# Patient Record
Sex: MALE | Race: WHITE | NOT HISPANIC OR LATINO | Employment: OTHER | URBAN - NONMETROPOLITAN AREA
[De-identification: names, ages, dates, MRNs, and addresses within clinical notes are randomized per-mention and may not be internally consistent; named-entity substitution may affect disease eponyms.]

---

## 2019-08-30 ENCOUNTER — TRANSCRIBE ORDERS (OUTPATIENT)
Dept: ADMINISTRATIVE | Facility: HOSPITAL | Age: 59
End: 2019-08-30

## 2019-08-30 ENCOUNTER — HOSPITAL ENCOUNTER (OUTPATIENT)
Dept: GENERAL RADIOLOGY | Facility: HOSPITAL | Age: 59
Discharge: HOME | End: 2019-08-30
Admitting: FAMILY MEDICINE

## 2019-08-30 DIAGNOSIS — M54.5 LOW BACK PAIN, UNSPECIFIED BACK PAIN LATERALITY, UNSPECIFIED CHRONICITY, WITH SCIATICA PRESENCE UNSPECIFIED: ICD-10-CM

## 2019-08-30 DIAGNOSIS — M54.5 LOW BACK PAIN, UNSPECIFIED BACK PAIN LATERALITY, UNSPECIFIED CHRONICITY, WITH SCIATICA PRESENCE UNSPECIFIED: Primary | ICD-10-CM

## 2019-08-30 PROCEDURE — 72050 X-RAY EXAM NECK SPINE 4/5VWS: CPT | Performed by: RADIOLOGY

## 2019-08-30 PROCEDURE — 72110 X-RAY EXAM L-2 SPINE 4/>VWS: CPT

## 2019-08-30 PROCEDURE — 72050 X-RAY EXAM NECK SPINE 4/5VWS: CPT

## 2019-08-30 PROCEDURE — 72110 X-RAY EXAM L-2 SPINE 4/>VWS: CPT | Performed by: RADIOLOGY

## 2020-05-26 ENCOUNTER — HOSPITAL ENCOUNTER (EMERGENCY)
Facility: HOSPITAL | Age: 60
Discharge: ANOTHER HOSPITAL | End: 2020-05-26
Attending: EMERGENCY MEDICINE | Admitting: EMERGENCY MEDICINE

## 2020-05-26 VITALS
DIASTOLIC BLOOD PRESSURE: 74 MMHG | BODY MASS INDEX: 24.25 KG/M2 | SYSTOLIC BLOOD PRESSURE: 105 MMHG | HEIGHT: 68 IN | TEMPERATURE: 98.7 F | RESPIRATION RATE: 18 BRPM | HEART RATE: 68 BPM | OXYGEN SATURATION: 94 % | WEIGHT: 160 LBS

## 2020-05-26 DIAGNOSIS — T18.128A ESOPHAGEAL OBSTRUCTION DUE TO FOOD IMPACTION: Primary | ICD-10-CM

## 2020-05-26 DIAGNOSIS — K22.2 ESOPHAGEAL OBSTRUCTION DUE TO FOOD IMPACTION: Primary | ICD-10-CM

## 2020-05-26 LAB
ALBUMIN SERPL-MCNC: 4.56 G/DL (ref 3.5–5.2)
ALBUMIN/GLOB SERPL: 1.5 G/DL
ALP SERPL-CCNC: 80 U/L (ref 39–117)
ALT SERPL W P-5'-P-CCNC: 9 U/L (ref 1–41)
ANION GAP SERPL CALCULATED.3IONS-SCNC: 15.5 MMOL/L (ref 5–15)
AST SERPL-CCNC: 12 U/L (ref 1–40)
BASOPHILS # BLD AUTO: 0.04 10*3/MM3 (ref 0–0.2)
BASOPHILS NFR BLD AUTO: 0.4 % (ref 0–1.5)
BILIRUB SERPL-MCNC: 0.9 MG/DL (ref 0.2–1.2)
BUN BLD-MCNC: 23 MG/DL (ref 8–23)
BUN/CREAT SERPL: 20.4 (ref 7–25)
CALCIUM SPEC-SCNC: 9.7 MG/DL (ref 8.6–10.5)
CHLORIDE SERPL-SCNC: 103 MMOL/L (ref 98–107)
CO2 SERPL-SCNC: 21.5 MMOL/L (ref 22–29)
CREAT BLD-MCNC: 1.13 MG/DL (ref 0.76–1.27)
DEPRECATED RDW RBC AUTO: 40.3 FL (ref 37–54)
EOSINOPHIL # BLD AUTO: 0.03 10*3/MM3 (ref 0–0.4)
EOSINOPHIL NFR BLD AUTO: 0.3 % (ref 0.3–6.2)
ERYTHROCYTE [DISTWIDTH] IN BLOOD BY AUTOMATED COUNT: 12.2 % (ref 12.3–15.4)
GFR SERPL CREATININE-BSD FRML MDRD: 66 ML/MIN/1.73
GLOBULIN UR ELPH-MCNC: 3 GM/DL
GLUCOSE BLD-MCNC: 109 MG/DL (ref 65–99)
HCT VFR BLD AUTO: 54.2 % (ref 37.5–51)
HGB BLD-MCNC: 18.3 G/DL (ref 13–17.7)
IMM GRANULOCYTES # BLD AUTO: 0.04 10*3/MM3 (ref 0–0.05)
IMM GRANULOCYTES NFR BLD AUTO: 0.4 % (ref 0–0.5)
LYMPHOCYTES # BLD AUTO: 2 10*3/MM3 (ref 0.7–3.1)
LYMPHOCYTES NFR BLD AUTO: 18.2 % (ref 19.6–45.3)
MCH RBC QN AUTO: 30.6 PG (ref 26.6–33)
MCHC RBC AUTO-ENTMCNC: 33.8 G/DL (ref 31.5–35.7)
MCV RBC AUTO: 90.5 FL (ref 79–97)
MONOCYTES # BLD AUTO: 0.7 10*3/MM3 (ref 0.1–0.9)
MONOCYTES NFR BLD AUTO: 6.4 % (ref 5–12)
NEUTROPHILS # BLD AUTO: 8.16 10*3/MM3 (ref 1.7–7)
NEUTROPHILS NFR BLD AUTO: 74.3 % (ref 42.7–76)
NRBC BLD AUTO-RTO: 0 /100 WBC (ref 0–0.2)
PLATELET # BLD AUTO: 236 10*3/MM3 (ref 140–450)
PMV BLD AUTO: 10.4 FL (ref 6–12)
POTASSIUM BLD-SCNC: 4.1 MMOL/L (ref 3.5–5.2)
PROT SERPL-MCNC: 7.6 G/DL (ref 6–8.5)
RBC # BLD AUTO: 5.99 10*6/MM3 (ref 4.14–5.8)
SODIUM BLD-SCNC: 140 MMOL/L (ref 136–145)
WBC NRBC COR # BLD: 10.97 10*3/MM3 (ref 3.4–10.8)

## 2020-05-26 PROCEDURE — 96374 THER/PROPH/DIAG INJ IV PUSH: CPT

## 2020-05-26 PROCEDURE — 80053 COMPREHEN METABOLIC PANEL: CPT | Performed by: PHYSICIAN ASSISTANT

## 2020-05-26 PROCEDURE — 25010000002 GLUCAGON (HUMAN RECOMBINANT) 1 MG RECONSTITUTED SOLUTION: Performed by: PHYSICIAN ASSISTANT

## 2020-05-26 PROCEDURE — 99283 EMERGENCY DEPT VISIT LOW MDM: CPT

## 2020-05-26 PROCEDURE — 85025 COMPLETE CBC W/AUTO DIFF WBC: CPT | Performed by: PHYSICIAN ASSISTANT

## 2020-05-26 RX ORDER — SODIUM CHLORIDE 0.9 % (FLUSH) 0.9 %
10 SYRINGE (ML) INJECTION AS NEEDED
Status: DISCONTINUED | OUTPATIENT
Start: 2020-05-26 | End: 2020-05-26 | Stop reason: HOSPADM

## 2020-05-26 RX ADMIN — SODIUM CHLORIDE 1000 ML: 9 INJECTION, SOLUTION INTRAVENOUS at 16:45

## 2020-05-26 RX ADMIN — GLUCAGON HYDROCHLORIDE 1 MG: 1 INJECTION, POWDER, FOR SOLUTION INTRAMUSCULAR; INTRAVENOUS; SUBCUTANEOUS at 16:45

## 2020-09-01 ENCOUNTER — APPOINTMENT (OUTPATIENT)
Dept: CT IMAGING | Facility: HOSPITAL | Age: 60
End: 2020-09-01

## 2020-09-01 ENCOUNTER — APPOINTMENT (OUTPATIENT)
Dept: GENERAL RADIOLOGY | Facility: HOSPITAL | Age: 60
End: 2020-09-01

## 2020-09-01 ENCOUNTER — HOSPITAL ENCOUNTER (INPATIENT)
Facility: HOSPITAL | Age: 60
LOS: 9 days | Discharge: HOME | End: 2020-09-10
Attending: FAMILY MEDICINE | Admitting: INTERNAL MEDICINE

## 2020-09-01 DIAGNOSIS — R94.39 ABNORMAL NUCLEAR STRESS TEST: ICD-10-CM

## 2020-09-01 DIAGNOSIS — M54.50 CHRONIC MIDLINE LOW BACK PAIN, UNSPECIFIED WHETHER SCIATICA PRESENT: ICD-10-CM

## 2020-09-01 DIAGNOSIS — G89.29 CHRONIC MIDLINE LOW BACK PAIN, UNSPECIFIED WHETHER SCIATICA PRESENT: ICD-10-CM

## 2020-09-01 DIAGNOSIS — I48.91 ATRIAL FIBRILLATION WITH RVR (HCC): Primary | ICD-10-CM

## 2020-09-01 DIAGNOSIS — I50.21 ACUTE SYSTOLIC HEART FAILURE (HCC): ICD-10-CM

## 2020-09-01 LAB
6-ACETYL MORPHINE: NEGATIVE
ALBUMIN SERPL-MCNC: 4.03 G/DL (ref 3.5–5.2)
ALBUMIN/GLOB SERPL: 1.5 G/DL
ALP SERPL-CCNC: 96 U/L (ref 39–117)
ALT SERPL W P-5'-P-CCNC: 30 U/L (ref 1–41)
AMPHET+METHAMPHET UR QL: NEGATIVE
ANION GAP SERPL CALCULATED.3IONS-SCNC: 9.9 MMOL/L (ref 5–15)
APAP SERPL-MCNC: <5 MCG/ML (ref 10–30)
AST SERPL-CCNC: 24 U/L (ref 1–40)
BACTERIA UR QL AUTO: ABNORMAL /HPF
BARBITURATES UR QL SCN: NEGATIVE
BASOPHILS # BLD AUTO: 0.07 10*3/MM3 (ref 0–0.2)
BASOPHILS NFR BLD AUTO: 0.8 % (ref 0–1.5)
BENZODIAZ UR QL SCN: NEGATIVE
BILIRUB SERPL-MCNC: 0.4 MG/DL (ref 0–1.2)
BILIRUB UR QL STRIP: NEGATIVE
BUN SERPL-MCNC: 11 MG/DL (ref 8–23)
BUN/CREAT SERPL: 9.8 (ref 7–25)
BUPRENORPHINE SERPL-MCNC: POSITIVE NG/ML
CA-I SERPL ISE-MCNC: 1.25 MMOL/L (ref 1.12–1.32)
CALCIUM SPEC-SCNC: 8.9 MG/DL (ref 8.6–10.5)
CANNABINOIDS SERPL QL: POSITIVE
CHLORIDE SERPL-SCNC: 103 MMOL/L (ref 98–107)
CLARITY UR: CLEAR
CO2 SERPL-SCNC: 25.1 MMOL/L (ref 22–29)
COCAINE UR QL: NEGATIVE
COLOR UR: YELLOW
CREAT SERPL-MCNC: 1.12 MG/DL (ref 0.76–1.27)
CRP SERPL-MCNC: 0.3 MG/DL (ref 0–0.5)
D-LACTATE SERPL-SCNC: 1.2 MMOL/L (ref 0.5–2)
DEPRECATED RDW RBC AUTO: 43.6 FL (ref 37–54)
EOSINOPHIL # BLD AUTO: 0.09 10*3/MM3 (ref 0–0.4)
EOSINOPHIL NFR BLD AUTO: 1 % (ref 0.3–6.2)
ERYTHROCYTE [DISTWIDTH] IN BLOOD BY AUTOMATED COUNT: 12.9 % (ref 12.3–15.4)
ETHANOL BLD-MCNC: <10 MG/DL (ref 0–10)
ETHANOL UR QL: <0.01 %
FLUAV AG NPH QL: NEGATIVE
FLUBV AG NPH QL IA: NEGATIVE
GFR SERPL CREATININE-BSD FRML MDRD: 67 ML/MIN/1.73
GLOBULIN UR ELPH-MCNC: 2.8 GM/DL
GLUCOSE SERPL-MCNC: 114 MG/DL (ref 65–99)
GLUCOSE UR STRIP-MCNC: NEGATIVE MG/DL
HCT VFR BLD AUTO: 48.9 % (ref 37.5–51)
HGB BLD-MCNC: 16.1 G/DL (ref 13–17.7)
HGB UR QL STRIP.AUTO: NEGATIVE
HOLD SPECIMEN: NORMAL
HOLD SPECIMEN: NORMAL
HYALINE CASTS UR QL AUTO: ABNORMAL /LPF
IMM GRANULOCYTES # BLD AUTO: 0.05 10*3/MM3 (ref 0–0.05)
IMM GRANULOCYTES NFR BLD AUTO: 0.5 % (ref 0–0.5)
INR PPP: 0.97 (ref 0.9–1.1)
KETONES UR QL STRIP: NEGATIVE
LEUKOCYTE ESTERASE UR QL STRIP.AUTO: NEGATIVE
LIPASE SERPL-CCNC: 53 U/L (ref 13–60)
LYMPHOCYTES # BLD AUTO: 1.77 10*3/MM3 (ref 0.7–3.1)
LYMPHOCYTES NFR BLD AUTO: 19.4 % (ref 19.6–45.3)
MAGNESIUM SERPL-MCNC: 2.3 MG/DL (ref 1.6–2.4)
MCH RBC QN AUTO: 30.2 PG (ref 26.6–33)
MCHC RBC AUTO-ENTMCNC: 32.9 G/DL (ref 31.5–35.7)
MCV RBC AUTO: 91.7 FL (ref 79–97)
METHADONE UR QL SCN: NEGATIVE
MONOCYTES # BLD AUTO: 0.65 10*3/MM3 (ref 0.1–0.9)
MONOCYTES NFR BLD AUTO: 7.1 % (ref 5–12)
NEUTROPHILS NFR BLD AUTO: 6.49 10*3/MM3 (ref 1.7–7)
NEUTROPHILS NFR BLD AUTO: 71.2 % (ref 42.7–76)
NITRITE UR QL STRIP: NEGATIVE
NRBC BLD AUTO-RTO: 0 /100 WBC (ref 0–0.2)
NT-PROBNP SERPL-MCNC: 4722 PG/ML (ref 0–900)
OPIATES UR QL: NEGATIVE
OXYCODONE UR QL SCN: NEGATIVE
PCP UR QL SCN: NEGATIVE
PH UR STRIP.AUTO: 5.5 [PH] (ref 5–8)
PLATELET # BLD AUTO: 276 10*3/MM3 (ref 140–450)
PMV BLD AUTO: 10.3 FL (ref 6–12)
POTASSIUM SERPL-SCNC: 4.7 MMOL/L (ref 3.5–5.2)
PROCALCITONIN SERPL-MCNC: 0.03 NG/ML (ref 0–0.25)
PROT SERPL-MCNC: 6.8 G/DL (ref 6–8.5)
PROT UR QL STRIP: ABNORMAL
PROTHROMBIN TIME: 12.7 SECONDS (ref 11.9–14.1)
RBC # BLD AUTO: 5.33 10*6/MM3 (ref 4.14–5.8)
RBC # UR: ABNORMAL /HPF
REF LAB TEST METHOD: ABNORMAL
SALICYLATES SERPL-MCNC: <0.3 MG/DL
SODIUM SERPL-SCNC: 138 MMOL/L (ref 136–145)
SP GR UR STRIP: 1.02 (ref 1–1.03)
SPERM URNS QL MICRO: ABNORMAL /HPF
SQUAMOUS #/AREA URNS HPF: ABNORMAL /HPF
TROPONIN T SERPL-MCNC: <0.01 NG/ML (ref 0–0.03)
TROPONIN T SERPL-MCNC: <0.01 NG/ML (ref 0–0.03)
UROBILINOGEN UR QL STRIP: ABNORMAL
WBC # BLD AUTO: 9.12 10*3/MM3 (ref 3.4–10.8)
WBC UR QL AUTO: ABNORMAL /HPF
WHOLE BLOOD HOLD SPECIMEN: NORMAL
WHOLE BLOOD HOLD SPECIMEN: NORMAL

## 2020-09-01 PROCEDURE — 71045 X-RAY EXAM CHEST 1 VIEW: CPT | Performed by: RADIOLOGY

## 2020-09-01 PROCEDURE — 83735 ASSAY OF MAGNESIUM: CPT | Performed by: FAMILY MEDICINE

## 2020-09-01 PROCEDURE — 84484 ASSAY OF TROPONIN QUANT: CPT | Performed by: FAMILY MEDICINE

## 2020-09-01 PROCEDURE — 80307 DRUG TEST PRSMV CHEM ANLYZR: CPT | Performed by: FAMILY MEDICINE

## 2020-09-01 PROCEDURE — 87040 BLOOD CULTURE FOR BACTERIA: CPT | Performed by: FAMILY MEDICINE

## 2020-09-01 PROCEDURE — 85610 PROTHROMBIN TIME: CPT | Performed by: FAMILY MEDICINE

## 2020-09-01 PROCEDURE — 99284 EMERGENCY DEPT VISIT MOD MDM: CPT

## 2020-09-01 PROCEDURE — 86140 C-REACTIVE PROTEIN: CPT | Performed by: FAMILY MEDICINE

## 2020-09-01 PROCEDURE — 85025 COMPLETE CBC W/AUTO DIFF WBC: CPT | Performed by: FAMILY MEDICINE

## 2020-09-01 PROCEDURE — 87804 INFLUENZA ASSAY W/OPTIC: CPT | Performed by: FAMILY MEDICINE

## 2020-09-01 PROCEDURE — 81001 URINALYSIS AUTO W/SCOPE: CPT | Performed by: FAMILY MEDICINE

## 2020-09-01 PROCEDURE — 71275 CT ANGIOGRAPHY CHEST: CPT

## 2020-09-01 PROCEDURE — 71045 X-RAY EXAM CHEST 1 VIEW: CPT

## 2020-09-01 PROCEDURE — 83605 ASSAY OF LACTIC ACID: CPT | Performed by: FAMILY MEDICINE

## 2020-09-01 PROCEDURE — 82330 ASSAY OF CALCIUM: CPT | Performed by: FAMILY MEDICINE

## 2020-09-01 PROCEDURE — 0 IOVERSOL 74 % SOLUTION: Performed by: FAMILY MEDICINE

## 2020-09-01 PROCEDURE — 84145 PROCALCITONIN (PCT): CPT | Performed by: FAMILY MEDICINE

## 2020-09-01 PROCEDURE — 83690 ASSAY OF LIPASE: CPT | Performed by: FAMILY MEDICINE

## 2020-09-01 PROCEDURE — 93005 ELECTROCARDIOGRAM TRACING: CPT | Performed by: FAMILY MEDICINE

## 2020-09-01 PROCEDURE — 71275 CT ANGIOGRAPHY CHEST: CPT | Performed by: RADIOLOGY

## 2020-09-01 PROCEDURE — 93010 ELECTROCARDIOGRAM REPORT: CPT | Performed by: INTERNAL MEDICINE

## 2020-09-01 PROCEDURE — 80053 COMPREHEN METABOLIC PANEL: CPT | Performed by: FAMILY MEDICINE

## 2020-09-01 PROCEDURE — 25010000002 ENOXAPARIN PER 10 MG: Performed by: INTERNAL MEDICINE

## 2020-09-01 PROCEDURE — 99223 1ST HOSP IP/OBS HIGH 75: CPT | Performed by: INTERNAL MEDICINE

## 2020-09-01 PROCEDURE — 83880 ASSAY OF NATRIURETIC PEPTIDE: CPT | Performed by: FAMILY MEDICINE

## 2020-09-01 RX ORDER — SODIUM CHLORIDE 0.9 % (FLUSH) 0.9 %
10 SYRINGE (ML) INJECTION EVERY 12 HOURS SCHEDULED
Status: DISCONTINUED | OUTPATIENT
Start: 2020-09-01 | End: 2020-09-10 | Stop reason: HOSPADM

## 2020-09-01 RX ORDER — DILTIAZEM HYDROCHLORIDE 5 MG/ML
10 INJECTION INTRAVENOUS ONCE
Status: COMPLETED | OUTPATIENT
Start: 2020-09-01 | End: 2020-09-01

## 2020-09-01 RX ORDER — HYDROCODONE BITARTRATE AND ACETAMINOPHEN 5; 325 MG/1; MG/1
1 TABLET ORAL ONCE
Status: COMPLETED | OUTPATIENT
Start: 2020-09-02 | End: 2020-09-02

## 2020-09-01 RX ORDER — SODIUM CHLORIDE 0.9 % (FLUSH) 0.9 %
10 SYRINGE (ML) INJECTION AS NEEDED
Status: DISCONTINUED | OUTPATIENT
Start: 2020-09-01 | End: 2020-09-10 | Stop reason: HOSPADM

## 2020-09-01 RX ORDER — ASPIRIN 81 MG/1
81 TABLET ORAL DAILY
COMMUNITY
End: 2020-09-10 | Stop reason: HOSPADM

## 2020-09-01 RX ADMIN — SODIUM CHLORIDE 5 MG/HR: 900 INJECTION, SOLUTION INTRAVENOUS at 12:48

## 2020-09-01 RX ADMIN — ENOXAPARIN SODIUM 40 MG: 40 INJECTION SUBCUTANEOUS at 18:15

## 2020-09-01 RX ADMIN — DILTIAZEM HYDROCHLORIDE 10 MG: 5 INJECTION INTRAVENOUS at 12:48

## 2020-09-01 RX ADMIN — SODIUM CHLORIDE 500 ML: 9 INJECTION, SOLUTION INTRAVENOUS at 12:44

## 2020-09-01 RX ADMIN — IOVERSOL 100 ML: 741 INJECTION INTRA-ARTERIAL; INTRAVENOUS at 15:37

## 2020-09-01 RX ADMIN — SODIUM CHLORIDE 10 MG/HR: 900 INJECTION, SOLUTION INTRAVENOUS at 23:01

## 2020-09-01 RX ADMIN — SODIUM CHLORIDE, PRESERVATIVE FREE 10 ML: 5 INJECTION INTRAVENOUS at 20:14

## 2020-09-01 NOTE — ED NOTES
Consent for contrast obtained at this time and placed onto chart.     Roxana Mo, RN  09/01/20 0962

## 2020-09-01 NOTE — ED PROVIDER NOTES
Subjective   60-year-old male with a history of coronary disease status post stent placement, hypertension presents the emergency room with complaints of shortness of breath and palpitations.  Patient reports that his been feeling short of breath present his heart racing over the past few days.  Patient states he had a cough is nonproductive.  Denies fever chills.  He denies nausea vomiting.  Patient states that he is also noticed lower extremity swelling.  He states that he tried to get to the emergency room when symptoms worsen began but has been unable due to transportation reasons.  He states that he was unable to contact EMS due to not having phone service.  Patient denies chest pressure chest pain at this time.  Patient states that he takes aspirin daily but he denies any other medications.  He states he currently is camping.  He reports his stent was placed in 2003 in [facility]      Shortness of Breath   Severity:  Moderate  Timing:  Constant  Progression:  Worsening  Chronicity:  New  Relieved by:  Nothing  Worsened by:  Nothing  Associated symptoms: abdominal pain and cough    Associated symptoms: no chest pain, no claudication, no diaphoresis, no ear pain, no fever, no headaches, no hemoptysis, no sputum production, no vomiting and no wheezing    Risk factors: tobacco use        Review of Systems   Constitutional: Negative for diaphoresis and fever.   HENT: Negative for ear pain.    Respiratory: Positive for cough and shortness of breath. Negative for hemoptysis, sputum production and wheezing.    Cardiovascular: Positive for leg swelling. Negative for chest pain and claudication.   Gastrointestinal: Positive for abdominal pain. Negative for vomiting.   Neurological: Negative for headaches.   All other systems reviewed and are negative.      Past Medical History:   Diagnosis Date   • Atrial fibrillation (CMS/HCC)    • GERD (gastroesophageal reflux disease)    • Hypertension    • Myocardial infarction  (CMS/[facility])        No Known Allergies    Past Surgical History:   Procedure Laterality Date   • CORONARY STENT PLACEMENT         No family history on file.    Social History     Socioeconomic History   • Marital status: Single     Spouse name: Not on file   • Number of children: Not on file   • Years of education: Not on file   • Highest education level: Not on file   Tobacco Use   • Smoking status: Current Every Day Smoker     Packs/day: 1.00   • Smokeless tobacco: Never Used   Substance and Sexual Activity   • Alcohol use: Never     Frequency: Never   • Drug use: Never   • Sexual activity: Defer           Objective   Physical Exam   Constitutional: He is oriented to person, place, and time. He does not appear ill.   HENT:   Head: Normocephalic and atraumatic.   Eyes: Pupils are equal, round, and reactive to light. EOM are normal.   Neck: Neck supple. No JVD present.   Cardiovascular:   Tachycardic.  Irregularly irregular.  2+ radial pulses bilaterally.   Pulmonary/Chest: He has wheezes in the right upper field, the right middle field and the left upper field. He has no rhonchi.   No accessory muscle use.  Symmetric chest wall movement.  Expiratory wheeze   Abdominal: Soft. Bowel sounds are normal. There is no tenderness.   No guarding no rigidity.  No abdominal bruit.   Musculoskeletal:        Right lower leg: He exhibits edema.        Left lower leg: He exhibits edema.   No calf tenderness.  Pedal edema bilateral extremities.   Neurological: He is alert and oriented to person, place, and time. No cranial nerve deficit.   No focal neurologic deficits.   Skin: Skin is warm and dry.   Psychiatric: He has a normal mood and affect.   Nursing note and vitals reviewed.      Procedures           ED Course  ED Course as of Sep 02 0816   Tue Sep 01, 2020   1225 EKG: Atrial fibrillation with a ventricular response ventricular 153 QRS 84 QTc 402 no ST elevation    [BB]   1233 Patient white blood cell count unremarkable  hemoglobin hematocrit unremarkable.  Patient is noted atrial fibrillation rapid ventricular response.  Have ordered IV diltiazem.    [BB]   1245 Patient ionized calcium coags unremarkable    [BB]   1246 Patient flu negative    [BB]   1301 Patient is noted to have proven heart rate to 100-90 after IV Cardizem and placed on drip.  We will continue to monitor    [BB]   1302 Patient salicylate acetaminophen lipase unremarkable CMP is unremarkable lactic acid unremarkable.  Troponin negative x1 set CRP 0.30    [BB]   1338 Have discussed case with Dr. Melendez who has assumed care    [BB]      ED Course User Index  [BB] Wilson Farmer MD                                           MDM  Number of Diagnoses or Management Options  Atrial fibrillation with RVR (CMS/HCC): new and requires workup     Amount and/or Complexity of Data Reviewed  Clinical lab tests: reviewed and ordered  Tests in the radiology section of CPT®: reviewed and ordered  Tests in the medicine section of CPT®: reviewed and ordered  Discuss the patient with other providers: yes  Independent visualization of images, tracings, or specimens: yes    Risk of Complications, Morbidity, and/or Mortality  Presenting problems: high  Diagnostic procedures: high  Management options: high    Patient Progress  Patient progress: (guarded)      Final diagnoses:   Atrial fibrillation with RVR (CMS/HCC)            Lovely Melendez DO  09/02/20 0816

## 2020-09-01 NOTE — PLAN OF CARE
Problem: Patient Care Overview  Goal: Plan of Care Review  Outcome: Ongoing (interventions implemented as appropriate)  Flowsheets  Taken 9/1/2020 1755  Progress: no change  Taken 9/1/2020 1747  Plan of Care Reviewed With: patient  Note:   Patient arrived to the floor from ER. VSS. Tachycardic at 106. Cardizem drip infusing at 10mg/hr. No distress noted. No needs at this time. Will continue to monitor.   Goal: Individualization and Mutuality  Outcome: Ongoing (interventions implemented as appropriate)  Goal: Discharge Needs Assessment  Outcome: Ongoing (interventions implemented as appropriate)  Goal: Interprofessional Rounds/Family Conf  Outcome: Ongoing (interventions implemented as appropriate)     Problem: Fall Risk (Adult)  Goal: Identify Related Risk Factors and Signs and Symptoms  Outcome: Ongoing (interventions implemented as appropriate)  Goal: Absence of Fall  Outcome: Ongoing (interventions implemented as appropriate)     Problem: Arrhythmia/Dysrhythmia (Symptomatic) (Adult)  Goal: Signs and Symptoms of Listed Potential Problems Will be Absent, Minimized or Managed (Arrhythmia/Dysrhythmia)  Outcome: Ongoing (interventions implemented as appropriate)

## 2020-09-01 NOTE — H&P
[facility] HOSPITALIST HISTORY AND PHYSICAL    Patient Identification:  Name:  Chong White  Age:  60 y.o.  Sex:  male  :  1960  MRN:  [id number]   Visit Number:  [id number]  Primary Care Physician:  Kaya Romo APRN       Chief complaint: Palpitations and shortness of breath    History of presenting illness: Mr. White is a 60 y.o. male who presented to [facility] emergency department on 2020 with a chief complaint of shortness of breath with palpitations.  Patient has a past medical history remarkable for GERD, essential hypertension, coronary artery disease and tobacco abuse.  It is listed in patient's past medical history that he has a history of atrial fibrillation but he denies this.  Patient states approximately 2 days ago he began feeling sudden acute shortness of breath.  He denies any fevers or chills.  Denies any productive cough.  Patient states his shortness of breath is worsened when lying flat and he has had to use an extra pillow to sleep at night.  He denies exertion worsening his symptoms.  Secondary to the beforementioned problems, he did present to the emergency department for further treatment and evaluation.  Initial evaluation in the emergency department did consist of basic laboratory work as well as physical exam and vital signs.  Initial vital signs found patient's blood pressure 140/112, heart rate of 129, respirations 21 and temperature 97.6 °F.  Patient also had EKG which did confirm new onset atrial fibrillation with RVR with a confirmed heart rate of 153.  Initial lab work did include a CBC and BMP.  CBC was within normal limits.  CMP was within normal limits.  Patient did have chest x-ray obtained in the emergency department which demonstrated findings concerning for a left lower lobe pneumonia.  Overall, patient was diagnosed with new onset A. fib with RVR and was admitted for further treatment and  evaluation.  -------------------------------------------------------------------------------------------------------------------  Past Medical History:   Diagnosis Date   • Atrial fibrillation (CMS/HCC)    • GERD (gastroesophageal reflux disease)    • Hypertension    • Myocardial infarction (CMS/HCC)      Past Surgical History:   Procedure Laterality Date   • CORONARY STENT PLACEMENT       No family history on file.  Social History     Socioeconomic History   • Marital status: Single     Spouse name: Not on file   • Number of children: Not on file   • Years of education: Not on file   • Highest education level: Not on file   Tobacco Use   • Smoking status: Current Every Day Smoker     Packs/day: 1.00   • Smokeless tobacco: Never Used   Substance and Sexual Activity   • Alcohol use: Never     Frequency: Never   • Drug use: Never   • Sexual activity: Defer     ---------------------------------------------------------------------------------------------------------------------   Allergies:  Patient has no known allergies.  ---------------------------------------------------------------------------------------------------------------------   Prior to Admission Medications     Prescriptions Last Dose Informant Patient Reported? Taking?    aspirin 81 MG EC tablet 8/31/2020 Self Yes Yes    Take 81 mg by mouth Daily.        Hospital Scheduled Meds:    enoxaparin 40 mg Subcutaneous Q24H   sodium chloride 10 mL Intravenous Q12H       dilTIAZem 5-15 mg/hr Last Rate: 10 mg/hr (09/01/20 1757)     ---------------------------------------------------------------------------------------------------------------------   Review of Systems   On review of systems the patient denies the following unless noted above:     Constitutional:  Fevers, chills, weight change, fatigue     Eyes: Vision changes, headache, double vision, loss of vision     ENT: Runny nose, nose bleeds, ringing in ears, pain with swallowing, sore  throat     Cardiovascular: Chest pains, palpitations, PND, orthopnea     Respiratory: Cough, wheezing, SOA, hemoptysis     GI:  Abdominal pain, diarrhea, constipation, change in stool caliber,    Rectal bleeding, vomiting or nausea     : Difficulty voiding, dysuria, hematuria     Musculoskeletal: Changes of any chronic joint pain, swelling     Skin: Rash, itching, easy bruisability     Neurological: Unilateral weakness, new onset numbness, speech difficulties     Psychiatric: Sadness, tearfulness, feelings of hopelessness, racing thoughts     Endocrine:  Heat or cold intolerance, mood swings, polydipsia, polyphagia,    recent hypoglycemia  --------------------------------------------------------------------------------------------------------------------  Vital Signs:  Temp:  [97.6 °F (36.4 °C)-98.8 °F (37.1 °C)] 98.8 °F (37.1 °C)  Heart Rate:  [] 96  Resp:  [21-22] 22  BP: (119-145)/() 128/89      09/01/20  1213 09/01/20  1717   Weight: 72.6 kg (160 lb) 75.7 kg (166 lb 14.4 oz)     Body mass index is 25.38 kg/m².  ---------------------------------------------------------------------------------------------------------------------   Physical exam:  Constitutional: Well-nourished  male in no apparent distress.     HENT:  Head:  Normocephalic and atraumatic.  Mouth:  Moist mucous membranes.    Eyes:  Conjunctivae and EOM are normal.  Pupils are equal, round, and reactive to light.  No scleral icterus.    Neck:  Neck supple. No thyromegaly.  No JVD present.    Cardiovascular: Irregular rhythm with rate in the low 100s, no murmurs, rubs, clicks or gallops appreciated  Pulmonary/Chest: Bibasilar crackles on inspiration with no wheezes or rhonchi appreciated  Abdominal:  Soft. Nontender. Nondistended  Bowel sounds are normal in all four quadrants. No organomegally appreciated.   Musculoskeletal:  5/5 muscle strength bilateral upper and lower extermties with equal muscle tone and bulk.  2+ pitting  edema bilateral lower extremities, no tenderness, and no deformity.  No red or swollen joints anywhere.    Neurological:  Alert and oriented to person, place, and time. Cranial nerves II-XII intact with no focal defecits.  No facial droop.  No slurred speech.   Skin:  Warm and dry to palpation with no rashes or lesions appreciated.  Peripheral vascular:  2+ radial and pedal pulses in bilateral upper and lower extremities.  Psychiatric:  Alert and oriented x3, demonstrates appropriate judgement and insight.  ---------------------------------------------------------------------------------------------------------------------  EKG: EKG as interpreted by myself appears to demonstrate atrial fibrillation with ventricular rate in the 150s.  No ST elevation or depression is noted.    ---------------------------------------------------------------------------------------------------------------------   Results from last 7 days   Lab Units 09/01/20  1228 09/01/20  1221   CRP mg/dL  --  0.30   LACTATE mmol/L 1.2  --    WBC 10*3/mm3  --  9.12   HEMOGLOBIN g/dL  --  16.1   HEMATOCRIT %  --  48.9   MCV fL  --  91.7   MCHC g/dL  --  32.9   PLATELETS 10*3/mm3  --  276   INR   --  0.97         Results from last 7 days   Lab Units 09/01/20  1221   SODIUM mmol/L 138   POTASSIUM mmol/L 4.7   MAGNESIUM mg/dL 2.3   CHLORIDE mmol/L 103   CO2 mmol/L 25.1   BUN mg/dL 11   CREATININE mg/dL 1.12   EGFR IF NONAFRICN AM mL/min/1.73 67   CALCIUM mg/dL 8.9   GLUCOSE mg/dL 114*   ALBUMIN g/dL 4.03   BILIRUBIN mg/dL 0.4   ALK PHOS U/L 96   AST (SGOT) U/L 24   ALT (SGPT) U/L 30   Estimated Creatinine Clearance: 75.1 mL/min (by C-G formula based on SCr of 1.12 mg/dL).  No results found for: AMMONIA  Results from last 7 days   Lab Units 09/01/20  1541 09/01/20  1221   TROPONIN T ng/mL <0.010 <0.010     Results from last 7 days   Lab Units 09/01/20  1221   PROBNP pg/mL 4,722.0*     No results found for: HGBA1C  No results found for: TSH, FREET4  No  results found for: PREGTESTUR, PREGSERUM, HCG, HCGQUANT  Pain Management Panel     Pain Management Panel Latest Ref Rng & Units 9/1/2020    AMPHETAMINES SCREEN, URINE Negative Negative    BARBITURATES SCREEN Negative Negative    BENZODIAZEPINE SCREEN, URINE Negative Negative    BUPRENORPHINEUR Negative Positive(A)    COCAINE SCREEN, URINE Negative Negative    METHADONE SCREEN, URINE Negative Negative        No results found for: BLOODCX  No results found for: URINECX  No results found for: WOUNDCX  No results found for: STOOLCX      ---------------------------------------------------------------------------------------------------------------------  Imaging Results (Last 7 Days)     Procedure Component Value Units Date/Time    CT Chest Pulmonary Embolism [[id number]] Collected:  09/01/20 1543     Updated:  09/01/20 1547    Narrative:       EXAMINATION: CT CHEST PULMONARY EMBOLISM-      CLINICAL INDICATION:shortness of breath; afib w rvr      COMPARISON: NONE.     TECHNIQUE: Multiple CT axial images were obtained through the level of  pulmonary arteries, following IV contrast administration per CT PE  protocol.  Volume Rendered 3D or MIP images performed.     Radiation dose reduction techniques were utilized per ALARA protocol.  Automated exposure control was initiated through either or MiiPharos or  DoseRight software packages by  protocol.    DOSE (DLP mGy-cm): 574.47 mGy.cm        FINDINGS:     Pulmonary arteries: No evidence of pulmonary embolism.  Lungs: Predominantly linear opacities of the left greater than right  lower lobes predominantly representing atelectasis but superimposed  pneumonia of the left lower lobe also considered. Advanced centrilobular  emphysema noted with upper lung predominance. Interstitial thickening  likely edema.  Heart: Moderate cardiomegaly.  Pericardium: No effusion.  Mediastinum: No masses. No enlarged lymph nodes.  No fluid collections.  Pleura: Small left pleural  effusion and trace right pleural effusion  which are nonloculated.  Major airways: Clear. No intrinsic mass.  Vasculature: No evidence of aneurysm.  Visualized upper abdomen:Low-density liver lesion appears to represent  benign hepatic cysts.  Other: None.  Bones: No acute bony abnormality.       Impression:       1. No PE.  2. Moderate cardiomegaly with interstitial edema and left greater than  right small nonloculated pleural effusions. Findings compatible with  CHF.  3. Moderate to advanced COPD.  4. Other nonacute findings described above.     This report was finalized on 9/1/2020 3:45 PM by Dr. Chidi Dean MD.       XR Chest 1 View [[id number]] Collected:  09/01/20 1415     Updated:  09/01/20 1418    Narrative:       EXAMINATION: XR CHEST 1 VW-      CLINICAL INDICATION:     Simple Sepsis Protocol     TECHNIQUE:  XR CHEST 1 VW-      COMPARISON: NONE      FINDINGS:      Left lower lobe pneumonia.   Heart size, mediastinum, and pulmonary vascularity are unremarkable.   No pneumothorax.   No effusions.   No acute osseous findings.          Impression:       Left lower lobe pneumonia.     This report was finalized on 9/1/2020 2:16 PM by Dr. Chidi Dean MD.             I have personally reviewed the radiology images and read the final radiology report.  ---------------------------------------------------------------------------------------------------------------------  Assessment and Plan:    1.  New onset A. Fib -patient will be admitted to progressive care unit and placed on telemetry.  Patient will be continued on Cardizem drip at 10 mg/hour.  Cardiology has been consulted, CHADS-Vasc at this time is low and patient does not require anticoagulation at this time.  Transthoracic echocardiogram currently pending.    2.  Questionable left lower lobe pneumonia -patient with no hypoxia, no fever, white blood cell count within normal limits and normal CRP and lactic acid.  Will obtain pro calcitonin as well.  1  view chest x-ray likely demonstrates left lower lobe consolidation consistent with atelectasis versus mild pulmonary edema from what appears to be new onset CHF from A. fib.  Defer antibiotic coverage at this time.    3.  Essential hypertension -well controlled at this time, will restart home medications once available    4.  History of CAD -continue medical management, cardiology also consulted.    5.  Tobacco abuse -encourage cessation    Napoleon Del Angel,   09/01/20  19:04

## 2020-09-02 ENCOUNTER — APPOINTMENT (OUTPATIENT)
Dept: CARDIOLOGY | Facility: HOSPITAL | Age: 60
End: 2020-09-02

## 2020-09-02 LAB
ANION GAP SERPL CALCULATED.3IONS-SCNC: 10.8 MMOL/L (ref 5–15)
APTT PPP: 28.3 SECONDS (ref 25.6–35.3)
APTT PPP: 35.1 SECONDS (ref 25.6–35.3)
BASOPHILS # BLD AUTO: 0.06 10*3/MM3 (ref 0–0.2)
BASOPHILS NFR BLD AUTO: 0.7 % (ref 0–1.5)
BH CV ECHO MEAS - % IVS THICK: 0.81 %
BH CV ECHO MEAS - % LVPW THICK: 8.4 %
BH CV ECHO MEAS - ACS: 2.2 CM
BH CV ECHO MEAS - AO MAX PG: 3.6 MMHG
BH CV ECHO MEAS - AO MEAN PG: 2 MMHG
BH CV ECHO MEAS - AO ROOT AREA (BSA CORRECTED): 1.9
BH CV ECHO MEAS - AO ROOT AREA: 9.6 CM^2
BH CV ECHO MEAS - AO ROOT DIAM: 3.5 CM
BH CV ECHO MEAS - AO V2 MAX: 94.8 CM/SEC
BH CV ECHO MEAS - AO V2 MEAN: 66.9 CM/SEC
BH CV ECHO MEAS - AO V2 VTI: 13.3 CM
BH CV ECHO MEAS - BSA(HAYCOCK): 1.9 M^2
BH CV ECHO MEAS - BSA: 1.9 M^2
BH CV ECHO MEAS - BZI_BMI: 25.2 KILOGRAMS/M^2
BH CV ECHO MEAS - BZI_METRIC_HEIGHT: 172.7 CM
BH CV ECHO MEAS - BZI_METRIC_WEIGHT: 75.3 KG
BH CV ECHO MEAS - EDV(CUBED): 166.4 ML
BH CV ECHO MEAS - EDV(MOD-SP4): 95.3 ML
BH CV ECHO MEAS - EDV(TEICH): 147.4 ML
BH CV ECHO MEAS - EF(CUBED): 21.4 %
BH CV ECHO MEAS - EF(MOD-SP4): 20.5 %
BH CV ECHO MEAS - EF(TEICH): 17 %
BH CV ECHO MEAS - ESV(CUBED): 130.7 ML
BH CV ECHO MEAS - ESV(MOD-SP4): 75.8 ML
BH CV ECHO MEAS - ESV(TEICH): 122.4 ML
BH CV ECHO MEAS - FS: 7.7 %
BH CV ECHO MEAS - IVS/LVPW: 0.87
BH CV ECHO MEAS - IVSD: 0.99 CM
BH CV ECHO MEAS - IVSS: 1 CM
BH CV ECHO MEAS - LA DIMENSION: 3.8 CM
BH CV ECHO MEAS - LA/AO: 1.1
BH CV ECHO MEAS - LV DIASTOLIC VOL/BSA (35-75): 50.5 ML/M^2
BH CV ECHO MEAS - LV MASS(C)D: 231.2 GRAMS
BH CV ECHO MEAS - LV MASS(C)DI: 122.4 GRAMS/M^2
BH CV ECHO MEAS - LV MASS(C)S: 216 GRAMS
BH CV ECHO MEAS - LV MASS(C)SI: 114.4 GRAMS/M^2
BH CV ECHO MEAS - LV SYSTOLIC VOL/BSA (12-30): 40.1 ML/M^2
BH CV ECHO MEAS - LVIDD: 5.5 CM
BH CV ECHO MEAS - LVIDS: 5.1 CM
BH CV ECHO MEAS - LVLD AP4: 6.5 CM
BH CV ECHO MEAS - LVLS AP4: 6 CM
BH CV ECHO MEAS - LVOT AREA (M): 4.2 CM^2
BH CV ECHO MEAS - LVOT AREA: 4.2 CM^2
BH CV ECHO MEAS - LVOT DIAM: 2.3 CM
BH CV ECHO MEAS - LVPWD: 1.1 CM
BH CV ECHO MEAS - LVPWS: 1.2 CM
BH CV ECHO MEAS - MV E MAX VEL: 94 CM/SEC
BH CV ECHO MEAS - PA ACC TIME: 0.09 SEC
BH CV ECHO MEAS - PA PR(ACCEL): 40.8 MMHG
BH CV ECHO MEAS - RAP SYSTOLE: 10 MMHG
BH CV ECHO MEAS - RVSP: 35 MMHG
BH CV ECHO MEAS - SI(AO): 67.8 ML/M^2
BH CV ECHO MEAS - SI(CUBED): 18.9 ML/M^2
BH CV ECHO MEAS - SI(MOD-SP4): 10.3 ML/M^2
BH CV ECHO MEAS - SI(TEICH): 13.2 ML/M^2
BH CV ECHO MEAS - SV(AO): 128 ML
BH CV ECHO MEAS - SV(CUBED): 35.7 ML
BH CV ECHO MEAS - SV(MOD-SP4): 19.5 ML
BH CV ECHO MEAS - SV(TEICH): 25 ML
BH CV ECHO MEAS - TR MAX VEL: 245 CM/SEC
BUN SERPL-MCNC: 15 MG/DL (ref 8–23)
BUN/CREAT SERPL: 17.6 (ref 7–25)
CALCIUM SPEC-SCNC: 8.7 MG/DL (ref 8.6–10.5)
CHLORIDE SERPL-SCNC: 106 MMOL/L (ref 98–107)
CO2 SERPL-SCNC: 22.2 MMOL/L (ref 22–29)
CREAT SERPL-MCNC: 0.85 MG/DL (ref 0.76–1.27)
DEPRECATED RDW RBC AUTO: 44.4 FL (ref 37–54)
DEPRECATED RDW RBC AUTO: 45.2 FL (ref 37–54)
EOSINOPHIL # BLD AUTO: 0.11 10*3/MM3 (ref 0–0.4)
EOSINOPHIL NFR BLD AUTO: 1.3 % (ref 0.3–6.2)
ERYTHROCYTE [DISTWIDTH] IN BLOOD BY AUTOMATED COUNT: 13 % (ref 12.3–15.4)
ERYTHROCYTE [DISTWIDTH] IN BLOOD BY AUTOMATED COUNT: 13.2 % (ref 12.3–15.4)
GFR SERPL CREATININE-BSD FRML MDRD: 92 ML/MIN/1.73
GLUCOSE SERPL-MCNC: 108 MG/DL (ref 65–99)
HCT VFR BLD AUTO: 44.4 % (ref 37.5–51)
HCT VFR BLD AUTO: 44.6 % (ref 37.5–51)
HGB BLD-MCNC: 14.1 G/DL (ref 13–17.7)
HGB BLD-MCNC: 14.7 G/DL (ref 13–17.7)
IMM GRANULOCYTES # BLD AUTO: 0.04 10*3/MM3 (ref 0–0.05)
IMM GRANULOCYTES NFR BLD AUTO: 0.5 % (ref 0–0.5)
INR PPP: 1.05 (ref 0.9–1.1)
LYMPHOCYTES # BLD AUTO: 1.5 10*3/MM3 (ref 0.7–3.1)
LYMPHOCYTES NFR BLD AUTO: 18.2 % (ref 19.6–45.3)
MAXIMAL PREDICTED HEART RATE: 160 BPM
MCH RBC QN AUTO: 30 PG (ref 26.6–33)
MCH RBC QN AUTO: 30.5 PG (ref 26.6–33)
MCHC RBC AUTO-ENTMCNC: 31.8 G/DL (ref 31.5–35.7)
MCHC RBC AUTO-ENTMCNC: 33 G/DL (ref 31.5–35.7)
MCV RBC AUTO: 92.5 FL (ref 79–97)
MCV RBC AUTO: 94.5 FL (ref 79–97)
MONOCYTES # BLD AUTO: 0.54 10*3/MM3 (ref 0.1–0.9)
MONOCYTES NFR BLD AUTO: 6.6 % (ref 5–12)
NEUTROPHILS NFR BLD AUTO: 5.99 10*3/MM3 (ref 1.7–7)
NEUTROPHILS NFR BLD AUTO: 72.7 % (ref 42.7–76)
NRBC BLD AUTO-RTO: 0 /100 WBC (ref 0–0.2)
PLATELET # BLD AUTO: 212 10*3/MM3 (ref 140–450)
PLATELET # BLD AUTO: 219 10*3/MM3 (ref 140–450)
PMV BLD AUTO: 10.2 FL (ref 6–12)
PMV BLD AUTO: 10.2 FL (ref 6–12)
POTASSIUM SERPL-SCNC: 4.5 MMOL/L (ref 3.5–5.2)
PROTHROMBIN TIME: 13.5 SECONDS (ref 11.9–14.1)
RBC # BLD AUTO: 4.7 10*6/MM3 (ref 4.14–5.8)
RBC # BLD AUTO: 4.82 10*6/MM3 (ref 4.14–5.8)
SODIUM SERPL-SCNC: 139 MMOL/L (ref 136–145)
STRESS TARGET HR: 136 BPM
WBC # BLD AUTO: 8.24 10*3/MM3 (ref 3.4–10.8)
WBC # BLD AUTO: 8.53 10*3/MM3 (ref 3.4–10.8)

## 2020-09-02 PROCEDURE — 93306 TTE W/DOPPLER COMPLETE: CPT | Performed by: SPECIALIST

## 2020-09-02 PROCEDURE — 80048 BASIC METABOLIC PNL TOTAL CA: CPT | Performed by: INTERNAL MEDICINE

## 2020-09-02 PROCEDURE — 93306 TTE W/DOPPLER COMPLETE: CPT

## 2020-09-02 PROCEDURE — 85610 PROTHROMBIN TIME: CPT | Performed by: NURSE PRACTITIONER

## 2020-09-02 PROCEDURE — 25010000002 AZITHROMYCIN PER 500 MG: Performed by: INTERNAL MEDICINE

## 2020-09-02 PROCEDURE — 85025 COMPLETE CBC W/AUTO DIFF WBC: CPT | Performed by: NURSE PRACTITIONER

## 2020-09-02 PROCEDURE — 94799 UNLISTED PULMONARY SVC/PX: CPT

## 2020-09-02 PROCEDURE — 25010000002 HEPARIN (PORCINE) PER 1000 UNITS: Performed by: INTERNAL MEDICINE

## 2020-09-02 PROCEDURE — 85730 THROMBOPLASTIN TIME PARTIAL: CPT | Performed by: NURSE PRACTITIONER

## 2020-09-02 PROCEDURE — 25010000002 CEFTRIAXONE PER 250 MG: Performed by: INTERNAL MEDICINE

## 2020-09-02 PROCEDURE — 99253 IP/OBS CNSLTJ NEW/EST LOW 45: CPT | Performed by: SPECIALIST

## 2020-09-02 PROCEDURE — 25010000002 HEPARIN (PORCINE) PER 1000 UNITS: Performed by: NURSE PRACTITIONER

## 2020-09-02 PROCEDURE — 99232 SBSQ HOSP IP/OBS MODERATE 35: CPT | Performed by: INTERNAL MEDICINE

## 2020-09-02 PROCEDURE — 85027 COMPLETE CBC AUTOMATED: CPT | Performed by: INTERNAL MEDICINE

## 2020-09-02 PROCEDURE — 85730 THROMBOPLASTIN TIME PARTIAL: CPT | Performed by: INTERNAL MEDICINE

## 2020-09-02 RX ORDER — HEPARIN SODIUM 5000 [USP'U]/ML
60 INJECTION, SOLUTION INTRAVENOUS; SUBCUTANEOUS ONCE
Status: DISCONTINUED | OUTPATIENT
Start: 2020-09-02 | End: 2020-09-05 | Stop reason: SDUPTHER

## 2020-09-02 RX ORDER — ASPIRIN 81 MG/1
81 TABLET ORAL DAILY
Status: DISCONTINUED | OUTPATIENT
Start: 2020-09-02 | End: 2020-09-08

## 2020-09-02 RX ORDER — FUROSEMIDE 20 MG/1
20 TABLET ORAL
Status: DISCONTINUED | OUTPATIENT
Start: 2020-09-02 | End: 2020-09-03

## 2020-09-02 RX ORDER — HYDROCODONE BITARTRATE AND ACETAMINOPHEN 5; 325 MG/1; MG/1
1 TABLET ORAL EVERY 6 HOURS PRN
Status: DISPENSED | OUTPATIENT
Start: 2020-09-02 | End: 2020-09-09

## 2020-09-02 RX ORDER — HEPARIN SODIUM 10000 [USP'U]/100ML
12 INJECTION, SOLUTION INTRAVENOUS
Status: DISCONTINUED | OUTPATIENT
Start: 2020-09-02 | End: 2020-09-05 | Stop reason: SDUPTHER

## 2020-09-02 RX ORDER — HEPARIN SODIUM 5000 [USP'U]/ML
30 INJECTION, SOLUTION INTRAVENOUS; SUBCUTANEOUS AS NEEDED
Status: DISCONTINUED | OUTPATIENT
Start: 2020-09-02 | End: 2020-09-05 | Stop reason: SDUPTHER

## 2020-09-02 RX ORDER — HEPARIN SODIUM 5000 [USP'U]/ML
60 INJECTION, SOLUTION INTRAVENOUS; SUBCUTANEOUS AS NEEDED
Status: DISCONTINUED | OUTPATIENT
Start: 2020-09-02 | End: 2020-09-05 | Stop reason: SDUPTHER

## 2020-09-02 RX ORDER — GUAIFENESIN 600 MG/1
1200 TABLET, EXTENDED RELEASE ORAL EVERY 12 HOURS SCHEDULED
Status: DISCONTINUED | OUTPATIENT
Start: 2020-09-02 | End: 2020-09-10 | Stop reason: HOSPADM

## 2020-09-02 RX ORDER — ATORVASTATIN CALCIUM 40 MG/1
40 TABLET, FILM COATED ORAL NIGHTLY
Status: DISCONTINUED | OUTPATIENT
Start: 2020-09-02 | End: 2020-09-10 | Stop reason: HOSPADM

## 2020-09-02 RX ADMIN — SODIUM CHLORIDE, PRESERVATIVE FREE 10 ML: 5 INJECTION INTRAVENOUS at 08:05

## 2020-09-02 RX ADMIN — AZITHROMYCIN MONOHYDRATE 500 MG: 500 INJECTION, POWDER, LYOPHILIZED, FOR SOLUTION INTRAVENOUS at 20:23

## 2020-09-02 RX ADMIN — SODIUM CHLORIDE, PRESERVATIVE FREE 10 ML: 5 INJECTION INTRAVENOUS at 20:23

## 2020-09-02 RX ADMIN — CEFTRIAXONE 1 G: 1 INJECTION, POWDER, FOR SOLUTION INTRAMUSCULAR; INTRAVENOUS at 20:23

## 2020-09-02 RX ADMIN — METOPROLOL TARTRATE 25 MG: 25 TABLET, FILM COATED ORAL at 20:23

## 2020-09-02 RX ADMIN — SODIUM CHLORIDE 5 MG/HR: 900 INJECTION, SOLUTION INTRAVENOUS at 10:21

## 2020-09-02 RX ADMIN — HEPARIN SODIUM 4500 UNITS: 5000 INJECTION INTRAVENOUS; SUBCUTANEOUS at 22:58

## 2020-09-02 RX ADMIN — ATORVASTATIN CALCIUM 40 MG: 40 TABLET, FILM COATED ORAL at 20:23

## 2020-09-02 RX ADMIN — GUAIFENESIN 1200 MG: 600 TABLET, EXTENDED RELEASE ORAL at 22:57

## 2020-09-02 RX ADMIN — ASPIRIN 81 MG: 81 TABLET, COATED ORAL at 10:21

## 2020-09-02 RX ADMIN — HYDROCODONE BITARTRATE AND ACETAMINOPHEN 1 TABLET: 5; 325 TABLET ORAL at 10:21

## 2020-09-02 RX ADMIN — HYDROCODONE BITARTRATE AND ACETAMINOPHEN 1 TABLET: 5; 325 TABLET ORAL at 20:24

## 2020-09-02 RX ADMIN — HYDROCODONE BITARTRATE AND ACETAMINOPHEN 1 TABLET: 5; 325 TABLET ORAL at 00:26

## 2020-09-02 RX ADMIN — FUROSEMIDE 20 MG: 20 TABLET ORAL at 18:14

## 2020-09-02 RX ADMIN — FUROSEMIDE 20 MG: 20 TABLET ORAL at 13:51

## 2020-09-02 RX ADMIN — HEPARIN SODIUM 4500 UNITS: 5000 INJECTION INTRAVENOUS; SUBCUTANEOUS at 13:53

## 2020-09-02 RX ADMIN — HEPARIN SODIUM 12 UNITS/KG/HR: 10000 INJECTION, SOLUTION INTRAVENOUS at 13:52

## 2020-09-02 RX ADMIN — METOPROLOL TARTRATE 25 MG: 25 TABLET, FILM COATED ORAL at 13:51

## 2020-09-02 NOTE — PAYOR COMM NOTE
"  [facility]  NPI: [id number]    Utilization Review   Contact:Aleida Ramos MSN, APRN, NP-C  Phone: [phone]  Fax: [phone]    hum[employer] mcaid/Attn: nurse review  Inpatient Auth Req  REF: [id number]  Chong White (60 y.o. Male)     Date of Birth Social Security Number Address Home Phone MRN    1960  [address] [facility] [address] [phone] [id number]    Sikhism Marital Status          Samaritan Single       Admission Date Admission Type Admitting Provider Attending Provider Department, Room/Bed    20 Emergency Napoleon Del Angel DO Mullins, Thomas Anthony, DO [facility] PROGRESS CARE, P222/1P    Discharge Date Discharge Disposition Discharge Destination                       Attending Provider:  Napoleon Del Angel DO    Allergies:  No Known Allergies    Isolation:  None   Infection:  None   Code Status:  CPR    Ht:  172.7 cm (68\")   Wt:  75.3 kg (166 lb)    Admission Cmt:  None   Principal Problem:  None                Active Insurance as of 2020     Primary Coverage     Payor Plan Insurance Group Employer/Plan Group    [id number]     Payor Plan Address Payor Plan Phone Number Payor Plan Fax Number Effective Dates    [employer] Claims Office - [address] [address] [phone]  2020 - None Entered    [facility] [id number]       Subscriber Name Subscriber Birth Date Member ID       CHONG WHITE 1960 [id number]                 Emergency Contacts      (Rel.) Home Phone Work Phone Mobile Phone    KENDRICK WHITE (Sister) -- -- [phone]               History & Physical      Napoleon Del Angel DO at 20 1904              [facility] HOSPITALIST HISTORY AND PHYSICAL    Patient Identification:  Name:  Chong White  Age:  60 y.o.  Sex:  male  :  1960  MRN:  [id number]   Visit Number:  [id number]  Primary Care Physician:  Kaya Romo, SAMARA       Chief complaint: " Palpitations and shortness of breath    History of presenting illness: Mr. White is a 60 y.o. male who presented to [facility] emergency department on 9/1/2020 with a chief complaint of shortness of breath with palpitations.  Patient has a past medical history remarkable for GERD, essential hypertension, coronary artery disease and tobacco abuse.  It is listed in patient's past medical history that he has a history of atrial fibrillation but he denies this.  Patient states approximately 2 days ago he began feeling sudden acute shortness of breath.  He denies any fevers or chills.  Denies any productive cough.  Patient states his shortness of breath is worsened when lying flat and he has had to use an extra pillow to sleep at night.  He denies exertion worsening his symptoms.  Secondary to the beforementioned problems, he did present to the emergency department for further treatment and evaluation.  Initial evaluation in the emergency department did consist of basic laboratory work as well as physical exam and vital signs.  Initial vital signs found patient's blood pressure 140/112, heart rate of 129, respirations 21 and temperature 97.6 °F.  Patient also had EKG which did confirm new onset atrial fibrillation with RVR with a confirmed heart rate of 153.  Initial lab work did include a CBC and BMP.  CBC was within normal limits.  CMP was within normal limits.  Patient did have chest x-ray obtained in the emergency department which demonstrated findings concerning for a left lower lobe pneumonia.  Overall, patient was diagnosed with new onset A. fib with RVR and was admitted for further treatment and evaluation.  -------------------------------------------------------------------------------------------------------------------  Past Medical History:   Diagnosis Date   • Atrial fibrillation (CMS/HCC)    • GERD (gastroesophageal reflux disease)    • Hypertension    • Myocardial infarction (CMS/HCC)      Past  Surgical History:   Procedure Laterality Date   • CORONARY STENT PLACEMENT       No family history on file.  Social History     Socioeconomic History   • Marital status: Single     Spouse name: Not on file   • Number of children: Not on file   • Years of education: Not on file   • Highest education level: Not on file   Tobacco Use   • Smoking status: Current Every Day Smoker     Packs/day: 1.00   • Smokeless tobacco: Never Used   Substance and Sexual Activity   • Alcohol use: Never     Frequency: Never   • Drug use: Never   • Sexual activity: Defer     ---------------------------------------------------------------------------------------------------------------------   Allergies:  Patient has no known allergies.  ---------------------------------------------------------------------------------------------------------------------   Prior to Admission Medications     Prescriptions Last Dose Informant Patient Reported? Taking?    aspirin 81 MG EC tablet 8/31/2020 Self Yes Yes    Take 81 mg by mouth Daily.        Hospital Scheduled Meds:    enoxaparin 40 mg Subcutaneous Q24H   sodium chloride 10 mL Intravenous Q12H       dilTIAZem 5-15 mg/hr Last Rate: 10 mg/hr (09/01/20 1757)     ---------------------------------------------------------------------------------------------------------------------   Review of Systems   On review of systems the patient denies the following unless noted above:     Constitutional:  Fevers, chills, weight change, fatigue     Eyes: Vision changes, headache, double vision, loss of vision     ENT: Runny nose, nose bleeds, ringing in ears, pain with swallowing, sore throat     Cardiovascular: Chest pains, palpitations, PND, orthopnea     Respiratory: Cough, wheezing, SOA, hemoptysis     GI:  Abdominal pain, diarrhea, constipation, change in stool caliber,    Rectal bleeding, vomiting or nausea     : Difficulty voiding, dysuria, hematuria     Musculoskeletal: Changes of any chronic joint pain,  swelling     Skin: Rash, itching, easy bruisability     Neurological: Unilateral weakness, new onset numbness, speech difficulties     Psychiatric: Sadness, tearfulness, feelings of hopelessness, racing thoughts     Endocrine:  Heat or cold intolerance, mood swings, polydipsia, polyphagia,    recent hypoglycemia  --------------------------------------------------------------------------------------------------------------------  Vital Signs:  Temp:  [97.6 °F (36.4 °C)-98.8 °F (37.1 °C)] 98.8 °F (37.1 °C)  Heart Rate:  [] 96  Resp:  [21-22] 22  BP: (119-145)/() 128/89      09/01/20  1213 09/01/20  1717   Weight: 72.6 kg (160 lb) 75.7 kg (166 lb 14.4 oz)     Body mass index is 25.38 kg/m².  ---------------------------------------------------------------------------------------------------------------------   Physical exam:  Constitutional: Well-nourished  male in no apparent distress.     HENT:  Head:  Normocephalic and atraumatic.  Mouth:  Moist mucous membranes.    Eyes:  Conjunctivae and EOM are normal.  Pupils are equal, round, and reactive to light.  No scleral icterus.    Neck:  Neck supple. No thyromegaly.  No JVD present.    Cardiovascular: Irregular rhythm with rate in the low 100s, no murmurs, rubs, clicks or gallops appreciated  Pulmonary/Chest: Bibasilar crackles on inspiration with no wheezes or rhonchi appreciated  Abdominal:  Soft. Nontender. Nondistended  Bowel sounds are normal in all four quadrants. No organomegally appreciated.   Musculoskeletal:  5/5 muscle strength bilateral upper and lower extermties with equal muscle tone and bulk.  2+ pitting edema bilateral lower extremities, no tenderness, and no deformity.  No red or swollen joints anywhere.    Neurological:  Alert and oriented to person, place, and time. Cranial nerves II-XII intact with no focal defecits.  No facial droop.  No slurred speech.   Skin:  Warm and dry to palpation with no rashes or lesions  appreciated.  Peripheral vascular:  2+ radial and pedal pulses in bilateral upper and lower extremities.  Psychiatric:  Alert and oriented x3, demonstrates appropriate judgement and insight.  ---------------------------------------------------------------------------------------------------------------------  EKG: EKG as interpreted by myself appears to demonstrate atrial fibrillation with ventricular rate in the 150s.  No ST elevation or depression is noted.    ---------------------------------------------------------------------------------------------------------------------   Results from last 7 days   Lab Units 09/01/20  1228 09/01/20  1221   CRP mg/dL  --  0.30   LACTATE mmol/L 1.2  --    WBC 10*3/mm3  --  9.12   HEMOGLOBIN g/dL  --  16.1   HEMATOCRIT %  --  48.9   MCV fL  --  91.7   MCHC g/dL  --  32.9   PLATELETS 10*3/mm3  --  276   INR   --  0.97         Results from last 7 days   Lab Units 09/01/20  1221   SODIUM mmol/L 138   POTASSIUM mmol/L 4.7   MAGNESIUM mg/dL 2.3   CHLORIDE mmol/L 103   CO2 mmol/L 25.1   BUN mg/dL 11   CREATININE mg/dL 1.12   EGFR IF NONAFRICN AM mL/min/1.73 67   CALCIUM mg/dL 8.9   GLUCOSE mg/dL 114*   ALBUMIN g/dL 4.03   BILIRUBIN mg/dL 0.4   ALK PHOS U/L 96   AST (SGOT) U/L 24   ALT (SGPT) U/L 30   Estimated Creatinine Clearance: 75.1 mL/min (by C-G formula based on SCr of 1.12 mg/dL).  No results found for: AMMONIA  Results from last 7 days   Lab Units 09/01/20  1541 09/01/20  1221   TROPONIN T ng/mL <0.010 <0.010     Results from last 7 days   Lab Units 09/01/20  1221   PROBNP pg/mL 4,722.0*     No results found for: HGBA1C  No results found for: TSH, FREET4  No results found for: PREGTESTUR, PREGSERUM, HCG, HCGQUANT  Pain Management Panel     Pain Management Panel Latest Ref Rng & Units 9/1/2020    AMPHETAMINES SCREEN, URINE Negative Negative    BARBITURATES SCREEN Negative Negative    BENZODIAZEPINE SCREEN, URINE Negative Negative    BUPRENORPHINEUR Negative Positive(A)     COCAINE SCREEN, URINE Negative Negative    METHADONE SCREEN, URINE Negative Negative        No results found for: BLOODCX  No results found for: URINECX  No results found for: WOUNDCX  No results found for: STOOLCX      ---------------------------------------------------------------------------------------------------------------------  Imaging Results (Last 7 Days)     Procedure Component Value Units Date/Time    CT Chest Pulmonary Embolism [[id number]] Collected:  09/01/20 1543     Updated:  09/01/20 1547    Narrative:       EXAMINATION: CT CHEST PULMONARY EMBOLISM-      CLINICAL INDICATION:shortness of breath; afib w rvr      COMPARISON: NONE.     TECHNIQUE: Multiple CT axial images were obtained through the level of  pulmonary arteries, following IV contrast administration per CT PE  protocol.  Volume Rendered 3D or MIP images performed.     Radiation dose reduction techniques were utilized per ALARA protocol.  Automated exposure control was initiated through either or [employer] or  Dose[employer] software packages by  protocol.    DOSE (DLP mGy-cm): 574.47 mGy.cm        FINDINGS:     Pulmonary arteries: No evidence of pulmonary embolism.  Lungs: Predominantly linear opacities of the left greater than right  lower lobes predominantly representing atelectasis but superimposed  pneumonia of the left lower lobe also considered. Advanced centrilobular  emphysema noted with upper lung predominance. Interstitial thickening  likely edema.  Heart: Moderate cardiomegaly.  Pericardium: No effusion.  Mediastinum: No masses. No enlarged lymph nodes.  No fluid collections.  Pleura: Small left pleural effusion and trace right pleural effusion  which are nonloculated.  Major airways: Clear. No intrinsic mass.  Vasculature: No evidence of aneurysm.  Visualized upper abdomen:Low-density liver lesion appears to represent  benign hepatic cysts.  Other: None.  Bones: No acute bony abnormality.       Impression:       1. No  PE.  2. Moderate cardiomegaly with interstitial edema and left greater than  right small nonloculated pleural effusions. Findings compatible with  CHF.  3. Moderate to advanced COPD.  4. Other nonacute findings described above.     This report was finalized on 9/1/2020 3:45 PM by Dr. Chidi Dean MD.       XR Chest 1 View [[id number]] Collected:  09/01/20 1415     Updated:  09/01/20 1418    Narrative:       EXAMINATION: XR CHEST 1 VW-      CLINICAL INDICATION:     Simple Sepsis Protocol     TECHNIQUE:  XR CHEST 1 VW-      COMPARISON: NONE      FINDINGS:      Left lower lobe pneumonia.   Heart size, mediastinum, and pulmonary vascularity are unremarkable.   No pneumothorax.   No effusions.   No acute osseous findings.          Impression:       Left lower lobe pneumonia.     This report was finalized on 9/1/2020 2:16 PM by Dr. Chidi Dean MD.             I have personally reviewed the radiology images and read the final radiology report.  ---------------------------------------------------------------------------------------------------------------------  Assessment and Plan:    1.  New onset A. Fib -patient will be admitted to progressive care unit and placed on telemetry.  Patient will be continued on Cardizem drip at 10 mg/hour.  Cardiology has been consulted, CHADS-Vasc at this time is low and patient does not require anticoagulation at this time.  Transthoracic echocardiogram currently pending.    2.  Questionable left lower lobe pneumonia -patient with no hypoxia, no fever, white blood cell count within normal limits and normal CRP and lactic acid.  Will obtain pro calcitonin as well.  1 view chest x-ray likely demonstrates left lower lobe consolidation consistent with atelectasis versus mild pulmonary edema from what appears to be new onset CHF from A. fib.  Defer antibiotic coverage at this time.    3.  Essential hypertension -well controlled at this time, will restart home medications once  available    4.  History of CAD -continue medical management, cardiology also consulted.    5.  Tobacco abuse -encourage cessation    Napoleon Del Angel DO  09/01/20  19:04    Electronically signed by Napoleon Del Angel DO at 09/01/20 1912          Emergency Department Notes      Lovely Melendez,  at 09/01/20 1227          Subjective   60-year-old male with a history of coronary disease status post stent placement, hypertension presents the emergency room with complaints of shortness of breath and palpitations.  Patient reports that his been feeling short of breath present his heart racing over the past few days.  Patient states he had a cough is nonproductive.  Denies fever chills.  He denies nausea vomiting.  Patient states that he is also noticed lower extremity swelling.  He states that he tried to get to the emergency room when symptoms worsen began but has been unable due to transportation reasons.  He states that he was unable to contact EMS due to not having phone service.  Patient denies chest pressure chest pain at this time.  Patient states that he takes aspirin daily but he denies any other medications.  He states he currently is camping.  He reports his stent was placed in 2003 in [facility]      Shortness of Breath   Severity:  Moderate  Timing:  Constant  Progression:  Worsening  Chronicity:  New  Relieved by:  Nothing  Worsened by:  Nothing  Associated symptoms: abdominal pain and cough    Associated symptoms: no chest pain, no claudication, no diaphoresis, no ear pain, no fever, no headaches, no hemoptysis, no sputum production, no vomiting and no wheezing    Risk factors: tobacco use        Review of Systems   Constitutional: Negative for diaphoresis and fever.   HENT: Negative for ear pain.    Respiratory: Positive for cough and shortness of breath. Negative for hemoptysis, sputum production and wheezing.    Cardiovascular: Positive for leg swelling. Negative for chest  pain and claudication.   Gastrointestinal: Positive for abdominal pain. Negative for vomiting.   Neurological: Negative for headaches.   All other systems reviewed and are negative.      Past Medical History:   Diagnosis Date   • Atrial fibrillation (CMS/HCC)    • GERD (gastroesophageal reflux disease)    • Hypertension    • Myocardial infarction (CMS/HCC)        No Known Allergies    Past Surgical History:   Procedure Laterality Date   • CORONARY STENT PLACEMENT         No family history on file.    Social History     Socioeconomic History   • Marital status: Single     Spouse name: Not on file   • Number of children: Not on file   • Years of education: Not on file   • Highest education level: Not on file   Tobacco Use   • Smoking status: Current Every Day Smoker     Packs/day: 1.00   • Smokeless tobacco: Never Used   Substance and Sexual Activity   • Alcohol use: Never     Frequency: Never   • Drug use: Never   • Sexual activity: Defer           Objective   Physical Exam   Constitutional: He is oriented to person, place, and time. He does not appear ill.   HENT:   Head: Normocephalic and atraumatic.   Eyes: Pupils are equal, round, and reactive to light. EOM are normal.   Neck: Neck supple. No JVD present.   Cardiovascular:   Tachycardic.  Irregularly irregular.  2+ radial pulses bilaterally.   Pulmonary/Chest: He has wheezes in the right upper field, the right middle field and the left upper field. He has no rhonchi.   No accessory muscle use.  Symmetric chest wall movement.  Expiratory wheeze   Abdominal: Soft. Bowel sounds are normal. There is no tenderness.   No guarding no rigidity.  No abdominal bruit.   Musculoskeletal:        Right lower leg: He exhibits edema.        Left lower leg: He exhibits edema.   No calf tenderness.  Pedal edema bilateral extremities.   Neurological: He is alert and oriented to person, place, and time. No cranial nerve deficit.   No focal neurologic deficits.   Skin: Skin is warm  and dry.   Psychiatric: He has a normal mood and affect.   Nursing note and vitals reviewed.      Procedures          ED Course  ED Course as of Sep 02 0816   Tue Sep 01, 2020   1225 EKG: Atrial fibrillation with a ventricular response ventricular 153 QRS 84 QTc 402 no ST elevation    [BB]   1233 Patient white blood cell count unremarkable hemoglobin hematocrit unremarkable.  Patient is noted atrial fibrillation rapid ventricular response.  Have ordered IV diltiazem.    [BB]   1245 Patient ionized calcium coags unremarkable    [BB]   1246 Patient flu negative    [BB]   1301 Patient is noted to have proven heart rate to 100-90 after IV Cardizem and placed on drip.  We will continue to monitor    [BB]   1302 Patient salicylate acetaminophen lipase unremarkable CMP is unremarkable lactic acid unremarkable.  Troponin negative x1 set CRP 0.30    [BB]   1338 Have discussed case with Dr. Melendez who has assumed care    [BB]      ED Course User Index  [BB] Wilson Farmer MD                                           MDM  Number of Diagnoses or Management Options  Atrial fibrillation with RVR (CMS/HCC): new and requires workup     Amount and/or Complexity of Data Reviewed  Clinical lab tests: reviewed and ordered  Tests in the radiology section of CPT®:  reviewed and ordered  Tests in the medicine section of CPT®:  reviewed and ordered  Discuss the patient with other providers: yes  Independent visualization of images, tracings, or specimens: yes    Risk of Complications, Morbidity, and/or Mortality  Presenting problems: high  Diagnostic procedures: high  Management options: high    Patient Progress  Patient progress: (guarded)      Final diagnoses:   Atrial fibrillation with RVR (CMS/HCC)            Lovely Melendez DO  09/02/20 0816      Electronically signed by Lovely Melendez DO at 09/02/20 0816     Phillip Yoon at 09/01/20 1259        EKG performed @ 1223 given to Dr Farmer @ 1225     Karrie  Phillip  09/01/20 1259      Electronically signed by Phillip Yoon at 09/01/20 1259     Roxana Mo RN at 09/01/20 1454        Consent for contrast obtained at this time and placed onto chart.     Roxana Mo RN  09/01/20 1454      Electronically signed by Roxana Mo RN at 09/01/20 1454     Roxana Mo RN at 09/01/20 1601        Ordered pt a cardiac tray at this time.     Roxana Mo RN  09/01/20 1601      Electronically signed by Roxana Mo RN at 09/01/20 1601       Scheduled Meds Sorted by Name   for Chong White as of 8/31/20 through 9/2/20     1 Day 3 Days 7 Days 10 Days < Today >    Legend:                           Inactive     Active     Other Encounter    Linked               Medications 08/31/20 09/01/20 09/02/20   aspirin EC tablet 81 mg   Dose: 81 mg  Freq: Daily Route: PO  Start: 09/02/20 0915    Admin Instructions:   Herbal/drug interaction: Avoid use with ginkgo biloba. Do not crush or chew.  Do not exceed 4 grams of aspirin in a 24 hr period.    If given for pain, use the following pain scale:   Mild Pain = Pain Score of 1-3, CPOT 1-2  Moderate Pain = Pain Score of 4-6, CPOT 3-4  Severe Pain = Pain Score of 7-10, CPOT 5-8      0915            dilTIAZem (CARDIZEM) injection 10 mg   Dose: 10 mg  Freq: Once Route: IV  Start: 09/01/20 1221 End: 09/01/20 1248    Admin Instructions:   Caution: Look alike/sound alike drug alert. Refrigerate. May give IV push over 2 minutes.     1248             enoxaparin (LOVENOX) syringe 40 mg   Dose: 40 mg  Freq: Every 24 Hours Route: SC  Indications of Use: PROPHYLAXIS OF VENOUS THROMBOEMBOLISM  Start: 09/01/20 1900    Admin Instructions:   Give subcutaneous in abdomen only. Do not massage site after injection.     1815 1900            HYDROcodone-acetaminophen (NORCO) 5-325 MG per tablet 1 tablet   Dose: 1 tablet  Freq: Once Route: PO  Start: 09/02/20 0045 End: 09/02/20 0026    Admin Instructions:   [DIANA]    Do not exceed 4 grams  of acetaminophen in a 24 hr period.    If given for pain, use the following pain scale:   Mild Pain = Pain Score of 1-3, CPOT 1-2  Moderate Pain = Pain Score of 4-6, CPOT 3-4  Severe Pain = Pain Score of 7-10, CPOT 5-8      0026            Ioversol (OPTIRAY 350) injection 100 mL   Dose: 100 mL  Freq: Once in Imaging Route: IV  Start: 09/01/20 1537 End: 09/01/20 1537     1537             sodium chloride 0.9 % bolus 500 mL   Dose: 500 mL  Freq: Once Route: IV  Last Dose: Stopped (09/01/20 1327)  Start: 09/01/20 1239 End: 09/01/20 1327     1244   1327           sodium chloride 0.9 % flush 10 mL   Dose: 10 mL  Freq: Every 12 Hours Scheduled Route: IV  Start: 09/01/20 2100 2014 0805 2100          Medications 08/31/20 09/01/20 09/02/20       Continuous Meds Sorted by Name   for Chong White as of 8/31/20 through 9/2/20    Legend:                           Inactive     Active     Other Encounter    Linked               Medications 08/31/20 09/01/20 09/02/20   dilTIAZem (CARDIZEM) 100 mg in 100 mL NS (1 mg/mL) infusion (ADV)   Rate: 5-15 mL/hr Dose: 5-15 mg/hr  Freq: Titrated Route: IV  Last Dose: 10 mg/hr (09/01/20 2301)  Start: 09/01/20 1800    Admin Instructions:   For heart rate - To maintain HR less than 120, initiate infusion at 5 mg/hr. Titrate 5 mg/hr every 15 minutes to use the lowest dose possible. Maximum infusion rate of 15 mg/hr. Hold for SBP less than 120 mmHg or heart rate less than 60. Contact provider if unable to maintain HR less than 120 on maximum dose. Once SBP target achieved obtain vitals a minimum of every 30 minutes. For patients not in critical care areas - obtain vitals a minimum of every 4 hours.  Caution: Look alike/sound alike drug alert.     [id number]           dilTIAZem (CARDIZEM) 100 mg in 100 mL NS (1 mg/mL) infusion (ADV)   Rate: 5-15 mL/hr Dose: 5-15 mg/hr  Freq: Titrated Route: IV  Last Dose: 10 mg/hr (09/01/20 1550)  Start: 09/01/20 1238 End: 09/01/20 1712    Admin  Instructions:   For heart rate - To maintain HR less than 120, initiate infusion at 5 mg/hr. Titrate 5 mg/hr every 15 minutes to use the lowest dose possible. Maximum infusion rate of 15 mg/hr. Hold for SBP less than 120 mmHg or heart rate less than 60. Contact provider if unable to maintain HR less than 120 on maximum dose. Once SBP target achieved obtain vitals a minimum of every 30 minutes. For patients not in critical care areas - obtain vitals a minimum of every 4 hours.  Caution: Look alike/sound alike drug alert.     1248   1453   1550     1712-D/C'd               PRN Meds Sorted by Name   for Chong White as of 8/31/20 through 9/2/20    Legend:                           Inactive     Active     Other Encounter    Linked               Medications 08/31/20 09/01/20 09/02/20   HYDROcodone-acetaminophen (NORCO) 5-325 MG per tablet 1 tablet   Dose: 1 tablet  Freq: Every 6 Hours PRN Route: PO  PRN Reason: Moderate Pain   Start: 09/02/20 0817 End: 09/09/20 0816    Admin Instructions:   [DIANA]    Do not exceed 4 grams of acetaminophen in a 24 hr period.    If given for pain, use the following pain scale:   Mild Pain = Pain Score of 1-3, CPOT 1-2  Moderate Pain = Pain Score of 4-6, CPOT 3-4  Severe Pain = Pain Score of 7-10, CPOT 5-8         sodium chloride 0.9 % flush 10 mL   Dose: 10 mL  Freq: As Needed Route: IV  PRN Reason: Line Care  Start: 09/01/20 1709         sodium chloride 0.9 % flush 10 mL   Dose: 10 mL  Freq: As Needed Route: IV  PRN Reason: Line Care  Start: 09/01/20 1219

## 2020-09-02 NOTE — PLAN OF CARE
Problem: Patient Care Overview  Goal: Plan of Care Review  Outcome: Ongoing (interventions implemented as appropriate)  Flowsheets  Progress: no change  Taken 2020 0200 by Fadia Chun RN  Plan of Care Reviewed With: patient  Goal: Individualization and Mutuality  Outcome: Ongoing (interventions implemented as appropriate)  Goal: Discharge Needs Assessment  Outcome: Ongoing (interventions implemented as appropriate)  Goal: Interprofessional Rounds/Family Conf  Outcome: Ongoing (interventions implemented as appropriate)   Problem: Fall Risk (Adult)  Goal: Identify Related Risk Factors and Signs and Symptoms  Description  Related risk factors and signs and symptoms are identified upon initiation of Human Response Clinical Practice Guideline (CPG).  Outcome: Ongoing (interventions implemented as appropriate)  Goal: Absence of Fall  Description  Patient will demonstrate the desired outcomes by discharge/transition of care.  Outcome: Ongoing (interventions implemented as appropriate)   Problem: Arrhythmia/Dysrhythmia (Symptomatic) (Adult)  Description  Prevent and manage potential problems includin. chest pain (angina)  2. electrophysiologic conduction defect  3. embolism  4. hemodynamic instability  5. hypoxia/hypoxemia  6. situational response  7. syncope  Goal: Signs and Symptoms of Listed Potential Problems Will be Absent, Minimized or Managed (Arrhythmia/Dysrhythmia)  Description  Signs and symptoms of listed potential problems will be absent, minimized or managed by discharge/transition of care (reference Arrhythmia/Dysrhythmia (Symptomatic) (Adult) CPG).  Outcome: Ongoing (interventions implemented as appropriate)

## 2020-09-02 NOTE — CONSULTS
Date of Admit: 9/1/2020  Date of Consult: 09/02/20  No ref. provider found        A-fib (CMS/[facility])      Assessment      1. New onset atrial fibrillation with RVR, CHADS-VASc score of 3 for CHF, HTN and CAD, on IV cardizem   2. ASCVD, history of MI in remote past with multiple PCI per patient report   3. Essential hypertension, controlled   4. Acute congestive heart failure, echo pending     Recommendations     1. Patient will need to be anticoagulated has a history of cardiac disease and hypertension and also was done for CHF will consult pharmacy for the cost of NOAC for now start him on heparin  2. For rate control we will going to add metoprolol 25 mg p.o. twice daily and will titrate the dose as tolerated  3.  Consider cardioversion if is not tolerating this can be done with a LUCERO to rule out left atrial thrombus otherwise if is rate controlled potentially patient can be discharged and be cardioverted 3 to 4 weeks after initiating anticoagulation  4.  Patient has history of coronary disease he never had chest pain before according to him we will proceed with stress testing to assess meant of ischemia  5.  We will get an echocardiac to assess cardiac function will motion valve morphology  6.  He is not on a statin we will get lipid profile in the interim we will put him on atorvastatin 40 mg/day  7.  He was advised in the strongest terms to quit smoking will start him on a NicoDerm patch  8.  We will start him on 81 mg of aspirin per day        Reason for consultation: New onset atrial fibrillation with RVR    Subjective       Subjective     History of Present Illness     Chong White is a 60-year-old male with past medical history significant for essential hypertension, coronary artery disease, tobacco abuse and GERD.  He presented to the ER with complaints of shortness of breath and palpitations.Patient states for the last 3 days he has had worsening shortness of breath and palpitations. He states he feels  "like he is \"smothering\". He also has had intermittent palpitations. He has had worsening pedal edema also. He has a history of coronary artery disease per his report and had a MI years ago with multiple PCI in the past from IV drug abuse. He denies any current drug abuse. He has not been following with any cardiologist and reports he only takes aspirin at home. He denies any current chest pain, fever or chills. He denies any history of atrial fibrillation in the past. He is current smoker.     Cardiac risk factors:arteriosclerotic heart disease, hypertension and smoking    Past Medical History:   Diagnosis Date   • Atrial fibrillation (CMS/HCC)    • GERD (gastroesophageal reflux disease)    • Hypertension    • Myocardial infarction (CMS/HCC)      Past Surgical History:   Procedure Laterality Date   • CORONARY STENT PLACEMENT       No family history on file.  Social History     Tobacco Use   • Smoking status: Current Every Day Smoker     Packs/day: 1.00   • Smokeless tobacco: Never Used   Substance Use Topics   • Alcohol use: Never     Frequency: Never   • Drug use: Never     Medications Prior to Admission   Medication Sig Dispense Refill Last Dose   • aspirin 81 MG EC tablet Take 81 mg by mouth Daily.   8/31/2020 at Unknown time     Allergies:  Patient has no known allergies.    Review of Systems   Constitutional: Negative for chills, diaphoresis, fatigue and fever.   HENT: Negative for congestion and trouble swallowing.    Eyes: Negative for photophobia and visual disturbance.   Respiratory: Positive for shortness of breath. Negative for chest tightness and wheezing.    Cardiovascular: Positive for palpitations and leg swelling. Negative for chest pain.   Gastrointestinal: Negative for abdominal distention, abdominal pain, nausea and vomiting.   Endocrine: Negative for polyphagia and polyuria.   Genitourinary: Negative for dysuria and hematuria.   Musculoskeletal: Negative for neck pain and neck stiffness.   Skin: " Negative for rash and wound.   Allergic/Immunologic: Negative for food allergies and immunocompromised state.   Neurological: Negative for dizziness, syncope, weakness and light-headedness.   Hematological: Does not bruise/bleed easily.   Psychiatric/Behavioral: Negative for confusion and suicidal ideas.       Objective       Objective      Vital Signs  Temp:  [97.6 °F (36.4 °C)-98.8 °F (37.1 °C)] 98.6 °F (37 °C)  Heart Rate:  [] 98  Resp:  [18-22] 18  BP: ()/() 109/86     Vital Signs (last 72 hrs)       08/30 0700  -  08/31 0659 08/31 0700  -  09/01 0659 09/01 0700  -  09/02 0659 09/02 0700  -  09/02 1016   Most Recent    Temp (°F)     97.6 -  98.8       98.6 (37)    Heart Rate     82 -  160    93 -  114     98    Resp     21 -  22    18 -  20     18    BP     96/79 -  145/97    109/86 -  131/100     109/86    SpO2 (%)     88 -  100    93 -  98     95        Body mass index is 25.24 kg/m².    Intake/Output Summary (Last 24 hours) at 9/2/2020 1016  Last data filed at 9/2/2020 0803  Gross per 24 hour   Intake 783.19 ml   Output 975 ml   Net -191.81 ml     Physical Exam   Constitutional: He is oriented to person, place, and time. He appears well-developed and well-nourished.   HENT:   Head: Normocephalic and atraumatic.   Eyes: Pupils are equal, round, and reactive to light. Conjunctivae and EOM are normal.   Neck: Normal range of motion. No JVD present.   Cardiovascular: An irregularly irregular rhythm present. Tachycardia present. Exam reveals no S3 and no S4.   No murmur heard.  Pulses:       Dorsalis pedis pulses are 2+ on the right side, and 2+ on the left side.        Posterior tibial pulses are 2+ on the right side, and 2+ on the left side.   Pulmonary/Chest: Effort normal and breath sounds normal. No respiratory distress. He has no wheezes.   Abdominal: Soft. Bowel sounds are normal.   Musculoskeletal: Normal range of motion. He exhibits no edema.   Lymphadenopathy:     He has no cervical  adenopathy.   Neurological: He is alert and oriented to person, place, and time.   Skin: Skin is warm and dry.   Psychiatric: He has a normal mood and affect. His behavior is normal.     Results review     Results Review:    I reviewed the patient's new clinical results.  Results from last 7 days   Lab Units 09/01/20  1541 09/01/20  1221   TROPONIN T ng/mL <0.010 <0.010     Results from last 7 days   Lab Units 09/02/20  0518 09/01/20  1221   WBC 10*3/mm3 8.53 9.12   HEMOGLOBIN g/dL 14.1 16.1   PLATELETS 10*3/mm3 219 276     Results from last 7 days   Lab Units 09/02/20  0518 09/01/20  1221   SODIUM mmol/L 139 138   POTASSIUM mmol/L 4.5 4.7   CHLORIDE mmol/L 106 103   CO2 mmol/L 22.2 25.1   BUN mg/dL 15 11   CREATININE mg/dL 0.85 1.12   CALCIUM mg/dL 8.7 8.9   GLUCOSE mg/dL 108* 114*   ALT (SGPT) U/L  --  30   AST (SGOT) U/L  --  24     Lab Results   Component Value Date    INR 0.97 09/01/2020     Lab Results   Component Value Date    MG 2.3 09/01/2020     No results found for: TSH, PSA, CHLPL, TRIG, HDL, LDL   Lab Results   Component Value Date    PROBNP 4,722.0 (H) 09/01/2020       ECG  ECG/EMG Results (last 24 hours)     Procedure Component Value Units Date/Time    ECG 12 Lead [[id number]] Collected:  09/01/20 1209     Updated:  09/01/20 1340            Imaging Results (Last 72 Hours)     Procedure Component Value Units Date/Time    CT Chest Pulmonary Embolism [[id number]] Collected:  09/01/20 1543     Updated:  09/01/20 1547    Narrative:       EXAMINATION: CT CHEST PULMONARY EMBOLISM-      CLINICAL INDICATION:shortness of breath; afib w rvr      COMPARISON: NONE.     TECHNIQUE: Multiple CT axial images were obtained through the level of  pulmonary arteries, following IV contrast administration per CT PE  protocol.  Volume Rendered 3D or MIP images performed.     Radiation dose reduction techniques were utilized per ALARA protocol.  Automated exposure control was initiated through either or CareDose or  DoseRight  software packages by  protocol.    DOSE (DLP mGy-cm): 574.47 mGy.cm        FINDINGS:     Pulmonary arteries: No evidence of pulmonary embolism.  Lungs: Predominantly linear opacities of the left greater than right  lower lobes predominantly representing atelectasis but superimposed  pneumonia of the left lower lobe also considered. Advanced centrilobular  emphysema noted with upper lung predominance. Interstitial thickening  likely edema.  Heart: Moderate cardiomegaly.  Pericardium: No effusion.  Mediastinum: No masses. No enlarged lymph nodes.  No fluid collections.  Pleura: Small left pleural effusion and trace right pleural effusion  which are nonloculated.  Major airways: Clear. No intrinsic mass.  Vasculature: No evidence of aneurysm.  Visualized upper abdomen:Low-density liver lesion appears to represent  benign hepatic cysts.  Other: None.  Bones: No acute bony abnormality.       Impression:       1. No PE.  2. Moderate cardiomegaly with interstitial edema and left greater than  right small nonloculated pleural effusions. Findings compatible with  CHF.  3. Moderate to advanced COPD.  4. Other nonacute findings described above.     This report was finalized on 9/1/2020 3:45 PM by Dr. Chidi Dean MD.       XR Chest 1 View [[id number]] Collected:  09/01/20 1415     Updated:  09/01/20 1418    Narrative:       EXAMINATION: XR CHEST 1 VW-      CLINICAL INDICATION:     Simple Sepsis Protocol     TECHNIQUE:  XR CHEST 1 VW-      COMPARISON: NONE      FINDINGS:      Left lower lobe pneumonia.   Heart size, mediastinum, and pulmonary vascularity are unremarkable.   No pneumothorax.   No effusions.   No acute osseous findings.          Impression:       Left lower lobe pneumonia.     This report was finalized on 9/1/2020 2:16 PM by Dr. Chidi Dean MD.             I have discussed my impression and recommendations with the patient and family.    Thank you very much for asking us to be involved in this  patient's care.  We will follow along with you.      SAMARA Arango, acting as scribe for Dr. Thong Delarosa MD FACC  09/02/20  10:16  IThong MD, [facility], performed the services described in this documentation as documented by the above-named individual under my supervision and made necessary changes in the note is both accurate and complete  Please note that portions of this note were completed with a voice recognition program.

## 2020-09-02 NOTE — PROGRESS NOTES
"Discharge Planning Assessment   [place]     Patient Name: Chong White  MRN: [id number]  Today's Date: 9/2/2020    Admit Date: 9/1/2020    Discharge Needs Assessment     Row Name 09/02/20 1656       Living Environment    Lives With  child(real), adult    Name(s) of Who Lives With Patient  Adult son with mental illness    Current Living Arrangements  homeless    Primary Care Provided by  self    Provides Primary Care For  no one    Family Caregiver if Needed  none    Quality of Family Relationships  unable to assess    Able to Return to Prior Arrangements  no       Resource/Environmental Concerns    Resource/Environmental Concerns  none    Transportation Concerns  car, none       Transition Planning    Patient/Family Anticipates Transition to  shelter    Patient/Family Anticipated Services at Transition  none       Discharge Needs Assessment    Readmission Within the Last 30 Days  no previous admission in last 30 days    Equipment Currently Used at Home  none        Discharge Plan     Row Name 09/02/20 6239       Plan    Plan  SS spoke with Pt. Pt reported that he is currently living in his care. Pt reports that staying at [facility] until about a week ago and it was \"shut down\". Pt reports that he has had apartments in the past but gets evicted due to his 28 year old schizophrenic son. Pt reports that his son is currently staying with the son's brother.     Pt reports that he does have an income and he draws $771 monthly SSI. Pt does not utilize any home health services or DME. Pt's PCP is Kaya Romo and Pt utilizes [facility]. Pt does not have a discharge plan at this time.     Pt was okay with referral to [facility] for targeted case management services [phone] to assist with housing and other needs post hospital discharge.     SS will continue to work on housing options. SS will continue to follow and assist with discharge planning.    Patient/Family in " Agreement with Plan  yes          Demographic Summary     Row Name 09/02/20 9821       General Information    Admission Type  inpatient    Referral Source  nursing SS received consult for patient is asking for help with discharge planning. States he currently does not have a home and lives from place to place with no stable home. Needs help with housing at discharge.               Gosia Allen

## 2020-09-02 NOTE — PLAN OF CARE
Patient c/o pain in lower back and neck.  Dr. Del Angel notified and orders put in for prn pain meds.  Patient remains on Diltiazem at 10 and heparin at 12 u/kg/hr.  Patient NPO at midnight and will have stress test tomorrow.

## 2020-09-02 NOTE — PHARMACY PATIENT ASSISTANCE
Pharmacy was consulted to evaluate patient co-pay for novel anticoagulants. According to the patients insurance, the patient would have a $0 co-pay for either Xarelto or Eliquis.       Thank you,    Gwen Marrero. Lazaro, PharmD  09/02/20  12:02

## 2020-09-02 NOTE — PROGRESS NOTES
[facility] HOSPITALIST PROGRESS NOTE     Patient Identification:  Name:  Chong White  Age:  60 y.o.  Sex:  male  :  1960  MRN:  [id number]  Visit Number:  [id number]  Primary Care Provider:  Kaya Romo APRN    Length of stay:  1    Chief complaint:  Cough and shortness of breath    Subjective:    Patient states he developed a somewhat productive cough overnight and he still feels as though he is short of breath.  Did discuss with patient he has an oxygen saturation of 97 to 98% on room air and that he is not hypoxic.  Patient states he understands this but he still feels as though he cannot get enough air when he is breathing.  Did discuss possible anxiety and methods in coping with anxiety.  Patient expressed his understanding.  Otherwise, no new events overnight.  ----------------------------------------------------------------------------------------------------------------------  Current [facility] Meds:    aspirin 81 mg Oral Daily   atorvastatin 40 mg Oral Nightly   furosemide 20 mg Oral BID   heparin (porcine) 60 Units/kg Intravenous Once   metoprolol tartrate 25 mg Oral Q12H   sodium chloride 10 mL Intravenous Q12H       dilTIAZem 5-15 mg/hr Last Rate: 5 mg/hr (20 1021)   heparin (porcine) 12 Units/kg/hr Last Rate: 12 Units/kg/hr (20 1352)     ----------------------------------------------------------------------------------------------------------------------  Vital Signs:  Temp:  [98.5 °F (36.9 °C)-98.8 °F (37.1 °C)] 98.6 °F (37 °C)  Heart Rate:  [] 120  Resp:  [14-22] 16  BP: ()/() 134/94      20  1213 20  1717 20  0400   Weight: 72.6 kg (160 lb) 75.7 kg (166 lb 14.4 oz) 75.3 kg (166 lb)     Body mass index is 25.24 kg/m².    Intake/Output Summary (Last 24 hours) at 2020 1714  Last data filed at 2020 1600  Gross per 24 hour   Intake 523.19 ml   Output 1275 ml   Net -751.81 ml     Diet Regular; Cardiac  NPO  Diet  ----------------------------------------------------------------------------------------------------------------------  Physical exam:  Constitutional: Well-nourished  male in no apparent distress.     HENT:  Head:  Normocephalic and atraumatic.  Mouth:  Moist mucous membranes.    Eyes:  Conjunctivae and EOM are normal.  Pupils are equal, round, and reactive to light.  No scleral icterus.    Neck:  Neck supple. No thyromegaly.  No JVD present.    Cardiovascular: Irregular rhythm with rate in the low 100s, no murmurs, rubs, clicks or gallops appreciated  Pulmonary/Chest:  Faint inspiratory crackle in the right base with no wheezes or rhonchi appreciated  Abdominal:  Soft. Nontender. Nondistended  Bowel sounds are normal in all four quadrants. No organomegally appreciated.   Musculoskeletal:  5/5 muscle strength bilateral upper and lower extermties with equal muscle tone and bulk.  2+ pitting edema bilateral lower extremities, no tenderness, and no deformity.  No red or swollen joints anywhere.    Neurological:  Alert and oriented to person, place, and time. Cranial nerves II-XII intact with no focal defecits.  No facial droop.  No slurred speech.   Skin:  Warm and dry to palpation with no rashes or lesions appreciated.  Peripheral vascular:  2+ radial and pedal pulses in bilateral upper and lower extremities.  Psychiatric:  Alert and oriented x3, demonstrates appropriate judgement and insight.  ----------------------------------------------------------------------------------------------------------------------  Tele:    ----------------------------------------------------------------------------------------------------------------------  Results from last 7 days   Lab Units 09/01/20  1541 09/01/20  1221   TROPONIN T ng/mL <0.010 <0.010     Results from last 7 days   Lab Units 09/02/20  1244 09/02/20  0518 09/01/20  1228 09/01/20  1221   CRP mg/dL  --   --   --  0.30   LACTATE mmol/L  --   --  1.2  --     WBC 10*3/mm3 8.24 8.53  --  9.12   HEMOGLOBIN g/dL 14.7 14.1  --  16.1   HEMATOCRIT % 44.6 44.4  --  48.9   MCV fL 92.5 94.5  --  91.7   MCHC g/dL 33.0 31.8  --  32.9   PLATELETS 10*3/mm3 212 219  --  276   INR  1.05  --   --  0.97         Results from last 7 days   Lab Units 09/02/20  0518 09/01/20  1221   SODIUM mmol/L 139 138   POTASSIUM mmol/L 4.5 4.7   MAGNESIUM mg/dL  --  2.3   CHLORIDE mmol/L 106 103   CO2 mmol/L 22.2 25.1   BUN mg/dL 15 11   CREATININE mg/dL 0.85 1.12   EGFR IF NONAFRICN AM mL/min/1.73 92 67   CALCIUM mg/dL 8.7 8.9   GLUCOSE mg/dL 108* 114*   ALBUMIN g/dL  --  4.03   BILIRUBIN mg/dL  --  0.4   ALK PHOS U/L  --  96   AST (SGOT) U/L  --  24   ALT (SGPT) U/L  --  30   Estimated Creatinine Clearance: 98.4 mL/min (by C-G formula based on SCr of 0.85 mg/dL).    No results found for: AMMONIA      Blood Culture   Date Value Ref Range Status   09/01/2020 No growth at 24 hours  Preliminary   09/01/2020 No growth at 24 hours  Preliminary     No results found for: URINECX  No results found for: WOUNDCX  No results found for: STOOLCX    I have personally looked at the labs and they are summarized above.  ----------------------------------------------------------------------------------------------------------------------  Imaging Results (Last 24 Hours)     ** No results found for the last 24 hours. **        ----------------------------------------------------------------------------------------------------------------------  Assessment and Plan:    1.  New onset A. Fib -with consideration of transthoracic echo, patient now with CHADS-Vasc of 3.  Continue heparin for now, appreciate cardiology recommendations.  Continue Cardizem drip and add metoprolol tartrate for rate control.  If patient is intolerant of addition of beta-blocker, patient may require electrical cardioversion with LUCERO.    2.  Chronic systolic CHF -transthoracic echocardiogram with severely reduced left ventricular systolic function  with estimated EF 21 to 25%.  Patient will need further ischemic work-up.  Appreciate cardiology recommendations, will proceed with stress testing.  Patient will likely require LifeVest on discharge.     3.  Questionable left lower lobe pneumonia -patient with persistent cough and subjective feelings of shortness of breath.  Will initiate antibiotic therapy for possible underlying pneumonia.     4.  Essential hypertension -well controlled at this time, will restart home medications once available     5.  History of CAD -continue medical management, cardiology also consulted.     6.  Tobacco abuse -encourage cessation            Naploeon Del Angel,   09/02/20  17:14

## 2020-09-03 ENCOUNTER — APPOINTMENT (OUTPATIENT)
Dept: NUCLEAR MEDICINE | Facility: HOSPITAL | Age: 60
End: 2020-09-03

## 2020-09-03 ENCOUNTER — APPOINTMENT (OUTPATIENT)
Dept: CARDIOLOGY | Facility: HOSPITAL | Age: 60
End: 2020-09-03

## 2020-09-03 ENCOUNTER — APPOINTMENT (OUTPATIENT)
Dept: GENERAL RADIOLOGY | Facility: HOSPITAL | Age: 60
End: 2020-09-03

## 2020-09-03 LAB
ANION GAP SERPL CALCULATED.3IONS-SCNC: 10 MMOL/L (ref 5–15)
APTT PPP: 46.4 SECONDS (ref 25.6–35.3)
APTT PPP: 47.6 SECONDS (ref 25.6–35.3)
APTT PPP: >100 SECONDS (ref 25.6–35.3)
BH CV NUCLEAR PRIOR STUDY: 3
BH CV STRESS BP STAGE 1: NORMAL
BH CV STRESS BP STAGE 2: NORMAL
BH CV STRESS COMMENTS STAGE 1: NORMAL
BH CV STRESS COMMENTS STAGE 2: NORMAL
BH CV STRESS DOSE REGADENOSON STAGE 1: 0.4
BH CV STRESS DURATION MIN STAGE 1: 0
BH CV STRESS DURATION MIN STAGE 2: 4
BH CV STRESS DURATION SEC STAGE 1: 10
BH CV STRESS DURATION SEC STAGE 2: 0
BH CV STRESS HR STAGE 1: 98
BH CV STRESS HR STAGE 2: 119
BH CV STRESS PROTOCOL 1: NORMAL
BH CV STRESS RECOVERY BP: NORMAL MMHG
BH CV STRESS RECOVERY HR: 97 BPM
BH CV STRESS STAGE 1: 1
BH CV STRESS STAGE 2: 2
BUN SERPL-MCNC: 17 MG/DL (ref 8–23)
BUN/CREAT SERPL: 19.3 (ref 7–25)
CALCIUM SPEC-SCNC: 8.9 MG/DL (ref 8.6–10.5)
CHLORIDE SERPL-SCNC: 103 MMOL/L (ref 98–107)
CHOLEST SERPL-MCNC: 133 MG/DL (ref 0–200)
CO2 SERPL-SCNC: 24 MMOL/L (ref 22–29)
CREAT SERPL-MCNC: 0.88 MG/DL (ref 0.76–1.27)
DEPRECATED RDW RBC AUTO: 44.5 FL (ref 37–54)
ERYTHROCYTE [DISTWIDTH] IN BLOOD BY AUTOMATED COUNT: 13.2 % (ref 12.3–15.4)
GFR SERPL CREATININE-BSD FRML MDRD: 88 ML/MIN/1.73
GLUCOSE SERPL-MCNC: 101 MG/DL (ref 65–99)
HCT VFR BLD AUTO: 42.7 % (ref 37.5–51)
HDLC SERPL-MCNC: 42 MG/DL (ref 40–60)
HGB BLD-MCNC: 14 G/DL (ref 13–17.7)
LDLC SERPL CALC-MCNC: 81 MG/DL (ref 0–100)
LDLC/HDLC SERPL: 1.92 {RATIO}
LV EF NUC BP: 21 %
MAXIMAL PREDICTED HEART RATE: 160 BPM
MCH RBC QN AUTO: 30.4 PG (ref 26.6–33)
MCHC RBC AUTO-ENTMCNC: 32.8 G/DL (ref 31.5–35.7)
MCV RBC AUTO: 92.8 FL (ref 79–97)
PERCENT MAX PREDICTED HR: 74.38 %
PLATELET # BLD AUTO: 208 10*3/MM3 (ref 140–450)
PMV BLD AUTO: 10.3 FL (ref 6–12)
POTASSIUM SERPL-SCNC: 4.3 MMOL/L (ref 3.5–5.2)
RBC # BLD AUTO: 4.6 10*6/MM3 (ref 4.14–5.8)
SARS-COV-2 RDRP RESP QL NAA+PROBE: NOT DETECTED
SODIUM SERPL-SCNC: 137 MMOL/L (ref 136–145)
STRESS BASELINE BP: NORMAL MMHG
STRESS BASELINE HR: 94 BPM
STRESS PERCENT HR: 88 %
STRESS POST PEAK BP: NORMAL MMHG
STRESS POST PEAK HR: 119 BPM
STRESS TARGET HR: 136 BPM
TRIGL SERPL-MCNC: 51 MG/DL (ref 0–150)
VLDLC SERPL-MCNC: 10.2 MG/DL
WBC # BLD AUTO: 8.12 10*3/MM3 (ref 3.4–10.8)

## 2020-09-03 PROCEDURE — 99232 SBSQ HOSP IP/OBS MODERATE 35: CPT | Performed by: INTERNAL MEDICINE

## 2020-09-03 PROCEDURE — 25010000002 HEPARIN (PORCINE) PER 1000 UNITS: Performed by: NURSE PRACTITIONER

## 2020-09-03 PROCEDURE — 78452 HT MUSCLE IMAGE SPECT MULT: CPT | Performed by: SPECIALIST

## 2020-09-03 PROCEDURE — 25010000002 DIGOXIN PER 500 MCG: Performed by: NURSE PRACTITIONER

## 2020-09-03 PROCEDURE — 99232 SBSQ HOSP IP/OBS MODERATE 35: CPT | Performed by: SPECIALIST

## 2020-09-03 PROCEDURE — 25010000002 AZITHROMYCIN PER 500 MG: Performed by: INTERNAL MEDICINE

## 2020-09-03 PROCEDURE — 93018 CV STRESS TEST I&R ONLY: CPT | Performed by: SPECIALIST

## 2020-09-03 PROCEDURE — 25010000002 CEFTRIAXONE PER 250 MG: Performed by: INTERNAL MEDICINE

## 2020-09-03 PROCEDURE — A9500 TC99M SESTAMIBI: HCPCS | Performed by: INTERNAL MEDICINE

## 2020-09-03 PROCEDURE — 71046 X-RAY EXAM CHEST 2 VIEWS: CPT

## 2020-09-03 PROCEDURE — 93017 CV STRESS TEST TRACING ONLY: CPT

## 2020-09-03 PROCEDURE — 78452 HT MUSCLE IMAGE SPECT MULT: CPT

## 2020-09-03 PROCEDURE — 80048 BASIC METABOLIC PNL TOTAL CA: CPT | Performed by: INTERNAL MEDICINE

## 2020-09-03 PROCEDURE — 94799 UNLISTED PULMONARY SVC/PX: CPT

## 2020-09-03 PROCEDURE — 80061 LIPID PANEL: CPT | Performed by: NURSE PRACTITIONER

## 2020-09-03 PROCEDURE — 25010000002 FUROSEMIDE PER 20 MG: Performed by: INTERNAL MEDICINE

## 2020-09-03 PROCEDURE — 25010000002 REGADENOSON 0.4 MG/5ML SOLUTION: Performed by: NURSE PRACTITIONER

## 2020-09-03 PROCEDURE — 71046 X-RAY EXAM CHEST 2 VIEWS: CPT | Performed by: RADIOLOGY

## 2020-09-03 PROCEDURE — 85730 THROMBOPLASTIN TIME PARTIAL: CPT | Performed by: INTERNAL MEDICINE

## 2020-09-03 PROCEDURE — 85027 COMPLETE CBC AUTOMATED: CPT | Performed by: INTERNAL MEDICINE

## 2020-09-03 PROCEDURE — 87635 SARS-COV-2 COVID-19 AMP PRB: CPT | Performed by: NURSE PRACTITIONER

## 2020-09-03 PROCEDURE — 0 TECHNETIUM SESTAMIBI: Performed by: INTERNAL MEDICINE

## 2020-09-03 RX ORDER — L.ACID,PARA/B.BIFIDUM/S.THERM 8B CELL
1 CAPSULE ORAL DAILY
Status: DISCONTINUED | OUTPATIENT
Start: 2020-09-03 | End: 2020-09-08

## 2020-09-03 RX ORDER — DIGOXIN 0.25 MG/ML
500 INJECTION INTRAMUSCULAR; INTRAVENOUS ONCE
Status: COMPLETED | OUTPATIENT
Start: 2020-09-03 | End: 2020-09-03

## 2020-09-03 RX ORDER — DIGOXIN 125 MCG
125 TABLET ORAL
Status: DISCONTINUED | OUTPATIENT
Start: 2020-09-04 | End: 2020-09-04

## 2020-09-03 RX ORDER — FUROSEMIDE 10 MG/ML
20 INJECTION INTRAMUSCULAR; INTRAVENOUS DAILY
Status: DISCONTINUED | OUTPATIENT
Start: 2020-09-03 | End: 2020-09-08

## 2020-09-03 RX ORDER — LOSARTAN POTASSIUM 25 MG/1
25 TABLET ORAL
Status: DISCONTINUED | OUTPATIENT
Start: 2020-09-03 | End: 2020-09-08

## 2020-09-03 RX ORDER — CARVEDILOL 6.25 MG/1
6.25 TABLET ORAL 2 TIMES DAILY WITH MEALS
Status: DISCONTINUED | OUTPATIENT
Start: 2020-09-03 | End: 2020-09-04

## 2020-09-03 RX ADMIN — FUROSEMIDE 20 MG: 20 TABLET ORAL at 09:21

## 2020-09-03 RX ADMIN — FUROSEMIDE 20 MG: 20 INJECTION, SOLUTION INTRAMUSCULAR; INTRAVENOUS at 18:14

## 2020-09-03 RX ADMIN — SODIUM CHLORIDE, PRESERVATIVE FREE 10 ML: 5 INJECTION INTRAVENOUS at 20:58

## 2020-09-03 RX ADMIN — ATORVASTATIN CALCIUM 40 MG: 40 TABLET, FILM COATED ORAL at 20:58

## 2020-09-03 RX ADMIN — HYDROCODONE BITARTRATE AND ACETAMINOPHEN 1 TABLET: 5; 325 TABLET ORAL at 11:23

## 2020-09-03 RX ADMIN — SODIUM CHLORIDE, PRESERVATIVE FREE 10 ML: 5 INJECTION INTRAVENOUS at 09:22

## 2020-09-03 RX ADMIN — HYDROCODONE BITARTRATE AND ACETAMINOPHEN 1 TABLET: 5; 325 TABLET ORAL at 03:14

## 2020-09-03 RX ADMIN — GUAIFENESIN 1200 MG: 600 TABLET, EXTENDED RELEASE ORAL at 20:57

## 2020-09-03 RX ADMIN — SODIUM CHLORIDE 5 MG/HR: 900 INJECTION, SOLUTION INTRAVENOUS at 22:11

## 2020-09-03 RX ADMIN — SODIUM CHLORIDE 5 MG/HR: 900 INJECTION, SOLUTION INTRAVENOUS at 03:15

## 2020-09-03 RX ADMIN — Medication 1 CAPSULE: at 18:14

## 2020-09-03 RX ADMIN — HEPARIN SODIUM 15 UNITS/KG/HR: 10000 INJECTION, SOLUTION INTRAVENOUS at 14:15

## 2020-09-03 RX ADMIN — ASPIRIN 81 MG: 81 TABLET, COATED ORAL at 09:21

## 2020-09-03 RX ADMIN — CARVEDILOL 6.25 MG: 6.25 TABLET, FILM COATED ORAL at 18:14

## 2020-09-03 RX ADMIN — TECHNETIUM TC 99M SESTAMIBI 1 DOSE: 1 INJECTION INTRAVENOUS at 10:27

## 2020-09-03 RX ADMIN — HEPARIN SODIUM 2300 UNITS: 5000 INJECTION INTRAVENOUS; SUBCUTANEOUS at 14:13

## 2020-09-03 RX ADMIN — CEFTRIAXONE 1 G: 1 INJECTION, POWDER, FOR SOLUTION INTRAMUSCULAR; INTRAVENOUS at 20:57

## 2020-09-03 RX ADMIN — HYDROCODONE BITARTRATE AND ACETAMINOPHEN 1 TABLET: 5; 325 TABLET ORAL at 18:23

## 2020-09-03 RX ADMIN — METOPROLOL TARTRATE 25 MG: 25 TABLET, FILM COATED ORAL at 09:21

## 2020-09-03 RX ADMIN — LOSARTAN POTASSIUM 25 MG: 25 TABLET, FILM COATED ORAL at 12:50

## 2020-09-03 RX ADMIN — TECHNETIUM TC 99M SESTAMIBI 1 DOSE: 1 INJECTION INTRAVENOUS at 08:30

## 2020-09-03 RX ADMIN — AZITHROMYCIN MONOHYDRATE 500 MG: 500 INJECTION, POWDER, LYOPHILIZED, FOR SOLUTION INTRAVENOUS at 20:56

## 2020-09-03 RX ADMIN — GUAIFENESIN 1200 MG: 600 TABLET, EXTENDED RELEASE ORAL at 09:21

## 2020-09-03 RX ADMIN — REGADENOSON 0.4 MG: 0.08 INJECTION, SOLUTION INTRAVENOUS at 10:27

## 2020-09-03 RX ADMIN — DIGOXIN 500 MCG: 0.25 INJECTION INTRAMUSCULAR; INTRAVENOUS at 12:51

## 2020-09-03 NOTE — PROGRESS NOTES
Discharge Planning Assessment  ANIYAH Burden     Patient Name: Chong White  MRN: [id number]  Today's Date: 9/3/2020    Admit Date: 9/1/2020        Discharge Plan     Row Name 09/03/20 1619       Plan    Plan  SS spoke with Nancy at [facility] [phone]. Nancy reported that they do not have any shelters available at this time and that the shelter availability that they do have is for women, women and children and families. Nancy reported that they do have low income housing for the elderly but Pt would have to be agreeable to move to [facility]. SS will inform Pt. SS will continue to follow and assist with discharge planning.    Patient/Family in Agreement with Plan  yes           Gosia Allen

## 2020-09-03 NOTE — PROGRESS NOTES
[facility] HOSPITALIST PROGRESS NOTE     Patient Identification:  Name:  Chong White  Age:  60 y.o.  Sex:  male  :  1960  MRN:  [id number]  Visit Number:  [id number]  Primary Care Provider:  Kaya Romo APRN    Length of stay:  2    Chief complaint:  Cough and shortness of breath    Subjective:    Patient continues to report mild shortness of breath but is more concerned about bilateral lower extremity edema today.  He says it is painful from time to time.  He denies any fevers or chills.  Denies any other new symptoms overnight.  ----------------------------------------------------------------------------------------------------------------------  Current [facility] Meds:    aspirin 81 mg Oral Daily   atorvastatin 40 mg Oral Nightly   azithromycin 500 mg Intravenous Q24H   carvedilol 6.25 mg Oral BID With Meals   cefTRIAXone 1 g Intravenous Q24H   [START ON 2020] digoxin 125 mcg Oral Daily   furosemide 20 mg Oral BID   guaiFENesin 1,200 mg Oral Q12H   heparin (porcine) 60 Units/kg Intravenous Once   lactobacillus acidophilus 1 capsule Oral Daily   losartan 25 mg Oral Q24H   sodium chloride 10 mL Intravenous Q12H       dilTIAZem 5-15 mg/hr Last Rate: 5 mg/hr (20 0315)   heparin (porcine) 12 Units/kg/hr Last Rate: 15 Units/kg/hr (20 1415)     ----------------------------------------------------------------------------------------------------------------------  Vital Signs:  Temp:  [97.5 °F (36.4 °C)-98.1 °F (36.7 °C)] 98.1 °F (36.7 °C)  Heart Rate:  [] 88  Resp:  [12-24] 23  BP: ()/() 115/90      20  1717 20  0400 20  0400   Weight: 75.7 kg (166 lb 14.4 oz) 75.3 kg (166 lb) 78.1 kg (172 lb 3.2 oz)     Body mass index is 26.18 kg/m².    Intake/Output Summary (Last 24 hours) at 9/3/2020 1655  Last data filed at 9/3/2020 0400  Gross per 24 hour   Intake 2338.67 ml   Output 1200 ml   Net 1138.67 ml     NPO Diet  Diet Regular;  Cardiac  ----------------------------------------------------------------------------------------------------------------------  Physical exam:  Constitutional: Well-nourished  male in no apparent distress.     HENT:  Head:  Normocephalic and atraumatic.  Mouth:  Moist mucous membranes.    Eyes:  Conjunctivae and EOM are normal.  Pupils are equal, round, and reactive to light.  No scleral icterus.    Neck:  Neck supple. No thyromegaly.  No JVD present.    Cardiovascular: Irregular rhythm with rate in the low 100s, no murmurs, rubs, clicks or gallops appreciated  Pulmonary/Chest:  Faint inspiratory crackle in the right base with no wheezes or rhonchi appreciated  Abdominal:  Soft. Nontender. Nondistended  Bowel sounds are normal in all four quadrants. No organomegally appreciated.   Musculoskeletal:  5/5 muscle strength bilateral upper and lower extermties with equal muscle tone and bulk.  2+ pitting edema bilateral lower extremities, no tenderness, and no deformity.  No red or swollen joints anywhere.    Neurological:  Alert and oriented to person, place, and time. Cranial nerves II-XII intact with no focal defecits.  No facial droop.  No slurred speech.   Skin:  Warm and dry to palpation with no rashes or lesions appreciated.  Peripheral vascular:  2+ radial and pedal pulses in bilateral upper and lower extremities.  Psychiatric:  Alert and oriented x3, demonstrates appropriate judgement and insight.  ----------------------------------------------------------------------------------------------------------------------  Tele:    ----------------------------------------------------------------------------------------------------------------------  Results from last 7 days   Lab Units 09/01/20  1541 09/01/20  1221   TROPONIN T ng/mL <0.010 <0.010     Results from last 7 days   Lab Units 09/03/20  0136 09/02/20  1244 09/02/20  0518 09/01/20  1228 09/01/20  1221   CRP mg/dL  --   --   --   --  0.30   LACTATE  mmol/L  --   --   --  1.2  --    WBC 10*3/mm3 8.12 8.24 8.53  --  9.12   HEMOGLOBIN g/dL 14.0 14.7 14.1  --  16.1   HEMATOCRIT % 42.7 44.6 44.4  --  48.9   MCV fL 92.8 92.5 94.5  --  91.7   MCHC g/dL 32.8 33.0 31.8  --  32.9   PLATELETS 10*3/mm3 208 212 219  --  276   INR   --  1.05  --   --  0.97         Results from last 7 days   Lab Units 09/03/20  0136 09/02/20  0518 09/01/20  1221   SODIUM mmol/L 137 139 138   POTASSIUM mmol/L 4.3 4.5 4.7   MAGNESIUM mg/dL  --   --  2.3   CHLORIDE mmol/L 103 106 103   CO2 mmol/L 24.0 22.2 25.1   BUN mg/dL 17 15 11   CREATININE mg/dL 0.88 0.85 1.12   EGFR IF NONAFRICN AM mL/min/1.73 88 92 67   CALCIUM mg/dL 8.9 8.7 8.9   GLUCOSE mg/dL 101* 108* 114*   ALBUMIN g/dL  --   --  4.03   BILIRUBIN mg/dL  --   --  0.4   ALK PHOS U/L  --   --  96   AST (SGOT) U/L  --   --  24   ALT (SGPT) U/L  --   --  30   Estimated Creatinine Clearance: 98.6 mL/min (by C-G formula based on SCr of 0.88 mg/dL).    No results found for: AMMONIA  Results from last 7 days   Lab Units 09/03/20  0136   CHOLESTEROL mg/dL 133   TRIGLYCERIDES mg/dL 51   HDL CHOL mg/dL 42   LDL CHOL mg/dL 81     Blood Culture   Date Value Ref Range Status   09/01/2020 No growth at 24 hours  Preliminary   09/01/2020 No growth at 24 hours  Preliminary     No results found for: URINECX  No results found for: WOUNDCX  No results found for: STOOLCX    I have personally looked at the labs and they are summarized above.  ----------------------------------------------------------------------------------------------------------------------  Imaging Results (Last 24 Hours)     Procedure Component Value Units Date/Time    XR Chest PA & Lateral [[id number]] Collected:  09/03/20 1008     Updated:  09/03/20 1011    Narrative:       EXAMINATION: XR CHEST PA AND LATERAL-      CLINICAL INDICATION:     possible pna; I48.91-Unspecified atrial  fibrillation     TECHNIQUE:  XR CHEST PA AND LATERAL-      COMPARISON: NONE      FINDINGS:   Bilateral  interstitial thickening.  Central bronchial thickening with perihilar bronchial cuffing.   Heart size, mediastinum, and pulmonary vascularity are unremarkable.   No pneumothorax.   Trace effusions noted.   No acute osseous findings.          Impression:       Bilateral atypical pneumonia. Superimposed CHF with  interstitial edema also considered given presence of trace pleural  effusions.     This report was finalized on 9/3/2020 10:08 AM by Dr. Chidi Dean MD.           ----------------------------------------------------------------------------------------------------------------------  Assessment and Plan:    1.  New onset A. Fib -plan for LUCERO with electrical cardioversion tomorrow morning, appreciate cardiology recommendations.    2.  Chronic systolic CHF -stress test performed today and demonstrates an intermediate risk study.  This in combination with transthoracic echocardiogram findings, recommendation has been made for left heart catheterization.  Consult has been placed for Entresto eligibility.     3.  Questionable left lower lobe pneumonia -patient with persistent cough and subjective feelings of shortness of breath.  Continue Rocephin and azithromycin for now     4.  Essential hypertension -well controlled at this time     5.  History of CAD -continue medical management, cardiology also consulted.     6.  Tobacco abuse -encourage cessation            Napoleon Del Angel,   09/03/20  16:55

## 2020-09-03 NOTE — PLAN OF CARE
Pt is NPO again after midnight for potential heart cath,LUCERO, and cardioversion.  Pt has resting in room with all items in reach. Pt expressed issues with being homeless and son with schizophrenia. Pt completed stress test today. NELIA MARIA.

## 2020-09-03 NOTE — PLAN OF CARE
Problem: Patient Care Overview  Goal: Plan of Care Review  Outcome: Ongoing (interventions implemented as appropriate)  Goal: Individualization and Mutuality  Outcome: Ongoing (interventions implemented as appropriate)  Goal: Discharge Needs Assessment  Outcome: Ongoing (interventions implemented as appropriate)  Goal: Interprofessional Rounds/Family Conf  Outcome: Ongoing (interventions implemented as appropriate)     Problem: Fall Risk (Adult)  Goal: Identify Related Risk Factors and Signs and Symptoms  Description  Related risk factors and signs and symptoms are identified upon initiation of Human Response Clinical Practice Guideline (CPG).  Outcome: Ongoing (interventions implemented as appropriate)  Goal: Absence of Fall  Description  Patient will demonstrate the desired outcomes by discharge/transition of care.  Outcome: Ongoing (interventions implemented as appropriate)     Problem: Arrhythmia/Dysrhythmia (Symptomatic) (Adult)  Description  Prevent and manage potential problems includin. chest pain (angina)  2. electrophysiologic conduction defect  3. embolism  4. hemodynamic instability  5. hypoxia/hypoxemia  6. situational response  7. syncope  Goal: Signs and Symptoms of Listed Potential Problems Will be Absent, Minimized or Managed (Arrhythmia/Dysrhythmia)  Description  Signs and symptoms of listed potential problems will be absent, minimized or managed by discharge/transition of care (reference Arrhythmia/Dysrhythmia (Symptomatic) (Adult) CPG).  Outcome: Ongoing (interventions implemented as appropriate)

## 2020-09-03 NOTE — NURSING NOTE
Pt continuously removes telemetry himself. Pt has been educated to not disconnected from Cardizem or Heparin. Pt was been educated not to shower because of IVs. Pt refuses bed alarm and is aware of risk of walking frequently in the room. NELIA RN.

## 2020-09-03 NOTE — PROGRESS NOTES
"     LOS: 2 days     Name: Chong White  Age/Sex: 60 y.o. male  :  1960        PCP: Kaya Romo APRN  REF: No ref. provider found    Active Problems:    A-fib (CMS/[facility])      Reason for follow-up: Atrial fibrillation and Congestive Heart Failure     Subjective       Subjective     Chong White is a 60-year-old male with past medical history significant for essential hypertension, coronary artery disease, tobacco abuse and GERD.  He presented to the ER with complaints of shortness of breath and palpitations.Patient states for the last 3 days he has had worsening shortness of breath and palpitations. He states he feels like he is \"smothering\". He also has had intermittent palpitations. He has had worsening pedal edema also. He has a history of coronary artery disease per his report and had a MI years ago with multiple PCI in the past from IV drug abuse. He denies any current drug abuse. He has not been following with any cardiologist and reports he only takes aspirin at home. He denies any current chest pain, fever or chills. He denies any history of atrial fibrillation in the past. He is current smoker.     Interval History: Patient reports his breathing is some better today. He denies any chest pain. Stress test showing old infarct but no ischemia. Echocardiogram showing decreased EF of 21-25%. Remains in atrial fibrillation on cardizem drip. He does report intermittent palpitations.     Vital Signs  Temp:  [97.8 °F (36.6 °C)-98 °F (36.7 °C)] 98 °F (36.7 °C)  Heart Rate:  [] 101  Resp:  [12-24] 24  BP: ()/() 118/39     Vital Signs (last 72 hrs)        0700  -  09/ 0659 09/ 0700  -   0659 / 07  -   0659  07  -   1056   Most Recent    Temp (°F)   97.6 -  98.8    97.8 -  98       98 (36.7)    Heart Rate   82 -  160    80 -  120    96 -  107     101    Resp   21 -  22    14 -  24    12 -  24     24    BP   96/79 -  145/97    94/46 -  148/122    " 118/39 -  148/72     118/39    SpO2 (%)   88 -  100    89 -  100    90 -  97     97        Body mass index is 26.18 kg/m².    Intake/Output Summary (Last 24 hours) at 9/3/2020 1056  Last data filed at 9/3/2020 0400  Gross per 24 hour   Intake 2578.67 ml   Output 1500 ml   Net 1078.67 ml     Objective    Objective       Physical Exam:     General Appearance:    Alert, cooperative, in no acute distress   Head:    Normocephalic, without obvious abnormality, atraumatic   Eyes:            Conjunctivae and sclerae normal, no   icterus, no pallor, corneas clear.   Neck:   No adenopathy, supple, trachea midline, no thyromegaly, no   carotid bruit, no JVD   Lungs:     Clear to auscultation,respirations regular, even and                  unlabored    Heart:    irregular rhythm and normal rate, normal S1 and S2, no            murmur, no gallop, no rub, no click   Chest Wall:    No abnormalities observed   Abdomen:     Normal bowel sounds, no masses, no organomegaly, soft        non-tender, non-distended, no guarding, no rebound                tenderness   Extremities:   Moves all extremities well, no edema, no cyanosis, no             redness   Pulses:   Pulses palpable and equal bilaterally   Skin:   No bleeding, bruising or rash       Neurologic:   Alert and oriented    I have seen and performed a physical examination today.     Results review       Results Review:   Results from last 7 days   Lab Units 09/03/20  0136 09/02/20  1244 09/02/20 0518 09/01/20  1221   WBC 10*3/mm3 8.12 8.24 8.53 9.12   HEMOGLOBIN g/dL 14.0 14.7 14.1 16.1   PLATELETS 10*3/mm3 208 212 219 276     Results from last 7 days   Lab Units 09/03/20  0136 09/02/20  0518 09/01/20  1221   SODIUM mmol/L 137 139 138   POTASSIUM mmol/L 4.3 4.5 4.7   CHLORIDE mmol/L 103 106 103   CO2 mmol/L 24.0 22.2 25.1   BUN mg/dL 17 15 11   CREATININE mg/dL 0.88 0.85 1.12   CALCIUM mg/dL 8.9 8.7 8.9   GLUCOSE mg/dL 101* 108* 114*   ALT (SGPT) U/L  --   --  30   AST (SGOT)  U/L  --   --  24     Results from last 7 days   Lab Units 09/01/20  1541 09/01/20  1221   TROPONIN T ng/mL <0.010 <0.010     Lab Results   Component Value Date    INR 1.05 09/02/2020    INR 0.97 09/01/2020     Lab Results   Component Value Date    MG 2.3 09/01/2020     Lab Results   Component Value Date    TRIG 51 09/03/2020    HDL 42 09/03/2020    LDL 81 09/03/2020      Imaging Results (Last 48 Hours)     Procedure Component Value Units Date/Time    XR Chest PA & Lateral [[id number]] Collected:  09/03/20 1008     Updated:  09/03/20 1011    Narrative:       EXAMINATION: XR CHEST PA AND LATERAL-      CLINICAL INDICATION:     possible pna; I48.91-Unspecified atrial  fibrillation     TECHNIQUE:  XR CHEST PA AND LATERAL-      COMPARISON: NONE      FINDINGS:   Bilateral interstitial thickening.  Central bronchial thickening with perihilar bronchial cuffing.   Heart size, mediastinum, and pulmonary vascularity are unremarkable.   No pneumothorax.   Trace effusions noted.   No acute osseous findings.          Impression:       Bilateral atypical pneumonia. Superimposed CHF with  interstitial edema also considered given presence of trace pleural  effusions.     This report was finalized on 9/3/2020 10:08 AM by Dr. Chidi Dean MD.       CT Chest Pulmonary Embolism [[id number]] Collected:  09/01/20 1543     Updated:  09/01/20 1547    Narrative:       EXAMINATION: CT CHEST PULMONARY EMBOLISM-      CLINICAL INDICATION:shortness of breath; afib w rvr      COMPARISON: NONE.     TECHNIQUE: Multiple CT axial images were obtained through the level of  pulmonary arteries, following IV contrast administration per CT PE  protocol.  Volume Rendered 3D or MIP images performed.     Radiation dose reduction techniques were utilized per ALARA protocol.  Automated exposure control was initiated through either or Xierkang or  DoseRight software packages by  protocol.    DOSE (DLP mGy-cm): 574.47 mGy.cm        FINDINGS:        Pulmonary arteries: No evidence of pulmonary embolism.  Lungs: Predominantly linear opacities of the left greater than right  lower lobes predominantly representing atelectasis but superimposed  pneumonia of the left lower lobe also considered. Advanced centrilobular  emphysema noted with upper lung predominance. Interstitial thickening  likely edema.  Heart: Moderate cardiomegaly.  Pericardium: No effusion.  Mediastinum: No masses. No enlarged lymph nodes.  No fluid collections.  Pleura: Small left pleural effusion and trace right pleural effusion  which are nonloculated.  Major airways: Clear. No intrinsic mass.  Vasculature: No evidence of aneurysm.  Visualized upper abdomen:Low-density liver lesion appears to represent  benign hepatic cysts.  Other: None.  Bones: No acute bony abnormality.       Impression:       1. No PE.  2. Moderate cardiomegaly with interstitial edema and left greater than  right small nonloculated pleural effusions. Findings compatible with  CHF.  3. Moderate to advanced COPD.  4. Other nonacute findings described above.     This report was finalized on 9/1/2020 3:45 PM by Dr. Chidi Dean MD.       XR Chest 1 View [[id number]] Collected:  09/01/20 1415     Updated:  09/01/20 1418    Narrative:       EXAMINATION: XR CHEST 1 VW-      CLINICAL INDICATION:     Simple Sepsis Protocol     TECHNIQUE:  XR CHEST 1 VW-      COMPARISON: NONE      FINDINGS:      Left lower lobe pneumonia.   Heart size, mediastinum, and pulmonary vascularity are unremarkable.   No pneumothorax.   No effusions.   No acute osseous findings.          Impression:       Left lower lobe pneumonia.     This report was finalized on 9/1/2020 2:16 PM by Dr. Chidi Dean MD.             Echo   Results for orders placed during the hospital encounter of 09/01/20   Transthoracic Echo Complete With Contrast if Necessary Per Protocol    Narrative · The left ventricular cavity is borderline dilated with severe global   wall  hypokinesis and severe left ventricular systolic dysfunction, EF   21-25%.  · Left ventricular diastolic dysfunction (grade II) consistent with   pseudonormalization.  · Right ventricular cavity is dilated.  · Left atrial cavity size is mildly dilated.  · Mild-to-moderate mitral valve regurgitation is present  · Moderate tricuspid valve regurgitation is present.  · Calculated right ventricular systolic pressure from tricuspid   regurgitation is 35 mmHg.           I reviewed the patient's new clinical results.    Telemetry: A. Fib  bpm        Medication Review:     aspirin 81 mg Oral Daily   atorvastatin 40 mg Oral Nightly   azithromycin 500 mg Intravenous Q24H   cefTRIAXone 1 g Intravenous Q24H   furosemide 20 mg Oral BID   guaiFENesin 1,200 mg Oral Q12H   heparin (porcine) 60 Units/kg Intravenous Once   metoprolol tartrate 25 mg Oral Q12H   sodium chloride 10 mL Intravenous Q12H         dilTIAZem 5-15 mg/hr Last Rate: 5 mg/hr (09/03/20 0315)   heparin (porcine) 12 Units/kg/hr Last Rate: 13 Units/kg/hr (09/03/20 5214)       Assessment      Assessment:  1. New onset atrial fibrillation with RVR, CHADS-VASc score of 3 for CHF, HTN and CAD, on IV cardizem and IV heparin, rate  bpm   2. ASCVD, history of MI in remote past with multiple PCI per patient report   3. Acute systolic and diastolic congestive heart failure  4. Ischemic vs non-ischemic cardiomyopathy, EF of 21-25%   5. Moderate tricuspid valve regurgitation  6. Mild to moderate mitral valve regurgitation   7. Essential hypertension, controlled     Plan     Recommendations:  1. Patient still in atrial fibrillation with RVR I discussed with him about having a LUCERO and cardioversion he is agreeable with plan to do that in the morning  2. I discussed the stress test resulted also in consideration of severe LV systolic dysfunction I recommended cardiac catheterization for him with the risks and benefits he is agreeable to go for the procedure  3. We will  advance carvedilol gradually and also consult for Entresto  4. I had a long discussion with the patient regarding compliance with medications and he insisted that he would be compliant  5. Again recommended in the strongest terms that he should quit smoking    I discussed the patients findings and my recommendations with patient and family      Coleen SAMARA Schwartz, acting as scribe for Dr. Thong Delarosa MD FACC  09/03/20  10:56  I, Thong Delarosa MD, [facility], performed the services described in this documentation as documented by the above-named individual under my supervision and made necessary changes in the note is both accurate and complete  Please note that portions of this note were completed with a voice recognition program.

## 2020-09-04 ENCOUNTER — APPOINTMENT (OUTPATIENT)
Dept: CARDIOLOGY | Facility: HOSPITAL | Age: 60
End: 2020-09-04

## 2020-09-04 ENCOUNTER — ANESTHESIA EVENT (OUTPATIENT)
Dept: CARDIOLOGY | Facility: HOSPITAL | Age: 60
End: 2020-09-04

## 2020-09-04 ENCOUNTER — ANESTHESIA (OUTPATIENT)
Dept: CARDIOLOGY | Facility: HOSPITAL | Age: 60
End: 2020-09-04

## 2020-09-04 PROBLEM — R94.39 ABNORMAL NUCLEAR STRESS TEST: Status: ACTIVE | Noted: 2020-09-01

## 2020-09-04 LAB
ANION GAP SERPL CALCULATED.3IONS-SCNC: 9.4 MMOL/L (ref 5–15)
APTT PPP: 39.7 SECONDS (ref 25.6–35.3)
APTT PPP: 61.7 SECONDS (ref 25.6–35.3)
APTT PPP: >100 SECONDS (ref 25.6–35.3)
BH CV ECHO MEAS - BSA(HAYCOCK): 2 M^2
BH CV ECHO MEAS - BSA: 1.9 M^2
BH CV ECHO MEAS - BZI_BMI: 26.8 KILOGRAMS/M^2
BH CV ECHO MEAS - BZI_METRIC_HEIGHT: 172.7 CM
BH CV ECHO MEAS - BZI_METRIC_WEIGHT: 79.8 KG
BUN SERPL-MCNC: 18 MG/DL (ref 8–23)
BUN/CREAT SERPL: 17.1 (ref 7–25)
CALCIUM SPEC-SCNC: 8.8 MG/DL (ref 8.6–10.5)
CHLORIDE SERPL-SCNC: 102 MMOL/L (ref 98–107)
CO2 SERPL-SCNC: 28.6 MMOL/L (ref 22–29)
CREAT SERPL-MCNC: 1.05 MG/DL (ref 0.76–1.27)
DEPRECATED RDW RBC AUTO: 43.7 FL (ref 37–54)
ERYTHROCYTE [DISTWIDTH] IN BLOOD BY AUTOMATED COUNT: 12.9 % (ref 12.3–15.4)
GFR SERPL CREATININE-BSD FRML MDRD: 72 ML/MIN/1.73
GLUCOSE SERPL-MCNC: 101 MG/DL (ref 65–99)
HCT VFR BLD AUTO: 44.9 % (ref 37.5–51)
HGB BLD-MCNC: 14.4 G/DL (ref 13–17.7)
MCH RBC QN AUTO: 29.7 PG (ref 26.6–33)
MCHC RBC AUTO-ENTMCNC: 32.1 G/DL (ref 31.5–35.7)
MCV RBC AUTO: 92.6 FL (ref 79–97)
PLATELET # BLD AUTO: 235 10*3/MM3 (ref 140–450)
PMV BLD AUTO: 10.1 FL (ref 6–12)
POTASSIUM SERPL-SCNC: 4.3 MMOL/L (ref 3.5–5.2)
RBC # BLD AUTO: 4.85 10*6/MM3 (ref 4.14–5.8)
SODIUM SERPL-SCNC: 140 MMOL/L (ref 136–145)
WBC # BLD AUTO: 8.24 10*3/MM3 (ref 3.4–10.8)

## 2020-09-04 PROCEDURE — 93454 CORONARY ARTERY ANGIO S&I: CPT | Performed by: INTERNAL MEDICINE

## 2020-09-04 PROCEDURE — C1769 GUIDE WIRE: HCPCS | Performed by: INTERNAL MEDICINE

## 2020-09-04 PROCEDURE — 25010000002 FUROSEMIDE PER 20 MG: Performed by: INTERNAL MEDICINE

## 2020-09-04 PROCEDURE — 25010000002 PROPOFOL 10 MG/ML EMULSION: Performed by: NURSE ANESTHETIST, CERTIFIED REGISTERED

## 2020-09-04 PROCEDURE — 4A023N7 MEASUREMENT OF CARDIAC SAMPLING AND PRESSURE, LEFT HEART, PERCUTANEOUS APPROACH: ICD-10-PCS | Performed by: SPECIALIST

## 2020-09-04 PROCEDURE — 0 IOPAMIDOL PER 1 ML: Performed by: INTERNAL MEDICINE

## 2020-09-04 PROCEDURE — 92960 CARDIOVERSION ELECTRIC EXT: CPT

## 2020-09-04 PROCEDURE — 93325 DOPPLER ECHO COLOR FLOW MAPG: CPT

## 2020-09-04 PROCEDURE — 85730 THROMBOPLASTIN TIME PARTIAL: CPT | Performed by: INTERNAL MEDICINE

## 2020-09-04 PROCEDURE — 92960 CARDIOVERSION ELECTRIC EXT: CPT | Performed by: SPECIALIST

## 2020-09-04 PROCEDURE — 93312 ECHO TRANSESOPHAGEAL: CPT | Performed by: SPECIALIST

## 2020-09-04 PROCEDURE — 93321 DOPPLER ECHO F-UP/LMTD STD: CPT | Performed by: SPECIALIST

## 2020-09-04 PROCEDURE — 80048 BASIC METABOLIC PNL TOTAL CA: CPT | Performed by: INTERNAL MEDICINE

## 2020-09-04 PROCEDURE — 25010000002 AZITHROMYCIN PER 500 MG: Performed by: INTERNAL MEDICINE

## 2020-09-04 PROCEDURE — 93321 DOPPLER ECHO F-UP/LMTD STD: CPT

## 2020-09-04 PROCEDURE — 99232 SBSQ HOSP IP/OBS MODERATE 35: CPT | Performed by: SPECIALIST

## 2020-09-04 PROCEDURE — 5A2204Z RESTORATION OF CARDIAC RHYTHM, SINGLE: ICD-10-PCS | Performed by: SPECIALIST

## 2020-09-04 PROCEDURE — C1894 INTRO/SHEATH, NON-LASER: HCPCS | Performed by: INTERNAL MEDICINE

## 2020-09-04 PROCEDURE — 25010000002 HEPARIN (PORCINE) PER 1000 UNITS: Performed by: NURSE PRACTITIONER

## 2020-09-04 PROCEDURE — 99232 SBSQ HOSP IP/OBS MODERATE 35: CPT | Performed by: INTERNAL MEDICINE

## 2020-09-04 PROCEDURE — 93312 ECHO TRANSESOPHAGEAL: CPT

## 2020-09-04 PROCEDURE — 85027 COMPLETE CBC AUTOMATED: CPT | Performed by: INTERNAL MEDICINE

## 2020-09-04 PROCEDURE — 25010000002 CEFTRIAXONE PER 250 MG: Performed by: INTERNAL MEDICINE

## 2020-09-04 PROCEDURE — 93325 DOPPLER ECHO COLOR FLOW MAPG: CPT | Performed by: SPECIALIST

## 2020-09-04 PROCEDURE — B2111ZZ FLUOROSCOPY OF MULTIPLE CORONARY ARTERIES USING LOW OSMOLAR CONTRAST: ICD-10-PCS | Performed by: SPECIALIST

## 2020-09-04 RX ORDER — PROPOFOL 10 MG/ML
VIAL (ML) INTRAVENOUS AS NEEDED
Status: DISCONTINUED | OUTPATIENT
Start: 2020-09-04 | End: 2020-09-04 | Stop reason: SURG

## 2020-09-04 RX ORDER — SODIUM CHLORIDE 9 MG/ML
INJECTION, SOLUTION INTRAVENOUS CONTINUOUS PRN
Status: COMPLETED | OUTPATIENT
Start: 2020-09-04 | End: 2020-09-04

## 2020-09-04 RX ORDER — LIDOCAINE HYDROCHLORIDE 20 MG/ML
INJECTION, SOLUTION INFILTRATION; PERINEURAL AS NEEDED
Status: DISCONTINUED | OUTPATIENT
Start: 2020-09-04 | End: 2020-09-04 | Stop reason: HOSPADM

## 2020-09-04 RX ORDER — SODIUM CHLORIDE 9 MG/ML
100 INJECTION, SOLUTION INTRAVENOUS ONCE
Status: COMPLETED | OUTPATIENT
Start: 2020-09-04 | End: 2020-09-04

## 2020-09-04 RX ORDER — NICOTINE 21 MG/24HR
1 PATCH, TRANSDERMAL 24 HOURS TRANSDERMAL
Status: DISCONTINUED | OUTPATIENT
Start: 2020-09-04 | End: 2020-09-10 | Stop reason: HOSPADM

## 2020-09-04 RX ORDER — SODIUM CHLORIDE 9 MG/ML
INJECTION, SOLUTION INTRAVENOUS CONTINUOUS PRN
Status: DISCONTINUED | OUTPATIENT
Start: 2020-09-04 | End: 2020-09-04 | Stop reason: SURG

## 2020-09-04 RX ORDER — CARVEDILOL 6.25 MG/1
6.25 TABLET ORAL 2 TIMES DAILY WITH MEALS
Status: DISCONTINUED | OUTPATIENT
Start: 2020-09-04 | End: 2020-09-05

## 2020-09-04 RX ORDER — LIDOCAINE HYDROCHLORIDE 20 MG/ML
INJECTION, SOLUTION EPIDURAL; INFILTRATION; INTRACAUDAL; PERINEURAL AS NEEDED
Status: DISCONTINUED | OUTPATIENT
Start: 2020-09-04 | End: 2020-09-04 | Stop reason: SURG

## 2020-09-04 RX ORDER — CARVEDILOL 6.25 MG/1
12.5 TABLET ORAL 2 TIMES DAILY WITH MEALS
Status: DISCONTINUED | OUTPATIENT
Start: 2020-09-04 | End: 2020-09-04

## 2020-09-04 RX ORDER — ACETAMINOPHEN 325 MG/1
650 TABLET ORAL EVERY 4 HOURS PRN
Status: DISCONTINUED | OUTPATIENT
Start: 2020-09-04 | End: 2020-09-10 | Stop reason: HOSPADM

## 2020-09-04 RX ORDER — SOTALOL HYDROCHLORIDE 80 MG/1
80 TABLET ORAL EVERY 12 HOURS SCHEDULED
Status: DISCONTINUED | OUTPATIENT
Start: 2020-09-04 | End: 2020-09-10 | Stop reason: HOSPADM

## 2020-09-04 RX ADMIN — GUAIFENESIN 1200 MG: 600 TABLET, EXTENDED RELEASE ORAL at 08:52

## 2020-09-04 RX ADMIN — NICOTINE 1 PATCH: 21 PATCH TRANSDERMAL at 14:54

## 2020-09-04 RX ADMIN — HYDROCODONE BITARTRATE AND ACETAMINOPHEN 1 TABLET: 5; 325 TABLET ORAL at 00:49

## 2020-09-04 RX ADMIN — GUAIFENESIN 1200 MG: 600 TABLET, EXTENDED RELEASE ORAL at 21:17

## 2020-09-04 RX ADMIN — ASPIRIN 81 MG: 81 TABLET, COATED ORAL at 08:52

## 2020-09-04 RX ADMIN — PROPOFOL 80 MG: 10 INJECTION, EMULSION INTRAVENOUS at 13:45

## 2020-09-04 RX ADMIN — SODIUM CHLORIDE, PRESERVATIVE FREE 10 ML: 5 INJECTION INTRAVENOUS at 08:52

## 2020-09-04 RX ADMIN — SODIUM CHLORIDE, PRESERVATIVE FREE 10 ML: 5 INJECTION INTRAVENOUS at 21:18

## 2020-09-04 RX ADMIN — HYDROCODONE BITARTRATE AND ACETAMINOPHEN 1 TABLET: 5; 325 TABLET ORAL at 08:52

## 2020-09-04 RX ADMIN — SODIUM CHLORIDE 100 ML/HR: 9 INJECTION, SOLUTION INTRAVENOUS at 14:54

## 2020-09-04 RX ADMIN — CARVEDILOL 6.25 MG: 6.25 TABLET, FILM COATED ORAL at 08:52

## 2020-09-04 RX ADMIN — HEPARIN SODIUM 21 UNITS/KG/HR: 10000 INJECTION, SOLUTION INTRAVENOUS at 11:27

## 2020-09-04 RX ADMIN — CEFTRIAXONE 1 G: 1 INJECTION, POWDER, FOR SOLUTION INTRAMUSCULAR; INTRAVENOUS at 21:16

## 2020-09-04 RX ADMIN — Medication 1 CAPSULE: at 08:52

## 2020-09-04 RX ADMIN — HYDROCODONE BITARTRATE AND ACETAMINOPHEN 1 TABLET: 5; 325 TABLET ORAL at 14:54

## 2020-09-04 RX ADMIN — SOTALOL HYDROCHLORIDE 80 MG: 80 TABLET ORAL at 21:17

## 2020-09-04 RX ADMIN — FUROSEMIDE 20 MG: 20 INJECTION, SOLUTION INTRAMUSCULAR; INTRAVENOUS at 08:52

## 2020-09-04 RX ADMIN — PROPOFOL 75 MCG/KG/MIN: 10 INJECTION, EMULSION INTRAVENOUS at 13:45

## 2020-09-04 RX ADMIN — LIDOCAINE HYDROCHLORIDE 60 MG: 20 INJECTION, SOLUTION EPIDURAL; INFILTRATION; INTRACAUDAL; PERINEURAL at 13:45

## 2020-09-04 RX ADMIN — CARVEDILOL 6.25 MG: 6.25 TABLET, FILM COATED ORAL at 17:54

## 2020-09-04 RX ADMIN — HYDROCODONE BITARTRATE AND ACETAMINOPHEN 1 TABLET: 5; 325 TABLET ORAL at 21:18

## 2020-09-04 RX ADMIN — AZITHROMYCIN MONOHYDRATE 500 MG: 500 INJECTION, POWDER, LYOPHILIZED, FOR SOLUTION INTRAVENOUS at 21:17

## 2020-09-04 RX ADMIN — LOSARTAN POTASSIUM 25 MG: 25 TABLET, FILM COATED ORAL at 08:52

## 2020-09-04 RX ADMIN — HEPARIN SODIUM 4500 UNITS: 5000 INJECTION INTRAVENOUS; SUBCUTANEOUS at 11:24

## 2020-09-04 RX ADMIN — ATORVASTATIN CALCIUM 40 MG: 40 TABLET, FILM COATED ORAL at 21:17

## 2020-09-04 RX ADMIN — DIGOXIN 125 MCG: 125 TABLET ORAL at 11:24

## 2020-09-04 RX ADMIN — SODIUM CHLORIDE: 0.9 INJECTION, SOLUTION INTRAVENOUS at 13:36

## 2020-09-04 NOTE — PROGRESS NOTES
Discharge Planning Assessment  ANIYAH Burden     Patient Name: Chong White  MRN: [id number]  Today's Date: 9/4/2020    Admit Date: 9/1/2020    Discharge Plan     Row Name 09/04/20 1632       Plan    Plan  Pt was admitted on 9/1/20. SS is still currently working on housing. Pt reports that his son will have to live with him which makes it hard to live in an apartment complex. Pt is not agreeable to move to [facility] at this time. SS will continue to explore housing options. SS will continue to follow and assist with discharge planning.    Patient/Family in Agreement with Plan  yes              Gosia Allen

## 2020-09-04 NOTE — ADDENDUM NOTE
Addendum  created 09/04/20 1522 by Puma Novoa MD    Attestation recorded in Intraprocedure, Intraprocedure Attestations filed

## 2020-09-04 NOTE — NURSING NOTE
"Patient has been educated again today that he does not need to remove monitors. He continuously has removed them despite q15 VS being ordered post cath, stating that he is \"trying to look for something.\"  "

## 2020-09-04 NOTE — ANESTHESIA POSTPROCEDURE EVALUATION
Patient: Chong White    Procedure Summary     Date:  09/04/20 Room / Location:  [facility] NONINVASIVE LAB    Anesthesia Start:  1337 Anesthesia Stop:  1405    Procedure:  ADULT TRANSESOPHAGEAL ECHO (LUCERO) W/ CONT IF NECESSARY PER PROTOCOL Diagnosis:  (Arrhythmia)    Scheduled Providers:  Thong Delarosa MD Provider:  Puma Novoa MD    Anesthesia Type:  general ASA Status:  3          Anesthesia Type: general    Vitals  No vitals data found for the desired time range.          Post Anesthesia Care and Evaluation    Patient location during evaluation: PHASE II  Patient participation: waiting for patient participation  Level of consciousness: responsive to verbal stimuli and sleepy but conscious  Pain score: 1  Pain management: adequate  Airway patency: patent  Anesthetic complications: No anesthetic complications  PONV Status: controlled  Cardiovascular status: acceptable  Respiratory status: acceptable  Hydration status: acceptable

## 2020-09-04 NOTE — ANESTHESIA PREPROCEDURE EVALUATION
Anesthesia Evaluation     Patient summary reviewed   NPO Solid Status: > 8 hours  NPO Liquid Status: > 8 hours           Airway   Mallampati: II  TM distance: >3 FB  Neck ROM: full  No difficulty expected  Dental - normal exam     Pulmonary - normal exam    breath sounds clear to auscultation  (+) pneumonia (left lower lobe) ,   Cardiovascular     Rhythm: irregular  Rate: abnormal    (+) hypertension less than 2 medications, past MI , dysrhythmias Atrial Fib,       Neuro/Psych  GI/Hepatic/Renal/Endo    (+)  GERD,      Musculoskeletal     Abdominal  - normal exam   Substance History   (+) drug use (smoker 2 ppd;  marjuiana 1 month ago;  other substances years ago)     OB/GYN          Other                      Anesthesia Plan    ASA 3     general     intravenous induction     Anesthetic plan, all risks, benefits, and alternatives have been provided, discussed and informed consent has been obtained with: patient.

## 2020-09-04 NOTE — PHARMACY PATIENT ASSISTANCE
Pharmacy was asked to check on the cost of Entresto for this patient. It required a prior authorization through patient's insurance, which was approved today, and patient will have a $0 copay. The PA is good through 9/4/2022.    Thank you,  Miranda George, PharmD

## 2020-09-04 NOTE — PROGRESS NOTES
[facility] HOSPITALIST PROGRESS NOTE     Patient Identification:  Name:  Chong White  Age:  60 y.o.  Sex:  male  :  1960  MRN:  [id number]  Visit Number:  [id number]  Primary Care Provider:  Kaya Romo APRN    Length of stay:  3    Chief complaint: Bilateral lower extremity edema    Subjective:    Patient reports that his cough has improved compared to yesterday.  However, he continues to have bilateral lower extremity edema with some pain.  Patient is scheduled to have LUCERO with electrical cardioversion as well as left heart catheterization today.  ----------------------------------------------------------------------------------------------------------------------  [facility] Meds:    [START ON 2020] apixaban 5 mg Oral Q12H   aspirin 81 mg Oral Daily   atorvastatin 40 mg Oral Nightly   azithromycin 500 mg Intravenous Q24H   carvedilol 6.25 mg Oral BID With Meals   cefTRIAXone 1 g Intravenous Q24H   furosemide 20 mg Intravenous Daily   guaiFENesin 1,200 mg Oral Q12H   heparin (porcine) 60 Units/kg Intravenous Once   lactobacillus acidophilus 1 capsule Oral Daily   losartan 25 mg Oral Q24H   nicotine 1 patch Transdermal Q24H   sodium chloride 10 mL Intravenous Q12H   sotalol 80 mg Oral Q12H       dilTIAZem 5-15 mg/hr Last Rate: Stopped (20 1130)   heparin (porcine) 12 Units/kg/hr Last Rate: 21 Units/kg/hr (20 1127)   Pharmacy Consult       ----------------------------------------------------------------------------------------------------------------------  Vital Signs:  Temp:  [98.1 °F (36.7 °C)-98.5 °F (36.9 °C)] 98.1 °F (36.7 °C)  Heart Rate:  [] 108  Resp:  [12-24] 21  BP: ()/() 131/119      20  0400 20  0400 20  0500   Weight: 75.3 kg (166 lb) 78.1 kg (172 lb 3.2 oz) 79.8 kg (176 lb)     Body mass index is 26.76 kg/m².    Intake/Output Summary (Last 24 hours) at 2020 1609  Last data filed at 2020 1321  Gross per  24 hour   Intake 1647.62 ml   Output 2700 ml   Net -1052.38 ml     Diet Regular; Cardiac  ----------------------------------------------------------------------------------------------------------------------  Physical exam:  Constitutional: Well-nourished  male in no apparent distress.     HENT:  Head:  Normocephalic and atraumatic.  Mouth:  Moist mucous membranes.    Eyes:  Conjunctivae and EOM are normal.  Pupils are equal, round, and reactive to light.  No scleral icterus.    Neck:  Neck supple. No thyromegaly.  No JVD present.    Cardiovascular: Irregular rhythm with rate in the low 100s, no murmurs, rubs, clicks or gallops appreciated  Pulmonary/Chest:  Faint inspiratory crackle in the right base with no wheezes or rhonchi appreciated  Abdominal:  Soft. Nontender. Nondistended  Bowel sounds are normal in all four quadrants. No organomegally appreciated.   Musculoskeletal:  5/5 muscle strength bilateral upper and lower extermties with equal muscle tone and bulk.  2+ pitting edema bilateral lower extremities, no tenderness, and no deformity.  No red or swollen joints anywhere.    Neurological:  Alert and oriented to person, place, and time. Cranial nerves II-XII intact with no focal defecits.  No facial droop.  No slurred speech.   Skin:  Warm and dry to palpation with no rashes or lesions appreciated.  Peripheral vascular:  2+ radial and pedal pulses in bilateral upper and lower extremities.  Psychiatric:  Alert and oriented x3, demonstrates appropriate judgement and insight.  ----------------------------------------------------------------------------------------------------------------------  Tele:    ----------------------------------------------------------------------------------------------------------------------  Results from last 7 days   Lab Units 09/01/20  1541 09/01/20  1221   TROPONIN T ng/mL <0.010 <0.010     Results from last 7 days   Lab Units 09/04/20  0353 09/03/20  0136  09/02/20  1244  09/01/20  1228 09/01/20  1221   CRP mg/dL  --   --   --   --   --  0.30   LACTATE mmol/L  --   --   --   --  1.2  --    WBC 10*3/mm3 8.24 8.12 8.24   < >  --  9.12   HEMOGLOBIN g/dL 14.4 14.0 14.7   < >  --  16.1   HEMATOCRIT % 44.9 42.7 44.6   < >  --  48.9   MCV fL 92.6 92.8 92.5   < >  --  91.7   MCHC g/dL 32.1 32.8 33.0   < >  --  32.9   PLATELETS 10*3/mm3 235 208 212   < >  --  276   INR   --   --  1.05  --   --  0.97    < > = values in this interval not displayed.         Results from last 7 days   Lab Units 09/04/20  0353 09/03/20  0136 09/02/20  0518 09/01/20  1221   SODIUM mmol/L 140 137 139 138   POTASSIUM mmol/L 4.3 4.3 4.5 4.7   MAGNESIUM mg/dL  --   --   --  2.3   CHLORIDE mmol/L 102 103 106 103   CO2 mmol/L 28.6 24.0 22.2 25.1   BUN mg/dL 18 17 15 11   CREATININE mg/dL 1.05 0.88 0.85 1.12   EGFR IF NONAFRICN AM mL/min/1.73 72 88 92 67   CALCIUM mg/dL 8.8 8.9 8.7 8.9   GLUCOSE mg/dL 101* 101* 108* 114*   ALBUMIN g/dL  --   --   --  4.03   BILIRUBIN mg/dL  --   --   --  0.4   ALK PHOS U/L  --   --   --  96   AST (SGOT) U/L  --   --   --  24   ALT (SGPT) U/L  --   --   --  30   Estimated Creatinine Clearance: 84.4 mL/min (by C-G formula based on SCr of 1.05 mg/dL).    No results found for: AMMONIA  Results from last 7 days   Lab Units 09/03/20  0136   CHOLESTEROL mg/dL 133   TRIGLYCERIDES mg/dL 51   HDL CHOL mg/dL 42   LDL CHOL mg/dL 81     Blood Culture   Date Value Ref Range Status   09/01/2020 No growth at 24 hours  Preliminary   09/01/2020 No growth at 24 hours  Preliminary     No results found for: URINECX  No results found for: WOUNDCX  No results found for: STOOLCX    I have personally looked at the labs and they are summarized above.  ----------------------------------------------------------------------------------------------------------------------  Imaging Results (Last 24 Hours)     ** No results found for the last 24 hours. **         ----------------------------------------------------------------------------------------------------------------------  Assessment and Plan:    1.  New onset A. Fib - plan for LUCERO with electrical cardioversion today, appreciate cardiology recommendations.    2.  Chronic systolic CHF -left heart catheterization performed today demonstrated occlusion of ostial LAD with surprisingly good collateral connection from RCA filling the LAD with brisk flow to the point of occlusion.  RCA and left circumflex are free of any disease.  Patient will be placed on beta-blockers, statin therapy, aspirin and ACE inhibitor.     3.  Questionable left lower lobe pneumonia - Continue Rocephin and azithromycin for now     4.  Essential hypertension -well controlled at this time     5.  History of CAD -continue medical management, cardiology also consulted.     6.  Tobacco abuse -encourage cessation            Napoleon Del Angel,   09/04/20  16:09

## 2020-09-04 NOTE — H&P (VIEW-ONLY)
Patient Identification:  Name:  Chong White  Age:  60 y.o.  Sex:  male  :  1960  MRN:  [id number]  Visit Number:  [id number]    Chief Complaint:   Atrial fibrillation and Congestive heart failure    Subjective:  The patient is resting on the side of the bed today. He denies chest pains. He reports palpitations and dyspnea. LE edema is persistent. He reports excellent urine output today. Cardiac catheterization and LUCERO cardioversion is planned for this afternoon.   ----------------------------------------------------------------------------------------------------------------------  Current Hospital Meds:    aspirin 81 mg Oral Daily   atorvastatin 40 mg Oral Nightly   azithromycin 500 mg Intravenous Q24H   carvedilol 6.25 mg Oral BID With Meals   cefTRIAXone 1 g Intravenous Q24H   digoxin 125 mcg Oral Daily   furosemide 20 mg Intravenous Daily   guaiFENesin 1,200 mg Oral Q12H   heparin (porcine) 60 Units/kg Intravenous Once   lactobacillus acidophilus 1 capsule Oral Daily   losartan 25 mg Oral Q24H   nicotine 1 patch Transdermal Q24H   sodium chloride 10 mL Intravenous Q12H       dilTIAZem 5-15 mg/hr Last Rate: 5 mg/hr (20 2211)   heparin (porcine) 12 Units/kg/hr Last Rate: 21 Units/kg/hr (20 1127)     ----------------------------------------------------------------------------------------------------------------------  Vital Signs:  Temp:  [97.8 °F (36.6 °C)-98.5 °F (36.9 °C)] 98.2 °F (36.8 °C)  Heart Rate:  [] 98  Resp:  [12-23] 15  BP: ()/() 114/93      20  0400 20  0400 20  0500   Weight: 75.3 kg (166 lb) 78.1 kg (172 lb 3.2 oz) 79.8 kg (176 lb)     Body mass index is 26.76 kg/m².    Intake/Output Summary (Last 24 hours) at 2020 1130  Last data filed at 2020 0804  Gross per 24 hour   Intake 1875.12 ml   Output 2000 ml   Net -124.88 ml     NPO  Diet  ----------------------------------------------------------------------------------------------------------------------  Physical exam:    HEENT:  Head:  Normocephalic and atraumatic.     Eyes:  Conjunctivae and EOM are normal.  Pupils are equal, round, and reactive to light.  No scleral icterus.    Neck:  Neck supple.  No JVD present.  No bruit.  Cardiovascular: Normal rate, irregularly irregular rhythm, S1 S2+, NO S3 / S4  Pulmonary/Chest:  Vesicular breath sounds B/L, Clear to auscultation, with no added sounds.  Abdominal:  Soft.  Bowel sounds are normal.  No distension and no tenderness.  No organomegaly.  Neurological:  Alert and oriented to person, place, and time. No focal defecits  Skin:  Skin is warm and dry. No rash noted. No pallor.   Musculoskeletal:  No tenderness, and no deformity.  No red or swollen joints anywhere.   Lower extremities: 1+ edema BLE, Peripheral vascular:  2+ Pulses B/L DP.  ----------------------------------------------------------------------------------------------------------------------    ----------------------------------------------------------------------------------------------------------------------  Results from last 7 days   Lab Units 09/01/20  1541 09/01/20  1221   TROPONIN T ng/mL <0.010 <0.010     Results from last 7 days   Lab Units 09/04/20  0353 09/03/20  0136 09/02/20  1244  09/01/20  1228 09/01/20  1221   CRP mg/dL  --   --   --   --   --  0.30   LACTATE mmol/L  --   --   --   --  1.2  --    WBC 10*3/mm3 8.24 8.12 8.24   < >  --  9.12   HEMOGLOBIN g/dL 14.4 14.0 14.7   < >  --  16.1   HEMATOCRIT % 44.9 42.7 44.6   < >  --  48.9   MCV fL 92.6 92.8 92.5   < >  --  91.7   MCHC g/dL 32.1 32.8 33.0   < >  --  32.9   PLATELETS 10*3/mm3 235 208 212   < >  --  276   INR   --   --  1.05  --   --  0.97    < > = values in this interval not displayed.         Results from last 7 days   Lab Units 09/04/20 0353 09/03/20  0136 09/02/20  0518 09/01/20  1221   SODIUM mmol/L  140 137 139 138   POTASSIUM mmol/L 4.3 4.3 4.5 4.7   MAGNESIUM mg/dL  --   --   --  2.3   CHLORIDE mmol/L 102 103 106 103   CO2 mmol/L 28.6 24.0 22.2 25.1   BUN mg/dL 18 17 15 11   CREATININE mg/dL 1.05 0.88 0.85 1.12   EGFR IF NONAFRICN AM mL/min/1.73 72 88 92 67   CALCIUM mg/dL 8.8 8.9 8.7 8.9   GLUCOSE mg/dL 101* 101* 108* 114*   ALBUMIN g/dL  --   --   --  4.03   BILIRUBIN mg/dL  --   --   --  0.4   ALK PHOS U/L  --   --   --  96   AST (SGOT) U/L  --   --   --  24   ALT (SGPT) U/L  --   --   --  30   Estimated Creatinine Clearance: 84.4 mL/min (by C-G formula based on SCr of 1.05 mg/dL).    No results found for: AMMONIA  Results from last 7 days   Lab Units 09/03/20  0136   CHOLESTEROL mg/dL 133   TRIGLYCERIDES mg/dL 51   HDL CHOL mg/dL 42   LDL CHOL mg/dL 81     Blood Culture   Date Value Ref Range Status   09/01/2020 No growth at 2 days  Preliminary   09/01/2020 No growth at 2 days  Preliminary     No results found for: URINECX  No results found for: WOUNDCX  No results found for: STOOLCX    I have personally looked at the labs and they are summarized above.  ----------------------------------------------------------------------------------------------------------------------  Imaging Results (Last 24 Hours)     ** No results found for the last 24 hours. **        ----------------------------------------------------------------------------------------------------------------------    Assessment:  1. New onset atrial fibrillation with RVR, CHADS-VASc score of 3 for CHF, HTN and CAD, on IV cardizem and IV heparin, rate  bpm   2. ASCVD, history of MI in remote past with multiple PCI per patient report   3. Acute systolic and diastolic congestive heart failure  4. Ischemic vs non-ischemic cardiomyopathy, EF of 21-25%   5. Moderate tricuspid valve regurgitation  6. Mild to moderate mitral valve regurgitation   7. Essential hypertension, controlled     Plan:  1.  His heart rate is better controlled we will  increase the carvedilol to 12.5 twice daily  2.  Cardiac catheterization today assessment of coronary anatomy  3.  Trial of a LUCERO and possible cardioversion  4.  Subsequently after cardiac catheterization can start Eliquis for anticoagulation  5.  Again advised to quit smoking      Olviia Steele, APRN   09/04/20 11:30     Addendum:  IThong MD, formerly [facility], performed the services described in this documentation as documented by the above-named individual under my supervision and made necessary changes in the note is both accurate and complete  Dr. Thong Delarosa MD

## 2020-09-04 NOTE — PLAN OF CARE
Problem: Patient Care Overview  Goal: Plan of Care Review  Outcome: Ongoing (interventions implemented as appropriate)  Note:   Patient had heart cath today with no stents placed. He also had LUCERO & cardioversion today which LUCERO was negative for thrombus but the cardioversion was unsuccessful x3. Plans to try again on Monday. Remains in atrial fibrillation.      Problem: Patient Care Overview  Goal: Plan of Care Review  Outcome: Ongoing (interventions implemented as appropriate)

## 2020-09-04 NOTE — PLAN OF CARE
Problem: Fall Risk (Adult)  Intervention: Monitor/Assist with Self Care  Flowsheets (Taken 9/4/2020 0221)  Activity Assistance Provided: independent     Problem: Arrhythmia/Dysrhythmia (Symptomatic) (Adult)  Intervention: Prevent/Manage Thrombi/Emboli  Flowsheets (Taken 9/4/2020 0221)  Bleeding Precautions: blood pressure closely monitored  Intervention: Promote Hemodynamic Stability  Flowsheets  Taken 9/3/2020 0200 by Fadia Chun RN  Fluid/Electrolyte Management: fluids restricted  Taken 9/3/2020 1400 by Dainka Mendez RN  Sensory Stimulation Regulation: quiet environment promoted  Taken 9/4/2020 0000 by Rona Knapp PCT  Head of Bed (HOB): HOB elevated     Problem: Patient Care Overview  Goal: Plan of Care Review  Outcome: Ongoing (interventions implemented as appropriate)  Flowsheets  Taken 9/3/2020 1529 by Danika Mendez RN  Progress: no change  Taken 9/4/2020 0214 by Veda Muñoz RN  Plan of Care Reviewed With: patient  Goal: Individualization and Mutuality  Outcome: Ongoing (interventions implemented as appropriate)  Goal: Discharge Needs Assessment  Outcome: Ongoing (interventions implemented as appropriate)  Flowsheets  Taken 9/1/2020 1720 by Alexandrea Mendoza, RN  Transportation Anticipated: family or friend will provide  Taken 9/2/2020 1656 by Gosia Allen  Transportation Concerns: car, none  Readmission Within the Last 30 Days: no previous admission in last 30 days  Patient/Family Anticipated Services at Transition: none  Patient/Family Anticipates Transition to: shelter  Goal: Interprofessional Rounds/Family Conf  Outcome: Ongoing (interventions implemented as appropriate)     Problem: Pain, Acute (Adult)  Goal: Identify Related Risk Factors and Signs and Symptoms  Outcome: Ongoing (interventions implemented as appropriate)  Goal: Acceptable Pain Control/Comfort Level  Outcome: Ongoing (interventions implemented as appropriate)  Flowsheets (Taken 9/4/2020  0221)  Acceptable Pain Control/Comfort Level: making progress toward outcome

## 2020-09-04 NOTE — PROGRESS NOTES
Patient Identification:  Name:  Chong White  Age:  60 y.o.  Sex:  male  :  1960  MRN:  [id number]  Visit Number:  [id number]    Chief Complaint:   Atrial fibrillation and Congestive heart failure    Subjective:  The patient is resting on the side of the bed today. He denies chest pains. He reports palpitations and dyspnea. LE edema is persistent. He reports excellent urine output today. Cardiac catheterization and LUCERO cardioversion is planned for this afternoon.   ----------------------------------------------------------------------------------------------------------------------  Current Hospital Meds:    aspirin 81 mg Oral Daily   atorvastatin 40 mg Oral Nightly   azithromycin 500 mg Intravenous Q24H   carvedilol 6.25 mg Oral BID With Meals   cefTRIAXone 1 g Intravenous Q24H   digoxin 125 mcg Oral Daily   furosemide 20 mg Intravenous Daily   guaiFENesin 1,200 mg Oral Q12H   heparin (porcine) 60 Units/kg Intravenous Once   lactobacillus acidophilus 1 capsule Oral Daily   losartan 25 mg Oral Q24H   nicotine 1 patch Transdermal Q24H   sodium chloride 10 mL Intravenous Q12H       dilTIAZem 5-15 mg/hr Last Rate: 5 mg/hr (20 2211)   heparin (porcine) 12 Units/kg/hr Last Rate: 21 Units/kg/hr (20 1127)     ----------------------------------------------------------------------------------------------------------------------  Vital Signs:  Temp:  [97.8 °F (36.6 °C)-98.5 °F (36.9 °C)] 98.2 °F (36.8 °C)  Heart Rate:  [] 98  Resp:  [12-23] 15  BP: ()/() 114/93      20  0400 20  0400 20  0500   Weight: 75.3 kg (166 lb) 78.1 kg (172 lb 3.2 oz) 79.8 kg (176 lb)     Body mass index is 26.76 kg/m².    Intake/Output Summary (Last 24 hours) at 2020 1130  Last data filed at 2020 0804  Gross per 24 hour   Intake 1875.12 ml   Output 2000 ml   Net -124.88 ml     NPO  Diet  ----------------------------------------------------------------------------------------------------------------------  Physical exam:    HEENT:  Head:  Normocephalic and atraumatic.     Eyes:  Conjunctivae and EOM are normal.  Pupils are equal, round, and reactive to light.  No scleral icterus.    Neck:  Neck supple.  No JVD present.  No bruit.  Cardiovascular: Normal rate, irregularly irregular rhythm, S1 S2+, NO S3 / S4  Pulmonary/Chest:  Vesicular breath sounds B/L, Clear to auscultation, with no added sounds.  Abdominal:  Soft.  Bowel sounds are normal.  No distension and no tenderness.  No organomegaly.  Neurological:  Alert and oriented to person, place, and time. No focal defecits  Skin:  Skin is warm and dry. No rash noted. No pallor.   Musculoskeletal:  No tenderness, and no deformity.  No red or swollen joints anywhere.   Lower extremities: 1+ edema BLE, Peripheral vascular:  2+ Pulses B/L DP.  ----------------------------------------------------------------------------------------------------------------------    ----------------------------------------------------------------------------------------------------------------------  Results from last 7 days   Lab Units 09/01/20  1541 09/01/20  1221   TROPONIN T ng/mL <0.010 <0.010     Results from last 7 days   Lab Units 09/04/20  0353 09/03/20  0136 09/02/20  1244  09/01/20  1228 09/01/20  1221   CRP mg/dL  --   --   --   --   --  0.30   LACTATE mmol/L  --   --   --   --  1.2  --    WBC 10*3/mm3 8.24 8.12 8.24   < >  --  9.12   HEMOGLOBIN g/dL 14.4 14.0 14.7   < >  --  16.1   HEMATOCRIT % 44.9 42.7 44.6   < >  --  48.9   MCV fL 92.6 92.8 92.5   < >  --  91.7   MCHC g/dL 32.1 32.8 33.0   < >  --  32.9   PLATELETS 10*3/mm3 235 208 212   < >  --  276   INR   --   --  1.05  --   --  0.97    < > = values in this interval not displayed.         Results from last 7 days   Lab Units 09/04/20 0353 09/03/20  0136 09/02/20  0518 09/01/20  1221   SODIUM mmol/L  140 137 139 138   POTASSIUM mmol/L 4.3 4.3 4.5 4.7   MAGNESIUM mg/dL  --   --   --  2.3   CHLORIDE mmol/L 102 103 106 103   CO2 mmol/L 28.6 24.0 22.2 25.1   BUN mg/dL 18 17 15 11   CREATININE mg/dL 1.05 0.88 0.85 1.12   EGFR IF NONAFRICN AM mL/min/1.73 72 88 92 67   CALCIUM mg/dL 8.8 8.9 8.7 8.9   GLUCOSE mg/dL 101* 101* 108* 114*   ALBUMIN g/dL  --   --   --  4.03   BILIRUBIN mg/dL  --   --   --  0.4   ALK PHOS U/L  --   --   --  96   AST (SGOT) U/L  --   --   --  24   ALT (SGPT) U/L  --   --   --  30   Estimated Creatinine Clearance: 84.4 mL/min (by C-G formula based on SCr of 1.05 mg/dL).    No results found for: AMMONIA  Results from last 7 days   Lab Units 09/03/20  0136   CHOLESTEROL mg/dL 133   TRIGLYCERIDES mg/dL 51   HDL CHOL mg/dL 42   LDL CHOL mg/dL 81     Blood Culture   Date Value Ref Range Status   09/01/2020 No growth at 2 days  Preliminary   09/01/2020 No growth at 2 days  Preliminary     No results found for: URINECX  No results found for: WOUNDCX  No results found for: STOOLCX    I have personally looked at the labs and they are summarized above.  ----------------------------------------------------------------------------------------------------------------------  Imaging Results (Last 24 Hours)     ** No results found for the last 24 hours. **        ----------------------------------------------------------------------------------------------------------------------    Assessment:  1. New onset atrial fibrillation with RVR, CHADS-VASc score of 3 for CHF, HTN and CAD, on IV cardizem and IV heparin, rate  bpm   2. ASCVD, history of MI in remote past with multiple PCI per patient report   3. Acute systolic and diastolic congestive heart failure  4. Ischemic vs non-ischemic cardiomyopathy, EF of 21-25%   5. Moderate tricuspid valve regurgitation  6. Mild to moderate mitral valve regurgitation   7. Essential hypertension, controlled     Plan:  1.  His heart rate is better controlled we will  increase the carvedilol to 12.5 twice daily  2.  Cardiac catheterization today assessment of coronary anatomy  3.  Trial of a LUCERO and possible cardioversion  4.  Subsequently after cardiac catheterization can start Eliquis for anticoagulation  5.  Again advised to quit smoking      Olivia Steele, APRN   09/04/20 11:30     Addendum:  IThong MD, [facility], performed the services described in this documentation as documented by the above-named individual under my supervision and made necessary changes in the note is both accurate and complete  Dr. Thong Delarosa MD

## 2020-09-04 NOTE — SIGNIFICANT NOTE
Patient had unsuccessful cardioversion x3 we will start him on sotalol and then another trial of cardioversion and 3 days we will monitor the QTC

## 2020-09-04 NOTE — PAYOR COMM NOTE
"  [facility]  NPI: [id number]    Utilization Review   Contact:Aleida Ramos MSN, APRN, NP-C  Phone: [phone]  Fax: [phone]    hum[employer] mcaid/Attn: clemencia  Continue stay  REF: [id number]  Chong White (60 y.o. Male)     Date of Birth Social Security Number Address Home Phone MRN    1960  [address] [phone] [id number]    Caoda[employer] Marital Status          Presybeterian Single       Admission Date Admission Type Admitting Provider Attending Provider Department, Room/Bed    9/1/20 Emergency Napoleon Del Angel, Napoleon Evans,  [facility] PROGRESS CARE, P222/1P    Discharge Date Discharge Disposition Discharge Destination                       Attending Provider:  Napoleon Del Angel DO    Allergies:  No Known Allergies    Isolation:  None   Infection:  None   Code Status:  CPR    Ht:  172.7 cm (68\")   Wt:  79.8 kg (176 lb)    Admission Cmt:  None   Principal Problem:  Abnormal nuclear stress test [R94.39] More...                 Active Insurance as of 9/1/2020     Primary Coverage     Payor Plan Insurance Group Employer/Plan Group    [place] [id number]     Payor Plan Address Payor Plan Phone Number Payor Plan Fax Number Effective Dates    [employer] Claims Office - [address] [address] [phone]  4/1/2020 - None Entered    [facility] [id number]       Subscriber Name Subscriber Birth Date Member ID       CHONG WHITE 1960 [id number]                 Emergency Contacts      (Rel.) Home Phone Work Phone Mobile Phone    KENDRICK WHITE (Sister) -- -- [phone]        Thong Delarosa MD   Physician   Cardiology   Progress Notes   Signed   Date of Service:  09/03/20 1056   Creation Time:  09/03/20 1056            Signed        Expand All Collapse All     Show:Clear all  [x]Manual[x]Template[x]Copied    Added by:  [x]Coleen Schwartz APRN[x]Thong Delarosa MD    []Jennifer for details         " "LOS: 2 days      Name: Chong White  Age/Sex: 60 y.o. male  :  1960        PCP: Kaya Romo APRN  REF: No ref. provider found     Active Problems:    A-fib (CMS/[facility])        Reason for follow-up: Atrial fibrillation and Congestive Heart Failure      Subjective            Subjective         Chong White is a 60-year-old male with past medical history significant for essential hypertension, coronary artery disease, tobacco abuse and GERD.  He presented to the ER with complaints of shortness of breath and palpitations.Patient states for the last 3 days he has had worsening shortness of breath and palpitations. He states he feels like he is \"smothering\". He also has had intermittent palpitations. He has had worsening pedal edema also. He has a history of coronary artery disease per his report and had a MI years ago with multiple PCI in the past from IV drug abuse. He denies any current drug abuse. He has not been following with any cardiologist and reports he only takes aspirin at home. He denies any current chest pain, fever or chills. He denies any history of atrial fibrillation in the past. He is current smoker.      Interval History: Patient reports his breathing is some better today. He denies any chest pain. Stress test showing old infarct but no ischemia. Echocardiogram showing decreased EF of 21-25%. Remains in atrial fibrillation on cardizem drip. He does report intermittent palpitations.      Vital Signs  Temp:  [97.8 °F (36.6 °C)-98 °F (36.7 °C)] 98 °F (36.7 °C)  Heart Rate:  [] 101  Resp:  [12-24] 24  BP: ()/() 118/39                 Vital Signs (last 72 hrs)         0700  -  09/ 0659 09/ 0700  -   0659  07  -   0659  07  -   1056   Most Recent     Temp (°F)   97.6 -  98.8    97.8 -  98        98 (36.7)     Heart Rate   82 -  160    80 -  120    96 -  107      101     Resp   21 -  22    14 -  24    12 -  24      24     BP   96/79 -  " 145/97    94/46 -  148/122    118/39 -  148/72      118/39     SpO2 (%)   88 -  100    89 -  100    90 -  97      97          Body mass index is 26.18 kg/m².     Intake/Output Summary (Last 24 hours) at 9/3/2020 1056  Last data filed at 9/3/2020 0400      Gross per 24 hour   Intake 2578.67 ml   Output 1500 ml   Net 1078.67 ml      Objective     Objective         Physical Exam:                General Appearance:    Alert, cooperative, in no acute distress   Head:    Normocephalic, without obvious abnormality, atraumatic   Eyes:            Conjunctivae and sclerae normal, no   icterus, no pallor, corneas clear.   Neck:   No adenopathy, supple, trachea midline, no thyromegaly, no   carotid bruit, no JVD   Lungs:     Clear to auscultation,respirations regular, even and                  unlabored    Heart:    irregular rhythm and normal rate, normal S1 and S2, no            murmur, no gallop, no rub, no click   Chest Wall:    No abnormalities observed   Abdomen:     Normal bowel sounds, no masses, no organomegaly, soft        non-tender, non-distended, no guarding, no rebound                tenderness   Extremities:   Moves all extremities well, no edema, no cyanosis, no             redness   Pulses:   Pulses palpable and equal bilaterally   Skin:   No bleeding, bruising or rash         Neurologic:   Alert and oriented    I have seen and performed a physical examination today.      Results review         Results Review:           Results from last 7 days   Lab Units 09/03/20  0136 09/02/20  1244 09/02/20  0518 09/01/20  1221   WBC 10*3/mm3 8.12 8.24 8.53 9.12   HEMOGLOBIN g/dL 14.0 14.7 14.1 16.1   PLATELETS 10*3/mm3 208 212 219 276             Results from last 7 days   Lab Units 09/03/20  0136 09/02/20  0518 09/01/20  1221   SODIUM mmol/L 137 139 138   POTASSIUM mmol/L 4.3 4.5 4.7   CHLORIDE mmol/L 103 106 103   CO2 mmol/L 24.0 22.2 25.1   BUN mg/dL 17 15 11   CREATININE mg/dL 0.88 0.85 1.12   CALCIUM mg/dL 8.9 8.7 8.9    GLUCOSE mg/dL 101* 108* 114*   ALT (SGPT) U/L  --   --  30   AST (SGOT) U/L  --   --  24            Results from last 7 days   Lab Units 09/01/20  1541 09/01/20  1221   TROPONIN T ng/mL <0.010 <0.010            Lab Results   Component Value Date     INR 1.05 09/02/2020     INR 0.97 09/01/2020            Lab Results   Component Value Date     MG 2.3 09/01/2020            Lab Results   Component Value Date     TRIG 51 09/03/2020     HDL 42 09/03/2020     LDL 81 09/03/2020               Imaging Results (Last 48 Hours)      Procedure Component Value Units Date/Time     XR Chest PA & Lateral [[id number]] Collected:  09/03/20 1008       Updated:  09/03/20 1011     Narrative:        EXAMINATION: XR CHEST PA AND LATERAL-      CLINICAL INDICATION:     possible pna; I48.91-Unspecified atrial  fibrillation     TECHNIQUE:  XR CHEST PA AND LATERAL-      COMPARISON: NONE      FINDINGS:   Bilateral interstitial thickening.  Central bronchial thickening with perihilar bronchial cuffing.   Heart size, mediastinum, and pulmonary vascularity are unremarkable.   No pneumothorax.   Trace effusions noted.   No acute osseous findings.           Impression:        Bilateral atypical pneumonia. Superimposed CHF with  interstitial edema also considered given presence of trace pleural  effusions.     This report was finalized on 9/3/2020 10:08 AM by Dr. Chidi Dean MD.        CT Chest Pulmonary Embolism [[id number]] Collected:  09/01/20 1543       Updated:  09/01/20 1547     Narrative:        EXAMINATION: CT CHEST PULMONARY EMBOLISM-      CLINICAL INDICATION:shortness of breath; afib w rvr      COMPARISON: NONE.     TECHNIQUE: Multiple CT axial images were obtained through the level of  pulmonary arteries, following IV contrast administration per CT PE  protocol.  Volume Rendered 3D or MIP images performed.     Radiation dose reduction techniques were utilized per ALARA protocol.  Automated exposure control was initiated through either or  CareDose or  DoseRight software packages by  protocol.    DOSE (DLP mGy-cm): 574.47 mGy.cm        FINDINGS:     Pulmonary arteries: No evidence of pulmonary embolism.  Lungs: Predominantly linear opacities of the left greater than right  lower lobes predominantly representing atelectasis but superimposed  pneumonia of the left lower lobe also considered. Advanced centrilobular  emphysema noted with upper lung predominance. Interstitial thickening  likely edema.  Heart: Moderate cardiomegaly.  Pericardium: No effusion.  Mediastinum: No masses. No enlarged lymph nodes.  No fluid collections.  Pleura: Small left pleural effusion and trace right pleural effusion  which are nonloculated.  Major airways: Clear. No intrinsic mass.  Vasculature: No evidence of aneurysm.  Visualized upper abdomen:Low-density liver lesion appears to represent  benign hepatic cysts.  Other: None.  Bones: No acute bony abnormality.        Impression:        1. No PE.  2. Moderate cardiomegaly with interstitial edema and left greater than  right small nonloculated pleural effusions. Findings compatible with  CHF.  3. Moderate to advanced COPD.  4. Other nonacute findings described above.     This report was finalized on 9/1/2020 3:45 PM by Dr. Chidi Dean MD.        XR Chest 1 View [[id number]] Collected:  09/01/20 1415       Updated:  09/01/20 1418     Narrative:        EXAMINATION: XR CHEST 1 VW-      CLINICAL INDICATION:     Simple Sepsis Protocol     TECHNIQUE:  XR CHEST 1 VW-      COMPARISON: NONE      FINDINGS:      Left lower lobe pneumonia.   Heart size, mediastinum, and pulmonary vascularity are unremarkable.   No pneumothorax.   No effusions.   No acute osseous findings.           Impression:        Left lower lobe pneumonia.     This report was finalized on 9/1/2020 2:16 PM by Dr. Chidi Dean MD.                Echo        Results for orders placed during the hospital encounter of 09/01/20   Transthoracic Echo  Complete With Contrast if Necessary Per Protocol     Narrative · The left ventricular cavity is borderline dilated with severe global   wall hypokinesis and severe left ventricular systolic dysfunction, EF   21-25%.  · Left ventricular diastolic dysfunction (grade II) consistent with   pseudonormalization.  · Right ventricular cavity is dilated.  · Left atrial cavity size is mildly dilated.  · Mild-to-moderate mitral valve regurgitation is present  · Moderate tricuspid valve regurgitation is present.  · Calculated right ventricular systolic pressure from tricuspid   regurgitation is 35 mmHg.             I reviewed the patient's new clinical results.     Telemetry: A. Fib  bpm         Medication Review:      aspirin 81 mg Oral Daily   atorvastatin 40 mg Oral Nightly   azithromycin 500 mg Intravenous Q24H   cefTRIAXone 1 g Intravenous Q24H   furosemide 20 mg Oral BID   guaiFENesin 1,200 mg Oral Q12H   heparin (porcine) 60 Units/kg Intravenous Once   metoprolol tartrate 25 mg Oral Q12H   sodium chloride 10 mL Intravenous Q12H            dilTIAZem 5-15 mg/hr Last Rate: 5 mg/hr (09/03/20 0315)   heparin (porcine) 12 Units/kg/hr Last Rate: 13 Units/kg/hr (09/03/20 0786)         Assessment       Assessment:  1. New onset atrial fibrillation with RVR, CHADS-VASc score of 3 for CHF, HTN and CAD, on IV cardizem and IV heparin, rate  bpm   2. ASCVD, history of MI in remote past with multiple PCI per patient report   3. Acute systolic and diastolic congestive heart failure  4. Ischemic vs non-ischemic cardiomyopathy, EF of 21-25%   5. Moderate tricuspid valve regurgitation  6. Mild to moderate mitral valve regurgitation   7. Essential hypertension, controlled      Plan      Recommendations:  1. Patient still in atrial fibrillation with RVR I discussed with him about having a LUCERO and cardioversion he is agreeable with plan to do that in the morning  2. I discussed the stress test resulted also in consideration of  severe LV systolic dysfunction I recommended cardiac catheterization for him with the risks and benefits he is agreeable to go for the procedure  3. We will advance carvedilol gradually and also consult for Entresto  4. I had a long discussion with the patient regarding compliance with medications and he insisted that he would be compliant  5. Again recommended in the strongest terms that he should quit smoking     I discussed the patients findings and my recommendations with patient and family        Coleen SAMARA Schwartz, acting as scribe for Dr. Thong Delarosa MD [facility]  20  10:56  I, Thong Delarosa MD, [facility], performed the services described in this documentation as documented by the above-named individual under my supervision and made necessary changes in the note is both accurate and complete  Please note that portions of this note were completed with a voice recognition program.               Revision History                             Napoleon Del Angel DO   Physician   Medicine   Progress Notes   Signed   Date of Service:  20   Creation Time:  20            Signed        Expand All Collapse All     Show:Clear all  [x]Manual[x]Template[x]Copied    Added by:  [x]Napoleon Del Angel DO    []Jennifer for details       [facility]     Patient Identification:  Name:  Chong White  Age:  60 y.o.  Sex:  male  :  1960  MRN:  [id number]  Visit Number:  [id number]  Primary Care Provider:  Kaya Romo APRN     Length of stay:  2     Chief complaint:  Cough and shortness of breath     Subjective:    Patient continues to report mild shortness of breath but is more concerned about bilateral lower extremity edema today.  He says it is painful from time to time.  He denies any fevers or chills.  Denies any other new symptoms  overnight.  ----------------------------------------------------------------------------------------------------------------------  Current Hospital Meds:     aspirin 81 mg Oral Daily   atorvastatin 40 mg Oral Nightly   azithromycin 500 mg Intravenous Q24H   carvedilol 6.25 mg Oral BID With Meals   cefTRIAXone 1 g Intravenous Q24H   [START ON 9/4/2020] digoxin 125 mcg Oral Daily   furosemide 20 mg Oral BID   guaiFENesin 1,200 mg Oral Q12H   heparin (porcine) 60 Units/kg Intravenous Once   lactobacillus acidophilus 1 capsule Oral Daily   losartan 25 mg Oral Q24H   sodium chloride 10 mL Intravenous Q12H         dilTIAZem 5-15 mg/hr Last Rate: 5 mg/hr (09/03/20 0315)   heparin (porcine) 12 Units/kg/hr Last Rate: 15 Units/kg/hr (09/03/20 1415)      ----------------------------------------------------------------------------------------------------------------------  Vital Signs:  Temp:  [97.5 °F (36.4 °C)-98.1 °F (36.7 °C)] 98.1 °F (36.7 °C)  Heart Rate:  [] 88  Resp:  [12-24] 23  BP: ()/() 115/90  Vitals               09/01/20  1717 09/02/20 0400 09/03/20 0400   Weight: 75.7 kg (166 lb 14.4 oz) 75.3 kg (166 lb) 78.1 kg (172 lb 3.2 oz)         Body mass index is 26.18 kg/m².     Intake/Output Summary (Last 24 hours) at 9/3/2020 1655  Last data filed at 9/3/2020 0400      Gross per 24 hour   Intake 2338.67 ml   Output 1200 ml   Net 1138.67 ml      NPO Diet  Diet Regular; Cardiac  ----------------------------------------------------------------------------------------------------------------------  Physical exam:  Constitutional: Well-nourished  male in no apparent distress.     HENT:  Head:  Normocephalic and atraumatic.  Mouth:  Moist mucous membranes.    Eyes:  Conjunctivae and EOM are normal.  Pupils are equal, round, and reactive to light.  No scleral icterus.    Neck:  Neck supple. No thyromegaly.  No JVD present.    Cardiovascular: Irregular rhythm with rate in the low 100s, no murmurs,  rubs, clicks or gallops appreciated  Pulmonary/Chest:  Faint inspiratory crackle in the right base with no wheezes or rhonchi appreciated  Abdominal:  Soft. Nontender. Nondistended  Bowel sounds are normal in all four quadrants. No organomegally appreciated.   Musculoskeletal:  5/5 muscle strength bilateral upper and lower extermties with equal muscle tone and bulk.  2+ pitting edema bilateral lower extremities, no tenderness, and no deformity.  No red or swollen joints anywhere.    Neurological:  Alert and oriented to person, place, and time. Cranial nerves II-XII intact with no focal defecits.  No facial droop.  No slurred speech.   Skin:  Warm and dry to palpation with no rashes or lesions appreciated.  Peripheral vascular:  2+ radial and pedal pulses in bilateral upper and lower extremities.  Psychiatric:  Alert and oriented x3, demonstrates appropriate judgement and insight.  ----------------------------------------------------------------------------------------------------------------------  Tele:    ----------------------------------------------------------------------------------------------------------------------        Results from last 7 days   Lab Units 09/01/20  1541 09/01/20  1221   TROPONIN T ng/mL <0.010 <0.010               Results from last 7 days   Lab Units 09/03/20  0136 09/02/20  1244 09/02/20  0518 09/01/20  1228 09/01/20  1221   CRP mg/dL  --   --   --   --  0.30   LACTATE mmol/L  --   --   --  1.2  --    WBC 10*3/mm3 8.12 8.24 8.53  --  9.12   HEMOGLOBIN g/dL 14.0 14.7 14.1  --  16.1   HEMATOCRIT % 42.7 44.6 44.4  --  48.9   MCV fL 92.8 92.5 94.5  --  91.7   MCHC g/dL 32.8 33.0 31.8  --  32.9   PLATELETS 10*3/mm3 208 212 219  --  276   INR    --  1.05  --   --  0.97                 Results from last 7 days   Lab Units 09/03/20  0136 09/02/20  0518 09/01/20  1221   SODIUM mmol/L 137 139 138   POTASSIUM mmol/L 4.3 4.5 4.7   MAGNESIUM mg/dL  --   --  2.3   CHLORIDE mmol/L 103 106 103   CO2  mmol/L 24.0 22.2 25.1   BUN mg/dL 17 15 11   CREATININE mg/dL 0.88 0.85 1.12   EGFR IF NONAFRICN AM mL/min/1.73 88 92 67   CALCIUM mg/dL 8.9 8.7 8.9   GLUCOSE mg/dL 101* 108* 114*   ALBUMIN g/dL  --   --  4.03   BILIRUBIN mg/dL  --   --  0.4   ALK PHOS U/L  --   --  96   AST (SGOT) U/L  --   --  24   ALT (SGPT) U/L  --   --  30   Estimated Creatinine Clearance: 98.6 mL/min (by C-G formula based on SCr of 0.88 mg/dL).     No results found for: AMMONIA       Results from last 7 days   Lab Units 09/03/20  0136   CHOLESTEROL mg/dL 133   TRIGLYCERIDES mg/dL 51   HDL CHOL mg/dL 42   LDL CHOL mg/dL 81            Blood Culture   Date Value Ref Range Status   09/01/2020 No growth at 24 hours   Preliminary   09/01/2020 No growth at 24 hours   Preliminary      No results found for: URINECX  No results found for: WOUNDCX  No results found for: STOOLCX     I have personally looked at the labs and they are summarized above.  ----------------------------------------------------------------------------------------------------------------------           Imaging Results (Last 24 Hours)      Procedure Component Value Units Date/Time     XR Chest PA & Lateral [[id number]] Collected:  09/03/20 1008       Updated:  09/03/20 1011     Narrative:        EXAMINATION: XR CHEST PA AND LATERAL-      CLINICAL INDICATION:     possible pna; I48.91-Unspecified atrial  fibrillation     TECHNIQUE:  XR CHEST PA AND LATERAL-      COMPARISON: NONE      FINDINGS:   Bilateral interstitial thickening.  Central bronchial thickening with perihilar bronchial cuffing.   Heart size, mediastinum, and pulmonary vascularity are unremarkable.   No pneumothorax.   Trace effusions noted.   No acute osseous findings.           Impression:        Bilateral atypical pneumonia. Superimposed CHF with  interstitial edema also considered given presence of trace pleural  effusions.     This report was finalized on 9/3/2020 10:08 AM by Dr. Chidi Dean MD.              ----------------------------------------------------------------------------------------------------------------------  Assessment and Plan:     1.  New onset A. Fib -plan for LUCERO with electrical cardioversion tomorrow morning, appreciate cardiology recommendations.     2.  Chronic systolic CHF -stress test performed today and demonstrates an intermediate risk study.  This in combination with transthoracic echocardiogram findings, recommendation has been made for left heart catheterization.  Consult has been placed for Entresto eligibility.     3.  Questionable left lower lobe pneumonia -patient with persistent cough and subjective feelings of shortness of breath.  Continue Rocephin and azithromycin for now     4.  Essential hypertension -well controlled at this time     5.  History of CAD -continue medical management, cardiology also consulted.     6.  Tobacco abuse -encourage cessation                 Napoleon Del Angel,   09/03/20  16:55

## 2020-09-05 LAB
ANION GAP SERPL CALCULATED.3IONS-SCNC: 9 MMOL/L (ref 5–15)
APTT PPP: 73.5 SECONDS (ref 25.6–35.3)
APTT PPP: 75.8 SECONDS (ref 25.6–35.3)
BASOPHILS # BLD AUTO: 0.05 10*3/MM3 (ref 0–0.2)
BASOPHILS NFR BLD AUTO: 0.7 % (ref 0–1.5)
BUN SERPL-MCNC: 17 MG/DL (ref 8–23)
BUN/CREAT SERPL: 18.7 (ref 7–25)
CALCIUM SPEC-SCNC: 9 MG/DL (ref 8.6–10.5)
CHLORIDE SERPL-SCNC: 105 MMOL/L (ref 98–107)
CO2 SERPL-SCNC: 27 MMOL/L (ref 22–29)
CREAT SERPL-MCNC: 0.91 MG/DL (ref 0.76–1.27)
DEPRECATED RDW RBC AUTO: 43.8 FL (ref 37–54)
DEPRECATED RDW RBC AUTO: 44 FL (ref 37–54)
EOSINOPHIL # BLD AUTO: 0.14 10*3/MM3 (ref 0–0.4)
EOSINOPHIL NFR BLD AUTO: 2 % (ref 0.3–6.2)
ERYTHROCYTE [DISTWIDTH] IN BLOOD BY AUTOMATED COUNT: 12.8 % (ref 12.3–15.4)
ERYTHROCYTE [DISTWIDTH] IN BLOOD BY AUTOMATED COUNT: 12.8 % (ref 12.3–15.4)
GFR SERPL CREATININE-BSD FRML MDRD: 85 ML/MIN/1.73
GLUCOSE SERPL-MCNC: 87 MG/DL (ref 65–99)
HCT VFR BLD AUTO: 47 % (ref 37.5–51)
HCT VFR BLD AUTO: 48 % (ref 37.5–51)
HGB BLD-MCNC: 15.1 G/DL (ref 13–17.7)
HGB BLD-MCNC: 15.8 G/DL (ref 13–17.7)
IMM GRANULOCYTES # BLD AUTO: 0.05 10*3/MM3 (ref 0–0.05)
IMM GRANULOCYTES NFR BLD AUTO: 0.7 % (ref 0–0.5)
LYMPHOCYTES # BLD AUTO: 1.56 10*3/MM3 (ref 0.7–3.1)
LYMPHOCYTES NFR BLD AUTO: 22 % (ref 19.6–45.3)
MCH RBC QN AUTO: 30 PG (ref 26.6–33)
MCH RBC QN AUTO: 30.4 PG (ref 26.6–33)
MCHC RBC AUTO-ENTMCNC: 32.1 G/DL (ref 31.5–35.7)
MCHC RBC AUTO-ENTMCNC: 32.9 G/DL (ref 31.5–35.7)
MCV RBC AUTO: 92.5 FL (ref 79–97)
MCV RBC AUTO: 93.3 FL (ref 79–97)
MONOCYTES # BLD AUTO: 0.65 10*3/MM3 (ref 0.1–0.9)
MONOCYTES NFR BLD AUTO: 9.2 % (ref 5–12)
NEUTROPHILS NFR BLD AUTO: 4.64 10*3/MM3 (ref 1.7–7)
NEUTROPHILS NFR BLD AUTO: 65.4 % (ref 42.7–76)
NRBC BLD AUTO-RTO: 0 /100 WBC (ref 0–0.2)
PLATELET # BLD AUTO: 259 10*3/MM3 (ref 140–450)
PLATELET # BLD AUTO: 279 10*3/MM3 (ref 140–450)
PMV BLD AUTO: 10 FL (ref 6–12)
PMV BLD AUTO: 10.2 FL (ref 6–12)
POTASSIUM SERPL-SCNC: 5.2 MMOL/L (ref 3.5–5.2)
RBC # BLD AUTO: 5.04 10*6/MM3 (ref 4.14–5.8)
RBC # BLD AUTO: 5.19 10*6/MM3 (ref 4.14–5.8)
SODIUM SERPL-SCNC: 141 MMOL/L (ref 136–145)
WBC # BLD AUTO: 7.09 10*3/MM3 (ref 3.4–10.8)
WBC # BLD AUTO: 7.6 10*3/MM3 (ref 3.4–10.8)

## 2020-09-05 PROCEDURE — 93005 ELECTROCARDIOGRAM TRACING: CPT | Performed by: INTERNAL MEDICINE

## 2020-09-05 PROCEDURE — 93010 ELECTROCARDIOGRAM REPORT: CPT | Performed by: INTERNAL MEDICINE

## 2020-09-05 PROCEDURE — 85730 THROMBOPLASTIN TIME PARTIAL: CPT | Performed by: INTERNAL MEDICINE

## 2020-09-05 PROCEDURE — 25010000002 AZITHROMYCIN PER 500 MG: Performed by: INTERNAL MEDICINE

## 2020-09-05 PROCEDURE — 85025 COMPLETE CBC W/AUTO DIFF WBC: CPT | Performed by: INTERNAL MEDICINE

## 2020-09-05 PROCEDURE — 93005 ELECTROCARDIOGRAM TRACING: CPT | Performed by: SPECIALIST

## 2020-09-05 PROCEDURE — 99232 SBSQ HOSP IP/OBS MODERATE 35: CPT | Performed by: INTERNAL MEDICINE

## 2020-09-05 PROCEDURE — 99232 SBSQ HOSP IP/OBS MODERATE 35: CPT | Performed by: PHYSICIAN ASSISTANT

## 2020-09-05 PROCEDURE — 85027 COMPLETE CBC AUTOMATED: CPT | Performed by: INTERNAL MEDICINE

## 2020-09-05 PROCEDURE — 25010000002 CEFTRIAXONE PER 250 MG: Performed by: INTERNAL MEDICINE

## 2020-09-05 PROCEDURE — 80048 BASIC METABOLIC PNL TOTAL CA: CPT | Performed by: INTERNAL MEDICINE

## 2020-09-05 PROCEDURE — 63710000001 DIPHENHYDRAMINE PER 50 MG: Performed by: HOSPITALIST

## 2020-09-05 PROCEDURE — 25010000002 FUROSEMIDE PER 20 MG: Performed by: INTERNAL MEDICINE

## 2020-09-05 RX ORDER — DIPHENHYDRAMINE HCL 25 MG
25 CAPSULE ORAL ONCE
Status: COMPLETED | OUTPATIENT
Start: 2020-09-05 | End: 2020-09-05

## 2020-09-05 RX ADMIN — GUAIFENESIN 1200 MG: 600 TABLET, EXTENDED RELEASE ORAL at 08:42

## 2020-09-05 RX ADMIN — CARVEDILOL 6.25 MG: 6.25 TABLET, FILM COATED ORAL at 07:06

## 2020-09-05 RX ADMIN — SOTALOL HYDROCHLORIDE 80 MG: 80 TABLET ORAL at 08:42

## 2020-09-05 RX ADMIN — CEFTRIAXONE 1 G: 1 INJECTION, POWDER, FOR SOLUTION INTRAMUSCULAR; INTRAVENOUS at 20:51

## 2020-09-05 RX ADMIN — AZITHROMYCIN MONOHYDRATE 500 MG: 500 INJECTION, POWDER, LYOPHILIZED, FOR SOLUTION INTRAVENOUS at 20:51

## 2020-09-05 RX ADMIN — NICOTINE 1 PATCH: 21 PATCH TRANSDERMAL at 08:43

## 2020-09-05 RX ADMIN — Medication 1 CAPSULE: at 08:42

## 2020-09-05 RX ADMIN — DIPHENHYDRAMINE HYDROCHLORIDE 25 MG: 25 CAPSULE ORAL at 06:14

## 2020-09-05 RX ADMIN — LOSARTAN POTASSIUM 25 MG: 25 TABLET, FILM COATED ORAL at 08:42

## 2020-09-05 RX ADMIN — SODIUM CHLORIDE, PRESERVATIVE FREE 10 ML: 5 INJECTION INTRAVENOUS at 08:42

## 2020-09-05 RX ADMIN — ATORVASTATIN CALCIUM 40 MG: 40 TABLET, FILM COATED ORAL at 20:50

## 2020-09-05 RX ADMIN — HYDROCODONE BITARTRATE AND ACETAMINOPHEN 1 TABLET: 5; 325 TABLET ORAL at 07:06

## 2020-09-05 RX ADMIN — HYDROCODONE BITARTRATE AND ACETAMINOPHEN 1 TABLET: 5; 325 TABLET ORAL at 15:46

## 2020-09-05 RX ADMIN — FUROSEMIDE 20 MG: 20 INJECTION, SOLUTION INTRAMUSCULAR; INTRAVENOUS at 08:42

## 2020-09-05 RX ADMIN — HYDROCODONE BITARTRATE AND ACETAMINOPHEN 1 TABLET: 5; 325 TABLET ORAL at 21:52

## 2020-09-05 RX ADMIN — APIXABAN 5 MG: 5 TABLET, FILM COATED ORAL at 20:50

## 2020-09-05 RX ADMIN — APIXABAN 5 MG: 5 TABLET, FILM COATED ORAL at 08:42

## 2020-09-05 RX ADMIN — SODIUM CHLORIDE, PRESERVATIVE FREE 10 ML: 5 INJECTION INTRAVENOUS at 20:51

## 2020-09-05 RX ADMIN — GUAIFENESIN 1200 MG: 600 TABLET, EXTENDED RELEASE ORAL at 20:50

## 2020-09-05 RX ADMIN — SOTALOL HYDROCHLORIDE 80 MG: 80 TABLET ORAL at 20:50

## 2020-09-05 RX ADMIN — ASPIRIN 81 MG: 81 TABLET, COATED ORAL at 08:42

## 2020-09-05 NOTE — PROGRESS NOTES
[facility] HOSPITALIST PROGRESS NOTE     Patient Identification:  Name:  Chong White  Age:  60 y.o.  Sex:  male  :  1960  MRN:  [id number]  Visit Number:  [id number]  Primary Care Provider:  Kaya Romo APRN    Length of stay:  4    Chief complaint: Bilateral lower extremity edema    Subjective:    Patient states his bilateral lower extremity edema has improved compared to yesterday.  However, it is still persistent.  He does report occasional shortness of breath.  Otherwise, denies any other new symptoms overnight.  ----------------------------------------------------------------------------------------------------------------------  Current Hospital Meds:    apixaban 5 mg Oral Q12H   aspirin 81 mg Oral Daily   atorvastatin 40 mg Oral Nightly   azithromycin 500 mg Intravenous Q24H   cefTRIAXone 1 g Intravenous Q24H   furosemide 20 mg Intravenous Daily   guaiFENesin 1,200 mg Oral Q12H   lactobacillus acidophilus 1 capsule Oral Daily   losartan 25 mg Oral Q24H   nicotine 1 patch Transdermal Q24H   sodium chloride 10 mL Intravenous Q12H   sotalol 80 mg Oral Q12H       dilTIAZem 5-15 mg/hr Last Rate: Stopped (20 1130)     ----------------------------------------------------------------------------------------------------------------------  Vital Signs:  Temp:  [98 °F (36.7 °C)-98.4 °F (36.9 °C)] 98.1 °F (36.7 °C)  Heart Rate:  [] 62  Resp:  [8-21] 9  BP: ()/(51-98) 97/51      20  0400 20  0500 20  0400   Weight: 78.1 kg (172 lb 3.2 oz) 79.8 kg (176 lb) 71.5 kg (157 lb 9.6 oz) (CANDACE Mccollum aware of weight)     Body mass index is 23.96 kg/m².    Intake/Output Summary (Last 24 hours) at 2020 1650  Last data filed at 2020 1302  Gross per 24 hour   Intake 3702.16 ml   Output 1475 ml   Net 2227.16 ml     Diet Regular;  Cardiac  ----------------------------------------------------------------------------------------------------------------------  Physical exam:  Constitutional: Well-nourished  male in no apparent distress.     HENT:  Head:  Normocephalic and atraumatic.  Mouth:  Moist mucous membranes.    Eyes:  Conjunctivae and EOM are normal.  Pupils are equal, round, and reactive to light.  No scleral icterus.    Neck:  Neck supple. No thyromegaly.  No JVD present.    Cardiovascular: Irregular rhythm with rate in the low 100s, no murmurs, rubs, clicks or gallops appreciated  Pulmonary/Chest:  Faint inspiratory crackle in the right base with no wheezes or rhonchi appreciated  Abdominal:  Soft. Nontender. Nondistended  Bowel sounds are normal in all four quadrants. No organomegally appreciated.   Musculoskeletal:  5/5 muscle strength bilateral upper and lower extermties with equal muscle tone and bulk.  2+ pitting edema bilateral lower extremities, no tenderness, and no deformity.  No red or swollen joints anywhere.    Neurological:  Alert and oriented to person, place, and time. Cranial nerves II-XII intact with no focal defecits.  No facial droop.  No slurred speech.   Skin:  Warm and dry to palpation with no rashes or lesions appreciated.  Peripheral vascular:  2+ radial and pedal pulses in bilateral upper and lower extremities.  Psychiatric:  Alert and oriented x3, demonstrates appropriate judgement and insight.  ----------------------------------------------------------------------------------------------------------------------  Tele:    ----------------------------------------------------------------------------------------------------------------------  Results from last 7 days   Lab Units 09/01/20  1541 09/01/20  1221   TROPONIN T ng/mL <0.010 <0.010     Results from last 7 days   Lab Units 09/05/20  1200 09/05/20  0116 09/04/20  0353  09/02/20  1244  09/01/20  1228 09/01/20  1221   CRP mg/dL  --   --   --   --    --   --   --  0.30   LACTATE mmol/L  --   --   --   --   --   --  1.2  --    WBC 10*3/mm3 7.09 7.60 8.24   < > 8.24   < >  --  9.12   HEMOGLOBIN g/dL 15.1 15.8 14.4   < > 14.7   < >  --  16.1   HEMATOCRIT % 47.0 48.0 44.9   < > 44.6   < >  --  48.9   MCV fL 93.3 92.5 92.6   < > 92.5   < >  --  91.7   MCHC g/dL 32.1 32.9 32.1   < > 33.0   < >  --  32.9   PLATELETS 10*3/mm3 279 259 235   < > 212   < >  --  276   INR   --   --   --   --  1.05  --   --  0.97    < > = values in this interval not displayed.         Results from last 7 days   Lab Units 09/05/20  0116 09/04/20  0353 09/03/20  0136  09/01/20  1221   SODIUM mmol/L 141 140 137   < > 138   POTASSIUM mmol/L 5.2 4.3 4.3   < > 4.7   MAGNESIUM mg/dL  --   --   --   --  2.3   CHLORIDE mmol/L 105 102 103   < > 103   CO2 mmol/L 27.0 28.6 24.0   < > 25.1   BUN mg/dL 17 18 17   < > 11   CREATININE mg/dL 0.91 1.05 0.88   < > 1.12   EGFR IF NONAFRICN AM mL/min/1.73 85 72 88   < > 67   CALCIUM mg/dL 9.0 8.8 8.9   < > 8.9   GLUCOSE mg/dL 87 101* 101*   < > 114*   ALBUMIN g/dL  --   --   --   --  4.03   BILIRUBIN mg/dL  --   --   --   --  0.4   ALK PHOS U/L  --   --   --   --  96   AST (SGOT) U/L  --   --   --   --  24   ALT (SGPT) U/L  --   --   --   --  30    < > = values in this interval not displayed.   Estimated Creatinine Clearance: 87.3 mL/min (by C-G formula based on SCr of 0.91 mg/dL).    No results found for: AMMONIA  Results from last 7 days   Lab Units 09/03/20  0136   CHOLESTEROL mg/dL 133   TRIGLYCERIDES mg/dL 51   HDL CHOL mg/dL 42   LDL CHOL mg/dL 81     Blood Culture   Date Value Ref Range Status   09/01/2020 No growth at 24 hours  Preliminary   09/01/2020 No growth at 24 hours  Preliminary     No results found for: URINECX  No results found for: WOUNDCX  No results found for: STOOLCX    I have personally looked at the labs and they are summarized  above.  ----------------------------------------------------------------------------------------------------------------------  Imaging Results (Last 24 Hours)     ** No results found for the last 24 hours. **        ----------------------------------------------------------------------------------------------------------------------  Assessment and Plan:    1.  New onset A. Fib -patient did have LUCERO performed yesterday which unfortunately was unsuccessful.  However, patient did spontaneously convert to normal sinus rhythm today after starting sotalol.  Continue sotalol and continue Eliquis.    2.  Chronic systolic CHF -left heart catheterization demonstrated occlusion of ostial LAD with surprisingly good collateral connection from RCA filling the LAD with brisk flow to the point of occlusion.  RCA and left circumflex are free of any disease.  Will hold carvedilol secondary to low normal blood pressure, continue statin therapy, aspirin and ACE inhibitor.  Patient will need LifeVest on discharge.     3.  Questionable left lower lobe pneumonia - Continue Rocephin and azithromycin for now     4.  Essential hypertension -well controlled at this time     5.  History of CAD -continue medical management, cardiology also consulted.     6.  Tobacco abuse -encourage cessation            Napoleon Del Angel DO  09/05/20  16:50

## 2020-09-05 NOTE — PROGRESS NOTES
LOS: 4 days     Name: Chong White  Age/Sex: 60 y.o. male  :  1960        PCP: Kaya Romo APRN    Principal Problem:    Abnormal nuclear stress test  Active Problems:    A-fib (CMS/HCC)        Following for: New onset atrial fibrillation    Telemetry Monitoring: Sinus rhythm with rate of 60-70    Interval history: Patient reports she is still short of breath compared to his baseline.  He did spontaneously convert to normal sinus rhythm earlier this morning.  He is tolerating sotalol well QTC is 418.    Subjective     ROS    Vital Signs  Vitals:    20 1002 20 1133 20 1302 20 1402   BP: 104/87 (!) 87/61 110/69 93/55   Pulse: 110 69 64 72   Resp: 8 12 19 11   Temp:       TempSrc:       SpO2: 95% 93% 95% 99%   Weight:       Height:         Body mass index is 23.96 kg/m².      Intake/Output Summary (Last 24 hours) at 2020 1511  Last data filed at 2020 1302  Gross per 24 hour   Intake 4057.16 ml   Output 1675 ml   Net 2382.16 ml       Physical Exam   Constitutional: He is oriented to person, place, and time. He appears well-developed and well-nourished. No distress.   HENT:   Head: Normocephalic and atraumatic.   Cardiovascular: Normal rate, regular rhythm and normal heart sounds.   Pulmonary/Chest: Effort normal. No respiratory distress. He has rales.   Musculoskeletal: He exhibits no edema.   Neurological: He is alert and oriented to person, place, and time.   Skin: He is not diaphoretic.       Results Review:     Results from last 7 days   Lab Units 20  1200 20  0116 20  0353 20  0136 20  1244 20  0518 20  1221   WBC 10*3/mm3 7.09 7.60 8.24 8.12 8.24 8.53 9.12   HEMOGLOBIN g/dL 15.1 15.8 14.4 14.0 14.7 14.1 16.1   PLATELETS 10*3/mm3 279 259 235 208 212 219 276     Results from last 7 days   Lab Units 20  0116 20  0353 20  0136 20  0518 20  1221   SODIUM mmol/L 141 140 137 139 138   POTASSIUM  mmol/L 5.2 4.3 4.3 4.5 4.7   CHLORIDE mmol/L 105 102 103 106 103   CO2 mmol/L 27.0 28.6 24.0 22.2 25.1   BUN mg/dL 17 18 17 15 11   CREATININE mg/dL 0.91 1.05 0.88 0.85 1.12   CALCIUM mg/dL 9.0 8.8 8.9 8.7 8.9   GLUCOSE mg/dL 87 101* 101* 108* 114*   ALT (SGPT) U/L  --   --   --   --  30   AST (SGOT) U/L  --   --   --   --  24     Results from last 7 days   Lab Units 09/01/20  1541 09/01/20  1221   TROPONIN T ng/mL <0.010 <0.010       Results from last 7 days   Lab Units 09/02/20  1244 09/01/20  1221   INR  1.05 0.97       I reviewed the patient's new clinical results.  I reviewed the patient's new imaging results and agree with the interpretation.  I personally viewed and interpreted the patient's EKG/Telemetry data    Medication Review:     apixaban 5 mg Oral Q12H   aspirin 81 mg Oral Daily   atorvastatin 40 mg Oral Nightly   azithromycin 500 mg Intravenous Q24H   carvedilol 6.25 mg Oral BID With Meals   cefTRIAXone 1 g Intravenous Q24H   furosemide 20 mg Intravenous Daily   guaiFENesin 1,200 mg Oral Q12H   lactobacillus acidophilus 1 capsule Oral Daily   losartan 25 mg Oral Q24H   nicotine 1 patch Transdermal Q24H   sodium chloride 10 mL Intravenous Q12H   sotalol 80 mg Oral Q12H       dilTIAZem 5-15 mg/hr Last Rate: Stopped (09/04/20 1130)       Assessment:  1. New onset atrial fibrillation with RVR, CHADS-VASc score of 3 for CHF, HTN and CAD, anticoagulated with eliquis  2. ASCVD, history of MI in remote past with multiple PCI per patient report, occlusion of LAD on left heart cath yesterday with good colateral flow from RCA.   3. Acute systolic and diastolic congestive heart failure  4. Ischemic vs non-ischemic cardiomyopathy, EF of 21-25%   5. Moderate tricuspid valve regurgitation  6. Mild to moderate mitral valve regurgitation   7. Essential hypertension, controlled       Recommendations:  1. Thankfully patient has converted back to normal sinus rhythm with the assistance of sotalol.  Unfortunately patient  has become hypotensive with systolic running 90 to 95 mmHg.  He is also on carvedilol so will stop this.  2. I also recommend patient being on at least 2000 mL fluid restriction.  3. If blood pressure improves would like to increase Lasix to 40 mg IV however will continue with 20 until blood pressure improves.    I discussed the patients findings and my recommendations with patient and family    Pancho Edwards PA-C  09/05/20  3:11 PM

## 2020-09-05 NOTE — PLAN OF CARE
Problem: Patient Care Overview  Goal: Plan of Care Review  Outcome: Ongoing (interventions implemented as appropriate)  Note:   Patient was transitioned from IV heparin to eliquis today. He was started on sotalol today and has since converted to normal sinus rhythm. Anticipating discharge on Monday if he can find suitable housing. VSS. Continue to monitor.

## 2020-09-05 NOTE — PLAN OF CARE
Problem: Fall Risk (Adult)  Intervention: Monitor/Assist with Self Care  Flowsheets (Taken 9/5/2020 0240)  Activity Assistance Provided: independent  Self-Care Promotion: independence encouraged  Intervention: Reduce Risk/Promote Restraint Free Environment  Flowsheets  Taken 9/5/2020 0240 by Veda Muñoz RN  Safety Promotion/Fall Prevention: fall prevention program maintained;safety round/check completed  Environmental Safety Modification: assistive device/personal items within reach;lighting adjusted  Taken 9/5/2020 0000 by Faye Sousa PCT  Safety/Security Measures: bed alarm refused     Problem: Arrhythmia/Dysrhythmia (Symptomatic) (Adult)  Intervention: Prevent/Manage Thrombi/Emboli  Flowsheets  Taken 9/5/2020 0240  Bleeding Precautions: blood pressure closely monitored  Taken 9/4/2020 1940  VTE Prevention/Management: other (see comments) (hep)  Intervention: Promote Hemodynamic Stability  Flowsheets  Taken 9/4/2020 1940 by Veda Muñoz RN  Fluid/Electrolyte Management: fluids restricted  Taken 9/3/2020 1400 by Danika Mendez RN  Sensory Stimulation Regulation: quiet environment promoted  Taken 9/5/2020 0200 by Faye Sousa PCT  Head of Bed (HOB): HOB elevated  Intervention: Monitor/Manage Cardiac Rhythm Disturbance  Flowsheets (Taken 9/3/2020 0800 by Danika Mendez RN)  Dysrhythmia Management: other (see comments) (mar)     Problem: Patient Care Overview  Goal: Plan of Care Review  Outcome: Ongoing (interventions implemented as appropriate)  Flowsheets  Taken 9/3/2020 1529 by Danika Mendez RN  Progress: no change  Taken 9/5/2020 0240 by Veda Muñoz RN  Plan of Care Reviewed With: patient  Goal: Individualization and Mutuality  Outcome: Ongoing (interventions implemented as appropriate)  Goal: Discharge Needs Assessment  Outcome: Ongoing (interventions implemented as appropriate)  Flowsheets  Taken 9/5/2020 0245 by Veda Muñoz RN  Outpatient/Agency/Support Group Needs:  transportation;home based intervention services;case management  Current Discharge Risk: homeless  Concerns to be Addressed: homelessness  Taken 9/1/2020 1720 by Alexandrea Mendoza RN  Transportation Anticipated: family or friend will provide  Taken 9/2/2020 1656 by Gosia Allen  Transportation Concerns: car, none  Readmission Within the Last 30 Days: no previous admission in last 30 days  Patient/Family Anticipated Services at Transition: none  Patient/Family Anticipates Transition to: shelter  Goal: Interprofessional Rounds/Family Conf  Outcome: Ongoing (interventions implemented as appropriate)     Problem: Pain, Acute (Adult)  Goal: Identify Related Risk Factors and Signs and Symptoms  Outcome: Ongoing (interventions implemented as appropriate)  Goal: Acceptable Pain Control/Comfort Level  Outcome: Ongoing (interventions implemented as appropriate)  Flowsheets (Taken 9/5/2020 0245)  Acceptable Pain Control/Comfort Level: making progress toward outcome     Problem: Patient Care Overview  Goal: Plan of Care Review  Outcome: Ongoing (interventions implemented as appropriate)  Flowsheets  Taken 9/3/2020 1529 by Dnaika Mendez RN  Progress: no change  Taken 9/5/2020 0240 by Veda Muñoz RN  Plan of Care Reviewed With: patient  Goal: Individualization and Mutuality  Outcome: Ongoing (interventions implemented as appropriate)  Goal: Discharge Needs Assessment  Outcome: Ongoing (interventions implemented as appropriate)  Flowsheets  Taken 9/2/2020 1656 by Gosia Allen  Equipment Currently Used at Home: none  Transportation Concerns: car, none  Readmission Within the Last 30 Days: no previous admission in last 30 days  Patient/Family Anticipated Services at Transition: none  Patient/Family Anticipates Transition to: shelter  Taken 9/1/2020 1720 by Alexandrea Mendoza RN  Transportation Anticipated: family or friend will provide  Taken 9/5/2020 0245 by Veda Muñoz RN  Current Discharge Risk:  homeless  Concerns to be Addressed: homelessness  Goal: Interprofessional Rounds/Family Conf  Outcome: Ongoing (interventions implemented as appropriate)

## 2020-09-06 LAB
ANION GAP SERPL CALCULATED.3IONS-SCNC: 7.9 MMOL/L (ref 5–15)
BACTERIA SPEC AEROBE CULT: NORMAL
BACTERIA SPEC AEROBE CULT: NORMAL
BUN SERPL-MCNC: 21 MG/DL (ref 8–23)
BUN/CREAT SERPL: 18.8 (ref 7–25)
CALCIUM SPEC-SCNC: 9 MG/DL (ref 8.6–10.5)
CHLORIDE SERPL-SCNC: 103 MMOL/L (ref 98–107)
CO2 SERPL-SCNC: 28.1 MMOL/L (ref 22–29)
CREAT SERPL-MCNC: 1.12 MG/DL (ref 0.76–1.27)
DEPRECATED RDW RBC AUTO: 45 FL (ref 37–54)
ERYTHROCYTE [DISTWIDTH] IN BLOOD BY AUTOMATED COUNT: 13.1 % (ref 12.3–15.4)
GFR SERPL CREATININE-BSD FRML MDRD: 67 ML/MIN/1.73
GLUCOSE SERPL-MCNC: 82 MG/DL (ref 65–99)
HCT VFR BLD AUTO: 44.2 % (ref 37.5–51)
HGB BLD-MCNC: 14 G/DL (ref 13–17.7)
MCH RBC QN AUTO: 29.9 PG (ref 26.6–33)
MCHC RBC AUTO-ENTMCNC: 31.7 G/DL (ref 31.5–35.7)
MCV RBC AUTO: 94.4 FL (ref 79–97)
PLATELET # BLD AUTO: 255 10*3/MM3 (ref 140–450)
PMV BLD AUTO: 10.2 FL (ref 6–12)
POTASSIUM SERPL-SCNC: 5.1 MMOL/L (ref 3.5–5.2)
RBC # BLD AUTO: 4.68 10*6/MM3 (ref 4.14–5.8)
SARS-COV-2 RNA RESP QL NAA+PROBE: NOT DETECTED
SODIUM SERPL-SCNC: 139 MMOL/L (ref 136–145)
WBC # BLD AUTO: 7.11 10*3/MM3 (ref 3.4–10.8)

## 2020-09-06 PROCEDURE — 93010 ELECTROCARDIOGRAM REPORT: CPT | Performed by: INTERNAL MEDICINE

## 2020-09-06 PROCEDURE — 99232 SBSQ HOSP IP/OBS MODERATE 35: CPT | Performed by: INTERNAL MEDICINE

## 2020-09-06 PROCEDURE — 80048 BASIC METABOLIC PNL TOTAL CA: CPT | Performed by: INTERNAL MEDICINE

## 2020-09-06 PROCEDURE — 99232 SBSQ HOSP IP/OBS MODERATE 35: CPT | Performed by: PHYSICIAN ASSISTANT

## 2020-09-06 PROCEDURE — 87635 SARS-COV-2 COVID-19 AMP PRB: CPT | Performed by: INTERNAL MEDICINE

## 2020-09-06 PROCEDURE — 85027 COMPLETE CBC AUTOMATED: CPT | Performed by: INTERNAL MEDICINE

## 2020-09-06 PROCEDURE — 25010000002 FUROSEMIDE PER 20 MG: Performed by: INTERNAL MEDICINE

## 2020-09-06 PROCEDURE — 25010000002 AZITHROMYCIN PER 500 MG: Performed by: INTERNAL MEDICINE

## 2020-09-06 PROCEDURE — 93005 ELECTROCARDIOGRAM TRACING: CPT | Performed by: INTERNAL MEDICINE

## 2020-09-06 PROCEDURE — 25010000002 CEFTRIAXONE PER 250 MG: Performed by: INTERNAL MEDICINE

## 2020-09-06 RX ORDER — CARVEDILOL 3.12 MG/1
3.12 TABLET ORAL 2 TIMES DAILY WITH MEALS
Status: DISCONTINUED | OUTPATIENT
Start: 2020-09-06 | End: 2020-09-10 | Stop reason: HOSPADM

## 2020-09-06 RX ADMIN — AZITHROMYCIN MONOHYDRATE 500 MG: 500 INJECTION, POWDER, LYOPHILIZED, FOR SOLUTION INTRAVENOUS at 19:57

## 2020-09-06 RX ADMIN — HYDROCODONE BITARTRATE AND ACETAMINOPHEN 1 TABLET: 5; 325 TABLET ORAL at 19:57

## 2020-09-06 RX ADMIN — HYDROCODONE BITARTRATE AND ACETAMINOPHEN 1 TABLET: 5; 325 TABLET ORAL at 11:55

## 2020-09-06 RX ADMIN — ATORVASTATIN CALCIUM 40 MG: 40 TABLET, FILM COATED ORAL at 19:56

## 2020-09-06 RX ADMIN — GUAIFENESIN 1200 MG: 600 TABLET, EXTENDED RELEASE ORAL at 09:02

## 2020-09-06 RX ADMIN — ASPIRIN 81 MG: 81 TABLET, COATED ORAL at 09:02

## 2020-09-06 RX ADMIN — NICOTINE 1 PATCH: 21 PATCH TRANSDERMAL at 11:11

## 2020-09-06 RX ADMIN — CARVEDILOL 3.12 MG: 3.12 TABLET, FILM COATED ORAL at 17:04

## 2020-09-06 RX ADMIN — ACETAMINOPHEN 650 MG: 325 TABLET ORAL at 15:33

## 2020-09-06 RX ADMIN — LOSARTAN POTASSIUM 25 MG: 25 TABLET, FILM COATED ORAL at 09:04

## 2020-09-06 RX ADMIN — CEFTRIAXONE 1 G: 1 INJECTION, POWDER, FOR SOLUTION INTRAMUSCULAR; INTRAVENOUS at 19:56

## 2020-09-06 RX ADMIN — APIXABAN 5 MG: 5 TABLET, FILM COATED ORAL at 09:02

## 2020-09-06 RX ADMIN — HYDROCODONE BITARTRATE AND ACETAMINOPHEN 1 TABLET: 5; 325 TABLET ORAL at 05:31

## 2020-09-06 RX ADMIN — SODIUM CHLORIDE, PRESERVATIVE FREE 10 ML: 5 INJECTION INTRAVENOUS at 09:02

## 2020-09-06 RX ADMIN — SOTALOL HYDROCHLORIDE 80 MG: 80 TABLET ORAL at 19:55

## 2020-09-06 RX ADMIN — SOTALOL HYDROCHLORIDE 80 MG: 80 TABLET ORAL at 09:04

## 2020-09-06 RX ADMIN — Medication 1 CAPSULE: at 09:04

## 2020-09-06 RX ADMIN — SODIUM CHLORIDE, PRESERVATIVE FREE 10 ML: 5 INJECTION INTRAVENOUS at 20:02

## 2020-09-06 RX ADMIN — FUROSEMIDE 20 MG: 20 INJECTION, SOLUTION INTRAMUSCULAR; INTRAVENOUS at 09:02

## 2020-09-06 RX ADMIN — APIXABAN 5 MG: 5 TABLET, FILM COATED ORAL at 19:56

## 2020-09-06 RX ADMIN — GUAIFENESIN 1200 MG: 600 TABLET, EXTENDED RELEASE ORAL at 19:56

## 2020-09-06 NOTE — PROGRESS NOTES
[facility] HOSPITALIST PROGRESS NOTE     Patient Identification:  Name:  Chong White  Age:  60 y.o.  Sex:  male  :  1960  MRN:  [id number]  Visit Number:  [id number]  Primary Care Provider:  Kaya Romo APRN    Length of stay:  5    Chief complaint: Bilateral lower extremity edema    Subjective:    Patient reports continued improvement in bilateral lower extremity edema.  Patient still with intermittent shortness of breath.  Denies any further palpitations.  Patient reports he has close to his baseline functioning status.  However, patient raises some concerns about where he will go when he is discharged.  He is technically homeless at this point and is requesting assistance from case management for possible placement in assisted living or possibly nursing home.  ----------------------------------------------------------------------------------------------------------------------  Current Hospital Meds:    apixaban 5 mg Oral Q12H   aspirin 81 mg Oral Daily   atorvastatin 40 mg Oral Nightly   azithromycin 500 mg Intravenous Q24H   cefTRIAXone 1 g Intravenous Q24H   furosemide 20 mg Intravenous Daily   guaiFENesin 1,200 mg Oral Q12H   lactobacillus acidophilus 1 capsule Oral Daily   losartan 25 mg Oral Q24H   nicotine 1 patch Transdermal Q24H   sodium chloride 10 mL Intravenous Q12H   sotalol 80 mg Oral Q12H       dilTIAZem 5-15 mg/hr Last Rate: Stopped (20 1130)     ----------------------------------------------------------------------------------------------------------------------  Vital Signs:  Temp:  [98.1 °F (36.7 °C)-98.6 °F (37 °C)] 98.6 °F (37 °C)  Heart Rate:  [55-77] 64  Resp:  [9-21] 18  BP: ()/(51-91) 122/71      20  0500 20  0400 20  0400   Weight: 79.8 kg (176 lb) 71.5 kg (157 lb 9.6 oz) (CANDACE Mccollum aware of weight) 72.8 kg (160 lb 8 oz) (Simultaneous filing. User may be unaware of other data.)     Body mass index is 24.4  kg/m².    Intake/Output Summary (Last 24 hours) at 9/6/2020 1443  Last data filed at 9/6/2020 1200  Gross per 24 hour   Intake 800.24 ml   Output 650 ml   Net 150.24 ml     Diet Regular; Cardiac  ----------------------------------------------------------------------------------------------------------------------  Physical exam:  Constitutional: Well-nourished  male in no apparent distress.     HENT:  Head:  Normocephalic and atraumatic.  Mouth:  Moist mucous membranes.    Eyes:  Conjunctivae and EOM are normal.  Pupils are equal, round, and reactive to light.  No scleral icterus.    Neck:  Neck supple. No thyromegaly.  No JVD present.    Cardiovascular: Irregular rhythm with rate in the low 100s, no murmurs, rubs, clicks or gallops appreciated  Pulmonary/Chest:  Faint inspiratory crackle in the right base with no wheezes or rhonchi appreciated  Abdominal:  Soft. Nontender. Nondistended  Bowel sounds are normal in all four quadrants. No organomegally appreciated.   Musculoskeletal:  5/5 muscle strength bilateral upper and lower extermties with equal muscle tone and bulk.   Trace pitting edema bilateral lower extremities, no tenderness, and no deformity.  No red or swollen joints anywhere.    Neurological:  Alert and oriented to person, place, and time. Cranial nerves II-XII intact with no focal defecits.  No facial droop.  No slurred speech.   Skin:  Warm and dry to palpation with no rashes or lesions appreciated.  Peripheral vascular:  2+ radial and pedal pulses in bilateral upper and lower extremities.  Psychiatric:  Alert and oriented x3, demonstrates appropriate judgement and insight.  ----------------------------------------------------------------------------------------------------------------------  Tele:    ----------------------------------------------------------------------------------------------------------------------  Results from last 7 days   Lab Units 09/01/20  1541 09/01/20  1221    TROPONIN T ng/mL <0.010 <0.010     Results from last 7 days   Lab Units 09/06/20  0140 09/05/20  1200 09/05/20  0116  09/02/20  1244  09/01/20  1228 09/01/20  1221   CRP mg/dL  --   --   --   --   --   --   --  0.30   LACTATE mmol/L  --   --   --   --   --   --  1.2  --    WBC 10*3/mm3 7.11 7.09 7.60   < > 8.24   < >  --  9.12   HEMOGLOBIN g/dL 14.0 15.1 15.8   < > 14.7   < >  --  16.1   HEMATOCRIT % 44.2 47.0 48.0   < > 44.6   < >  --  48.9   MCV fL 94.4 93.3 92.5   < > 92.5   < >  --  91.7   MCHC g/dL 31.7 32.1 32.9   < > 33.0   < >  --  32.9   PLATELETS 10*3/mm3 255 279 259   < > 212   < >  --  276   INR   --   --   --   --  1.05  --   --  0.97    < > = values in this interval not displayed.         Results from last 7 days   Lab Units 09/06/20  0140 09/05/20  0116 09/04/20  0353  09/01/20  1221   SODIUM mmol/L 139 141 140   < > 138   POTASSIUM mmol/L 5.1 5.2 4.3   < > 4.7   MAGNESIUM mg/dL  --   --   --   --  2.3   CHLORIDE mmol/L 103 105 102   < > 103   CO2 mmol/L 28.1 27.0 28.6   < > 25.1   BUN mg/dL 21 17 18   < > 11   CREATININE mg/dL 1.12 0.91 1.05   < > 1.12   EGFR IF NONAFRICN AM mL/min/1.73 67 85 72   < > 67   CALCIUM mg/dL 9.0 9.0 8.8   < > 8.9   GLUCOSE mg/dL 82 87 101*   < > 114*   ALBUMIN g/dL  --   --   --   --  4.03   BILIRUBIN mg/dL  --   --   --   --  0.4   ALK PHOS U/L  --   --   --   --  96   AST (SGOT) U/L  --   --   --   --  24   ALT (SGPT) U/L  --   --   --   --  30    < > = values in this interval not displayed.   Estimated Creatinine Clearance: 72.2 mL/min (by C-G formula based on SCr of 1.12 mg/dL).    No results found for: AMMONIA  Results from last 7 days   Lab Units 09/03/20  0136   CHOLESTEROL mg/dL 133   TRIGLYCERIDES mg/dL 51   HDL CHOL mg/dL 42   LDL CHOL mg/dL 81     Blood Culture   Date Value Ref Range Status   09/01/2020 No growth at 24 hours  Preliminary   09/01/2020 No growth at 24 hours  Preliminary     No results found for: URINECX  No results found for: WOUNDCX  No  results found for: STOOLCX    I have personally looked at the labs and they are summarized above.  ----------------------------------------------------------------------------------------------------------------------  Imaging Results (Last 24 Hours)     ** No results found for the last 24 hours. **        ----------------------------------------------------------------------------------------------------------------------  Assessment and Plan:    1.  New onset A. Fib -patient did have LUCERO performed which unfortunately was unsuccessful.  However, patient did spontaneously convert to normal sinus rhythm yesterday after starting sotalol.  Continue sotalol and continue Eliquis.    2.  Chronic systolic CHF -left heart catheterization demonstrated occlusion of ostial LAD with surprisingly good collateral connection from RCA filling the LAD with brisk flow to the point of occlusion.  RCA and left circumflex are free of any disease.  Will hold carvedilol secondary to low normal blood pressure, continue statin therapy, aspirin and ACE inhibitor.  Patient will need LifeVest on discharge.     3.  Questionable left lower lobe pneumonia - Will complete Rocephin and azithromycin today     4.  Essential hypertension -well controlled at this time     5.  History of CAD -continue medical management, cardiology also consulted.     6.  Tobacco abuse -encourage cessation      Case management consulted for placement.      Napoleon Del Angel,   09/06/20  14:43

## 2020-09-06 NOTE — PLAN OF CARE
Problem: Fall Risk (Adult)  Intervention: Monitor/Assist with Self Care  Flowsheets (Taken 9/6/2020 0210)  Activity Assistance Provided: independent  Self-Care Promotion: independence encouraged     Problem: Arrhythmia/Dysrhythmia (Symptomatic) (Adult)  Intervention: Prevent/Manage Thrombi/Emboli  Flowsheets  Taken 9/5/2020 2000 by Lorrie Morrison RN  Bleeding Precautions: blood pressure closely monitored  Taken 9/4/2020 1940 by Veda Muñoz RN  VTE Prevention/Management: other (see comments) (hep)     Problem: Patient Care Overview  Goal: Plan of Care Review  Outcome: Ongoing (interventions implemented as appropriate)  Flowsheets  Taken 9/3/2020 1529 by Danika Mendez RN  Progress: no change  Taken 9/6/2020 0210 by Veda Muñoz RN  Plan of Care Reviewed With: patient  Goal: Individualization and Mutuality  Outcome: Ongoing (interventions implemented as appropriate)  Goal: Discharge Needs Assessment  Outcome: Ongoing (interventions implemented as appropriate)  Flowsheets  Taken 9/5/2020 0245 by Veda Muñoz RN  Outpatient/Agency/Support Group Needs: transportation;home based intervention services;case management  Current Discharge Risk: homeless  Concerns to be Addressed: homelessness  Taken 9/1/2020 1720 by Alexandrea Mendoza, RN  Transportation Anticipated: family or friend will provide  Taken 9/2/2020 1656 by Gosia Allen  Transportation Concerns: car, none  Readmission Within the Last 30 Days: no previous admission in last 30 days  Patient/Family Anticipated Services at Transition: none  Patient/Family Anticipates Transition to: shelter  Goal: Interprofessional Rounds/Family Conf  Outcome: Ongoing (interventions implemented as appropriate)     Problem: Pain, Acute (Adult)  Goal: Identify Related Risk Factors and Signs and Symptoms  Outcome: Ongoing (interventions implemented as appropriate)  Flowsheets (Taken 9/6/2020 0227)  Signs and Symptoms (Acute Pain): verbalization of pain  descriptors  Goal: Acceptable Pain Control/Comfort Level  Outcome: Ongoing (interventions implemented as appropriate)  Flowsheets (Taken 9/6/2020 0227)  Acceptable Pain Control/Comfort Level: making progress toward outcome     Problem: Patient Care Overview  Goal: Plan of Care Review  Outcome: Ongoing (interventions implemented as appropriate)  Flowsheets  Taken 9/3/2020 1529 by Danika Mendez RN  Progress: no change  Taken 9/6/2020 0210 by Veda Muñoz RN  Plan of Care Reviewed With: patient  Goal: Individualization and Mutuality  Outcome: Ongoing (interventions implemented as appropriate)  Goal: Discharge Needs Assessment  Outcome: Ongoing (interventions implemented as appropriate)  Flowsheets  Taken 9/2/2020 1656 by Gosia Allen  Equipment Currently Used at Home: none  Transportation Concerns: car, none  Readmission Within the Last 30 Days: no previous admission in last 30 days  Patient/Family Anticipated Services at Transition: none  Patient/Family Anticipates Transition to: shelter  Taken 9/1/2020 1720 by Alexandrea Mendoza, RN  Transportation Anticipated: family or friend will provide  Taken 9/5/2020 0245 by Veda Muñoz RN  Current Discharge Risk: homeless  Concerns to be Addressed: homelessness  Goal: Interprofessional Rounds/Family Conf  Outcome: Ongoing (interventions implemented as appropriate)

## 2020-09-06 NOTE — PLAN OF CARE
Problem: Patient Care Overview  Goal: Plan of Care Review  Outcome: Ongoing (interventions implemented as appropriate)   Patient's vital signs stable. Norco given for chronic back pain at 1151. Will continue to monitor.

## 2020-09-06 NOTE — PROGRESS NOTES
LOS: 5 days     Name: Chong White  Age/Sex: 60 y.o. male  :  1960        PCP: Kaya Romo APRN    Principal Problem:    Abnormal nuclear stress test  Active Problems:    A-fib (CMS/HCC)    Following for: Atrial fibrillation    Telemetry Monitoring: Sinus rhythm with rate of 70 bpm    Interval history: Patient reports he is doing much better reports his breathing is significantly improved than when he was admitted.  He denies any chest pains or bleeding issues with his Eliquis.  Patient's QTC is 449    Subjective     ROS    Vital Signs  Vitals:    20 1116 20 1203 20 1303 20 1403   BP: 105/74 107/65 133/83 122/71   BP Location: Left arm Left arm Left arm Left arm   Patient Position: Sitting Sitting Sitting Sitting   Pulse: 64 58 57 64   Resp: 18 18 18 18   Temp:  98.6 °F (37 °C)     TempSrc:  Oral     SpO2: 96% 94% 97% 99%   Weight:       Height:         Body mass index is 24.4 kg/m².      Intake/Output Summary (Last 24 hours) at 2020 1439  Last data filed at 2020 1200  Gross per 24 hour   Intake 800.24 ml   Output 650 ml   Net 150.24 ml       Physical Exam   Constitutional: He is oriented to person, place, and time. He appears well-developed and well-nourished. No distress.   HENT:   Head: Normocephalic and atraumatic.   Cardiovascular: Normal rate, regular rhythm and normal heart sounds.   Pulmonary/Chest: Effort normal and breath sounds normal. No respiratory distress.   Musculoskeletal: He exhibits no edema.   Neurological: He is alert and oriented to person, place, and time.   Skin: He is not diaphoretic.       Results Review:     Results from last 7 days   Lab Units 20  0140 20  1200 20  0116 20  0353 20  0136 20  1244 20  0518   WBC 10*3/mm3 7.11 7.09 7.60 8.24 8.12 8.24 8.53   HEMOGLOBIN g/dL 14.0 15.1 15.8 14.4 14.0 14.7 14.1   PLATELETS 10*3/mm3 255 279 259 235 208 212 219     Results from last 7 days   Lab Units  09/06/20  0140 09/05/20  0116 09/04/20  0353 09/03/20  0136 09/02/20  0518 09/01/20  1221   SODIUM mmol/L 139 141 140 137 139 138   POTASSIUM mmol/L 5.1 5.2 4.3 4.3 4.5 4.7   CHLORIDE mmol/L 103 105 102 103 106 103   CO2 mmol/L 28.1 27.0 28.6 24.0 22.2 25.1   BUN mg/dL 21 17 18 17 15 11   CREATININE mg/dL 1.12 0.91 1.05 0.88 0.85 1.12   CALCIUM mg/dL 9.0 9.0 8.8 8.9 8.7 8.9   GLUCOSE mg/dL 82 87 101* 101* 108* 114*   ALT (SGPT) U/L  --   --   --   --   --  30   AST (SGOT) U/L  --   --   --   --   --  24     Results from last 7 days   Lab Units 09/01/20  1541 09/01/20  1221   TROPONIN T ng/mL <0.010 <0.010       Results from last 7 days   Lab Units 09/02/20  1244 09/01/20  1221   INR  1.05 0.97       I reviewed the patient's new clinical results.  I reviewed the patient's new imaging results and agree with the interpretation.  I personally viewed and interpreted the patient's EKG/Telemetry data    Medication Review:     apixaban 5 mg Oral Q12H   aspirin 81 mg Oral Daily   atorvastatin 40 mg Oral Nightly   azithromycin 500 mg Intravenous Q24H   cefTRIAXone 1 g Intravenous Q24H   furosemide 20 mg Intravenous Daily   guaiFENesin 1,200 mg Oral Q12H   lactobacillus acidophilus 1 capsule Oral Daily   losartan 25 mg Oral Q24H   nicotine 1 patch Transdermal Q24H   sodium chloride 10 mL Intravenous Q12H   sotalol 80 mg Oral Q12H       dilTIAZem 5-15 mg/hr Last Rate: Stopped (09/04/20 1130)       Assessment:  1. New onset atrial fibrillation with RVR, CHADS-VASc score of 3 for CHF, HTN and CAD, anticoagulated with eliquis  2. ASCVD, history of MI in remote past with multiple PCI per patient report, occlusion of LAD on left heart cath yesterday with good colateral flow from RCA.   3. Acute systolic and diastolic congestive heart failure  4. Ischemic vs non-ischemic cardiomyopathy, EF of 21-25%   5. Moderate tricuspid valve regurgitation  6. Mild to moderate mitral valve regurgitation   7. Essential  hypertension, controlled       Recommendations:  1. I did speak with Dr. Delarosa, his attending cardiologist, he would like to keep at least a low dose Coreg if possible given his severe systolic heart failure. I will restart Coreg at 3.125 for now.   2. Will continue with current dose of lasix, he does appear to be doing much better. Lungs are clear on auscultation and he reports his breathing is improved as well as his pedal edema.  3. Continue losartan, Eliquis, Lipitor, and aspirin.    I discussed the patients findings and my recommendations with patient and family    Pancho Edwards PA-C  09/06/20  2:39 PM

## 2020-09-07 LAB
ANION GAP SERPL CALCULATED.3IONS-SCNC: 7.7 MMOL/L (ref 5–15)
BUN SERPL-MCNC: 21 MG/DL (ref 8–23)
BUN/CREAT SERPL: 20.8 (ref 7–25)
CALCIUM SPEC-SCNC: 9.3 MG/DL (ref 8.6–10.5)
CHLORIDE SERPL-SCNC: 103 MMOL/L (ref 98–107)
CO2 SERPL-SCNC: 25.3 MMOL/L (ref 22–29)
CREAT SERPL-MCNC: 1.01 MG/DL (ref 0.76–1.27)
DEPRECATED RDW RBC AUTO: 43.8 FL (ref 37–54)
ERYTHROCYTE [DISTWIDTH] IN BLOOD BY AUTOMATED COUNT: 12.8 % (ref 12.3–15.4)
GFR SERPL CREATININE-BSD FRML MDRD: 75 ML/MIN/1.73
GLUCOSE SERPL-MCNC: 83 MG/DL (ref 65–99)
HCT VFR BLD AUTO: 45.9 % (ref 37.5–51)
HGB BLD-MCNC: 14.8 G/DL (ref 13–17.7)
MCH RBC QN AUTO: 30.1 PG (ref 26.6–33)
MCHC RBC AUTO-ENTMCNC: 32.2 G/DL (ref 31.5–35.7)
MCV RBC AUTO: 93.5 FL (ref 79–97)
PLATELET # BLD AUTO: 271 10*3/MM3 (ref 140–450)
PMV BLD AUTO: 10.2 FL (ref 6–12)
POTASSIUM SERPL-SCNC: 4.8 MMOL/L (ref 3.5–5.2)
RBC # BLD AUTO: 4.91 10*6/MM3 (ref 4.14–5.8)
SODIUM SERPL-SCNC: 136 MMOL/L (ref 136–145)
WBC # BLD AUTO: 8.33 10*3/MM3 (ref 3.4–10.8)

## 2020-09-07 PROCEDURE — 94799 UNLISTED PULMONARY SVC/PX: CPT

## 2020-09-07 PROCEDURE — 80048 BASIC METABOLIC PNL TOTAL CA: CPT | Performed by: INTERNAL MEDICINE

## 2020-09-07 PROCEDURE — 85027 COMPLETE CBC AUTOMATED: CPT | Performed by: INTERNAL MEDICINE

## 2020-09-07 PROCEDURE — 93010 ELECTROCARDIOGRAM REPORT: CPT | Performed by: INTERNAL MEDICINE

## 2020-09-07 PROCEDURE — 25010000002 FUROSEMIDE PER 20 MG: Performed by: INTERNAL MEDICINE

## 2020-09-07 PROCEDURE — 99232 SBSQ HOSP IP/OBS MODERATE 35: CPT | Performed by: HOSPITALIST

## 2020-09-07 PROCEDURE — 93005 ELECTROCARDIOGRAM TRACING: CPT | Performed by: HOSPITALIST

## 2020-09-07 RX ORDER — ALUMINA, MAGNESIA, AND SIMETHICONE 2400; 2400; 240 MG/30ML; MG/30ML; MG/30ML
15 SUSPENSION ORAL EVERY 6 HOURS PRN
Status: DISCONTINUED | OUTPATIENT
Start: 2020-09-07 | End: 2020-09-10 | Stop reason: HOSPADM

## 2020-09-07 RX ADMIN — APIXABAN 5 MG: 5 TABLET, FILM COATED ORAL at 20:14

## 2020-09-07 RX ADMIN — SOTALOL HYDROCHLORIDE 80 MG: 80 TABLET ORAL at 08:42

## 2020-09-07 RX ADMIN — SOTALOL HYDROCHLORIDE 80 MG: 80 TABLET ORAL at 20:14

## 2020-09-07 RX ADMIN — HYDROCODONE BITARTRATE AND ACETAMINOPHEN 1 TABLET: 5; 325 TABLET ORAL at 04:59

## 2020-09-07 RX ADMIN — LOSARTAN POTASSIUM 25 MG: 25 TABLET, FILM COATED ORAL at 08:44

## 2020-09-07 RX ADMIN — ALUMINUM HYDROXIDE, MAGNESIUM HYDROXIDE, AND DIMETHICONE 15 ML: 400; 400; 40 SUSPENSION ORAL at 04:59

## 2020-09-07 RX ADMIN — NICOTINE 1 PATCH: 21 PATCH TRANSDERMAL at 08:43

## 2020-09-07 RX ADMIN — SODIUM CHLORIDE, PRESERVATIVE FREE 10 ML: 5 INJECTION INTRAVENOUS at 08:43

## 2020-09-07 RX ADMIN — GUAIFENESIN 1200 MG: 600 TABLET, EXTENDED RELEASE ORAL at 20:14

## 2020-09-07 RX ADMIN — CARVEDILOL 3.12 MG: 3.12 TABLET, FILM COATED ORAL at 08:41

## 2020-09-07 RX ADMIN — GUAIFENESIN 1200 MG: 600 TABLET, EXTENDED RELEASE ORAL at 08:42

## 2020-09-07 RX ADMIN — ATORVASTATIN CALCIUM 40 MG: 40 TABLET, FILM COATED ORAL at 20:14

## 2020-09-07 RX ADMIN — HYDROCODONE BITARTRATE AND ACETAMINOPHEN 1 TABLET: 5; 325 TABLET ORAL at 20:14

## 2020-09-07 RX ADMIN — HYDROCODONE BITARTRATE AND ACETAMINOPHEN 1 TABLET: 5; 325 TABLET ORAL at 14:12

## 2020-09-07 RX ADMIN — SODIUM CHLORIDE, PRESERVATIVE FREE 10 ML: 5 INJECTION INTRAVENOUS at 20:14

## 2020-09-07 RX ADMIN — ASPIRIN 81 MG: 81 TABLET, COATED ORAL at 08:42

## 2020-09-07 RX ADMIN — Medication 1 CAPSULE: at 08:44

## 2020-09-07 RX ADMIN — FUROSEMIDE 20 MG: 20 INJECTION, SOLUTION INTRAMUSCULAR; INTRAVENOUS at 08:42

## 2020-09-07 RX ADMIN — CARVEDILOL 3.12 MG: 3.12 TABLET, FILM COATED ORAL at 17:01

## 2020-09-07 RX ADMIN — APIXABAN 5 MG: 5 TABLET, FILM COATED ORAL at 08:42

## 2020-09-07 NOTE — PROGRESS NOTES
"    [facility] HOSPITALIST PROGRESS NOTE     Patient Identification:  Name:  Chong White  Age:  60 y.o.  Sex:  male  :  1960  MRN:  [id number]  Visit Number:  [id number]  Primary Care Provider:  Kaya Romo APRN    Length of stay:  6    Subjective: Patient seen and examined, patient is in bed he is not orthopneic, he reports breathing \"fine\".  Patient maintained sinus rhythm.  No nausea or vomiting, patient currently on sotalol and Lopressor as well as losartan for cardiac medications.  Cardiology help appreciated.  Patient reports he had MI multiple times and had 1 stent placed almost    Chief Complaint: Palpitations  ----------------------------------------------------------------------------------------------------------------------  Current [facility] Meds:    apixaban 5 mg Oral Q12H   aspirin 81 mg Oral Daily   atorvastatin 40 mg Oral Nightly   carvedilol 3.125 mg Oral BID With Meals   furosemide 20 mg Intravenous Daily   guaiFENesin 1,200 mg Oral Q12H   lactobacillus acidophilus 1 capsule Oral Daily   losartan 25 mg Oral Q24H   nicotine 1 patch Transdermal Q24H   sodium chloride 10 mL Intravenous Q12H   sotalol 80 mg Oral Q12H       dilTIAZem 5-15 mg/hr Last Rate: Stopped (20 1130)     ----------------------------------------------------------------------------------------------------------------------  Vital Signs:  Temp:  [98 °F (36.7 °C)-98.6 °F (37 °C)] 98 °F (36.7 °C)  Heart Rate:  [54-77] 56  Resp:  [16-20] 16  BP: ()/(34-83) 97/62       Tele: Sinus rhythm 59 bpm        20  0400 20  0400 20  0400   Weight: 71.5 kg (157 lb 9.6 oz) (CANDACE Mccollum aware of weight) 72.8 kg (160 lb 8 oz) (Simultaneous filing. User may be unaware of other data.) 72.9 kg (160 lb 12.8 oz)     Body mass index is 24.45 kg/m².    Intake/Output Summary (Last 24 hours) at 2020 1010  Last data filed at 2020 1800  Gross per 24 hour   Intake 0 ml   Output --   Net 0 ml     Diet " Regular; Cardiac  ----------------------------------------------------------------------------------------------------------------------  Physical exam:  General: Comfortable,awake, alert, oriented to self, place, and time, well-developed and well-nourished.  No respiratory distress.    Skin:  Skin is warm and dry. No rash noted. No pallor.    HENT:  Head:  Normocephalic and atraumatic.  Mouth:  Moist mucous membranes.    Eyes:  Conjunctivae and EOM are normal.  Pupils are equal, round, and reactive to light.  No scleral icterus.    Neck:  Neck supple.  No JVD present.  No bruit  Pulmonary/Chest:  No respiratory distress, no wheezes, no crackles, with normal breath sounds and good air movement.  Multiple tattoos noted on chest wall  Cardiovascular:  Normal rate, regular rhythm and normal heart sounds with no murmur.  Abdominal:  Soft.  Bowel sounds are normal.  No distension and no tenderness.   Extremities:  No edema, no tenderness, and no deformity.  No red or swollen joints anywhere.  Strong pulses in all 4 extremities with no clubbing, no cyanosis, no edema.  Neurological:  Motor strength equal no obvious deficit, sensory grossly intact.   No cranial nerve deficit.  No tongue deviation.  No facial droop.  No slurred speech.    Genitourinary: No Salguero catheter  Back: No skin break  ----------------------------------------------------------------------------------------------------------------------  ----------------------------------------------------------------------------------------------------------------------  Results from last 7 days   Lab Units 09/01/20  1541 09/01/20  1221   TROPONIN T ng/mL <0.010 <0.010     Results from last 7 days   Lab Units 09/07/20  0102 09/06/20  0140 09/05/20  1200  09/02/20  1244  09/01/20  1228 09/01/20  1221   CRP mg/dL  --   --   --   --   --   --   --  0.30   LACTATE mmol/L  --   --   --   --   --   --  1.2  --    WBC 10*3/mm3 8.33 7.11 7.09   < > 8.24   < >  --  9.12      HEMOGLOBIN g/dL 14.8 14.0 15.1   < > 14.7   < >  --  16.1   HEMATOCRIT % 45.9 44.2 47.0   < > 44.6   < >  --  48.9   MCV fL 93.5 94.4 93.3   < > 92.5   < >  --  91.7   MCHC g/dL 32.2 31.7 32.1   < > 33.0   < >  --  32.9   PLATELETS 10*3/mm3 271 255 279   < > 212   < >  --  276   INR   --   --   --   --  1.05  --   --  0.97    < > = values in this interval not displayed.         Results from last 7 days   Lab Units 09/07/20  0102 09/06/20  0140 09/05/20  0116  09/01/20  1221   SODIUM mmol/L 136 139 141   < > 138   POTASSIUM mmol/L 4.8 5.1 5.2   < > 4.7   MAGNESIUM mg/dL  --   --   --   --  2.3   CHLORIDE mmol/L 103 103 105   < > 103   CO2 mmol/L 25.3 28.1 27.0   < > 25.1   BUN mg/dL 21 21 17   < > 11   CREATININE mg/dL 1.01 1.12 0.91   < > 1.12   EGFR IF NONAFRICN AM mL/min/1.73 75 67 85   < > 67   CALCIUM mg/dL 9.3 9.0 9.0   < > 8.9   GLUCOSE mg/dL 83 82 87   < > 114*   ALBUMIN g/dL  --   --   --   --  4.03   BILIRUBIN mg/dL  --   --   --   --  0.4   ALK PHOS U/L  --   --   --   --  96   AST (SGOT) U/L  --   --   --   --  24   ALT (SGPT) U/L  --   --   --   --  30    < > = values in this interval not displayed.   Estimated Creatinine Clearance: 80.2 mL/min (by C-G formula based on SCr of 1.01 mg/dL).    No results found for: AMMONIA  Results from last 7 days   Lab Units 09/03/20  0136   CHOLESTEROL mg/dL 133   TRIGLYCERIDES mg/dL 51   HDL CHOL mg/dL 42   LDL CHOL mg/dL 81     Blood Culture   Date Value Ref Range Status   09/01/2020 No growth at 5 days  Final   09/01/2020 No growth at 5 days  Final     No results found for: URINECX  No results found for: WOUNDCX  No results found for: STOOLCX    I have personally looked at the labs and they are summarized above.  ----------------------------------------------------------------------------------------------------------------------  Imaging Results (Last 24 Hours)     ** No results found for the last 24 hours. **         ----------------------------------------------------------------------------------------------------------------------  Assessment and Plan:  -Atrial fibrillation with RVR now converted to sinus  -Moderate tricuspid regurgitation  -Acute combined systolic and diastolic heart failure ejection fraction 21 to 25%  -Apparent history of coronary disease status post stent placement  -Complete occlusion of LAD on heart cath on this admission with good collaterals from RCA  -Tobacco use    So far QT is not prolonged, patient tolerating with Coreg and sotalol, cardiology help appreciated, no further changes on his medication at this time.  Blood pressure is borderline low, will hold off on ACE inhibitor/ARB for now.   working on placement.  Monitor renal function and electrolytes.    Plan outlined to the patient and agreed.    Cindy Olivo MD  09/07/20  10:10

## 2020-09-07 NOTE — PLAN OF CARE
Problem: Patient Care Overview  Goal: Plan of Care Review  Outcome: Ongoing (interventions implemented as appropriate)   Patient resting in bed. Vital signs stable. Norco given for chronic back pain. Will continue to monitor.

## 2020-09-07 NOTE — PLAN OF CARE
Problem: Patient Care Overview  Goal: Plan of Care Review  Outcome: Ongoing (interventions implemented as appropriate)  Goal: Individualization and Mutuality  Outcome: Ongoing (interventions implemented as appropriate)  Goal: Discharge Needs Assessment  Outcome: Ongoing (interventions implemented as appropriate)  Goal: Interprofessional Rounds/Family Conf  Outcome: Ongoing (interventions implemented as appropriate)     Problem: Pain, Acute (Adult)  Goal: Identify Related Risk Factors and Signs and Symptoms  Outcome: Ongoing (interventions implemented as appropriate)  Goal: Acceptable Pain Control/Comfort Level  Outcome: Ongoing (interventions implemented as appropriate)     Problem: Patient Care Overview  Goal: Plan of Care Review  Outcome: Ongoing (interventions implemented as appropriate)  Note:   Patient has rested well through shift. VSS. Patient reported heart burn. Administered mallox. Patient reported relief. Will continue to monitor.   Goal: Individualization and Mutuality  Outcome: Ongoing (interventions implemented as appropriate)  Goal: Discharge Needs Assessment  Outcome: Ongoing (interventions implemented as appropriate)  Goal: Interprofessional Rounds/Family Conf  Outcome: Ongoing (interventions implemented as appropriate)

## 2020-09-08 LAB
ANION GAP SERPL CALCULATED.3IONS-SCNC: 10.8 MMOL/L (ref 5–15)
BASOPHILS # BLD AUTO: 0.06 10*3/MM3 (ref 0–0.2)
BASOPHILS # BLD AUTO: 0.08 10*3/MM3 (ref 0–0.2)
BASOPHILS NFR BLD AUTO: 0.8 % (ref 0–1.5)
BASOPHILS NFR BLD AUTO: 1.1 % (ref 0–1.5)
BUN SERPL-MCNC: 28 MG/DL (ref 8–23)
BUN/CREAT SERPL: 25.9 (ref 7–25)
CALCIUM SPEC-SCNC: 9 MG/DL (ref 8.6–10.5)
CHLORIDE SERPL-SCNC: 100 MMOL/L (ref 98–107)
CO2 SERPL-SCNC: 27.2 MMOL/L (ref 22–29)
CREAT SERPL-MCNC: 1.08 MG/DL (ref 0.76–1.27)
DEPRECATED RDW RBC AUTO: 43.8 FL (ref 37–54)
DEPRECATED RDW RBC AUTO: 43.8 FL (ref 37–54)
EOSINOPHIL # BLD AUTO: 0.13 10*3/MM3 (ref 0–0.4)
EOSINOPHIL # BLD AUTO: 0.21 10*3/MM3 (ref 0–0.4)
EOSINOPHIL NFR BLD AUTO: 1.6 % (ref 0.3–6.2)
EOSINOPHIL NFR BLD AUTO: 2.8 % (ref 0.3–6.2)
ERYTHROCYTE [DISTWIDTH] IN BLOOD BY AUTOMATED COUNT: 12.6 % (ref 12.3–15.4)
ERYTHROCYTE [DISTWIDTH] IN BLOOD BY AUTOMATED COUNT: 12.7 % (ref 12.3–15.4)
GFR SERPL CREATININE-BSD FRML MDRD: 70 ML/MIN/1.73
GLUCOSE SERPL-MCNC: 120 MG/DL (ref 65–99)
HCT VFR BLD AUTO: 46 % (ref 37.5–51)
HCT VFR BLD AUTO: 49.3 % (ref 37.5–51)
HGB BLD-MCNC: 14.7 G/DL (ref 13–17.7)
HGB BLD-MCNC: 16 G/DL (ref 13–17.7)
IMM GRANULOCYTES # BLD AUTO: 0.05 10*3/MM3 (ref 0–0.05)
IMM GRANULOCYTES # BLD AUTO: 0.05 10*3/MM3 (ref 0–0.05)
IMM GRANULOCYTES NFR BLD AUTO: 0.6 % (ref 0–0.5)
IMM GRANULOCYTES NFR BLD AUTO: 0.7 % (ref 0–0.5)
LYMPHOCYTES # BLD AUTO: 1.52 10*3/MM3 (ref 0.7–3.1)
LYMPHOCYTES # BLD AUTO: 1.8 10*3/MM3 (ref 0.7–3.1)
LYMPHOCYTES NFR BLD AUTO: 19 % (ref 19.6–45.3)
LYMPHOCYTES NFR BLD AUTO: 24.2 % (ref 19.6–45.3)
MAGNESIUM SERPL-MCNC: 2.3 MG/DL (ref 1.6–2.4)
MCH RBC QN AUTO: 30.1 PG (ref 26.6–33)
MCH RBC QN AUTO: 30.3 PG (ref 26.6–33)
MCHC RBC AUTO-ENTMCNC: 32 G/DL (ref 31.5–35.7)
MCHC RBC AUTO-ENTMCNC: 32.5 G/DL (ref 31.5–35.7)
MCV RBC AUTO: 93.4 FL (ref 79–97)
MCV RBC AUTO: 94.3 FL (ref 79–97)
MONOCYTES # BLD AUTO: 0.66 10*3/MM3 (ref 0.1–0.9)
MONOCYTES # BLD AUTO: 0.7 10*3/MM3 (ref 0.1–0.9)
MONOCYTES NFR BLD AUTO: 8.8 % (ref 5–12)
MONOCYTES NFR BLD AUTO: 8.9 % (ref 5–12)
NEUTROPHILS NFR BLD AUTO: 4.64 10*3/MM3 (ref 1.7–7)
NEUTROPHILS NFR BLD AUTO: 5.52 10*3/MM3 (ref 1.7–7)
NEUTROPHILS NFR BLD AUTO: 62.3 % (ref 42.7–76)
NEUTROPHILS NFR BLD AUTO: 69.2 % (ref 42.7–76)
NRBC BLD AUTO-RTO: 0 /100 WBC (ref 0–0.2)
NRBC BLD AUTO-RTO: 0 /100 WBC (ref 0–0.2)
PLATELET # BLD AUTO: 264 10*3/MM3 (ref 140–450)
PLATELET # BLD AUTO: 280 10*3/MM3 (ref 140–450)
PMV BLD AUTO: 10.1 FL (ref 6–12)
PMV BLD AUTO: 10.4 FL (ref 6–12)
POTASSIUM SERPL-SCNC: 4.6 MMOL/L (ref 3.5–5.2)
RBC # BLD AUTO: 4.88 10*6/MM3 (ref 4.14–5.8)
RBC # BLD AUTO: 5.28 10*6/MM3 (ref 4.14–5.8)
SODIUM SERPL-SCNC: 138 MMOL/L (ref 136–145)
WBC # BLD AUTO: 7.44 10*3/MM3 (ref 3.4–10.8)
WBC # BLD AUTO: 7.98 10*3/MM3 (ref 3.4–10.8)

## 2020-09-08 PROCEDURE — 85025 COMPLETE CBC W/AUTO DIFF WBC: CPT | Performed by: HOSPITALIST

## 2020-09-08 PROCEDURE — 25010000002 FUROSEMIDE PER 20 MG: Performed by: INTERNAL MEDICINE

## 2020-09-08 PROCEDURE — 99232 SBSQ HOSP IP/OBS MODERATE 35: CPT | Performed by: HOSPITALIST

## 2020-09-08 PROCEDURE — 99231 SBSQ HOSP IP/OBS SF/LOW 25: CPT | Performed by: SPECIALIST

## 2020-09-08 PROCEDURE — 94799 UNLISTED PULMONARY SVC/PX: CPT

## 2020-09-08 PROCEDURE — 85025 COMPLETE CBC W/AUTO DIFF WBC: CPT | Performed by: INTERNAL MEDICINE

## 2020-09-08 PROCEDURE — 80048 BASIC METABOLIC PNL TOTAL CA: CPT | Performed by: HOSPITALIST

## 2020-09-08 PROCEDURE — 83735 ASSAY OF MAGNESIUM: CPT | Performed by: HOSPITALIST

## 2020-09-08 RX ORDER — FUROSEMIDE 40 MG/1
40 TABLET ORAL DAILY
Status: DISCONTINUED | OUTPATIENT
Start: 2020-09-09 | End: 2020-09-10 | Stop reason: HOSPADM

## 2020-09-08 RX ADMIN — Medication 1 CAPSULE: at 08:10

## 2020-09-08 RX ADMIN — APIXABAN 5 MG: 5 TABLET, FILM COATED ORAL at 08:09

## 2020-09-08 RX ADMIN — LOSARTAN POTASSIUM 25 MG: 25 TABLET, FILM COATED ORAL at 08:10

## 2020-09-08 RX ADMIN — CARVEDILOL 3.12 MG: 3.12 TABLET, FILM COATED ORAL at 08:09

## 2020-09-08 RX ADMIN — HYDROCODONE BITARTRATE AND ACETAMINOPHEN 1 TABLET: 5; 325 TABLET ORAL at 08:10

## 2020-09-08 RX ADMIN — GUAIFENESIN 1200 MG: 600 TABLET, EXTENDED RELEASE ORAL at 08:09

## 2020-09-08 RX ADMIN — NICOTINE 1 PATCH: 21 PATCH TRANSDERMAL at 08:11

## 2020-09-08 RX ADMIN — SOTALOL HYDROCHLORIDE 80 MG: 80 TABLET ORAL at 08:10

## 2020-09-08 RX ADMIN — HYDROCODONE BITARTRATE AND ACETAMINOPHEN 1 TABLET: 5; 325 TABLET ORAL at 02:01

## 2020-09-08 RX ADMIN — HYDROCODONE BITARTRATE AND ACETAMINOPHEN 1 TABLET: 5; 325 TABLET ORAL at 23:34

## 2020-09-08 RX ADMIN — SOTALOL HYDROCHLORIDE 80 MG: 80 TABLET ORAL at 20:14

## 2020-09-08 RX ADMIN — SODIUM CHLORIDE, PRESERVATIVE FREE 10 ML: 5 INJECTION INTRAVENOUS at 08:10

## 2020-09-08 RX ADMIN — ACETAMINOPHEN 650 MG: 325 TABLET ORAL at 20:25

## 2020-09-08 RX ADMIN — GUAIFENESIN 1200 MG: 600 TABLET, EXTENDED RELEASE ORAL at 20:15

## 2020-09-08 RX ADMIN — SACUBITRIL AND VALSARTAN 1 TABLET: 24; 26 TABLET, FILM COATED ORAL at 20:13

## 2020-09-08 RX ADMIN — SODIUM CHLORIDE, PRESERVATIVE FREE 10 ML: 5 INJECTION INTRAVENOUS at 20:17

## 2020-09-08 RX ADMIN — FUROSEMIDE 20 MG: 20 INJECTION, SOLUTION INTRAMUSCULAR; INTRAVENOUS at 08:10

## 2020-09-08 RX ADMIN — APIXABAN 5 MG: 5 TABLET, FILM COATED ORAL at 20:15

## 2020-09-08 RX ADMIN — ATORVASTATIN CALCIUM 40 MG: 40 TABLET, FILM COATED ORAL at 20:15

## 2020-09-08 RX ADMIN — ASPIRIN 81 MG: 81 TABLET, COATED ORAL at 08:09

## 2020-09-08 RX ADMIN — HYDROCODONE BITARTRATE AND ACETAMINOPHEN 1 TABLET: 5; 325 TABLET ORAL at 17:19

## 2020-09-08 NOTE — PROGRESS NOTES
Discharge Planning Assessment   [place]     Patient Name: Chong White  MRN: [id number]  Today's Date: 9/8/2020    Admit Date: 9/1/2020      Discharge Plan     Row Name 09/08/20 1627       Plan    Plan  SS spoke with pt on this day. Pt states that he does have a place to go at discharge, that he does not prefer to go here though due to certain activity by others who live here. However, he understands that he may have to go here at discharge while he continues to look for a place to live. He did request the information to sign up for [employer]. SS made him aware that he would have to go to the [employer] office and fill out an application. He stated that he would be able to do this once discharged. SS was going to provide pt with the number but pt had to get off of the phone because [employer] rep was here to fit him for his vest. SS will call pt back in the morning to provide him with the number. SS will follow and assist as needed.     Patient/Family in Agreement with Plan  yes          BRYANT Julian

## 2020-09-08 NOTE — PROGRESS NOTES
Is a video interview     LOS: 7 days     Name: Chong White  Age/Sex: 60 y.o. male  :  1960        PCP: Kaya Romo APRN  REF: No ref. provider found    Principal Problem:    Abnormal nuclear stress test  Active Problems:    A-fib (CMS/HCC)      Reason for follow-up: Atrial fibrillation and cardiomyopathy    Subjective       Subjective     Chong White is a 60-year-old male admitted with new onset atrial fibrillation and cardiomyopathy    Interval History: Patient complains of intermittent palpitations and shortness of breath but has overall improved. He remains in normal sinus rhythm on sotalol. [employer] is being placed. He was seen today via IPAD due to covid restrictions.       Vital Signs  Temp:  [97.5 °F (36.4 °C)-98.5 °F (36.9 °C)] 97.5 °F (36.4 °C)  Heart Rate:  [52-66] 55  Resp:  [16-20] 18  BP: ()/(44-77) 110/75     Vital Signs (last 72 hrs)       09/ 0700  -  09/ 0659 09/06 0700  -  / 0659 09/ 0700  -   0659 / 0700  -   1402   Most Recent    Temp (°F) 98.1 -  98.4    98.1 -  98.6    97.5 -  98.7       97.5 (36.4)    Heart Rate 55 -  129    54 -  77    52 -  66    55 -  65     55    Resp 8 -  21    18 -  20    16 -  20      18     18    BP 87/61 -  139/75    97/52 -  133/83    89/44 -  127/76    95/59 -  122/77     110/75    SpO2 (%) 91 -  99    92 -  99    91 -  99    91 -  93     93        Body mass index is 25.16 kg/m².    Intake/Output Summary (Last 24 hours) at 2020 1402  Last data filed at 2020 0907  Gross per 24 hour   Intake 850 ml   Output 250 ml   Net 600 ml     Objective    Objective       Physical Exam:  Patient was interviewed through video due to the coronavirus pandemic patient skin tone was normal as well as patient`s rate of breathing as well as clarity of speech the patient was sitting there was no pallor and breathing rate was normal patient was not wheezing the patient was able to complete full sentences without difficulty patient  mood and behavior were both appropriate there was no sign of the stress the patient could also follow instruction to with examination and patient was not in pain all or distress and through the camera the patient's eyes and oropharynx seems to be unremarkable and facial appearance were essentially unremarkable.    Patient seen via IPAD due to COVID restrictions.     Results review       Results Review:   Results from last 7 days   Lab Units 09/08/20  0100 09/07/20  0102 09/06/20  0140 09/05/20  1200 09/05/20  0116 09/04/20  0353 09/03/20  0136   WBC 10*3/mm3 7.44 8.33 7.11 7.09 7.60 8.24 8.12   HEMOGLOBIN g/dL 14.7 14.8 14.0 15.1 15.8 14.4 14.0   PLATELETS 10*3/mm3 264 271 255 279 259 235 208     Results from last 7 days   Lab Units 09/08/20  0100 09/07/20  0102 09/06/20  0140 09/05/20  0116 09/04/20  0353 09/03/20  0136 09/02/20  0518   SODIUM mmol/L 138 136 139 141 140 137 139   POTASSIUM mmol/L 4.6 4.8 5.1 5.2 4.3 4.3 4.5   CHLORIDE mmol/L 100 103 103 105 102 103 106   CO2 mmol/L 27.2 25.3 28.1 27.0 28.6 24.0 22.2   BUN mg/dL 28* 21 21 17 18 17 15   CREATININE mg/dL 1.08 1.01 1.12 0.91 1.05 0.88 0.85   CALCIUM mg/dL 9.0 9.3 9.0 9.0 8.8 8.9 8.7   GLUCOSE mg/dL 120* 83 82 87 101* 101* 108*     Results from last 7 days   Lab Units 09/01/20  1541   TROPONIN T ng/mL <0.010     Lab Results   Component Value Date    INR 1.05 09/02/2020    INR 0.97 09/01/2020     Lab Results   Component Value Date    MG 2.3 09/08/2020    MG 2.3 09/01/2020     Lab Results   Component Value Date    TRIG 51 09/03/2020    HDL 42 09/03/2020    LDL 81 09/03/2020      Imaging Results (Last 48 Hours)     ** No results found for the last 48 hours. **        Echo   Results for orders placed during the hospital encounter of 09/01/20   Adult Transesophageal Echo (LUCERO) W/ Cont if Necessary Per Protocol    Narrative · Left ventricular systolic function is severely decreased, EF 21-25%.  · Moderately reduced right ventricular systolic function  noted.  · Left atrial cavity size is dilated.  · No left atrial thrombus seen.  · Trace mitral regurgitation.  · Trace tricuspid valve regurgitation.  · No PFO seen with negative bubble study.           I reviewed the patient's new clinical results.    Telemetry: NSR 60-70 bpm        Medication Review:     apixaban 5 mg Oral Q12H   aspirin 81 mg Oral Daily   atorvastatin 40 mg Oral Nightly   carvedilol 3.125 mg Oral BID With Meals   furosemide 20 mg Intravenous Daily   guaiFENesin 1,200 mg Oral Q12H   lactobacillus acidophilus 1 capsule Oral Daily   losartan 25 mg Oral Q24H   nicotine 1 patch Transdermal Q24H   sodium chloride 10 mL Intravenous Q12H   sotalol 80 mg Oral Q12H         dilTIAZem 5-15 mg/hr Last Rate: Stopped (09/04/20 1130)       Assessment      Assessment:  1. New onset atrial fibrillation with RVR, CHADS-VASc score of 3 for CHF, HTN and CAD, s/p unsuccessful cardioversion on 9/4/2020, now with spontaneous conversion to NSR, on sotalol, on Eliquis  2. ASCVD, left heart catheterization on 9/4/2020 shows occlusion of the ostial LAD with good collateral connection from the RCA filling the LAD with brisk flow to the point of occlusion to the proximal LAD  3. Acute systolic and diastolic congestive heart failure, clinically compensated  4. Non-ischemic cardiomyopathy, EF of 21-25%   5. Moderate tricuspid valve regurgitation  6. Mild to moderate mitral valve regurgitation   7. Essential hypertension, controlled     Plan     Recommendations:  1. Patient is maintaining sinus rhythm with acceptable QTc interval continue current medications including anticoagulation and sotalol  2. Patient will need a LifeVest placement prior to discharge  3. We will try a small dose of Entresto  4. And we can switch to p.o. Diuretics  5. Blood pressure is controlled continue current management  6. Again advised to quit smoking    I discussed the patients findings and my recommendations with patient and family      Coleen  SAMARA Schwartz, acting as scribe for Dr. Thong Delarosa MD [facility]  09/08/20  14:02    Please note that portions of this note were completed with a voice recognition program.

## 2020-09-08 NOTE — PLAN OF CARE
Patient is being fitted today by [employer].  Patient in sinus bradycardia today (50's), otherwise patient vitals are stable and will continue to monitor.

## 2020-09-08 NOTE — PROGRESS NOTES
[facility] HOSPITALIST PROGRESS NOTE     Patient Identification:  Name:  Chong White  Age:  60 y.o.  Sex:  male  :  1960  MRN:  [id number]  Visit Number:  [id number]  Primary Care Provider:  Kaya Romo APRN    Length of stay:  7    Subjective: Patient is currently in bed, awake and alert, no complaints, blood pressure stable, no recurrence of cardiac arrhythmia/A. fib EKG noted QTC so far has been stable on sotalol.  Patient reports of occasional sharp pain sacroiliac area, no dysuria no hematuria no nausea vomiting.  Afebrile.    Chief Complaint: Short of breath  ----------------------------------------------------------------------------------------------------------------------  Current Hospital Meds:    apixaban 5 mg Oral Q12H   aspirin 81 mg Oral Daily   atorvastatin 40 mg Oral Nightly   carvedilol 3.125 mg Oral BID With Meals   furosemide 20 mg Intravenous Daily   guaiFENesin 1,200 mg Oral Q12H   lactobacillus acidophilus 1 capsule Oral Daily   losartan 25 mg Oral Q24H   nicotine 1 patch Transdermal Q24H   sodium chloride 10 mL Intravenous Q12H   sotalol 80 mg Oral Q12H       dilTIAZem 5-15 mg/hr Last Rate: Stopped (20 1130)     ----------------------------------------------------------------------------------------------------------------------  Vital Signs:  Temp:  [97.5 °F (36.4 °C)-98.7 °F (37.1 °C)] 97.5 °F (36.4 °C)  Heart Rate:  [52-66] 52  Resp:  [16-20] 18  BP: ()/(44-76) 124/69       Tele: Sinus bradycardia 53 bpm          20  0400 20  0400 20  0200   Weight: 72.8 kg (160 lb 8 oz) (Simultaneous filing. User may be unaware of other data.) 72.9 kg (160 lb 12.8 oz) 75.1 kg (165 lb 8 oz)     Body mass index is 25.16 kg/m².    Intake/Output Summary (Last 24 hours) at 2020 1041  Last data filed at 2020 0907  Gross per 24 hour   Intake 1205 ml   Output 250 ml   Net 955 ml     Diet Regular;  Cardiac  ----------------------------------------------------------------------------------------------------------------------  Physical exam:  General: Comfortable,awake, alert, oriented to self, place, and time, well-developed and well-nourished.  No respiratory distress.    Skin:  Skin is warm and dry. No rash noted. No pallor.    HENT:  Head:  Normocephalic and atraumatic.  Mouth:  Moist mucous membranes.    Eyes:  Conjunctivae and EOM are normal.  Pupils are equal, round, and reactive to light.  No scleral icterus.    Neck:  Neck supple.  No JVD present.    Pulmonary/Chest:  No respiratory distress, no wheezes, no crackles, with normal breath sounds and good air movement.  Cardiovascular:  Normal rate, regular rhythm and normal heart sounds with no murmur.  Abdominal:  Soft.  Bowel sounds are normal.  No distension and no tenderness.   Extremities:  No edema, no tenderness, and no deformity.  No red or swollen joints anywhere.  Strong pulses in all 4 extremities with no clubbing, no cyanosis, no edema.  Neurological:  Motor strength equal no obvious deficit, sensory grossly intact.   No cranial nerve deficit.  No tongue deviation.  No facial droop.  No slurred speech.    Genitourinary:   Back:  ----------------------------------------------------------------------------------------------------------------------  ----------------------------------------------------------------------------------------------------------------------  Results from last 7 days   Lab Units 09/01/20  1541 09/01/20  1221   TROPONIN T ng/mL <0.010 <0.010     Results from last 7 days   Lab Units 09/08/20  0100 09/07/20  0102 09/06/20  0140  09/02/20  1244  09/01/20  1228 09/01/20  1221   CRP mg/dL  --   --   --   --   --   --   --  0.30   LACTATE mmol/L  --   --   --   --   --   --  1.2  --    WBC 10*3/mm3 7.44 8.33 7.11   < > 8.24   < >  --  9.12   HEMOGLOBIN g/dL 14.7 14.8 14.0   < > 14.7   < >  --  16.1   HEMATOCRIT % 46.0 45.9 44.2    < > 44.6   < >  --  48.9   MCV fL 94.3 93.5 94.4   < > 92.5   < >  --  91.7   MCHC g/dL 32.0 32.2 31.7   < > 33.0   < >  --  32.9   PLATELETS 10*3/mm3 264 271 255   < > 212   < >  --  276   INR   --   --   --   --  1.05  --   --  0.97    < > = values in this interval not displayed.         Results from last 7 days   Lab Units 09/08/20  0100 09/07/20  0102 09/06/20  0140  09/01/20  1221   SODIUM mmol/L 138 136 139   < > 138   POTASSIUM mmol/L 4.6 4.8 5.1   < > 4.7   MAGNESIUM mg/dL 2.3  --   --   --  2.3   CHLORIDE mmol/L 100 103 103   < > 103   CO2 mmol/L 27.2 25.3 28.1   < > 25.1   BUN mg/dL 28* 21 21   < > 11   CREATININE mg/dL 1.08 1.01 1.12   < > 1.12   EGFR IF NONAFRICN AM mL/min/1.73 70 75 67   < > 67   CALCIUM mg/dL 9.0 9.3 9.0   < > 8.9   GLUCOSE mg/dL 120* 83 82   < > 114*   ALBUMIN g/dL  --   --   --   --  4.03   BILIRUBIN mg/dL  --   --   --   --  0.4   ALK PHOS U/L  --   --   --   --  96   AST (SGOT) U/L  --   --   --   --  24   ALT (SGPT) U/L  --   --   --   --  30    < > = values in this interval not displayed.   Estimated Creatinine Clearance: 77.3 mL/min (by C-G formula based on SCr of 1.08 mg/dL).    No results found for: AMMONIA  Results from last 7 days   Lab Units 09/03/20  0136   CHOLESTEROL mg/dL 133   TRIGLYCERIDES mg/dL 51   HDL CHOL mg/dL 42   LDL CHOL mg/dL 81     Blood Culture   Date Value Ref Range Status   09/01/2020 No growth at 5 days  Final   09/01/2020 No growth at 5 days  Final     No results found for: URINECX  No results found for: WOUNDCX  No results found for: STOOLCX    I have personally looked at the labs and they are summarized above.  ----------------------------------------------------------------------------------------------------------------------  Imaging Results (Last 24 Hours)     ** No results found for the last 24 hours. **        ----------------------------------------------------------------------------------------------------------------------  Assessment and  Plan:    -Atrial fibrillation with RVR now in sinus rhythm  -Acute combined systolic and diastolic heart failure ejection fraction of 21 to 25%  -Moderate tricuspid regurgitation  -Atherosclerotic cardiovascular disease status post stent placement in the past  -Tobacco use  -Complete occlusion of LAD with good collateralization  -Episodic sacroiliac pain right side, if persistent or significant will order x-ray of pelvis.    Patient is tolerating the current regimen, awaiting for placement, regarding patient's cardiomyopathy whether he needs LifeVest cardiology is on board and is managing his cardiac issues.    Cindy Olivo MD  09/08/20  10:41

## 2020-09-08 NOTE — PLAN OF CARE
Problem: Fall Risk (Adult)  Intervention: Monitor/Assist with Self Care  Flowsheets  Taken 9/8/2020 0211  Activity Assistance Provided: independent  Taken 9/6/2020 0210  Self-Care Promotion: independence encouraged     Problem: Arrhythmia/Dysrhythmia (Symptomatic) (Adult)  Intervention: Prevent/Manage Thrombi/Emboli  Flowsheets  Taken 9/8/2020 0211 by Veda Muñoz RN  Bleeding Precautions: blood pressure closely monitored  Taken 9/7/2020 2014 by Gladys Sharp RN  VTE Prevention/Management: other (see comments) (See MAr)     Problem: Patient Care Overview  Goal: Plan of Care Review  Outcome: Ongoing (interventions implemented as appropriate)  Goal: Individualization and Mutuality  Outcome: Ongoing (interventions implemented as appropriate)  Goal: Discharge Needs Assessment  Outcome: Ongoing (interventions implemented as appropriate)  Goal: Interprofessional Rounds/Family Conf  Outcome: Ongoing (interventions implemented as appropriate)     Problem: Pain, Acute (Adult)  Goal: Identify Related Risk Factors and Signs and Symptoms  Outcome: Ongoing (interventions implemented as appropriate)  Flowsheets (Taken 9/6/2020 0227)  Signs and Symptoms (Acute Pain): verbalization of pain descriptors  Goal: Acceptable Pain Control/Comfort Level  Outcome: Ongoing (interventions implemented as appropriate)  Flowsheets (Taken 9/8/2020 0216)  Acceptable Pain Control/Comfort Level: making progress toward outcome     Problem: Patient Care Overview  Goal: Plan of Care Review  Outcome: Ongoing (interventions implemented as appropriate)  Flowsheets  Taken 9/3/2020 1529 by Danika Mendez, RN  Progress: no change  Taken 9/8/2020 0211 by Veda Muñoz RN  Plan of Care Reviewed With: patient  Goal: Individualization and Mutuality  Outcome: Ongoing (interventions implemented as appropriate)  Goal: Discharge Needs Assessment  Outcome: Ongoing (interventions implemented as appropriate)  Flowsheets  Taken 9/2/2020 1656 by Alejandro  Gosia  Equipment Currently Used at Home: none  Transportation Concerns: car, none  Readmission Within the Last 30 Days: no previous admission in last 30 days  Patient/Family Anticipated Services at Transition: none  Patient/Family Anticipates Transition to: shelter  Taken 9/1/2020 1720 by Alexandrea Mendoza, RN  Transportation Anticipated: family or friend will provide  Taken 9/5/2020 0245 by Veda Muñoz RN  Current Discharge Risk: homeless  Concerns to be Addressed: homelessness  Goal: Interprofessional Rounds/Family Conf  Outcome: Ongoing (interventions implemented as appropriate)  Flowsheets (Taken 9/2/2020 1451 by Hailey Millan, RN)  Participants: ;social work/services

## 2020-09-09 LAB
NT-PROBNP SERPL-MCNC: 351.9 PG/ML (ref 0–900)
SARS-COV-2 RNA NOSE QL NAA+PROBE: NOT DETECTED

## 2020-09-09 PROCEDURE — U0004 COV-19 TEST NON-CDC HGH THRU: HCPCS | Performed by: NURSE PRACTITIONER

## 2020-09-09 PROCEDURE — 99232 SBSQ HOSP IP/OBS MODERATE 35: CPT | Performed by: HOSPITALIST

## 2020-09-09 PROCEDURE — 99231 SBSQ HOSP IP/OBS SF/LOW 25: CPT | Performed by: NURSE PRACTITIONER

## 2020-09-09 PROCEDURE — 83880 ASSAY OF NATRIURETIC PEPTIDE: CPT | Performed by: HOSPITALIST

## 2020-09-09 RX ORDER — ASPIRIN 81 MG/1
81 TABLET, CHEWABLE ORAL DAILY
Status: DISCONTINUED | OUTPATIENT
Start: 2020-09-09 | End: 2020-09-10 | Stop reason: HOSPADM

## 2020-09-09 RX ORDER — HYDROCODONE BITARTRATE AND ACETAMINOPHEN 5; 325 MG/1; MG/1
1 TABLET ORAL EVERY 6 HOURS PRN
Status: DISCONTINUED | OUTPATIENT
Start: 2020-09-09 | End: 2020-09-10 | Stop reason: HOSPADM

## 2020-09-09 RX ADMIN — CARVEDILOL 3.12 MG: 3.12 TABLET, FILM COATED ORAL at 08:17

## 2020-09-09 RX ADMIN — ATORVASTATIN CALCIUM 40 MG: 40 TABLET, FILM COATED ORAL at 21:21

## 2020-09-09 RX ADMIN — SACUBITRIL AND VALSARTAN 1 TABLET: 24; 26 TABLET, FILM COATED ORAL at 08:16

## 2020-09-09 RX ADMIN — SODIUM CHLORIDE, PRESERVATIVE FREE 10 ML: 5 INJECTION INTRAVENOUS at 21:21

## 2020-09-09 RX ADMIN — SOTALOL HYDROCHLORIDE 80 MG: 80 TABLET ORAL at 21:21

## 2020-09-09 RX ADMIN — FUROSEMIDE 40 MG: 40 TABLET ORAL at 08:19

## 2020-09-09 RX ADMIN — SOTALOL HYDROCHLORIDE 80 MG: 80 TABLET ORAL at 08:18

## 2020-09-09 RX ADMIN — GUAIFENESIN 1200 MG: 600 TABLET, EXTENDED RELEASE ORAL at 08:18

## 2020-09-09 RX ADMIN — SODIUM CHLORIDE, PRESERVATIVE FREE 10 ML: 5 INJECTION INTRAVENOUS at 08:15

## 2020-09-09 RX ADMIN — APIXABAN 5 MG: 5 TABLET, FILM COATED ORAL at 21:21

## 2020-09-09 RX ADMIN — HYDROCODONE BITARTRATE AND ACETAMINOPHEN 1 TABLET: 5; 325 TABLET ORAL at 14:50

## 2020-09-09 RX ADMIN — ASPIRIN 81 MG: 81 TABLET, CHEWABLE ORAL at 13:38

## 2020-09-09 RX ADMIN — GUAIFENESIN 1200 MG: 600 TABLET, EXTENDED RELEASE ORAL at 21:21

## 2020-09-09 RX ADMIN — SACUBITRIL AND VALSARTAN 1 TABLET: 24; 26 TABLET, FILM COATED ORAL at 21:21

## 2020-09-09 RX ADMIN — NICOTINE 1 PATCH: 21 PATCH TRANSDERMAL at 08:21

## 2020-09-09 RX ADMIN — APIXABAN 5 MG: 5 TABLET, FILM COATED ORAL at 08:19

## 2020-09-09 RX ADMIN — HYDROCODONE BITARTRATE AND ACETAMINOPHEN 1 TABLET: 5; 325 TABLET ORAL at 08:20

## 2020-09-09 NOTE — PROGRESS NOTES
Discharge Planning Assessment   [place]     Patient Name: Chong White  MRN: [id number]  Today's Date: 9/9/2020    Admit Date: 9/1/2020        Discharge Plan     Row Name 09/09/20 1435       Plan    Plan  SS provided pt with the phone number to [facility] office. Pt plans to return to prior living situation at discharge. SS will follow and assist as needed.     Patient/Family in Agreement with Plan  yes            BRYANT Julian

## 2020-09-09 NOTE — PLAN OF CARE
Patient has complained of pain and numbness to right hand and bilateral feet and legs today. Patient states that this is not new to today, states that this was part of the reason he came to the hospital. Patient states symptoms come and go.  Dr. Olivo was notified of patient complaints and ordered xray of patients lower back. Patient will not go to xray until his covid test comes back this evening. Patient's VS have remained stable today.

## 2020-09-09 NOTE — NURSING NOTE
Mr. White has COVID19 test pending, will follow up tomorrow to provide HF Education. Per discussion with his primary nurse, he had LV placed yesterday. Please call my cell should discharge orders be written prior to getting his HF education.    Thank you

## 2020-09-09 NOTE — NURSING NOTE
Patient c/o numbness and tingling in bilateral fingers and toes.  Assessed patient at this time and patient shows no deficits.  Dr. Olivo notified.

## 2020-09-09 NOTE — PROGRESS NOTES
[facility] HOSPITALIST PROGRESS NOTE     Patient Identification:  Name:  Chong White  Age:  60 y.o.  Sex:  male  :  1960  MRN:  [id number]  Visit Number:  [id number]  Primary Care Provider:  Kaya Romo APRN    Length of stay:  8    Subjective: Patient seen and examined, patient denies shortness of breath chest pain or palpitation, his LifeVest has arrived and has been wearing it patient reports occasional right pelvic pain sharp starting from the right sacroiliac radiating to the groin very brief episodes.  Patient also reports of numbness and tingling on both feet and left arm.  No slurring of speech.    Chief Complaint: Short of breath  ----------------------------------------------------------------------------------------------------------------------  Current Hospital Meds:    apixaban 5 mg Oral Q12H   aspirin 81 mg Oral Daily   atorvastatin 40 mg Oral Nightly   carvedilol 3.125 mg Oral BID With Meals   furosemide 40 mg Oral Daily   guaiFENesin 1,200 mg Oral Q12H   nicotine 1 patch Transdermal Q24H   sacubitril-valsartan 1 tablet Oral Q12H   sodium chloride 10 mL Intravenous Q12H   sotalol 80 mg Oral Q12H       dilTIAZem 5-15 mg/hr Last Rate: Stopped (20 1130)     ----------------------------------------------------------------------------------------------------------------------  Vital Signs:  Temp:  [97.8 °F (36.6 °C)-98.5 °F (36.9 °C)] 98.4 °F (36.9 °C)  Heart Rate:  [54-67] 58  Resp:  [13-22] 16  BP: ()/(51-91) 122/76       Tele: Sinus rhythm 63 beats per      20  0400 20  0200 20  0400   Weight: 72.9 kg (160 lb 12.8 oz) 75.1 kg (165 lb 8 oz) 76.2 kg (168 lb)     Body mass index is 25.54 kg/m².    Intake/Output Summary (Last 24 hours) at 2020 1612  Last data filed at 2020 0530  Gross per 24 hour   Intake 0 ml   Output 1350 ml   Net -1350 ml     Diet Regular;  Cardiac  ----------------------------------------------------------------------------------------------------------------------  Physical exam:  General: Comfortable,awake, alert, oriented to self, place, and time, well-developed and well-nourished.  No respiratory distress.    Skin:  Skin is warm and dry. No rash noted. No pallor.    HENT:  Head:  Normocephalic and atraumatic.  Mouth:  Moist mucous membranes.    Eyes:  Conjunctivae and EOM are normal.  Pupils are equal, round, and reactive to light.  No scleral icterus.    Neck:  Neck supple.  No JVD present.    Pulmonary/Chest:  No respiratory distress, no wheezes, no crackles, with normal breath sounds and good air movement.  Cardiovascular:  Normal rate, regular rhythm and normal heart sounds with no murmur.  Abdominal:  Soft.  Bowel sounds are normal.  No distension and no tenderness.   Extremities:  No edema, no tenderness, and no deformity.  No red or swollen joints anywhere.  Strong pulses in all 4 extremities with no clubbing, no cyanosis, no edema.  Neurological:  Motor strength equal no obvious deficit,  No cranial nerve deficit.  No tongue deviation.  No facial droop.  No slurred speech.  No subjective deficit for sensory and tactile stimuli.  Genitourinary: He has no obvious inguinal hernia noted, no lymphadenopathy.  Back: No skin break  ----------------------------------------------------------------------------------------------------------------------  ----------------------------------------------------------------------------------------------------------------------      Results from last 7 days   Lab Units 09/08/20  1213 09/08/20 0100 09/07/20  0102   WBC 10*3/mm3 7.98 7.44 8.33   HEMOGLOBIN g/dL 16.0 14.7 14.8   HEMATOCRIT % 49.3 46.0 45.9   MCV fL 93.4 94.3 93.5   MCHC g/dL 32.5 32.0 32.2   PLATELETS 10*3/mm3 280 264 271         Results from last 7 days   Lab Units 09/08/20  0100 09/07/20  0102 09/06/20  0140   SODIUM mmol/L 138 136 139    POTASSIUM mmol/L 4.6 4.8 5.1   MAGNESIUM mg/dL 2.3  --   --    CHLORIDE mmol/L 100 103 103   CO2 mmol/L 27.2 25.3 28.1   BUN mg/dL 28* 21 21   CREATININE mg/dL 1.08 1.01 1.12   EGFR IF NONAFRICN AM mL/min/1.73 70 75 67   CALCIUM mg/dL 9.0 9.3 9.0   GLUCOSE mg/dL 120* 83 82   Estimated Creatinine Clearance: 78.4 mL/min (by C-G formula based on SCr of 1.08 mg/dL).    No results found for: AMMONIA  Results from last 7 days   Lab Units 09/03/20  0136   CHOLESTEROL mg/dL 133   TRIGLYCERIDES mg/dL 51   HDL CHOL mg/dL 42   LDL CHOL mg/dL 81     No results found for: BLOODCX  No results found for: URINECX  No results found for: WOUNDCX  No results found for: STOOLCX    I have personally looked at the labs and they are summarized above.  ----------------------------------------------------------------------------------------------------------------------  Imaging Results (Last 24 Hours)     ** No results found for the last 24 hours. **        ----------------------------------------------------------------------------------------------------------------------  Assessment and Plan:    -Atrial fibrillation with RVR now in sinus rhythm  -Acute combined systolic and diastolic heart failure ejection fraction 21 to 25% now compensated  -Complete occlusion of LAD with good collateralization  -Episodes of paresthesia of both feet together with left fingers, etiology unclear, will get x-ray of the lumbosacral spine, will check BMP.  His left fingers paresthesia is not related to activity.    -Atherosclerotic cardiovascular disease status post and placement in the past  -Moderate tricuspid regurgitation    Patient is now on Entresto and tolerating well, on anticoagulation, awaiting placement, will order lumbosacral spine x-ray and BNP.    Cindy Olivo MD  09/09/20  16:12

## 2020-09-09 NOTE — PLAN OF CARE
Problem: Patient Care Overview  Goal: Plan of Care Review  Outcome: Ongoing (interventions implemented as appropriate)  Flowsheets  Taken 9/9/2020 0344  Progress: improving  Outcome Summary: Patient vital signs currently stable and he is on room air. He has required pain medication during the shift for lower back and lower extremity pain. Otherwise, no significant changes. Will continue to monitor.  Taken 9/9/2020 0200  Plan of Care Reviewed With: patient

## 2020-09-09 NOTE — PROGRESS NOTES
LOS: 8 days     Name: Chong White  Age/Sex: 60 y.o. male  :  1960        PCP: Kaya Romo APRN  REF: No ref. provider found    Principal Problem:    Abnormal nuclear stress test  Active Problems:    A-fib (CMS/HCC)      Reason for follow-up: Atrial fibrillation and cardiomyopathy    Subjective       Subjective     Chong White is a 60-year-old male admitted with new onset atrial fibrillation and cardiomyopathy.     Interval History: Patient remains in normal sinus rhythm.  LifeVest has been placed.  Vital signs are stable.  Shortness of breath has improved.       Vital Signs  Temp:  [97.5 °F (36.4 °C)-98.5 °F (36.9 °C)] 98.5 °F (36.9 °C)  Heart Rate:  [54-67] 64  Resp:  [13-22] 18  BP: ()/(51-91) 106/75     Vital Signs (last 72 hrs)        0700  -   0659  0700  -   0659 700  -   0659  07  -   1114   Most Recent    Temp (°F) 98.1 -  98.6    97.5 -  98.7    97.5 -  98.4      98.5     98.5 (36.9)    Heart Rate 54 -  77    52 -  66    54 -  67    54 -  64     64    Resp 18 -  20    16 -  20    13 -  22    13 -  21     18    BP 97/52 -  133/83    89/44 -  127/76    93/51 -  132/75    103/62 -  118/79     106/75    SpO2 (%) 92 -  99    91 -  99    91 -  97    96 -  98     98        Body mass index is 25.54 kg/m².    Intake/Output Summary (Last 24 hours) at 2020 1114  Last data filed at 2020 0530  Gross per 24 hour   Intake 0 ml   Output 1700 ml   Net -1700 ml     Objective    Objective       Physical Exam:     General Appearance:    Alert, cooperative, in no acute distress   Head:    Normocephalic, without obvious abnormality, atraumatic   Eyes:            Conjunctivae and sclerae normal, no   icterus, no pallor, corneas clear.   Neck:   No adenopathy, supple, trachea midline, no thyromegaly, no   carotid bruit, no JVD   Lungs:     Clear to auscultation,respirations regular, even and                  unlabored    Heart:    Regular rhythm  and bradycardia, normal S1 and S2, no            murmur, no gallop, no rub, no click   Chest Wall:    No abnormalities observed   Abdomen:     Normal bowel sounds, no masses, no organomegaly, soft        non-tender, non-distended, no guarding, no rebound                tenderness   Extremities:   Moves all extremities well, no edema, no cyanosis, no             redness   Pulses:   Pulses palpable and equal bilaterally   Skin:   No bleeding, bruising or rash       Neurologic:  Alert and oriented   I have seen and performed a physical examination today.     Results review       Results Review:   Results from last 7 days   Lab Units 09/08/20  1213 09/08/20  0100 09/07/20  0102 09/06/20  0140 09/05/20  1200 09/05/20  0116 09/04/20  0353   WBC 10*3/mm3 7.98 7.44 8.33 7.11 7.09 7.60 8.24   HEMOGLOBIN g/dL 16.0 14.7 14.8 14.0 15.1 15.8 14.4   PLATELETS 10*3/mm3 280 264 271 255 279 259 235     Results from last 7 days   Lab Units 09/08/20  0100 09/07/20  0102 09/06/20  0140 09/05/20  0116 09/04/20  0353 09/03/20  0136   SODIUM mmol/L 138 136 139 141 140 137   POTASSIUM mmol/L 4.6 4.8 5.1 5.2 4.3 4.3   CHLORIDE mmol/L 100 103 103 105 102 103   CO2 mmol/L 27.2 25.3 28.1 27.0 28.6 24.0   BUN mg/dL 28* 21 21 17 18 17   CREATININE mg/dL 1.08 1.01 1.12 0.91 1.05 0.88   CALCIUM mg/dL 9.0 9.3 9.0 9.0 8.8 8.9   GLUCOSE mg/dL 120* 83 82 87 101* 101*         Lab Results   Component Value Date    INR 1.05 09/02/2020    INR 0.97 09/01/2020     Lab Results   Component Value Date    MG 2.3 09/08/2020    MG 2.3 09/01/2020     Lab Results   Component Value Date    TRIG 51 09/03/2020    HDL 42 09/03/2020    LDL 81 09/03/2020      Imaging Results (Last 48 Hours)     ** No results found for the last 48 hours. **        Echo   Results for orders placed during the hospital encounter of 09/01/20   Adult Transesophageal Echo (LUCERO) W/ Cont if Necessary Per Protocol    Narrative · Left ventricular systolic function is severely decreased, EF  21-25%.  · Moderately reduced right ventricular systolic function noted.  · Left atrial cavity size is dilated.  · No left atrial thrombus seen.  · Trace mitral regurgitation.  · Trace tricuspid valve regurgitation.  · No PFO seen with negative bubble study.         I reviewed the patient's new clinical results.    Telemetry: Sinus bradycardia 50-60 bpm     Medication Review:     apixaban 5 mg Oral Q12H   atorvastatin 40 mg Oral Nightly   carvedilol 3.125 mg Oral BID With Meals   furosemide 40 mg Oral Daily   guaiFENesin 1,200 mg Oral Q12H   nicotine 1 patch Transdermal Q24H   sacubitril-valsartan 1 tablet Oral Q12H   sodium chloride 10 mL Intravenous Q12H   sotalol 80 mg Oral Q12H         dilTIAZem 5-15 mg/hr Last Rate: Stopped (09/04/20 1130)       Assessment      Assessment:  1. New onset atrial fibrillation with RVR, CHADS-VASc score of 3 for CHF, HTN and CAD, s/p unsuccessful cardioversion on 9/4/2020, now with spontaneous conversion to NSR, on sotalol, on Eliquis,, stable  2. ASCVD, left heart catheterization on 9/4/2020 shows occlusion of the ostial LAD with good collateral connection from the RCA filling the LAD with brisk flow to the point of occlusion to the proximal LAD  3. Acute systolic and diastolic congestive heart failure, clinically compensated  4. Non-ischemic cardiomyopathy, EF of 21-25%, LifeVest has been placed  5. Moderate tricuspid valve regurgitation  6. Mild to moderate mitral valve regurgitation   7. Essential hypertension, controlled     Plan     Recommendations:  1. Atrial fibrillation  · Patient remains in normal sinus rhythm on sotalol with acceptable QTC.  Continue with Eliquis for stroke prevention.    2.  ASCVD  · Will continue with guideline directed medical therapy for coronary artery disease.  Continue with Lipitor, Coreg, aspirin and Entresto.  · Discussed with the patient about the importance of smoking cessation to reduce his risk of cardiovascular disease.  He verbalized  understanding.    3.  Nonischemic cardiomyopathy/CHF  · LifeVest has been placed.  Continue with Entresto and Coreg.  · Will continue with Lasix 40 mg p.o. daily.  Discussed with patient about the importance of outpatient follow-up and he verbalized understanding.   · We will plan to repeat echocardiogram in about 2 to 3 months to reevaluate LV function and will consider referral to EP for ICD placement if EF does not improve.  · He will need to follow-up in our office in 1 week.  · Patient is stable from a cardiac standpoint at this time.  We will see as needed.  Please call if assistance is needed.    I discussed the patients findings and my recommendations with patient and family      ColeenSAMARA Carballo  09/09/20  11:14    Please note that portions of this note were completed with a voice recognition program.

## 2020-09-10 ENCOUNTER — APPOINTMENT (OUTPATIENT)
Dept: GENERAL RADIOLOGY | Facility: HOSPITAL | Age: 60
End: 2020-09-10

## 2020-09-10 VITALS
OXYGEN SATURATION: 97 % | DIASTOLIC BLOOD PRESSURE: 68 MMHG | BODY MASS INDEX: 25.46 KG/M2 | SYSTOLIC BLOOD PRESSURE: 111 MMHG | WEIGHT: 168 LBS | RESPIRATION RATE: 18 BRPM | HEIGHT: 68 IN | HEART RATE: 54 BPM | TEMPERATURE: 98.4 F

## 2020-09-10 PROCEDURE — 72220 X-RAY EXAM SACRUM TAILBONE: CPT

## 2020-09-10 PROCEDURE — 99239 HOSP IP/OBS DSCHRG MGMT >30: CPT | Performed by: HOSPITALIST

## 2020-09-10 PROCEDURE — 72110 X-RAY EXAM L-2 SPINE 4/>VWS: CPT

## 2020-09-10 PROCEDURE — 72110 X-RAY EXAM L-2 SPINE 4/>VWS: CPT | Performed by: RADIOLOGY

## 2020-09-10 PROCEDURE — 72220 X-RAY EXAM SACRUM TAILBONE: CPT | Performed by: RADIOLOGY

## 2020-09-10 RX ORDER — ACETAMINOPHEN 325 MG/1
650 TABLET ORAL EVERY 4 HOURS PRN
Qty: 30 TABLET | Refills: 3 | Status: SHIPPED | OUTPATIENT
Start: 2020-09-10 | End: 2020-11-06

## 2020-09-10 RX ORDER — FUROSEMIDE 40 MG/1
40 TABLET ORAL DAILY
Qty: 30 TABLET | Refills: 3 | Status: SHIPPED | OUTPATIENT
Start: 2020-09-11 | End: 2020-09-23 | Stop reason: SDUPTHER

## 2020-09-10 RX ORDER — ATORVASTATIN CALCIUM 40 MG/1
40 TABLET, FILM COATED ORAL NIGHTLY
Qty: 30 TABLET | Refills: 3 | Status: SHIPPED | OUTPATIENT
Start: 2020-09-10 | End: 2020-09-23 | Stop reason: SDUPTHER

## 2020-09-10 RX ORDER — NICOTINE 21 MG/24HR
1 PATCH, TRANSDERMAL 24 HOURS TRANSDERMAL
Qty: 28 PATCH | Refills: 0 | Status: SHIPPED | OUTPATIENT
Start: 2020-09-11 | End: 2022-02-23

## 2020-09-10 RX ORDER — PANTOPRAZOLE SODIUM 40 MG/1
40 TABLET, DELAYED RELEASE ORAL DAILY
Qty: 30 TABLET | Refills: 3 | Status: SHIPPED | OUTPATIENT
Start: 2020-09-10 | End: 2022-02-23

## 2020-09-10 RX ORDER — HYDROCODONE BITARTRATE AND ACETAMINOPHEN 7.5; 325 MG/1; MG/1
1 TABLET ORAL EVERY 8 HOURS PRN
Qty: 10 TABLET | Refills: 0 | Status: SHIPPED | OUTPATIENT
Start: 2020-09-10 | End: 2020-09-14

## 2020-09-10 RX ORDER — SOTALOL HYDROCHLORIDE 80 MG/1
80 TABLET ORAL 2 TIMES DAILY
Qty: 60 TABLET | Refills: 3 | Status: SHIPPED | OUTPATIENT
Start: 2020-09-10 | End: 2020-09-23 | Stop reason: SDUPTHER

## 2020-09-10 RX ORDER — CARVEDILOL 3.12 MG/1
3.12 TABLET ORAL 2 TIMES DAILY WITH MEALS
Qty: 60 TABLET | Refills: 3 | Status: SHIPPED | OUTPATIENT
Start: 2020-09-10 | End: 2020-09-23 | Stop reason: SDUPTHER

## 2020-09-10 RX ORDER — ASPIRIN 81 MG/1
81 TABLET, CHEWABLE ORAL DAILY
Qty: 30 TABLET | Refills: 3 | Status: SHIPPED | OUTPATIENT
Start: 2020-09-11 | End: 2020-09-23 | Stop reason: SDUPTHER

## 2020-09-10 RX ADMIN — ASPIRIN 81 MG: 81 TABLET, CHEWABLE ORAL at 09:18

## 2020-09-10 RX ADMIN — GUAIFENESIN 1200 MG: 600 TABLET, EXTENDED RELEASE ORAL at 09:18

## 2020-09-10 RX ADMIN — APIXABAN 5 MG: 5 TABLET, FILM COATED ORAL at 09:18

## 2020-09-10 RX ADMIN — HYDROCODONE BITARTRATE AND ACETAMINOPHEN 1 TABLET: 5; 325 TABLET ORAL at 03:56

## 2020-09-10 RX ADMIN — SOTALOL HYDROCHLORIDE 80 MG: 80 TABLET ORAL at 09:18

## 2020-09-10 RX ADMIN — CARVEDILOL 3.12 MG: 3.12 TABLET, FILM COATED ORAL at 09:18

## 2020-09-10 RX ADMIN — SACUBITRIL AND VALSARTAN 1 TABLET: 24; 26 TABLET, FILM COATED ORAL at 09:18

## 2020-09-10 RX ADMIN — FUROSEMIDE 40 MG: 40 TABLET ORAL at 09:18

## 2020-09-10 RX ADMIN — SODIUM CHLORIDE, PRESERVATIVE FREE 10 ML: 5 INJECTION INTRAVENOUS at 09:18

## 2020-09-10 RX ADMIN — NICOTINE 1 PATCH: 21 PATCH TRANSDERMAL at 09:17

## 2020-09-10 NOTE — PLAN OF CARE
Problem: Patient Care Overview  Goal: Plan of Care Review  Outcome: Ongoing (interventions implemented as appropriate)  Flowsheets  Taken 9/10/2020 0342  Progress: improving  Outcome Summary: Patient vital signs stable and he remains on room air. No significant changes noted. Will continue to monitor.  Taken 9/10/2020 0200  Plan of Care Reviewed With: patient

## 2020-09-10 NOTE — PROGRESS NOTES
Discharge Planning Assessment  ANIYAH Burden     Patient Name: Chong White  MRN: [id number]  Today's Date: 9/10/2020    Admit Date: 9/1/2020    Discharge Plan     Row Name 09/10/20 1423       Plan    Final Discharge Disposition Code  01 - home or self-care    Final Note  Pt is being discharged on this date. SS spoke with Pt and Pt reported that he has rented a place to live and he will be transporting himself home. No other needs identified.         Expected Discharge Date and Time     Expected Discharge Date Expected Discharge Time    Sep 10, 2020           Gosia Allen

## 2020-09-10 NOTE — DISCHARGE SUMMARY
[facility] HOSPITALIST MEDICINE DISCHARGE SUMMARY    Patient Identification:  Name:  Chong White  Age:  60 y.o.  Sex:  male  :  1960  MRN:  [id number]  Visit Number:  [id number]    Date of Admission: 2020  Date of Discharge: September 10, 2020  DISCHARGE DISPOSITION   Stable  PCP: aKya Romo, SAMARA    DISCHARGE DIAGNOSIS : Nonischemic cardiomyopathy, paroxysmal atrial fibrillation, GERD, essential hypertension, coronary artery disease, tobacco use, chronic paresthesia of both upper and lower extremity mostly hands and feet patient is to see neurologist for possible neuropathy, COPD without exacerbation, left lower lobe pneumonia.  Acute combined systolic and diastolic heart failure ejection fraction 21 to 25%, moderate tricuspid regurgitation.  Complete occlusion of LAD with good collateralization, chronic back pain.    HOSPITAL COURSE  Patient is a 60 y.o. male presented to [facility] complaining of orthopnea, leg swelling shortness of breath and palpitation on admission he was noted to be in atrial fibrillation, he has congestive heart failure with cardiomegaly, CT scan of the chest PE protocol was negative for PE, he has pulmonary edema, left lower lobe infiltrate.  He was placed on heparin drip, his heart rate was controlled on admission, work-up included transthoracic echo revealing findings above.  Cardiology is comanaging.  He was placed on antibiotic for possible pneumonia left lower lobe, cultures were nonyielding.  Patient remained in atrial fibrillation with normal ventricular response, he underwent transesophageal echo and attempted cardioversion failed.  The next day he spontaneously converted.  He was maintained on sotalol per cardiology, cardiac catheterization revealed findings above, treated medically.  LifeVest has been recommended and to reevaluate in 2 to 3 months time after optimal medical management.  During his stay with diuretics his leg  "swelling has resolved, he normally has orthopnea.  He has complaints of chronic numbness of both hands and toes sensation of \"sleep \".  He has this for quite some time.  He reports it gets better with movement or exercise.  He also complained of low back pain which x-ray showed DJD.  Rest of his stay was uneventful, initially he claims he is homeless and  was trying to work on placement.  But eventually he decided to go back to his friend/relatives where he can rent.  Plan is to discharge him home today appointment with cardiology in 1 week's time, advised to come back to emergency room should he develop hematemesis melena hemoptysis palpitation or episodes of LifeVest discharge.  Smoking cessation has also been counseled to the patient.      VITAL SIGNS:      09/08/20  0200 09/09/20  0400 09/10/20  0400   Weight: 75.1 kg (165 lb 8 oz) 76.2 kg (168 lb) 76.2 kg (168 lb)     Body mass index is 25.54 kg/m².  Vitals:    09/10/20 1214   BP:    Pulse: 54   Resp:    Temp: 98.4 °F (36.9 °C)   SpO2: 97%     PHYSICAL EXAM:  General: Comfortable,awake, alert, oriented to self, place, and time, well-developed and well-nourished.  No respiratory distress.    Skin:  Skin is warm and dry. No rash noted. No pallor.    HENT:  Head:  Normocephalic and atraumatic.  Mouth:  Moist mucous membranes.    Eyes:  Conjunctivae and EOM are normal.  Pupils are equal, round, and reactive to light.  No scleral icterus.    Neck:  Neck supple.  No JVD present.    Pulmonary/Chest:  No respiratory distress, no wheezes, no crackles, with normal breath sounds and good air movement.  Tattoos both chest  Cardiovascular:  Normal rate, regular rhythm and normal heart sounds with no murmur.  Abdominal:  Soft.  Bowel sounds are normal.  No distension and no tenderness.   Extremities:  No edema, no tenderness, and no deformity.  No red or swollen joints anywhere.  Strong pulses in all 4 extremities with no clubbing, no cyanosis, no " edema.  Neurological:  Motor strength equal no obvious deficit, sensory grossly intact.   No cranial nerve deficit.  No tongue deviation.  No facial droop.  No slurred speech.    Genitourinary: No Salguero catheter  Back: No skin break  ----------    DISCHARGE MEDICATIONS:     Discharge Medications      New Medications      Instructions Start Date   acetaminophen 325 MG tablet  Commonly known as:  TYLENOL   650 mg, Oral, Every 4 Hours PRN      Aspirin Low Dose 81 MG chewable tablet  Generic drug:  aspirin  Replaces:  aspirin 81 MG EC tablet   81 mg, Oral, Daily   Start Date:  September 11, 2020     atorvastatin 40 MG tablet  Commonly known as:  LIPITOR   40 mg, Oral, Nightly      carvedilol 3.125 MG tablet  Commonly known as:  COREG   3.125 mg, Oral, 2 Times Daily With Meals      Eliquis 5 MG tablet tablet  Generic drug:  apixaban   5 mg, Oral, Every 12 Hours Scheduled      Entresto 24-26 MG tablet  Generic drug:  sacubitril-valsartan   1 tablet, Oral, Every 12 Hours Scheduled      furosemide 40 MG tablet  Commonly known as:  LASIX   40 mg, Oral, Daily   Start Date:  September 11, 2020     pantoprazole 40 MG EC tablet  Commonly known as:  PROTONIX   40 mg, Oral, Daily      sotalol 80 MG tablet  Commonly known as:  BETAPACE   80 mg, Oral, 2 Times Daily         Stop These Medications    aspirin 81 MG EC tablet  Replaced by:  Aspirin Low Dose 81 MG chewable tablet                  Additional Instructions for the Follow-ups that You Need to Schedule     Discharge Follow-up with PCP   As directed       Currently Documented PCP:    Kaya Romo APRN    PCP Phone Number:    [phone]     Follow Up Details:  SAMARA Segvoia         Discharge Follow-up with Specified Provider: Cardiology; 1 Week   As directed      To:  Cardiology    Follow Up:  1 Week         Discharge Follow-up with Specified Provider: Dr Keenan; 2 Weeks   As directed      To:  Dr Keenan    Follow Up:  2 Weeks    Follow Up Details:  numbness,  ?Neuropathy           Follow-up Information     Kaya Romo APRN .    Specialty:  Family Medicine  Why:  SAMARA Segovia  Contact information:  [address] [facility] [address] [phone]                   Cindy Olivo MD  09/10/20  13:56    Please note that this discharge summary required more than 30 minutes to complete.

## 2020-09-10 NOTE — CONSULTS
Heart Failure Education Consult  60 y.o. male with a chief complaint of shortness of breath with palpitations.  Patient has a past medical history of GERD, HTN, MI, CAD  Type of Heart Failure: Systolic     Length of diagnosis: New Diagnosis      Current HF knowledge: poor     Have you had HF education/teaching in the past? No  Do you check your weight daily? No    Current weight        09/09/20  0400 09/10/20  0400   Weight: 76.2 kg (168 lb) 76.2 kg (168 lb)          Most recent EF 21-25% Date 9/4/2020       Most recent ProBNP 351.9pg/ml Date 9/9/2020      Edema Yes and Improving        Shortness of Breath: Yes and Improving     Number of pillows used to sleep at night: 4  Barriers to learning: Other stress of caring for son who has schizophrenia     Materials Provided:Living with HF Book, HF Action Plan, Daily Weight Monitoring  and 2 Gm Na+ diet             Referral candidate for HF Clinic:Yes - Scheduled for 9/14/2020 at 11am      Thank you for this consult. Please let me know if I can be of any assistance with HF education for this patient.  Harper Mckeon, CANDACE   09/10/20 3:11 PM

## 2020-09-10 NOTE — PLAN OF CARE
Problem: Patient Care Overview  Goal: Plan of Care Review  Outcome: Ongoing (interventions implemented as appropriate)  Flowsheets (Taken 9/10/2020 0594)  Progress: improving  Plan of Care Reviewed With: patient  Outcome Summary: VSS. Patient being discharged this day. No concerns from patient. Will continue to monitor.

## 2020-09-11 ENCOUNTER — READMISSION MANAGEMENT (OUTPATIENT)
Dept: CALL CENTER | Facility: HOSPITAL | Age: 60
End: 2020-09-11

## 2020-09-11 NOTE — PAYOR COMM NOTE
"[facility]  NPI: [id number]    Utilization Review   Contact:Aleida Ramos MSN, APRN, FNP- BC  Phone: [phone]  Fax: [phone]    human mcaid/ attn: nurse review  Discharge Notification  REF# [id number]  Please let me know if all days are approved   Chong White (60 y.o. Male)     Date of Birth Social Security Number Address Home Phone MRN    1960 [address] [facility] [address] [phone] [id number]    Spiritism Marital Status          Jainism Single       Admission Date Admission Type Admitting Provider Attending Provider Department, Room/Bed    20 Emergency Napoleon Del Angel DO  [facility] PROGRESS CARE, P222/1P    Discharge Date Discharge Disposition Discharge Destination        9/10/2020 Home or Self Care              Attending Provider:  (none)   Allergies:  No Known Allergies    Isolation:  None   Infection:  None   Code Status:  Prior    Ht:  172.7 cm (68\")   Wt:  76.2 kg (168 lb)    Admission Cmt:  None   Principal Problem:  Abnormal nuclear stress test [R94.39] More...                 Active Insurance as of 2020     Primary Coverage     Payor Plan Insurance Group Employer/Plan Group    [place] [id number]     Payor Plan Address Payor Plan Phone Number Payor Plan Fax Number Effective Dates    [employer] Claims Office - [address] [address] [phone]  2020 - None Entered    [facility] [id number]       Subscriber Name Subscriber Birth Date Member ID       CHONG WHITE 1960 [id number]                 Emergency Contacts      (Rel.) Home Phone Work Phone Mobile Phone    KENDRICK WHITE (Sister) -- -- [phone]               Discharge Summary      Cindy Olivo MD at 09/10/20 [facility]6              [facility] HOSPITALIST MEDICINE DISCHARGE SUMMARY    Patient Identification:  Name:  Chong White  Age:  60 y.o.  Sex:  male  :  1960  MRN:  [id number]  Visit Number:  [id number]    Date " "of Admission: 9/1/2020  Date of Discharge: September 10, 2020  DISCHARGE DISPOSITION   Stable  PCP: Kaya Romo, SAMARA    DISCHARGE DIAGNOSIS : Nonischemic cardiomyopathy, paroxysmal atrial fibrillation, GERD, essential hypertension, coronary artery disease, tobacco use, chronic paresthesia of both upper and lower extremity mostly hands and feet patient is to see neurologist for possible neuropathy, COPD without exacerbation, left lower lobe pneumonia.  Acute combined systolic and diastolic heart failure ejection fraction 21 to 25%, moderate tricuspid regurgitation.  Complete occlusion of LAD with good collateralization, chronic back pain.    HOSPITAL COURSE  Patient is a 60 y.o. male presented to [facility] complaining of orthopnea, leg swelling shortness of breath and palpitation on admission he was noted to be in atrial fibrillation, he has congestive heart failure with cardiomegaly, CT scan of the chest PE protocol was negative for PE, he has pulmonary edema, left lower lobe infiltrate.  He was placed on heparin drip, his heart rate was controlled on admission, work-up included transthoracic echo revealing findings above.  Cardiology is comanaging.  He was placed on antibiotic for possible pneumonia left lower lobe, cultures were nonyielding.  Patient remained in atrial fibrillation with normal ventricular response, he underwent transesophageal echo and attempted cardioversion failed.  The next day he spontaneously converted.  He was maintained on sotalol per cardiology, cardiac catheterization revealed findings above, treated medically.  LifeVest has been recommended and to reevaluate in 2 to 3 months time after optimal medical management.  During his stay with diuretics his leg swelling has resolved, he normally has orthopnea.  He has complaints of chronic numbness of both hands and toes sensation of \"sleep \".  He has this for quite some time.  He reports it gets better with movement or " exercise.  He also complained of low back pain which x-ray showed DJD.  Rest of his stay was uneventful, initially he claims he is homeless and  was trying to work on placement.  But eventually he decided to go back to his friend/relatives where he can rent.  Plan is to discharge him home today appointment with cardiology in 1 week's time, advised to come back to emergency room should he develop hematemesis melena hemoptysis palpitation or episodes of LifeVest discharge.  Smoking cessation has also been counseled to the patient.      VITAL SIGNS:      09/08/20  0200 09/09/20  0400 09/10/20  0400   Weight: 75.1 kg (165 lb 8 oz) 76.2 kg (168 lb) 76.2 kg (168 lb)     Body mass index is 25.54 kg/m².  Vitals:    09/10/20 1214   BP:    Pulse: 54   Resp:    Temp: 98.4 °F (36.9 °C)   SpO2: 97%     PHYSICAL EXAM:  General: Comfortable,awake, alert, oriented to self, place, and time, well-developed and well-nourished.  No respiratory distress.    Skin:  Skin is warm and dry. No rash noted. No pallor.    HENT:  Head:  Normocephalic and atraumatic.  Mouth:  Moist mucous membranes.    Eyes:  Conjunctivae and EOM are normal.  Pupils are equal, round, and reactive to light.  No scleral icterus.    Neck:  Neck supple.  No JVD present.    Pulmonary/Chest:  No respiratory distress, no wheezes, no crackles, with normal breath sounds and good air movement.  Tattoos both chest  Cardiovascular:  Normal rate, regular rhythm and normal heart sounds with no murmur.  Abdominal:  Soft.  Bowel sounds are normal.  No distension and no tenderness.   Extremities:  No edema, no tenderness, and no deformity.  No red or swollen joints anywhere.  Strong pulses in all 4 extremities with no clubbing, no cyanosis, no edema.  Neurological:  Motor strength equal no obvious deficit, sensory grossly intact.   No cranial nerve deficit.  No tongue deviation.  No facial droop.  No slurred speech.    Genitourinary: No Salguero catheter  Back: No  skin break  ----------    DISCHARGE MEDICATIONS:     Discharge Medications      New Medications      Instructions Start Date   acetaminophen 325 MG tablet  Commonly known as:  TYLENOL   650 mg, Oral, Every 4 Hours PRN      Aspirin Low Dose 81 MG chewable tablet  Generic drug:  aspirin  Replaces:  aspirin 81 MG EC tablet   81 mg, Oral, Daily   Start Date:  September 11, 2020     atorvastatin 40 MG tablet  Commonly known as:  LIPITOR   40 mg, Oral, Nightly      carvedilol 3.125 MG tablet  Commonly known as:  COREG   3.125 mg, Oral, 2 Times Daily With Meals      Eliquis 5 MG tablet tablet  Generic drug:  apixaban   5 mg, Oral, Every 12 Hours Scheduled      Entresto 24-26 MG tablet  Generic drug:  sacubitril-valsartan   1 tablet, Oral, Every 12 Hours Scheduled      furosemide 40 MG tablet  Commonly known as:  LASIX   40 mg, Oral, Daily   Start Date:  September 11, 2020     pantoprazole 40 MG EC tablet  Commonly known as:  PROTONIX   40 mg, Oral, Daily      sotalol 80 MG tablet  Commonly known as:  BETAPACE   80 mg, Oral, 2 Times Daily         Stop These Medications    aspirin 81 MG EC tablet  Replaced by:  Aspirin Low Dose 81 MG chewable tablet                  Additional Instructions for the Follow-ups that You Need to Schedule     Discharge Follow-up with PCP   As directed       Currently Documented PCP:    Kaya Romo APRN    PCP Phone Number:    [phone]     Follow Up Details:  SAMARA Segovia         Discharge Follow-up with Specified Provider: Cardiology; 1 Week   As directed      To:  Cardiology    Follow Up:  1 Week         Discharge Follow-up with Specified Provider: Dr Keenan; 2 Weeks   As directed      To:  Dr Keenan    Follow Up:  2 Weeks    Follow Up Details:  numbness, ?Neuropathy           Follow-up Information     Kaya Romo APRN .    Specialty:  Family Medicine  Why:  SAMARA Segovia  Contact information:  [facility] [address] [phone]                          Cindy Olivo MD  09/10/20  13:56    Please note that this discharge summary required more than 30 minutes to complete.      Electronically signed by Cindy Olivo MD at 09/10/20 0784

## 2020-09-11 NOTE — OUTREACH NOTE
Prep Survey      Responses   [facility] [place] patient discharged from?  [facility]   Is LACE score < 7 ?  No   Eligibility  Readm Mgmt   Discharge diagnosis  Nonischemic cardiomyopathy, paroxysmal atrial fibrillation, GERD, essential hypertension, coronary artery disease, tobacco use, chronic paresthesia of both upper and lower extremity mostly hands and feet patient is to see neurologist for possible neuropathy, COPD without exacerbation, left lower lobe pneumonia.  Acute combined systolic and diastolic heart failure ejection fraction 21 to 25%, moderate tricuspid regurgitation.  Complete occlusion of LAD with good collateralization, chronic back pain.   COVID-19 Test Status  Negative   Does the patient have one of the following disease processes/diagnoses(primary or secondary)?  CHF   Does the patient have Home health ordered?  No   Is there a DME ordered?  Yes   What DME was ordered?  Life Vest   Comments regarding appointments  see AVS   Medication alerts for this patient  see AVS for changes   Prep survey completed?  Yes          Yanet Fiore RN

## 2020-09-14 ENCOUNTER — HOSPITAL ENCOUNTER (OUTPATIENT)
Dept: CARDIOLOGY | Facility: HOSPITAL | Age: 60
Discharge: HOME | End: 2020-09-14
Admitting: NURSE PRACTITIONER

## 2020-09-14 VITALS
WEIGHT: 156.6 LBS | DIASTOLIC BLOOD PRESSURE: 84 MMHG | SYSTOLIC BLOOD PRESSURE: 127 MMHG | OXYGEN SATURATION: 96 % | BODY MASS INDEX: 23.81 KG/M2 | TEMPERATURE: 97.5 F | HEART RATE: 72 BPM

## 2020-09-14 DIAGNOSIS — I50.41 ACUTE COMBINED SYSTOLIC AND DIASTOLIC CONGESTIVE HEART FAILURE (HCC): ICD-10-CM

## 2020-09-14 DIAGNOSIS — Z72.0 TOBACCO ABUSE: ICD-10-CM

## 2020-09-14 DIAGNOSIS — G62.9 NEUROPATHY: ICD-10-CM

## 2020-09-14 DIAGNOSIS — I42.8 NON-ISCHEMIC CARDIOMYOPATHY (HCC): Primary | ICD-10-CM

## 2020-09-14 LAB
ANION GAP SERPL CALCULATED.3IONS-SCNC: 9.8 MMOL/L (ref 5–15)
BUN SERPL-MCNC: 16 MG/DL (ref 8–23)
BUN/CREAT SERPL: 19.3 (ref 7–25)
CALCIUM SPEC-SCNC: 9.2 MG/DL (ref 8.6–10.5)
CHLORIDE SERPL-SCNC: 103 MMOL/L (ref 98–107)
CO2 SERPL-SCNC: 25.2 MMOL/L (ref 22–29)
CREAT SERPL-MCNC: 0.83 MG/DL (ref 0.76–1.27)
GFR SERPL CREATININE-BSD FRML MDRD: 95 ML/MIN/1.73
GLUCOSE SERPL-MCNC: 102 MG/DL (ref 65–99)
MAGNESIUM SERPL-MCNC: 2.1 MG/DL (ref 1.6–2.4)
NT-PROBNP SERPL-MCNC: 450.5 PG/ML (ref 0–900)
POTASSIUM SERPL-SCNC: 4.3 MMOL/L (ref 3.5–5.2)
SODIUM SERPL-SCNC: 138 MMOL/L (ref 136–145)

## 2020-09-14 PROCEDURE — G0463 HOSPITAL OUTPT CLINIC VISIT: HCPCS | Performed by: PHARMACIST

## 2020-09-14 PROCEDURE — 83880 ASSAY OF NATRIURETIC PEPTIDE: CPT | Performed by: NURSE PRACTITIONER

## 2020-09-14 PROCEDURE — 99214 OFFICE O/P EST MOD 30 MIN: CPT | Performed by: NURSE PRACTITIONER

## 2020-09-14 PROCEDURE — 80048 BASIC METABOLIC PNL TOTAL CA: CPT | Performed by: NURSE PRACTITIONER

## 2020-09-14 PROCEDURE — 83735 ASSAY OF MAGNESIUM: CPT | Performed by: NURSE PRACTITIONER

## 2020-09-14 NOTE — PROGRESS NOTES
Heart Failure Pharmacy Note  Patient Name: Chong White  Referring Provider: Coleen Schwartz  Primary Cardiologist: Olivia Steele    Medication Use:   Adherence: Pt has Hx of medical non-compliance, will need close monitoring   Hx of med intolerances: None reported  Affordability: None reported, Eliquis and Entresto $0     Past Medical History:   Diagnosis Date   • Atrial fibrillation (CMS/HCC)    • GERD (gastroesophageal reflux disease)    • Hypertension    • Myocardial infarction (CMS/HCC)      ALLERGIES: Patient has no known allergies.  Current Outpatient Medications   Medication Sig Dispense Refill   • acetaminophen (TYLENOL) 325 MG tablet Take 2 tablets by mouth Every 4 (Four) Hours As Needed for Mild Pain  or Fever (temperature greater than 101F). 30 tablet 3   • apixaban (ELIQUIS) 5 MG tablet tablet Take 1 tablet by mouth Every 12 (Twelve) Hours. Indications: Atrial Fibrillation 60 tablet 3   • aspirin 81 MG chewable tablet Chew 1 tablet Daily. 30 tablet 3   • atorvastatin (LIPITOR) 40 MG tablet Take 1 tablet by mouth Every Night. 30 tablet 3   • carvedilol (COREG) 3.125 MG tablet Take 1 tablet by mouth 2 (Two) Times a Day With Meals. 60 tablet 3   • furosemide (LASIX) 40 MG tablet Take 1 tablet by mouth Daily. 30 tablet 3   • nicotine (NICODERM CQ) 21 MG/24HR patch Place 1 patch on the skin as directed by provider Daily. 28 patch 0   • pantoprazole (Protonix) 40 MG EC tablet Take 1 tablet by mouth Daily. 30 tablet 3   • sacubitril-valsartan (ENTRESTO) 24-26 MG tablet Take 1 tablet by mouth Every 12 (Twelve) Hours. 60 tablet 3   • sotalol (BETAPACE) 80 MG tablet Take 1 tablet by mouth 2 (Two) Times a Day. 60 tablet 3     No current facility-administered medications for this encounter.        Vaccination History:   Pneumonia: Needs vaccination  Annual Influenza: Needs vaccination     Objective  Vitals:    09/14/20 1130   BP: 127/84   BP Location: Left arm   Patient Position: Sitting   Cuff Size: Adult    Pulse: 72   Temp: 97.5 °F (36.4 °C)   TempSrc: Oral   SpO2: 96%   Weight: 71 kg (156 lb 9.6 oz)     Wt Readings from Last 3 Encounters:   09/14/20 71 kg (156 lb 9.6 oz)   09/10/20 76.2 kg (168 lb)   05/26/20 72.6 kg (160 lb)         09/14/20  1130   Weight: 71 kg (156 lb 9.6 oz)     Lab Results   Component Value Date    GLUCOSE 120 (H) 09/08/2020    BUN 28 (H) 09/08/2020    CREATININE 1.08 09/08/2020    EGFRIFNONA 70 09/08/2020    BCR 25.9 (H) 09/08/2020    K 4.6 09/08/2020    CO2 27.2 09/08/2020    CALCIUM 9.0 09/08/2020    ALBUMIN 4.03 09/01/2020    AST 24 09/01/2020    ALT 30 09/01/2020     Lab Results   Component Value Date    WBC 7.98 09/08/2020    HGB 16.0 09/08/2020    HCT 49.3 09/08/2020    MCV 93.4 09/08/2020     09/08/2020     Lab Results   Component Value Date    TROPONINT <0.010 09/01/2020     Lab Results   Component Value Date    PROBNP 351.9 09/09/2020     Results for orders placed during the hospital encounter of 09/01/20   Adult Transesophageal Echo (LUCERO) W/ Cont if Necessary Per Protocol    Narrative · Left ventricular systolic function is severely decreased, EF 21-25%.  · Moderately reduced right ventricular systolic function noted.  · Left atrial cavity size is dilated.  · No left atrial thrombus seen.  · Trace mitral regurgitation.  · Trace tricuspid valve regurgitation.  · No PFO seen with negative bubble study.             Drug Therapy Problems    1. Drug Interactions Screening: Aspirin and Eliquis, increased bleeding risk   2. GDMT: on Entresto and carvedilol   3. Needs PPSV23 and Annual Influenza   4. Smoking cessation       Recommendations:     1. Pt educated to watch for S/Sx of bleeding, avoid NSAIDs and no additional ASA.   2. Plan to titrate Entresto next week if BP and creatinine/K+ allow   3. Will give PPSV23 and flu vaccine at next appointment   4. Counseled Pt on benefits of quitting.  Advised Pt that he should not be using nicotine patch and smoking at the same time.    Patient was educated on heart failure medications and the importance of medication adherence. All questions were addressed and patient expressed understanding.      Thank you for allowing me to participate in the care of your patient,    Georgia Hartley, PharmD  09/14/20  12:27 EDT
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
## 2020-09-14 NOTE — PROGRESS NOTES
Heart Failure Clinic Note  To room 4 via wheelchair. Patient walks with cane usually.  Weighing self daily - scale provided prior to discharge  Monitoring HF zones  States in green zone today.  Taking medication as prescribed.  No edema at this time.  No shortness of breath at this time, however states that he gets SOA with ambulation. Will defer 6MWT until next visit.  Has just gotten into his new living space and is currently sleeping on a mattress only.    Additional educational material provided on monitoring sodium amounts on labels and Shopping on a Budget from [employer]. Blood pressure cuff provided to patient. Phone number for [facility] to assist with transportation and possibly to provide assistance with his son who has schizophrenic.

## 2020-09-15 ENCOUNTER — READMISSION MANAGEMENT (OUTPATIENT)
Dept: CALL CENTER | Facility: HOSPITAL | Age: 60
End: 2020-09-15

## 2020-09-15 PROBLEM — G62.9 NEUROPATHY: Status: ACTIVE | Noted: 2020-09-15

## 2020-09-15 NOTE — ADDENDUM NOTE
Encounter addended by: Georgia Hartley, PharmD on: 9/15/2020 10:27 AM   Actions taken: Charge Capture section accepted

## 2020-09-15 NOTE — OUTREACH NOTE
CHF Week 1 Survey      Responses   [facility] patient discharged from?  [facility]   Does the patient have one of the following disease processes/diagnoses(primary or secondary)?  CHF   CHF Week 1 attempt successful?  Yes   Call start time  1111   Call end time  [phone]   Discharge diagnosis  Nonischemic cardiomyopathy, paroxysmal atrial fibrillation, GERD, essential hypertension, coronary artery disease, tobacco use, chronic paresthesia of both upper and lower extremity mostly hands and feet patient is to see neurologist for possible neuropathy, COPD without exacerbation, left lower lobe pneumonia.  Acute combined systolic and diastolic heart failure ejection fraction 21 to 25%, moderate tricuspid regurgitation.  Complete occlusion of LAD with good collateralization, chronic back pain.   Meds reviewed with patient/caregiver?  Yes   Is the patient having any side effects they believe may be caused by any medication additions or changes?  No   Does the patient have all medications ordered at discharge?  Yes   Is the patient taking all medications as directed (includes completed medication regime)?  Yes   Does the patient have a primary care provider?   Yes   Does the patient have an appointment with their PCP within 7 days of discharge?  Yes   Has the patient kept scheduled appointments due by today?  Yes   Has home health visited the patient within 72 hours of discharge?  N/A   What DME was ordered?  Life Vest   Has all DME been delivered?  Yes   Pulse Ox monitoring  None   Psychosocial issues?  No   Comments  pt states had visit from  to coordinate care   Did the patient receive a copy of their discharge instructions?  Yes   Nursing interventions  Reviewed instructions with patient   What is the patient's perception of their health status since discharge?  Improving   Nursing interventions  Nurse provided patient education   Is the patient weighing daily?  No   Does the patient have scales?  Yes   Is  the patient able to teach back Heart Failure diet management?  Yes   Is the patient able to teach back Heart Failure Zones?  Yes   Is the patient able to teach back signs and symptoms of worsening condition? (i.e. weight gain, shortness of air, etc.)  Yes   Is the patient/caregiver able to teach back the hierarchy of who to call/visit for symptoms/problems? PCP, Specialist, Home health nurse, Urgent Care, ED, 911  Yes   Additional teach back comments  states was given scale but has not started daily weights yet, wears LifeVest, removes when bathing    CHF Week 1 call completed?  Yes          Va Glover, RN

## 2020-09-15 NOTE — ADDENDUM NOTE
Encounter addended by: Veronica Moeller APRN on: 9/15/2020 9:19 AM   Actions taken: Level of Service modified

## 2020-09-16 ENCOUNTER — OFFICE VISIT (OUTPATIENT)
Dept: CARDIOLOGY | Facility: CLINIC | Age: 60
End: 2020-09-16

## 2020-09-16 VITALS
HEIGHT: 68 IN | SYSTOLIC BLOOD PRESSURE: 94 MMHG | RESPIRATION RATE: 16 BRPM | DIASTOLIC BLOOD PRESSURE: 58 MMHG | BODY MASS INDEX: 23.76 KG/M2 | TEMPERATURE: 97.7 F | HEART RATE: 63 BPM | WEIGHT: 156.8 LBS

## 2020-09-16 DIAGNOSIS — Z72.0 TOBACCO ABUSE: ICD-10-CM

## 2020-09-16 DIAGNOSIS — I50.41 ACUTE COMBINED SYSTOLIC AND DIASTOLIC CONGESTIVE HEART FAILURE (HCC): ICD-10-CM

## 2020-09-16 DIAGNOSIS — I48.0 PAROXYSMAL ATRIAL FIBRILLATION (HCC): ICD-10-CM

## 2020-09-16 DIAGNOSIS — I42.8 NON-ISCHEMIC CARDIOMYOPATHY (HCC): Primary | ICD-10-CM

## 2020-09-16 DIAGNOSIS — I25.10 ASCVD (ARTERIOSCLEROTIC CARDIOVASCULAR DISEASE): ICD-10-CM

## 2020-09-16 PROCEDURE — 99214 OFFICE O/P EST MOD 30 MIN: CPT | Performed by: NURSE PRACTITIONER

## 2020-09-16 NOTE — PROGRESS NOTES
Kaya Romo APRN  Chong White  1960  09/16/2020    Patient Active Problem List   Diagnosis   • A-fib (CMS/[facility])   • Abnormal nuclear stress test   • Neuropathy       Dear Kaya Romo APRN:    Subjective     Chief Complaint   Patient presents with   • Follow-up     hosp stay   • Atrial Fibrillation   • Coronary Artery Disease     s/p MI,    • Med Management     not available   • Chest Pain     palp and shortness of breath           History of Present Illness:    Chong White is a 60 y.o. male with a history of hypertension, ASCVD, paroxysmal atrial fibrillation, cardiomyopathy with most recent EF 21 to 25% on LUCERO, tobacco abuse, and history of IV drug abuse.  He presents today for hospital follow-up.  He was initially admitted to [facility] due to atrial fibrillation with rapid ventricular rate.  He was also noted to be in acute systolic congestive heart failure.  He underwent cardiac catheterization which revealed flush occlusion of the LAD at the ostium which filled from the RCA as well as 50% stenosis in the distal segment of the left circumflex.  No intervention was indicated and was recommended the patient continue with medical management.  He was placed on sotalol for his atrial fibrillation and ultimately converted to sinus rhythm.  He has been anticoagulated with Eliquis.  He is also wearing a LifeVest due to the cardiomyopathy.  He reports he is just feeling very tired and weak.  His breathing has been stable.  He continues to lose weight and has had excellent urine output per his report.  He has been following with the outpatient heart failure clinic and recent labs have been noted.  Kidney function and potassium are stable.  He is tolerating his medications well.  He denies any bleeding issues with Eliquis.  He does report he believes he had a shock from the LifeVest and was contacted by the company but we do not have any report of this.  He has been trying to eat better  and avoid high sodium foods.          No Known Allergies:      Current Outpatient Medications:   •  acetaminophen (TYLENOL) 325 MG tablet, Take 2 tablets by mouth Every 4 (Four) Hours As Needed for Mild Pain  or Fever (temperature greater than 101F)., Disp: 30 tablet, Rfl: 3  •  apixaban (ELIQUIS) 5 MG tablet tablet, Take 1 tablet by mouth Every 12 (Twelve) Hours. Indications: Atrial Fibrillation, Disp: 60 tablet, Rfl: 3  •  aspirin 81 MG chewable tablet, Chew 1 tablet Daily., Disp: 30 tablet, Rfl: 3  •  atorvastatin (LIPITOR) 40 MG tablet, Take 1 tablet by mouth Every Night., Disp: 30 tablet, Rfl: 3  •  carvedilol (COREG) 3.125 MG tablet, Take 1 tablet by mouth 2 (Two) Times a Day With Meals., Disp: 60 tablet, Rfl: 3  •  furosemide (LASIX) 40 MG tablet, Take 1 tablet by mouth Daily., Disp: 30 tablet, Rfl: 3  •  nicotine (NICODERM CQ) 21 MG/24HR patch, Place 1 patch on the skin as directed by provider Daily., Disp: 28 patch, Rfl: 0  •  pantoprazole (Protonix) 40 MG EC tablet, Take 1 tablet by mouth Daily., Disp: 30 tablet, Rfl: 3  •  sacubitril-valsartan (ENTRESTO) 24-26 MG tablet, Take 1 tablet by mouth Every 12 (Twelve) Hours., Disp: 60 tablet, Rfl: 3  •  sotalol (BETAPACE) 80 MG tablet, Take 1 tablet by mouth 2 (Two) Times a Day., Disp: 60 tablet, Rfl: 3      The following portions of the patient's history were reviewed and updated as appropriate: allergies, current medications, past family history, past medical history, past social history, past surgical history and problem list.    Social History     Tobacco Use   • Smoking status: Current Every Day Smoker     Packs/day: 2.00     Types: Cigarettes     Start date: 3/1/1969   • Smokeless tobacco: Never Used   • Tobacco comment: Has nicotine patches and states has only had approximately 10 cigarettes since discharge on 9/11/2020. Not using patch at this time   Substance Use Topics   • Alcohol use: Never     Frequency: Never   • Drug use: Never       Review of  "Systems   Constitution: Negative for decreased appetite and malaise/fatigue.   Cardiovascular: Positive for chest pain and palpitations. Negative for dyspnea on exertion, irregular heartbeat, leg swelling, near-syncope, orthopnea, paroxysmal nocturnal dyspnea and syncope.   Respiratory: Positive for shortness of breath. Negative for cough and wheezing.    Neurological: Positive for numbness and paresthesias. Negative for dizziness, light-headedness and weakness.       Objective   Vitals:    09/16/20 1120   BP: 94/58   Pulse: 63   Resp: 16   Temp: 97.7 °F (36.5 °C)   Weight: 71.1 kg (156 lb 12.8 oz)   Height: 172.7 cm (68\")     Body mass index is 23.84 kg/m².        Vitals signs reviewed.   Constitutional:       Appearance: Healthy appearance. Well-developed and not in distress.   HENT:      Head: Normocephalic and atraumatic.   Neck:      Vascular: JVD normal.   Pulmonary:      Effort: Pulmonary effort is normal.      Breath sounds: Normal breath sounds. No wheezing. No rhonchi. No rales.   Chest:      Chest wall: Not tender to palpatation.   Cardiovascular:      Normal rate. Regular rhythm.      Murmurs: There is no murmur.      . No S3 and S4 gallop.   Edema:     Peripheral edema absent.   Abdominal:      General: Bowel sounds are normal.      Palpations: Abdomen is soft.      Tenderness: There is no abdominal tenderness.   Musculoskeletal:      Comments: Ambulates with cane  Antalgic gait   Skin:     General: Skin is warm and dry.   Neurological:      General: No focal deficit present.      Mental Status: Alert, oriented to person, place, and time and oriented to person, place and time.   Psychiatric:         Behavior: Behavior normal.         Lab Results   Component Value Date     09/14/2020    K 4.3 09/14/2020     09/14/2020    CO2 25.2 09/14/2020    BUN 16 09/14/2020    CREATININE 0.83 09/14/2020    GLUCOSE 102 (H) 09/14/2020    CALCIUM 9.2 09/14/2020    AST 24 09/01/2020    ALT 30 09/01/2020    " ALKPHOS 96 09/01/2020     No results found for: CKTOTAL  Lab Results   Component Value Date    WBC 7.98 09/08/2020    HGB 16.0 09/08/2020    HCT 49.3 09/08/2020     09/08/2020     Lab Results   Component Value Date    INR 1.05 09/02/2020    INR 0.97 09/01/2020     Lab Results   Component Value Date    MG 2.1 09/14/2020     Lab Results   Component Value Date    TRIG 51 09/03/2020    HDL 42 09/03/2020    LDL 81 09/03/2020      No results found for: BNP        Procedures      Assessment/Plan    Diagnosis Plan   1. Non-ischemic cardiomyopathy (CMS/[facility])     2. Acute combined systolic and diastolic congestive heart failure (CMS/[facility])     3. Tobacco abuse     4. Paroxysmal atrial fibrillation (CMS/[facility])                  Recommendations:    1.  Nonischemic cardiomyopathy- continue carvedilol, furosemide, and Entresto.  Monitor renal function and potassium.  Continue follow-up with heart failure clinic as scheduled.  Plan to follow-up with limited echocardiogram in 3 months to evaluate LV function and decide if ICD is indicated.     2.  Coronary artery disease-continue low-dose aspirin, atorvastatin, carvedilol.    3.  Paroxysmal atrial fibrillation-continue sotalol and Eliquis.    4.  Have counseled him regarding tobacco abuse today and advised him the consequences of continued tobacco abuse.  He is currently using nicotine patches and trying to quit.      5.  Follow-up in 2 months or sooner if needed.        Patient's Body mass index is 23.84 kg/m². BMI is within normal parameters. No follow-up required..         Return in about 2 months (around 11/16/2020) for Recheck.    As always, I appreciate very much the opportunity to participate in the cardiovascular care of your patients.      With Best Regards,    SAMARA Almonte

## 2020-09-21 ENCOUNTER — HOSPITAL ENCOUNTER (OUTPATIENT)
Dept: CARDIOLOGY | Facility: HOSPITAL | Age: 60
Discharge: HOME | End: 2020-09-21
Admitting: NURSE PRACTITIONER

## 2020-09-21 VITALS
RESPIRATION RATE: 20 BRPM | HEART RATE: 69 BPM | WEIGHT: 157.6 LBS | SYSTOLIC BLOOD PRESSURE: 149 MMHG | BODY MASS INDEX: 23.96 KG/M2 | OXYGEN SATURATION: 97 % | DIASTOLIC BLOOD PRESSURE: 85 MMHG | TEMPERATURE: 97.5 F

## 2020-09-21 DIAGNOSIS — Z72.0 TOBACCO ABUSE: ICD-10-CM

## 2020-09-21 DIAGNOSIS — I50.42 CHRONIC COMBINED SYSTOLIC AND DIASTOLIC CONGESTIVE HEART FAILURE (HCC): ICD-10-CM

## 2020-09-21 DIAGNOSIS — I42.8 NON-ISCHEMIC CARDIOMYOPATHY (HCC): Primary | ICD-10-CM

## 2020-09-21 DIAGNOSIS — G62.9 NEUROPATHY: ICD-10-CM

## 2020-09-21 LAB
ANION GAP SERPL CALCULATED.3IONS-SCNC: 10.6 MMOL/L (ref 5–15)
BUN SERPL-MCNC: 14 MG/DL (ref 8–23)
BUN/CREAT SERPL: 18.2 (ref 7–25)
CALCIUM SPEC-SCNC: 9 MG/DL (ref 8.6–10.5)
CHLORIDE SERPL-SCNC: 101 MMOL/L (ref 98–107)
CO2 SERPL-SCNC: 27.4 MMOL/L (ref 22–29)
CREAT SERPL-MCNC: 0.77 MG/DL (ref 0.76–1.27)
GFR SERPL CREATININE-BSD FRML MDRD: 103 ML/MIN/1.73
GLUCOSE SERPL-MCNC: 91 MG/DL (ref 65–99)
NT-PROBNP SERPL-MCNC: 766.7 PG/ML (ref 0–900)
POTASSIUM SERPL-SCNC: 3.5 MMOL/L (ref 3.5–5.2)
SODIUM SERPL-SCNC: 139 MMOL/L (ref 136–145)
TROPONIN T SERPL-MCNC: <0.01 NG/ML (ref 0–0.03)

## 2020-09-21 PROCEDURE — 83880 ASSAY OF NATRIURETIC PEPTIDE: CPT | Performed by: NURSE PRACTITIONER

## 2020-09-21 PROCEDURE — 99214 OFFICE O/P EST MOD 30 MIN: CPT | Performed by: NURSE PRACTITIONER

## 2020-09-21 PROCEDURE — 80048 BASIC METABOLIC PNL TOTAL CA: CPT | Performed by: NURSE PRACTITIONER

## 2020-09-21 PROCEDURE — 84484 ASSAY OF TROPONIN QUANT: CPT | Performed by: NURSE PRACTITIONER

## 2020-09-21 PROCEDURE — G0463 HOSPITAL OUTPT CLINIC VISIT: HCPCS | Performed by: PHARMACIST

## 2020-09-21 RX ORDER — SPIRONOLACTONE 25 MG/1
25 TABLET ORAL DAILY
Qty: 30 TABLET | Refills: 1 | Status: SHIPPED | OUTPATIENT
Start: 2020-09-21 | End: 2020-11-18 | Stop reason: SDUPTHER

## 2020-09-21 NOTE — PROGRESS NOTES
Heart Failure Pharmacy Note  Patient Name: Chong White  Referring Provider: Coleen Schwartz  Primary Cardiologist: Olivia Steele    Medication Use:   Adherence: Reports non-compliance x 2 in the past week   Hx of med intolerances: None reported  Affordability: None reported, Eliquis and Entresto $0     Past Medical History:   Diagnosis Date   • Atrial fibrillation (CMS/HCC)    • GERD (gastroesophageal reflux disease)    • Hypertension    • Myocardial infarction (CMS/HCC)      ALLERGIES: Patient has no known allergies.  Current Outpatient Medications   Medication Sig Dispense Refill   • acetaminophen (TYLENOL) 325 MG tablet Take 2 tablets by mouth Every 4 (Four) Hours As Needed for Mild Pain  or Fever (temperature greater than 101F). 30 tablet 3   • apixaban (ELIQUIS) 5 MG tablet tablet Take 1 tablet by mouth Every 12 (Twelve) Hours. Indications: Atrial Fibrillation 60 tablet 3   • aspirin 81 MG chewable tablet Chew 1 tablet Daily. 30 tablet 3   • atorvastatin (LIPITOR) 40 MG tablet Take 1 tablet by mouth Every Night. 30 tablet 3   • carvedilol (COREG) 3.125 MG tablet Take 1 tablet by mouth 2 (Two) Times a Day With Meals. 60 tablet 3   • furosemide (LASIX) 40 MG tablet Take 1 tablet by mouth Daily. 30 tablet 3   • influenza vac split quad (Flulaval Quadrivalent) 0.5 ML suspension prefilled syringe injection Inject  into the appropriate muscle as directed by prescriber. 0.5 mL 0   • nicotine (NICODERM CQ) 21 MG/24HR patch Place 1 patch on the skin as directed by provider Daily. 28 patch 0   • pantoprazole (Protonix) 40 MG EC tablet Take 1 tablet by mouth Daily. 30 tablet 3   • pneumococcal polysaccharide 23-valent (PNEUMOVAX-23) 25 MCG/0.5ML vaccine Inject  into the appropriate muscle as directed by prescriber. 0.5 mL 0   • sacubitril-valsartan (ENTRESTO) 24-26 MG tablet Take 1 tablet by mouth Every 12 (Twelve) Hours. 60 tablet 3   • sotalol (BETAPACE) 80 MG tablet Take 1 tablet by mouth 2 (Two) Times a Day. 60  tablet 3     No current facility-administered medications for this encounter.        Vaccination History:   Pneumonia: Needs vaccination  Annual Influenza: Needs vaccination     Objective  Vitals:    09/21/20 1150   BP: 149/85   BP Location: Left arm   Patient Position: Sitting   Cuff Size: Adult   Pulse: 69   Resp: 20   Temp: 97.5 °F (36.4 °C)   TempSrc: Oral   SpO2: 97%   Weight: 71.5 kg (157 lb 9.6 oz)     Wt Readings from Last 3 Encounters:   09/21/20 71.5 kg (157 lb 9.6 oz)   09/16/20 71.1 kg (156 lb 12.8 oz)   09/14/20 71 kg (156 lb 9.6 oz)         09/21/20  1150   Weight: 71.5 kg (157 lb 9.6 oz)     Lab Results   Component Value Date    GLUCOSE 91 09/21/2020    BUN 14 09/21/2020    CREATININE 0.77 09/21/2020    EGFRIFNONA 103 09/21/2020    BCR 18.2 09/21/2020    K 3.5 09/21/2020    CO2 27.4 09/21/2020    CALCIUM 9.0 09/21/2020    ALBUMIN 4.03 09/01/2020    AST 24 09/01/2020    ALT 30 09/01/2020     Lab Results   Component Value Date    WBC 7.98 09/08/2020    HGB 16.0 09/08/2020    HCT 49.3 09/08/2020    MCV 93.4 09/08/2020     09/08/2020     Lab Results   Component Value Date    TROPONINT <0.010 09/21/2020     Lab Results   Component Value Date    PROBNP 766.7 09/21/2020     Results for orders placed during the hospital encounter of 09/01/20   Adult Transesophageal Echo (LUCERO) W/ Cont if Necessary Per Protocol    Narrative · Left ventricular systolic function is severely decreased, EF 21-25%.  · Moderately reduced right ventricular systolic function noted.  · Left atrial cavity size is dilated.  · No left atrial thrombus seen.  · Trace mitral regurgitation.  · Trace tricuspid valve regurgitation.  · No PFO seen with negative bubble study.             Drug Therapy Problems    1.GDMT: on Entresto and carvedilol   2. Needs PPSV23 and Annual Influenza   3. Smoking cessation   4. Medication Non-compliance       Recommendations:     1. Plan to titrate Entresto to 49/51mg BID, carvedilol would be titrated very  cautiously as patient is on sotalol. Consider potassium replacement or spironolactone initiation if current drug regimen continued.   2. Administered PPSV23 and flu vaccine    3. Encouraged Pt to quit smoking.    4. Patient educated on the importance of compliance with medications.  Explained risks associated with missed doses.  Pill planner was provided at last visit. Encouraged patient to bring in medication bottles to next appointment for me to review.   5. Pt requests that Mychart access be removed.  I have sent request to [facility].     Patient was educated on heart failure medications and the importance of medication adherence. All questions were addressed and patient expressed understanding.      Thank you for allowing me to participate in the care of your patient,    Georgia Hartley, PharmD  09/21/20  12:29 EDT
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
## 2020-09-21 NOTE — PROGRESS NOTES
Mr. White mentioned that his LifeVest garment was too big. Stated that the original one was the right size but the second one that they sent him is too big. Upon inspection, the garment is very loose around his chest and is noted to be a size B04. Called [employer] support and spoke with Jenna who said Mr. White had been measured as a B02.4 and should not have received a size B04. [employer] will call him later today to re-measure and refit him for appropriate garment.

## 2020-09-21 NOTE — PROGRESS NOTES
Heart Failure Clinic    Vitals:    09/21/20 1150   BP: 149/85   Pulse: 69   Resp: 20   Temp: 97.5 °F (36.4 °C)   SpO2: 97%        Method of arrival: Ambulatory with cane    Weighing self daily: Most days - states he weighed himself this morning and his weight was 136 at home. States isn't sure the scale is working correctly. Encouraged to continue doing daily weights to trend and track changes daily.    Monitoring Heart Failure Zones: Yes    Today's HF Zone: Yellow     Taking medications as prescribed: Yes    Edema None at present.    Shortness of Air: Yes - at times.    Number of pillows used at night:<2     States had some chest wall pain and shortness of breath yesterday. No pain at this time but states feels very fatigued. States that his son is very difficult to manage and keeps him very stressed and tired.     Educational Materials given:   No additional printed material given. Provided with emotional support and encouragement.   Harper Mckeon RN 09/21/20 11:52 EDT

## 2020-09-22 PROBLEM — I50.42 CHRONIC COMBINED SYSTOLIC AND DIASTOLIC CONGESTIVE HEART FAILURE: Status: ACTIVE | Noted: 2020-09-22

## 2020-09-22 PROBLEM — I42.8 NON-ISCHEMIC CARDIOMYOPATHY: Status: ACTIVE | Noted: 2020-09-22

## 2020-09-23 ENCOUNTER — TELEPHONE (OUTPATIENT)
Dept: CARDIOLOGY | Facility: CLINIC | Age: 60
End: 2020-09-23

## 2020-09-23 ENCOUNTER — READMISSION MANAGEMENT (OUTPATIENT)
Dept: CALL CENTER | Facility: HOSPITAL | Age: 60
End: 2020-09-23

## 2020-09-23 RX ORDER — CARVEDILOL 3.12 MG/1
3.12 TABLET ORAL 2 TIMES DAILY WITH MEALS
Qty: 60 TABLET | Refills: 3 | Status: SHIPPED | OUTPATIENT
Start: 2020-09-23 | End: 2022-02-23 | Stop reason: SDUPTHER

## 2020-09-23 RX ORDER — FUROSEMIDE 40 MG/1
40 TABLET ORAL DAILY
Qty: 30 TABLET | Refills: 3 | Status: SHIPPED | OUTPATIENT
Start: 2020-09-23 | End: 2021-09-13

## 2020-09-23 RX ORDER — SOTALOL HYDROCHLORIDE 80 MG/1
80 TABLET ORAL 2 TIMES DAILY
Qty: 60 TABLET | Refills: 3 | Status: SHIPPED | OUTPATIENT
Start: 2020-09-23 | End: 2022-02-23 | Stop reason: ALTCHOICE

## 2020-09-23 RX ORDER — ASPIRIN 81 MG/1
81 TABLET, CHEWABLE ORAL DAILY
Qty: 30 TABLET | Refills: 3 | Status: SHIPPED | OUTPATIENT
Start: 2020-09-23 | End: 2020-10-12

## 2020-09-23 RX ORDER — ATORVASTATIN CALCIUM 40 MG/1
40 TABLET, FILM COATED ORAL NIGHTLY
Qty: 30 TABLET | Refills: 3 | Status: SHIPPED | OUTPATIENT
Start: 2020-09-23 | End: 2021-09-13

## 2020-09-23 NOTE — OUTREACH NOTE
CHF Week 2 Survey      Responses   [facility] patient discharged from?  [facility]   Does the patient have one of the following disease processes/diagnoses(primary or secondary)?  CHF   Week 2 attempt successful?  Yes   Call start time  1206   Call end time  1218   Discharge diagnosis  Nonischemic cardiomyopathy, paroxysmal atrial fibrillation, GERD, essential hypertension, coronary artery disease, tobacco use, chronic paresthesia of both upper and lower extremity mostly hands and feet patient is to see neurologist for possible neuropathy, COPD without exacerbation, left lower lobe pneumonia.  Acute combined systolic and diastolic heart failure ejection fraction 21 to 25%, moderate tricuspid regurgitation.  Complete occlusion of LAD with good collateralization, chronic back pain.   Is patient permission given to speak with other caregiver?  No   Meds reviewed with patient/caregiver?  Yes   Does the patient have all medications ordered at discharge?  No [Patient states that he has lost his meds. He states that he moved and does not know what happened to meds. ]   Nursing Interventions  Nurse advised patient to call provider [Advised to call Heart Failure Clinic, number provided, to have medication refills sent to pharmacy. ]   Is the patient taking all medications as directed (includes completed medication regime)?  No   What is preventing the patient from taking all medications as directed?  Other [See above. ]   Nursing Interventions  Nurse provided patient education, Advised patient to call provider   Does the patient have a primary care provider?   Yes   Comments regarding PCP  PCP SAMARA Segovia   Has the patient kept scheduled appointments due by today?  Yes [patient has been to heart failure clinic post discharge. ]   Has home health visited the patient within 72 hours of discharge?  N/A   What DME was ordered?  Life Vest   Pulse Ox monitoring  None   Psychosocial issues?  Yes   Nursing  interventions  Other [Advised to contact local food bank, [employer] for assistance. Message routed to case management. ]   Psychosocial comments  patient states that he recently moved. He states that he needs groceries.    Notified Case Management  Psychosocial (Urgent) [patient needs food. ]   Did the patient receive a copy of their discharge instructions?  Yes   Nursing interventions  Reviewed instructions with patient   What is the patient's perception of their health status since discharge?  Improving   Is the patient weighing daily?  No   Does the patient have scales?  Yes   Daily weight interventions  Education provided on importance of daily weight   Is the patient able to teach back signs and symptoms of worsening condition? (i.e. weight gain, shortness of air, etc.)  Yes   Is the patient/caregiver able to teach back the hierarchy of who to call/visit for symptoms/problems? PCP, Specialist, Home health nurse, Urgent Care, ED, 911  Yes   CHF Week 2 call completed?  Yes          Marian Gongora RN

## 2020-09-23 NOTE — TELEPHONE ENCOUNTER
Patient called stating he has lost all of his medications and needs refills called to [facility]'s pharmacy at [phone].  All medications had been prescribed at hospital.

## 2020-09-24 ENCOUNTER — TELEPHONE (OUTPATIENT)
Dept: TELEMETRY | Facility: HOSPITAL | Age: 60
End: 2020-09-24

## 2020-09-24 ENCOUNTER — TELEPHONE (OUTPATIENT)
Dept: CARDIOLOGY | Facility: HOSPITAL | Age: 60
End: 2020-09-24

## 2020-09-24 ENCOUNTER — TELEPHONE (OUTPATIENT)
Dept: CARDIOLOGY | Facility: CLINIC | Age: 60
End: 2020-09-24
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
## 2020-09-29 ENCOUNTER — OFFICE VISIT (OUTPATIENT)
Dept: FAMILY MEDICINE CLINIC | Facility: CLINIC | Age: 60
End: 2020-09-29

## 2020-09-29 VITALS
HEIGHT: 68 IN | TEMPERATURE: 97.7 F | SYSTOLIC BLOOD PRESSURE: 120 MMHG | DIASTOLIC BLOOD PRESSURE: 80 MMHG | WEIGHT: 159 LBS | BODY MASS INDEX: 24.1 KG/M2 | OXYGEN SATURATION: 99 % | HEART RATE: 78 BPM | RESPIRATION RATE: 16 BRPM

## 2020-09-29 DIAGNOSIS — G62.9 NEUROPATHY: ICD-10-CM

## 2020-09-29 DIAGNOSIS — M54.50 LUMBAR PAIN: Primary | ICD-10-CM

## 2020-09-29 DIAGNOSIS — I25.10 ASCVD (ARTERIOSCLEROTIC CARDIOVASCULAR DISEASE): ICD-10-CM

## 2020-09-29 DIAGNOSIS — R10.13 EPIGASTRIC ABDOMINAL PAIN: ICD-10-CM

## 2020-09-29 DIAGNOSIS — K21.9 GASTROESOPHAGEAL REFLUX DISEASE, ESOPHAGITIS PRESENCE NOT SPECIFIED: ICD-10-CM

## 2020-09-29 DIAGNOSIS — I50.42 CHRONIC COMBINED SYSTOLIC AND DIASTOLIC CONGESTIVE HEART FAILURE (HCC): ICD-10-CM

## 2020-09-29 DIAGNOSIS — I42.8 NON-ISCHEMIC CARDIOMYOPATHY (HCC): ICD-10-CM

## 2020-09-29 DIAGNOSIS — I48.0 PAROXYSMAL ATRIAL FIBRILLATION (HCC): ICD-10-CM

## 2020-09-29 PROCEDURE — 99214 OFFICE O/P EST MOD 30 MIN: CPT | Performed by: NURSE PRACTITIONER
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
## 2020-09-30 ENCOUNTER — TELEPHONE (OUTPATIENT)
Dept: CARDIOLOGY | Facility: CLINIC | Age: 60
End: 2020-09-30

## 2020-10-02 ENCOUNTER — APPOINTMENT (OUTPATIENT)
Dept: CARDIOLOGY | Facility: HOSPITAL | Age: 60
End: 2020-10-02

## 2020-10-05 ENCOUNTER — LAB REQUISITION (OUTPATIENT)
Dept: LAB | Facility: HOSPITAL | Age: 60
End: 2020-10-05

## 2020-10-05 DIAGNOSIS — Z20.828 CONTACT WITH AND (SUSPECTED) EXPOSURE TO OTHER VIRAL COMMUNICABLE DISEASES: ICD-10-CM

## 2020-10-05 PROCEDURE — U0004 COV-19 TEST NON-CDC HGH THRU: HCPCS | Performed by: NURSE PRACTITIONER

## 2020-10-06 LAB — SARS-COV-2 RNA RESP QL NAA+PROBE: NOT DETECTED

## 2020-10-08 NOTE — PROGRESS NOTES
SS spoke with CHF clinic per [place] who states when she called to confirm pt's appt for tomorrow 10/09/20 at 11:30am pt was unsure about transportation. SS spoke with [facility] [phone] and they cannot schedule pt so soon for transport. SS notified [place] [facility] would not be able to transport.

## 2020-10-09 ENCOUNTER — APPOINTMENT (OUTPATIENT)
Dept: CARDIOLOGY | Facility: HOSPITAL | Age: 60
End: 2020-10-09

## 2020-10-12 ENCOUNTER — HOSPITAL ENCOUNTER (OUTPATIENT)
Dept: CARDIOLOGY | Facility: HOSPITAL | Age: 60
Discharge: HOME | End: 2020-10-12
Admitting: NURSE PRACTITIONER

## 2020-10-12 ENCOUNTER — TELEPHONE (OUTPATIENT)
Dept: FAMILY MEDICINE CLINIC | Facility: CLINIC | Age: 60
End: 2020-10-12

## 2020-10-12 VITALS
BODY MASS INDEX: 23.48 KG/M2 | DIASTOLIC BLOOD PRESSURE: 79 MMHG | WEIGHT: 154.4 LBS | TEMPERATURE: 98.2 F | OXYGEN SATURATION: 97 % | HEART RATE: 76 BPM | RESPIRATION RATE: 16 BRPM | SYSTOLIC BLOOD PRESSURE: 124 MMHG

## 2020-10-12 DIAGNOSIS — G62.9 NEUROPATHY: ICD-10-CM

## 2020-10-12 DIAGNOSIS — R26.9 ABNORMAL GAIT: Primary | ICD-10-CM

## 2020-10-12 DIAGNOSIS — G89.29 OTHER CHRONIC PAIN: ICD-10-CM

## 2020-10-12 DIAGNOSIS — I42.8 NON-ISCHEMIC CARDIOMYOPATHY (HCC): Primary | ICD-10-CM

## 2020-10-12 DIAGNOSIS — I50.42 CHRONIC COMBINED SYSTOLIC AND DIASTOLIC CONGESTIVE HEART FAILURE (HCC): ICD-10-CM

## 2020-10-12 DIAGNOSIS — Z72.0 TOBACCO ABUSE: ICD-10-CM

## 2020-10-12 LAB
ANION GAP SERPL CALCULATED.3IONS-SCNC: 9.6 MMOL/L (ref 5–15)
BUN SERPL-MCNC: 18 MG/DL (ref 8–23)
BUN/CREAT SERPL: 17.6 (ref 7–25)
CALCIUM SPEC-SCNC: 9.4 MG/DL (ref 8.6–10.5)
CHLORIDE SERPL-SCNC: 104 MMOL/L (ref 98–107)
CO2 SERPL-SCNC: 26.4 MMOL/L (ref 22–29)
CREAT SERPL-MCNC: 1.02 MG/DL (ref 0.76–1.27)
GFR SERPL CREATININE-BSD FRML MDRD: 74 ML/MIN/1.73
GLUCOSE SERPL-MCNC: 94 MG/DL (ref 65–99)
MAGNESIUM SERPL-MCNC: 2.1 MG/DL (ref 1.6–2.4)
NT-PROBNP SERPL-MCNC: 273.7 PG/ML (ref 0–900)
POTASSIUM SERPL-SCNC: 4.6 MMOL/L (ref 3.5–5.2)
SODIUM SERPL-SCNC: 140 MMOL/L (ref 136–145)

## 2020-10-12 PROCEDURE — 83735 ASSAY OF MAGNESIUM: CPT | Performed by: NURSE PRACTITIONER

## 2020-10-12 PROCEDURE — 80048 BASIC METABOLIC PNL TOTAL CA: CPT | Performed by: NURSE PRACTITIONER

## 2020-10-12 PROCEDURE — 83880 ASSAY OF NATRIURETIC PEPTIDE: CPT | Performed by: NURSE PRACTITIONER

## 2020-10-12 PROCEDURE — 99214 OFFICE O/P EST MOD 30 MIN: CPT | Performed by: NURSE PRACTITIONER

## 2020-10-12 NOTE — PATIENT INSTRUCTIONS
Living With Heart Failure   Heart failure is a long-term (chronic) condition in which the heart cannot pump enough blood through the body. When this happens, parts of the body do not get the blood and oxygen they need.  There is no cure for heart failure at this time, so it is important for you to take good care of yourself and follow the treatment plan set by your health care provider. If you are living with heart failure, there are ways to help you manage the disease.  Follow these instructions at home:  Living with heart failure requires you to make changes in your life. Your health care team will teach you about the changes you need to make in order to relieve your symptoms and lower your risk of going to the hospital. Follow the treatment plan as set by your health care provider.  Medicines  Medicines are important in reducing your heart's workload, slowing the progression of heart failure, and improving your symptoms.  · Take over-the-counter and prescription medicines only as told by your health care provider.  · Do not stop taking your medicine unless your health care provider tells you to do that.  · Do not skip any dose of your medicine.  · Refill prescriptions before you run out of medicine. You need your medicines every day.  Eating and drinking    · Eat heart-healthy foods. Talk with a dietitian to make an eating plan that is right for you.  ? If directed by your health care provider:  ? Limit salt (sodium). Lowering your sodium intake may reduce symptoms of heart failure. Ask a dietitian to recommend heart-healthy seasonings.  ? Limit your fluid intake. Fluid restriction may reduce symptoms of heart failure.  ? Use low-fat cooking methods instead of frying. Low-fat methods include roasting, grilling, broiling, baking, poaching, steaming, and stir-frying.  ? Choose foods that contain no trans fat and are low in saturated fat and cholesterol. Healthy choices include fresh or frozen fruits and vegetables,  fish, lean meats, legumes, fat-free or low-fat dairy products, and whole-grain or high-fiber foods.  · Limit alcohol intake to no more than 1 drink a day for nonpregnant women and 2 drinks a day for men. One drink equals 12 oz of beer, 5 oz of wine, or 1½ oz of hard liquor.  ? Drinking more than that is harmful to your heart. Tell your health care provider if you drink alcohol several times a week.  ? Talk with your health care provider about whether any level of alcohol use is safe for you.  Activity    · Ask your health care provider about attending cardiac rehabilitation. These programs include aerobic physical activity, which provides many benefits for your heart.  · If no cardiac rehabilitation program is available, ask your health care provider what aerobic exercises are safe for you to do.  Lifestyle  Make the lifestyle changes recommended by your health care provider. In general:  · Lose weight if your health care provider tells you to do that. Weight loss may reduce symptoms of heart failure.  · Do not use any products that contain nicotine or tobacco, such as cigarettes or e-cigarettes. If you need help quitting, ask your health care provider.  · Do not use street (illegal) drugs.  · Return to your normal activities as told by your health care provider. Ask your health care provider what activities are safe for you.  General instructions    · Make sure you weigh yourself every day to track your weight. Rapid weight gain may indicate an increase in fluid in your body and may increase the workload of your heart.  ? Weigh yourself every morning. Do this after you urinate but before you eat breakfast.  ? Wear the same type of clothing, without shoes, each time you weigh yourself.  ? Weigh yourself on the same scale and in the same spot each time.  · Living with chronic heart failure often leads to emotions such as fear, stress, anxiety, and depression. If you feel any of these emotions and need help coping,  contact your health care provider. Other ways to get help include:  ? Talking to friends and family members about your condition. They can give you support and guidance. Explain your symptoms to them and, if comfortable, invite them to attend appointments or rehabilitation with you.  ? Joining a support group for people with chronic heart failure. Talking with other people who have the same symptoms may give you new ways of coping with your disease and your emotions.  · Stay up to date with your shots (vaccines). Staying current on pneumococcal and influenza vaccines is especially important in preventing germs from attacking your airways (respiratory infections).  · Keep all follow-up visits as told by your health care provider. This is important.  How to recognize changes in your condition  You and your family members need to know what changes to watch for in your condition.  Watch for the following changes and report them to your health care provider:  · Sudden weight gain. Ask your health care provider what amount of weight gain to report.  · Shortness of breath:  ? Feeling short of breath while at rest, with no exercise or activity that required great effort.  ? Feeling breathless with activity.  · Swelling of your lower legs or ankles.  · Difficulty sleeping:  ? You wake up feeling short of breath.  ? You have to use more pillows to raise your head in order to sleep.  · Frequent, dry, hacking cough.  · Loss of appetite.  · Feeling more tired all the time.  · Depression or feelings of sadness or hopelessness.  · Bloating in the stomach.  Where to find more information  · Local support groups. Ask your health care provider about groups near you.  · The American Heart Association: www.heart.org  Contact a health care provider if:  · You have a rapid weight gain.  · You have increasing shortness of breath that is unusual for you.  · You are unable to participate in your usual physical activities.  · You tire  easily.  · You cough more than normal, especially with physical activity.  · You have any swelling or more swelling in areas such as your hands, feet, ankles, or abdomen.  · You feel like your heart is beating quickly (palpitations).  · You become dizzy or light-headed when you stand up.  Get help right away if:  · You have difficulty breathing.  · You notice or your family notices a change in your awareness, such as having trouble staying awake or having difficulty with concentration.  · You have pain or discomfort in your chest.  · You have an episode of fainting (syncope).  Summary  · There is no cure for heart failure, so it is important for you to take good care of yourself and follow the treatment plan set by your health care provider.  · Medicines are important in reducing your heart's workload, slowing the progression of heart failure, and improving your symptoms.  · Living with chronic heart failure often leads to emotions such as fear, stress, anxiety, and depression. If you are feeling any of these emotions and need help coping, contact your health care provider.  This information is not intended to replace advice given to you by your health care provider. Make sure you discuss any questions you have with your health care provider.  Document Released: 05/02/2018 Document Revised: 11/30/2018 Document Reviewed: 05/02/2018  [employer] Patient Education © 2020 [employer]    Steps to Quit Smoking  Smoking tobacco is the leading cause of preventable death. It can affect almost every organ in the body. Smoking puts you and people around you at risk for many serious, long-lasting (chronic) diseases. Quitting smoking can be hard, but it is one of the best things that you can do for your health. It is never too late to quit.  How do I get ready to quit?  When you decide to quit smoking, make a plan to help you succeed. Before you quit:  · Pick a date to quit. Set a date within the next 2 weeks to give you time to  prepare.  · Write down the reasons why you are quitting. Keep this list in places where you will see it often.  · Tell your family, friends, and co-workers that you are quitting. Their support is important.  · Talk with your doctor about the choices that may help you quit.  · Find out if your health insurance will pay for these treatments.  · Know the people, places, things, and activities that make you want to smoke (triggers). Avoid them.  What first steps can I take to quit smoking?  · Throw away all cigarettes at home, at work, and in your car.  · Throw away the things that you use when you smoke, such as ashtrays and lighters.  · Clean your car. Make sure to empty the ashtray.  · Clean your home, including curtains and carpets.  What can I do to help me quit smoking?  Talk with your doctor about taking medicines and seeing a counselor at the same time. You are more likely to succeed when you do both.  · If you are pregnant or breastfeeding, talk with your doctor about counseling or other ways to quit smoking. Do not take medicine to help you quit smoking unless your doctor tells you to do so.  To quit smoking:  Quit right away  · Quit smoking totally, instead of slowly cutting back on how much you smoke over a period of time.  · Go to counseling. You are more likely to quit if you go to counseling sessions regularly.  Take medicine  You may take medicines to help you quit. Some medicines need a prescription, and some you can buy over-the-counter. Some medicines may contain a drug called nicotine to replace the nicotine in cigarettes. Medicines may:  · Help you to stop having the desire to smoke (cravings).  · Help to stop the problems that come when you stop smoking (withdrawal symptoms).  Your doctor may ask you to use:  · Nicotine patches, gum, or lozenges.  · Nicotine inhalers or sprays.  · Non-nicotine medicine that is taken by mouth.  Find resources  Find resources and other ways to help you quit smoking  and remain smoke-free after you quit. These resources are most helpful when you use them often. They include:  · Online chats with a counselor.  · Phone quitlines.  · Printed self-help materials.  · Support groups or group counseling.  · Text messaging programs.  · Mobile phone apps. Use apps on your mobile phone or tablet that can help you stick to your quit plan. There are many free apps for mobile phones and tablets as well as websites. Examples include Quit Guide from the CDC and smokefree.gov    What things can I do to make it easier to quit?    · Talk to your family and friends. Ask them to support and encourage you.  · Call a phone quitline (1-800-QUIT-NOW), reach out to support groups, or work with a counselor.  · Ask people who smoke to not smoke around you.  · Avoid places that make you want to smoke, such as:  ? Bars.  ? Parties.  ? Smoke-break areas at work.  · Spend time with people who do not smoke.  · Lower the stress in your life. Stress can make you want to smoke. Try these things to help your stress:  ? Getting regular exercise.  ? Doing deep-breathing exercises.  ? Doing yoga.  ? Meditating.  ? Doing a body scan. To do this, close your eyes, focus on one area of your body at a time from head to toe. Notice which parts of your body are tense. Try to relax the muscles in those areas.  How will I feel when I quit smoking?  Day 1 to 3 weeks  Within the first 24 hours, you may start to have some problems that come from quitting tobacco. These problems are very bad 2-3 days after you quit, but they do not often last for more than 2-3 weeks. You may get these symptoms:  · Mood swings.  · Feeling restless, nervous, angry, or annoyed.  · Trouble concentrating.  · Dizziness.  · Strong desire for high-sugar foods and nicotine.  · Weight gain.  · Trouble pooping (constipation).  · Feeling like you may vomit (nausea).  · Coughing or a sore throat.  · Changes in how the medicines that you take for other issues  work in your body.  · Depression.  · Trouble sleeping (insomnia).  Week 3 and afterward  After the first 2-3 weeks of quitting, you may start to notice more positive results, such as:  · Better sense of smell and taste.  · Less coughing and sore throat.  · Slower heart rate.  · Lower blood pressure.  · Clearer skin.  · Better breathing.  · Fewer sick days.  Quitting smoking can be hard. Do not give up if you fail the first time. Some people need to try a few times before they succeed. Do your best to stick to your quit plan, and talk with your doctor if you have any questions or concerns.  Summary  · Smoking tobacco is the leading cause of preventable death. Quitting smoking can be hard, but it is one of the best things that you can do for your health.  · When you decide to quit smoking, make a plan to help you succeed.  · Quit smoking right away, not slowly over a period of time.  · When you start quitting, seek help from your doctor, family, or friends.  This information is not intended to replace advice given to you by your health care provider. Make sure you discuss any questions you have with your health care provider.  Document Released: 10/14/2010 Document Revised: 09/11/2020 Document Reviewed: 03/07/2020  [employer] Patient Education © 2020 [employer]
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
## 2020-10-12 NOTE — PROGRESS NOTES
Heart Failure Pharmacy Note  Patient Name: Chong White  Referring Provider: Coleen Schwartz  Primary Cardiologist: Olivia Steele    Medication Use:   Adherence: pt reports being compliant   Hx of med intolerances: None reported  Affordability: None reported, Eliquis and Entresto $0     Past Medical History:   Diagnosis Date   • Atrial fibrillation (CMS/HCC)    • GERD (gastroesophageal reflux disease)    • Hypertension    • Myocardial infarction (CMS/HCC)      ALLERGIES: Penicillins  Current Outpatient Medications   Medication Sig Dispense Refill   • acetaminophen (TYLENOL) 325 MG tablet Take 2 tablets by mouth Every 4 (Four) Hours As Needed for Mild Pain  or Fever (temperature greater than 101F). 30 tablet 3   • furosemide (LASIX) 40 MG tablet Take 1 tablet by mouth Daily. 30 tablet 3   • spironolactone (ALDACTONE) 25 MG tablet Take 1 tablet by mouth Daily. 30 tablet 1   • apixaban (ELIQUIS) 5 MG tablet tablet Take 1 tablet by mouth Every 12 (Twelve) Hours. Indications: Atrial Fibrillation 60 tablet 3   • atorvastatin (LIPITOR) 40 MG tablet Take 1 tablet by mouth Every Night. 30 tablet 3   • carvedilol (COREG) 3.125 MG tablet Take 1 tablet by mouth 2 (Two) Times a Day With Meals. 60 tablet 3   • nicotine (NICODERM CQ) 21 MG/24HR patch Place 1 patch on the skin as directed by provider Daily. 28 patch 0   • pantoprazole (Protonix) 40 MG EC tablet Take 1 tablet by mouth Daily. 30 tablet 3   • sacubitril-valsartan (ENTRESTO) 24-26 MG tablet Take 1 tablet by mouth Every 12 (Twelve) Hours. 60 tablet 3   • sotalol (BETAPACE) 80 MG tablet Take 1 tablet by mouth 2 (Two) Times a Day. 60 tablet 3     No current facility-administered medications for this encounter.        Vaccination History:   Pneumonia: up to date  Annual Influenza: up to date for 20-21    Objective  Vitals:    10/12/20 1159   BP: 124/79   BP Location: Left arm   Patient Position: Sitting   Cuff Size: Adult   Pulse: 76   Resp: 16   Temp: 98.2 °F (36.8  °C)   TempSrc: Oral   SpO2: 97%   Weight: 70 kg (154 lb 6.4 oz)     Wt Readings from Last 3 Encounters:   10/12/20 70 kg (154 lb 6.4 oz)   09/29/20 72.1 kg (159 lb)   09/21/20 71.5 kg (157 lb 9.6 oz)         10/12/20  1159   Weight: 70 kg (154 lb 6.4 oz)     Lab Results   Component Value Date    GLUCOSE 94 10/12/2020    BUN 18 10/12/2020    CREATININE 1.02 10/12/2020    EGFRIFNONA 74 10/12/2020    BCR 17.6 10/12/2020    K 4.6 10/12/2020    CO2 26.4 10/12/2020    CALCIUM 9.4 10/12/2020    ALBUMIN 4.03 09/01/2020    AST 24 09/01/2020    ALT 30 09/01/2020     Lab Results   Component Value Date    WBC 7.98 09/08/2020    HGB 16.0 09/08/2020    HCT 49.3 09/08/2020    MCV 93.4 09/08/2020     09/08/2020     Lab Results   Component Value Date    TROPONINT <0.010 09/21/2020     Lab Results   Component Value Date    PROBNP 273.7 10/12/2020     Results for orders placed during the hospital encounter of 09/01/20   Adult Transesophageal Echo (LUCERO) W/ Cont if Necessary Per Protocol    Narrative · Left ventricular systolic function is severely decreased, EF 21-25%.  · Moderately reduced right ventricular systolic function noted.  · Left atrial cavity size is dilated.  · No left atrial thrombus seen.  · Trace mitral regurgitation.  · Trace tricuspid valve regurgitation.  · No PFO seen with negative bubble study.             Drug Therapy Problems    1.GDMT: on Entresto, carvedilol and spironolactone   2. Smoking cessation    3. Medication Non-compliance       Recommendations:     1. Titrate up Entresto when appropriate  2. Encouraged Pt to quit smoking. Pt willing and reports using patches off and on and cutting back his overall cigarette use.   3. Patient educated on the importance of compliance with medications.  Explained risks associated with missed doses.  Encouraged patient to bring in medication bottles to next appointment for me to review. Attempted to call patient twice to go over medications he has at home to figure  out what he has been taking. Called [facility] and verified $0 copay for his medications. Refills ready and waiting for him to .       Patient was educated on heart failure medications and the importance of medication adherence. All questions were addressed and patient expressed understanding.      Thank you for allowing me to participate in the care of your patient,    Silvio Maier, PharmD  Pharmacy Resident   10/12/2020  15:12 EDT

## 2020-10-13 NOTE — PROGRESS NOTES
Tired to call patient x2 today to go over his medications since we never reached him yesterday. Still no answer and I am unable to leave a message on his phone.     Thank you,   Silvio Maier, PharmD  Pharmacy Resident   10/13/2020  15:23 EDT

## 2020-10-13 NOTE — ADDENDUM NOTE
Encounter addended by: Silvio Maier PharmD on: 10/13/2020 3:24 PM   Actions taken: Clinical Note Signed

## 2020-10-15 DIAGNOSIS — R26.9 ABNORMAL GAIT: Primary | ICD-10-CM

## 2020-10-28 PROBLEM — M54.50 CHRONIC LOW BACK PAIN: Status: ACTIVE | Noted: 2019-08-22

## 2020-10-28 PROBLEM — G89.29 CHRONIC LOW BACK PAIN: Status: ACTIVE | Noted: 2019-08-22

## 2020-10-29 ENCOUNTER — OFFICE VISIT (OUTPATIENT)
Dept: FAMILY MEDICINE CLINIC | Facility: CLINIC | Age: 60
End: 2020-10-29

## 2020-10-29 VITALS
HEART RATE: 72 BPM | HEIGHT: 68 IN | WEIGHT: 152 LBS | TEMPERATURE: 97.5 F | BODY MASS INDEX: 23.04 KG/M2 | SYSTOLIC BLOOD PRESSURE: 100 MMHG | RESPIRATION RATE: 18 BRPM | OXYGEN SATURATION: 98 % | DIASTOLIC BLOOD PRESSURE: 60 MMHG

## 2020-10-29 DIAGNOSIS — R10.32 LEFT LOWER QUADRANT PAIN: Primary | ICD-10-CM

## 2020-10-29 DIAGNOSIS — I50.42 CHRONIC COMBINED SYSTOLIC AND DIASTOLIC CONGESTIVE HEART FAILURE (HCC): ICD-10-CM

## 2020-10-29 PROCEDURE — 99213 OFFICE O/P EST LOW 20 MIN: CPT | Performed by: NURSE PRACTITIONER

## 2020-10-29 PROCEDURE — 36415 COLL VENOUS BLD VENIPUNCTURE: CPT | Performed by: NURSE PRACTITIONER

## 2020-10-29 NOTE — PROGRESS NOTES
"Subjective   Chong White is a 60 y.o. male.     Chief Complaint   Patient presents with   • Follow-up       He presents for follow up of heart failure. He did not wear his life vest today because it was aggravating him. He has appointments with pain management and with psych. He also c/o a sharp pain in his left lower abdomen for about a week. He states he doesn't know, it could be from his back. He states he had a colonoscopy about 6 months ago that was normal. He is unsure of what medications he is taking and did not bring them with him. He reports his sons are on meth.       The following portions of the patient's history were reviewed and updated as appropriate: allergies, current medications, past family history, past medical history, past social history, past surgical history and problem list.    Review of Systems   Constitutional: Negative for fever and unexpected weight change.   HENT: Negative for ear pain, rhinorrhea, sore throat and trouble swallowing.    Eyes: Negative for visual disturbance.   Respiratory: Positive for cough, shortness of breath and wheezing.    Cardiovascular: Positive for chest pain and palpitations.   Gastrointestinal: Positive for abdominal pain. Negative for blood in stool, constipation, diarrhea, nausea and vomiting.   Genitourinary: Negative for dysuria and hematuria.   Musculoskeletal: Negative for arthralgias and myalgias.   Skin: Negative for color change.   Allergic/Immunologic: Negative for environmental allergies.   Neurological: Negative for dizziness, seizures, syncope and headaches.   Hematological: Negative for adenopathy.   Psychiatric/Behavioral: Negative for sleep disturbance and suicidal ideas. The patient is not nervous/anxious.        Objective     /60 (BP Location: Right arm, Patient Position: Sitting, Cuff Size: Adult)   Pulse 72   Temp 97.5 °F (36.4 °C) (Temporal)   Resp 18   Ht 172.7 cm (67.99\")   Wt 68.9 kg (152 lb)   SpO2 98%   BMI 23.12 " kg/m²     Physical Exam  Vitals signs reviewed.   Constitutional:       General: He is not in acute distress.     Appearance: He is well-developed. He is not diaphoretic.   HENT:      Head: Normocephalic and atraumatic.   Cardiovascular:      Rate and Rhythm: Normal rate and regular rhythm.      Heart sounds: Normal heart sounds. No murmur. No friction rub. No gallop.    Pulmonary:      Effort: Pulmonary effort is normal. No respiratory distress.      Breath sounds: Normal breath sounds.   Abdominal:      General: Bowel sounds are normal. There is no distension.      Palpations: Abdomen is soft.      Tenderness: There is no abdominal tenderness. There is no guarding or rebound.   Skin:     General: Skin is warm and dry.   Neurological:      Mental Status: He is alert and oriented to person, place, and time.   Psychiatric:         Behavior: Behavior normal.         Thought Content: Thought content normal.         Judgment: Judgment normal.         Assessment/Plan     Problem List Items Addressed This Visit        Cardiovascular and Mediastinum    Chronic combined systolic and diastolic congestive heart failure (CMS/HCC)      Other Visit Diagnoses     Left lower quadrant pain    -  Primary    Relevant Orders    CBC & Differential    Comprehensive Metabolic Panel    US Abdomen Complete          Plan: Nothing on exam to account for left lower quadrant pain. Get ultrasound and labs to look for cause of pain. I have instructed him to wear his life vest as his heart is not functioning like it should and he could die if he has an event without it. I have instructed him to keep his appointments with neurology and pain management as well as cardiology. He verbalized an understanding of the same. Follow up pending results.     Patient's Body mass index is 23.12 kg/m². BMI is within normal parameters. No follow-up required..   (Normal BMI:  18.5-24.9, OW 25-29.9, Obesity 30 or greater)    Chong White  reports that he has  been smoking cigarettes. He started smoking about 51 years ago. He has been smoking about 2.00 packs per day. He has never used smokeless tobacco.. He reports he knows he needs to stop smoking.            Understands disease processes and need for medications.  Understands reasons for urgent and emergent care.  Patient (& family) verbalized agreement for treatment plan.   Emotional support and active listening provided.  Patient provided time to verbalize feelings.               This document has been electronically signed by SAMARA Blunt   October 29, 2020 11:29 EDT

## 2020-10-30 ENCOUNTER — TELEPHONE (OUTPATIENT)
Dept: CARDIOLOGY | Facility: CLINIC | Age: 60
End: 2020-10-30

## 2020-10-30 LAB
ALBUMIN SERPL-MCNC: 4.3 G/DL (ref 3.5–5.2)
ALBUMIN/GLOB SERPL: 1.9 G/DL
ALP SERPL-CCNC: 93 U/L (ref 39–117)
ALT SERPL-CCNC: 36 U/L (ref 1–41)
AST SERPL-CCNC: 21 U/L (ref 1–40)
BASOPHILS # BLD AUTO: 0.03 10*3/MM3 (ref 0–0.2)
BASOPHILS NFR BLD AUTO: 0.4 % (ref 0–1.5)
BILIRUB SERPL-MCNC: 0.6 MG/DL (ref 0–1.2)
BUN SERPL-MCNC: 23 MG/DL (ref 8–23)
BUN/CREAT SERPL: 25.8 (ref 7–25)
CALCIUM SERPL-MCNC: 9.1 MG/DL (ref 8.6–10.5)
CHLORIDE SERPL-SCNC: 102 MMOL/L (ref 98–107)
CO2 SERPL-SCNC: 30.6 MMOL/L (ref 22–29)
CREAT SERPL-MCNC: 0.89 MG/DL (ref 0.76–1.27)
EOSINOPHIL # BLD AUTO: 0.03 10*3/MM3 (ref 0–0.4)
EOSINOPHIL NFR BLD AUTO: 0.4 % (ref 0.3–6.2)
ERYTHROCYTE [DISTWIDTH] IN BLOOD BY AUTOMATED COUNT: 12.8 % (ref 12.3–15.4)
GLOBULIN SER CALC-MCNC: 2.3 GM/DL
GLUCOSE SERPL-MCNC: 95 MG/DL (ref 65–99)
HCT VFR BLD AUTO: 45.5 % (ref 37.5–51)
HGB BLD-MCNC: 15.9 G/DL (ref 13–17.7)
IMM GRANULOCYTES # BLD AUTO: 0.04 10*3/MM3 (ref 0–0.05)
IMM GRANULOCYTES NFR BLD AUTO: 0.5 % (ref 0–0.5)
LYMPHOCYTES # BLD AUTO: 1.59 10*3/MM3 (ref 0.7–3.1)
LYMPHOCYTES NFR BLD AUTO: 19.3 % (ref 19.6–45.3)
MCH RBC QN AUTO: 31 PG (ref 26.6–33)
MCHC RBC AUTO-ENTMCNC: 34.9 G/DL (ref 31.5–35.7)
MCV RBC AUTO: 88.7 FL (ref 79–97)
MONOCYTES # BLD AUTO: 0.56 10*3/MM3 (ref 0.1–0.9)
MONOCYTES NFR BLD AUTO: 6.8 % (ref 5–12)
NEUTROPHILS # BLD AUTO: 5.98 10*3/MM3 (ref 1.7–7)
NEUTROPHILS NFR BLD AUTO: 72.6 % (ref 42.7–76)
NRBC BLD AUTO-RTO: 0 /100 WBC (ref 0–0.2)
PLATELET # BLD AUTO: 252 10*3/MM3 (ref 140–450)
POTASSIUM SERPL-SCNC: 4.7 MMOL/L (ref 3.5–5.2)
PROT SERPL-MCNC: 6.6 G/DL (ref 6–8.5)
RBC # BLD AUTO: 5.13 10*6/MM3 (ref 4.14–5.8)
SODIUM SERPL-SCNC: 141 MMOL/L (ref 136–145)
WBC # BLD AUTO: 8.23 10*3/MM3 (ref 3.4–10.8)

## 2020-10-30 NOTE — TELEPHONE ENCOUNTER
Chong had called with concerns about being dehydrated. He reports he went to his PCP and had labs and they had reported dehydration. He had bought some Gatorade to see if that helps with his dehydration. He is concerned he may need to stop some of his heart failure medications. He reports that he is drinking and eating and has good urine output. Reviewed his labs which appear to be stable. Advised to continue on current medical therapy, cautioned him in drinking to much Gatorade due to sodium. He inquired about his leg pain, reviewed his potassium which was normal advised to follow up with neurology and discuss with pcp other causes. He expresses understanding. Reviewed his upcoming appointment.

## 2020-11-02 ENCOUNTER — APPOINTMENT (OUTPATIENT)
Dept: CARDIOLOGY | Facility: HOSPITAL | Age: 60
End: 2020-11-02

## 2020-11-06 ENCOUNTER — HOSPITAL ENCOUNTER (OUTPATIENT)
Dept: CARDIOLOGY | Facility: HOSPITAL | Age: 60
Discharge: HOME | End: 2020-11-06

## 2020-11-06 ENCOUNTER — HOSPITAL ENCOUNTER (OUTPATIENT)
Dept: ULTRASOUND IMAGING | Facility: HOSPITAL | Age: 60
Discharge: HOME | End: 2020-11-06

## 2020-11-06 VITALS
SYSTOLIC BLOOD PRESSURE: 102 MMHG | WEIGHT: 153.8 LBS | DIASTOLIC BLOOD PRESSURE: 73 MMHG | HEART RATE: 57 BPM | OXYGEN SATURATION: 96 % | BODY MASS INDEX: 23.39 KG/M2 | RESPIRATION RATE: 20 BRPM | TEMPERATURE: 97.8 F

## 2020-11-06 DIAGNOSIS — G89.29 OTHER CHRONIC PAIN: ICD-10-CM

## 2020-11-06 DIAGNOSIS — Z72.0 TOBACCO ABUSE: ICD-10-CM

## 2020-11-06 DIAGNOSIS — I50.42 CHRONIC COMBINED SYSTOLIC AND DIASTOLIC CONGESTIVE HEART FAILURE (HCC): ICD-10-CM

## 2020-11-06 DIAGNOSIS — R10.32 LEFT LOWER QUADRANT PAIN: ICD-10-CM

## 2020-11-06 DIAGNOSIS — G62.9 NEUROPATHY: ICD-10-CM

## 2020-11-06 DIAGNOSIS — I42.8 NON-ISCHEMIC CARDIOMYOPATHY (HCC): Primary | ICD-10-CM

## 2020-11-06 LAB
ANION GAP SERPL CALCULATED.3IONS-SCNC: 8.9 MMOL/L (ref 5–15)
BUN SERPL-MCNC: 19 MG/DL (ref 8–23)
BUN/CREAT SERPL: 19.2 (ref 7–25)
CALCIUM SPEC-SCNC: 9.4 MG/DL (ref 8.6–10.5)
CHLORIDE SERPL-SCNC: 99 MMOL/L (ref 98–107)
CO2 SERPL-SCNC: 33.1 MMOL/L (ref 22–29)
CREAT SERPL-MCNC: 0.99 MG/DL (ref 0.76–1.27)
GFR SERPL CREATININE-BSD FRML MDRD: 77 ML/MIN/1.73
GLUCOSE SERPL-MCNC: 97 MG/DL (ref 65–99)
MAGNESIUM SERPL-MCNC: 2.3 MG/DL (ref 1.6–2.4)
NT-PROBNP SERPL-MCNC: 260.5 PG/ML (ref 0–900)
POTASSIUM SERPL-SCNC: 4.3 MMOL/L (ref 3.5–5.2)
SODIUM SERPL-SCNC: 141 MMOL/L (ref 136–145)

## 2020-11-06 PROCEDURE — 83880 ASSAY OF NATRIURETIC PEPTIDE: CPT | Performed by: NURSE PRACTITIONER

## 2020-11-06 PROCEDURE — 83735 ASSAY OF MAGNESIUM: CPT | Performed by: NURSE PRACTITIONER

## 2020-11-06 PROCEDURE — 76700 US EXAM ABDOM COMPLETE: CPT

## 2020-11-06 PROCEDURE — 80048 BASIC METABOLIC PNL TOTAL CA: CPT | Performed by: NURSE PRACTITIONER

## 2020-11-06 PROCEDURE — 99214 OFFICE O/P EST MOD 30 MIN: CPT | Performed by: NURSE PRACTITIONER

## 2020-11-06 PROCEDURE — G0463 HOSPITAL OUTPT CLINIC VISIT: HCPCS | Performed by: PHARMACIST

## 2020-11-06 PROCEDURE — 76700 US EXAM ABDOM COMPLETE: CPT | Performed by: RADIOLOGY

## 2020-11-06 RX ORDER — ASPIRIN 81 MG/1
81 TABLET, CHEWABLE ORAL DAILY
COMMUNITY
End: 2022-06-16

## 2020-11-06 NOTE — PROGRESS NOTES
Heart Failure Pharmacy Note  Patient Name: Chong White  Referring Provider: Coleen Schwartz  Primary Cardiologist: Olivia Steele    Medication Use:   Adherence: pt reports being compliant   Hx of med intolerances: None reported  Affordability: None reported, Eliquis and Entresto $0     Past Medical History:   Diagnosis Date   • Atrial fibrillation (CMS/HCC)    • GERD (gastroesophageal reflux disease)    • Hypertension    • Myocardial infarction (CMS/HCC)      ALLERGIES: Penicillins  Current Outpatient Medications   Medication Sig Dispense Refill   • apixaban (ELIQUIS) 5 MG tablet tablet Take 1 tablet by mouth Every 12 (Twelve) Hours. Indications: Atrial Fibrillation 60 tablet 3   • aspirin 81 MG chewable tablet Chew 81 mg Daily.     • atorvastatin (LIPITOR) 40 MG tablet Take 1 tablet by mouth Every Night. 30 tablet 3   • carvedilol (COREG) 3.125 MG tablet Take 1 tablet by mouth 2 (Two) Times a Day With Meals. 60 tablet 3   • furosemide (LASIX) 40 MG tablet Take 1 tablet by mouth Daily. 30 tablet 3   • pantoprazole (Protonix) 40 MG EC tablet Take 1 tablet by mouth Daily. 30 tablet 3   • sacubitril-valsartan (ENTRESTO) 24-26 MG tablet Take 1 tablet by mouth Every 12 (Twelve) Hours. 60 tablet 3   • sotalol (BETAPACE) 80 MG tablet Take 1 tablet by mouth 2 (Two) Times a Day. 60 tablet 3   • spironolactone (ALDACTONE) 25 MG tablet Take 1 tablet by mouth Daily. 30 tablet 1   • nicotine (NICODERM CQ) 21 MG/24HR patch Place 1 patch on the skin as directed by provider Daily. 28 patch 0     No current facility-administered medications for this encounter.        Vaccination History:   Pneumonia: up to date  Annual Influenza: up to date for 20-21    Objective  Vitals:    11/06/20 1100   BP: 102/73   BP Location: Left arm   Patient Position: Sitting   Pulse: 57   Resp: 20   Temp: 97.8 °F (36.6 °C)   TempSrc: Oral   SpO2: 96%   Weight: 69.8 kg (153 lb 12.8 oz)     Wt Readings from Last 3 Encounters:   11/06/20 69.8 kg (153  lb 12.8 oz)   10/29/20 68.9 kg (152 lb)   10/12/20 70 kg (154 lb 6.4 oz)         11/06/20  1100   Weight: 69.8 kg (153 lb 12.8 oz)     Lab Results   Component Value Date    GLUCOSE 97 11/06/2020    BUN 19 11/06/2020    CREATININE 0.99 11/06/2020    EGFRIFNONA 77 11/06/2020    EGFRIFAFRI 106 10/29/2020    BCR 19.2 11/06/2020    K 4.3 11/06/2020    CO2 33.1 (H) 11/06/2020    CALCIUM 9.4 11/06/2020    PROTENTOTREF 6.6 10/29/2020    ALBUMIN 4.30 10/29/2020    LABIL2 1.9 10/29/2020    AST 21 10/29/2020    ALT 36 10/29/2020     Lab Results   Component Value Date    WBC 8.23 10/29/2020    HGB 15.9 10/29/2020    HCT 45.5 10/29/2020    MCV 88.7 10/29/2020     10/29/2020     Lab Results   Component Value Date    TROPONINT <0.010 09/21/2020     Lab Results   Component Value Date    PROBNP 260.5 11/06/2020     Results for orders placed during the hospital encounter of 09/01/20   Adult Transesophageal Echo (LUCERO) W/ Cont if Necessary Per Protocol    Narrative · Left ventricular systolic function is severely decreased, EF 21-25%.  · Moderately reduced right ventricular systolic function noted.  · Left atrial cavity size is dilated.  · No left atrial thrombus seen.  · Trace mitral regurgitation.  · Trace tricuspid valve regurgitation.  · No PFO seen with negative bubble study.             Drug Therapy Problems    1.GDMT: on Entresto, carvedilol and spironolactone   2. Medication Non-compliance     Recommendations:     1. Titrate up Entresto when appropriate, BP would not tolerate today.   2. Encouraged Pt to quit smoking. Pt requests refills on nicotine patch.   3. Patient educated on the importance of compliance with medications.  Encouraged patient to bring in medication bottles to next appointment for me to review.     Patient was educated on heart failure medications and the importance of medication adherence. All questions were addressed and patient expressed understanding.      Thank you for allowing me to participate  in the care of your patient,    Georgia Hartley, PharmD  11/6/2020  12:33 EST

## 2020-11-06 NOTE — PROGRESS NOTES
[facility] CHF CLINIC OFFICE VISIT    Subjective:     History of Present Illness   Chong White is a 60-year-old  male who presents to the clinic today for follow-up on heart failure.  He reports taking Entresto 24/26 twice daily, Lasix 40 mg daily, Spironolactone 25 mg daily and Coreg 3.125 mg twice a day. There is some concerns with medication compliance, he reports he takes what is in the bottles.  He doesn't bring in any medicines today. He reports being relaxed about wearing his LifeVest. He was given a BP cuff however hasn't been monitoring his blood pressure at home. He hasn't been monitoring weight. He feels that he is loosing weight - he wonders about using boost or nutritional shakes. He has difficulties with finances and affording to keeping follow-up appointments. He hasn't been able to get much help thru . He is trying to adhere to a low-sodium diet however that is difficult from a financial standpoint. He does continue to smoke. He feels he needs something for pain and his neuropathy is worsening. He is awaiting referrals for neurology and pain management next week. He was able to get a new cane. Poor historian due to memory issues.     PCP: Kaya Romo but has been seeing Estelle Joyce  Cardiologist: Dr. Delarosa    Hospitalizations: discharged on 9/10/2020    Past Medical History:   Diagnosis Date   • Atrial fibrillation (CMS/HCC)    • GERD (gastroesophageal reflux disease)    • Hypertension    • Myocardial infarction (CMS/HCC)      Past Surgical History:   Procedure Laterality Date   • CARDIAC CATHETERIZATION N/A 9/4/2020    Procedure: Left Heart Cath;  Surgeon: Herman Sen MD;  Location: [facility] INVASIVE LOCATION;  Service: Cardiology;  Laterality: N/A;   • CORONARY STENT PLACEMENT       Social History     Socioeconomic History   • Marital status: Single     Spouse name: Not on file   • Number of children: Not on file   • Years of education: Not on file   • Highest  education level: Not on file   Tobacco Use   • Smoking status: Current Every Day Smoker     Packs/day: 2.00     Types: Cigarettes     Start date: 3/1/1969   • Smokeless tobacco: Never Used   • Tobacco comment: Has nicotine patches and states has only had approximately 10 cigarettes since discharge on 9/11/2020. Not using patch at this time   Substance and Sexual Activity   • Alcohol use: Never     Frequency: Never   • Drug use: Never   • Sexual activity: Defer     Family History   Problem Relation Age of Onset   • Heart disease Mother    • Heart disease Maternal Grandmother      Allergies:  Allergies   Allergen Reactions   • Penicillins Hives     Review of Systems   Constitution: Negative for fever, malaise/fatigue, weight gain and weight loss.   HENT: Negative for congestion and sore throat.    Eyes: Negative for double vision and visual disturbance.   Cardiovascular: Negative for chest pain, dyspnea on exertion, irregular heartbeat, leg swelling, orthopnea, palpitations, paroxysmal nocturnal dyspnea and syncope.   Respiratory: Positive for shortness of breath (stable ). Negative for cough and wheezing.    Endocrine: Negative for cold intolerance and heat intolerance.   Hematologic/Lymphatic: Negative for bleeding problem. Does not bruise/bleed easily.   Skin: Negative for dry skin and rash.   Musculoskeletal: Positive for arthritis and neck pain. Negative for joint pain and joint swelling.   Gastrointestinal: Negative for abdominal pain, diarrhea, nausea and vomiting.   Genitourinary: Negative for dysuria, frequency and urgency.   Neurological: Positive for numbness, paresthesias and weakness. Negative for loss of balance.   Psychiatric/Behavioral: Negative for altered mental status, depression and substance abuse. The patient is not nervous/anxious.      Current Outpatient Medications   Medication Sig Dispense Refill   • apixaban (ELIQUIS) 5 MG tablet tablet Take 1 tablet by mouth Every 12 (Twelve) Hours.  Indications: Atrial Fibrillation 60 tablet 3   • aspirin 81 MG chewable tablet Chew 81 mg Daily.     • atorvastatin (LIPITOR) 40 MG tablet Take 1 tablet by mouth Every Night. 30 tablet 3   • carvedilol (COREG) 3.125 MG tablet Take 1 tablet by mouth 2 (Two) Times a Day With Meals. 60 tablet 3   • furosemide (LASIX) 40 MG tablet Take 1 tablet by mouth Daily. 30 tablet 3   • pantoprazole (Protonix) 40 MG EC tablet Take 1 tablet by mouth Daily. 30 tablet 3   • sacubitril-valsartan (ENTRESTO) 24-26 MG tablet Take 1 tablet by mouth Every 12 (Twelve) Hours. 60 tablet 3   • sotalol (BETAPACE) 80 MG tablet Take 1 tablet by mouth 2 (Two) Times a Day. 60 tablet 3   • spironolactone (ALDACTONE) 25 MG tablet Take 1 tablet by mouth Daily. 30 tablet 1   • nicotine (NICODERM CQ) 21 MG/24HR patch Place 1 patch on the skin as directed by provider Daily. 28 patch 0     No current facility-administered medications for this encounter.         Objective:     Vitals:    11/06/20 1100   BP: 102/73   Pulse: 57   Resp: 20   Temp: 97.8 °F (36.6 °C)   SpO2: 96%     Wt Readings from Last 3 Encounters:   11/06/20 69.8 kg (153 lb 12.8 oz)   10/29/20 68.9 kg (152 lb)   10/12/20 70 kg (154 lb 6.4 oz)        Vitals signs reviewed.   Constitutional:       Appearance: Normal appearance. Well-developed.      Comments: He walks into the clinic with a new cane.    Eyes:      Conjunctiva/sclera: Conjunctivae normal.   HENT:      Head: Normocephalic.   Neck:      Musculoskeletal: Normal range of motion and neck supple.      Vascular: No JVD or JVR.   Pulmonary:      Effort: Pulmonary effort is normal.      Breath sounds: Normal breath sounds.   Cardiovascular:      Bradycardia present. Regular rhythm.      Comments: lifevest on   Pulses:     Intact distal pulses.   Edema:     Peripheral edema absent.   Abdominal:      General: Bowel sounds are normal.      Palpations: Abdomen is soft. There is no hepatomegaly or splenomegaly.   Musculoskeletal: Normal  range of motion.   Skin:     General: Skin is warm and dry.   Neurological:      Mental Status: Alert and oriented to person, place, and time.   Psychiatric:         Attention and Perception: Attention normal.         Mood and Affect: Mood normal.         Speech: Speech normal.         Behavior: Behavior is cooperative.         Cognition and Memory: Cognition normal.     Cardiographics  Results for orders placed during the hospital encounter of 09/01/20   Adult Transesophageal Echo (LUCERO) W/ Cont if Necessary Per Protocol    Narrative · Left ventricular systolic function is severely decreased, EF 21-25%.  · Moderately reduced right ventricular systolic function noted.  · Left atrial cavity size is dilated.  · No left atrial thrombus seen.  · Trace mitral regurgitation.  · Trace tricuspid valve regurgitation.  · No PFO seen with negative bubble study.        EKG 9/7/2020    Chest x-ray: 9/3/2020    IMPRESSION:  Bilateral atypical pneumonia. Superimposed CHF with  interstitial edema also considered given presence of trace pleural  effusions.  This report was finalized on 9/3/2020 10:08 AM by Dr. Chidi Dean MD.    Lab Review     No results found for: TSH  Lab Results   Component Value Date    GLUCOSE 97 11/06/2020    BUN 19 11/06/2020    CREATININE 0.99 11/06/2020    EGFRIFNONA 77 11/06/2020    EGFRIFAFRI 106 10/29/2020    BCR 19.2 11/06/2020    K 4.3 11/06/2020    CO2 33.1 (H) 11/06/2020    CALCIUM 9.4 11/06/2020    PROTENTOTREF 6.6 10/29/2020    ALBUMIN 4.30 10/29/2020    LABIL2 1.9 10/29/2020    AST 21 10/29/2020    ALT 36 10/29/2020     Lab Results   Component Value Date    WBC 8.23 10/29/2020    HGB 15.9 10/29/2020    HCT 45.5 10/29/2020    MCV 88.7 10/29/2020     10/29/2020     Lab Results   Component Value Date    TROPONINT <0.010 09/21/2020     Lab Results   Component Value Date    PROBNP 260.5 11/06/2020     The following portions of the patient's history were reviewed and updated as appropriate:  allergies, current medications, past family history, past medical history, past social history, past surgical history and problem list.     Old records reviewed and pertinent information is included in the above objective data.     Assessment/Plan:   1. Non-ischemic cardiomyopathy, EF of 21-25% from echo on 9/4/2020  2. Chronic combined systolic and diastolic congestive heart failure  4. Tobacco abuse  5. Neuropathy   6. Chronic pain    1. BMP, BNP and magnesium today   2. Reviewed and discussed laboratory testing with patient today.   3. Continue on current medications, encouraged to bring in all bottles at follow up appointment. Discussed importance of compliance with medications.     4. Monitor blood pressure  5. Encouraged in smoking cessation and encouraged in use of nicotine patches.   5. Schedule for follow up echocardiogram to reevaluate EF  6. Follow up with PCP for evaluation of TSH. Encourage to further discuss nutritional supplements with PCP. Contacted pharmacy and they do not fill nutritional supplements. He will need to further discuss with home supply store.   7. Keep follow up with pain management and neurology  8. Follow up in 1 month, sooner if needed     NICM: EF21-25%. NYHA Class III, Stage C. Patient appears euvolemic. and in a well perfused physiologic state. Hemodynamics are acceptable  BETA-BLOCKER:   Carvedilol 3.215 BID   CORLANOR: no  ACE/ARB: Entresto   ENTRESTO: 24/26mg BID  DIURETIC: Lasix 40 mg daily   ALDOSTERONE ANTAGONIST: Spironolactone 25 mg daily    IMDUR/HYDRALAZINE: no  DIGOXIN: no   Fluid restriction: 2 L  Sodium restriction:2 grams  Daily Weights - encouraged   6MWT: will consider when pt condition is improved  ICD: not indicated - schedule echo for 12/2020  ADHF: 9/10/2000  LVAD/AHF: not indicated     25 minutes face to face spent counseling patient extensively on dietary Na+ intake, importance of activity, weight monitoring, compliance with medications and follow up  appointments.

## 2020-11-09 DIAGNOSIS — K76.9 HEPATIC LESION: Primary | ICD-10-CM

## 2020-11-09 NOTE — PROGRESS NOTES
The ultrasound showed a couple of areas related to liver.  Most likely a cyst and also a large blood vessel.  Further testing is recommended.  I want to refer to GI for further evaluation.  Diagnosis hepatic lesion.

## 2020-11-11 ENCOUNTER — TELEPHONE (OUTPATIENT)
Dept: CARDIOLOGY | Facility: CLINIC | Age: 60
End: 2020-11-11

## 2020-11-11 NOTE — TELEPHONE ENCOUNTER
Patient called inquiring about nutritional supplements. Spoke with [facility] and they can fill prescriptions with proper documentation and diagnosis from PCP. He is advised to further discuss this with his primary as his weight at follow up visits here seem to be stable. He expresses understanding. He also states his spironolactone cost him a $10.00 copay and wanting to know if he needs this medication. Discussed with patient that it is needed for GDMT. We will attempt to see if we can fill thru our pharmacy with some assistance programs. Discussed with [place] and we will further discuss at follow up appointments. Rescheduled echo to same day of his office visit at his request due to transportation difficulties. Patient expresses understanding.

## 2020-11-17 ENCOUNTER — APPOINTMENT (OUTPATIENT)
Dept: CARDIOLOGY | Facility: HOSPITAL | Age: 60
End: 2020-11-17

## 2020-11-18 ENCOUNTER — HOSPITAL ENCOUNTER (OUTPATIENT)
Dept: GENERAL RADIOLOGY | Facility: HOSPITAL | Age: 60
Discharge: HOME | End: 2020-11-18
Admitting: PSYCHIATRY & NEUROLOGY

## 2020-11-18 ENCOUNTER — TRANSCRIBE ORDERS (OUTPATIENT)
Dept: OTHER | Facility: OTHER | Age: 60
End: 2020-11-18

## 2020-11-18 ENCOUNTER — OFFICE VISIT (OUTPATIENT)
Dept: CARDIOLOGY | Facility: CLINIC | Age: 60
End: 2020-11-18

## 2020-11-18 VITALS
SYSTOLIC BLOOD PRESSURE: 85 MMHG | HEIGHT: 68 IN | DIASTOLIC BLOOD PRESSURE: 60 MMHG | BODY MASS INDEX: 23.64 KG/M2 | RESPIRATION RATE: 14 BRPM | HEART RATE: 70 BPM | TEMPERATURE: 97.1 F | WEIGHT: 156 LBS | OXYGEN SATURATION: 95 %

## 2020-11-18 DIAGNOSIS — M54.2 NECK PAIN: ICD-10-CM

## 2020-11-18 DIAGNOSIS — I50.42 CHRONIC COMBINED SYSTOLIC AND DIASTOLIC CONGESTIVE HEART FAILURE (HCC): ICD-10-CM

## 2020-11-18 DIAGNOSIS — I42.8 NON-ISCHEMIC CARDIOMYOPATHY (HCC): Primary | ICD-10-CM

## 2020-11-18 DIAGNOSIS — M54.2 NECK PAIN: Primary | ICD-10-CM

## 2020-11-18 DIAGNOSIS — I48.0 PAROXYSMAL ATRIAL FIBRILLATION (HCC): ICD-10-CM

## 2020-11-18 DIAGNOSIS — I25.10 ASCVD (ARTERIOSCLEROTIC CARDIOVASCULAR DISEASE): ICD-10-CM

## 2020-11-18 PROCEDURE — 72050 X-RAY EXAM NECK SPINE 4/5VWS: CPT | Performed by: RADIOLOGY

## 2020-11-18 PROCEDURE — 72072 X-RAY EXAM THORAC SPINE 3VWS: CPT

## 2020-11-18 PROCEDURE — 72072 X-RAY EXAM THORAC SPINE 3VWS: CPT | Performed by: RADIOLOGY

## 2020-11-18 PROCEDURE — 99213 OFFICE O/P EST LOW 20 MIN: CPT | Performed by: NURSE PRACTITIONER

## 2020-11-18 PROCEDURE — 72050 X-RAY EXAM NECK SPINE 4/5VWS: CPT

## 2020-11-18 RX ORDER — SPIRONOLACTONE 25 MG/1
12.5 TABLET ORAL DAILY
Qty: 15 TABLET | Refills: 1
Start: 2020-11-18 | End: 2021-09-13 | Stop reason: DRUGHIGH

## 2020-11-18 NOTE — PROGRESS NOTES
Kaya Romo APRN  Chong White  1960  11/18/2020    Patient Active Problem List   Diagnosis   • Atrial fibrillation (CMS/HCC)   • Abnormal nuclear stress test   • Neuropathy   • ASCVD (arteriosclerotic cardiovascular disease)   • Tobacco abuse   • Non-ischemic cardiomyopathy (CMS/HCC)   • Chronic combined systolic and diastolic congestive heart failure (CMS/HCC)   • Chronic pain   • Chronic low back pain   • Paralysis (CMS/HCC)       Dear Kaya Romo APRN:    Subjective     Chief Complaint   Patient presents with   • Follow-up     patient is still feeling his hear fluttering   • Med Management     verbal           History of Present Illness:    Chong White is a 60 y.o. male with a past medical history significant for hypertension, ASCVD, paroxysmal atrial fibrillation, cardiomyopathy with most recent EF 21 to 25%, tobacco abuse, and history of IV drug abuse.  He presents today for routine cardiology follow-up.  He has an occasional aching in the left chest wall which is not related to exertion.  He has chronic dyspnea with mild to moderate exertion.  He has occasional palpitations which seem to be occurring at night.  He denies any leg edema, orthopnea, or weight gain.  He denies any discharges from his LifeVest.  He does report he continues to feel weak and tired.  His blood pressure has been running low.  He reports good urine output.  Labs on 11/6/2020 revealed normal BMP, BNP, and magnesium.  He has follow-up echocardiogram scheduled for 12/4/2020 to reevaluate LV function.  He has a visit with the heart failure clinic on 12/4/2020 as well.        Allergies   Allergen Reactions   • Penicillins Hives   :      Current Outpatient Medications:   •  apixaban (ELIQUIS) 5 MG tablet tablet, Take 1 tablet by mouth Every 12 (Twelve) Hours. Indications: Atrial Fibrillation, Disp: 60 tablet, Rfl: 3  •  aspirin 81 MG chewable tablet, Chew 81 mg Daily., Disp: , Rfl:   •  atorvastatin (LIPITOR) 40 MG  tablet, Take 1 tablet by mouth Every Night., Disp: 30 tablet, Rfl: 3  •  carvedilol (COREG) 3.125 MG tablet, Take 1 tablet by mouth 2 (Two) Times a Day With Meals., Disp: 60 tablet, Rfl: 3  •  furosemide (LASIX) 40 MG tablet, Take 1 tablet by mouth Daily., Disp: 30 tablet, Rfl: 3  •  nicotine (NICODERM CQ) 21 MG/24HR patch, Place 1 patch on the skin as directed by provider Daily., Disp: 28 patch, Rfl: 0  •  pantoprazole (Protonix) 40 MG EC tablet, Take 1 tablet by mouth Daily., Disp: 30 tablet, Rfl: 3  •  sacubitril-valsartan (ENTRESTO) 24-26 MG tablet, Take 1 tablet by mouth Every 12 (Twelve) Hours., Disp: 60 tablet, Rfl: 3  •  sotalol (BETAPACE) 80 MG tablet, Take 1 tablet by mouth 2 (Two) Times a Day., Disp: 60 tablet, Rfl: 3  •  spironolactone (ALDACTONE) 25 MG tablet, Take 0.5 tablets by mouth Daily., Disp: 15 tablet, Rfl: 1      The following portions of the patient's history were reviewed and updated as appropriate: allergies, current medications, past family history, past medical history, past social history, past surgical history and problem list.    Social History     Tobacco Use   • Smoking status: Current Every Day Smoker     Packs/day: 2.00     Types: Cigarettes     Start date: 3/1/1969   • Smokeless tobacco: Never Used   • Tobacco comment: Has nicotine patches and states has only had approximately 10 cigarettes since discharge on 9/11/2020. Not using patch at this time   Substance Use Topics   • Alcohol use: Never     Frequency: Never   • Drug use: Never       Review of Systems   Constitution: Positive for malaise/fatigue. Negative for decreased appetite.   Cardiovascular: Positive for dyspnea on exertion and palpitations. Negative for chest pain, irregular heartbeat, leg swelling, near-syncope, orthopnea, paroxysmal nocturnal dyspnea and syncope.   Respiratory: Positive for shortness of breath. Negative for cough and wheezing.    Neurological: Negative for dizziness, light-headedness and weakness.  "      Objective   Vitals:    11/18/20 1338   BP: (!) 85/60   BP Location: Left arm   Patient Position: Sitting   Cuff Size: Adult   Pulse: 70   Resp: 14   Temp: 97.1 °F (36.2 °C)   TempSrc: Temporal   SpO2: 95%   Weight: 70.8 kg (156 lb)   Height: 172.7 cm (67.99\")     Body mass index is 23.73 kg/m².        Vitals signs reviewed.   Constitutional:       Appearance: Healthy appearance. Well-developed and not in distress.   HENT:      Head: Normocephalic and atraumatic.   Pulmonary:      Effort: Pulmonary effort is normal.      Breath sounds: Normal breath sounds. No wheezing. No rales.   Chest:      Comments: Wearing LifeVest  Cardiovascular:      Normal rate. Regular rhythm.      Murmurs: There is no murmur.      . No S3 and S4 gallop.   Edema:     Peripheral edema absent.   Abdominal:      General: Bowel sounds are normal.      Palpations: Abdomen is soft.   Musculoskeletal:      Comments: Antalgic gait, ambulates with cane   Skin:     General: Skin is warm and dry.   Neurological:      Mental Status: Alert, oriented to person, place, and time and oriented to person, place and time.   Psychiatric:         Mood and Affect: Mood normal.         Behavior: Behavior normal.         Lab Results   Component Value Date     11/06/2020    K 4.3 11/06/2020    CL 99 11/06/2020    CO2 33.1 (H) 11/06/2020    BUN 19 11/06/2020    CREATININE 0.99 11/06/2020    GLUCOSE 97 11/06/2020    CALCIUM 9.4 11/06/2020    AST 21 10/29/2020    ALT 36 10/29/2020    ALKPHOS 93 10/29/2020    LABIL2 1.9 10/29/2020     No results found for: CKTOTAL  Lab Results   Component Value Date    WBC 8.23 10/29/2020    HGB 15.9 10/29/2020    HCT 45.5 10/29/2020     10/29/2020     Lab Results   Component Value Date    INR 1.05 09/02/2020    INR 0.97 09/01/2020     Lab Results   Component Value Date    MG 2.3 11/06/2020     Lab Results   Component Value Date    TRIG 51 09/03/2020    HDL 42 09/03/2020    LDL 81 09/03/2020      No results found for: " BNP        Procedures      Assessment/Plan    Diagnosis Plan   1. Non-ischemic cardiomyopathy (CMS/HCC)  spironolactone (ALDACTONE) 25 MG tablet   2. Chronic combined systolic and diastolic congestive heart failure (CMS/HCC)     3. ASCVD (arteriosclerotic cardiovascular disease)     4. Paroxysmal atrial fibrillation (CMS/HCC)                  Recommendations:    1. Since he is hypotensive and weak today, will decrease spironolactone to 12.5 mg daily.    2. Continue low dose aspirin, Eliquis, atorvastatin, carvedilol, furosemide, Entresto, and Sotalol.    3. He will follow up with CHF clinic and have echocardiogram on 12/4/2020.    4. Follow up here in 2 months or sooner if needed.          Patient's Body mass index is 23.73 kg/m². BMI is within normal parameters. No follow-up required..         Return in about 2 months (around 1/18/2021) for Recheck.    As always, I appreciate very much the opportunity to participate in the cardiovascular care of your patients.      With Best Regards,    SAMARA Almonte

## 2021-01-08 ENCOUNTER — TELEPHONE (OUTPATIENT)
Dept: CARDIOLOGY | Facility: CLINIC | Age: 61
End: 2021-01-08

## 2021-01-08 NOTE — TELEPHONE ENCOUNTER
Have made several attempt to call patient to tell him needs to reschedule his ECHO-per Olivia-because he is wearing the [employer] Life Vest    Number has been unreachable and is now disconnected     Left  for Dania -his emergency contact to call back-she is not on his HIPPA but we need to get another number from her

## 2021-01-11 ENCOUNTER — TELEPHONE (OUTPATIENT)
Dept: CARDIOLOGY | Facility: HOSPITAL | Age: 61
End: 2021-01-11

## 2021-01-11 NOTE — TELEPHONE ENCOUNTER
Tried reaching out to Mr. White to see if we could get him an appointment scheduled. No answer or voicemail.

## 2021-01-14 NOTE — TELEPHONE ENCOUNTER
I spoke with life vest and they are not going to reorder echo since he has not answered his phone.

## 2021-01-18 ENCOUNTER — TELEPHONE (OUTPATIENT)
Dept: CARDIOLOGY | Facility: HOSPITAL | Age: 61
End: 2021-01-18

## 2021-01-18 NOTE — TELEPHONE ENCOUNTER
I tried calling Chnog again to see if we can get him an appointment to follow-up in the [facility], no answer on either number.

## 2021-02-01 ENCOUNTER — TELEPHONE (OUTPATIENT)
Dept: CARDIOLOGY | Facility: HOSPITAL | Age: 61
End: 2021-02-01

## 2021-02-01 NOTE — TELEPHONE ENCOUNTER
Spoke to Chong about getting an appointment scheduled in [facility]. He requested early afternoon next Wednesday 2/10/21. He has been scheduled for 1:00 PM. I also spoke to him about I have scheduled him for his Echo at 9:30 AM same day and mentioned he could come on over to appointment right after. He was okay with appointment times and stated he would be compliant. He had no questions at this time.

## 2021-02-10 ENCOUNTER — APPOINTMENT (OUTPATIENT)
Dept: CARDIOLOGY | Facility: HOSPITAL | Age: 61
End: 2021-02-10

## 2021-02-10 ENCOUNTER — TELEPHONE (OUTPATIENT)
Dept: CARDIOLOGY | Facility: HOSPITAL | Age: 61
End: 2021-02-10

## 2021-02-10 NOTE — TELEPHONE ENCOUNTER
I called Chong to check on him since he did not show for his ECHO or appointment in Heart Failure Clinic. No answer or voicemail.

## 2021-02-26 ENCOUNTER — TELEPHONE (OUTPATIENT)
Dept: CARDIOLOGY | Facility: HOSPITAL | Age: 61
End: 2021-02-26

## 2021-02-26 NOTE — TELEPHONE ENCOUNTER
Spoke to Chong about having an Echo scheduled and a follow up appointment in [facility]. His Echo is scheduled for 3/8/21 at 9:[facility]am at Hospital and his follow up has been made for 11:00am same day. I encouraged him about how important it is to keep his appointments in order to keep his LifeVest. He had no questions today and stated that he would keep his appointments.

## 2021-03-15 NOTE — PROGRESS NOTES
He is a little dehydrated. Otherwise his labs are not showing any cause for his abdominal pain. Detail Level: Detailed Billing Information: Bill by Static Price Render Post-Care Instructions In Note?: no Post-Care Instructions: I reviewed with the patient in detail post-care instructions. Patient is to wear sunprotection, and avoid picking at any of the treated lesions. Pt may apply Vaseline to crusted or scabbing areas. Consent: The patient's consent was obtained including but not limited to risks of crusting, scabbing, blistering, scarring, darker or lighter pigmentary change, recurrence, incomplete removal and infection. The patient understands that the procedure is cosmetic in nature and is not covered by insurance.

## 2021-03-16 ENCOUNTER — HOSPITAL ENCOUNTER (OUTPATIENT)
Dept: GENERAL RADIOLOGY | Facility: HOSPITAL | Age: 61
Discharge: HOME | End: 2021-03-16
Admitting: PSYCHIATRY & NEUROLOGY

## 2021-03-16 ENCOUNTER — TRANSCRIBE ORDERS (OUTPATIENT)
Dept: ADMINISTRATIVE | Facility: HOSPITAL | Age: 61
End: 2021-03-16

## 2021-03-16 DIAGNOSIS — M47.816 LUMBAR SPONDYLOSIS: ICD-10-CM

## 2021-03-16 DIAGNOSIS — M47.816 LUMBAR SPONDYLOSIS: Primary | ICD-10-CM

## 2021-03-16 PROCEDURE — 72202 X-RAY EXAM SI JOINTS 3/> VWS: CPT | Performed by: RADIOLOGY

## 2021-03-16 PROCEDURE — 72202 X-RAY EXAM SI JOINTS 3/> VWS: CPT

## 2021-03-25 ENCOUNTER — TRANSCRIBE ORDERS (OUTPATIENT)
Dept: ADMINISTRATIVE | Facility: HOSPITAL | Age: 61
End: 2021-03-25

## 2021-03-25 DIAGNOSIS — M47.816 LUMBAR SPONDYLOSIS: Primary | ICD-10-CM

## 2021-04-13 ENCOUNTER — HOSPITAL ENCOUNTER (OUTPATIENT)
Dept: MRI IMAGING | Facility: HOSPITAL | Age: 61
Discharge: HOME | End: 2021-04-13

## 2021-04-13 DIAGNOSIS — M47.816 LUMBAR SPONDYLOSIS: ICD-10-CM

## 2021-04-13 PROCEDURE — 72148 MRI LUMBAR SPINE W/O DYE: CPT

## 2021-04-13 PROCEDURE — 72141 MRI NECK SPINE W/O DYE: CPT | Performed by: RADIOLOGY

## 2021-04-13 PROCEDURE — 72141 MRI NECK SPINE W/O DYE: CPT

## 2021-04-13 PROCEDURE — 72148 MRI LUMBAR SPINE W/O DYE: CPT | Performed by: RADIOLOGY

## 2021-08-17 ENCOUNTER — TELEPHONE (OUTPATIENT)
Dept: PHARMACY | Facility: HOSPITAL | Age: 61
End: 2021-08-17

## 2021-08-17 NOTE — TELEPHONE ENCOUNTER
Patient called wanting to schedule an appointment with the [facility]. He reports have shortness of breathe and swelling. I instructed patient to go to the ER. He states he has a child at home that he can not leave. I made patient an appointment for tomorrow and again advised that he should go to the ER.

## 2021-08-18 ENCOUNTER — APPOINTMENT (OUTPATIENT)
Dept: GENERAL RADIOLOGY | Facility: HOSPITAL | Age: 61
End: 2021-08-18

## 2021-08-18 ENCOUNTER — HOSPITAL ENCOUNTER (EMERGENCY)
Facility: HOSPITAL | Age: 61
Discharge: HOME | End: 2021-08-18
Attending: EMERGENCY MEDICINE | Admitting: EMERGENCY MEDICINE

## 2021-08-18 ENCOUNTER — HOSPITAL ENCOUNTER (OUTPATIENT)
Dept: CARDIOLOGY | Facility: HOSPITAL | Age: 61
Discharge: HOME | End: 2021-08-18

## 2021-08-18 VITALS
HEIGHT: 68 IN | WEIGHT: 171 LBS | TEMPERATURE: 98.6 F | DIASTOLIC BLOOD PRESSURE: 94 MMHG | BODY MASS INDEX: 25.91 KG/M2 | SYSTOLIC BLOOD PRESSURE: 104 MMHG | RESPIRATION RATE: 18 BRPM | OXYGEN SATURATION: 100 % | HEART RATE: 131 BPM

## 2021-08-18 DIAGNOSIS — R07.9 CHEST PAIN, UNSPECIFIED TYPE: Primary | ICD-10-CM

## 2021-08-18 DIAGNOSIS — I42.8 NON-ISCHEMIC CARDIOMYOPATHY (HCC): ICD-10-CM

## 2021-08-18 DIAGNOSIS — I50.43 ACUTE ON CHRONIC COMBINED SYSTOLIC AND DIASTOLIC CONGESTIVE HEART FAILURE (HCC): ICD-10-CM

## 2021-08-18 DIAGNOSIS — R00.2 PALPITATIONS: ICD-10-CM

## 2021-08-18 DIAGNOSIS — I48.91 ATRIAL FIBRILLATION WITH RVR (HCC): Primary | ICD-10-CM

## 2021-08-18 DIAGNOSIS — J18.9 PNEUMONIA OF LEFT LOWER LOBE DUE TO INFECTIOUS ORGANISM: ICD-10-CM

## 2021-08-18 LAB
ALBUMIN SERPL-MCNC: 3.62 G/DL (ref 3.5–5.2)
ALBUMIN/GLOB SERPL: 1.4 G/DL
ALP SERPL-CCNC: 106 U/L (ref 39–117)
ALT SERPL W P-5'-P-CCNC: 49 U/L (ref 1–41)
ANION GAP SERPL CALCULATED.3IONS-SCNC: 8.3 MMOL/L (ref 5–15)
APTT PPP: 29.8 SECONDS (ref 25.5–35.4)
AST SERPL-CCNC: 30 U/L (ref 1–40)
BASOPHILS # BLD AUTO: 0.04 10*3/MM3 (ref 0–0.2)
BASOPHILS NFR BLD AUTO: 0.5 % (ref 0–1.5)
BILIRUB SERPL-MCNC: 0.4 MG/DL (ref 0–1.2)
BILIRUB UR QL STRIP: NEGATIVE
BUN SERPL-MCNC: 11 MG/DL (ref 8–23)
BUN/CREAT SERPL: 10.3 (ref 7–25)
CALCIUM SPEC-SCNC: 8.6 MG/DL (ref 8.6–10.5)
CHLORIDE SERPL-SCNC: 105 MMOL/L (ref 98–107)
CLARITY UR: CLEAR
CO2 SERPL-SCNC: 26.7 MMOL/L (ref 22–29)
COLOR UR: YELLOW
CREAT SERPL-MCNC: 1.07 MG/DL (ref 0.76–1.27)
DEPRECATED RDW RBC AUTO: 43.8 FL (ref 37–54)
EOSINOPHIL # BLD AUTO: 0.06 10*3/MM3 (ref 0–0.4)
EOSINOPHIL NFR BLD AUTO: 0.8 % (ref 0.3–6.2)
ERYTHROCYTE [DISTWIDTH] IN BLOOD BY AUTOMATED COUNT: 12.9 % (ref 12.3–15.4)
FLUAV RNA RESP QL NAA+PROBE: NOT DETECTED
FLUBV RNA RESP QL NAA+PROBE: NOT DETECTED
GFR SERPL CREATININE-BSD FRML MDRD: 70 ML/MIN/1.73
GLOBULIN UR ELPH-MCNC: 2.7 GM/DL
GLUCOSE SERPL-MCNC: 104 MG/DL (ref 65–99)
GLUCOSE UR STRIP-MCNC: NEGATIVE MG/DL
HCT VFR BLD AUTO: 44.1 % (ref 37.5–51)
HGB BLD-MCNC: 14.4 G/DL (ref 13–17.7)
HGB UR QL STRIP.AUTO: NEGATIVE
IMM GRANULOCYTES # BLD AUTO: 0.04 10*3/MM3 (ref 0–0.05)
IMM GRANULOCYTES NFR BLD AUTO: 0.5 % (ref 0–0.5)
INR PPP: 1.07 (ref 0.9–1.1)
KETONES UR QL STRIP: NEGATIVE
LEUKOCYTE ESTERASE UR QL STRIP.AUTO: NEGATIVE
LYMPHOCYTES # BLD AUTO: 1.48 10*3/MM3 (ref 0.7–3.1)
LYMPHOCYTES NFR BLD AUTO: 19 % (ref 19.6–45.3)
MAGNESIUM SERPL-MCNC: 2.3 MG/DL (ref 1.6–2.4)
MCH RBC QN AUTO: 30.1 PG (ref 26.6–33)
MCHC RBC AUTO-ENTMCNC: 32.7 G/DL (ref 31.5–35.7)
MCV RBC AUTO: 92.1 FL (ref 79–97)
MONOCYTES # BLD AUTO: 0.59 10*3/MM3 (ref 0.1–0.9)
MONOCYTES NFR BLD AUTO: 7.6 % (ref 5–12)
NEUTROPHILS NFR BLD AUTO: 5.6 10*3/MM3 (ref 1.7–7)
NEUTROPHILS NFR BLD AUTO: 71.6 % (ref 42.7–76)
NITRITE UR QL STRIP: NEGATIVE
NRBC BLD AUTO-RTO: 0 /100 WBC (ref 0–0.2)
NT-PROBNP SERPL-MCNC: 5409 PG/ML (ref 0–900)
PH UR STRIP.AUTO: 5.5 [PH] (ref 5–8)
PLATELET # BLD AUTO: 295 10*3/MM3 (ref 140–450)
PMV BLD AUTO: 10 FL (ref 6–12)
POTASSIUM SERPL-SCNC: 3.9 MMOL/L (ref 3.5–5.2)
PROT SERPL-MCNC: 6.3 G/DL (ref 6–8.5)
PROT UR QL STRIP: ABNORMAL
PROTHROMBIN TIME: 14.3 SECONDS (ref 12.8–14.5)
RBC # BLD AUTO: 4.79 10*6/MM3 (ref 4.14–5.8)
SARS-COV-2 RNA RESP QL NAA+PROBE: NOT DETECTED
SODIUM SERPL-SCNC: 140 MMOL/L (ref 136–145)
SP GR UR STRIP: 1.02 (ref 1–1.03)
TROPONIN T SERPL-MCNC: <0.01 NG/ML (ref 0–0.03)
UROBILINOGEN UR QL STRIP: ABNORMAL
WBC # BLD AUTO: 7.81 10*3/MM3 (ref 3.4–10.8)

## 2021-08-18 PROCEDURE — 93005 ELECTROCARDIOGRAM TRACING: CPT | Performed by: EMERGENCY MEDICINE

## 2021-08-18 PROCEDURE — 87636 SARSCOV2 & INF A&B AMP PRB: CPT | Performed by: EMERGENCY MEDICINE

## 2021-08-18 PROCEDURE — 99284 EMERGENCY DEPT VISIT MOD MDM: CPT

## 2021-08-18 PROCEDURE — 85610 PROTHROMBIN TIME: CPT | Performed by: EMERGENCY MEDICINE

## 2021-08-18 PROCEDURE — 71045 X-RAY EXAM CHEST 1 VIEW: CPT

## 2021-08-18 PROCEDURE — 81003 URINALYSIS AUTO W/O SCOPE: CPT | Performed by: EMERGENCY MEDICINE

## 2021-08-18 PROCEDURE — 96376 TX/PRO/DX INJ SAME DRUG ADON: CPT

## 2021-08-18 PROCEDURE — 80053 COMPREHEN METABOLIC PANEL: CPT | Performed by: EMERGENCY MEDICINE

## 2021-08-18 PROCEDURE — 93010 ELECTROCARDIOGRAM REPORT: CPT | Performed by: INTERNAL MEDICINE

## 2021-08-18 PROCEDURE — 85025 COMPLETE CBC W/AUTO DIFF WBC: CPT | Performed by: EMERGENCY MEDICINE

## 2021-08-18 PROCEDURE — 71045 X-RAY EXAM CHEST 1 VIEW: CPT | Performed by: RADIOLOGY

## 2021-08-18 PROCEDURE — 83880 ASSAY OF NATRIURETIC PEPTIDE: CPT | Performed by: EMERGENCY MEDICINE

## 2021-08-18 PROCEDURE — 83735 ASSAY OF MAGNESIUM: CPT | Performed by: EMERGENCY MEDICINE

## 2021-08-18 PROCEDURE — 84484 ASSAY OF TROPONIN QUANT: CPT | Performed by: EMERGENCY MEDICINE

## 2021-08-18 PROCEDURE — 96374 THER/PROPH/DIAG INJ IV PUSH: CPT

## 2021-08-18 PROCEDURE — 85730 THROMBOPLASTIN TIME PARTIAL: CPT | Performed by: EMERGENCY MEDICINE

## 2021-08-18 RX ORDER — SOTALOL HYDROCHLORIDE 80 MG/1
80 TABLET ORAL 2 TIMES DAILY
Qty: 60 TABLET | Refills: 0 | Status: SHIPPED | OUTPATIENT
Start: 2021-08-18 | End: 2022-02-23 | Stop reason: SDUPTHER

## 2021-08-18 RX ORDER — METOPROLOL TARTRATE 5 MG/5ML
5 INJECTION INTRAVENOUS ONCE
Status: COMPLETED | OUTPATIENT
Start: 2021-08-18 | End: 2021-08-18

## 2021-08-18 RX ORDER — FUROSEMIDE 20 MG/1
20 TABLET ORAL DAILY
Qty: 30 TABLET | Refills: 0 | Status: SHIPPED | OUTPATIENT
Start: 2021-08-18 | End: 2022-02-23

## 2021-08-18 RX ORDER — FUROSEMIDE 40 MG/1
20 TABLET ORAL ONCE
Status: COMPLETED | OUTPATIENT
Start: 2021-08-18 | End: 2021-08-18

## 2021-08-18 RX ORDER — CARVEDILOL 3.12 MG/1
3.12 TABLET ORAL DAILY
Qty: 30 TABLET | Refills: 0 | Status: SHIPPED | OUTPATIENT
Start: 2021-08-18 | End: 2022-02-23 | Stop reason: SDUPTHER

## 2021-08-18 RX ORDER — DOXYCYCLINE 100 MG/1
100 CAPSULE ORAL ONCE
Status: COMPLETED | OUTPATIENT
Start: 2021-08-18 | End: 2021-08-18

## 2021-08-18 RX ORDER — CARVEDILOL 6.25 MG/1
6.25 TABLET ORAL 2 TIMES DAILY WITH MEALS
Status: DISCONTINUED | OUTPATIENT
Start: 2021-08-18 | End: 2021-08-18 | Stop reason: HOSPADM

## 2021-08-18 RX ORDER — SODIUM CHLORIDE 0.9 % (FLUSH) 0.9 %
10 SYRINGE (ML) INJECTION AS NEEDED
Status: DISCONTINUED | OUTPATIENT
Start: 2021-08-18 | End: 2021-08-18 | Stop reason: HOSPADM

## 2021-08-18 RX ORDER — DOXYCYCLINE 100 MG/1
100 CAPSULE ORAL 2 TIMES DAILY
Qty: 14 CAPSULE | Refills: 0 | Status: SHIPPED | OUTPATIENT
Start: 2021-08-18 | End: 2022-02-23

## 2021-08-18 RX ORDER — SPIRONOLACTONE 25 MG/1
25 TABLET ORAL 2 TIMES DAILY
Qty: 60 TABLET | Refills: 0 | Status: SHIPPED | OUTPATIENT
Start: 2021-08-18 | End: 2022-02-23

## 2021-08-18 RX ORDER — ATORVASTATIN CALCIUM 10 MG/1
40 TABLET, FILM COATED ORAL DAILY
Qty: 30 TABLET | Refills: 0 | Status: SHIPPED | OUTPATIENT
Start: 2021-08-18 | End: 2021-09-13 | Stop reason: ALTCHOICE

## 2021-08-18 RX ORDER — DILTIAZEM HYDROCHLORIDE 5 MG/ML
20 INJECTION INTRAVENOUS ONCE
Status: DISCONTINUED | OUTPATIENT
Start: 2021-08-18 | End: 2021-08-18 | Stop reason: HOSPADM

## 2021-08-18 RX ORDER — SPIRONOLACTONE 25 MG/1
50 TABLET ORAL DAILY
Status: DISCONTINUED | OUTPATIENT
Start: 2021-08-18 | End: 2021-08-18 | Stop reason: HOSPADM

## 2021-08-18 RX ADMIN — APIXABAN 5 MG: 5 TABLET, FILM COATED ORAL at 14:56

## 2021-08-18 RX ADMIN — SPIRONOLACTONE 50 MG: 25 TABLET ORAL at 14:56

## 2021-08-18 RX ADMIN — METOPROLOL TARTRATE 5 MG: 1 INJECTION, SOLUTION INTRAVENOUS at 17:31

## 2021-08-18 RX ADMIN — METOPROLOL TARTRATE 5 MG: 1 INJECTION, SOLUTION INTRAVENOUS at 15:00

## 2021-08-18 RX ADMIN — FUROSEMIDE 20 MG: 40 TABLET ORAL at 16:03

## 2021-08-18 RX ADMIN — DOXYCYCLINE 100 MG: 100 CAPSULE ORAL at 16:04

## 2021-08-18 RX ADMIN — CARVEDILOL 6.25 MG: 6.25 TABLET, FILM COATED ORAL at 14:56

## 2021-08-18 RX ADMIN — METOPROLOL TARTRATE 5 MG: 5 INJECTION, SOLUTION INTRAVENOUS at 14:24

## 2021-08-18 RX ADMIN — METOPROLOL TARTRATE 5 MG: 1 INJECTION, SOLUTION INTRAVENOUS at 16:03

## 2021-08-18 NOTE — ED NOTES
Pt placed on zoles monitor in [facility].     Elisabeth Powell, CANDACE  08/18/21 7215

## 2021-08-18 NOTE — ED PROVIDER NOTES
Subjective   Patient has had chest discomfort and palpitations for 5 days. He is out of all his medications for 30 days. He has history of heart disease, atrial fibrillation, and hypertension.      Chest Pain  Pain location:  Unable to specify  Pain quality: dull    Pain radiates to:  Does not radiate  Pain severity:  Mild  Onset quality:  Gradual  Duration:  5 days  Chronicity:  Chronic  Context: at rest    Context: not breathing, not drug use and not eating    Relieved by:  Nothing  Worsened by:  Nothing  Associated symptoms: fatigue, palpitations and shortness of breath    Risk factors: coronary artery disease, high cholesterol, hypertension and smoking        Review of Systems   Constitutional: Positive for activity change and fatigue.   HENT: Negative.    Eyes: Negative.    Respiratory: Positive for shortness of breath.    Cardiovascular: Positive for chest pain, palpitations and leg swelling.   Gastrointestinal: Negative.    Endocrine: Negative.    Genitourinary: Negative.    Musculoskeletal: Negative.    Skin: Negative.    Allergic/Immunologic: Negative.    Neurological: Negative.    Hematological: Negative.    Psychiatric/Behavioral: Negative.        Past Medical History:   Diagnosis Date   • Atrial fibrillation (HCC)    • GERD (gastroesophageal reflux disease)    • Hypertension    • Myocardial infarction (HCC)        Allergies   Allergen Reactions   • Penicillins Hives       Past Surgical History:   Procedure Laterality Date   • CARDIAC CATHETERIZATION N/A 9/4/2020    Procedure: Left Heart Cath;  Surgeon: Herman Sen MD;  Location: [facility] CATH INVASIVE LOCATION;  Service: Cardiology;  Laterality: N/A;   • CORONARY STENT PLACEMENT         Family History   Problem Relation Age of Onset   • Heart disease Mother    • Heart disease Maternal Grandmother        Social History     Socioeconomic History   • Marital status: Single   Tobacco Use   • Smoking status: Current Every Day Smoker     Packs/day: 2.00      Types: Cigarettes     Start date: 3/1/1969   • Smokeless tobacco: Never Used   • Tobacco comment: Has nicotine patches and states has only had approximately 10 cigarettes since discharge on 9/11/2020. Not using patch at this time   Vaping Use   • Vaping Use: Never used   Substance and Sexual Activity   • Alcohol use: Never   • Drug use: Never   • Sexual activity: Defer           Objective   Physical Exam  Vitals and nursing note reviewed.   HENT:      Head: Normocephalic.   Eyes:      Pupils: Pupils are equal, round, and reactive to light.   Cardiovascular:      Rate and Rhythm: Tachycardia present. Rhythm irregular.   Pulmonary:      Breath sounds: Examination of the right-upper field reveals wheezing. Examination of the left-upper field reveals wheezing. Examination of the right-lower field reveals decreased breath sounds. Examination of the left-lower field reveals decreased breath sounds. Decreased breath sounds and wheezing present.   Abdominal:      Palpations: Abdomen is soft.   Musculoskeletal:         General: Normal range of motion.      Cervical back: Normal range of motion.   Skin:     General: Skin is warm.      Capillary Refill: Capillary refill takes less than 2 seconds.   Neurological:      General: No focal deficit present.      Mental Status: He is alert.   Psychiatric:         Mood and Affect: Mood normal.         Procedures           ED Course  ED Course as of 03/10/22 1310   Wed Aug 18, 2021   1349 EKG interpretation, ventricular rate 143, QRS 88, QTc 481, A. fib with RVR   [JM]   1349 Patient will be triaged to room as quickly as possible after reviewing the EKG patient's presentation is chest pain. [JM]   1553 CXR : left basilar consolidation [FABY]      ED Course User Index  [JM] Alex Lorenzo,   [FABY] Neal Musa MD                                           [facility]    Final diagnoses:   Atrial fibrillation with RVR (CMS/HCC)   Pneumonia of left lower lobe due to infectious organism        ED Disposition  ED Disposition     ED Disposition   Discharge    Condition   Stable    Comment   --             Kaya Romo, APRN  [address] [id number]  [phone]    Schedule an appointment as soon as possible for a visit   If symptoms worsen         Medication List      No changes were made to your prescriptions during this visit.          Neal Musa MD  08/18/21 2455       Neal Musa MD  03/10/22 1317

## 2021-08-18 NOTE — PROGRESS NOTES
Patient presented to clinic today for urgent evaluation after several missed appointments with complaint of chest pain, palpitations, dyspnea and swelling.   VS - weight - 174 lbs, /80, pulse 148, SpO2 at 98%. Pt was advised the need for urgent evaluation in the ER, he was agreeable. Merlyn Johnston MA transported patient via wheelchair to ER.

## 2021-08-18 NOTE — DISCHARGE INSTRUCTIONS
Atrial Fibrillation    Atrial fibrillation is a type of heartbeat that is irregular or fast. If you have this condition, your heart beats without any order. This makes it hard for your heart to pump blood in a normal way.  Atrial fibrillation may come and go, or it may become a long-lasting problem. If this condition is not treated, it can put you at higher risk for stroke, heart failure, and other heart problems.  What are the causes?  This condition may be caused by diseases that damage the heart. They include:  · High blood pressure.  · Heart failure.  · Heart valve disease.  · Heart surgery.  Other causes include:  · Diabetes.  · Thyroid disease.  · Being overweight.  · Kidney disease.  Sometimes the cause is not known.  What increases the risk?  You are more likely to develop this condition if:  · You are older.  · You smoke.  · You exercise often and very hard.  · You have a family history of this condition.  · You are a man.  · You use drugs.  · You drink a lot of alcohol.  · You have lung conditions, such as emphysema, pneumonia, or COPD.  · You have sleep apnea.  What are the signs or symptoms?  Common symptoms of this condition include:  · A feeling that your heart is beating very fast.  · Chest pain or discomfort.  · Feeling short of breath.  · Suddenly feeling light-headed or weak.  · Getting tired easily during activity.  · Fainting.  · Sweating.  In some cases, there are no symptoms.  How is this treated?  Treatment for this condition depends on underlying conditions and how you feel when you have atrial fibrillation. They include:  · Medicines to:  ? Prevent blood clots.  ? Treat heart rate or heart rhythm problems.  · Using devices, such as a pacemaker, to correct heart rhythm problems.  · Doing surgery to remove the part of the heart that sends bad signals.  · Closing an area where clots can form in the heart (left atrial appendage).  In some cases, your doctor will treat other underlying  conditions.  Follow these instructions at home:  Medicines  · Take over-the-counter and prescription medicines only as told by your doctor.  · Do not take any new medicines without first talking to your doctor.  · If you are taking blood thinners:  ? Talk with your doctor before you take any medicines that have aspirin or NSAIDs, such as ibuprofen, in them.  ? Take your medicine exactly as told by your doctor. Take it at the same time each day.  ? Avoid activities that could hurt or bruise you. Follow instructions about how to prevent falls.  ? Wear a bracelet that says you are taking blood thinners. Or, carry a card that lists what medicines you take.  Lifestyle         · Do not use any products that have nicotine or tobacco in them. These include cigarettes, e-cigarettes, and chewing tobacco. If you need help quitting, ask your doctor.  · Eat heart-healthy foods. Talk with your doctor about the right eating plan for you.  · Exercise regularly as told by your doctor.  · Do not drink alcohol.  · Lose weight if you are overweight.  · Do not use drugs, including cannabis.  General instructions  · If you have a condition that causes breathing to stop for a short period of time (apnea), treat it as told by your doctor.  · Keep a healthy weight. Do not use diet pills unless your doctor says they are safe for you. Diet pills may make heart problems worse.  · Keep all follow-up visits as told by your doctor. This is important.  Contact a doctor if:  · You notice a change in the speed, rhythm, or strength of your heartbeat.  · You are taking a blood-thinning medicine and you get more bruising.  · You get tired more easily when you move or exercise.  · You have a sudden change in weight.  Get help right away if:    · You have pain in your chest or your belly (abdomen).  · You have trouble breathing.  · You have side effects of blood thinners, such as blood in your vomit, poop (stool), or pee (urine), or bleeding that cannot  "stop.  · You have any signs of a stroke. \"BE FAST\" is an easy way to remember the main warning signs:  ? B - Balance. Signs are dizziness, sudden trouble walking, or loss of balance.  ? E - Eyes. Signs are trouble seeing or a change in how you see.  ? F - Face. Signs are sudden weakness or loss of feeling in the face, or the face or eyelid drooping on one side.  ? A - Arms. Signs are weakness or loss of feeling in an arm. This happens suddenly and usually on one side of the body.  ? S - Speech. Signs are sudden trouble speaking, slurred speech, or trouble understanding what people say.  ? T - Time. Time to call emergency services. Write down what time symptoms started.  · You have other signs of a stroke, such as:  ? A sudden, very bad headache with no known cause.  ? Feeling like you may vomit (nausea).  ? Vomiting.  ? A seizure.  These symptoms may be an emergency. Do not wait to see if the symptoms will go away. Get medical help right away. Call your local emergency services (911 in the [place] Do not drive yourself to the hospital.  Summary  · Atrial fibrillation is a type of heartbeat that is irregular or fast.  · You are at higher risk of this condition if you smoke, are older, have diabetes, or are overweight.  · Follow your doctor's instructions about medicines, diet, exercise, and follow-up visits.  · Get help right away if you have signs or symptoms of a stroke.  · Get help right away if you cannot catch your breath, or you have chest pain or discomfort.  This information is not intended to replace advice given to you by your health care provider. Make sure you discuss any questions you have with your health care provider.  Document Revised: 06/10/2020 Document Reviewed: 06/10/2020  Elsevier Patient Education © 2021 [employer]    "

## 2021-08-19 LAB
QT INTERVAL: 312 MS
QTC INTERVAL: 481 MS

## 2021-09-13 RX ORDER — FUROSEMIDE 40 MG/1
TABLET ORAL
Qty: 30 TABLET | Refills: 2 | Status: SHIPPED | OUTPATIENT
Start: 2021-09-13 | End: 2022-02-23

## 2021-09-13 RX ORDER — APIXABAN 5 MG/1
TABLET, FILM COATED ORAL
Qty: 60 TABLET | Refills: 2 | Status: SHIPPED | OUTPATIENT
Start: 2021-09-13 | End: 2022-02-23

## 2021-09-13 RX ORDER — ATORVASTATIN CALCIUM 40 MG/1
40 TABLET, FILM COATED ORAL NIGHTLY
Qty: 30 TABLET | Refills: 2 | Status: SHIPPED | OUTPATIENT
Start: 2021-09-13 | End: 2022-02-23

## 2021-09-13 RX ORDER — SPIRONOLACTONE 25 MG/1
25 TABLET ORAL 2 TIMES DAILY
Qty: 60 TABLET | Refills: 2 | OUTPATIENT
Start: 2021-09-13

## 2021-09-16 NOTE — TELEPHONE ENCOUNTER
I tried to call pt on all numbers listed and they have call restrictions so I called [facility] and they have the same number and was unable toreach pt as well but they said if he calls in they will let him know to call our office and schedule a appt.

## 2021-12-20 ENCOUNTER — TRANSCRIBE ORDERS (OUTPATIENT)
Dept: LAB | Facility: HOSPITAL | Age: 61
End: 2021-12-20

## 2021-12-20 ENCOUNTER — HOSPITAL ENCOUNTER (OUTPATIENT)
Dept: GENERAL RADIOLOGY | Facility: HOSPITAL | Age: 61
Discharge: HOME | End: 2021-12-20

## 2021-12-20 DIAGNOSIS — M54.2 CERVICALGIA: ICD-10-CM

## 2021-12-20 DIAGNOSIS — M47.816 LUMBAR SPONDYLOSIS: ICD-10-CM

## 2021-12-20 DIAGNOSIS — M54.2 CERVICALGIA: Primary | ICD-10-CM

## 2021-12-20 PROCEDURE — 72110 X-RAY EXAM L-2 SPINE 4/>VWS: CPT

## 2021-12-20 PROCEDURE — 72050 X-RAY EXAM NECK SPINE 4/5VWS: CPT

## 2021-12-20 PROCEDURE — 72050 X-RAY EXAM NECK SPINE 4/5VWS: CPT | Performed by: RADIOLOGY

## 2021-12-20 PROCEDURE — 72110 X-RAY EXAM L-2 SPINE 4/>VWS: CPT | Performed by: RADIOLOGY

## 2021-12-22 RX ORDER — FUROSEMIDE 40 MG/1
TABLET ORAL
Qty: 30 TABLET | Refills: 2 | OUTPATIENT
Start: 2021-12-22

## 2021-12-22 RX ORDER — ATORVASTATIN CALCIUM 40 MG/1
40 TABLET, FILM COATED ORAL NIGHTLY
Qty: 30 TABLET | Refills: 2 | OUTPATIENT
Start: 2021-12-22

## 2022-01-27 ENCOUNTER — TELEPHONE (OUTPATIENT)
Dept: NEUROSURGERY | Facility: CLINIC | Age: 62
End: 2022-01-27

## 2022-01-27 NOTE — TELEPHONE ENCOUNTER
Caller: Chong White    Relationship to patient: Self    Best call back number:[phone]    Chief complaint:MISSED A NEW PATIENT APPT.    Type of visit:NEW PATIENT    Requested date:ASAP    If rescheduling, when is the original appointment:01/25/22    Additional notes:PT CALLED AND STATES THAT HE RECEIVED A PAPER IN THE MAIL STATING THAT HE HAD BEEN SCHEDULED FOR AN APPT. ON 01/25/22-PT STATES THAT THE LETTER WAS DATED 12/22/21-PT WAS UNAWARE OF THE APPT. OFFICE MADE THE APPT. ADVISING AS PT WOULD LIKE TO RESCHEDULE

## 2022-01-28 NOTE — TELEPHONE ENCOUNTER
Called patient with no answer and no voicemail. If the patient calls back can whomever takes the call please reschedule the patient with a PA-C on a day Dr. Lackey is in the office.

## 2022-02-23 ENCOUNTER — HOSPITAL ENCOUNTER (OUTPATIENT)
Dept: CARDIOLOGY | Facility: HOSPITAL | Age: 62
Discharge: HOME | End: 2022-02-23
Admitting: NURSE PRACTITIONER

## 2022-02-23 VITALS
SYSTOLIC BLOOD PRESSURE: 150 MMHG | HEART RATE: 67 BPM | OXYGEN SATURATION: 97 % | HEIGHT: 68 IN | DIASTOLIC BLOOD PRESSURE: 90 MMHG | WEIGHT: 158.2 LBS | BODY MASS INDEX: 23.98 KG/M2

## 2022-02-23 DIAGNOSIS — Z91.199 MEDICAL NON-COMPLIANCE: ICD-10-CM

## 2022-02-23 DIAGNOSIS — I50.42 CHRONIC COMBINED SYSTOLIC AND DIASTOLIC CONGESTIVE HEART FAILURE: Primary | ICD-10-CM

## 2022-02-23 DIAGNOSIS — Z72.0 TOBACCO ABUSE: ICD-10-CM

## 2022-02-23 DIAGNOSIS — I48.91 ATRIAL FIBRILLATION, UNSPECIFIED TYPE: ICD-10-CM

## 2022-02-23 DIAGNOSIS — I42.9 CARDIOMYOPATHY, UNSPECIFIED TYPE: ICD-10-CM

## 2022-02-23 LAB
ABSOLUTE LUNG FLUID CONTENT: 27 % (ref 20–35)
ANION GAP SERPL CALCULATED.3IONS-SCNC: 8 MMOL/L (ref 5–15)
BUN SERPL-MCNC: 14 MG/DL (ref 8–23)
BUN/CREAT SERPL: 15.2 (ref 7–25)
CALCIUM SPEC-SCNC: 9.1 MG/DL (ref 8.6–10.5)
CHLORIDE SERPL-SCNC: 102 MMOL/L (ref 98–107)
CO2 SERPL-SCNC: 29 MMOL/L (ref 22–29)
CREAT SERPL-MCNC: 0.92 MG/DL (ref 0.76–1.27)
GFR SERPL CREATININE-BSD FRML MDRD: 84 ML/MIN/1.73
GLUCOSE SERPL-MCNC: 87 MG/DL (ref 65–99)
MAGNESIUM SERPL-MCNC: 2.3 MG/DL (ref 1.6–2.4)
NT-PROBNP SERPL-MCNC: 767.6 PG/ML (ref 0–900)
POTASSIUM SERPL-SCNC: 4.8 MMOL/L (ref 3.5–5.2)
SODIUM SERPL-SCNC: 139 MMOL/L (ref 136–145)

## 2022-02-23 PROCEDURE — 83735 ASSAY OF MAGNESIUM: CPT | Performed by: NURSE PRACTITIONER

## 2022-02-23 PROCEDURE — 83880 ASSAY OF NATRIURETIC PEPTIDE: CPT

## 2022-02-23 PROCEDURE — 36415 COLL VENOUS BLD VENIPUNCTURE: CPT | Performed by: NURSE PRACTITIONER

## 2022-02-23 PROCEDURE — 80048 BASIC METABOLIC PNL TOTAL CA: CPT | Performed by: NURSE PRACTITIONER

## 2022-02-23 PROCEDURE — 94726 PLETHYSMOGRAPHY LUNG VOLUMES: CPT | Performed by: NURSE PRACTITIONER

## 2022-02-23 PROCEDURE — 99214 OFFICE O/P EST MOD 30 MIN: CPT | Performed by: NURSE PRACTITIONER

## 2022-02-23 RX ORDER — TAMSULOSIN HYDROCHLORIDE 0.4 MG/1
1 CAPSULE ORAL DAILY
COMMUNITY
End: 2022-02-23

## 2022-02-23 RX ORDER — BACLOFEN 20 MG/1
10 TABLET ORAL 3 TIMES DAILY PRN
COMMUNITY
End: 2022-02-23

## 2022-02-23 RX ORDER — OXCARBAZEPINE 300 MG/1
600 TABLET, FILM COATED ORAL 2 TIMES DAILY
COMMUNITY
End: 2022-02-23

## 2022-02-23 RX ORDER — CARVEDILOL 3.12 MG/1
3.12 TABLET ORAL 2 TIMES DAILY WITH MEALS
Qty: 60 TABLET | Refills: 0 | Status: SHIPPED | OUTPATIENT
Start: 2022-02-23 | End: 2022-02-28 | Stop reason: ALTCHOICE

## 2022-02-23 RX ORDER — LISINOPRIL 5 MG/1
5 TABLET ORAL DAILY
COMMUNITY
End: 2022-02-23

## 2022-02-23 NOTE — PROGRESS NOTES
[facility] CL[facility] OFFICE VISIT  Subjective:     History of Present Illness   Chong White is a 61-year-old  male who presents to the clinic today after a delayed follow-up on heart failure.  He has a history of chronic systolic congestive heart failure and cardiomyopathy with an LVEF of 21 to 25% from echocardiogram performed on 9/2/2020 read by Dr. Delarosa.  He has been noncompliant with medical therapy.  He reports he has not taken any medications in quite some time.     He complains today of intermittent chest pain and palpitations over the last month.  He has a history of systolic congestive heart failure, cardiomyopathy and paroxysmal atrial fibrillation.  He has not been taking any medications for some time.   Mild shortness of air   Mild swelling in lower extremities  Denies abdominal swelling  He discontinued LifeVest usage and did not follow-up for repeat echocardiogram to reevaluate his LVEF to see if he needed evaluation with EP for defibrillator placement.   He was in the ER in August for atrial fibrillation with no follow-up and has quit taking his medicines.  He continues to smoke  When performing a medicine reconciliation it does appear primary Kaya Romo had prescribed lisinopril, patient reports not taking.  Nonadherence to sodium and fluid restrictions    PCP: Tanna Daniels in [place] and Kaya Romo in [place](per patient report)  Cardiologist: Dr. Delarosa    Hospitalizations: discharged on 9/10/2020    Past Medical History:   Diagnosis Date   • Atrial fibrillation (HCC)    • GERD (gastroesophageal reflux disease)    • Hypertension    • Myocardial infarction (HCC)      Past Surgical History:   Procedure Laterality Date   • CARDIAC CATHETERIZATION N/A 9/4/2020    Procedure: Left Heart Cath;  Surgeon: Herman Sen MD;  Location: [facility] CATH INVASIVE LOCATION;  Service: Cardiology;  Laterality: N/A;   • CORONARY STENT PLACEMENT       Social History     Socioeconomic History   • Marital  status: Single   Tobacco Use   • Smoking status: Current Every Day Smoker     Packs/day: 2.00     Types: Cigarettes     Start date: 3/1/1969   • Smokeless tobacco: Never Used   • Tobacco comment: Has nicotine patches and states has only had approximately 10 cigarettes since discharge on 9/11/2020. Not using patch at this time   Substance and Sexual Activity   • Alcohol use: Never   • Drug use: Never   • Sexual activity: Defer     Family History   Problem Relation Age of Onset   • Heart disease Mother    • Heart disease Maternal Grandmother      Allergies:  Allergies   Allergen Reactions   • Penicillins Hives     Review of Systems   Constitutional: Negative for fever, malaise/fatigue, weight gain and weight loss.   HENT: Negative for congestion and sore throat.    Eyes: Negative for double vision and visual disturbance.   Cardiovascular: Positive for leg swelling. Negative for chest pain, dyspnea on exertion, irregular heartbeat, orthopnea, palpitations, paroxysmal nocturnal dyspnea and syncope.   Respiratory: Positive for shortness of breath. Negative for cough and wheezing.    Endocrine: Negative for cold intolerance and heat intolerance.   Hematologic/Lymphatic: Negative for bleeding problem. Does not bruise/bleed easily.   Skin: Negative for dry skin and rash.   Musculoskeletal: Negative for arthritis, joint pain, joint swelling and neck pain.   Gastrointestinal: Negative for abdominal pain, diarrhea, nausea and vomiting.   Genitourinary: Negative for dysuria, frequency and urgency.   Neurological: Negative for loss of balance, numbness, paresthesias and weakness.   Psychiatric/Behavioral: Negative for altered mental status, depression and substance abuse. The patient is not nervous/anxious.      Current Outpatient Medications   Medication Sig Dispense Refill   • aspirin 81 MG chewable tablet Chew 81 mg Daily.     • carvedilol (COREG) 3.125 MG tablet Take 1 tablet by mouth 2 (Two) Times a Day With Meals. 60  tablet 0   • sacubitril-valsartan (ENTRESTO) 24-26 MG tablet Take 1 tablet by mouth Every 12 (Twelve) Hours. 60 tablet 0     No current facility-administered medications for this encounter.      Objective:     Vitals:    02/23/22 0958   BP: 150/90   Pulse: 67   SpO2: 97%     Body mass index is 24.05 kg/m².    Wt Readings from Last 3 Encounters:   02/23/22 71.8 kg (158 lb 3.2 oz)   08/18/21 77.6 kg (171 lb)   11/18/20 70.8 kg (156 lb)        Vitals reviewed.   Constitutional:       Appearance: Normal appearance. Well-developed.      Comments: Uses a cane    Eyes:      Conjunctiva/sclera: Conjunctivae normal.   HENT:      Head: Normocephalic.   Neck:      Vascular: No JVD or JVR.   Pulmonary:      Effort: Pulmonary effort is normal.      Breath sounds: Normal breath sounds.   Cardiovascular:      Normal rate. Regular rhythm.   Pulses:     Intact distal pulses.   Edema:     Peripheral edema present.     Ankle: bilateral trace edema of the ankle.     Feet: bilateral trace edema of the feet.  Abdominal:      General: Bowel sounds are normal.      Palpations: Abdomen is soft. There is no hepatomegaly or splenomegaly.   Musculoskeletal: Normal range of motion.      Cervical back: Normal range of motion and neck supple. Skin:     General: Skin is warm and dry.   Neurological:      Mental Status: Alert and oriented to person, place, and time.   Psychiatric:         Attention and Perception: Attention normal.         Mood and Affect: Mood normal.         Speech: Speech normal.         Behavior: Behavior is cooperative.         Cognition and Memory: Cognition normal.     Cardiographics  Results for orders placed during the hospital encounter of 09/01/20    Adult Transesophageal Echo (LUCERO) W/ Cont if Necessary Per Protocol    Interpretation Summary  · Left ventricular systolic function is severely decreased, EF 21-25%.  · Moderately reduced right ventricular systolic function noted.  · Left atrial cavity size is dilated.  · No  left atrial thrombus seen.  · Trace mitral regurgitation.  · Trace tricuspid valve regurgitation.  · No PFO seen with negative bubble study.    EKG 9/7/2020      Lab Review   No results found for: TSH  Lab Results   Component Value Date    GLUCOSE 87 02/23/2022    BUN 14 02/23/2022    CREATININE 0.92 02/23/2022    EGFRIFNONA 84 02/23/2022    EGFRIFAFRI 106 10/29/2020    BCR 15.2 02/23/2022    K 4.8 02/23/2022    CO2 29.0 02/23/2022    CALCIUM 9.1 02/23/2022    PROTENTOTREF 6.6 10/29/2020    ALBUMIN 3.62 08/18/2021    LABIL2 1.9 10/29/2020    AST 30 08/18/2021    ALT 49 (H) 08/18/2021     Lab Results   Component Value Date    WBC 7.81 08/18/2021    HGB 14.4 08/18/2021    HCT 44.1 08/18/2021    MCV 92.1 08/18/2021     08/18/2021     Lab Results   Component Value Date    TROPONINT <0.010 08/18/2021     Lab Results   Component Value Date    PROBNP 767.6 02/23/2022     The following portions of the patient's history were reviewed and updated as appropriate: allergies, current medications, past family history, past medical history, past social history, past surgical history and problem list.     Old records reviewed and pertinent information is included in the above objective data.     Assessment/Plan:   1. Chronic combined systolic and diastolic congestive heart failure  2. Cardiomyopathy, EF of 21-25% from echo on 9/4/2020  3. Tobacco abuse  4. Medical non-compliance   5. A. Fib     BMP, pro-BNP and magnesium today   Reviewed and discussed laboratory testing with patient today.   ReDs reviewed and discussed   Restart Entresto 24/26 BID, advised to not take Lisinopril with this medication or 72 hours before.  Refills canceled at [facility] pharmacy  Restart coreg 3.125 mg BID   Monitor BP and heart rate, prescription sent for blood pressure device   Encouraged in smoking cessation   Arrange for echocardiogram to reevaluate LVEF  Advised for chest discomfort he should seek immediate emergent medical attention in the  emergency room.  He expresses understanding but declines further evaluation.  Pharmacist arranged routine follow-up with cardiology.  He can further discuss reinitiation of medications for atrial fibrillation at that time and possible need for further cardiac work-up.    Strongly encourage patient in compliance with routine medical appointments and importance of medication compliance. We discussed risks to include but not limited to heart attack, stroke and even death. He expresses understanding and questions were answered.   Counseled patient extensively on dietary Na+ intake, importance of activity, weight monitoring, compliance with medications and follow up appointments.  Follow up in 2 weeks, sooner if needed     NICM: EF21-25%. NYHA Class III, Stage C. Patient appears euvolemic. and in a well perfused physiologic state. Hemodynamics are acceptable  BETA-BLOCKER:   Carvedilol 3.215 BID   CORLANOR: no  ACE/ARB: Entresto   ENTRESTO: 24/26mg BID  DIURETIC:   ALDOSTERONE ANTAGONIST:    IMDUR/HYDRALAZINE: no  DIGOXIN: no   Fluid restriction: 2 L  Sodium restriction:2 grams  Daily Weights - encouraged   6MWT: defer   ICD: reevaluate LVEF   ADHF: 9/10/2000  LVAD/AHF: not indicated

## 2022-02-23 NOTE — PROGRESS NOTES
Heart Failure Pharmacy Note  Patient Name: Chong White  Referring Provider:   Primary Cardiologist:John     Medication Use:   Adherence: Extremely non-adherent   Hx of med intolerances: None related to HF  Affordability: Reports no issues  Retail Rx Management: None    Past Medical History:   Diagnosis Date   • Atrial fibrillation (HCC)    • GERD (gastroesophageal reflux disease)    • Hypertension    • Myocardial infarction (HCC)      ALLERGIES: Penicillins  Current Outpatient Medications   Medication Sig Dispense Refill   • baclofen (LIORESAL) 20 MG tablet Take 10 mg by mouth 3 (Three) Times a Day As Needed for Muscle Spasms.     • OXcarbazepine (TRILEPTAL) 300 MG tablet Take 600 mg by mouth 2 (Two) Times a Day.     • aspirin 81 MG chewable tablet Chew 81 mg Daily.     • atorvastatin (LIPITOR) 40 MG tablet TAKE 1 TABLET BY MOUTH EVERY NIGHT. 30 tablet 2   • carvedilol (COREG) 3.125 MG tablet Take 1 tablet by mouth 2 (Two) Times a Day With Meals. 60 tablet 3   • carvedilol (COREG) 3.125 MG tablet Take 1 tablet by mouth Daily. 30 tablet 0   • Eliquis 5 MG tablet tablet TAKE 1 TABLET BY MOUTH EVERY 12 (TWELVE) HOURS. INDICATIONS: ATRIAL FIBRILLATION 60 tablet 2   • furosemide (LASIX) 40 MG tablet TAKE ONE TABLET BY MOUTH DAILY 30 tablet 2   • lisinopril (PRINIVIL,ZESTRIL) 5 MG tablet Take 5 mg by mouth Daily.     • pantoprazole (Protonix) 40 MG EC tablet Take 1 tablet by mouth Daily. 30 tablet 3   • sacubitril-valsartan (ENTRESTO) 24-26 MG tablet Take 1 tablet by mouth Every 12 (Twelve) Hours. 60 tablet 3   • sotalol (BETAPACE) 80 MG tablet Take 1 tablet by mouth 2 (Two) Times a Day. 60 tablet 3   • spironolactone (ALDACTONE) 25 MG tablet Take 1 tablet by mouth 2 (Two) Times a Day. 60 tablet 0   • tamsulosin (FLOMAX) 0.4 MG capsule 24 hr capsule Take 1 capsule by mouth Daily.       No current facility-administered medications for this encounter.       Vaccination History:   Pneumonia:   Annual Influenza:  "    Objective  Vitals:    02/23/22 0958   BP: 150/90   BP Location: Left arm   Patient Position: Sitting   Cuff Size: Adult   Pulse: 67   SpO2: 97%   Weight: 71.8 kg (158 lb 3.2 oz)   Height: 172.7 cm (68\")     Wt Readings from Last 3 Encounters:   02/23/22 71.8 kg (158 lb 3.2 oz)   08/18/21 77.6 kg (171 lb)   11/18/20 70.8 kg (156 lb)         02/23/22  0958   Weight: 71.8 kg (158 lb 3.2 oz)     Lab Results   Component Value Date    GLUCOSE 104 (H) 08/18/2021    BUN 11 08/18/2021    CREATININE 1.07 08/18/2021    EGFRIFNONA 70 08/18/2021    EGFRIFAFRI 106 10/29/2020    BCR 10.3 08/18/2021    K 3.9 08/18/2021    CO2 26.7 08/18/2021    CALCIUM 8.6 08/18/2021    PROTENTOTREF 6.6 10/29/2020    ALBUMIN 3.62 08/18/2021    LABIL2 1.9 10/29/2020    AST 30 08/18/2021    ALT 49 (H) 08/18/2021     Lab Results   Component Value Date    WBC 7.81 08/18/2021    HGB 14.4 08/18/2021    HCT 44.1 08/18/2021    MCV 92.1 08/18/2021     08/18/2021     Lab Results   Component Value Date    TROPONINT <0.010 08/18/2021     Lab Results   Component Value Date    PROBNP 5,409.0 (H) 08/18/2021     Results for orders placed during the hospital encounter of 09/01/20    Adult Transesophageal Echo (LUCERO) W/ Cont if Necessary Per Protocol    Interpretation Summary  · Left ventricular systolic function is severely decreased, EF 21-25%.  · Moderately reduced right ventricular systolic function noted.  · Left atrial cavity size is dilated.  · No left atrial thrombus seen.  · Trace mitral regurgitation.  · Trace tricuspid valve regurgitation.  · No PFO seen with negative bubble study.           GDMT:       Class   Drug   Dose Last Dose Adjustment Additional Titration   ACEi/ARB/ARNI       Beta Blocker       MRA         Drug Therapy Problems    1. Medication Non-adhearance - not currently taking any of his medications    Recommendations:     1. Educated Pt on the importance of medication adherence.  Explained to him that without his medications he " is at risk for a heart attack, stroke, or blood clot.  I explained that our pharmacy could help him with his medications if he would like but he would still have to be compliant with cardiology visits. Also I called and got him an appointment with cardiology for Monday.     Thank you for allowing me to participate in the care of your patient,    Georgia Hartley, PharmD  02/23/22  10:26 EST

## 2022-02-23 NOTE — PROGRESS NOTES
Heart Failure Clinic    Date: 02/23/22     Vitals:    02/23/22 0958   BP: 150/90   Pulse: 67   SpO2: 97%    weight 158.2    Method of arrival: Ambulatory    Weighing self daily: No    Monitoring Heart Failure Zones: Most days    Today's HF Zone: Yellow     Taking medications as prescribed: Yes    Edema No    Shortness of Air: Yes    Number of pillows used at night:3 or recliner    Educational Materials given:  avs                                                                         ReDS Value: 27  25-35 Optimal Value Status      Esteban Levine RN 02/23/22 10:00 EST

## 2022-02-28 ENCOUNTER — OFFICE VISIT (OUTPATIENT)
Dept: CARDIOLOGY | Facility: CLINIC | Age: 62
End: 2022-02-28

## 2022-02-28 VITALS
HEART RATE: 87 BPM | BODY MASS INDEX: 24.19 KG/M2 | OXYGEN SATURATION: 96 % | WEIGHT: 159.6 LBS | HEIGHT: 68 IN | DIASTOLIC BLOOD PRESSURE: 87 MMHG | TEMPERATURE: 97.1 F | SYSTOLIC BLOOD PRESSURE: 139 MMHG

## 2022-02-28 DIAGNOSIS — I42.9 CARDIOMYOPATHY, UNSPECIFIED TYPE: Primary | ICD-10-CM

## 2022-02-28 DIAGNOSIS — I48.0 PAROXYSMAL ATRIAL FIBRILLATION: ICD-10-CM

## 2022-02-28 DIAGNOSIS — R00.2 PALPITATIONS: ICD-10-CM

## 2022-02-28 PROCEDURE — 99214 OFFICE O/P EST MOD 30 MIN: CPT | Performed by: NURSE PRACTITIONER

## 2022-02-28 PROCEDURE — 93000 ELECTROCARDIOGRAM COMPLETE: CPT | Performed by: NURSE PRACTITIONER

## 2022-02-28 RX ORDER — METOPROLOL SUCCINATE 25 MG/1
25 TABLET, EXTENDED RELEASE ORAL DAILY
Qty: 30 TABLET | Refills: 11 | Status: SHIPPED | OUTPATIENT
Start: 2022-02-28 | End: 2022-03-04 | Stop reason: SDUPTHER

## 2022-02-28 RX ORDER — TAMSULOSIN HYDROCHLORIDE 0.4 MG/1
1 CAPSULE ORAL NIGHTLY
COMMUNITY
End: 2022-07-27

## 2022-02-28 RX ORDER — OXCARBAZEPINE 600 MG/1
600 TABLET, FILM COATED ORAL DAILY
COMMUNITY
Start: 2022-02-18 | End: 2022-06-16

## 2022-02-28 RX ORDER — BACLOFEN 20 MG/1
20 TABLET ORAL DAILY
COMMUNITY
Start: 2021-11-18 | End: 2022-06-16

## 2022-02-28 NOTE — PROGRESS NOTES
Tanna Daniels  Chong White  1960  02/28/2022    Patient Active Problem List   Diagnosis   • Atrial fibrillation (HCC)   • Abnormal nuclear stress test   • Neuropathy   • ASCVD (arteriosclerotic cardiovascular disease)   • Tobacco abuse   • Non-ischemic cardiomyopathy (HCC)   • Chronic combined systolic and diastolic congestive heart failure (HCC)   • Chronic pain   • Chronic low back pain   • Paralysis (HCC)       Dear Tanna Daniels:    Subjective     Chief Complaint   Patient presents with   • Follow-up   • Med Management     verbal           History of Present Illness:    Chong White is a 61 y.o. male with a past medical history of hypertension, ASCVD, paroxysmal atrial fibrillation, cardiomyopathy with most recent EF 21 to 25%, tobacco abuse, and history of IV drug abuse.  He presents today for cardiology follow-up.  He was last evaluated in our office in November 2020.  At that time he was wearing a LifeVest and on GDMT for cardiomyopathy with plans to repeat echocardiogram.  However, he did not return for follow-up.  He states he has had difficulty making his appointments so he is here to follow-up today.  He has quit taking all of his medications.  He also stopped taking the sotalol.  He was in the ER in August with an episode of atrial fibrillation.  He reports he has been having palpitations with feel like a racing heart rate and causes chest discomfort.  He has some occasional leg edema which has not been persistent.  He has chronic shortness of breath with mild exertion unchanged from his baseline.  He was recently evaluated by SAMARA Pandya in the heart failure clinic at [facility].  Entresto and carvedilol were resumed.  An echocardiogram was reordered to evaluate the patient's EF.  Of note, the patient did quit wearing his LifeVest.          Allergies   Allergen Reactions   • Penicillins Hives   :      Current Outpatient Medications:   •  aspirin 81 MG chewable tablet, Chew 81 mg  "Daily., Disp: , Rfl:   •  baclofen (LIORESAL) 20 MG tablet, Take 20 mg by mouth Daily., Disp: , Rfl:   •  OXcarbazepine (TRILEPTAL) 600 MG tablet, Take 600 mg by mouth Daily., Disp: , Rfl:   •  sacubitril-valsartan (ENTRESTO) 24-26 MG tablet, Take 1 tablet by mouth Every 12 (Twelve) Hours., Disp: 60 tablet, Rfl: 0  •  tamsulosin (FLOMAX) 0.4 MG capsule 24 hr capsule, Take 0.4 mg by mouth Daily., Disp: , Rfl:   •  apixaban (ELIQUIS) 5 MG tablet tablet, Take 1 tablet by mouth 2 (Two) Times a Day., Disp: 60 tablet, Rfl: 11  •  metoprolol succinate XL (TOPROL-XL) 25 MG 24 hr tablet, Take 1 tablet by mouth Daily., Disp: 30 tablet, Rfl: 11      The following portions of the patient's history were reviewed and updated as appropriate: allergies, current medications, past family history, past medical history, past social history, past surgical history and problem list.    Social History     Tobacco Use   • Smoking status: Current Every Day Smoker     Packs/day: 2.00     Types: Cigarettes     Start date: 3/1/1969   • Smokeless tobacco: Never Used   • Tobacco comment: Has nicotine patches and states has only had approximately 10 cigarettes since discharge on 9/11/2020. Not using patch at this time   Vaping Use   • Vaping Use: Never used   Substance Use Topics   • Alcohol use: Never   • Drug use: Never       ROS    Objective   Vitals:    02/28/22 0825   BP: 139/87   BP Location: Left arm   Patient Position: Sitting   Cuff Size: Adult   Pulse: 87   Temp: 97.1 °F (36.2 °C)   TempSrc: Temporal   SpO2: 96%   Weight: 72.4 kg (159 lb 9.6 oz)   Height: 172.7 cm (68\")     Body mass index is 24.27 kg/m².        Vitals reviewed.   Constitutional:       Appearance: Healthy appearance. Well-developed and not in distress.      Comments: Antalgic gait, ambulates with cane   HENT:      Head: Normocephalic and atraumatic.   Pulmonary:      Effort: Pulmonary effort is normal.      Breath sounds: Normal breath sounds. No wheezing. No rales. "   Cardiovascular:      Normal rate. Regular rhythm.      Murmurs: There is no murmur.      . No S3 and S4 gallop.   Edema:     Peripheral edema absent.   Abdominal:      General: Bowel sounds are normal.      Palpations: Abdomen is soft.   Skin:     General: Skin is warm and dry.   Neurological:      Mental Status: Alert, oriented to person, place, and time and oriented to person, place and time.   Psychiatric:         Mood and Affect: Mood normal.         Behavior: Behavior normal.         Lab Results   Component Value Date     02/23/2022    K 4.8 02/23/2022     02/23/2022    CO2 29.0 02/23/2022    BUN 14 02/23/2022    CREATININE 0.92 02/23/2022    GLUCOSE 87 02/23/2022    CALCIUM 9.1 02/23/2022    AST 30 08/18/2021    ALT 49 (H) 08/18/2021    ALKPHOS 106 08/18/2021    LABIL2 1.9 10/29/2020     No results found for: CKTOTAL  Lab Results   Component Value Date    WBC 7.81 08/18/2021    HGB 14.4 08/18/2021    HCT 44.1 08/18/2021     08/18/2021     Lab Results   Component Value Date    INR 1.07 08/18/2021    INR 1.05 09/02/2020    INR 0.97 09/01/2020     Lab Results   Component Value Date    MG 2.3 02/23/2022     Lab Results   Component Value Date    TRIG 51 09/03/2020    HDL 42 09/03/2020    LDL 81 09/03/2020      No results found for: BNP          ECG 12 Lead    Date/Time: 2/28/2022 8:18 AM  Performed by: Olivia Steele APRN  Authorized by: Olivia Steele APRN   Comparison: compared with previous ECG   Rhythm: sinus rhythm  BPM: 81  Q waves: V1, V2 and V3    T inversion: II, III and aVF                Assessment/Plan    Diagnosis Plan   1. Cardiomyopathy, unspecified type (HCC)  metoprolol succinate XL (TOPROL-XL) 25 MG 24 hr tablet   2. Paroxysmal atrial fibrillation (HCC)  apixaban (ELIQUIS) 5 MG tablet tablet    metoprolol succinate XL (TOPROL-XL) 25 MG 24 hr tablet    Cardiac Event Monitor   3. Palpitations  Cardiac Event Monitor                Recommendations:    1. I do think he is  having episodes of atrial fibrillation since he quit his antiarrhythmic therapy.  We will discontinue carvedilol and start metoprolol succinate 25 mg daily.  We will also resume Eliquis 5 mg twice daily.  2. If his symptoms persist, will have to consider specific antiarrhythmic therapy.  3. I have ordered an event monitor to rule out any other significant arrhythmias that could be causing his symptoms.  4. Echocardiogram pending to re--evaluate LV function  5. He will resume Entresto with lab follow up at the heart failure clinic.  6. We discussed the risks of non compliance with medication therapy and medical treatment.  7. Return in about 4 weeks (around 3/28/2022) for Recheck with Dr. Lopez.    As always, I appreciate very much the opportunity to participate in the cardiovascular care of your patients.      With Best Regards,    SAMARA Almonte

## 2022-03-03 ENCOUNTER — TELEPHONE (OUTPATIENT)
Dept: CARDIOLOGY | Facility: CLINIC | Age: 62
End: 2022-03-03

## 2022-03-04 ENCOUNTER — OFFICE VISIT (OUTPATIENT)
Dept: CARDIOLOGY | Facility: CLINIC | Age: 62
End: 2022-03-04

## 2022-03-04 VITALS
TEMPERATURE: 97.1 F | BODY MASS INDEX: 24.4 KG/M2 | HEART RATE: 81 BPM | HEIGHT: 68 IN | OXYGEN SATURATION: 99 % | DIASTOLIC BLOOD PRESSURE: 83 MMHG | SYSTOLIC BLOOD PRESSURE: 146 MMHG | WEIGHT: 161 LBS

## 2022-03-04 DIAGNOSIS — I48.0 PAROXYSMAL ATRIAL FIBRILLATION: Primary | ICD-10-CM

## 2022-03-04 DIAGNOSIS — I25.10 ASCVD (ARTERIOSCLEROTIC CARDIOVASCULAR DISEASE): ICD-10-CM

## 2022-03-04 DIAGNOSIS — I48.0 PAROXYSMAL ATRIAL FIBRILLATION: ICD-10-CM

## 2022-03-04 DIAGNOSIS — R00.2 PALPITATIONS: ICD-10-CM

## 2022-03-04 DIAGNOSIS — I42.9 CARDIOMYOPATHY, UNSPECIFIED TYPE: ICD-10-CM

## 2022-03-04 PROCEDURE — 99214 OFFICE O/P EST MOD 30 MIN: CPT | Performed by: INTERNAL MEDICINE

## 2022-03-04 RX ORDER — METOPROLOL SUCCINATE 25 MG/1
50 TABLET, EXTENDED RELEASE ORAL DAILY
Qty: 60 TABLET | Refills: 3 | Status: SHIPPED | OUTPATIENT
Start: 2022-03-04 | End: 2022-03-28 | Stop reason: SDUPTHER

## 2022-03-04 NOTE — PROGRESS NOTES
Tanna Daniels MD  Chong White  1960  03/04/2022    Patient Active Problem List   Diagnosis   • Atrial fibrillation (HCC)   • Abnormal nuclear stress test   • Neuropathy   • ASCVD (arteriosclerotic cardiovascular disease)   • Tobacco abuse   • Non-ischemic cardiomyopathy (HCC)   • Chronic combined systolic and diastolic congestive heart failure (HCC)   • Chronic pain   • Chronic low back pain   • Paralysis (HCC)       Dear :    Subjective     Chong White is a 62 y.o. male with the problems as listed above, presents    Chief Complaint   Patient presents with   • Follow-up     Event monitor reading   • Med Management     verbal       History of Present Illness: Mr. White is a pleasant 60-year-old  male with history of known coronary artery disease, with his recent cardiac catheterization revealing 100% occlusion of the proximal LAD with excellent collaterals from RCA to LAD and mild disease in the RCA left circumflex with underlying advanced cardiomyopathy and proximal afibrillation.  He is here after his recent event monitor revealed recurrent episodes of atrial fibrillation with rapid ventricular response to 190 bpm.  On further questioning patient complains of some intermittent palpitations with some associated dizziness but no syncope.  He is currently on metoprolol succinate 25 mg daily.  He is also on Entresto for his cardiomyopathy.  He has dyspnea with mild exertion with 3 pillow orthopnea and intermittent bilateral leg edema.    Allergies   Allergen Reactions   • Penicillins Hives       Current Outpatient Medications:   •  apixaban (ELIQUIS) 5 MG tablet tablet, Take 1 tablet by mouth 2 (Two) Times a Day., Disp: 60 tablet, Rfl: 11  •  aspirin 81 MG chewable tablet, Chew 81 mg Daily., Disp: , Rfl:   •  baclofen (LIORESAL) 20 MG tablet, Take 20 mg by mouth Daily., Disp: , Rfl:   •  metoprolol succinate XL (TOPROL-XL) 25 MG 24 hr tablet, Take 2 tablets by mouth Daily., Disp: 60 tablet, Rfl:  "3  •  OXcarbazepine (TRILEPTAL) 600 MG tablet, Take 600 mg by mouth Daily., Disp: , Rfl:   •  sacubitril-valsartan (ENTRESTO) 24-26 MG tablet, Take 1 tablet by mouth Every 12 (Twelve) Hours., Disp: 60 tablet, Rfl: 0  •  tamsulosin (FLOMAX) 0.4 MG capsule 24 hr capsule, Take 0.4 mg by mouth Daily., Disp: , Rfl:     The following portions of the patient's history were reviewed and updated as appropriate: allergies, current medications, past family history, past medical history, past social history, past surgical history and problem list.    Social History     Tobacco Use   • Smoking status: Current Every Day Smoker     Packs/day: 2.00     Types: Cigarettes     Start date: 3/1/1969   • Smokeless tobacco: Never Used   • Tobacco comment: Has nicotine patches and states has only had approximately 10 cigarettes since discharge on 9/11/2020. Not using patch at this time   Vaping Use   • Vaping Use: Never used   Substance Use Topics   • Alcohol use: Never   • Drug use: Never     Review of Systems   Constitutional: Negative for fever.   Cardiovascular: Positive for dyspnea on exertion and palpitations.   Respiratory: Positive for shortness of breath.      Objective   Vitals:    03/04/22 1037   BP: 146/83   Pulse: 81   Temp: 97.1 °F (36.2 °C)   SpO2: 99%   Weight: 73 kg (161 lb)   Height: 172.7 cm (67.99\")     Body mass index is 24.49 kg/m².    Constitutional:       Appearance: Well-developed.   Eyes:      Conjunctiva/sclera: Conjunctivae normal.   HENT:      Head: Normocephalic.   Neck:      Thyroid: No thyromegaly.      Vascular: No JVD.      Trachea: No tracheal deviation.   Pulmonary:      Effort: No respiratory distress.      Breath sounds: Normal breath sounds. No wheezing. No rales.   Cardiovascular:      PMI at left midclavicular line. Normal rate. Irregularly irregular rhythm. Normal S1. Normal S2.      Murmurs: There is no murmur.      No gallop. No click. No rub.   Pulses:     Intact distal pulses.   Edema:     " Peripheral edema absent.   Abdominal:      General: Bowel sounds are normal.      Palpations: Abdomen is soft. There is no abdominal mass.      Tenderness: There is no abdominal tenderness.   Musculoskeletal:      Cervical back: Normal range of motion and neck supple. Skin:     General: Skin is warm and dry.   Neurological:      Mental Status: Alert and oriented to person, place, and time.      Cranial Nerves: No cranial nerve deficit.       Lab Results   Component Value Date     02/23/2022    K 4.8 02/23/2022     02/23/2022    CO2 29.0 02/23/2022    BUN 14 02/23/2022    CREATININE 0.92 02/23/2022    GLUCOSE 87 02/23/2022    CALCIUM 9.1 02/23/2022    AST 30 08/18/2021    ALT 49 (H) 08/18/2021    ALKPHOS 106 08/18/2021    LABIL2 1.9 10/29/2020     No results found for: CKTOTAL  Lab Results   Component Value Date    WBC 7.81 08/18/2021    HGB 14.4 08/18/2021    HCT 44.1 08/18/2021     08/18/2021     Lab Results   Component Value Date    INR 1.07 08/18/2021    INR 1.05 09/02/2020    INR 0.97 09/01/2020     Lab Results   Component Value Date    MG 2.3 02/23/2022     Lab Results   Component Value Date    TRIG 51 09/03/2020    HDL 42 09/03/2020    LDL 81 09/03/2020        Assessment/Plan :   Diagnosis Plan   1. Paroxysmal atrial fibrillation with rapid ventricular response.     2. Cardiomyopathy, unspecified type (HCC)  metoprolol succinate XL (TOPROL-XL) 25 MG 24 hr tablet   3. ASCVD (arteriosclerotic cardiovascular disease) with 100% occlusion of the proximal LAD with excellent collaterals from RCA     4. Paroxysmal atrial fibrillation (HCC)  metoprolol succinate XL (TOPROL-XL) 25 MG 24 hr tablet       Recommendations:  1. We will increase the metoprolol succinate dose to 50 mg daily.  2. Continue with event monitor.  3. I am going to have the office staff to find the cost of Tikosyn for him and if this is with his insurance then will plan to admit him to [facility] to initiate Tikosyn and  monitor for 72 hours.  I discussed this with patient and he is agreeable.    Return in about 2 weeks (around 3/18/2022).    As always,   I appreciate very much the opportunity to participate in the cardiovascular care of your patients. Please do not hesitate to call me with any questions with regards to Chong Olliedonny's evaluation and management.       With Best Regards,        Remi Lopez MD, [facility]    Please note that portions of this note were completed with a voice recognition program.

## 2022-03-16 ENCOUNTER — APPOINTMENT (OUTPATIENT)
Dept: CARDIOLOGY | Facility: HOSPITAL | Age: 62
End: 2022-03-16

## 2022-03-28 ENCOUNTER — OFFICE VISIT (OUTPATIENT)
Dept: CARDIOLOGY | Facility: CLINIC | Age: 62
End: 2022-03-28

## 2022-03-28 VITALS
TEMPERATURE: 97.4 F | WEIGHT: 161 LBS | DIASTOLIC BLOOD PRESSURE: 93 MMHG | OXYGEN SATURATION: 97 % | HEIGHT: 68 IN | HEART RATE: 67 BPM | BODY MASS INDEX: 24.4 KG/M2 | SYSTOLIC BLOOD PRESSURE: 154 MMHG

## 2022-03-28 DIAGNOSIS — I42.9 CARDIOMYOPATHY, UNSPECIFIED TYPE: ICD-10-CM

## 2022-03-28 DIAGNOSIS — I50.42 CHRONIC COMBINED SYSTOLIC AND DIASTOLIC CONGESTIVE HEART FAILURE: ICD-10-CM

## 2022-03-28 DIAGNOSIS — I42.0 CARDIOMYOPATHY, DILATED: ICD-10-CM

## 2022-03-28 DIAGNOSIS — I48.0 PAROXYSMAL ATRIAL FIBRILLATION: ICD-10-CM

## 2022-03-28 DIAGNOSIS — I25.10 ASCVD (ARTERIOSCLEROTIC CARDIOVASCULAR DISEASE): Primary | ICD-10-CM

## 2022-03-28 DIAGNOSIS — Z91.14 NONCOMPLIANCE W/MEDICATION TREATMENT DUE TO INTERMIT USE OF MEDICATION: ICD-10-CM

## 2022-03-28 PROCEDURE — 99214 OFFICE O/P EST MOD 30 MIN: CPT | Performed by: INTERNAL MEDICINE

## 2022-03-28 RX ORDER — NICOTINE 21 MG/24HR
1 PATCH, TRANSDERMAL 24 HOURS TRANSDERMAL EVERY 24 HOURS
Qty: 30 PATCH | Refills: 0 | Status: SHIPPED | OUTPATIENT
Start: 2022-03-28 | End: 2022-06-16

## 2022-03-28 RX ORDER — METOPROLOL SUCCINATE 25 MG/1
50 TABLET, EXTENDED RELEASE ORAL DAILY
Qty: 60 TABLET | Refills: 3 | Status: SHIPPED | OUTPATIENT
Start: 2022-03-28 | End: 2022-06-16

## 2022-04-07 ENCOUNTER — HOSPITAL ENCOUNTER (OUTPATIENT)
Dept: CARDIOLOGY | Facility: HOSPITAL | Age: 62
Discharge: HOME | End: 2022-04-07
Admitting: NURSE PRACTITIONER

## 2022-04-07 DIAGNOSIS — I50.42 CHRONIC COMBINED SYSTOLIC AND DIASTOLIC CONGESTIVE HEART FAILURE: ICD-10-CM

## 2022-04-07 LAB
BH CV ECHO MEAS - ACS: 2.5 CM
BH CV ECHO MEAS - AO MAX PG: 4.2 MMHG
BH CV ECHO MEAS - AO MEAN PG: 2 MMHG
BH CV ECHO MEAS - AO ROOT DIAM: 3.2 CM
BH CV ECHO MEAS - AO V2 MAX: 102 CM/SEC
BH CV ECHO MEAS - AO V2 VTI: 16.9 CM
BH CV ECHO MEAS - EDV(CUBED): 217.1 ML
BH CV ECHO MEAS - EDV(MOD-SP4): 96.5 ML
BH CV ECHO MEAS - EF(MOD-SP4): 30.2 %
BH CV ECHO MEAS - ESV(CUBED): 105.8 ML
BH CV ECHO MEAS - ESV(MOD-SP4): 67.4 ML
BH CV ECHO MEAS - FS: 21.3 %
BH CV ECHO MEAS - IVS/LVPW: 1.2 CM
BH CV ECHO MEAS - IVSD: 1.44 CM
BH CV ECHO MEAS - LA DIMENSION: 3.5 CM
BH CV ECHO MEAS - LV DIASTOLIC VOL/BSA (35-75): 51.8 CM2
BH CV ECHO MEAS - LV MASS(C)D: 357.4 GRAMS
BH CV ECHO MEAS - LV SYSTOLIC VOL/BSA (12-30): 36.2 CM2
BH CV ECHO MEAS - LVIDD: 6 CM
BH CV ECHO MEAS - LVIDS: 4.7 CM
BH CV ECHO MEAS - LVOT AREA: 4.2 CM2
BH CV ECHO MEAS - LVOT DIAM: 2.3 CM
BH CV ECHO MEAS - LVPWD: 1.2 CM
BH CV ECHO MEAS - MV A MAX VEL: 48 CM/SEC
BH CV ECHO MEAS - MV E MAX VEL: 74.6 CM/SEC
BH CV ECHO MEAS - MV E/A: 1.55
BH CV ECHO MEAS - PA ACC TIME: 0.08 SEC
BH CV ECHO MEAS - PA PR(ACCEL): 44.4 MMHG
BH CV ECHO MEAS - RAP SYSTOLE: 10 MMHG
BH CV ECHO MEAS - RVSP: 26.4 MMHG
BH CV ECHO MEAS - SI(MOD-SP4): 15.6 ML/M2
BH CV ECHO MEAS - SV(MOD-SP4): 29.1 ML
BH CV ECHO MEAS - TAPSE (>1.6): 1.61 CM
BH CV ECHO MEAS - TR MAX PG: 16.4 MMHG
BH CV ECHO MEAS - TR MAX VEL: 201.5 CM/SEC
LEFT ATRIUM VOLUME INDEX: 28.5 ML/M2
MAXIMAL PREDICTED HEART RATE: 158 BPM
STRESS TARGET HR: 134 BPM

## 2022-04-07 PROCEDURE — 93306 TTE W/DOPPLER COMPLETE: CPT

## 2022-04-07 PROCEDURE — 93306 TTE W/DOPPLER COMPLETE: CPT | Performed by: SPECIALIST

## 2022-04-12 DIAGNOSIS — I42.9 CARDIOMYOPATHY, UNSPECIFIED TYPE: ICD-10-CM

## 2022-04-12 DIAGNOSIS — I42.8 NON-ISCHEMIC CARDIOMYOPATHY: Primary | ICD-10-CM

## 2022-04-12 DIAGNOSIS — I50.22 CHRONIC SYSTOLIC (CONGESTIVE) HEART FAILURE: ICD-10-CM

## 2022-04-19 ENCOUNTER — TELEPHONE (OUTPATIENT)
Dept: NEUROSURGERY | Facility: CLINIC | Age: 62
End: 2022-04-19

## 2022-04-19 NOTE — TELEPHONE ENCOUNTER
Caller: Chong White    Relationship: Self    Best call back number: [phone]    What orders are you requesting (i.e. lab or imaging): XRAYS    In what timeframe would the patient need to come in: BEFORE 5/6/2022    Where will you receive your lab/imaging services:  [facility]    Additional notes: PT STATES THIS IS THE 3RD TIME HE APPOINTMENT HAS BEEN RESCHEDULED.  HE ALSO STATES HE WAS NEVER CALLED.  PT HAD ARRANGED TO HAVE A SERVICE TO PICK HIM UP AND TAKE HIM TO THE APPT.      PT ALSO STATES HE HAS SOME XRAYS DONE IN FEB/22 AT [place].  NOTING IN CHART.    CONFIRMED PT PHONE NUMBER    PLEASE CONTACT WHEN X RAYS HAVE BEEN ORDERED.    THANK YOU

## 2022-04-19 NOTE — TELEPHONE ENCOUNTER
Patient completed both XRs and they are located in patient's chart. Can we get a sooner appointment for patient?

## 2022-06-16 ENCOUNTER — HOSPITAL ENCOUNTER (EMERGENCY)
Facility: HOSPITAL | Age: 62
Discharge: HOME | End: 2022-06-16
Attending: EMERGENCY MEDICINE | Admitting: EMERGENCY MEDICINE

## 2022-06-16 ENCOUNTER — APPOINTMENT (OUTPATIENT)
Dept: GENERAL RADIOLOGY | Facility: HOSPITAL | Age: 62
End: 2022-06-16

## 2022-06-16 VITALS
RESPIRATION RATE: 18 BRPM | BODY MASS INDEX: 25.76 KG/M2 | DIASTOLIC BLOOD PRESSURE: 69 MMHG | HEART RATE: 100 BPM | OXYGEN SATURATION: 100 % | TEMPERATURE: 98.2 F | HEIGHT: 68 IN | SYSTOLIC BLOOD PRESSURE: 100 MMHG | WEIGHT: 170 LBS

## 2022-06-16 DIAGNOSIS — I48.20 CHRONIC ATRIAL FIBRILLATION: Primary | ICD-10-CM

## 2022-06-16 LAB
ALBUMIN SERPL-MCNC: 3.63 G/DL (ref 3.5–5.2)
ALBUMIN/GLOB SERPL: 1.6 G/DL
ALP SERPL-CCNC: 77 U/L (ref 39–117)
ALT SERPL W P-5'-P-CCNC: 32 U/L (ref 1–41)
ANION GAP SERPL CALCULATED.3IONS-SCNC: 10 MMOL/L (ref 5–15)
APTT PPP: 34.8 SECONDS (ref 26.5–34.5)
AST SERPL-CCNC: 20 U/L (ref 1–40)
BASOPHILS # BLD AUTO: 0.03 10*3/MM3 (ref 0–0.2)
BASOPHILS NFR BLD AUTO: 0.4 % (ref 0–1.5)
BILIRUB SERPL-MCNC: 1.2 MG/DL (ref 0–1.2)
BUN SERPL-MCNC: 16 MG/DL (ref 8–23)
BUN/CREAT SERPL: 17.4 (ref 7–25)
CALCIUM SPEC-SCNC: 9 MG/DL (ref 8.6–10.5)
CHLORIDE SERPL-SCNC: 103 MMOL/L (ref 98–107)
CO2 SERPL-SCNC: 24 MMOL/L (ref 22–29)
CREAT SERPL-MCNC: 0.92 MG/DL (ref 0.76–1.27)
D-LACTATE SERPL-SCNC: 1.7 MMOL/L (ref 0.5–2)
DEPRECATED RDW RBC AUTO: 45.5 FL (ref 37–54)
EGFRCR SERPLBLD CKD-EPI 2021: 94.1 ML/MIN/1.73
EOSINOPHIL # BLD AUTO: 0.02 10*3/MM3 (ref 0–0.4)
EOSINOPHIL NFR BLD AUTO: 0.3 % (ref 0.3–6.2)
ERYTHROCYTE [DISTWIDTH] IN BLOOD BY AUTOMATED COUNT: 13.5 % (ref 12.3–15.4)
FLUAV RNA RESP QL NAA+PROBE: NOT DETECTED
FLUBV RNA RESP QL NAA+PROBE: NOT DETECTED
GLOBULIN UR ELPH-MCNC: 2.3 GM/DL
GLUCOSE SERPL-MCNC: 98 MG/DL (ref 65–99)
HCT VFR BLD AUTO: 44.1 % (ref 37.5–51)
HGB BLD-MCNC: 14.7 G/DL (ref 13–17.7)
IMM GRANULOCYTES # BLD AUTO: 0.02 10*3/MM3 (ref 0–0.05)
IMM GRANULOCYTES NFR BLD AUTO: 0.3 % (ref 0–0.5)
INR PPP: 1.41 (ref 0.9–1.1)
LYMPHOCYTES # BLD AUTO: 1.14 10*3/MM3 (ref 0.7–3.1)
LYMPHOCYTES NFR BLD AUTO: 14.6 % (ref 19.6–45.3)
MAGNESIUM SERPL-MCNC: 2 MG/DL (ref 1.6–2.4)
MCH RBC QN AUTO: 30.8 PG (ref 26.6–33)
MCHC RBC AUTO-ENTMCNC: 33.3 G/DL (ref 31.5–35.7)
MCV RBC AUTO: 92.3 FL (ref 79–97)
MONOCYTES # BLD AUTO: 0.52 10*3/MM3 (ref 0.1–0.9)
MONOCYTES NFR BLD AUTO: 6.6 % (ref 5–12)
NEUTROPHILS NFR BLD AUTO: 6.1 10*3/MM3 (ref 1.7–7)
NEUTROPHILS NFR BLD AUTO: 77.8 % (ref 42.7–76)
NRBC BLD AUTO-RTO: 0 /100 WBC (ref 0–0.2)
NT-PROBNP SERPL-MCNC: 5973 PG/ML (ref 0–900)
PLATELET # BLD AUTO: 193 10*3/MM3 (ref 140–450)
PMV BLD AUTO: 11 FL (ref 6–12)
POTASSIUM SERPL-SCNC: 4 MMOL/L (ref 3.5–5.2)
PROT SERPL-MCNC: 5.9 G/DL (ref 6–8.5)
PROTHROMBIN TIME: 17.6 SECONDS (ref 12.1–14.7)
RBC # BLD AUTO: 4.78 10*6/MM3 (ref 4.14–5.8)
SARS-COV-2 RNA RESP QL NAA+PROBE: NOT DETECTED
SODIUM SERPL-SCNC: 137 MMOL/L (ref 136–145)
TROPONIN T SERPL-MCNC: <0.01 NG/ML (ref 0–0.03)
TROPONIN T SERPL-MCNC: <0.01 NG/ML (ref 0–0.03)
TSH SERPL DL<=0.05 MIU/L-ACNC: 2.61 UIU/ML (ref 0.27–4.2)
WBC NRBC COR # BLD: 7.83 10*3/MM3 (ref 3.4–10.8)

## 2022-06-16 PROCEDURE — 83880 ASSAY OF NATRIURETIC PEPTIDE: CPT | Performed by: NURSE PRACTITIONER

## 2022-06-16 PROCEDURE — 36415 COLL VENOUS BLD VENIPUNCTURE: CPT

## 2022-06-16 PROCEDURE — 85730 THROMBOPLASTIN TIME PARTIAL: CPT | Performed by: NURSE PRACTITIONER

## 2022-06-16 PROCEDURE — 71045 X-RAY EXAM CHEST 1 VIEW: CPT | Performed by: RADIOLOGY

## 2022-06-16 PROCEDURE — 83735 ASSAY OF MAGNESIUM: CPT | Performed by: NURSE PRACTITIONER

## 2022-06-16 PROCEDURE — 71045 X-RAY EXAM CHEST 1 VIEW: CPT

## 2022-06-16 PROCEDURE — 83605 ASSAY OF LACTIC ACID: CPT | Performed by: NURSE PRACTITIONER

## 2022-06-16 PROCEDURE — 99284 EMERGENCY DEPT VISIT MOD MDM: CPT

## 2022-06-16 PROCEDURE — 84443 ASSAY THYROID STIM HORMONE: CPT | Performed by: NURSE PRACTITIONER

## 2022-06-16 PROCEDURE — 96375 TX/PRO/DX INJ NEW DRUG ADDON: CPT

## 2022-06-16 PROCEDURE — 87636 SARSCOV2 & INF A&B AMP PRB: CPT | Performed by: NURSE PRACTITIONER

## 2022-06-16 PROCEDURE — 25010000002 FUROSEMIDE PER 20 MG: Performed by: NURSE PRACTITIONER

## 2022-06-16 PROCEDURE — 96365 THER/PROPH/DIAG IV INF INIT: CPT

## 2022-06-16 PROCEDURE — 87040 BLOOD CULTURE FOR BACTERIA: CPT | Performed by: NURSE PRACTITIONER

## 2022-06-16 PROCEDURE — 85610 PROTHROMBIN TIME: CPT | Performed by: NURSE PRACTITIONER

## 2022-06-16 PROCEDURE — 93010 ELECTROCARDIOGRAM REPORT: CPT | Performed by: INTERNAL MEDICINE

## 2022-06-16 PROCEDURE — 84484 ASSAY OF TROPONIN QUANT: CPT | Performed by: NURSE PRACTITIONER

## 2022-06-16 PROCEDURE — 96366 THER/PROPH/DIAG IV INF ADDON: CPT

## 2022-06-16 PROCEDURE — 85025 COMPLETE CBC W/AUTO DIFF WBC: CPT | Performed by: NURSE PRACTITIONER

## 2022-06-16 PROCEDURE — 93005 ELECTROCARDIOGRAM TRACING: CPT | Performed by: NURSE PRACTITIONER

## 2022-06-16 PROCEDURE — 80053 COMPREHEN METABOLIC PANEL: CPT | Performed by: NURSE PRACTITIONER

## 2022-06-16 RX ORDER — DILTIAZEM HCL IN NACL,ISO-OSM 125 MG/125
5-15 PLASTIC BAG, INJECTION (ML) INTRAVENOUS CONTINUOUS
Status: DISCONTINUED | OUTPATIENT
Start: 2022-06-16 | End: 2022-06-16 | Stop reason: HOSPADM

## 2022-06-16 RX ORDER — MELOXICAM 15 MG/1
15 TABLET ORAL DAILY
COMMUNITY
End: 2022-07-12 | Stop reason: HOSPADM

## 2022-06-16 RX ORDER — METOPROLOL TARTRATE 50 MG/1
50 TABLET, FILM COATED ORAL ONCE
Status: COMPLETED | OUTPATIENT
Start: 2022-06-16 | End: 2022-06-16

## 2022-06-16 RX ORDER — ASPIRIN 81 MG/1
81 TABLET ORAL DAILY
COMMUNITY
End: 2022-07-26 | Stop reason: SDUPTHER

## 2022-06-16 RX ORDER — GABAPENTIN 600 MG/1
600 TABLET ORAL NIGHTLY
COMMUNITY
End: 2022-07-27

## 2022-06-16 RX ORDER — GABAPENTIN 300 MG/1
600 CAPSULE ORAL NIGHTLY
Status: CANCELLED | OUTPATIENT
Start: 2022-06-16

## 2022-06-16 RX ORDER — CETIRIZINE HYDROCHLORIDE 10 MG/1
10 TABLET ORAL DAILY PRN
Status: CANCELLED | OUTPATIENT
Start: 2022-06-16

## 2022-06-16 RX ORDER — METOPROLOL SUCCINATE 25 MG/1
25 TABLET, EXTENDED RELEASE ORAL DAILY
Status: CANCELLED | OUTPATIENT
Start: 2022-06-17

## 2022-06-16 RX ORDER — SODIUM CHLORIDE 0.9 % (FLUSH) 0.9 %
10 SYRINGE (ML) INJECTION AS NEEDED
Status: DISCONTINUED | OUTPATIENT
Start: 2022-06-16 | End: 2022-06-16 | Stop reason: HOSPADM

## 2022-06-16 RX ORDER — FUROSEMIDE 10 MG/ML
40 INJECTION INTRAMUSCULAR; INTRAVENOUS ONCE
Status: COMPLETED | OUTPATIENT
Start: 2022-06-16 | End: 2022-06-16

## 2022-06-16 RX ORDER — MELOXICAM 7.5 MG/1
15 TABLET ORAL DAILY
Status: CANCELLED | OUTPATIENT
Start: 2022-06-17

## 2022-06-16 RX ORDER — ASPIRIN 81 MG/1
81 TABLET ORAL DAILY
Status: CANCELLED | OUTPATIENT
Start: 2022-06-17

## 2022-06-16 RX ORDER — LISINOPRIL 5 MG/1
5 TABLET ORAL DAILY
COMMUNITY
End: 2022-07-12 | Stop reason: HOSPADM

## 2022-06-16 RX ORDER — TAMSULOSIN HYDROCHLORIDE 0.4 MG/1
0.4 CAPSULE ORAL NIGHTLY
Status: CANCELLED | OUTPATIENT
Start: 2022-06-16

## 2022-06-16 RX ORDER — FUROSEMIDE 40 MG/1
40 TABLET ORAL DAILY
Qty: 3 TABLET | Refills: 0 | Status: ON HOLD | OUTPATIENT
Start: 2022-06-16 | End: 2022-06-23

## 2022-06-16 RX ORDER — METOPROLOL SUCCINATE 25 MG/1
25 TABLET, EXTENDED RELEASE ORAL DAILY
Status: ON HOLD | COMMUNITY
End: 2022-06-23

## 2022-06-16 RX ORDER — LISINOPRIL 10 MG/1
5 TABLET ORAL DAILY
Status: CANCELLED | OUTPATIENT
Start: 2022-06-17

## 2022-06-16 RX ORDER — LORATADINE 10 MG/1
10 TABLET ORAL DAILY PRN
COMMUNITY
End: 2022-07-27

## 2022-06-16 RX ADMIN — Medication 5 MG/HR: at 10:50

## 2022-06-16 RX ADMIN — METOPROLOL TARTRATE 50 MG: 50 TABLET, FILM COATED ORAL at 17:55

## 2022-06-16 RX ADMIN — FUROSEMIDE 40 MG: 10 INJECTION, SOLUTION INTRAVENOUS at 17:55

## 2022-06-16 NOTE — ED PROVIDER NOTES
Subjective   Patient is a 62-year-old white male presents emergency room today complaining of shortness of breath, lower extremity edema, and feeling like his heart is beating fast.  Patient reports that he has a history of atrial fibrillation and states that his heart rate is usually lower.  Patient denies chest discomfort at this time.  Patient reports that his cardiologist on him in the past that he may need a pacemaker.      Shortness of Breath  Severity:  Moderate  Onset quality:  Sudden  Duration:  2 days  Timing:  Constant  Progression:  Worsening  Context: not activity, not emotional upset, not occupational exposure and not URI    Relieved by:  Nothing  Worsened by:  Nothing  Ineffective treatments:  None tried  Associated symptoms: no chest pain, no claudication, no diaphoresis, no fever, no hemoptysis and no PND    Risk factors: no recent alcohol use, no family hx of DVT, no obesity, no prolonged immobilization and no recent surgery    Risk factors comment:  History of atrial fibrillation, history of multiple MIs      Review of Systems   Constitutional: Negative.  Negative for diaphoresis and fever.   HENT: Negative.    Eyes: Negative.    Respiratory: Positive for shortness of breath. Negative for hemoptysis.    Cardiovascular: Negative.  Negative for chest pain, claudication and PND.   Gastrointestinal: Negative.    Endocrine: Negative.    Genitourinary: Negative.    Musculoskeletal: Negative.    Skin: Negative.    Allergic/Immunologic: Negative.    Neurological: Negative.    Hematological: Negative.    Psychiatric/Behavioral: Negative.        Past Medical History:   Diagnosis Date   • Atrial fibrillation (HCC)    • GERD (gastroesophageal reflux disease)    • Hypertension    • Myocardial infarction (HCC)        Allergies   Allergen Reactions   • Penicillins Hives       Past Surgical History:   Procedure Laterality Date   • CARDIAC CATHETERIZATION N/A 9/4/2020    Procedure: Left Heart Cath;  Surgeon:  Herman Sen MD;  Location:  [facility];  Service: Cardiology;  Laterality: N/A;   • CORONARY STENT PLACEMENT         Family History   Problem Relation Age of Onset   • Heart disease Mother    • Heart disease Maternal Grandmother        Social History     Socioeconomic History   • Marital status: Single   Tobacco Use   • Smoking status: Current Every Day Smoker     Packs/day: 2.00     Types: Cigarettes     Start date: 3/1/1969   • Smokeless tobacco: Never Used   • Tobacco comment: Has nicotine patches and states has only had approximately 10 cigarettes since discharge on 9/11/2020. Not using patch at this time   Vaping Use   • Vaping Use: Never used   Substance and Sexual Activity   • Alcohol use: Never   • Drug use: Never   • Sexual activity: Defer           Objective   Physical Exam  Vitals and nursing note reviewed.   Constitutional:       Appearance: He is well-developed.   HENT:      Head: Normocephalic.      Right Ear: External ear normal.      Left Ear: External ear normal.   Eyes:      Conjunctiva/sclera: Conjunctivae normal.      Pupils: Pupils are equal, round, and reactive to light.   Cardiovascular:      Rate and Rhythm: Normal rate and regular rhythm.      Heart sounds: Normal heart sounds.   Pulmonary:      Effort: Pulmonary effort is normal.      Breath sounds: Normal breath sounds.   Abdominal:      General: Bowel sounds are normal.      Palpations: Abdomen is soft.   Musculoskeletal:         General: Normal range of motion.      Cervical back: Normal range of motion and neck supple.   Skin:     General: Skin is warm and dry.      Capillary Refill: Capillary refill takes less than 2 seconds.   Neurological:      Mental Status: He is alert and oriented to person, place, and time.   Psychiatric:         Behavior: Behavior normal.         Thought Content: Thought content normal.         Procedures           ED Course                                                 MDM    Final  diagnoses:   Chronic atrial fibrillation (HCC)       ED Disposition  ED Disposition     ED Disposition   Discharge    Condition   Stable    Comment   --             Amy Yanez, APRN  [address]  [place] [id number]  [phone]    Schedule an appointment as soon as possible for a visit   For further evaluation         Medication List      New Prescriptions    furosemide 40 MG tablet  Commonly known as: LASIX  Take 1 tablet by mouth Daily for 3 days.           Where to Get Your Medications      You can get these medications from any pharmacy    Bring a paper prescription for each of these medications  · furosemide 40 MG tablet          Carlos Jorge, APRN  06/16/22 [id number]

## 2022-06-17 LAB
QT INTERVAL: 306 MS
QTC INTERVAL: 467 MS

## 2022-06-17 NOTE — ED NOTES
In to d/c pt home, reports doesn't have a way home, usually takes [employer]. States he has also taken a cab home, but unable to afford at this time. Spoke to provider, tech calling [employer] at this time

## 2022-06-17 NOTE — DISCHARGE INSTRUCTIONS
Increase Metoprolol to 50 mg daily    Follow up with the heart failure clinic at 10:30 AM Tomorrow

## 2022-06-17 NOTE — NURSING NOTE
6/17/2022 @ 1125:  Patient did not come to HF clinic this morning as directed by ED. Will have clinic RN attempt to contact and schedule follow up appointment as soon as possible.

## 2022-06-21 LAB
BACTERIA SPEC AEROBE CULT: NORMAL
BACTERIA SPEC AEROBE CULT: NORMAL

## 2022-06-23 ENCOUNTER — OFFICE VISIT (OUTPATIENT)
Dept: CARDIOLOGY | Facility: CLINIC | Age: 62
End: 2022-06-23

## 2022-06-23 ENCOUNTER — HOSPITAL ENCOUNTER (INPATIENT)
Facility: HOSPITAL | Age: 62
LOS: 19 days | Discharge: HOME | End: 2022-07-12
Attending: STUDENT IN AN ORGANIZED HEALTH CARE EDUCATION/TRAINING PROGRAM | Admitting: INTERNAL MEDICINE

## 2022-06-23 ENCOUNTER — APPOINTMENT (OUTPATIENT)
Dept: CT IMAGING | Facility: HOSPITAL | Age: 62
End: 2022-06-23

## 2022-06-23 ENCOUNTER — APPOINTMENT (OUTPATIENT)
Dept: GENERAL RADIOLOGY | Facility: HOSPITAL | Age: 62
End: 2022-06-23

## 2022-06-23 VITALS
WEIGHT: 189 LBS | SYSTOLIC BLOOD PRESSURE: 110 MMHG | HEIGHT: 68 IN | OXYGEN SATURATION: 97 % | DIASTOLIC BLOOD PRESSURE: 64 MMHG | HEART RATE: 84 BPM | BODY MASS INDEX: 28.64 KG/M2 | TEMPERATURE: 97.1 F

## 2022-06-23 DIAGNOSIS — I50.23 ACUTE ON CHRONIC HFREF (HEART FAILURE WITH REDUCED EJECTION FRACTION): ICD-10-CM

## 2022-06-23 DIAGNOSIS — I48.91 ATRIAL FIBRILLATION WITH RVR: Primary | ICD-10-CM

## 2022-06-23 DIAGNOSIS — I50.42 CHRONIC COMBINED SYSTOLIC AND DIASTOLIC CONGESTIVE HEART FAILURE: ICD-10-CM

## 2022-06-23 DIAGNOSIS — I42.0 CARDIOMYOPATHY, DILATED: ICD-10-CM

## 2022-06-23 DIAGNOSIS — I42.8 NON-ISCHEMIC CARDIOMYOPATHY: ICD-10-CM

## 2022-06-23 LAB
ALBUMIN SERPL-MCNC: 3.77 G/DL (ref 3.5–5.2)
ALBUMIN/GLOB SERPL: 1.6 G/DL
ALP SERPL-CCNC: 86 U/L (ref 39–117)
ALT SERPL W P-5'-P-CCNC: 24 U/L (ref 1–41)
ANION GAP SERPL CALCULATED.3IONS-SCNC: 12.2 MMOL/L (ref 5–15)
APTT PPP: 35.7 SECONDS (ref 26.5–34.5)
AST SERPL-CCNC: 18 U/L (ref 1–40)
BASOPHILS # BLD AUTO: 0.04 10*3/MM3 (ref 0–0.2)
BASOPHILS NFR BLD AUTO: 0.6 % (ref 0–1.5)
BILIRUB SERPL-MCNC: 0.9 MG/DL (ref 0–1.2)
BILIRUB UR QL STRIP: NEGATIVE
BUN SERPL-MCNC: 17 MG/DL (ref 8–23)
BUN/CREAT SERPL: 16.3 (ref 7–25)
CALCIUM SPEC-SCNC: 9 MG/DL (ref 8.6–10.5)
CHLORIDE SERPL-SCNC: 100 MMOL/L (ref 98–107)
CLARITY UR: CLEAR
CO2 SERPL-SCNC: 26.8 MMOL/L (ref 22–29)
COLOR UR: YELLOW
CREAT SERPL-MCNC: 1.04 MG/DL (ref 0.76–1.27)
CRP SERPL-MCNC: 0.36 MG/DL (ref 0–0.5)
D DIMER PPP FEU-MCNC: 0.94 MCGFEU/ML (ref 0–0.5)
DEPRECATED RDW RBC AUTO: 46.9 FL (ref 37–54)
EGFRCR SERPLBLD CKD-EPI 2021: 81.2 ML/MIN/1.73
EOSINOPHIL # BLD AUTO: 0.07 10*3/MM3 (ref 0–0.4)
EOSINOPHIL NFR BLD AUTO: 1.1 % (ref 0.3–6.2)
ERYTHROCYTE [DISTWIDTH] IN BLOOD BY AUTOMATED COUNT: 14 % (ref 12.3–15.4)
FLUAV RNA RESP QL NAA+PROBE: NOT DETECTED
FLUBV RNA RESP QL NAA+PROBE: NOT DETECTED
GLOBULIN UR ELPH-MCNC: 2.4 GM/DL
GLUCOSE SERPL-MCNC: 103 MG/DL (ref 65–99)
GLUCOSE UR STRIP-MCNC: NEGATIVE MG/DL
HCT VFR BLD AUTO: 45.9 % (ref 37.5–51)
HGB BLD-MCNC: 15.4 G/DL (ref 13–17.7)
HGB UR QL STRIP.AUTO: NEGATIVE
IMM GRANULOCYTES # BLD AUTO: 0.01 10*3/MM3 (ref 0–0.05)
IMM GRANULOCYTES NFR BLD AUTO: 0.2 % (ref 0–0.5)
INR PPP: 1.44 (ref 0.9–1.1)
KETONES UR QL STRIP: NEGATIVE
LEUKOCYTE ESTERASE UR QL STRIP.AUTO: NEGATIVE
LIPASE SERPL-CCNC: 12 U/L (ref 13–60)
LYMPHOCYTES # BLD AUTO: 1.38 10*3/MM3 (ref 0.7–3.1)
LYMPHOCYTES NFR BLD AUTO: 21.4 % (ref 19.6–45.3)
MAGNESIUM SERPL-MCNC: 2.2 MG/DL (ref 1.6–2.4)
MCH RBC QN AUTO: 31.2 PG (ref 26.6–33)
MCHC RBC AUTO-ENTMCNC: 33.6 G/DL (ref 31.5–35.7)
MCV RBC AUTO: 92.9 FL (ref 79–97)
MONOCYTES # BLD AUTO: 0.52 10*3/MM3 (ref 0.1–0.9)
MONOCYTES NFR BLD AUTO: 8.1 % (ref 5–12)
NEUTROPHILS NFR BLD AUTO: 4.42 10*3/MM3 (ref 1.7–7)
NEUTROPHILS NFR BLD AUTO: 68.6 % (ref 42.7–76)
NITRITE UR QL STRIP: NEGATIVE
NRBC BLD AUTO-RTO: 0 /100 WBC (ref 0–0.2)
NT-PROBNP SERPL-MCNC: 5648 PG/ML (ref 0–900)
PH UR STRIP.AUTO: 6 [PH] (ref 5–8)
PLATELET # BLD AUTO: 171 10*3/MM3 (ref 140–450)
PMV BLD AUTO: 11.3 FL (ref 6–12)
POTASSIUM SERPL-SCNC: 4.1 MMOL/L (ref 3.5–5.2)
PROCALCITONIN SERPL-MCNC: 0.04 NG/ML (ref 0–0.25)
PROT SERPL-MCNC: 6.2 G/DL (ref 6–8.5)
PROT UR QL STRIP: NEGATIVE
PROTHROMBIN TIME: 17.9 SECONDS (ref 12.1–14.7)
QT INTERVAL: 306 MS
QTC INTERVAL: 485 MS
RBC # BLD AUTO: 4.94 10*6/MM3 (ref 4.14–5.8)
SARS-COV-2 RNA RESP QL NAA+PROBE: NOT DETECTED
SODIUM SERPL-SCNC: 139 MMOL/L (ref 136–145)
SP GR UR STRIP: 1.01 (ref 1–1.03)
T4 FREE SERPL-MCNC: 1.49 NG/DL (ref 0.93–1.7)
TROPONIN T SERPL-MCNC: <0.01 NG/ML (ref 0–0.03)
TSH SERPL DL<=0.05 MIU/L-ACNC: 5.08 UIU/ML (ref 0.27–4.2)
UROBILINOGEN UR QL STRIP: NORMAL
WBC NRBC COR # BLD: 6.44 10*3/MM3 (ref 3.4–10.8)

## 2022-06-23 PROCEDURE — 84145 PROCALCITONIN (PCT): CPT | Performed by: STUDENT IN AN ORGANIZED HEALTH CARE EDUCATION/TRAINING PROGRAM

## 2022-06-23 PROCEDURE — 83735 ASSAY OF MAGNESIUM: CPT | Performed by: STUDENT IN AN ORGANIZED HEALTH CARE EDUCATION/TRAINING PROGRAM

## 2022-06-23 PROCEDURE — 93010 ELECTROCARDIOGRAM REPORT: CPT | Performed by: INTERNAL MEDICINE

## 2022-06-23 PROCEDURE — 25010000002 AMIODARONE PER 30 MG: Performed by: NURSE PRACTITIONER

## 2022-06-23 PROCEDURE — 84443 ASSAY THYROID STIM HORMONE: CPT | Performed by: STUDENT IN AN ORGANIZED HEALTH CARE EDUCATION/TRAINING PROGRAM

## 2022-06-23 PROCEDURE — 86140 C-REACTIVE PROTEIN: CPT | Performed by: STUDENT IN AN ORGANIZED HEALTH CARE EDUCATION/TRAINING PROGRAM

## 2022-06-23 PROCEDURE — 94761 N-INVAS EAR/PLS OXIMETRY MLT: CPT

## 2022-06-23 PROCEDURE — 25010000002 AMIODARONE IN DEXTROSE 5% 360-4.14 MG/200ML-% SOLUTION: Performed by: NURSE PRACTITIONER

## 2022-06-23 PROCEDURE — 83690 ASSAY OF LIPASE: CPT | Performed by: STUDENT IN AN ORGANIZED HEALTH CARE EDUCATION/TRAINING PROGRAM

## 2022-06-23 PROCEDURE — 93000 ELECTROCARDIOGRAM COMPLETE: CPT | Performed by: NURSE PRACTITIONER

## 2022-06-23 PROCEDURE — 99285 EMERGENCY DEPT VISIT HI MDM: CPT

## 2022-06-23 PROCEDURE — 25010000002 FUROSEMIDE PER 20 MG: Performed by: STUDENT IN AN ORGANIZED HEALTH CARE EDUCATION/TRAINING PROGRAM

## 2022-06-23 PROCEDURE — 84439 ASSAY OF FREE THYROXINE: CPT | Performed by: STUDENT IN AN ORGANIZED HEALTH CARE EDUCATION/TRAINING PROGRAM

## 2022-06-23 PROCEDURE — 25010000002 DIGOXIN PER 500 MCG: Performed by: STUDENT IN AN ORGANIZED HEALTH CARE EDUCATION/TRAINING PROGRAM

## 2022-06-23 PROCEDURE — 71275 CT ANGIOGRAPHY CHEST: CPT

## 2022-06-23 PROCEDURE — 0 IOPAMIDOL PER 1 ML: Performed by: STUDENT IN AN ORGANIZED HEALTH CARE EDUCATION/TRAINING PROGRAM

## 2022-06-23 PROCEDURE — 99223 1ST HOSP IP/OBS HIGH 75: CPT | Performed by: FAMILY MEDICINE

## 2022-06-23 PROCEDURE — 94799 UNLISTED PULMONARY SVC/PX: CPT

## 2022-06-23 PROCEDURE — 81003 URINALYSIS AUTO W/O SCOPE: CPT | Performed by: STUDENT IN AN ORGANIZED HEALTH CARE EDUCATION/TRAINING PROGRAM

## 2022-06-23 PROCEDURE — 83880 ASSAY OF NATRIURETIC PEPTIDE: CPT | Performed by: STUDENT IN AN ORGANIZED HEALTH CARE EDUCATION/TRAINING PROGRAM

## 2022-06-23 PROCEDURE — 99222 1ST HOSP IP/OBS MODERATE 55: CPT | Performed by: SPECIALIST

## 2022-06-23 PROCEDURE — 85610 PROTHROMBIN TIME: CPT | Performed by: STUDENT IN AN ORGANIZED HEALTH CARE EDUCATION/TRAINING PROGRAM

## 2022-06-23 PROCEDURE — 80053 COMPREHEN METABOLIC PANEL: CPT | Performed by: STUDENT IN AN ORGANIZED HEALTH CARE EDUCATION/TRAINING PROGRAM

## 2022-06-23 PROCEDURE — 71045 X-RAY EXAM CHEST 1 VIEW: CPT

## 2022-06-23 PROCEDURE — 84484 ASSAY OF TROPONIN QUANT: CPT | Performed by: STUDENT IN AN ORGANIZED HEALTH CARE EDUCATION/TRAINING PROGRAM

## 2022-06-23 PROCEDURE — 71045 X-RAY EXAM CHEST 1 VIEW: CPT | Performed by: RADIOLOGY

## 2022-06-23 PROCEDURE — 99214 OFFICE O/P EST MOD 30 MIN: CPT | Performed by: NURSE PRACTITIONER

## 2022-06-23 PROCEDURE — 85730 THROMBOPLASTIN TIME PARTIAL: CPT | Performed by: STUDENT IN AN ORGANIZED HEALTH CARE EDUCATION/TRAINING PROGRAM

## 2022-06-23 PROCEDURE — 85025 COMPLETE CBC W/AUTO DIFF WBC: CPT | Performed by: STUDENT IN AN ORGANIZED HEALTH CARE EDUCATION/TRAINING PROGRAM

## 2022-06-23 PROCEDURE — 71275 CT ANGIOGRAPHY CHEST: CPT | Performed by: RADIOLOGY

## 2022-06-23 PROCEDURE — 85379 FIBRIN DEGRADATION QUANT: CPT | Performed by: STUDENT IN AN ORGANIZED HEALTH CARE EDUCATION/TRAINING PROGRAM

## 2022-06-23 PROCEDURE — 87636 SARSCOV2 & INF A&B AMP PRB: CPT | Performed by: STUDENT IN AN ORGANIZED HEALTH CARE EDUCATION/TRAINING PROGRAM

## 2022-06-23 PROCEDURE — 93005 ELECTROCARDIOGRAM TRACING: CPT | Performed by: STUDENT IN AN ORGANIZED HEALTH CARE EDUCATION/TRAINING PROGRAM

## 2022-06-23 RX ORDER — DILTIAZEM HCL IN NACL,ISO-OSM 125 MG/125
5-15 PLASTIC BAG, INJECTION (ML) INTRAVENOUS
Status: DISCONTINUED | OUTPATIENT
Start: 2022-06-23 | End: 2022-06-23

## 2022-06-23 RX ORDER — CYCLOBENZAPRINE HCL 10 MG
10 TABLET ORAL EVERY 12 HOURS PRN
Status: CANCELLED | OUTPATIENT
Start: 2022-06-23

## 2022-06-23 RX ORDER — DIGOXIN 0.25 MG/ML
500 INJECTION INTRAMUSCULAR; INTRAVENOUS ONCE
Status: COMPLETED | OUTPATIENT
Start: 2022-06-23 | End: 2022-06-23

## 2022-06-23 RX ORDER — FUROSEMIDE 40 MG/1
40 TABLET ORAL DAILY
COMMUNITY
Start: 2022-06-17 | End: 2022-07-27 | Stop reason: SDUPTHER

## 2022-06-23 RX ORDER — FUROSEMIDE 40 MG/1
40 TABLET ORAL DAILY
Status: CANCELLED | OUTPATIENT
Start: 2022-06-23

## 2022-06-23 RX ORDER — ASPIRIN 81 MG/1
81 TABLET ORAL DAILY
Status: CANCELLED | OUTPATIENT
Start: 2022-06-23

## 2022-06-23 RX ORDER — LISINOPRIL 2.5 MG/1
5 TABLET ORAL DAILY
Status: CANCELLED | OUTPATIENT
Start: 2022-06-23

## 2022-06-23 RX ORDER — LISINOPRIL 5 MG/1
5 TABLET ORAL DAILY
Status: CANCELLED | OUTPATIENT
Start: 2022-06-23

## 2022-06-23 RX ORDER — HYDROCODONE BITARTRATE AND ACETAMINOPHEN 10; 325 MG/1; MG/1
1 TABLET ORAL EVERY 6 HOURS PRN
Status: DISPENSED | OUTPATIENT
Start: 2022-06-23 | End: 2022-06-30

## 2022-06-23 RX ORDER — FUROSEMIDE 10 MG/ML
40 INJECTION INTRAMUSCULAR; INTRAVENOUS ONCE
Status: COMPLETED | OUTPATIENT
Start: 2022-06-23 | End: 2022-06-23

## 2022-06-23 RX ORDER — GABAPENTIN 300 MG/1
600 CAPSULE ORAL NIGHTLY
Status: CANCELLED | OUTPATIENT
Start: 2022-06-23

## 2022-06-23 RX ORDER — TAMSULOSIN HYDROCHLORIDE 0.4 MG/1
0.4 CAPSULE ORAL NIGHTLY
Status: CANCELLED | OUTPATIENT
Start: 2022-06-23

## 2022-06-23 RX ORDER — ONDANSETRON 4 MG/1
4 TABLET, FILM COATED ORAL EVERY 6 HOURS PRN
Status: DISCONTINUED | OUTPATIENT
Start: 2022-06-23 | End: 2022-07-12 | Stop reason: HOSPADM

## 2022-06-23 RX ORDER — FUROSEMIDE 40 MG/1
40 TABLET ORAL DAILY
Qty: 3 TABLET | Refills: 0 | Status: CANCELLED | OUTPATIENT
Start: 2022-06-23 | End: 2022-06-26

## 2022-06-23 RX ORDER — CETIRIZINE HYDROCHLORIDE 10 MG/1
10 TABLET ORAL DAILY PRN
Status: CANCELLED | OUTPATIENT
Start: 2022-06-23

## 2022-06-23 RX ORDER — MELOXICAM 7.5 MG/1
15 TABLET ORAL DAILY
Status: CANCELLED | OUTPATIENT
Start: 2022-06-23

## 2022-06-23 RX ORDER — METOPROLOL SUCCINATE 25 MG/1
25 TABLET, EXTENDED RELEASE ORAL DAILY
Status: CANCELLED | OUTPATIENT
Start: 2022-06-23

## 2022-06-23 RX ORDER — SODIUM CHLORIDE 0.9 % (FLUSH) 0.9 %
10 SYRINGE (ML) INJECTION EVERY 12 HOURS SCHEDULED
Status: DISCONTINUED | OUTPATIENT
Start: 2022-06-23 | End: 2022-07-12 | Stop reason: HOSPADM

## 2022-06-23 RX ORDER — METOPROLOL TARTRATE 5 MG/5ML
5 INJECTION INTRAVENOUS
Status: DISCONTINUED | OUTPATIENT
Start: 2022-06-23 | End: 2022-06-23

## 2022-06-23 RX ORDER — SODIUM CHLORIDE 0.9 % (FLUSH) 0.9 %
10 SYRINGE (ML) INJECTION AS NEEDED
Status: DISCONTINUED | OUTPATIENT
Start: 2022-06-23 | End: 2022-07-12 | Stop reason: HOSPADM

## 2022-06-23 RX ORDER — CYCLOBENZAPRINE HCL 10 MG
10 TABLET ORAL EVERY 12 HOURS PRN
COMMUNITY
End: 2022-07-27

## 2022-06-23 RX ADMIN — IOPAMIDOL 88 ML: 755 INJECTION, SOLUTION INTRAVENOUS at 14:02

## 2022-06-23 RX ADMIN — Medication 5 MG/HR: at 14:39

## 2022-06-23 RX ADMIN — FUROSEMIDE 40 MG: 10 INJECTION, SOLUTION INTRAMUSCULAR; INTRAVENOUS at 11:59

## 2022-06-23 RX ADMIN — AMIODARONE HYDROCHLORIDE 1 MG/MIN: 1.8 INJECTION, SOLUTION INTRAVENOUS at 16:36

## 2022-06-23 RX ADMIN — DIGOXIN 500 MCG: 0.25 INJECTION INTRAMUSCULAR; INTRAVENOUS at 12:56

## 2022-06-23 RX ADMIN — AMIODARONE HYDROCHLORIDE 0.5 MG/MIN: 1.8 INJECTION, SOLUTION INTRAVENOUS at 22:53

## 2022-06-23 RX ADMIN — METOPROLOL TARTRATE 25 MG: 25 TABLET, FILM COATED ORAL at 12:56

## 2022-06-23 RX ADMIN — AMIODARONE HYDROCHLORIDE 150 MG: 50 INJECTION, SOLUTION INTRAVENOUS at 16:16

## 2022-06-23 RX ADMIN — METOPROLOL TARTRATE 25 MG: 25 TABLET, FILM COATED ORAL at 14:40

## 2022-06-23 RX ADMIN — METOPROLOL TARTRATE 5 MG: 1 INJECTION, SOLUTION INTRAVENOUS at 11:59

## 2022-06-23 RX ADMIN — Medication 10 ML: at 20:32

## 2022-06-23 RX ADMIN — HYDROCODONE BITARTRATE AND ACETAMINOPHEN 1 TABLET: 10; 325 TABLET ORAL at 20:32

## 2022-06-23 NOTE — PROGRESS NOTES
Amy Yanez, APRN  Chong White  1960  06/23/2022    Patient Active Problem List   Diagnosis   • Atrial fibrillation (HCC)   • Abnormal nuclear stress test   • Neuropathy   • ASCVD (arteriosclerotic cardiovascular disease)   • Tobacco abuse   • Non-ischemic cardiomyopathy (HCC)   • Chronic combined systolic and diastolic congestive heart failure (HCC)   • Chronic pain   • Chronic low back pain   • Paralysis (HCC)       Dear Amy Yanez, APRN:    Subjective     Chief Complaint   Patient presents with   • Follow-up   • Med Management     verbal           History of Present Illness:    Chong White is a 62 y.o. male with a past medical history of hypertension, ASCVD, paroxysmal atrial fibrillation, cardiomyopathy with most recent EF 21 to 25%, tobacco abuse, and history of IV drug abuse.  He presents today for cardiology follow-up. He recently underwent echocardiogram which revealed EF of 21-25%. He reports he has been compliant with medications recently. States he has gained 25 lbs over the last few months. He has been short of breath at rest with 2 pillow orthopnea. Has had increased lower extremity edema. He was evaluated in the ED on 6/16/22. At this time was in atrial fibrillation with RVR. proBNP was 5973. Chest x-ray was consistent with CHF. Metoprolol dosage was increased and he was discharged home. Since that time he has continued to gain weight. His swelling is worse in his lower extremities. He feels like he is smothering.           Allergies   Allergen Reactions   • Penicillins Hives   :      Current Outpatient Medications:   •  apixaban (ELIQUIS) 5 MG tablet tablet, Take 1 tablet by mouth 2 (Two) Times a Day., Disp: 60 tablet, Rfl: 11  •  aspirin 81 MG EC tablet, Take 81 mg by mouth Daily., Disp: , Rfl:   •  furosemide (LASIX) 40 MG tablet, Take 1 tablet by mouth Daily for 3 days., Disp: 3 tablet, Rfl: 0  •  gabapentin (NEURONTIN) 600 MG tablet, Take 600 mg by mouth Every Night., Disp: , Rfl:  "  •  lisinopril (PRINIVIL,ZESTRIL) 5 MG tablet, Take 5 mg by mouth Daily., Disp: , Rfl:   •  loratadine (CLARITIN) 10 MG tablet, Take 10 mg by mouth Daily As Needed for Allergies., Disp: , Rfl:   •  meloxicam (MOBIC) 15 MG tablet, Take 15 mg by mouth Daily., Disp: , Rfl:   •  metoprolol succinate XL (TOPROL-XL) 25 MG 24 hr tablet, Take 25 mg by mouth Daily., Disp: , Rfl:   •  sacubitril-valsartan (ENTRESTO) 24-26 MG tablet, Take 1 tablet by mouth Every 12 (Twelve) Hours., Disp: 60 tablet, Rfl: 0  •  tamsulosin (FLOMAX) 0.4 MG capsule 24 hr capsule, Take 1 capsule by mouth Every Night., Disp: , Rfl:       The following portions of the patient's history were reviewed and updated as appropriate: allergies, current medications, past family history, past medical history, past social history, past surgical history and problem list.    Social History     Tobacco Use   • Smoking status: Current Every Day Smoker     Packs/day: 2.00     Types: Cigarettes     Start date: 3/1/1969   • Smokeless tobacco: Never Used   • Tobacco comment: Has nicotine patches and states has only had approximately 10 cigarettes since discharge on 9/11/2020. Not using patch at this time   Vaping Use   • Vaping Use: Never used   Substance Use Topics   • Alcohol use: Never   • Drug use: Never       ROS    Objective   Vitals:    06/23/22 1039   BP: 110/64   BP Location: Left arm   Patient Position: Sitting   Cuff Size: Adult   Pulse: 84   Temp: 97.1 °F (36.2 °C)   TempSrc: Temporal   SpO2: 97%   Weight: 85.7 kg (189 lb)   Height: 172.7 cm (68\")     Body mass index is 28.74 kg/m².        Vitals reviewed.   Constitutional:       Appearance: Well-developed and not in distress. Acutely ill-appearing.   HENT:      Head: Normocephalic and atraumatic.   Pulmonary:      Effort: Pulmonary effort is normal.      Breath sounds: Bibasilar Rales present.   Cardiovascular:      Tachycardia present. Irregularly irregular rhythm.      Murmurs: There is no murmur.      " . No S3 and S4 gallop.   Edema:     Peripheral edema present.     Ankle: bilateral 2+ edema of the ankle.     Feet: bilateral 2+ edema of the feet.  Abdominal:      General: Bowel sounds are normal.      Palpations: Abdomen is soft.   Skin:     General: Skin is warm and dry.   Neurological:      Mental Status: Alert, oriented to person, place, and time and oriented to person, place and time.   Psychiatric:         Mood and Affect: Mood normal.         Behavior: Behavior normal.         Lab Results   Component Value Date     06/16/2022    K 4.0 06/16/2022     06/16/2022    CO2 24.0 06/16/2022    BUN 16 06/16/2022    CREATININE 0.92 06/16/2022    GLUCOSE 98 06/16/2022    CALCIUM 9.0 06/16/2022    AST 20 06/16/2022    ALT 32 06/16/2022    ALKPHOS 77 06/16/2022    LABIL2 1.9 10/29/2020     No results found for: CKTOTAL  Lab Results   Component Value Date    WBC 7.83 06/16/2022    HGB 14.7 06/16/2022    HCT 44.1 06/16/2022     06/16/2022     Lab Results   Component Value Date    INR 1.41 (H) 06/16/2022    INR 1.07 08/18/2021    INR 1.05 09/02/2020     Lab Results   Component Value Date    MG 2.0 06/16/2022     Lab Results   Component Value Date    TSH 2.610 06/16/2022    TRIG 51 09/03/2020    HDL 42 09/03/2020    LDL 81 09/03/2020      No results found for: BNP          ECG 12 Lead    Date/Time: 6/23/2022 11:15 AM  Performed by: Olivia Steele APRN  Authorized by: Olivia Steele APRN   Comparison: compared with previous ECG   Similar to previous ECG  Rhythm: atrial fibrillation  BPM: 148                Assessment & Plan    Diagnosis Plan   1. Atrial fibrillation with RVR ([facility])  ECG 12 Lead   2. Acute on chronic HFrEF (heart failure with reduced ejection fraction) ([facility])     3. Cardiomyopathy, dilated (possibly ischemic and nonischemic).                  Recommendations:    1. The patient is in atrial fibrillation with RVR in the office today. He is also in acute heart failure. He will likely  need admission to [facility] for IV diuretics. He has no transportation. EMS was contacted and transported the patient to the ED for further evaluation and treatment. Report called to Sonja at [facility] ED.        No follow-ups on file.    As always, I appreciate very much the opportunity to participate in the cardiovascular care of your patients.      With Best Regards,    SAMARA Almonte

## 2022-06-24 LAB
ABSOLUTE LUNG FLUID CONTENT: 34 % (ref 20–35)
ANION GAP SERPL CALCULATED.3IONS-SCNC: 12.1 MMOL/L (ref 5–15)
BASOPHILS # BLD AUTO: 0.06 10*3/MM3 (ref 0–0.2)
BASOPHILS NFR BLD AUTO: 0.9 % (ref 0–1.5)
BUN SERPL-MCNC: 23 MG/DL (ref 8–23)
BUN/CREAT SERPL: 17.4 (ref 7–25)
CALCIUM SPEC-SCNC: 8.5 MG/DL (ref 8.6–10.5)
CHLORIDE SERPL-SCNC: 102 MMOL/L (ref 98–107)
CO2 SERPL-SCNC: 22.9 MMOL/L (ref 22–29)
CREAT SERPL-MCNC: 1.32 MG/DL (ref 0.76–1.27)
DEPRECATED RDW RBC AUTO: 49.5 FL (ref 37–54)
EGFRCR SERPLBLD CKD-EPI 2021: 61 ML/MIN/1.73
EOSINOPHIL # BLD AUTO: 0.07 10*3/MM3 (ref 0–0.4)
EOSINOPHIL NFR BLD AUTO: 1.1 % (ref 0.3–6.2)
ERYTHROCYTE [DISTWIDTH] IN BLOOD BY AUTOMATED COUNT: 14.2 % (ref 12.3–15.4)
GLUCOSE SERPL-MCNC: 105 MG/DL (ref 65–99)
HCT VFR BLD AUTO: 42.3 % (ref 37.5–51)
HGB BLD-MCNC: 13.7 G/DL (ref 13–17.7)
IMM GRANULOCYTES # BLD AUTO: 0.02 10*3/MM3 (ref 0–0.05)
IMM GRANULOCYTES NFR BLD AUTO: 0.3 % (ref 0–0.5)
LYMPHOCYTES # BLD AUTO: 1.51 10*3/MM3 (ref 0.7–3.1)
LYMPHOCYTES NFR BLD AUTO: 23.4 % (ref 19.6–45.3)
MAGNESIUM SERPL-MCNC: 2.2 MG/DL (ref 1.6–2.4)
MCH RBC QN AUTO: 31.2 PG (ref 26.6–33)
MCHC RBC AUTO-ENTMCNC: 32.4 G/DL (ref 31.5–35.7)
MCV RBC AUTO: 96.4 FL (ref 79–97)
MONOCYTES # BLD AUTO: 0.57 10*3/MM3 (ref 0.1–0.9)
MONOCYTES NFR BLD AUTO: 8.8 % (ref 5–12)
NEUTROPHILS NFR BLD AUTO: 4.22 10*3/MM3 (ref 1.7–7)
NEUTROPHILS NFR BLD AUTO: 65.5 % (ref 42.7–76)
NRBC BLD AUTO-RTO: 0 /100 WBC (ref 0–0.2)
PLATELET # BLD AUTO: 170 10*3/MM3 (ref 140–450)
PMV BLD AUTO: 11.7 FL (ref 6–12)
POTASSIUM SERPL-SCNC: 4 MMOL/L (ref 3.5–5.2)
QT INTERVAL: 380 MS
QT INTERVAL: 382 MS
QTC INTERVAL: 464 MS
QTC INTERVAL: 482 MS
RBC # BLD AUTO: 4.39 10*6/MM3 (ref 4.14–5.8)
SODIUM SERPL-SCNC: 137 MMOL/L (ref 136–145)
TROPONIN T SERPL-MCNC: <0.01 NG/ML (ref 0–0.03)
TROPONIN T SERPL-MCNC: <0.01 NG/ML (ref 0–0.03)
WBC NRBC COR # BLD: 6.45 10*3/MM3 (ref 3.4–10.8)

## 2022-06-24 PROCEDURE — 94726 PLETHYSMOGRAPHY LUNG VOLUMES: CPT

## 2022-06-24 PROCEDURE — 99232 SBSQ HOSP IP/OBS MODERATE 35: CPT | Performed by: INTERNAL MEDICINE

## 2022-06-24 PROCEDURE — 93005 ELECTROCARDIOGRAM TRACING: CPT | Performed by: FAMILY MEDICINE

## 2022-06-24 PROCEDURE — 83735 ASSAY OF MAGNESIUM: CPT | Performed by: FAMILY MEDICINE

## 2022-06-24 PROCEDURE — 84484 ASSAY OF TROPONIN QUANT: CPT | Performed by: FAMILY MEDICINE

## 2022-06-24 PROCEDURE — 25010000002 AMIODARONE IN DEXTROSE 5% 360-4.14 MG/200ML-% SOLUTION: Performed by: NURSE PRACTITIONER

## 2022-06-24 PROCEDURE — 80048 BASIC METABOLIC PNL TOTAL CA: CPT | Performed by: FAMILY MEDICINE

## 2022-06-24 PROCEDURE — 93005 ELECTROCARDIOGRAM TRACING: CPT | Performed by: NURSE PRACTITIONER

## 2022-06-24 PROCEDURE — 93010 ELECTROCARDIOGRAM REPORT: CPT | Performed by: INTERNAL MEDICINE

## 2022-06-24 PROCEDURE — 99232 SBSQ HOSP IP/OBS MODERATE 35: CPT | Performed by: FAMILY MEDICINE

## 2022-06-24 PROCEDURE — 25010000002 DOBUTAMINE PER 250 MG: Performed by: INTERNAL MEDICINE

## 2022-06-24 PROCEDURE — 85025 COMPLETE CBC W/AUTO DIFF WBC: CPT | Performed by: FAMILY MEDICINE

## 2022-06-24 RX ORDER — CYCLOBENZAPRINE HCL 10 MG
10 TABLET ORAL EVERY 12 HOURS PRN
Status: DISCONTINUED | OUTPATIENT
Start: 2022-06-24 | End: 2022-07-12 | Stop reason: HOSPADM

## 2022-06-24 RX ORDER — RANOLAZINE 500 MG/1
500 TABLET, EXTENDED RELEASE ORAL EVERY 12 HOURS SCHEDULED
Status: DISCONTINUED | OUTPATIENT
Start: 2022-06-24 | End: 2022-07-12 | Stop reason: HOSPADM

## 2022-06-24 RX ORDER — CETIRIZINE HYDROCHLORIDE 10 MG/1
10 TABLET ORAL DAILY
Status: DISCONTINUED | OUTPATIENT
Start: 2022-06-24 | End: 2022-07-12 | Stop reason: HOSPADM

## 2022-06-24 RX ORDER — DOBUTAMINE HYDROCHLORIDE 200 MG/100ML
3 INJECTION INTRAVENOUS CONTINUOUS
Status: DISPENSED | OUTPATIENT
Start: 2022-06-24 | End: 2022-06-26

## 2022-06-24 RX ORDER — TAMSULOSIN HYDROCHLORIDE 0.4 MG/1
0.4 CAPSULE ORAL NIGHTLY
Status: DISCONTINUED | OUTPATIENT
Start: 2022-06-24 | End: 2022-07-12 | Stop reason: HOSPADM

## 2022-06-24 RX ORDER — CHOLECALCIFEROL (VITAMIN D3) 125 MCG
10 CAPSULE ORAL NIGHTLY PRN
Status: DISCONTINUED | OUTPATIENT
Start: 2022-06-24 | End: 2022-07-12 | Stop reason: HOSPADM

## 2022-06-24 RX ORDER — GABAPENTIN 300 MG/1
600 CAPSULE ORAL NIGHTLY
Status: DISCONTINUED | OUTPATIENT
Start: 2022-06-24 | End: 2022-07-12 | Stop reason: HOSPADM

## 2022-06-24 RX ORDER — ASPIRIN 81 MG/1
81 TABLET ORAL DAILY
Status: DISCONTINUED | OUTPATIENT
Start: 2022-06-24 | End: 2022-07-12 | Stop reason: HOSPADM

## 2022-06-24 RX ORDER — NICOTINE 21 MG/24HR
1 PATCH, TRANSDERMAL 24 HOURS TRANSDERMAL
Status: DISCONTINUED | OUTPATIENT
Start: 2022-06-24 | End: 2022-07-12 | Stop reason: HOSPADM

## 2022-06-24 RX ADMIN — DOBUTAMINE HYDROCHLORIDE 3 MCG/KG/MIN: 200 INJECTION INTRAVENOUS at 18:18

## 2022-06-24 RX ADMIN — METOPROLOL TARTRATE 12.5 MG: 25 TABLET, FILM COATED ORAL at 20:52

## 2022-06-24 RX ADMIN — Medication 10 MG: at 04:33

## 2022-06-24 RX ADMIN — RANOLAZINE 500 MG: 500 TABLET, FILM COATED, EXTENDED RELEASE ORAL at 20:52

## 2022-06-24 RX ADMIN — HYDROCODONE BITARTRATE AND ACETAMINOPHEN 1 TABLET: 10; 325 TABLET ORAL at 16:53

## 2022-06-24 RX ADMIN — HYDROCODONE BITARTRATE AND ACETAMINOPHEN 1 TABLET: 10; 325 TABLET ORAL at 04:33

## 2022-06-24 RX ADMIN — RANOLAZINE 500 MG: 500 TABLET, FILM COATED, EXTENDED RELEASE ORAL at 15:53

## 2022-06-24 RX ADMIN — TAMSULOSIN HYDROCHLORIDE 0.4 MG: 0.4 CAPSULE ORAL at 20:52

## 2022-06-24 RX ADMIN — APIXABAN 5 MG: 5 TABLET, FILM COATED ORAL at 09:31

## 2022-06-24 RX ADMIN — ASPIRIN 81 MG: 81 TABLET, COATED ORAL at 12:49

## 2022-06-24 RX ADMIN — NICOTINE TRANSDERMAL SYSTEM 1 PATCH: 21 PATCH, EXTENDED RELEASE TRANSDERMAL at 16:55

## 2022-06-24 RX ADMIN — METOPROLOL TARTRATE 12.5 MG: 25 TABLET, FILM COATED ORAL at 09:31

## 2022-06-24 RX ADMIN — CYCLOBENZAPRINE 10 MG: 10 TABLET, FILM COATED ORAL at 20:53

## 2022-06-24 RX ADMIN — CETIRIZINE HYDROCHLORIDE 10 MG: 10 TABLET, FILM COATED ORAL at 12:49

## 2022-06-24 RX ADMIN — HYDROCODONE BITARTRATE AND ACETAMINOPHEN 1 TABLET: 10; 325 TABLET ORAL at 23:07

## 2022-06-24 RX ADMIN — Medication 10 ML: at 20:53

## 2022-06-24 RX ADMIN — Medication 10 ML: at 08:06

## 2022-06-24 RX ADMIN — HYDROCODONE BITARTRATE AND ACETAMINOPHEN 1 TABLET: 10; 325 TABLET ORAL at 10:56

## 2022-06-24 RX ADMIN — Medication 10 MG: at 20:52

## 2022-06-24 RX ADMIN — AMIODARONE HYDROCHLORIDE 0.5 MG/MIN: 1.8 INJECTION, SOLUTION INTRAVENOUS at 08:05

## 2022-06-24 RX ADMIN — GABAPENTIN 600 MG: 300 CAPSULE ORAL at 20:52

## 2022-06-24 RX ADMIN — APIXABAN 5 MG: 5 TABLET, FILM COATED ORAL at 20:52

## 2022-06-25 ENCOUNTER — APPOINTMENT (OUTPATIENT)
Dept: GENERAL RADIOLOGY | Facility: HOSPITAL | Age: 62
End: 2022-06-25

## 2022-06-25 LAB
ANION GAP SERPL CALCULATED.3IONS-SCNC: 8.7 MMOL/L (ref 5–15)
BUN SERPL-MCNC: 26 MG/DL (ref 8–23)
BUN/CREAT SERPL: 21.1 (ref 7–25)
CALCIUM SPEC-SCNC: 8.5 MG/DL (ref 8.6–10.5)
CHLORIDE SERPL-SCNC: 100 MMOL/L (ref 98–107)
CO2 SERPL-SCNC: 29.3 MMOL/L (ref 22–29)
CREAT SERPL-MCNC: 1.23 MG/DL (ref 0.76–1.27)
EGFRCR SERPLBLD CKD-EPI 2021: 66.4 ML/MIN/1.73
GLUCOSE SERPL-MCNC: 97 MG/DL (ref 65–99)
POTASSIUM SERPL-SCNC: 4.2 MMOL/L (ref 3.5–5.2)
QT INTERVAL: 382 MS
QTC INTERVAL: 490 MS
SODIUM SERPL-SCNC: 138 MMOL/L (ref 136–145)
TROPONIN T SERPL-MCNC: <0.01 NG/ML (ref 0–0.03)

## 2022-06-25 PROCEDURE — 93005 ELECTROCARDIOGRAM TRACING: CPT | Performed by: FAMILY MEDICINE

## 2022-06-25 PROCEDURE — 84484 ASSAY OF TROPONIN QUANT: CPT | Performed by: FAMILY MEDICINE

## 2022-06-25 PROCEDURE — 25010000002 AMIODARONE IN DEXTROSE 5% 360-4.14 MG/200ML-% SOLUTION: Performed by: INTERNAL MEDICINE

## 2022-06-25 PROCEDURE — 94640 AIRWAY INHALATION TREATMENT: CPT

## 2022-06-25 PROCEDURE — 80048 BASIC METABOLIC PNL TOTAL CA: CPT | Performed by: INTERNAL MEDICINE

## 2022-06-25 PROCEDURE — 94761 N-INVAS EAR/PLS OXIMETRY MLT: CPT

## 2022-06-25 PROCEDURE — 94799 UNLISTED PULMONARY SVC/PX: CPT

## 2022-06-25 PROCEDURE — 99232 SBSQ HOSP IP/OBS MODERATE 35: CPT | Performed by: FAMILY MEDICINE

## 2022-06-25 PROCEDURE — 94760 N-INVAS EAR/PLS OXIMETRY 1: CPT

## 2022-06-25 PROCEDURE — 25010000002 FUROSEMIDE PER 20 MG: Performed by: INTERNAL MEDICINE

## 2022-06-25 PROCEDURE — 71045 X-RAY EXAM CHEST 1 VIEW: CPT

## 2022-06-25 RX ORDER — ALBUMIN (HUMAN) 12.5 G/50ML
25 SOLUTION INTRAVENOUS
Status: COMPLETED | OUTPATIENT
Start: 2022-06-26 | End: 2022-06-26

## 2022-06-25 RX ORDER — DIGOXIN 125 MCG
125 TABLET ORAL
Status: COMPLETED | OUTPATIENT
Start: 2022-06-25 | End: 2022-06-25

## 2022-06-25 RX ORDER — FUROSEMIDE 10 MG/ML
40 INJECTION INTRAMUSCULAR; INTRAVENOUS ONCE
Status: COMPLETED | OUTPATIENT
Start: 2022-06-25 | End: 2022-06-25

## 2022-06-25 RX ORDER — DOXYCYCLINE 100 MG/1
100 CAPSULE ORAL EVERY 12 HOURS SCHEDULED
Status: COMPLETED | OUTPATIENT
Start: 2022-06-25 | End: 2022-06-29

## 2022-06-25 RX ORDER — NOREPINEPHRINE BIT/0.9 % NACL 8 MG/250ML
.02-.3 INFUSION BOTTLE (ML) INTRAVENOUS
Status: DISCONTINUED | OUTPATIENT
Start: 2022-06-25 | End: 2022-06-29

## 2022-06-25 RX ADMIN — NICOTINE TRANSDERMAL SYSTEM 1 PATCH: 21 PATCH, EXTENDED RELEASE TRANSDERMAL at 08:02

## 2022-06-25 RX ADMIN — APIXABAN 5 MG: 5 TABLET, FILM COATED ORAL at 20:12

## 2022-06-25 RX ADMIN — Medication 0.02 MCG/KG/MIN: at 22:54

## 2022-06-25 RX ADMIN — HYDROCODONE BITARTRATE AND ACETAMINOPHEN 1 TABLET: 10; 325 TABLET ORAL at 08:05

## 2022-06-25 RX ADMIN — DOXYCYCLINE 100 MG: 100 CAPSULE ORAL at 20:11

## 2022-06-25 RX ADMIN — GABAPENTIN 600 MG: 300 CAPSULE ORAL at 20:12

## 2022-06-25 RX ADMIN — Medication 10 ML: at 20:12

## 2022-06-25 RX ADMIN — RANOLAZINE 500 MG: 500 TABLET, FILM COATED, EXTENDED RELEASE ORAL at 08:00

## 2022-06-25 RX ADMIN — HYDROCODONE BITARTRATE AND ACETAMINOPHEN 1 TABLET: 10; 325 TABLET ORAL at 18:07

## 2022-06-25 RX ADMIN — AMIODARONE HYDROCHLORIDE 0.5 MG/MIN: 1.8 INJECTION, SOLUTION INTRAVENOUS at 22:55

## 2022-06-25 RX ADMIN — ALBUMIN HUMAN 25 G: 0.25 SOLUTION INTRAVENOUS at 23:01

## 2022-06-25 RX ADMIN — RANOLAZINE 500 MG: 500 TABLET, FILM COATED, EXTENDED RELEASE ORAL at 20:11

## 2022-06-25 RX ADMIN — DIGOXIN 125 MCG: 125 TABLET ORAL at 23:01

## 2022-06-25 RX ADMIN — TAMSULOSIN HYDROCHLORIDE 0.4 MG: 0.4 CAPSULE ORAL at 20:11

## 2022-06-25 RX ADMIN — IPRATROPIUM BROMIDE 0.5 MG: 0.5 SOLUTION RESPIRATORY (INHALATION) at 18:55

## 2022-06-25 RX ADMIN — IPRATROPIUM BROMIDE 0.5 MG: 0.5 SOLUTION RESPIRATORY (INHALATION) at 13:51

## 2022-06-25 RX ADMIN — ASPIRIN 81 MG: 81 TABLET, COATED ORAL at 08:00

## 2022-06-25 RX ADMIN — Medication 10 ML: at 08:01

## 2022-06-25 RX ADMIN — FUROSEMIDE 40 MG: 10 INJECTION, SOLUTION INTRAMUSCULAR; INTRAVENOUS at 17:55

## 2022-06-25 RX ADMIN — FUROSEMIDE 40 MG: 10 INJECTION, SOLUTION INTRAVENOUS at 15:32

## 2022-06-25 RX ADMIN — CETIRIZINE HYDROCHLORIDE 10 MG: 10 TABLET, FILM COATED ORAL at 08:00

## 2022-06-25 RX ADMIN — Medication 10 MG: at 20:11

## 2022-06-25 RX ADMIN — HYDROCODONE BITARTRATE AND ACETAMINOPHEN 1 TABLET: 10; 325 TABLET ORAL at 23:28

## 2022-06-25 RX ADMIN — APIXABAN 5 MG: 5 TABLET, FILM COATED ORAL at 08:00

## 2022-06-25 RX ADMIN — DOXYCYCLINE 100 MG: 100 CAPSULE ORAL at 11:47

## 2022-06-26 ENCOUNTER — APPOINTMENT (OUTPATIENT)
Dept: GENERAL RADIOLOGY | Facility: HOSPITAL | Age: 62
End: 2022-06-26

## 2022-06-26 LAB
ANION GAP SERPL CALCULATED.3IONS-SCNC: 10.1 MMOL/L (ref 5–15)
BUN SERPL-MCNC: 28 MG/DL (ref 8–23)
BUN/CREAT SERPL: 21.1 (ref 7–25)
CALCIUM SPEC-SCNC: 8.2 MG/DL (ref 8.6–10.5)
CHLORIDE SERPL-SCNC: 100 MMOL/L (ref 98–107)
CO2 SERPL-SCNC: 23.9 MMOL/L (ref 22–29)
CREAT SERPL-MCNC: 1.33 MG/DL (ref 0.76–1.27)
EGFRCR SERPLBLD CKD-EPI 2021: 60.4 ML/MIN/1.73
GLUCOSE SERPL-MCNC: 73 MG/DL (ref 65–99)
POTASSIUM SERPL-SCNC: 4.4 MMOL/L (ref 3.5–5.2)
QT INTERVAL: 340 MS
QTC INTERVAL: 484 MS
SODIUM SERPL-SCNC: 134 MMOL/L (ref 136–145)

## 2022-06-26 PROCEDURE — 36556 INSERT NON-TUNNEL CV CATH: CPT | Performed by: SURGERY

## 2022-06-26 PROCEDURE — 94799 UNLISTED PULMONARY SVC/PX: CPT

## 2022-06-26 PROCEDURE — 02HV33Z INSERTION OF INFUSION DEVICE INTO SUPERIOR VENA CAVA, PERCUTANEOUS APPROACH: ICD-10-PCS | Performed by: SURGERY

## 2022-06-26 PROCEDURE — 99232 SBSQ HOSP IP/OBS MODERATE 35: CPT | Performed by: FAMILY MEDICINE

## 2022-06-26 PROCEDURE — 93005 ELECTROCARDIOGRAM TRACING: CPT | Performed by: NURSE PRACTITIONER

## 2022-06-26 PROCEDURE — 94664 DEMO&/EVAL PT USE INHALER: CPT

## 2022-06-26 PROCEDURE — 25010000002 METHYLPREDNISOLONE PER 40 MG: Performed by: FAMILY MEDICINE

## 2022-06-26 PROCEDURE — 94761 N-INVAS EAR/PLS OXIMETRY MLT: CPT

## 2022-06-26 PROCEDURE — 25010000002 PHENTOLAMINE MESYLATE PER 5 MG: Performed by: FAMILY MEDICINE

## 2022-06-26 PROCEDURE — 80048 BASIC METABOLIC PNL TOTAL CA: CPT | Performed by: INTERNAL MEDICINE

## 2022-06-26 PROCEDURE — 94760 N-INVAS EAR/PLS OXIMETRY 1: CPT

## 2022-06-26 PROCEDURE — 25010000002 FUROSEMIDE PER 20 MG: Performed by: INTERNAL MEDICINE

## 2022-06-26 PROCEDURE — 71045 X-RAY EXAM CHEST 1 VIEW: CPT

## 2022-06-26 PROCEDURE — 25010000002 ALBUMIN HUMAN 25% PER 50 ML: Performed by: HOSPITALIST

## 2022-06-26 PROCEDURE — P9047 ALBUMIN (HUMAN), 25%, 50ML: HCPCS | Performed by: HOSPITALIST

## 2022-06-26 RX ORDER — FUROSEMIDE 10 MG/ML
40 INJECTION INTRAMUSCULAR; INTRAVENOUS ONCE
Status: DISCONTINUED | OUTPATIENT
Start: 2022-06-26 | End: 2022-06-26

## 2022-06-26 RX ORDER — METHYLPREDNISOLONE SODIUM SUCCINATE 40 MG/ML
40 INJECTION, POWDER, LYOPHILIZED, FOR SOLUTION INTRAMUSCULAR; INTRAVENOUS ONCE
Status: COMPLETED | OUTPATIENT
Start: 2022-06-26 | End: 2022-06-26

## 2022-06-26 RX ORDER — GUAIFENESIN 600 MG/1
1200 TABLET, EXTENDED RELEASE ORAL EVERY 12 HOURS SCHEDULED
Status: DISCONTINUED | OUTPATIENT
Start: 2022-06-26 | End: 2022-07-12 | Stop reason: HOSPADM

## 2022-06-26 RX ADMIN — ALBUMIN HUMAN 25 G: 0.25 SOLUTION INTRAVENOUS at 01:07

## 2022-06-26 RX ADMIN — GUAIFENESIN 1200 MG: 600 TABLET, EXTENDED RELEASE ORAL at 11:31

## 2022-06-26 RX ADMIN — IPRATROPIUM BROMIDE 0.5 MG: 0.5 SOLUTION RESPIRATORY (INHALATION) at 00:13

## 2022-06-26 RX ADMIN — IPRATROPIUM BROMIDE 0.5 MG: 0.5 SOLUTION RESPIRATORY (INHALATION) at 18:24

## 2022-06-26 RX ADMIN — METHYLPREDNISOLONE SODIUM SUCCINATE 40 MG: 40 INJECTION, POWDER, FOR SOLUTION INTRAMUSCULAR; INTRAVENOUS at 11:31

## 2022-06-26 RX ADMIN — GABAPENTIN 600 MG: 300 CAPSULE ORAL at 20:43

## 2022-06-26 RX ADMIN — SODIUM CHLORIDE 5 MG: 9 INJECTION, SOLUTION INTRAVENOUS at 02:16

## 2022-06-26 RX ADMIN — HYDROCODONE BITARTRATE AND ACETAMINOPHEN 1 TABLET: 10; 325 TABLET ORAL at 17:22

## 2022-06-26 RX ADMIN — HYDROCODONE BITARTRATE AND ACETAMINOPHEN 1 TABLET: 10; 325 TABLET ORAL at 23:23

## 2022-06-26 RX ADMIN — APIXABAN 5 MG: 5 TABLET, FILM COATED ORAL at 20:43

## 2022-06-26 RX ADMIN — Medication 10 ML: at 09:32

## 2022-06-26 RX ADMIN — FUROSEMIDE 40 MG: 10 INJECTION, SOLUTION INTRAMUSCULAR; INTRAVENOUS at 11:32

## 2022-06-26 RX ADMIN — HYDROCODONE BITARTRATE AND ACETAMINOPHEN 1 TABLET: 10; 325 TABLET ORAL at 05:47

## 2022-06-26 RX ADMIN — Medication 10 MG: at 20:43

## 2022-06-26 RX ADMIN — ASPIRIN 81 MG: 81 TABLET, COATED ORAL at 09:30

## 2022-06-26 RX ADMIN — DOXYCYCLINE 100 MG: 100 CAPSULE ORAL at 20:43

## 2022-06-26 RX ADMIN — NICOTINE TRANSDERMAL SYSTEM 1 PATCH: 21 PATCH, EXTENDED RELEASE TRANSDERMAL at 09:31

## 2022-06-26 RX ADMIN — Medication 10 ML: at 20:44

## 2022-06-26 RX ADMIN — CETIRIZINE HYDROCHLORIDE 10 MG: 10 TABLET, FILM COATED ORAL at 09:30

## 2022-06-26 RX ADMIN — DOXYCYCLINE 100 MG: 100 CAPSULE ORAL at 09:30

## 2022-06-26 RX ADMIN — IPRATROPIUM BROMIDE 0.5 MG: 0.5 SOLUTION RESPIRATORY (INHALATION) at 12:56

## 2022-06-26 RX ADMIN — GUAIFENESIN 1200 MG: 600 TABLET, EXTENDED RELEASE ORAL at 20:43

## 2022-06-26 RX ADMIN — RANOLAZINE 500 MG: 500 TABLET, FILM COATED, EXTENDED RELEASE ORAL at 09:30

## 2022-06-26 RX ADMIN — IPRATROPIUM BROMIDE 0.5 MG: 0.5 SOLUTION RESPIRATORY (INHALATION) at 07:08

## 2022-06-26 RX ADMIN — RANOLAZINE 500 MG: 500 TABLET, FILM COATED, EXTENDED RELEASE ORAL at 20:44

## 2022-06-26 RX ADMIN — METOPROLOL TARTRATE 12.5 MG: 25 TABLET, FILM COATED ORAL at 20:50

## 2022-06-26 RX ADMIN — METOPROLOL TARTRATE 12.5 MG: 25 TABLET, FILM COATED ORAL at 09:32

## 2022-06-26 RX ADMIN — TAMSULOSIN HYDROCHLORIDE 0.4 MG: 0.4 CAPSULE ORAL at 20:43

## 2022-06-27 LAB
ANION GAP SERPL CALCULATED.3IONS-SCNC: 9.7 MMOL/L (ref 5–15)
BASOPHILS # BLD AUTO: 0 10*3/MM3 (ref 0–0.2)
BASOPHILS NFR BLD AUTO: 0 % (ref 0–1.5)
BUN SERPL-MCNC: 29 MG/DL (ref 8–23)
BUN/CREAT SERPL: 28.4 (ref 7–25)
CALCIUM SPEC-SCNC: 8.8 MG/DL (ref 8.6–10.5)
CHLORIDE SERPL-SCNC: 99 MMOL/L (ref 98–107)
CO2 SERPL-SCNC: 27.3 MMOL/L (ref 22–29)
CREAT SERPL-MCNC: 1.02 MG/DL (ref 0.76–1.27)
DEPRECATED RDW RBC AUTO: 47.2 FL (ref 37–54)
EGFRCR SERPLBLD CKD-EPI 2021: 83.1 ML/MIN/1.73
EOSINOPHIL # BLD AUTO: 0 10*3/MM3 (ref 0–0.4)
EOSINOPHIL NFR BLD AUTO: 0 % (ref 0.3–6.2)
ERYTHROCYTE [DISTWIDTH] IN BLOOD BY AUTOMATED COUNT: 13.9 % (ref 12.3–15.4)
GLUCOSE SERPL-MCNC: 138 MG/DL (ref 65–99)
HCT VFR BLD AUTO: 39.5 % (ref 37.5–51)
HGB BLD-MCNC: 12.9 G/DL (ref 13–17.7)
IMM GRANULOCYTES # BLD AUTO: 0.03 10*3/MM3 (ref 0–0.05)
IMM GRANULOCYTES NFR BLD AUTO: 0.5 % (ref 0–0.5)
LYMPHOCYTES # BLD AUTO: 0.36 10*3/MM3 (ref 0.7–3.1)
LYMPHOCYTES NFR BLD AUTO: 5.8 % (ref 19.6–45.3)
MCH RBC QN AUTO: 30.6 PG (ref 26.6–33)
MCHC RBC AUTO-ENTMCNC: 32.7 G/DL (ref 31.5–35.7)
MCV RBC AUTO: 93.6 FL (ref 79–97)
MONOCYTES # BLD AUTO: 0.16 10*3/MM3 (ref 0.1–0.9)
MONOCYTES NFR BLD AUTO: 2.6 % (ref 5–12)
NEUTROPHILS NFR BLD AUTO: 5.64 10*3/MM3 (ref 1.7–7)
NEUTROPHILS NFR BLD AUTO: 91.1 % (ref 42.7–76)
NRBC BLD AUTO-RTO: 0 /100 WBC (ref 0–0.2)
PLATELET # BLD AUTO: 142 10*3/MM3 (ref 140–450)
PMV BLD AUTO: 11.5 FL (ref 6–12)
POTASSIUM SERPL-SCNC: 4.6 MMOL/L (ref 3.5–5.2)
QT INTERVAL: 330 MS
QTC INTERVAL: 468 MS
RBC # BLD AUTO: 4.22 10*6/MM3 (ref 4.14–5.8)
SARS-COV-2 RNA RESP QL NAA+PROBE: NOT DETECTED
SODIUM SERPL-SCNC: 136 MMOL/L (ref 136–145)
TROPONIN T SERPL-MCNC: <0.01 NG/ML (ref 0–0.03)
WBC NRBC COR # BLD: 6.19 10*3/MM3 (ref 3.4–10.8)

## 2022-06-27 PROCEDURE — 84484 ASSAY OF TROPONIN QUANT: CPT | Performed by: FAMILY MEDICINE

## 2022-06-27 PROCEDURE — 25010000002 FUROSEMIDE PER 20 MG: Performed by: NURSE PRACTITIONER

## 2022-06-27 PROCEDURE — 94760 N-INVAS EAR/PLS OXIMETRY 1: CPT

## 2022-06-27 PROCEDURE — 94799 UNLISTED PULMONARY SVC/PX: CPT

## 2022-06-27 PROCEDURE — 85025 COMPLETE CBC W/AUTO DIFF WBC: CPT | Performed by: FAMILY MEDICINE

## 2022-06-27 PROCEDURE — 93005 ELECTROCARDIOGRAM TRACING: CPT | Performed by: FAMILY MEDICINE

## 2022-06-27 PROCEDURE — 93010 ELECTROCARDIOGRAM REPORT: CPT | Performed by: INTERNAL MEDICINE

## 2022-06-27 PROCEDURE — 99232 SBSQ HOSP IP/OBS MODERATE 35: CPT | Performed by: INTERNAL MEDICINE

## 2022-06-27 PROCEDURE — 94761 N-INVAS EAR/PLS OXIMETRY MLT: CPT

## 2022-06-27 PROCEDURE — 80048 BASIC METABOLIC PNL TOTAL CA: CPT | Performed by: INTERNAL MEDICINE

## 2022-06-27 PROCEDURE — 87635 SARS-COV-2 COVID-19 AMP PRB: CPT | Performed by: NURSE PRACTITIONER

## 2022-06-27 PROCEDURE — 99232 SBSQ HOSP IP/OBS MODERATE 35: CPT | Performed by: SPECIALIST

## 2022-06-27 RX ORDER — SODIUM CHLORIDE 0.9 % (FLUSH) 0.9 %
10 SYRINGE (ML) INJECTION EVERY 12 HOURS SCHEDULED
Status: DISCONTINUED | OUTPATIENT
Start: 2022-06-27 | End: 2022-07-12 | Stop reason: HOSPADM

## 2022-06-27 RX ORDER — SODIUM CHLORIDE 0.9 % (FLUSH) 0.9 %
20 SYRINGE (ML) INJECTION AS NEEDED
Status: DISCONTINUED | OUTPATIENT
Start: 2022-06-27 | End: 2022-07-12 | Stop reason: HOSPADM

## 2022-06-27 RX ORDER — FUROSEMIDE 10 MG/ML
20 INJECTION INTRAMUSCULAR; INTRAVENOUS EVERY 12 HOURS
Status: COMPLETED | OUTPATIENT
Start: 2022-06-27 | End: 2022-06-28

## 2022-06-27 RX ORDER — SODIUM CHLORIDE 0.9 % (FLUSH) 0.9 %
10 SYRINGE (ML) INJECTION AS NEEDED
Status: DISCONTINUED | OUTPATIENT
Start: 2022-06-27 | End: 2022-07-12 | Stop reason: HOSPADM

## 2022-06-27 RX ORDER — CALCIUM CARBONATE 200(500)MG
2 TABLET,CHEWABLE ORAL 3 TIMES DAILY PRN
Status: DISCONTINUED | OUTPATIENT
Start: 2022-06-27 | End: 2022-07-12 | Stop reason: HOSPADM

## 2022-06-27 RX ADMIN — DOXYCYCLINE 100 MG: 100 CAPSULE ORAL at 08:21

## 2022-06-27 RX ADMIN — IPRATROPIUM BROMIDE 0.5 MG: 0.5 SOLUTION RESPIRATORY (INHALATION) at 19:07

## 2022-06-27 RX ADMIN — GABAPENTIN 600 MG: 300 CAPSULE ORAL at 20:30

## 2022-06-27 RX ADMIN — GUAIFENESIN 1200 MG: 600 TABLET, EXTENDED RELEASE ORAL at 08:21

## 2022-06-27 RX ADMIN — Medication 10 ML: at 20:30

## 2022-06-27 RX ADMIN — METOPROLOL TARTRATE 12.5 MG: 25 TABLET, FILM COATED ORAL at 20:29

## 2022-06-27 RX ADMIN — HYDROCODONE BITARTRATE AND ACETAMINOPHEN 1 TABLET: 10; 325 TABLET ORAL at 13:56

## 2022-06-27 RX ADMIN — METOPROLOL TARTRATE 12.5 MG: 25 TABLET, FILM COATED ORAL at 08:22

## 2022-06-27 RX ADMIN — IPRATROPIUM BROMIDE 0.5 MG: 0.5 SOLUTION RESPIRATORY (INHALATION) at 06:57

## 2022-06-27 RX ADMIN — Medication 10 ML: at 08:23

## 2022-06-27 RX ADMIN — Medication 10 ML: at 08:22

## 2022-06-27 RX ADMIN — FUROSEMIDE 20 MG: 10 INJECTION, SOLUTION INTRAMUSCULAR; INTRAVENOUS at 12:07

## 2022-06-27 RX ADMIN — Medication 10 MG: at 20:29

## 2022-06-27 RX ADMIN — TAMSULOSIN HYDROCHLORIDE 0.4 MG: 0.4 CAPSULE ORAL at 20:29

## 2022-06-27 RX ADMIN — RANOLAZINE 500 MG: 500 TABLET, FILM COATED, EXTENDED RELEASE ORAL at 20:29

## 2022-06-27 RX ADMIN — IPRATROPIUM BROMIDE 0.5 MG: 0.5 SOLUTION RESPIRATORY (INHALATION) at 13:24

## 2022-06-27 RX ADMIN — GUAIFENESIN 1200 MG: 600 TABLET, EXTENDED RELEASE ORAL at 20:29

## 2022-06-27 RX ADMIN — APIXABAN 5 MG: 5 TABLET, FILM COATED ORAL at 20:29

## 2022-06-27 RX ADMIN — RANOLAZINE 500 MG: 500 TABLET, FILM COATED, EXTENDED RELEASE ORAL at 08:22

## 2022-06-27 RX ADMIN — ASPIRIN 81 MG: 81 TABLET, COATED ORAL at 08:22

## 2022-06-27 RX ADMIN — NICOTINE TRANSDERMAL SYSTEM 1 PATCH: 21 PATCH, EXTENDED RELEASE TRANSDERMAL at 08:23

## 2022-06-27 RX ADMIN — DOXYCYCLINE 100 MG: 100 CAPSULE ORAL at 20:29

## 2022-06-27 RX ADMIN — APIXABAN 5 MG: 5 TABLET, FILM COATED ORAL at 08:24

## 2022-06-27 RX ADMIN — CETIRIZINE HYDROCHLORIDE 10 MG: 10 TABLET, FILM COATED ORAL at 08:22

## 2022-06-27 RX ADMIN — HYDROCODONE BITARTRATE AND ACETAMINOPHEN 1 TABLET: 10; 325 TABLET ORAL at 05:11

## 2022-06-27 RX ADMIN — HYDROCODONE BITARTRATE AND ACETAMINOPHEN 1 TABLET: 10; 325 TABLET ORAL at 20:30

## 2022-06-28 ENCOUNTER — APPOINTMENT (OUTPATIENT)
Dept: CARDIOLOGY | Facility: HOSPITAL | Age: 62
End: 2022-06-28

## 2022-06-28 ENCOUNTER — ANESTHESIA EVENT (OUTPATIENT)
Dept: CARDIOLOGY | Facility: HOSPITAL | Age: 62
End: 2022-06-28

## 2022-06-28 ENCOUNTER — ANESTHESIA (OUTPATIENT)
Dept: CARDIOLOGY | Facility: HOSPITAL | Age: 62
End: 2022-06-28

## 2022-06-28 VITALS — TEMPERATURE: 98 F | SYSTOLIC BLOOD PRESSURE: 99 MMHG | DIASTOLIC BLOOD PRESSURE: 70 MMHG | OXYGEN SATURATION: 97 %

## 2022-06-28 LAB
ANION GAP SERPL CALCULATED.3IONS-SCNC: 10.5 MMOL/L (ref 5–15)
BUN SERPL-MCNC: 34 MG/DL (ref 8–23)
BUN/CREAT SERPL: 27.4 (ref 7–25)
CALCIUM SPEC-SCNC: 8.5 MG/DL (ref 8.6–10.5)
CHLORIDE SERPL-SCNC: 97 MMOL/L (ref 98–107)
CO2 SERPL-SCNC: 24.5 MMOL/L (ref 22–29)
CREAT SERPL-MCNC: 1.24 MG/DL (ref 0.76–1.27)
DEPRECATED RDW RBC AUTO: 49.1 FL (ref 37–54)
EGFRCR SERPLBLD CKD-EPI 2021: 65.7 ML/MIN/1.73
ERYTHROCYTE [DISTWIDTH] IN BLOOD BY AUTOMATED COUNT: 14.2 % (ref 12.3–15.4)
GLUCOSE SERPL-MCNC: 103 MG/DL (ref 65–99)
HCT VFR BLD AUTO: 38 % (ref 37.5–51)
HGB BLD-MCNC: 12.4 G/DL (ref 13–17.7)
MAXIMAL PREDICTED HEART RATE: 158 BPM
MCH RBC QN AUTO: 30.8 PG (ref 26.6–33)
MCHC RBC AUTO-ENTMCNC: 32.6 G/DL (ref 31.5–35.7)
MCV RBC AUTO: 94.3 FL (ref 79–97)
PLATELET # BLD AUTO: 132 10*3/MM3 (ref 140–450)
PMV BLD AUTO: 11.1 FL (ref 6–12)
POTASSIUM SERPL-SCNC: 4.3 MMOL/L (ref 3.5–5.2)
RBC # BLD AUTO: 4.03 10*6/MM3 (ref 4.14–5.8)
SODIUM SERPL-SCNC: 132 MMOL/L (ref 136–145)
STRESS TARGET HR: 134 BPM
WBC NRBC COR # BLD: 7.93 10*3/MM3 (ref 3.4–10.8)

## 2022-06-28 PROCEDURE — 93325 DOPPLER ECHO COLOR FLOW MAPG: CPT | Performed by: INTERNAL MEDICINE

## 2022-06-28 PROCEDURE — 93325 DOPPLER ECHO COLOR FLOW MAPG: CPT

## 2022-06-28 PROCEDURE — 80048 BASIC METABOLIC PNL TOTAL CA: CPT | Performed by: INTERNAL MEDICINE

## 2022-06-28 PROCEDURE — 99232 SBSQ HOSP IP/OBS MODERATE 35: CPT | Performed by: INTERNAL MEDICINE

## 2022-06-28 PROCEDURE — 25010000002 FUROSEMIDE PER 20 MG: Performed by: NURSE PRACTITIONER

## 2022-06-28 PROCEDURE — 93321 DOPPLER ECHO F-UP/LMTD STD: CPT | Performed by: INTERNAL MEDICINE

## 2022-06-28 PROCEDURE — 92960 CARDIOVERSION ELECTRIC EXT: CPT | Performed by: INTERNAL MEDICINE

## 2022-06-28 PROCEDURE — 25010000002 PROPOFOL 10 MG/ML EMULSION: Performed by: NURSE ANESTHETIST, CERTIFIED REGISTERED

## 2022-06-28 PROCEDURE — 85027 COMPLETE CBC AUTOMATED: CPT | Performed by: INTERNAL MEDICINE

## 2022-06-28 PROCEDURE — 94799 UNLISTED PULMONARY SVC/PX: CPT

## 2022-06-28 PROCEDURE — 94761 N-INVAS EAR/PLS OXIMETRY MLT: CPT

## 2022-06-28 PROCEDURE — 93005 ELECTROCARDIOGRAM TRACING: CPT | Performed by: INTERNAL MEDICINE

## 2022-06-28 PROCEDURE — 93312 ECHO TRANSESOPHAGEAL: CPT

## 2022-06-28 PROCEDURE — 5A2204Z RESTORATION OF CARDIAC RHYTHM, SINGLE: ICD-10-PCS | Performed by: INTERNAL MEDICINE

## 2022-06-28 PROCEDURE — 92960 CARDIOVERSION ELECTRIC EXT: CPT

## 2022-06-28 PROCEDURE — 93321 DOPPLER ECHO F-UP/LMTD STD: CPT

## 2022-06-28 PROCEDURE — 93010 ELECTROCARDIOGRAM REPORT: CPT | Performed by: INTERNAL MEDICINE

## 2022-06-28 PROCEDURE — 93312 ECHO TRANSESOPHAGEAL: CPT | Performed by: INTERNAL MEDICINE

## 2022-06-28 RX ORDER — AMIODARONE HYDROCHLORIDE 200 MG/1
400 TABLET ORAL EVERY 12 HOURS SCHEDULED
Status: DISCONTINUED | OUTPATIENT
Start: 2022-06-28 | End: 2022-07-12 | Stop reason: HOSPADM

## 2022-06-28 RX ORDER — CARVEDILOL 6.25 MG/1
6.25 TABLET ORAL 2 TIMES DAILY WITH MEALS
Status: DISCONTINUED | OUTPATIENT
Start: 2022-06-28 | End: 2022-07-12 | Stop reason: HOSPADM

## 2022-06-28 RX ORDER — SODIUM CHLORIDE 0.9 % (FLUSH) 0.9 %
10 SYRINGE (ML) INJECTION AS NEEDED
Status: DISCONTINUED | OUTPATIENT
Start: 2022-06-28 | End: 2022-07-12 | Stop reason: HOSPADM

## 2022-06-28 RX ORDER — SODIUM CHLORIDE, SODIUM LACTATE, POTASSIUM CHLORIDE, CALCIUM CHLORIDE 600; 310; 30; 20 MG/100ML; MG/100ML; MG/100ML; MG/100ML
125 INJECTION, SOLUTION INTRAVENOUS ONCE
Status: DISCONTINUED | OUTPATIENT
Start: 2022-06-28 | End: 2022-07-12 | Stop reason: HOSPADM

## 2022-06-28 RX ORDER — SODIUM CHLORIDE 0.9 % (FLUSH) 0.9 %
10 SYRINGE (ML) INJECTION EVERY 12 HOURS SCHEDULED
Status: DISCONTINUED | OUTPATIENT
Start: 2022-06-28 | End: 2022-07-12 | Stop reason: HOSPADM

## 2022-06-28 RX ORDER — PROPOFOL 10 MG/ML
VIAL (ML) INTRAVENOUS AS NEEDED
Status: DISCONTINUED | OUTPATIENT
Start: 2022-06-28 | End: 2022-06-28 | Stop reason: SURG

## 2022-06-28 RX ORDER — SODIUM CHLORIDE 9 MG/ML
INJECTION, SOLUTION INTRAVENOUS CONTINUOUS PRN
Status: DISCONTINUED | OUTPATIENT
Start: 2022-06-28 | End: 2022-06-28 | Stop reason: SURG

## 2022-06-28 RX ORDER — MIDAZOLAM HYDROCHLORIDE 1 MG/ML
1 INJECTION INTRAMUSCULAR; INTRAVENOUS
Status: DISCONTINUED | OUTPATIENT
Start: 2022-06-28 | End: 2022-07-03

## 2022-06-28 RX ADMIN — Medication 10 MG: at 20:31

## 2022-06-28 RX ADMIN — Medication 10 ML: at 09:57

## 2022-06-28 RX ADMIN — IPRATROPIUM BROMIDE 0.5 MG: 0.5 SOLUTION RESPIRATORY (INHALATION) at 13:25

## 2022-06-28 RX ADMIN — CARVEDILOL 6.25 MG: 6.25 TABLET, FILM COATED ORAL at 12:35

## 2022-06-28 RX ADMIN — TAMSULOSIN HYDROCHLORIDE 0.4 MG: 0.4 CAPSULE ORAL at 20:27

## 2022-06-28 RX ADMIN — GUAIFENESIN 1200 MG: 600 TABLET, EXTENDED RELEASE ORAL at 20:27

## 2022-06-28 RX ADMIN — IPRATROPIUM BROMIDE 0.5 MG: 0.5 SOLUTION RESPIRATORY (INHALATION) at 00:29

## 2022-06-28 RX ADMIN — ASPIRIN 81 MG: 81 TABLET, COATED ORAL at 09:57

## 2022-06-28 RX ADMIN — HYDROCODONE BITARTRATE AND ACETAMINOPHEN 1 TABLET: 10; 325 TABLET ORAL at 11:41

## 2022-06-28 RX ADMIN — DOXYCYCLINE 100 MG: 100 CAPSULE ORAL at 20:27

## 2022-06-28 RX ADMIN — PROPOFOL 50 MG: 10 INJECTION, EMULSION INTRAVENOUS at 08:53

## 2022-06-28 RX ADMIN — CETIRIZINE HYDROCHLORIDE 10 MG: 10 TABLET, FILM COATED ORAL at 09:57

## 2022-06-28 RX ADMIN — HYDROCODONE BITARTRATE AND ACETAMINOPHEN 1 TABLET: 10; 325 TABLET ORAL at 20:31

## 2022-06-28 RX ADMIN — Medication 10 ML: at 20:28

## 2022-06-28 RX ADMIN — NICOTINE TRANSDERMAL SYSTEM 1 PATCH: 21 PATCH, EXTENDED RELEASE TRANSDERMAL at 09:56

## 2022-06-28 RX ADMIN — PROPOFOL 20 MG: 10 INJECTION, EMULSION INTRAVENOUS at 08:58

## 2022-06-28 RX ADMIN — IPRATROPIUM BROMIDE 0.5 MG: 0.5 SOLUTION RESPIRATORY (INHALATION) at 19:12

## 2022-06-28 RX ADMIN — PROPOFOL 20 MG: 10 INJECTION, EMULSION INTRAVENOUS at 08:59

## 2022-06-28 RX ADMIN — GABAPENTIN 600 MG: 300 CAPSULE ORAL at 20:28

## 2022-06-28 RX ADMIN — RANOLAZINE 500 MG: 500 TABLET, FILM COATED, EXTENDED RELEASE ORAL at 09:57

## 2022-06-28 RX ADMIN — APIXABAN 5 MG: 5 TABLET, FILM COATED ORAL at 09:56

## 2022-06-28 RX ADMIN — APIXABAN 5 MG: 5 TABLET, FILM COATED ORAL at 20:27

## 2022-06-28 RX ADMIN — PROPOFOL 30 MG: 10 INJECTION, EMULSION INTRAVENOUS at 08:55

## 2022-06-28 RX ADMIN — RANOLAZINE 500 MG: 500 TABLET, FILM COATED, EXTENDED RELEASE ORAL at 20:27

## 2022-06-28 RX ADMIN — FUROSEMIDE 20 MG: 10 INJECTION, SOLUTION INTRAMUSCULAR; INTRAVENOUS at 00:33

## 2022-06-28 RX ADMIN — GUAIFENESIN 1200 MG: 600 TABLET, EXTENDED RELEASE ORAL at 09:56

## 2022-06-28 RX ADMIN — SODIUM CHLORIDE: 0.9 INJECTION, SOLUTION INTRAVENOUS at 08:49

## 2022-06-28 RX ADMIN — AMIODARONE HYDROCHLORIDE 400 MG: 200 TABLET ORAL at 20:28

## 2022-06-28 RX ADMIN — DOXYCYCLINE 100 MG: 100 CAPSULE ORAL at 09:56

## 2022-06-28 RX ADMIN — AMIODARONE HYDROCHLORIDE 400 MG: 200 TABLET ORAL at 12:35

## 2022-06-28 RX ADMIN — IPRATROPIUM BROMIDE 0.5 MG: 0.5 SOLUTION RESPIRATORY (INHALATION) at 06:27

## 2022-06-28 RX ADMIN — FUROSEMIDE 20 MG: 10 INJECTION, SOLUTION INTRAMUSCULAR; INTRAVENOUS at 12:35

## 2022-06-28 NOTE — ANESTHESIA POSTPROCEDURE EVALUATION
Patient: Chong White    Procedure Summary     Date: 06/28/22 Room / Location: [facility] NONINVASIVE LAB    Anesthesia Start: 0849 Anesthesia Stop: 0908    Procedure: ADULT TRANSESOPHAGEAL ECHO (LUCERO) W/ CONT IF NECESSARY PER PROTOCOL Diagnosis:       (Arrhythmia)      (AFib)    Scheduled Providers: Remi Lopez MD Provider: Jeffrey Feliz MD    Anesthesia Type: general, MAC ASA Status: 4          Anesthesia Type: general, MAC    Vitals  Vitals Value Taken Time   /81 06/28/22 1132   Temp 98 °F (36.7 °C) 06/28/22 0858   Pulse 87 06/28/22 1057   Resp 12 06/28/22 1002   SpO2 99 % 06/28/22 1058   Vitals shown include unvalidated device data.        Post Anesthesia Care and Evaluation    Patient location during evaluation: PHASE II  Patient participation: complete - patient participated  Level of consciousness: awake and alert  Pain score: 0  Pain management: adequate    Airway patency: patent  Anesthetic complications: No anesthetic complications    Cardiovascular status: acceptable  Respiratory status: acceptable  Hydration status: acceptable

## 2022-06-28 NOTE — ANESTHESIA PREPROCEDURE EVALUATION
Anesthesia Evaluation     Patient summary reviewed   NPO Solid Status: > 8 hours  NPO Liquid Status: > 8 hours           Airway   Mallampati: II  TM distance: >3 FB  Neck ROM: full  No difficulty expected  Dental - normal exam     Pulmonary - normal exam    breath sounds clear to auscultation  (+) pneumonia (left lower lobe) ,   Cardiovascular     Rhythm: irregular  Rate: abnormal    (+) hypertension less than 2 medications, past MI , dysrhythmias Atrial Fib, CHF ,       Neuro/Psych  GI/Hepatic/Renal/Endo    (+)  GERD,      Musculoskeletal     Abdominal  - normal exam   Substance History   (+) drug use (smoker 2 ppd;  marjuiana 1 month ago;  other substances years ago)     OB/GYN          Other        ROS/Med Hx Other: EF 20-25%                    Anesthesia Plan    ASA 4     general and MAC     intravenous induction     Anesthetic plan, risks, benefits, and alternatives have been provided, discussed and informed consent has been obtained with: patient.

## 2022-06-29 LAB
ALBUMIN SERPL-MCNC: 3.51 G/DL (ref 3.5–5.2)
ALBUMIN/GLOB SERPL: 1.8 G/DL
ALP SERPL-CCNC: 84 U/L (ref 39–117)
ALT SERPL W P-5'-P-CCNC: 20 U/L (ref 1–41)
ANION GAP SERPL CALCULATED.3IONS-SCNC: 7.1 MMOL/L (ref 5–15)
AST SERPL-CCNC: 19 U/L (ref 1–40)
BILIRUB SERPL-MCNC: 0.5 MG/DL (ref 0–1.2)
BUN SERPL-MCNC: 30 MG/DL (ref 8–23)
BUN/CREAT SERPL: 28.6 (ref 7–25)
CALCIUM SPEC-SCNC: 8.6 MG/DL (ref 8.6–10.5)
CHLORIDE SERPL-SCNC: 100 MMOL/L (ref 98–107)
CO2 SERPL-SCNC: 28.9 MMOL/L (ref 22–29)
CREAT SERPL-MCNC: 1.05 MG/DL (ref 0.76–1.27)
EGFRCR SERPLBLD CKD-EPI 2021: 80.3 ML/MIN/1.73
GLOBULIN UR ELPH-MCNC: 2 GM/DL
GLUCOSE SERPL-MCNC: 101 MG/DL (ref 65–99)
POTASSIUM SERPL-SCNC: 4.6 MMOL/L (ref 3.5–5.2)
PROT SERPL-MCNC: 5.5 G/DL (ref 6–8.5)
QT INTERVAL: 368 MS
QTC INTERVAL: 408 MS
SODIUM SERPL-SCNC: 136 MMOL/L (ref 136–145)

## 2022-06-29 PROCEDURE — 99232 SBSQ HOSP IP/OBS MODERATE 35: CPT | Performed by: INTERNAL MEDICINE

## 2022-06-29 PROCEDURE — 94761 N-INVAS EAR/PLS OXIMETRY MLT: CPT

## 2022-06-29 PROCEDURE — 99232 SBSQ HOSP IP/OBS MODERATE 35: CPT | Performed by: SPECIALIST

## 2022-06-29 PROCEDURE — C1751 CATH, INF, PER/CENT/MIDLINE: HCPCS

## 2022-06-29 PROCEDURE — 36410 VNPNXR 3YR/> PHY/QHP DX/THER: CPT

## 2022-06-29 PROCEDURE — 80053 COMPREHEN METABOLIC PANEL: CPT | Performed by: INTERNAL MEDICINE

## 2022-06-29 PROCEDURE — 25010000002 FUROSEMIDE PER 20 MG: Performed by: INTERNAL MEDICINE

## 2022-06-29 PROCEDURE — 94799 UNLISTED PULMONARY SVC/PX: CPT

## 2022-06-29 RX ORDER — SODIUM CHLORIDE 0.9 % (FLUSH) 0.9 %
10 SYRINGE (ML) INJECTION EVERY 12 HOURS SCHEDULED
Status: DISCONTINUED | OUTPATIENT
Start: 2022-06-29 | End: 2022-07-12 | Stop reason: HOSPADM

## 2022-06-29 RX ORDER — SODIUM CHLORIDE 0.9 % (FLUSH) 0.9 %
10 SYRINGE (ML) INJECTION AS NEEDED
Status: DISCONTINUED | OUTPATIENT
Start: 2022-06-29 | End: 2022-07-12 | Stop reason: HOSPADM

## 2022-06-29 RX ORDER — FUROSEMIDE 10 MG/ML
40 INJECTION INTRAMUSCULAR; INTRAVENOUS DAILY
Status: DISCONTINUED | OUTPATIENT
Start: 2022-06-29 | End: 2022-07-01

## 2022-06-29 RX ADMIN — HYDROCODONE BITARTRATE AND ACETAMINOPHEN 1 TABLET: 10; 325 TABLET ORAL at 14:48

## 2022-06-29 RX ADMIN — IPRATROPIUM BROMIDE 0.5 MG: 0.5 SOLUTION RESPIRATORY (INHALATION) at 19:42

## 2022-06-29 RX ADMIN — GABAPENTIN 600 MG: 300 CAPSULE ORAL at 20:56

## 2022-06-29 RX ADMIN — AMIODARONE HYDROCHLORIDE 400 MG: 200 TABLET ORAL at 20:56

## 2022-06-29 RX ADMIN — ASPIRIN 81 MG: 81 TABLET, COATED ORAL at 08:11

## 2022-06-29 RX ADMIN — AMIODARONE HYDROCHLORIDE 400 MG: 200 TABLET ORAL at 08:10

## 2022-06-29 RX ADMIN — FUROSEMIDE 40 MG: 10 INJECTION, SOLUTION INTRAVENOUS at 11:17

## 2022-06-29 RX ADMIN — Medication 10 ML: at 14:54

## 2022-06-29 RX ADMIN — Medication 10 ML: at 08:13

## 2022-06-29 RX ADMIN — Medication 10 ML: at 20:57

## 2022-06-29 RX ADMIN — Medication 10 ML: at 20:58

## 2022-06-29 RX ADMIN — IPRATROPIUM BROMIDE 0.5 MG: 0.5 SOLUTION RESPIRATORY (INHALATION) at 06:32

## 2022-06-29 RX ADMIN — HYDROCODONE BITARTRATE AND ACETAMINOPHEN 1 TABLET: 10; 325 TABLET ORAL at 04:52

## 2022-06-29 RX ADMIN — TAMSULOSIN HYDROCHLORIDE 0.4 MG: 0.4 CAPSULE ORAL at 20:56

## 2022-06-29 RX ADMIN — GUAIFENESIN 1200 MG: 600 TABLET, EXTENDED RELEASE ORAL at 08:11

## 2022-06-29 RX ADMIN — GUAIFENESIN 1200 MG: 600 TABLET, EXTENDED RELEASE ORAL at 20:56

## 2022-06-29 RX ADMIN — IPRATROPIUM BROMIDE 0.5 MG: 0.5 SOLUTION RESPIRATORY (INHALATION) at 01:03

## 2022-06-29 RX ADMIN — RANOLAZINE 500 MG: 500 TABLET, FILM COATED, EXTENDED RELEASE ORAL at 20:56

## 2022-06-29 RX ADMIN — DOXYCYCLINE 100 MG: 100 CAPSULE ORAL at 08:11

## 2022-06-29 RX ADMIN — CETIRIZINE HYDROCHLORIDE 10 MG: 10 TABLET, FILM COATED ORAL at 08:11

## 2022-06-29 RX ADMIN — Medication 10 ML: at 08:12

## 2022-06-29 RX ADMIN — CARVEDILOL 6.25 MG: 6.25 TABLET, FILM COATED ORAL at 17:43

## 2022-06-29 RX ADMIN — RANOLAZINE 500 MG: 500 TABLET, FILM COATED, EXTENDED RELEASE ORAL at 08:11

## 2022-06-29 RX ADMIN — IPRATROPIUM BROMIDE 0.5 MG: 0.5 SOLUTION RESPIRATORY (INHALATION) at 12:49

## 2022-06-29 RX ADMIN — HYDROCODONE BITARTRATE AND ACETAMINOPHEN 1 TABLET: 10; 325 TABLET ORAL at 20:56

## 2022-06-29 RX ADMIN — NICOTINE TRANSDERMAL SYSTEM 1 PATCH: 21 PATCH, EXTENDED RELEASE TRANSDERMAL at 08:17

## 2022-06-29 RX ADMIN — DOXYCYCLINE 100 MG: 100 CAPSULE ORAL at 20:56

## 2022-06-29 RX ADMIN — CARVEDILOL 6.25 MG: 6.25 TABLET, FILM COATED ORAL at 08:11

## 2022-06-29 RX ADMIN — APIXABAN 5 MG: 5 TABLET, FILM COATED ORAL at 20:56

## 2022-06-29 RX ADMIN — APIXABAN 5 MG: 5 TABLET, FILM COATED ORAL at 08:11

## 2022-06-30 ENCOUNTER — APPOINTMENT (OUTPATIENT)
Dept: NUCLEAR MEDICINE | Facility: HOSPITAL | Age: 62
End: 2022-06-30

## 2022-06-30 ENCOUNTER — APPOINTMENT (OUTPATIENT)
Dept: CARDIOLOGY | Facility: HOSPITAL | Age: 62
End: 2022-06-30

## 2022-06-30 LAB
ANION GAP SERPL CALCULATED.3IONS-SCNC: 8.1 MMOL/L (ref 5–15)
BH CV NUCLEAR PRIOR STUDY: 3
BH CV REST NUCLEAR ISOTOPE DOSE: 10.2 MCI
BH CV STRESS BP STAGE 1: NORMAL
BH CV STRESS BP STAGE 2: NORMAL
BH CV STRESS COMMENTS STAGE 1: NORMAL
BH CV STRESS COMMENTS STAGE 2: NORMAL
BH CV STRESS DOSE REGADENOSON STAGE 1: 0.4
BH CV STRESS DURATION MIN STAGE 1: 0
BH CV STRESS DURATION MIN STAGE 2: 4
BH CV STRESS DURATION SEC STAGE 1: 10
BH CV STRESS DURATION SEC STAGE 2: 0
BH CV STRESS HR STAGE 1: 77
BH CV STRESS HR STAGE 2: 86
BH CV STRESS NUCLEAR ISOTOPE DOSE: 30.7 MCI
BH CV STRESS PROTOCOL 1: NORMAL
BH CV STRESS RECOVERY BP: NORMAL MMHG
BH CV STRESS RECOVERY HR: 76 BPM
BH CV STRESS STAGE 1: 1
BH CV STRESS STAGE 2: 2
BUN SERPL-MCNC: 29 MG/DL (ref 8–23)
BUN/CREAT SERPL: 26.9 (ref 7–25)
CALCIUM SPEC-SCNC: 8.8 MG/DL (ref 8.6–10.5)
CHLORIDE SERPL-SCNC: 96 MMOL/L (ref 98–107)
CO2 SERPL-SCNC: 30.9 MMOL/L (ref 22–29)
CREAT SERPL-MCNC: 1.08 MG/DL (ref 0.76–1.27)
DEPRECATED RDW RBC AUTO: 48.6 FL (ref 37–54)
EGFRCR SERPLBLD CKD-EPI 2021: 77.6 ML/MIN/1.73
ERYTHROCYTE [DISTWIDTH] IN BLOOD BY AUTOMATED COUNT: 14.1 % (ref 12.3–15.4)
GLUCOSE SERPL-MCNC: 99 MG/DL (ref 65–99)
HCT VFR BLD AUTO: 37.9 % (ref 37.5–51)
HGB BLD-MCNC: 12.4 G/DL (ref 13–17.7)
LV EF NUC BP: 23 %
MAXIMAL PREDICTED HEART RATE: 158 BPM
MCH RBC QN AUTO: 30.9 PG (ref 26.6–33)
MCHC RBC AUTO-ENTMCNC: 32.7 G/DL (ref 31.5–35.7)
MCV RBC AUTO: 94.5 FL (ref 79–97)
PERCENT MAX PREDICTED HR: 54.43 %
PLATELET # BLD AUTO: 140 10*3/MM3 (ref 140–450)
PMV BLD AUTO: 11.5 FL (ref 6–12)
POTASSIUM SERPL-SCNC: 4.6 MMOL/L (ref 3.5–5.2)
RBC # BLD AUTO: 4.01 10*6/MM3 (ref 4.14–5.8)
SODIUM SERPL-SCNC: 135 MMOL/L (ref 136–145)
STRESS BASELINE BP: NORMAL MMHG
STRESS BASELINE HR: 78 BPM
STRESS PERCENT HR: 64 %
STRESS POST PEAK BP: NORMAL MMHG
STRESS POST PEAK HR: 86 BPM
STRESS TARGET HR: 134 BPM
WBC NRBC COR # BLD: 6.05 10*3/MM3 (ref 3.4–10.8)

## 2022-06-30 PROCEDURE — 78452 HT MUSCLE IMAGE SPECT MULT: CPT | Performed by: INTERNAL MEDICINE

## 2022-06-30 PROCEDURE — 94761 N-INVAS EAR/PLS OXIMETRY MLT: CPT

## 2022-06-30 PROCEDURE — 94799 UNLISTED PULMONARY SVC/PX: CPT

## 2022-06-30 PROCEDURE — 93017 CV STRESS TEST TRACING ONLY: CPT

## 2022-06-30 PROCEDURE — A9500 TC99M SESTAMIBI: HCPCS | Performed by: INTERNAL MEDICINE

## 2022-06-30 PROCEDURE — 93018 CV STRESS TEST I&R ONLY: CPT | Performed by: INTERNAL MEDICINE

## 2022-06-30 PROCEDURE — 85027 COMPLETE CBC AUTOMATED: CPT | Performed by: INTERNAL MEDICINE

## 2022-06-30 PROCEDURE — 25010000002 FUROSEMIDE PER 20 MG: Performed by: INTERNAL MEDICINE

## 2022-06-30 PROCEDURE — 78452 HT MUSCLE IMAGE SPECT MULT: CPT

## 2022-06-30 PROCEDURE — 25010000002 REGADENOSON 0.4 MG/5ML SOLUTION: Performed by: INTERNAL MEDICINE

## 2022-06-30 PROCEDURE — 0 TECHNETIUM SESTAMIBI: Performed by: INTERNAL MEDICINE

## 2022-06-30 PROCEDURE — 99232 SBSQ HOSP IP/OBS MODERATE 35: CPT | Performed by: INTERNAL MEDICINE

## 2022-06-30 PROCEDURE — 80048 BASIC METABOLIC PNL TOTAL CA: CPT | Performed by: INTERNAL MEDICINE

## 2022-06-30 RX ORDER — SODIUM CHLORIDE 0.9 % (FLUSH) 0.9 %
10 SYRINGE (ML) INJECTION AS NEEDED
Status: DISCONTINUED | OUTPATIENT
Start: 2022-06-30 | End: 2022-07-12 | Stop reason: HOSPADM

## 2022-06-30 RX ORDER — HYDROCODONE BITARTRATE AND ACETAMINOPHEN 10; 325 MG/1; MG/1
1 TABLET ORAL EVERY 6 HOURS PRN
Status: DISCONTINUED | OUTPATIENT
Start: 2022-06-30 | End: 2022-07-03

## 2022-06-30 RX ADMIN — Medication 10 ML: at 08:20

## 2022-06-30 RX ADMIN — Medication 10 ML: at 20:15

## 2022-06-30 RX ADMIN — RANOLAZINE 500 MG: 500 TABLET, FILM COATED, EXTENDED RELEASE ORAL at 20:14

## 2022-06-30 RX ADMIN — Medication 10 MG: at 20:14

## 2022-06-30 RX ADMIN — TAMSULOSIN HYDROCHLORIDE 0.4 MG: 0.4 CAPSULE ORAL at 20:14

## 2022-06-30 RX ADMIN — FUROSEMIDE 40 MG: 10 INJECTION, SOLUTION INTRAVENOUS at 08:23

## 2022-06-30 RX ADMIN — AMIODARONE HYDROCHLORIDE 400 MG: 200 TABLET ORAL at 08:17

## 2022-06-30 RX ADMIN — NICOTINE TRANSDERMAL SYSTEM 1 PATCH: 21 PATCH, EXTENDED RELEASE TRANSDERMAL at 08:22

## 2022-06-30 RX ADMIN — CETIRIZINE HYDROCHLORIDE 10 MG: 10 TABLET, FILM COATED ORAL at 08:18

## 2022-06-30 RX ADMIN — IPRATROPIUM BROMIDE 0.5 MG: 0.5 SOLUTION RESPIRATORY (INHALATION) at 00:45

## 2022-06-30 RX ADMIN — APIXABAN 5 MG: 5 TABLET, FILM COATED ORAL at 08:18

## 2022-06-30 RX ADMIN — APIXABAN 5 MG: 5 TABLET, FILM COATED ORAL at 20:14

## 2022-06-30 RX ADMIN — GABAPENTIN 600 MG: 300 CAPSULE ORAL at 20:14

## 2022-06-30 RX ADMIN — GUAIFENESIN 1200 MG: 600 TABLET, EXTENDED RELEASE ORAL at 08:17

## 2022-06-30 RX ADMIN — Medication 10 ML: at 08:23

## 2022-06-30 RX ADMIN — Medication 10 ML: at 20:14

## 2022-06-30 RX ADMIN — REGADENOSON 0.4 MG: 0.08 INJECTION, SOLUTION INTRAVENOUS at 10:06

## 2022-06-30 RX ADMIN — AMIODARONE HYDROCHLORIDE 400 MG: 200 TABLET ORAL at 20:14

## 2022-06-30 RX ADMIN — IPRATROPIUM BROMIDE 0.5 MG: 0.5 SOLUTION RESPIRATORY (INHALATION) at 06:19

## 2022-06-30 RX ADMIN — HYDROCODONE BITARTRATE AND ACETAMINOPHEN 1 TABLET: 10; 325 TABLET ORAL at 17:24

## 2022-06-30 RX ADMIN — GUAIFENESIN 1200 MG: 600 TABLET, EXTENDED RELEASE ORAL at 20:14

## 2022-06-30 RX ADMIN — Medication 10 ML: at 20:13

## 2022-06-30 RX ADMIN — ASPIRIN 81 MG: 81 TABLET, COATED ORAL at 08:21

## 2022-06-30 RX ADMIN — TECHNETIUM TC 99M SESTAMIBI 1 DOSE: 1 INJECTION INTRAVENOUS at 10:06

## 2022-06-30 RX ADMIN — CARVEDILOL 6.25 MG: 6.25 TABLET, FILM COATED ORAL at 17:20

## 2022-06-30 RX ADMIN — HYDROCODONE BITARTRATE AND ACETAMINOPHEN 1 TABLET: 10; 325 TABLET ORAL at 04:45

## 2022-06-30 RX ADMIN — TECHNETIUM TC 99M SESTAMIBI 1 DOSE: 1 INJECTION INTRAVENOUS at 08:30

## 2022-07-01 LAB
ANION GAP SERPL CALCULATED.3IONS-SCNC: 8 MMOL/L (ref 5–15)
BUN SERPL-MCNC: 27 MG/DL (ref 8–23)
BUN/CREAT SERPL: 22 (ref 7–25)
CALCIUM SPEC-SCNC: 8.8 MG/DL (ref 8.6–10.5)
CHLORIDE SERPL-SCNC: 101 MMOL/L (ref 98–107)
CO2 SERPL-SCNC: 27 MMOL/L (ref 22–29)
CREAT SERPL-MCNC: 1.23 MG/DL (ref 0.76–1.27)
EGFRCR SERPLBLD CKD-EPI 2021: 66.4 ML/MIN/1.73
GLUCOSE SERPL-MCNC: 93 MG/DL (ref 65–99)
POTASSIUM SERPL-SCNC: 4.7 MMOL/L (ref 3.5–5.2)
SODIUM SERPL-SCNC: 136 MMOL/L (ref 136–145)

## 2022-07-01 PROCEDURE — 94799 UNLISTED PULMONARY SVC/PX: CPT

## 2022-07-01 PROCEDURE — 99232 SBSQ HOSP IP/OBS MODERATE 35: CPT | Performed by: INTERNAL MEDICINE

## 2022-07-01 PROCEDURE — 99232 SBSQ HOSP IP/OBS MODERATE 35: CPT | Performed by: SPECIALIST

## 2022-07-01 PROCEDURE — 94761 N-INVAS EAR/PLS OXIMETRY MLT: CPT

## 2022-07-01 PROCEDURE — 80048 BASIC METABOLIC PNL TOTAL CA: CPT | Performed by: INTERNAL MEDICINE

## 2022-07-01 PROCEDURE — 25010000002 FUROSEMIDE PER 20 MG: Performed by: NURSE PRACTITIONER

## 2022-07-01 PROCEDURE — 25010000002 FUROSEMIDE PER 20 MG: Performed by: INTERNAL MEDICINE

## 2022-07-01 RX ORDER — FUROSEMIDE 10 MG/ML
40 INJECTION INTRAMUSCULAR; INTRAVENOUS EVERY 12 HOURS
Status: DISCONTINUED | OUTPATIENT
Start: 2022-07-01 | End: 2022-07-04

## 2022-07-01 RX ORDER — VALSARTAN 80 MG/1
40 TABLET ORAL
Status: DISCONTINUED | OUTPATIENT
Start: 2022-07-01 | End: 2022-07-02

## 2022-07-01 RX ADMIN — IPRATROPIUM BROMIDE 0.5 MG: 0.5 SOLUTION RESPIRATORY (INHALATION) at 13:34

## 2022-07-01 RX ADMIN — Medication 10 ML: at 20:40

## 2022-07-01 RX ADMIN — Medication 10 ML: at 08:39

## 2022-07-01 RX ADMIN — CARVEDILOL 6.25 MG: 6.25 TABLET, FILM COATED ORAL at 18:43

## 2022-07-01 RX ADMIN — APIXABAN 5 MG: 5 TABLET, FILM COATED ORAL at 08:32

## 2022-07-01 RX ADMIN — Medication 10 ML: at 08:33

## 2022-07-01 RX ADMIN — CETIRIZINE HYDROCHLORIDE 10 MG: 10 TABLET, FILM COATED ORAL at 08:32

## 2022-07-01 RX ADMIN — HYDROCODONE BITARTRATE AND ACETAMINOPHEN 1 TABLET: 10; 325 TABLET ORAL at 22:41

## 2022-07-01 RX ADMIN — RANOLAZINE 500 MG: 500 TABLET, FILM COATED, EXTENDED RELEASE ORAL at 08:33

## 2022-07-01 RX ADMIN — CARVEDILOL 6.25 MG: 6.25 TABLET, FILM COATED ORAL at 08:32

## 2022-07-01 RX ADMIN — IPRATROPIUM BROMIDE 0.5 MG: 0.5 SOLUTION RESPIRATORY (INHALATION) at 06:58

## 2022-07-01 RX ADMIN — GUAIFENESIN 1200 MG: 600 TABLET, EXTENDED RELEASE ORAL at 20:41

## 2022-07-01 RX ADMIN — HYDROCODONE BITARTRATE AND ACETAMINOPHEN 1 TABLET: 10; 325 TABLET ORAL at 00:51

## 2022-07-01 RX ADMIN — GABAPENTIN 600 MG: 300 CAPSULE ORAL at 20:41

## 2022-07-01 RX ADMIN — FUROSEMIDE 40 MG: 10 INJECTION, SOLUTION INTRAVENOUS at 08:32

## 2022-07-01 RX ADMIN — VALSARTAN 40 MG: 80 TABLET, FILM COATED ORAL at 18:44

## 2022-07-01 RX ADMIN — APIXABAN 5 MG: 5 TABLET, FILM COATED ORAL at 20:41

## 2022-07-01 RX ADMIN — HYDROCODONE BITARTRATE AND ACETAMINOPHEN 1 TABLET: 10; 325 TABLET ORAL at 16:13

## 2022-07-01 RX ADMIN — FUROSEMIDE 40 MG: 10 INJECTION, SOLUTION INTRAVENOUS at 20:41

## 2022-07-01 RX ADMIN — AMIODARONE HYDROCHLORIDE 400 MG: 200 TABLET ORAL at 20:41

## 2022-07-01 RX ADMIN — HYDROCODONE BITARTRATE AND ACETAMINOPHEN 1 TABLET: 10; 325 TABLET ORAL at 08:33

## 2022-07-01 RX ADMIN — NICOTINE TRANSDERMAL SYSTEM 1 PATCH: 21 PATCH, EXTENDED RELEASE TRANSDERMAL at 08:38

## 2022-07-01 RX ADMIN — AMIODARONE HYDROCHLORIDE 400 MG: 200 TABLET ORAL at 08:32

## 2022-07-01 RX ADMIN — RANOLAZINE 500 MG: 500 TABLET, FILM COATED, EXTENDED RELEASE ORAL at 20:41

## 2022-07-01 RX ADMIN — GUAIFENESIN 1200 MG: 600 TABLET, EXTENDED RELEASE ORAL at 08:33

## 2022-07-01 RX ADMIN — TAMSULOSIN HYDROCHLORIDE 0.4 MG: 0.4 CAPSULE ORAL at 20:41

## 2022-07-01 RX ADMIN — ASPIRIN 81 MG: 81 TABLET, COATED ORAL at 08:32

## 2022-07-01 NOTE — PHARMACY PATIENT ASSISTANCE
Pharmacy was asked to look into cost of entresto.  According to his insurance, he will not have a copay.    Thank You;  Kellie Hinton, PharmD  07/01/22  13:09 EDT

## 2022-07-01 NOTE — PROGRESS NOTES
[facility] HOSPITALIST PROGRESS NOTE     Patient Identification:  Name:  Chong White  Age:  62 y.o.  Sex:  male  :  1960  MRN:  [id number]  Visit Number:  [id number]  Primary Care Provider:  Amy Yanez APRN    Length of stay:  8    Chief complaint: Scrotal and bilateral lower extremity edema    Subjective:    Patient seen and examined at bedside with patient's nurse present.  Patient is very concerned about his scrotal edema and believes some of the medications we are giving him is making his scrotal edema worse.  I did attempt spending 15 to 20 minutes explaining how diuretics work and how his scrotal edema is secondary to advanced systolic CHF.  However, patient continues to believe the Lasix he has been given is making his scrotal edema worse.  I again attempted to explain how Lasix is being used to make his scrotal edema better but patient does not seem to understand this explanation.  He is getting quite frustrated at his persistent edema.  ----------------------------------------------------------------------------------------------------------------------  Current Hospital Meds:  amiodarone, 400 mg, Oral, Q12H  apixaban, 5 mg, Oral, Q12H  aspirin, 81 mg, Oral, Daily  carvedilol, 6.25 mg, Oral, BID With Meals  cetirizine, 10 mg, Oral, Daily  furosemide, 40 mg, Intravenous, Q12H  gabapentin, 600 mg, Oral, Nightly  guaiFENesin, 1,200 mg, Oral, Q12H  ipratropium, 0.5 mg, Nebulization, 4x Daily - RT  lactated ringers, 125 mL/hr, Intravenous, Once  nicotine, 1 patch, Transdermal, Q24H  ranolazine, 500 mg, Oral, Q12H  sodium chloride, 10 mL, Intravenous, Q12H  sodium chloride, 10 mL, Intravenous, Q12H  sodium chloride, 10 mL, Intravenous, Q12H  sodium chloride, 10 mL, Intravenous, Q12H  sodium chloride, 10 mL, Intravenous, Q12H  sodium chloride, 10 mL, Intravenous, Q12H  tamsulosin, 0.4 mg, Oral, Nightly          ----------------------------------------------------------------------------------------------------------------------  Vital Signs:  Temp:  [97.8 °F (36.6 °C)-98.7 °F (37.1 °C)] 97.9 °F (36.6 °C)  Heart Rate:  [61-85] 70  Resp:  [10-24] 12  BP: (101-128)/(50-93) 108/80      06/28/22  0500 06/29/22  0500 07/01/22  0500   Weight: 92.5 kg (203 lb 14.4 oz) 92.7 kg (204 lb 5.9 oz) 92 kg (202 lb 13.2 oz)     Body mass index is 30.84 kg/m².    Intake/Output Summary (Last 24 hours) at 7/1/2022 1437  Last data filed at 7/1/2022 1200  Gross per 24 hour   Intake 900 ml   Output 2850 ml   Net -1950 ml     Diet Regular; Cardiac  ----------------------------------------------------------------------------------------------------------------------  Physical exam:  Constitutional: Well-nourished  male in no apparent distress.     HENT:  Head:  Normocephalic and atraumatic.  Mouth:  Moist mucous membranes.    Eyes:  Conjunctivae and EOM are normal.  Pupils are equal, round, and reactive to light.  No scleral icterus.    Neck:  Neck supple. No thyromegaly.  No JVD present.    Cardiovascular:  Irregular rhythm with rate in the 90's with no murmurs, rubs, clicks or gallops appreciated.  Pulmonary/Chest:  Clear to auscultation bilaterally with no crackles, wheezes or rhonchi appreciated.  Abdominal:  Soft. Nontender. Nondistended  Bowel sounds are normal in all four quadrants. No organomegally appreciated.   Musculoskeletal:  3+ bilateral lower extremity edema, no tenderness, and no deformity.  No red or swollen joints anywhere.    Patient with significant scrotal edema  Neurological:  Alert, Cranial nerves II-XII intact with no focal deficits.  No facial droop.  No slurred speech.   Skin:  Warm and dry to palpation with no rashes or lesions appreciated.  Peripheral vascular:  2+ radial and pedal pulses in bilateral upper and lower extremities.  Psychiatric:  Alert and oriented  x3  -------------------------------------------------------------------------------  ----------------------------------------------------------------------------------------------------------------------  Results from last 7 days   Lab Units 06/27/22  0048 06/25/22  0700   TROPONIN T ng/mL <0.010 <0.010     Results from last 7 days   Lab Units 06/30/22  0113 06/28/22  0104 06/27/22  0048   WBC 10*3/mm3 6.05 7.93 6.19   HEMOGLOBIN g/dL 12.4* 12.4* 12.9*   HEMATOCRIT % 37.9 38.0 39.5   MCV fL 94.5 94.3 93.6   MCHC g/dL 32.7 32.6 32.7   PLATELETS 10*3/mm3 140 132* 142         Results from last 7 days   Lab Units 07/01/22  0749 06/30/22  0113 06/29/22  0029   SODIUM mmol/L 136 135* 136   POTASSIUM mmol/L 4.7 4.6 4.6   CHLORIDE mmol/L 101 96* 100   CO2 mmol/L 27.0 30.9* 28.9   BUN mg/dL 27* 29* 30*   CREATININE mg/dL 1.23 1.08 1.05   CALCIUM mg/dL 8.8 8.8 8.6   GLUCOSE mg/dL 93 99 101*   ALBUMIN g/dL  --   --  3.51   BILIRUBIN mg/dL  --   --  0.5   ALK PHOS U/L  --   --  84   AST (SGOT) U/L  --   --  19   ALT (SGPT) U/L  --   --  20   Estimated Creatinine Clearance: 68.5 mL/min (by C-G formula based on SCr of 1.23 mg/dL).    No results found for: AMMONIA      No results found for: BLOODCX  No results found for: URINECX  No results found for: WOUNDCX  No results found for: STOOLCX    I have personally looked at the labs and they are summarized above.  ----------------------------------------------------------------------------------------------------------------------  Imaging Results (Last 24 Hours)     ** No results found for the last 24 hours. **        ----------------------------------------------------------------------------------------------------------------------  Assessment and Plan:    1.  A. fib with RVR - Patient continues to remain in normal sinus rhythm. We will continue amiodarone 400 mg p.o. twice daily for the next 4 days then decrease to 200 mg p.o. daily for rhythm control.  Continue carvedilol and  added valsartan 40 mg p.o. daily today.    2.  Acute on chronic systolic CHF -creatinine essentially stable at 1.2.  We will continue Lasix 40 mg IV daily and monitor renal function closely.  Patient with excellent urine output with greater than 2 L output    3.  Acute kidney injury -resolved    4.  COPD - no wheezing on exam today.  We will continue supportive care for now.    Disposition will likely require several more days hospitalization    Napoleon Del Angel,    07/01/22   14:37 EDT

## 2022-07-01 NOTE — PLAN OF CARE
Goal Outcome Evaluation:  Plan of Care Reviewed With: patient           Outcome Evaluation: Pt transfered from PCU. Pt alert and oriented. VSS. Pt has no new complaints at this time. Will continue to monitor.

## 2022-07-01 NOTE — CASE MANAGEMENT/SOCIAL WORK
Discharge Planning Assessment  ANIYAH Burden     Patient Name: Chong White  MRN: [id number]  Today's Date: 7/1/2022    Admit Date: 6/23/2022     Discharge Plan     Row Name 07/01/22 1256       Plan    Plan Pt admitted 6/23/22. Edmund is working on life vest order. Pt lives at home with son and plans to return at discharge. Pt does not utilize home health services. [facility] utilized for DME. Housing and resources lists have been provided. SS following.              Continued Care and Services - Admitted Since 6/23/2022     Durable Medical Equipment     Service Provider Request Status Selected Services Address Phone Fax Patient Preferred    [facility]  Pending - No Request Sent N/A [id number] [place] [address] [phone] --              ELLE Soni

## 2022-07-01 NOTE — PROGRESS NOTES
LOS: 8 days     Name: Chong White  Age/Sex: 62 y.o. male  :  1960        PCP: Amy Yanez APRN  REF: No ref. provider found    Active Problems:    Atrial fibrillation with RVR ([facility])      Reason for follow-up: Atrial fibrillation and cardiomyopathy    Subjective     Subjective     Chong White is a 62 y.o. male with a past medical history significant for persistent atrial fibrillation and advanced cardiomyopathy with LV ejection fraction of 25% with acute on chronic systolic heart failure    Interval History: Patient had excellent urine output with IV diuresis and is - 2 liters. Breathing has improved. Reports scrotal edema and lower extremity edema. Kidney function is stable. Remains in normal sinus rhythm.     Vital Signs  Temp:  [97 °F (36.1 °C)-98.7 °F (37.1 °C)] 97.8 °F (36.6 °C)  Heart Rate:  [64-85] 71  Resp:  [10-] 16  BP: (101-126)/(50-92) 116/79     Vital Signs (last 72 hrs)        0700   0659  0700   0659  0700   0659  0700   0919   Most Recent      Temp (°F) 96.7 -  97.9    97.1 -  98    97 -  98.7      97.8     97.8 (36.6) / 0800    Heart Rate 69 -  118    68 -  89    64 -  85      71     71 07/01 0703    Resp 11 -  28    8 -  20    10 -        16     16 07/ 0703    BP 91/75 -  123/79    95/75 -  128/70    95/64 -  126/79      116/79     116/79 07/ 0703    SpO2 (%) 90 -  100    88 -  100    90 -  100       --  Comment: refused  040        Documented weights    22 1140 22 1712 22 0400 22 0500   Weight: 81.6 kg (180 lb) 86.5 kg (190 lb 11.2 oz) 89.8 kg (197 lb 14.4 oz) 86.8 kg (191 lb 4.8 oz)    22 0500 22 0500 22 0500 22 0500   Weight: 86.9 kg (191 lb 9.3 oz) 90 kg (198 lb 6.4 oz) 92.5 kg (203 lb 14.4 oz) 92.7 kg (204 lb 5.9 oz)    22 0500   Weight: 92 kg (202 lb 13.2 oz)      Body mass index is 30.84 kg/m².    Intake/Output Summary (Last 24 hours) at 2022 0919  Last  data filed at 7/1/2022 0800  Gross per 24 hour   Intake 715 ml   Output 2750 ml   Net -2035 ml     Objective    Objective       Physical Exam:     General Appearance:    Alert, cooperative, in no acute distress   Head:    Normocephalic, without obvious abnormality, atraumatic   Eyes:            Conjunctivae and sclerae normal, no   icterus, no pallor, corneas clear.   Neck:   No adenopathy, supple, trachea midline, no thyromegaly, no   carotid bruit, no JVD   Lungs:     Crackles,respirations regular, even and                  unlabored    Heart:    Regular rhythm and normal rate, normal S1 and S2, no            murmur, no gallop, no rub, no click   Chest Wall:    No abnormalities observed   Abdomen:     Normal bowel sounds, no masses, no organomegaly, soft        nontender, nondistended, no guarding, no rebound                tenderness   Extremities:   Moves all extremities well, 1+ BLE edema, no cyanosis, no      redness   Pulses:   Pulses palpable and equal bilaterally   Skin:   No bleeding, bruising or rash       Neurologic:  Alert and oriented     Results review       Results Review:   Results from last 7 days   Lab Units 06/30/22  0113 06/28/22  0104 06/27/22  0048   WBC 10*3/mm3 6.05 7.93 6.19   HEMOGLOBIN g/dL 12.4* 12.4* 12.9*   PLATELETS 10*3/mm3 140 132* 142     Results from last 7 days   Lab Units 07/01/22  0749 06/30/22  0113 06/29/22  0029 06/28/22  0104 06/27/22  0048 06/26/22  0219 06/25/22  0112   SODIUM mmol/L 136 135* 136 132* 136 134* 138   POTASSIUM mmol/L 4.7 4.6 4.6 4.3 4.6 4.4 4.2   CHLORIDE mmol/L 101 96* 100 97* 99 100 100   CO2 mmol/L 27.0 30.9* 28.9 24.5 27.3 23.9 29.3*   BUN mg/dL 27* 29* 30* 34* 29* 28* 26*   CREATININE mg/dL 1.23 1.08 1.05 1.24 1.02 1.33* 1.23   CALCIUM mg/dL 8.8 8.8 8.6 8.5* 8.8 8.2* 8.5*   GLUCOSE mg/dL 93 99 101* 103* 138* 73 97   ALT (SGPT) U/L  --   --  20  --   --   --   --    AST (SGOT) U/L  --   --  19  --   --   --   --      Results from last 7 days   Lab  Units 06/27/22  0048 06/25/22  0700 06/24/22  1312   TROPONIN T ng/mL <0.010 <0.010 <0.010     Lab Results   Component Value Date    INR 1.44 (H) 06/23/2022    INR 1.41 (H) 06/16/2022    INR 1.07 08/18/2021    INR 1.05 09/02/2020    INR 0.97 09/01/2020     Lab Results   Component Value Date    MG 2.2 06/24/2022    MG 2.2 06/23/2022    MG 2.0 06/16/2022     Lab Results   Component Value Date    TSH 5.080 (H) 06/23/2022    TRIG 51 09/03/2020    HDL 42 09/03/2020    LDL 81 09/03/2020      Imaging Results (Last 48 Hours)     ** No results found for the last 48 hours. **        No results found for: BNP    Lab Results   Component Value Date    ABSOLUTELUNG 34 06/24/2022       Echo   Results for orders placed during the hospital encounter of 06/23/22    Adult Transesophageal Echo (LUCERO) W/ Cont if Necessary Per Protocol    Interpretation Summary  · Left ventricular ejection fraction appears to be less than 20%. Left ventricular systolic function is severely decreased.  · The left ventricular cavity is moderate to severely dilated.  · Moderately reduced right ventricular systolic function noted.  · No evidence of a left atrial appendage thrombus was present.  · The aortic valve is structurally normal. Mild aortic valve regurgitation is present.  · Moderate mitral valve regurgitation is present. No significant mitral valve stenosis is present.  · Moderate tricuspid valve regurgitation is present.  · There is no evidence of pericardial effusion.  · Comments: Proceed with electrical cardioversion.     I reviewed the patient's new clinical results.    Telemetry: NSR 70's     Medication Review:   amiodarone, 400 mg, Oral, Q12H  apixaban, 5 mg, Oral, Q12H  aspirin, 81 mg, Oral, Daily  carvedilol, 6.25 mg, Oral, BID With Meals  cetirizine, 10 mg, Oral, Daily  furosemide, 40 mg, Intravenous, Daily  gabapentin, 600 mg, Oral, Nightly  guaiFENesin, 1,200 mg, Oral, Q12H  ipratropium, 0.5 mg, Nebulization, 4x Daily - RT  lactated  ringers, 125 mL/hr, Intravenous, Once  nicotine, 1 patch, Transdermal, Q24H  ranolazine, 500 mg, Oral, Q12H  sodium chloride, 10 mL, Intravenous, Q12H  sodium chloride, 10 mL, Intravenous, Q12H  sodium chloride, 10 mL, Intravenous, Q12H  sodium chloride, 10 mL, Intravenous, Q12H  sodium chloride, 10 mL, Intravenous, Q12H  sodium chloride, 10 mL, Intravenous, Q12H  tamsulosin, 0.4 mg, Oral, Nightly             Assessment      Assessment:    1. Acute on chronic HFrEF, improving  2. Ischemic cardiomyopathy EF 25% or less  3. Coronary artery disease with a  of the LAD with collaterals from the RCA  4. Persistent atrial fibrillation with episodes of RVR status post LUCERO cardioversion maintaining sinus rhythm      Plan     Recommendations:  1. Patient is diuresing well he is having significant scrotal edema we will continue with diuresing  2. He is maintaining normal sinus rhythm we will continue current management he is currently tolerating valsartan we will consult pharmacy for Entresto price  3. Stress test with no evidence of ischemia we will continue current management as planned    I discussed the patient's findings and my recommendations with patient and family      Electronically signed by SAMARA Arango, 07/01/22, 9:19 AM EDT.  Electronically signed by Thong Delarosa MD, 07/01/22, 12:42 PM EDT.    Please note that portions of this note were completed with a voice recognition program.

## 2022-07-01 NOTE — PLAN OF CARE
Goal Outcome Evaluation:   Pt alert and oriented x 4. VSS and afebrile this shift. Pt now has a 16fr f/c per order. Pt continues to complain of severe scrotal edema MD aware. No other changes. Safety maintained.

## 2022-07-02 LAB
ANION GAP SERPL CALCULATED.3IONS-SCNC: 10.7 MMOL/L (ref 5–15)
BUN SERPL-MCNC: 24 MG/DL (ref 8–23)
BUN/CREAT SERPL: 21.8 (ref 7–25)
CALCIUM SPEC-SCNC: 8.8 MG/DL (ref 8.6–10.5)
CHLORIDE SERPL-SCNC: 97 MMOL/L (ref 98–107)
CO2 SERPL-SCNC: 29.3 MMOL/L (ref 22–29)
CREAT SERPL-MCNC: 1.1 MG/DL (ref 0.76–1.27)
EGFRCR SERPLBLD CKD-EPI 2021: 75.9 ML/MIN/1.73
GLUCOSE SERPL-MCNC: 81 MG/DL (ref 65–99)
POTASSIUM SERPL-SCNC: 4.4 MMOL/L (ref 3.5–5.2)
SODIUM SERPL-SCNC: 137 MMOL/L (ref 136–145)

## 2022-07-02 PROCEDURE — 99232 SBSQ HOSP IP/OBS MODERATE 35: CPT | Performed by: NURSE PRACTITIONER

## 2022-07-02 PROCEDURE — 94799 UNLISTED PULMONARY SVC/PX: CPT

## 2022-07-02 PROCEDURE — 80048 BASIC METABOLIC PNL TOTAL CA: CPT | Performed by: INTERNAL MEDICINE

## 2022-07-02 PROCEDURE — 99232 SBSQ HOSP IP/OBS MODERATE 35: CPT | Performed by: INTERNAL MEDICINE

## 2022-07-02 PROCEDURE — 94761 N-INVAS EAR/PLS OXIMETRY MLT: CPT

## 2022-07-02 PROCEDURE — 25010000002 FUROSEMIDE PER 20 MG: Performed by: NURSE PRACTITIONER

## 2022-07-02 RX ADMIN — RANOLAZINE 500 MG: 500 TABLET, FILM COATED, EXTENDED RELEASE ORAL at 20:41

## 2022-07-02 RX ADMIN — RANOLAZINE 500 MG: 500 TABLET, FILM COATED, EXTENDED RELEASE ORAL at 08:50

## 2022-07-02 RX ADMIN — CYCLOBENZAPRINE 10 MG: 10 TABLET, FILM COATED ORAL at 22:34

## 2022-07-02 RX ADMIN — CARVEDILOL 6.25 MG: 6.25 TABLET, FILM COATED ORAL at 08:50

## 2022-07-02 RX ADMIN — GABAPENTIN 600 MG: 300 CAPSULE ORAL at 20:41

## 2022-07-02 RX ADMIN — GUAIFENESIN 1200 MG: 600 TABLET, EXTENDED RELEASE ORAL at 20:41

## 2022-07-02 RX ADMIN — Medication 10 ML: at 08:52

## 2022-07-02 RX ADMIN — VALSARTAN 40 MG: 80 TABLET, FILM COATED ORAL at 08:49

## 2022-07-02 RX ADMIN — CYCLOBENZAPRINE 10 MG: 10 TABLET, FILM COATED ORAL at 04:28

## 2022-07-02 RX ADMIN — HYDROCODONE BITARTRATE AND ACETAMINOPHEN 1 TABLET: 10; 325 TABLET ORAL at 20:41

## 2022-07-02 RX ADMIN — APIXABAN 5 MG: 5 TABLET, FILM COATED ORAL at 20:41

## 2022-07-02 RX ADMIN — FUROSEMIDE 40 MG: 10 INJECTION, SOLUTION INTRAVENOUS at 20:41

## 2022-07-02 RX ADMIN — APIXABAN 5 MG: 5 TABLET, FILM COATED ORAL at 08:51

## 2022-07-02 RX ADMIN — NICOTINE TRANSDERMAL SYSTEM 1 PATCH: 21 PATCH, EXTENDED RELEASE TRANSDERMAL at 08:52

## 2022-07-02 RX ADMIN — CARVEDILOL 6.25 MG: 6.25 TABLET, FILM COATED ORAL at 17:36

## 2022-07-02 RX ADMIN — FUROSEMIDE 40 MG: 10 INJECTION, SOLUTION INTRAVENOUS at 08:51

## 2022-07-02 RX ADMIN — TAMSULOSIN HYDROCHLORIDE 0.4 MG: 0.4 CAPSULE ORAL at 20:41

## 2022-07-02 RX ADMIN — Medication 10 MG: at 20:41

## 2022-07-02 RX ADMIN — IPRATROPIUM BROMIDE 0.5 MG: 0.5 SOLUTION RESPIRATORY (INHALATION) at 13:25

## 2022-07-02 RX ADMIN — IPRATROPIUM BROMIDE 0.5 MG: 0.5 SOLUTION RESPIRATORY (INHALATION) at 01:11

## 2022-07-02 RX ADMIN — ASPIRIN 81 MG: 81 TABLET, COATED ORAL at 08:50

## 2022-07-02 RX ADMIN — HYDROCODONE BITARTRATE AND ACETAMINOPHEN 1 TABLET: 10; 325 TABLET ORAL at 14:35

## 2022-07-02 RX ADMIN — GUAIFENESIN 1200 MG: 600 TABLET, EXTENDED RELEASE ORAL at 08:50

## 2022-07-02 RX ADMIN — IPRATROPIUM BROMIDE 0.5 MG: 0.5 SOLUTION RESPIRATORY (INHALATION) at 07:11

## 2022-07-02 RX ADMIN — Medication 10 ML: at 20:42

## 2022-07-02 RX ADMIN — AMIODARONE HYDROCHLORIDE 400 MG: 200 TABLET ORAL at 20:41

## 2022-07-02 RX ADMIN — AMIODARONE HYDROCHLORIDE 400 MG: 200 TABLET ORAL at 08:50

## 2022-07-02 RX ADMIN — IPRATROPIUM BROMIDE 0.5 MG: 0.5 SOLUTION RESPIRATORY (INHALATION) at 19:17

## 2022-07-02 RX ADMIN — CETIRIZINE HYDROCHLORIDE 10 MG: 10 TABLET, FILM COATED ORAL at 08:50

## 2022-07-02 RX ADMIN — HYDROCODONE BITARTRATE AND ACETAMINOPHEN 1 TABLET: 10; 325 TABLET ORAL at 04:28

## 2022-07-02 NOTE — PLAN OF CARE
Goal Outcome Evaluation:  Plan of Care Reviewed With: patient        Progress: improving  Outcome Evaluation: pt has done well this this shift, Followed POC well, pain control per MAR, no requests or complaints at this time will continue POC.

## 2022-07-02 NOTE — PROGRESS NOTES
[facility]     Patient Identification:  Name:  Chong White  Age:  62 y.o.  Sex:  male  :  1960  MRN:  [id number]  Visit Number:  [id number]  Primary Care Provider:  Amy Yanez APRN    Length of stay:  9    Chief complaint: Scrotal and bilateral lower extremity edema    Subjective:    Patient was asleep when I entered the room.  Per nursing staff, patient is with no new events overnight.  Patient still with significant bilateral lower extremity edema as well as scrotal edema.  ----------------------------------------------------------------------------------------------------------------------  Current Hospital Meds:  amiodarone, 400 mg, Oral, Q12H  apixaban, 5 mg, Oral, Q12H  aspirin, 81 mg, Oral, Daily  carvedilol, 6.25 mg, Oral, BID With Meals  cetirizine, 10 mg, Oral, Daily  furosemide, 40 mg, Intravenous, Q12H  gabapentin, 600 mg, Oral, Nightly  guaiFENesin, 1,200 mg, Oral, Q12H  ipratropium, 0.5 mg, Nebulization, 4x Daily - RT  lactated ringers, 125 mL/hr, Intravenous, Once  nicotine, 1 patch, Transdermal, Q24H  ranolazine, 500 mg, Oral, Q12H  sodium chloride, 10 mL, Intravenous, Q12H  sodium chloride, 10 mL, Intravenous, Q12H  sodium chloride, 10 mL, Intravenous, Q12H  sodium chloride, 10 mL, Intravenous, Q12H  sodium chloride, 10 mL, Intravenous, Q12H  sodium chloride, 10 mL, Intravenous, Q12H  tamsulosin, 0.4 mg, Oral, Nightly  valsartan, 40 mg, Oral, Q24H         ----------------------------------------------------------------------------------------------------------------------  Vital Signs:  Temp:  [97.3 °F (36.3 °C)-98.2 °F (36.8 °C)] 97.3 °F (36.3 °C)  Heart Rate:  [61-75] 67  Resp:  [14-20] 18  BP: ()/(50-84) 108/66      22  0500 22  0500 22  1620   Weight: 92.7 kg (204 lb 5.9 oz) 92 kg (202 lb 13.2 oz) 91.6 kg (202 lb)     Body mass index is 30.71 kg/m².    Intake/Output Summary (Last 24 hours) at 2022 1215  Last  data filed at 7/2/2022 1100  Gross per 24 hour   Intake 1760 ml   Output 4000 ml   Net -2240 ml     Diet Regular; Cardiac  ----------------------------------------------------------------------------------------------------------------------  Physical exam:  Constitutional: Well-nourished  male in no apparent distress.     HENT:  Head:  Normocephalic and atraumatic.  Mouth:  Moist mucous membranes.    Eyes:  Conjunctivae and EOM are normal.  Pupils are equal, round, and reactive to light.  No scleral icterus.    Neck:  Neck supple. No thyromegaly.  No JVD present.    Cardiovascular:  Irregular rhythm with rate in the 90's with no murmurs, rubs, clicks or gallops appreciated.  Pulmonary/Chest:  Clear to auscultation bilaterally with no crackles, wheezes or rhonchi appreciated.  Abdominal:  Soft. Nontender. Nondistended  Bowel sounds are normal in all four quadrants. No organomegally appreciated.   Musculoskeletal:  3+ bilateral lower extremity edema, no tenderness, and no deformity.  No red or swollen joints anywhere.    Patient with significant scrotal edema  Neurological:  Alert, Cranial nerves II-XII intact with no focal deficits.  No facial droop.  No slurred speech.   Skin:  Warm and dry to palpation with no rashes or lesions appreciated.  Peripheral vascular:  2+ radial and pedal pulses in bilateral upper and lower extremities.  Psychiatric:  Alert and oriented x3  -------------------------------------------------------------------------------  ----------------------------------------------------------------------------------------------------------------------  Results from last 7 days   Lab Units 06/27/22 0048   TROPONIN T ng/mL <0.010     Results from last 7 days   Lab Units 06/30/22  0113 06/28/22  0104 06/27/22 0048   WBC 10*3/mm3 6.05 7.93 6.19   HEMOGLOBIN g/dL 12.4* 12.4* 12.9*   HEMATOCRIT % 37.9 38.0 39.5   MCV fL 94.5 94.3 93.6   MCHC g/dL 32.7 32.6 32.7   PLATELETS 10*3/mm3 140 132* 142          Results from last 7 days   Lab Units 07/02/22  0437 07/01/22  0749 06/30/22  0113 06/29/22  0029   SODIUM mmol/L 137 136 135* 136   POTASSIUM mmol/L 4.4 4.7 4.6 4.6   CHLORIDE mmol/L 97* 101 96* 100   CO2 mmol/L 29.3* 27.0 30.9* 28.9   BUN mg/dL 24* 27* 29* 30*   CREATININE mg/dL 1.10 1.23 1.08 1.05   CALCIUM mg/dL 8.8 8.8 8.8 8.6   GLUCOSE mg/dL 81 93 99 101*   ALBUMIN g/dL  --   --   --  3.51   BILIRUBIN mg/dL  --   --   --  0.5   ALK PHOS U/L  --   --   --  84   AST (SGOT) U/L  --   --   --  19   ALT (SGPT) U/L  --   --   --  20   Estimated Creatinine Clearance: 76.5 mL/min (by C-G formula based on SCr of 1.1 mg/dL).    No results found for: AMMONIA      No results found for: BLOODCX  No results found for: URINECX  No results found for: WOUNDCX  No results found for: STOOLCX    I have personally looked at the labs and they are summarized above.  ----------------------------------------------------------------------------------------------------------------------  Imaging Results (Last 24 Hours)     ** No results found for the last 24 hours. **        ----------------------------------------------------------------------------------------------------------------------  Assessment and Plan:    1.  A. fib with RVR - Patient continues to remain in normal sinus rhythm. We will continue amiodarone 400 mg p.o. twice daily for the next 3 days then decrease to 200 mg p.o. daily for rhythm control.  Continue carvedilol and valsartan 40 mg.    2.  Acute on chronic systolic CHF -creatinine essentially stable at 1.1  We will continue Lasix 40 mg IV twice daily and monitor renal function closely.     3.  Acute kidney injury -resolved    4.  COPD - no wheezing on exam today.  We will continue supportive care for now.    Disposition will likely require several more days hospitalization    Napoleon Del Angel DO   07/02/22   12:15 EDT

## 2022-07-02 NOTE — PLAN OF CARE
Goal Outcome Evaluation:  Plan of Care Reviewed With: patient        Progress: no change  Outcome Evaluation: Pt resting in bed during assessment. Pt C/O pain, prn pain meds given per order. No other complaints at this time. Will continue to monitor.

## 2022-07-02 NOTE — PROGRESS NOTES
LOS: 9 days     Name: Chong White  Age/Sex: 62 y.o. male  :  1960        PCP: Amy Yanez APRN    Active Problems:    Atrial fibrillation with RVR ([facility])      Chief Complaint: Atrial fibrillation and cardiomyopathy    Interval history: Pt seen and examined.  Complains of scrotum increased swelling.  Also reports blood in urinary catheter.  No other complaints.      Subjective         Vital Signs  Vital Signs (last 72 hrs)        0700   0659  0700   0659  0700   0659  0700   1342   Most Recent      Temp (°F) 97.1 -  98    97 -  98.7    97.7 -  98.2      97.3     97.3 (36.3) 07 1000    Heart Rate 68 -  89    64 -  85    61 -  75    66 -  71     66 07/02 1330    Resp 8 -  20    10 -  24    11 -  20      18     18 07/ 1330    BP 95/75 -  128/70    95/64 -  126/79    90/50 -  134/77      108/66     108/66 07/02 1000    SpO2 (%) 88 -  100    90 -  100    93 -  100    93 -  100     100 07/02 1330        Temp:  [97.3 °F (36.3 °C)-98.2 °F (36.8 °C)] 97.3 °F (36.3 °C)  Heart Rate:  [61-75] 66  Resp:  [14-20] 18  BP: ()/(50-84) 108/66  Body mass index is 30.71 kg/m².      Intake/Output Summary (Last 24 hours) at 2022 1342  Last data filed at 2022 1100  Gross per 24 hour   Intake 1760 ml   Output 4000 ml   Net -2240 ml       Vitals reviewed.   Constitutional:       Appearance: Well-developed.   Pulmonary:      Effort: Pulmonary effort is normal. No respiratory distress.      Breath sounds: Normal breath sounds. No wheezing. No rales.   Cardiovascular:      Normal rate. Regular rhythm.      Comments:  lower extremity edema  Skin:     General: Skin is warm and dry.   Genitourinary:     Comments: Significant scrotal swelling  Neurological:      Mental Status: Alert and oriented to person, place, and time.         Telemetry: refursed       Results Review:     Results from last 7 days   Lab Units 22  0113 22  0104 22  0048   WBC 10*3/mm3  6.05 7.93 6.19   HEMOGLOBIN g/dL 12.4* 12.4* 12.9*   PLATELETS 10*3/mm3 140 132* 142     Results from last 7 days   Lab Units 07/02/22  0437 07/01/22  0749 06/30/22  0113 06/29/22  0029 06/28/22  0104 06/27/22  0048 06/26/22  0219   SODIUM mmol/L 137 136 135* 136 132* 136 134*   POTASSIUM mmol/L 4.4 4.7 4.6 4.6 4.3 4.6 4.4   CHLORIDE mmol/L 97* 101 96* 100 97* 99 100   CO2 mmol/L 29.3* 27.0 30.9* 28.9 24.5 27.3 23.9   BUN mg/dL 24* 27* 29* 30* 34* 29* 28*   CREATININE mg/dL 1.10 1.23 1.08 1.05 1.24 1.02 1.33*   CALCIUM mg/dL 8.8 8.8 8.8 8.6 8.5* 8.8 8.2*   GLUCOSE mg/dL 81 93 99 101* 103* 138* 73     Results from last 7 days   Lab Units 06/27/22  0048   TROPONIN T ng/mL <0.010             I reviewed the patient's new clinical results.  I reviewed the patient's new imaging results and agree with the interpretation.  I personally viewed and interpreted the patient's EKG/Telemetry data      Medication Review:   amiodarone, 400 mg, Oral, Q12H  apixaban, 5 mg, Oral, Q12H  aspirin, 81 mg, Oral, Daily  carvedilol, 6.25 mg, Oral, BID With Meals  cetirizine, 10 mg, Oral, Daily  furosemide, 40 mg, Intravenous, Q12H  gabapentin, 600 mg, Oral, Nightly  guaiFENesin, 1,200 mg, Oral, Q12H  ipratropium, 0.5 mg, Nebulization, 4x Daily - RT  lactated ringers, 125 mL/hr, Intravenous, Once  nicotine, 1 patch, Transdermal, Q24H  ranolazine, 500 mg, Oral, Q12H  sodium chloride, 10 mL, Intravenous, Q12H  sodium chloride, 10 mL, Intravenous, Q12H  sodium chloride, 10 mL, Intravenous, Q12H  sodium chloride, 10 mL, Intravenous, Q12H  sodium chloride, 10 mL, Intravenous, Q12H  sodium chloride, 10 mL, Intravenous, Q12H  tamsulosin, 0.4 mg, Oral, Nightly  valsartan, 40 mg, Oral, Q24H           Assessment:  1. Acute on chronic HFrEF, improving  2. Ischemic cardiomyopathy, LVEF less than 20% per echocardiogram 06/28/2022  3. Coronary artery disease,  of the LAD with collaterals from the RCA  4. Persistent atrial fibrillation with episodes of  RVR, status post LUCERO cardioversion.  Patient appears to be maintaining sinus rhythm.  He has refused telemetry monitoring.      Recommendations:  1. Pt's urinary output is good, net down 6 liters.  Still has significant swelling.  Creatinine is stable.  Continue Lasix BID.  Monitor creatinine and output.   2. Valsartan switched to Entresto.  Continue to monitor.  May add aldactone if pressure allows.  Pt is on BB.  Pharmacy consulted on price of Jardiance.    3. Continue Eliquis in the presence of paroxysmal  A fib.        I discussed the patients findings and my recommendations with patient and family    Electronically signed by SAMARA Harris, 07/02/22, 2:17 PM EDT.

## 2022-07-02 NOTE — PHARMACY PATIENT ASSISTANCE
Update:    PA has been approved and Jardiance is now covered through insurance with no copay.    Thank you,    Winnie Wagner PharmTASHI  07/02/22  15:25 EDT    ---------------------------------------------------------------------------------------------------------------------------------------    Pharmacy was consulted to evaluate the cost of Jardiance and Farxiga for patient. Both require a prior authorization (PA) through insurance, and Brandy Rinaldi states she prefers Jardiance for patient. Have started the PA process for Jardiance. Will update when approved/denied.    Thank you,    Winnie Wagner, PharmD  07/02/22  15:23 EDT

## 2022-07-03 LAB
ANION GAP SERPL CALCULATED.3IONS-SCNC: 9.7 MMOL/L (ref 5–15)
BUN SERPL-MCNC: 23 MG/DL (ref 8–23)
BUN/CREAT SERPL: 18.9 (ref 7–25)
CALCIUM SPEC-SCNC: 8.7 MG/DL (ref 8.6–10.5)
CHLORIDE SERPL-SCNC: 97 MMOL/L (ref 98–107)
CO2 SERPL-SCNC: 29.3 MMOL/L (ref 22–29)
CREAT SERPL-MCNC: 1.22 MG/DL (ref 0.76–1.27)
DEPRECATED RDW RBC AUTO: 50.9 FL (ref 37–54)
EGFRCR SERPLBLD CKD-EPI 2021: 67 ML/MIN/1.73
ERYTHROCYTE [DISTWIDTH] IN BLOOD BY AUTOMATED COUNT: 14.2 % (ref 12.3–15.4)
GLUCOSE SERPL-MCNC: 94 MG/DL (ref 65–99)
HCT VFR BLD AUTO: 38.1 % (ref 37.5–51)
HGB BLD-MCNC: 12.1 G/DL (ref 13–17.7)
MCH RBC QN AUTO: 30.8 PG (ref 26.6–33)
MCHC RBC AUTO-ENTMCNC: 31.8 G/DL (ref 31.5–35.7)
MCV RBC AUTO: 96.9 FL (ref 79–97)
PLATELET # BLD AUTO: 154 10*3/MM3 (ref 140–450)
PMV BLD AUTO: 10.4 FL (ref 6–12)
POTASSIUM SERPL-SCNC: 4.8 MMOL/L (ref 3.5–5.2)
RBC # BLD AUTO: 3.93 10*6/MM3 (ref 4.14–5.8)
SODIUM SERPL-SCNC: 136 MMOL/L (ref 136–145)
WBC NRBC COR # BLD: 6.36 10*3/MM3 (ref 3.4–10.8)

## 2022-07-03 PROCEDURE — 94799 UNLISTED PULMONARY SVC/PX: CPT

## 2022-07-03 PROCEDURE — 99233 SBSQ HOSP IP/OBS HIGH 50: CPT | Performed by: INTERNAL MEDICINE

## 2022-07-03 PROCEDURE — 94664 DEMO&/EVAL PT USE INHALER: CPT

## 2022-07-03 PROCEDURE — 80048 BASIC METABOLIC PNL TOTAL CA: CPT | Performed by: INTERNAL MEDICINE

## 2022-07-03 PROCEDURE — 85027 COMPLETE CBC AUTOMATED: CPT | Performed by: INTERNAL MEDICINE

## 2022-07-03 PROCEDURE — 25010000002 FUROSEMIDE PER 20 MG: Performed by: NURSE PRACTITIONER

## 2022-07-03 PROCEDURE — 93005 ELECTROCARDIOGRAM TRACING: CPT | Performed by: INTERNAL MEDICINE

## 2022-07-03 PROCEDURE — 99232 SBSQ HOSP IP/OBS MODERATE 35: CPT | Performed by: INTERNAL MEDICINE

## 2022-07-03 RX ORDER — HYDROCODONE BITARTRATE AND ACETAMINOPHEN 10; 325 MG/1; MG/1
1 TABLET ORAL EVERY 4 HOURS PRN
Status: DISCONTINUED | OUTPATIENT
Start: 2022-07-03 | End: 2022-07-12 | Stop reason: HOSPADM

## 2022-07-03 RX ADMIN — SACUBITRIL AND VALSARTAN 1 TABLET: 24; 26 TABLET, FILM COATED ORAL at 20:39

## 2022-07-03 RX ADMIN — AMIODARONE HYDROCHLORIDE 400 MG: 200 TABLET ORAL at 20:39

## 2022-07-03 RX ADMIN — CARVEDILOL 6.25 MG: 6.25 TABLET, FILM COATED ORAL at 17:37

## 2022-07-03 RX ADMIN — APIXABAN 5 MG: 5 TABLET, FILM COATED ORAL at 20:39

## 2022-07-03 RX ADMIN — ASPIRIN 81 MG: 81 TABLET, COATED ORAL at 08:24

## 2022-07-03 RX ADMIN — FUROSEMIDE 40 MG: 10 INJECTION, SOLUTION INTRAVENOUS at 20:45

## 2022-07-03 RX ADMIN — IPRATROPIUM BROMIDE 0.5 MG: 0.5 SOLUTION RESPIRATORY (INHALATION) at 13:21

## 2022-07-03 RX ADMIN — TAMSULOSIN HYDROCHLORIDE 0.4 MG: 0.4 CAPSULE ORAL at 20:39

## 2022-07-03 RX ADMIN — FUROSEMIDE 40 MG: 10 INJECTION, SOLUTION INTRAVENOUS at 08:24

## 2022-07-03 RX ADMIN — NICOTINE TRANSDERMAL SYSTEM 1 PATCH: 21 PATCH, EXTENDED RELEASE TRANSDERMAL at 08:24

## 2022-07-03 RX ADMIN — RANOLAZINE 500 MG: 500 TABLET, FILM COATED, EXTENDED RELEASE ORAL at 20:39

## 2022-07-03 RX ADMIN — HYDROCODONE BITARTRATE AND ACETAMINOPHEN 1 TABLET: 10; 325 TABLET ORAL at 08:58

## 2022-07-03 RX ADMIN — Medication 10 ML: at 08:25

## 2022-07-03 RX ADMIN — IPRATROPIUM BROMIDE 0.5 MG: 0.5 SOLUTION RESPIRATORY (INHALATION) at 07:07

## 2022-07-03 RX ADMIN — AMIODARONE HYDROCHLORIDE 400 MG: 200 TABLET ORAL at 08:24

## 2022-07-03 RX ADMIN — CETIRIZINE HYDROCHLORIDE 10 MG: 10 TABLET, FILM COATED ORAL at 08:24

## 2022-07-03 RX ADMIN — Medication 10 ML: at 20:46

## 2022-07-03 RX ADMIN — IPRATROPIUM BROMIDE 0.5 MG: 0.5 SOLUTION RESPIRATORY (INHALATION) at 18:27

## 2022-07-03 RX ADMIN — HYDROCODONE BITARTRATE AND ACETAMINOPHEN 1 TABLET: 10; 325 TABLET ORAL at 17:13

## 2022-07-03 RX ADMIN — IPRATROPIUM BROMIDE 0.5 MG: 0.5 SOLUTION RESPIRATORY (INHALATION) at 00:44

## 2022-07-03 RX ADMIN — CARVEDILOL 6.25 MG: 6.25 TABLET, FILM COATED ORAL at 08:24

## 2022-07-03 RX ADMIN — GUAIFENESIN 1200 MG: 600 TABLET, EXTENDED RELEASE ORAL at 20:39

## 2022-07-03 RX ADMIN — HYDROCODONE BITARTRATE AND ACETAMINOPHEN 1 TABLET: 10; 325 TABLET ORAL at 20:46

## 2022-07-03 RX ADMIN — APIXABAN 5 MG: 5 TABLET, FILM COATED ORAL at 08:24

## 2022-07-03 RX ADMIN — GABAPENTIN 600 MG: 300 CAPSULE ORAL at 20:40

## 2022-07-03 RX ADMIN — RANOLAZINE 500 MG: 500 TABLET, FILM COATED, EXTENDED RELEASE ORAL at 08:25

## 2022-07-03 RX ADMIN — GUAIFENESIN 1200 MG: 600 TABLET, EXTENDED RELEASE ORAL at 08:25

## 2022-07-03 RX ADMIN — HYDROCODONE BITARTRATE AND ACETAMINOPHEN 1 TABLET: 10; 325 TABLET ORAL at 13:04

## 2022-07-03 RX ADMIN — HYDROCODONE BITARTRATE AND ACETAMINOPHEN 1 TABLET: 10; 325 TABLET ORAL at 03:25

## 2022-07-03 RX ADMIN — SACUBITRIL AND VALSARTAN 1 TABLET: 24; 26 TABLET, FILM COATED ORAL at 08:24

## 2022-07-03 NOTE — PROGRESS NOTES
Patient Identification:  Name:  Chong White  Age:  62 y.o.  Sex:  male  :  1960  MRN:  [id number]  Visit Number:  [id number]    Chief Complaint: HFrEF, atrial fibrillation, CAD      Subjective:  Patient seen and examined at bedside.  Lying comfortably in bed.  No new complaints.  Denies any chest pain, worsening shortness of breath (now at baseline, has COPD), palpitations, dizziness/lightheadedness.  Has been out of bed to the bathroom without any problems.  ----------------------------------------------------------------------------------------------------------------------  Current Hospital Meds:  amiodarone, 400 mg, Oral, Q12H  apixaban, 5 mg, Oral, Q12H  aspirin, 81 mg, Oral, Daily  carvedilol, 6.25 mg, Oral, BID With Meals  cetirizine, 10 mg, Oral, Daily  furosemide, 40 mg, Intravenous, Q12H  gabapentin, 600 mg, Oral, Nightly  guaiFENesin, 1,200 mg, Oral, Q12H  ipratropium, 0.5 mg, Nebulization, 4x Daily - RT  lactated ringers, 125 mL/hr, Intravenous, Once  nicotine, 1 patch, Transdermal, Q24H  ranolazine, 500 mg, Oral, Q12H  sacubitril-valsartan, 1 tablet, Oral, Q12H  sodium chloride, 10 mL, Intravenous, Q12H  sodium chloride, 10 mL, Intravenous, Q12H  sodium chloride, 10 mL, Intravenous, Q12H  sodium chloride, 10 mL, Intravenous, Q12H  sodium chloride, 10 mL, Intravenous, Q12H  sodium chloride, 10 mL, Intravenous, Q12H  tamsulosin, 0.4 mg, Oral, Nightly      Pharmacy Consult,       ----------------------------------------------------------------------------------------------------------------------  Vital Signs:  Temp:  [97.5 °F (36.4 °C)-98.9 °F (37.2 °C)] 97.8 °F (36.6 °C)  Heart Rate:  [62-86] 65  Resp:  [16-20] 20  BP: ()/(56-73) 106/63      22  0500 22  0500 22  1620   Weight: 92.7 kg (204 lb 5.9 oz) 92 kg (202 lb 13.2 oz) 91.6 kg (202 lb)     Body mass index is 30.71 kg/m².    Intake/Output Summary (Last 24 hours) at 7/3/2022 1108  Last data filed at 7/3/2022  1254  Gross per 24 hour   Intake 960 ml   Output 5750 ml   Net -4790 ml     Diet Regular; Cardiac  ----------------------------------------------------------------------------------------------------------------------  Physical exam:  Constitutional:  Well-developed and well-nourished.  No respiratory distress.      HENT:  Head:  Normocephalic and atraumatic.  Mouth:  Moist mucous membranes.    Eyes:  Conjunctivae and EOM are normal.  Pupils are equal, round, and reactive to light.  No scleral icterus.    Neck:  Neck supple.  No JVD present.    Cardiovascular:  Normal rate, regular rhythm and normal heart sounds with no murmur.  Pulmonary/Chest:  No respiratory distress, no wheezes, no crackles, with normal breath sounds and good air movement.  Abdominal:  Soft.  Bowel sounds are normal.  No distension and no tenderness.   Musculoskeletal:  No edema, no tenderness, and no deformity.  No red or swollen joints anywhere.    Neurological:  Alert and oriented to person, place, and time.  No cranial nerve deficit.  No tongue deviation.  No facial droop.  No slurred speech.   Skin:  Skin is warm and dry. No rash noted. No pallor.   Peripheral vascular:  Strong pulses in all 4 extremities with no clubbing, no cyanosis, no edema.  ----------------------------------------------------------------------------------------------------------------------  Tele: Patient refused telemetry  ----------------------------------------------------------------------------------------------------------------------  Results from last 7 days   Lab Units 06/27/22  0048   TROPONIN T ng/mL <0.010     Results from last 7 days   Lab Units 07/03/22  0103 06/30/22  0113 06/28/22  0104   WBC 10*3/mm3 6.36 6.05 7.93   HEMOGLOBIN g/dL 12.1* 12.4* 12.4*   HEMATOCRIT % 38.1 37.9 38.0   MCV fL 96.9 94.5 94.3   MCHC g/dL 31.8 32.7 32.6   PLATELETS 10*3/mm3 154 140 132*         Results from last 7 days   Lab Units 07/03/22  0103 07/02/22  0437 07/01/22  0749  06/30/22  0113 06/29/22  0029   SODIUM mmol/L 136 137 136   < > 136   POTASSIUM mmol/L 4.8 4.4 4.7   < > 4.6   CHLORIDE mmol/L 97* 97* 101   < > 100   CO2 mmol/L 29.3* 29.3* 27.0   < > 28.9   BUN mg/dL 23 24* 27*   < > 30*   CREATININE mg/dL 1.22 1.10 1.23   < > 1.05   CALCIUM mg/dL 8.7 8.8 8.8   < > 8.6   GLUCOSE mg/dL 94 81 93   < > 101*   ALBUMIN g/dL  --   --   --   --  3.51   BILIRUBIN mg/dL  --   --   --   --  0.5   ALK PHOS U/L  --   --   --   --  84   AST (SGOT) U/L  --   --   --   --  19   ALT (SGPT) U/L  --   --   --   --  20    < > = values in this interval not displayed.   Estimated Creatinine Clearance: 69 mL/min (by C-G formula based on SCr of 1.22 mg/dL).    No results found for: AMMONIA      No results found for: BLOODCX  No results found for: URINECX  No results found for: WOUNDCX  No results found for: STOOLCX    I have personally looked at the labs and they are summarized above.  ----------------------------------------------------------------------------------------------------------------------  Imaging Results (Last 24 Hours)     ** No results found for the last 24 hours. **        ----------------------------------------------------------------------------------------------------------------------    Assessment:  62-year-old male with past medical history as stated including hypertension, hyperlipidemia, active long-term smoker, COPD, CAD (LAD  with collaterals from RCA), chronic HFrEF with a EF 20 to 25%, presented with A. fib with RVR and acute CHF exacerbation.  Now status post LUCERO cardioversion 6/28/2022 and maintaining normal sinus rhythm.    1.  A. fib with RVR: Status post LUCERO/DCCV 6/28/2022; now in NSR; on amiodarone and Eliquis  2.  Acute on chronic HFrEF; EF 20 to 25% chronically since at least 2020; today on exam not in overt heart failure.  3.  CAD: [facility] 9/2020 with LAD  and collaterals from RCA.  Stress test this admission 6/29/2020 with normal perfusion and EF of 23%.   Clinically stable from CAD standpoint.  -COPD/long-term tobacco abuse    Plan:   1.  Patient now in normal sinus rhythm and stable.  Continue Eliquis.  Continue amiodarone 400 mg twice daily for total of 2 weeks, then can start decreasing dose, pending clinical course.  -Continue Eliquis for stroke prevention in atrial fibrillation.  -For CAD, continue aspirin and Coreg.  Add statin - atorvastatin 20 mg daily  -For HFrEF, clinically appears to be stable now.  Continue Coreg, Entresto.  Currently on Lasix 40 mg IV twice daily.  We will check another chest x-ray and BNP, if stable consider switching Lasix to p.o.      Chito Allen MD  07/03/22  14:48 EDT

## 2022-07-03 NOTE — PLAN OF CARE
Goal Outcome Evaluation:  Plan of Care Reviewed With: patient        Progress: no change  Outcome Evaluation: Pt resting in bed during assessment. Pt C/O pain, prn pain meds given per order. Pt also C/O that the scrotal edema is getting worse, and his scrotem does seem a bit larger and red, we elevated them to help with swelling. Pt also still has blood in his urine, which pt says is getting worse, but it seems the same as it did during last nights shift. Vital signs are stable, no acute changes at this time. Will continue to monitor.

## 2022-07-03 NOTE — PLAN OF CARE
Goal Outcome Evaluation:   Patient has c/o all over pain. PRN pain medication given. He got OOB to shower today. He just recently agreed to place tele monitor back on. EKG was obtained earlier in shift. No issues noted at this time.

## 2022-07-03 NOTE — PLAN OF CARE
Goal Outcome Evaluation:              Outcome Evaluation: Patient rested in bed this shift, patient complained of pain PRN medications given, no other complaints or request at this time, VSS, Will continue to monitor.

## 2022-07-03 NOTE — PROGRESS NOTES
"    [facility] HOSPITALIST PROGRESS NOTE     Patient Identification:  Name:  Chong White  Age:  62 y.o.  Sex:  male  :  1960  MRN:  [id number]  Visit Number:  [id number]  Primary Care Provider:  Amy Yanez APRN    Length of stay:  10    Chief complaint: Scrotal and bilateral lower extremity edema    Subjective:    Patient states he is doing \"okay\" today.  He denies any worsening lower extremity edema or scrotal edema states he does not think it is significantly better.  Upon exam, bilateral lower extremity edema does appear improved.  No new events overnight.  ----------------------------------------------------------------------------------------------------------------------  Current Hospital Meds:  amiodarone, 400 mg, Oral, Q12H  apixaban, 5 mg, Oral, Q12H  aspirin, 81 mg, Oral, Daily  carvedilol, 6.25 mg, Oral, BID With Meals  cetirizine, 10 mg, Oral, Daily  furosemide, 40 mg, Intravenous, Q12H  gabapentin, 600 mg, Oral, Nightly  guaiFENesin, 1,200 mg, Oral, Q12H  ipratropium, 0.5 mg, Nebulization, 4x Daily - RT  lactated ringers, 125 mL/hr, Intravenous, Once  nicotine, 1 patch, Transdermal, Q24H  ranolazine, 500 mg, Oral, Q12H  sacubitril-valsartan, 1 tablet, Oral, Q12H  sodium chloride, 10 mL, Intravenous, Q12H  sodium chloride, 10 mL, Intravenous, Q12H  sodium chloride, 10 mL, Intravenous, Q12H  sodium chloride, 10 mL, Intravenous, Q12H  sodium chloride, 10 mL, Intravenous, Q12H  sodium chloride, 10 mL, Intravenous, Q12H  tamsulosin, 0.4 mg, Oral, Nightly      Pharmacy Consult,       ----------------------------------------------------------------------------------------------------------------------  Vital Signs:  Temp:  [97.5 °F (36.4 °C)-98.9 °F (37.2 °C)] 97.8 °F (36.6 °C)  Heart Rate:  [62-86] 65  Resp:  [16-20] 20  BP: ()/(56-73) 106/63      22  0500 22  0500 22  1620   Weight: 92.7 kg (204 lb 5.9 oz) 92 kg (202 lb 13.2 oz) 91.6 kg (202 lb)     Body " mass index is 30.71 kg/m².    Intake/Output Summary (Last 24 hours) at 7/3/2022 1206  Last data filed at 7/3/2022 0900  Gross per 24 hour   Intake 1440 ml   Output 3450 ml   Net -2010 ml     Diet Regular; Cardiac  ----------------------------------------------------------------------------------------------------------------------  Physical exam:  Constitutional: Well-nourished  male in no apparent distress.     HENT:  Head:  Normocephalic and atraumatic.  Mouth:  Moist mucous membranes.    Eyes:  Conjunctivae and EOM are normal.  Pupils are equal, round, and reactive to light.  No scleral icterus.    Neck:  Neck supple. No thyromegaly.  No JVD present.    Cardiovascular:  Irregular rhythm with rate in the 90's with no murmurs, rubs, clicks or gallops appreciated.  Pulmonary/Chest:  Clear to auscultation bilaterally with no crackles, wheezes or rhonchi appreciated.  Abdominal:  Soft. Nontender. Nondistended  Bowel sounds are normal in all four quadrants. No organomegally appreciated.   Musculoskeletal:  3+ bilateral lower extremity edema, no tenderness, and no deformity.  No red or swollen joints anywhere.    Patient with significant scrotal edema  Neurological:  Alert, Cranial nerves II-XII intact with no focal deficits.  No facial droop.  No slurred speech.   Skin:  Warm and dry to palpation with no rashes or lesions appreciated.  Peripheral vascular:  2+ radial and pedal pulses in bilateral upper and lower extremities.  Psychiatric:  Alert and oriented x3  -------------------------------------------------------------------------------  ----------------------------------------------------------------------------------------------------------------------  Results from last 7 days   Lab Units 06/27/22  0048   TROPONIN T ng/mL <0.010     Results from last 7 days   Lab Units 07/03/22  0103 06/30/22  0113 06/28/22  0104   WBC 10*3/mm3 6.36 6.05 7.93   HEMOGLOBIN g/dL 12.1* 12.4* 12.4*   HEMATOCRIT % 38.1 37.9  38.0   MCV fL 96.9 94.5 94.3   MCHC g/dL 31.8 32.7 32.6   PLATELETS 10*3/mm3 154 140 132*         Results from last 7 days   Lab Units 07/03/22  0103 07/02/22  0437 07/01/22  0749 06/30/22  0113 06/29/22  0029   SODIUM mmol/L 136 137 136   < > 136   POTASSIUM mmol/L 4.8 4.4 4.7   < > 4.6   CHLORIDE mmol/L 97* 97* 101   < > 100   CO2 mmol/L 29.3* 29.3* 27.0   < > 28.9   BUN mg/dL 23 24* 27*   < > 30*   CREATININE mg/dL 1.22 1.10 1.23   < > 1.05   CALCIUM mg/dL 8.7 8.8 8.8   < > 8.6   GLUCOSE mg/dL 94 81 93   < > 101*   ALBUMIN g/dL  --   --   --   --  3.51   BILIRUBIN mg/dL  --   --   --   --  0.5   ALK PHOS U/L  --   --   --   --  84   AST (SGOT) U/L  --   --   --   --  19   ALT (SGPT) U/L  --   --   --   --  20    < > = values in this interval not displayed.   Estimated Creatinine Clearance: 69 mL/min (by C-G formula based on SCr of 1.22 mg/dL).    No results found for: AMMONIA      No results found for: BLOODCX  No results found for: URINECX  No results found for: WOUNDCX  No results found for: STOOLCX    I have personally looked at the labs and they are summarized above.  ----------------------------------------------------------------------------------------------------------------------  Imaging Results (Last 24 Hours)     ** No results found for the last 24 hours. **        ----------------------------------------------------------------------------------------------------------------------  Assessment and Plan:    1.  A. fib with RVR - Patient continues to remain in normal sinus rhythm. We will continue amiodarone 400 mg p.o. twice daily for the next 2 days then decrease to 200 mg p.o. daily for rhythm control.  Continue carvedilol, valsartan has been transition to Entresto.  We will likely add Aldactone if blood pressure allows.     2.  Acute on chronic systolic CHF -creatinine essentially stable at 1.2.  Patient is diuresing very well with approximately net -4 L over the past 24 hours.  We will continue  Lasix 40 mg IV twice daily and monitor renal function closely.     3.  Acute kidney injury -resolved    4.  COPD - no wheezing on exam today.  We will continue supportive care for now.    Disposition will likely require several more days hospitalization    Napoleon Del Angel DO   07/03/22   12:06 EDT

## 2022-07-04 ENCOUNTER — APPOINTMENT (OUTPATIENT)
Dept: GENERAL RADIOLOGY | Facility: HOSPITAL | Age: 62
End: 2022-07-04

## 2022-07-04 LAB
ANION GAP SERPL CALCULATED.3IONS-SCNC: 7.8 MMOL/L (ref 5–15)
BUN SERPL-MCNC: 23 MG/DL (ref 8–23)
BUN/CREAT SERPL: 17.8 (ref 7–25)
CALCIUM SPEC-SCNC: 8.7 MG/DL (ref 8.6–10.5)
CHLORIDE SERPL-SCNC: 96 MMOL/L (ref 98–107)
CO2 SERPL-SCNC: 32.2 MMOL/L (ref 22–29)
CREAT SERPL-MCNC: 1.29 MG/DL (ref 0.76–1.27)
DEPRECATED RDW RBC AUTO: 49.5 FL (ref 37–54)
EGFRCR SERPLBLD CKD-EPI 2021: 62.7 ML/MIN/1.73
ERYTHROCYTE [DISTWIDTH] IN BLOOD BY AUTOMATED COUNT: 13.9 % (ref 12.3–15.4)
GLUCOSE SERPL-MCNC: 101 MG/DL (ref 65–99)
HCT VFR BLD AUTO: 38.4 % (ref 37.5–51)
HGB BLD-MCNC: 12.4 G/DL (ref 13–17.7)
MCH RBC QN AUTO: 30.9 PG (ref 26.6–33)
MCHC RBC AUTO-ENTMCNC: 32.3 G/DL (ref 31.5–35.7)
MCV RBC AUTO: 95.8 FL (ref 79–97)
NT-PROBNP SERPL-MCNC: 2239 PG/ML (ref 0–900)
PLATELET # BLD AUTO: 190 10*3/MM3 (ref 140–450)
PMV BLD AUTO: 10.2 FL (ref 6–12)
POTASSIUM SERPL-SCNC: 4.6 MMOL/L (ref 3.5–5.2)
QT INTERVAL: 450 MS
QTC INTERVAL: 456 MS
RBC # BLD AUTO: 4.01 10*6/MM3 (ref 4.14–5.8)
SODIUM SERPL-SCNC: 136 MMOL/L (ref 136–145)
WBC NRBC COR # BLD: 7.79 10*3/MM3 (ref 3.4–10.8)

## 2022-07-04 PROCEDURE — 99233 SBSQ HOSP IP/OBS HIGH 50: CPT | Performed by: INTERNAL MEDICINE

## 2022-07-04 PROCEDURE — 94761 N-INVAS EAR/PLS OXIMETRY MLT: CPT

## 2022-07-04 PROCEDURE — 94799 UNLISTED PULMONARY SVC/PX: CPT

## 2022-07-04 PROCEDURE — 80048 BASIC METABOLIC PNL TOTAL CA: CPT | Performed by: INTERNAL MEDICINE

## 2022-07-04 PROCEDURE — 99232 SBSQ HOSP IP/OBS MODERATE 35: CPT | Performed by: INTERNAL MEDICINE

## 2022-07-04 PROCEDURE — 83880 ASSAY OF NATRIURETIC PEPTIDE: CPT | Performed by: INTERNAL MEDICINE

## 2022-07-04 PROCEDURE — 85027 COMPLETE CBC AUTOMATED: CPT | Performed by: INTERNAL MEDICINE

## 2022-07-04 PROCEDURE — 25010000002 FUROSEMIDE PER 20 MG: Performed by: INTERNAL MEDICINE

## 2022-07-04 PROCEDURE — 71045 X-RAY EXAM CHEST 1 VIEW: CPT

## 2022-07-04 RX ORDER — FUROSEMIDE 10 MG/ML
40 INJECTION INTRAMUSCULAR; INTRAVENOUS DAILY
Status: DISCONTINUED | OUTPATIENT
Start: 2022-07-04 | End: 2022-07-05

## 2022-07-04 RX ADMIN — CARVEDILOL 6.25 MG: 6.25 TABLET, FILM COATED ORAL at 08:06

## 2022-07-04 RX ADMIN — NICOTINE TRANSDERMAL SYSTEM 1 PATCH: 21 PATCH, EXTENDED RELEASE TRANSDERMAL at 08:05

## 2022-07-04 RX ADMIN — APIXABAN 5 MG: 5 TABLET, FILM COATED ORAL at 08:06

## 2022-07-04 RX ADMIN — IPRATROPIUM BROMIDE 0.5 MG: 0.5 SOLUTION RESPIRATORY (INHALATION) at 18:08

## 2022-07-04 RX ADMIN — GUAIFENESIN 1200 MG: 600 TABLET, EXTENDED RELEASE ORAL at 20:05

## 2022-07-04 RX ADMIN — IPRATROPIUM BROMIDE 0.5 MG: 0.5 SOLUTION RESPIRATORY (INHALATION) at 13:40

## 2022-07-04 RX ADMIN — ASPIRIN 81 MG: 81 TABLET, COATED ORAL at 08:06

## 2022-07-04 RX ADMIN — SACUBITRIL AND VALSARTAN 1 TABLET: 24; 26 TABLET, FILM COATED ORAL at 08:06

## 2022-07-04 RX ADMIN — FUROSEMIDE 40 MG: 10 INJECTION, SOLUTION INTRAVENOUS at 08:05

## 2022-07-04 RX ADMIN — IPRATROPIUM BROMIDE 0.5 MG: 0.5 SOLUTION RESPIRATORY (INHALATION) at 00:37

## 2022-07-04 RX ADMIN — APIXABAN 5 MG: 5 TABLET, FILM COATED ORAL at 20:05

## 2022-07-04 RX ADMIN — GUAIFENESIN 1200 MG: 600 TABLET, EXTENDED RELEASE ORAL at 08:06

## 2022-07-04 RX ADMIN — CETIRIZINE HYDROCHLORIDE 10 MG: 10 TABLET, FILM COATED ORAL at 08:06

## 2022-07-04 RX ADMIN — AMIODARONE HYDROCHLORIDE 400 MG: 200 TABLET ORAL at 08:06

## 2022-07-04 RX ADMIN — HYDROCODONE BITARTRATE AND ACETAMINOPHEN 1 TABLET: 10; 325 TABLET ORAL at 17:21

## 2022-07-04 RX ADMIN — HYDROCODONE BITARTRATE AND ACETAMINOPHEN 1 TABLET: 10; 325 TABLET ORAL at 03:19

## 2022-07-04 RX ADMIN — AMIODARONE HYDROCHLORIDE 400 MG: 200 TABLET ORAL at 20:05

## 2022-07-04 RX ADMIN — CARVEDILOL 6.25 MG: 6.25 TABLET, FILM COATED ORAL at 17:21

## 2022-07-04 RX ADMIN — TAMSULOSIN HYDROCHLORIDE 0.4 MG: 0.4 CAPSULE ORAL at 20:05

## 2022-07-04 RX ADMIN — RANOLAZINE 500 MG: 500 TABLET, FILM COATED, EXTENDED RELEASE ORAL at 20:05

## 2022-07-04 RX ADMIN — HYDROCODONE BITARTRATE AND ACETAMINOPHEN 1 TABLET: 10; 325 TABLET ORAL at 21:45

## 2022-07-04 RX ADMIN — GABAPENTIN 600 MG: 300 CAPSULE ORAL at 20:05

## 2022-07-04 RX ADMIN — Medication 10 ML: at 20:05

## 2022-07-04 RX ADMIN — Medication 10 ML: at 08:06

## 2022-07-04 RX ADMIN — HYDROCODONE BITARTRATE AND ACETAMINOPHEN 1 TABLET: 10; 325 TABLET ORAL at 08:06

## 2022-07-04 RX ADMIN — RANOLAZINE 500 MG: 500 TABLET, FILM COATED, EXTENDED RELEASE ORAL at 08:06

## 2022-07-04 RX ADMIN — SACUBITRIL AND VALSARTAN 1 TABLET: 24; 26 TABLET, FILM COATED ORAL at 20:05

## 2022-07-04 RX ADMIN — IPRATROPIUM BROMIDE 0.5 MG: 0.5 SOLUTION RESPIRATORY (INHALATION) at 07:12

## 2022-07-04 NOTE — PROGRESS NOTES
[facility] HOSPITALIST PROGRESS NOTE     Patient Identification:  Name:  Chong White  Age:  62 y.o.  Sex:  male  :  1960  MRN:  [id number]  Visit Number:  [id number]  Primary Care Provider:  Amy Yanez APRN    Length of stay:  11    Chief complaint: Scrotal and bilateral lower extremity edema    Subjective:    Patient states he is slightly improved today.  Does still complain of bilateral lower extremity edema and scrotal edema.  He denies any shortness of breath.  No new events overnight.  ----------------------------------------------------------------------------------------------------------------------  Current Hospital Meds:  amiodarone, 400 mg, Oral, Q12H  apixaban, 5 mg, Oral, Q12H  aspirin, 81 mg, Oral, Daily  carvedilol, 6.25 mg, Oral, BID With Meals  cetirizine, 10 mg, Oral, Daily  furosemide, 40 mg, Intravenous, Daily  gabapentin, 600 mg, Oral, Nightly  guaiFENesin, 1,200 mg, Oral, Q12H  ipratropium, 0.5 mg, Nebulization, 4x Daily - RT  lactated ringers, 125 mL/hr, Intravenous, Once  nicotine, 1 patch, Transdermal, Q24H  ranolazine, 500 mg, Oral, Q12H  sacubitril-valsartan, 1 tablet, Oral, Q12H  sodium chloride, 10 mL, Intravenous, Q12H  sodium chloride, 10 mL, Intravenous, Q12H  sodium chloride, 10 mL, Intravenous, Q12H  sodium chloride, 10 mL, Intravenous, Q12H  sodium chloride, 10 mL, Intravenous, Q12H  sodium chloride, 10 mL, Intravenous, Q12H  tamsulosin, 0.4 mg, Oral, Nightly      Pharmacy Consult,       ----------------------------------------------------------------------------------------------------------------------  Vital Signs:  Temp:  [97.8 °F (36.6 °C)-98.7 °F (37.1 °C)] 97.8 °F (36.6 °C)  Heart Rate:  [63-77] 68  Resp:  [16-20] 18  BP: ()/(58-66) 107/64      22  0500 22  0500 22  1620   Weight: 92.7 kg (204 lb 5.9 oz) 92 kg (202 lb 13.2 oz) 91.6 kg (202 lb)     Body mass index is 30.71 kg/m².    Intake/Output Summary (Last 24 hours)  at 7/4/2022 1109  Last data filed at 7/4/2022 0855  Gross per 24 hour   Intake 1440 ml   Output 6100 ml   Net -4660 ml     Diet Regular; Cardiac  ----------------------------------------------------------------------------------------------------------------------  Physical exam:  Constitutional: Well-nourished  male in no apparent distress.     HENT:  Head:  Normocephalic and atraumatic.  Mouth:  Moist mucous membranes.    Eyes:  Conjunctivae and EOM are normal.  Pupils are equal, round, and reactive to light.  No scleral icterus.    Neck:  Neck supple. No thyromegaly.  No JVD present.    Cardiovascular:  Irregular rhythm with rate in the 90's with no murmurs, rubs, clicks or gallops appreciated.  Pulmonary/Chest:  Clear to auscultation bilaterally with no crackles, wheezes or rhonchi appreciated.  Abdominal:  Soft. Nontender. Nondistended  Bowel sounds are normal in all four quadrants. No organomegally appreciated.   Musculoskeletal:  3+ bilateral lower extremity edema, no tenderness, and no deformity.  No red or swollen joints anywhere.    Patient with significant scrotal edema  Neurological:  Alert, Cranial nerves II-XII intact with no focal deficits.  No facial droop.  No slurred speech.   Skin:  Warm and dry to palpation with no rashes or lesions appreciated.  Peripheral vascular:  2+ radial and pedal pulses in bilateral upper and lower extremities.  Psychiatric:  Alert and oriented x3  -------------------------------------------------------------------------------  ----------------------------------------------------------------------------------------------------------------------      Results from last 7 days   Lab Units 07/04/22  0051 07/03/22  0103 06/30/22  0113   WBC 10*3/mm3 7.79 6.36 6.05   HEMOGLOBIN g/dL 12.4* 12.1* 12.4*   HEMATOCRIT % 38.4 38.1 37.9   MCV fL 95.8 96.9 94.5   MCHC g/dL 32.3 31.8 32.7   PLATELETS 10*3/mm3 190 154 140         Results from last 7 days   Lab Units  07/04/22  0051 07/03/22  0103 07/02/22  0437 06/30/22  0113 06/29/22  0029   SODIUM mmol/L 136 136 137   < > 136   POTASSIUM mmol/L 4.6 4.8 4.4   < > 4.6   CHLORIDE mmol/L 96* 97* 97*   < > 100   CO2 mmol/L 32.2* 29.3* 29.3*   < > 28.9   BUN mg/dL 23 23 24*   < > 30*   CREATININE mg/dL 1.29* 1.22 1.10   < > 1.05   CALCIUM mg/dL 8.7 8.7 8.8   < > 8.6   GLUCOSE mg/dL 101* 94 81   < > 101*   ALBUMIN g/dL  --   --   --   --  3.51   BILIRUBIN mg/dL  --   --   --   --  0.5   ALK PHOS U/L  --   --   --   --  84   AST (SGOT) U/L  --   --   --   --  19   ALT (SGPT) U/L  --   --   --   --  20    < > = values in this interval not displayed.   Estimated Creatinine Clearance: 65.3 mL/min (A) (by C-G formula based on SCr of 1.29 mg/dL (H)).    No results found for: AMMONIA      No results found for: BLOODCX  No results found for: URINECX  No results found for: WOUNDCX  No results found for: STOOLCX    I have personally looked at the labs and they are summarized above.  ----------------------------------------------------------------------------------------------------------------------  Imaging Results (Last 24 Hours)     Procedure Component Value Units Date/Time    XR Chest 1 View [[id number]] Collected: 07/04/22 0954     Updated: 07/04/22 0956    Narrative:      CR Chest 1 Vw    INDICATION:   CHF, atrial fibrillation cardiomyopathy     COMPARISON:    Chest x-ray 6/26/2022    FINDINGS:  Portable AP view(s) of the chest.    Cardiac silhouette is moderately enlarged and not changed.    There is predominantly linear airspace disease left mid lung and right lung base. Likely more confluent airspace disease in the left lower lobe retrocardiac area. This area is not well evaluated due to portable technique and cardiac enlargement. On  comparison to prior study findings are probably not significantly changed. No pneumothorax. Cannot exclude some layering pleural fluid.       Impression:      Overall no significant interval  change.    Signer Name: Aleihsa Ceja MD   Signed: 7/4/2022 9:54 AM   Workstation Name: [email or web address]    [facility]        ----------------------------------------------------------------------------------------------------------------------  Assessment and Plan:    1.  A. fib with RVR - Patient continues to remain in normal sinus rhythm. We will continue amiodarone 400 mg p.o. twice daily for the next 1 days then decrease to 200 mg p.o. daily for rhythm control.  Continue carvedilol and Entresto.  Blood pressure still low normal, will not add Aldactone at this time.     2.  Acute on chronic systolic CHF -creatinine continues to slowly rise, will decrease Lasix to 40 mg IV daily.  Patient still with good urine output but overall slightly decreased net negative value compared to yesterday.      3.  Acute kidney injury -resolved    4.  COPD - no wheezing on exam today.  We will continue supportive care for now.    Disposition will likely require several more days hospitalization    Napoleon Del Angel DO   07/04/22   11:09 EDT

## 2022-07-04 NOTE — PLAN OF CARE
Goal Outcome Evaluation:   Patient is currently up in a chair. He has showered. He has had his PRN pain medication one time today. No other issues noted at this time.

## 2022-07-04 NOTE — PROGRESS NOTES
Patient Identification:  Name:  Chong White  Age:  62 y.o.  Sex:  male  :  1960  MRN:  [id number]  Visit Number:  [id number]    Chief Complaint: HFrEF, atrial fibrillation, CAD      Subjective:  Patient seen and examined at bedside.  Just came out of the shower and is having increased work of breathing.  +3 bilateral lower extremity edema while sitting on the side of the bed.   ----------------------------------------------------------------------------------------------------------------------  Current Hospital Meds:  amiodarone, 400 mg, Oral, Q12H  apixaban, 5 mg, Oral, Q12H  aspirin, 81 mg, Oral, Daily  carvedilol, 6.25 mg, Oral, BID With Meals  cetirizine, 10 mg, Oral, Daily  furosemide, 40 mg, Intravenous, Daily  gabapentin, 600 mg, Oral, Nightly  guaiFENesin, 1,200 mg, Oral, Q12H  ipratropium, 0.5 mg, Nebulization, 4x Daily - RT  lactated ringers, 125 mL/hr, Intravenous, Once  nicotine, 1 patch, Transdermal, Q24H  ranolazine, 500 mg, Oral, Q12H  sacubitril-valsartan, 1 tablet, Oral, Q12H  sodium chloride, 10 mL, Intravenous, Q12H  sodium chloride, 10 mL, Intravenous, Q12H  sodium chloride, 10 mL, Intravenous, Q12H  sodium chloride, 10 mL, Intravenous, Q12H  sodium chloride, 10 mL, Intravenous, Q12H  sodium chloride, 10 mL, Intravenous, Q12H  tamsulosin, 0.4 mg, Oral, Nightly      Pharmacy Consult,       ----------------------------------------------------------------------------------------------------------------------  Vital Signs:  Temp:  [97.6 °F (36.4 °C)-98.7 °F (37.1 °C)] 97.6 °F (36.4 °C)  Heart Rate:  [63-77] 66  Resp:  [16-20] 20  BP: ()/(58-66) 120/60      22  0500 22  0500 22  1620   Weight: 92.7 kg (204 lb 5.9 oz) 92 kg (202 lb 13.2 oz) 91.6 kg (202 lb)     Body mass index is 30.71 kg/m².    Intake/Output Summary (Last 24 hours) at 2022 1144  Last data filed at 2022 1134  Gross per 24 hour   Intake 1440 ml   Output 7100 ml   Net -5660 ml     Diet Regular;  Cardiac  ----------------------------------------------------------------------------------------------------------------------  Physical exam:  Constitutional:  Well-developed and well-nourished.  No respiratory distress.      HENT:  Head:  Normocephalic and atraumatic.  Mouth:  Moist mucous membranes.    Eyes:  Conjunctivae and EOM are normal.  Pupils are equal, round, and reactive to light.  No scleral icterus.    Neck:  Neck supple.  No JVD present.    Cardiovascular:  Normal rate, regular rhythm and normal heart sounds with no murmur.  Pulmonary/Chest: Increased work of breathing at this moment as he had just come out of the shower, no wheezes, no crackles, with normal breath sounds and good air movement.  Abdominal:  Soft.  Bowel sounds are normal.  No distension and no tenderness.   Musculoskeletal:  No edema, no tenderness, and no deformity.  No red or swollen joints anywhere.    Neurological:  Alert and oriented to person, place, and time.  No cranial nerve deficit.  No tongue deviation.  No facial droop.  No slurred speech.   Skin:  Skin is warm and dry. No rash noted. No pallor.   Peripheral vascular: +3 edema bilateral lower extremity up to the knees.    ----------------------------------------------------------------------------------------------------------------------  Tele: Patient refused telemetry  EK/3/2022 normal sinus rhythm at 62 bpm.  ----------------------------------------------------------------------------------------------------------------------      Results from last 7 days   Lab Units 22   WBC 10*3/mm3 7.79 6.36 6.05   HEMOGLOBIN g/dL 12.4* 12.1* 12.4*   HEMATOCRIT % 38.4 38.1 37.9   MCV fL 95.8 96.9 94.5   MCHC g/dL 32.3 31.8 32.7   PLATELETS 10*3/mm3 190 154 140         Results from last 7 days   Lab Units 22  0437 22  0113 22  0029   SODIUM mmol/L 136 136 137   < > 136   POTASSIUM mmol/L  4.6 4.8 4.4   < > 4.6   CHLORIDE mmol/L 96* 97* 97*   < > 100   CO2 mmol/L 32.2* 29.3* 29.3*   < > 28.9   BUN mg/dL 23 23 24*   < > 30*   CREATININE mg/dL 1.29* 1.22 1.10   < > 1.05   CALCIUM mg/dL 8.7 8.7 8.8   < > 8.6   GLUCOSE mg/dL 101* 94 81   < > 101*   ALBUMIN g/dL  --   --   --   --  3.51   BILIRUBIN mg/dL  --   --   --   --  0.5   ALK PHOS U/L  --   --   --   --  84   AST (SGOT) U/L  --   --   --   --  19   ALT (SGPT) U/L  --   --   --   --  20    < > = values in this interval not displayed.   Estimated Creatinine Clearance: 65.3 mL/min (A) (by C-G formula based on SCr of 1.29 mg/dL (H)).    No results found for: AMMONIA      No results found for: BLOODCX  No results found for: URINECX  No results found for: WOUNDCX  No results found for: STOOLCX    I have personally looked at the labs and they are summarized above.  ----------------------------------------------------------------------------------------------------------------------  Imaging Results (Last 24 Hours)     Procedure Component Value Units Date/Time    XR Chest 1 View [[id number]] Collected: 07/04/22 0954     Updated: 07/04/22 0956    Narrative:      CR Chest 1 Vw    INDICATION:   CHF, atrial fibrillation cardiomyopathy     COMPARISON:    Chest x-ray 6/26/2022    FINDINGS:  Portable AP view(s) of the chest.    Cardiac silhouette is moderately enlarged and not changed.    There is predominantly linear airspace disease left mid lung and right lung base. Likely more confluent airspace disease in the left lower lobe retrocardiac area. This area is not well evaluated due to portable technique and cardiac enlargement. On  comparison to prior study findings are probably not significantly changed. No pneumothorax. Cannot exclude some layering pleural fluid.       Impression:      Overall no significant interval change.    Signer Name: Aleisha Ceja MD   Signed: 7/4/2022 9:54 AM   Workstation Name: [email or web address]    [facility]         ----------------------------------------------------------------------------------------------------------------------    Assessment:  62-year-old male with past medical history as stated including hypertension, hyperlipidemia, active long-term smoker, COPD, CAD (LAD  with collaterals from RCA on [facility] 2020), chronic HFrEF with a EF 20 to 25%, presented with A. fib with RVR and acute CHF exacerbation.  Now status post LUCERO cardioversion 6/28/2022 and maintaining normal sinus rhythm.  Being diuresed for HFrEF.    1.  A. fib with RVR: Status post LUCERO/DCCV 6/28/2022; now in NSR; on amiodarone and Eliquis  2.  Acute on chronic HFrEF; EF 20 to 25% chronically since at least 2020; today with increased work of breathing after shower, +3 lower extremity edema.  BNP have trended down, however still elevated.  3.  CAD: [facility] 9/2020 with LAD  and collaterals from RCA.  Stress test this admission 6/29/2022 with normal perfusion and EF of 23%.  Clinically stable from CAD standpoint.  -COPD/long-term tobacco abuse    Plan:   1.  Patient now in normal sinus rhythm and stable.  Continue Eliquis.  Continue amiodarone 400 mg twice daily for total of 2 weeks, then can start decreasing dose, pending clinical course.  -Continue Eliquis for stroke prevention in atrial fibrillation.  -For CAD, continue aspirin and Coreg, statin, aspirin.  -For HFrEF, requires more diuresis and would continue IV Lasix 40 twice daily at this time; responding well to it with good net negative.  Creatinine slightly elevated today, trend and adjust diuretics as needed.  Continue Coreg, Entresto.    -Increase physical activity as tolerated, physical therapy.  Compression stockings/Ace wraps for lower extremity, low-salt diet and fluid restriction.      Chito Allen MD  07/04/22  11:44 EDT

## 2022-07-04 NOTE — PLAN OF CARE
Goal Outcome Evaluation:               PT has c/o of scrotal edema causing ambulation difficulty. Urine has decreased amount of blood as compared to yesterday per patient's report. No other c/o at this time. VSS will continue to monitor.

## 2022-07-05 LAB
ANION GAP SERPL CALCULATED.3IONS-SCNC: 7 MMOL/L (ref 5–15)
BUN SERPL-MCNC: 23 MG/DL (ref 8–23)
BUN/CREAT SERPL: 19.3 (ref 7–25)
CALCIUM SPEC-SCNC: 8.8 MG/DL (ref 8.6–10.5)
CHLORIDE SERPL-SCNC: 95 MMOL/L (ref 98–107)
CO2 SERPL-SCNC: 28 MMOL/L (ref 22–29)
CREAT SERPL-MCNC: 1.19 MG/DL (ref 0.76–1.27)
DEPRECATED RDW RBC AUTO: 48.1 FL (ref 37–54)
EGFRCR SERPLBLD CKD-EPI 2021: 69.1 ML/MIN/1.73
ERYTHROCYTE [DISTWIDTH] IN BLOOD BY AUTOMATED COUNT: 13.9 % (ref 12.3–15.4)
GLUCOSE SERPL-MCNC: 89 MG/DL (ref 65–99)
HCT VFR BLD AUTO: 39.1 % (ref 37.5–51)
HGB BLD-MCNC: 12.8 G/DL (ref 13–17.7)
MCH RBC QN AUTO: 31 PG (ref 26.6–33)
MCHC RBC AUTO-ENTMCNC: 32.7 G/DL (ref 31.5–35.7)
MCV RBC AUTO: 94.7 FL (ref 79–97)
PLATELET # BLD AUTO: 207 10*3/MM3 (ref 140–450)
PMV BLD AUTO: 10.1 FL (ref 6–12)
POTASSIUM SERPL-SCNC: 4.6 MMOL/L (ref 3.5–5.2)
RBC # BLD AUTO: 4.13 10*6/MM3 (ref 4.14–5.8)
SODIUM SERPL-SCNC: 130 MMOL/L (ref 136–145)
WBC NRBC COR # BLD: 7.68 10*3/MM3 (ref 3.4–10.8)

## 2022-07-05 PROCEDURE — 94799 UNLISTED PULMONARY SVC/PX: CPT

## 2022-07-05 PROCEDURE — 25010000002 FUROSEMIDE PER 20 MG: Performed by: INTERNAL MEDICINE

## 2022-07-05 PROCEDURE — 99233 SBSQ HOSP IP/OBS HIGH 50: CPT | Performed by: NURSE PRACTITIONER

## 2022-07-05 PROCEDURE — 80048 BASIC METABOLIC PNL TOTAL CA: CPT | Performed by: INTERNAL MEDICINE

## 2022-07-05 PROCEDURE — 99232 SBSQ HOSP IP/OBS MODERATE 35: CPT | Performed by: INTERNAL MEDICINE

## 2022-07-05 PROCEDURE — 85027 COMPLETE CBC AUTOMATED: CPT | Performed by: INTERNAL MEDICINE

## 2022-07-05 RX ORDER — FUROSEMIDE 10 MG/ML
40 INJECTION INTRAMUSCULAR; INTRAVENOUS EVERY 12 HOURS
Status: DISCONTINUED | OUTPATIENT
Start: 2022-07-05 | End: 2022-07-11

## 2022-07-05 RX ADMIN — GUAIFENESIN 1200 MG: 600 TABLET, EXTENDED RELEASE ORAL at 08:48

## 2022-07-05 RX ADMIN — CARVEDILOL 6.25 MG: 6.25 TABLET, FILM COATED ORAL at 08:48

## 2022-07-05 RX ADMIN — HYDROCODONE BITARTRATE AND ACETAMINOPHEN 1 TABLET: 10; 325 TABLET ORAL at 17:51

## 2022-07-05 RX ADMIN — FUROSEMIDE 40 MG: 10 INJECTION, SOLUTION INTRAVENOUS at 08:48

## 2022-07-05 RX ADMIN — CETIRIZINE HYDROCHLORIDE 10 MG: 10 TABLET, FILM COATED ORAL at 08:48

## 2022-07-05 RX ADMIN — SACUBITRIL AND VALSARTAN 1 TABLET: 24; 26 TABLET, FILM COATED ORAL at 08:48

## 2022-07-05 RX ADMIN — HYDROCODONE BITARTRATE AND ACETAMINOPHEN 1 TABLET: 10; 325 TABLET ORAL at 12:59

## 2022-07-05 RX ADMIN — TAMSULOSIN HYDROCHLORIDE 0.4 MG: 0.4 CAPSULE ORAL at 20:26

## 2022-07-05 RX ADMIN — Medication 10 ML: at 08:47

## 2022-07-05 RX ADMIN — RANOLAZINE 500 MG: 500 TABLET, FILM COATED, EXTENDED RELEASE ORAL at 08:48

## 2022-07-05 RX ADMIN — IPRATROPIUM BROMIDE 0.5 MG: 0.5 SOLUTION RESPIRATORY (INHALATION) at 13:43

## 2022-07-05 RX ADMIN — ASPIRIN 81 MG: 81 TABLET, COATED ORAL at 08:48

## 2022-07-05 RX ADMIN — GUAIFENESIN 1200 MG: 600 TABLET, EXTENDED RELEASE ORAL at 20:26

## 2022-07-05 RX ADMIN — CARVEDILOL 6.25 MG: 6.25 TABLET, FILM COATED ORAL at 17:32

## 2022-07-05 RX ADMIN — AMIODARONE HYDROCHLORIDE 400 MG: 200 TABLET ORAL at 08:48

## 2022-07-05 RX ADMIN — Medication 10 ML: at 20:26

## 2022-07-05 RX ADMIN — AMIODARONE HYDROCHLORIDE 400 MG: 200 TABLET ORAL at 20:26

## 2022-07-05 RX ADMIN — HYDROCODONE BITARTRATE AND ACETAMINOPHEN 1 TABLET: 10; 325 TABLET ORAL at 21:35

## 2022-07-05 RX ADMIN — APIXABAN 5 MG: 5 TABLET, FILM COATED ORAL at 08:48

## 2022-07-05 RX ADMIN — HYDROCODONE BITARTRATE AND ACETAMINOPHEN 1 TABLET: 10; 325 TABLET ORAL at 08:51

## 2022-07-05 RX ADMIN — SACUBITRIL AND VALSARTAN 1 TABLET: 24; 26 TABLET, FILM COATED ORAL at 20:26

## 2022-07-05 RX ADMIN — RANOLAZINE 500 MG: 500 TABLET, FILM COATED, EXTENDED RELEASE ORAL at 20:26

## 2022-07-05 RX ADMIN — FUROSEMIDE 40 MG: 10 INJECTION, SOLUTION INTRAVENOUS at 20:26

## 2022-07-05 RX ADMIN — NICOTINE TRANSDERMAL SYSTEM 1 PATCH: 21 PATCH, EXTENDED RELEASE TRANSDERMAL at 08:48

## 2022-07-05 RX ADMIN — GABAPENTIN 600 MG: 300 CAPSULE ORAL at 20:26

## 2022-07-05 RX ADMIN — APIXABAN 5 MG: 5 TABLET, FILM COATED ORAL at 20:26

## 2022-07-05 NOTE — PLAN OF CARE
Goal Outcome Evaluation:               Pt is resting in bed at this time. Pt made nurses aware that his urine was blood tinged again after ambulating in his room. Pain managed with PRN medication per MAR orders. VSS will continue to monitor.

## 2022-07-05 NOTE — CASE MANAGEMENT/SOCIAL WORK
Discharge Planning Assessment   [facility]     Patient Name: Chong White  MRN: [id number]  Today's Date: 7/5/2022    Admit Date: 6/23/2022     Discharge Plan     Row Name 07/05/22 1549       Plan    Plan Pt was transferred to [address] [place] [address]. SS contacted [employer] per Nancy Casper and lifevest has been delivered. Pt lives with his son and he plans to return home at discharge. Pt did not utilize home health services prior to admission. Pt has a cane and oxygen via [facility]. Pt request a new cane. Pt inquired about assistance with housing. SS has provided pt with housing list. SS sent a referral to [facility] with pt's permission. SS provided pt with contact information for [facility] SS to follow.              Continued Care and Services - Admitted Since 6/23/2022     Durable Medical Equipment     Service Provider Request Status Selected Services Address Phone Fax Patient Preferred    [facility]  Pending - No Request Sent N/A [id number] [place] [address] [phone] --              Expected Discharge Date and Time     Expected Discharge Date Expected Discharge Time    Jul 6, 2022       ELLE Fyae

## 2022-07-05 NOTE — PLAN OF CARE
Goal Outcome Evaluation:   Pt has been out of bed to chair and ambulated around his room. Pt went off the floor to smoke. Pt was educated on risk factors of smoking, and the hospital is a non-smoking facility. Pt has a nicotine patch in place. As of now, PRN pain medications were administered twice. Medications were effective per pt report. No other issues noted at this time.

## 2022-07-05 NOTE — PROGRESS NOTES
LOS: 12 days     Name: Chong White  Age/Sex: 62 y.o. male  :  1960        PCP: Amy Yanez APRN    Active Problems:    Atrial fibrillation with RVR ([facility])      Admission Information: Chong White is a 62 y.o. male with a past medical history significant for ASCVD, hypertension, paroxysmal atrial fibrillation, non-ischemic cardiomyopathy, combined systolic and diastolic congestive heart failure, and tobacco use.  Patient knowledgeably presented to his cardiologist office on 2022 and was noted to be in atrial fibrillation with RVR as well as acute heart failure.  He was recommended to go to the ER.  In the ED he was given metoprolol, placed on Cardizem drip and also given digoxin.  Chest x-ray showed congestive heart failure.  Patient was admitted for further evaluation and management cardiology was consulted for A. fib RVR and congestive heart failure.    Chief Complaint: A. fib RVR, congestive heart failure    Interval history: Patient was seen and examined.  He denies any chest pain, palpitations or shortness of breath.  He has been outside today and was found to be smoking.  CANDACE Morfin explained to him the risks of smoking and that this is a smoke free campus.  He complains of blood in his urinary catheter.      Subjective         Vital Signs  Vital Signs (last 72 hrs)        0700  07/ 0659  0700   0659 07 0700  07 0659 07 0700  07/ 1707   Most Recent      Temp (°F) 97.3 -  98.9    97.5 -  98.7    97.6 -  98.5    98.4 -  98.9     98.4 (36.9) 07/05 1500    Heart Rate 62 -  86    62 -  77    62 -  78    69 -  74     69 07/05 1500    Resp 16 -  18    16 -  20    16 -  20    16 -  18     16 07/05 1500    BP 99/64 -  108/66    92/58 -  112/66    102/63 -  120/60    100/54 -  120/80     100/54 07/05 1500    SpO2 (%) 93 -  100    94 -  100    95 -  98    96 -  98     97 07/05 1500        Temp:  [98 °F (36.7 °C)-98.9 °F (37.2 °C)] 98.4 °F (36.9 °C)  Heart Rate:  [62-78]  69  Resp:  [16-18] 16  BP: (100-120)/(53-80) 100/54  Body mass index is 30.71 kg/m².      Intake/Output Summary (Last 24 hours) at 7/5/2022 1707  Last data filed at 7/5/2022 1240  Gross per 24 hour   Intake 1680 ml   Output 5675 ml   Net -3995 ml       Vitals reviewed.   Constitutional:       Appearance: Well-developed.   Pulmonary:      Effort: Pulmonary effort is normal. No respiratory distress.      Breath sounds: Normal breath sounds. No wheezing. No rales.   Cardiovascular:      Normal rate. Regular rhythm.      Comments: Bilateral lower extremity edema   Edema:     Thigh: bilateral 3+ edema of the thigh.     Pretibial: bilateral 3+ edema of the pretibial area.     Ankle: bilateral 3+ edema of the ankle.     Feet: bilateral 3+ edema of the feet.  Skin:     General: Skin is warm and dry.   Neurological:      Mental Status: Alert and oriented to person, place, and time.         Telemetry: Sinus 60s        Results Review:     Results from last 7 days   Lab Units 07/05/22  0026 07/04/22  0051 07/03/22  0103 06/30/22  0113   WBC 10*3/mm3 7.68 7.79 6.36 6.05   HEMOGLOBIN g/dL 12.8* 12.4* 12.1* 12.4*   PLATELETS 10*3/mm3 207 190 154 140     Results from last 7 days   Lab Units 07/05/22  0026 07/04/22  0051 07/03/22  0103 07/02/22  0437 07/01/22  0749 06/30/22  0113 06/29/22  0029   SODIUM mmol/L 130* 136 136 137 136 135* 136   POTASSIUM mmol/L 4.6 4.6 4.8 4.4 4.7 4.6 4.6   CHLORIDE mmol/L 95* 96* 97* 97* 101 96* 100   CO2 mmol/L 28.0 32.2* 29.3* 29.3* 27.0 30.9* 28.9   BUN mg/dL 23 23 23 24* 27* 29* 30*   CREATININE mg/dL 1.19 1.29* 1.22 1.10 1.23 1.08 1.05   CALCIUM mg/dL 8.8 8.7 8.7 8.8 8.8 8.8 8.6   GLUCOSE mg/dL 89 101* 94 81 93 99 101*                 I reviewed the patient's new clinical results.  I reviewed the patient's new imaging results and agree with the interpretation.  I personally viewed and interpreted the patient's EKG/Telemetry data      Medication Review:   amiodarone, 400 mg, Oral, Q12H  apixaban,  5 mg, Oral, Q12H  aspirin, 81 mg, Oral, Daily  carvedilol, 6.25 mg, Oral, BID With Meals  cetirizine, 10 mg, Oral, Daily  furosemide, 40 mg, Intravenous, Q12H  gabapentin, 600 mg, Oral, Nightly  guaiFENesin, 1,200 mg, Oral, Q12H  ipratropium, 0.5 mg, Nebulization, 4x Daily - RT  lactated ringers, 125 mL/hr, Intravenous, Once  nicotine, 1 patch, Transdermal, Q24H  ranolazine, 500 mg, Oral, Q12H  sacubitril-valsartan, 1 tablet, Oral, Q12H  sodium chloride, 10 mL, Intravenous, Q12H  sodium chloride, 10 mL, Intravenous, Q12H  sodium chloride, 10 mL, Intravenous, Q12H  sodium chloride, 10 mL, Intravenous, Q12H  sodium chloride, 10 mL, Intravenous, Q12H  sodium chloride, 10 mL, Intravenous, Q12H  tamsulosin, 0.4 mg, Oral, Nightly      Pharmacy Consult,         Assessment:  1. A. fib RVR, status post LUCERO/DCCV on 6/28/2022, maintaining sinus rhythm.  On amiodarone and Eliquis.  2. Acute on chronic HFrEF, LVEF 20 to 25% since at least 2020.  LifeVest placed.  3. CAD: [facility] 9/2021 with LAD  with collaterals from the RCA.  Stress test this admission 6/9/2022 with normal fusion and EF of 23%.  Clinically stable from CAD standpoint  4. COPD with long-term tobacco use      Recommendations:  1. Continue amiodarone 400 mg twice daily for total of 14 days, then  down titrate as tolerated.  2. Continue Eliquis for stroke prophylaxis  3. Cont ASA, statin, and BB for CAD.  4. Pt appears to be diuresing well.  Will limit his oral intake as he is still quite edematous.  Continue Entresto, coreg.  ACE wraps to lower extremities.                 I discussed the patients findings and my recommendations with patient and family    Electronically signed by SAMARA Harris, 07/05/22, 10:18 PM EDT.

## 2022-07-05 NOTE — PROGRESS NOTES
[facility] HOSPITALIST PROGRESS NOTE     Patient Identification:  Name:  Chong White  Age:  62 y.o.  Sex:  male  :  1960  MRN:  [id number]  Visit Number:  [id number]  Primary Care Provider:  Amy Yanez APRN    Length of stay:  12    Chief complaint: Scrotal and bilateral lower extremity edema    Subjective:    Patient asleep and resting in bed when I entered the room.  Per nursing staff, patient still complaining of scrotal edema and bilateral lower extremity edema.  However, degree of edema has improved compared to yesterday.  Patient with remarkable diuresis over the past 24 hours, with net -4905 cc output.  ----------------------------------------------------------------------------------------------------------------------  Current Hospital Meds:  amiodarone, 400 mg, Oral, Q12H  apixaban, 5 mg, Oral, Q12H  aspirin, 81 mg, Oral, Daily  carvedilol, 6.25 mg, Oral, BID With Meals  cetirizine, 10 mg, Oral, Daily  furosemide, 40 mg, Intravenous, Q12H  gabapentin, 600 mg, Oral, Nightly  guaiFENesin, 1,200 mg, Oral, Q12H  ipratropium, 0.5 mg, Nebulization, 4x Daily - RT  lactated ringers, 125 mL/hr, Intravenous, Once  nicotine, 1 patch, Transdermal, Q24H  ranolazine, 500 mg, Oral, Q12H  sacubitril-valsartan, 1 tablet, Oral, Q12H  sodium chloride, 10 mL, Intravenous, Q12H  sodium chloride, 10 mL, Intravenous, Q12H  sodium chloride, 10 mL, Intravenous, Q12H  sodium chloride, 10 mL, Intravenous, Q12H  sodium chloride, 10 mL, Intravenous, Q12H  sodium chloride, 10 mL, Intravenous, Q12H  tamsulosin, 0.4 mg, Oral, Nightly      Pharmacy Consult,       ----------------------------------------------------------------------------------------------------------------------  Vital Signs:  Temp:  [98 °F (36.7 °C)-98.9 °F (37.2 °C)] 98.9 °F (37.2 °C)  Heart Rate:  [62-78] 70  Resp:  [16-20] 16  BP: (105-120)/(53-80) 120/80      22  0500 22  0500 22  1620   Weight: 92.7 kg (204 lb 5.9  oz) 92 kg (202 lb 13.2 oz) 91.6 kg (202 lb)     Body mass index is 30.71 kg/m².    Intake/Output Summary (Last 24 hours) at 7/5/2022 1245  Last data filed at 7/5/2022 1240  Gross per 24 hour   Intake 2660 ml   Output 6375 ml   Net -3715 ml     Diet Regular; Cardiac  ----------------------------------------------------------------------------------------------------------------------  Physical exam:  Constitutional: Well-nourished  male in no apparent distress.     HENT:  Head:  Normocephalic and atraumatic.  Mouth:  Moist mucous membranes.    Eyes:  Conjunctivae and EOM are normal.  Pupils are equal, round, and reactive to light.  No scleral icterus.    Neck:  Neck supple. No thyromegaly.  No JVD present.    Cardiovascular:  Irregular rhythm with rate in the 90's with no murmurs, rubs, clicks or gallops appreciated.  Pulmonary/Chest:  Clear to auscultation bilaterally with no crackles, wheezes or rhonchi appreciated.  Abdominal:  Soft. Nontender. Nondistended  Bowel sounds are normal in all four quadrants. No organomegally appreciated.   Musculoskeletal:  3+ bilateral lower extremity edema, no tenderness, and no deformity.  No red or swollen joints anywhere.    Patient with significant scrotal edema  Neurological:  Alert, Cranial nerves II-XII intact with no focal deficits.  No facial droop.  No slurred speech.   Skin:  Warm and dry to palpation with no rashes or lesions appreciated.  Peripheral vascular:  2+ radial and pedal pulses in bilateral upper and lower extremities.  Psychiatric:  Alert and oriented x3  -------------------------------------------------------------------------------  ----------------------------------------------------------------------------------------------------------------------      Results from last 7 days   Lab Units 07/05/22  0026 07/04/22  0051 07/03/22  0103   WBC 10*3/mm3 7.68 7.79 6.36   HEMOGLOBIN g/dL 12.8* 12.4* 12.1*   HEMATOCRIT % 39.1 38.4 38.1   MCV fL 94.7 95.8  96.9   MCHC g/dL 32.7 32.3 31.8   PLATELETS 10*3/mm3 207 190 154         Results from last 7 days   Lab Units 07/05/22  0026 07/04/22  0051 07/03/22  0103 06/30/22  0113 06/29/22  0029   SODIUM mmol/L 130* 136 136   < > 136   POTASSIUM mmol/L 4.6 4.6 4.8   < > 4.6   CHLORIDE mmol/L 95* 96* 97*   < > 100   CO2 mmol/L 28.0 32.2* 29.3*   < > 28.9   BUN mg/dL 23 23 23   < > 30*   CREATININE mg/dL 1.19 1.29* 1.22   < > 1.05   CALCIUM mg/dL 8.8 8.7 8.7   < > 8.6   GLUCOSE mg/dL 89 101* 94   < > 101*   ALBUMIN g/dL  --   --   --   --  3.51   BILIRUBIN mg/dL  --   --   --   --  0.5   ALK PHOS U/L  --   --   --   --  84   AST (SGOT) U/L  --   --   --   --  19   ALT (SGPT) U/L  --   --   --   --  20    < > = values in this interval not displayed.   Estimated Creatinine Clearance: 70.7 mL/min (by C-G formula based on SCr of 1.19 mg/dL).    No results found for: AMMONIA      No results found for: BLOODCX  No results found for: URINECX  No results found for: WOUNDCX  No results found for: STOOLCX    I have personally looked at the labs and they are summarized above.  ----------------------------------------------------------------------------------------------------------------------  Imaging Results (Last 24 Hours)     ** No results found for the last 24 hours. **        ----------------------------------------------------------------------------------------------------------------------  Assessment and Plan:    1.  A. fib with RVR - Patient continues to remain in normal sinus rhythm.  Cardiology now recommending continuing amiodarone 400 mg p.o. twice daily for a total of 2 weeks.  Patient will require an additional 7 days of 400 mg p.o. twice daily then will decrease to 200 mg p.o. daily. Continue carvedilol and Entresto.  Blood pressure still low normal, will not add Aldactone at this time.  Appreciate cardiology recommendations.    2.  Acute on chronic systolic CHF -serum creatinine relatively stable today, will increase  Lasix to 40 mg IV twice daily.  Patient with remarkable diuresis over the past 24 hours with a net -4905 cc of urine output.      3.  Acute kidney injury -resolved    4.  COPD - no wheezing on exam today.  We will continue supportive care for now.    Disposition will likely require several more days hospitalization    Napoleon Del Angel DO   07/05/22   12:45 EDT

## 2022-07-06 ENCOUNTER — APPOINTMENT (OUTPATIENT)
Dept: ULTRASOUND IMAGING | Facility: HOSPITAL | Age: 62
End: 2022-07-06

## 2022-07-06 LAB
ANION GAP SERPL CALCULATED.3IONS-SCNC: 10.4 MMOL/L (ref 5–15)
BUN SERPL-MCNC: 17 MG/DL (ref 8–23)
BUN/CREAT SERPL: 14.3 (ref 7–25)
CALCIUM SPEC-SCNC: 8.6 MG/DL (ref 8.6–10.5)
CHLORIDE SERPL-SCNC: 96 MMOL/L (ref 98–107)
CO2 SERPL-SCNC: 26.6 MMOL/L (ref 22–29)
CREAT SERPL-MCNC: 1.19 MG/DL (ref 0.76–1.27)
DEPRECATED RDW RBC AUTO: 49.1 FL (ref 37–54)
EGFRCR SERPLBLD CKD-EPI 2021: 69.1 ML/MIN/1.73
ERYTHROCYTE [DISTWIDTH] IN BLOOD BY AUTOMATED COUNT: 14 % (ref 12.3–15.4)
GLUCOSE SERPL-MCNC: 78 MG/DL (ref 65–99)
HCT VFR BLD AUTO: 39.3 % (ref 37.5–51)
HGB BLD-MCNC: 12.7 G/DL (ref 13–17.7)
MCH RBC QN AUTO: 30.8 PG (ref 26.6–33)
MCHC RBC AUTO-ENTMCNC: 32.3 G/DL (ref 31.5–35.7)
MCV RBC AUTO: 95.4 FL (ref 79–97)
PLATELET # BLD AUTO: 222 10*3/MM3 (ref 140–450)
PMV BLD AUTO: 9.9 FL (ref 6–12)
POTASSIUM SERPL-SCNC: 4.3 MMOL/L (ref 3.5–5.2)
RBC # BLD AUTO: 4.12 10*6/MM3 (ref 4.14–5.8)
SODIUM SERPL-SCNC: 133 MMOL/L (ref 136–145)
WBC NRBC COR # BLD: 7.1 10*3/MM3 (ref 3.4–10.8)

## 2022-07-06 PROCEDURE — 94799 UNLISTED PULMONARY SVC/PX: CPT

## 2022-07-06 PROCEDURE — 80048 BASIC METABOLIC PNL TOTAL CA: CPT | Performed by: INTERNAL MEDICINE

## 2022-07-06 PROCEDURE — 25010000002 FUROSEMIDE PER 20 MG: Performed by: INTERNAL MEDICINE

## 2022-07-06 PROCEDURE — 85027 COMPLETE CBC AUTOMATED: CPT | Performed by: INTERNAL MEDICINE

## 2022-07-06 PROCEDURE — 94761 N-INVAS EAR/PLS OXIMETRY MLT: CPT

## 2022-07-06 PROCEDURE — 99232 SBSQ HOSP IP/OBS MODERATE 35: CPT | Performed by: INTERNAL MEDICINE

## 2022-07-06 RX ADMIN — SACUBITRIL AND VALSARTAN 1 TABLET: 24; 26 TABLET, FILM COATED ORAL at 08:05

## 2022-07-06 RX ADMIN — Medication 10 ML: at 08:06

## 2022-07-06 RX ADMIN — IPRATROPIUM BROMIDE 0.5 MG: 0.5 SOLUTION RESPIRATORY (INHALATION) at 12:50

## 2022-07-06 RX ADMIN — GABAPENTIN 600 MG: 300 CAPSULE ORAL at 21:22

## 2022-07-06 RX ADMIN — AMIODARONE HYDROCHLORIDE 400 MG: 200 TABLET ORAL at 08:05

## 2022-07-06 RX ADMIN — GUAIFENESIN 1200 MG: 600 TABLET, EXTENDED RELEASE ORAL at 21:22

## 2022-07-06 RX ADMIN — CETIRIZINE HYDROCHLORIDE 10 MG: 10 TABLET, FILM COATED ORAL at 08:05

## 2022-07-06 RX ADMIN — IPRATROPIUM BROMIDE 0.5 MG: 0.5 SOLUTION RESPIRATORY (INHALATION) at 18:48

## 2022-07-06 RX ADMIN — FUROSEMIDE 40 MG: 10 INJECTION, SOLUTION INTRAVENOUS at 21:22

## 2022-07-06 RX ADMIN — HYDROCODONE BITARTRATE AND ACETAMINOPHEN 1 TABLET: 10; 325 TABLET ORAL at 03:42

## 2022-07-06 RX ADMIN — RANOLAZINE 500 MG: 500 TABLET, FILM COATED, EXTENDED RELEASE ORAL at 21:22

## 2022-07-06 RX ADMIN — APIXABAN 5 MG: 5 TABLET, FILM COATED ORAL at 21:22

## 2022-07-06 RX ADMIN — NICOTINE TRANSDERMAL SYSTEM 1 PATCH: 21 PATCH, EXTENDED RELEASE TRANSDERMAL at 08:04

## 2022-07-06 RX ADMIN — CARVEDILOL 6.25 MG: 6.25 TABLET, FILM COATED ORAL at 17:19

## 2022-07-06 RX ADMIN — AMIODARONE HYDROCHLORIDE 400 MG: 200 TABLET ORAL at 21:22

## 2022-07-06 RX ADMIN — HYDROCODONE BITARTRATE AND ACETAMINOPHEN 1 TABLET: 10; 325 TABLET ORAL at 08:14

## 2022-07-06 RX ADMIN — SACUBITRIL AND VALSARTAN 1 TABLET: 24; 26 TABLET, FILM COATED ORAL at 21:22

## 2022-07-06 RX ADMIN — FUROSEMIDE 40 MG: 10 INJECTION, SOLUTION INTRAVENOUS at 08:06

## 2022-07-06 RX ADMIN — Medication 10 ML: at 21:22

## 2022-07-06 RX ADMIN — APIXABAN 5 MG: 5 TABLET, FILM COATED ORAL at 08:05

## 2022-07-06 RX ADMIN — HYDROCODONE BITARTRATE AND ACETAMINOPHEN 1 TABLET: 10; 325 TABLET ORAL at 15:57

## 2022-07-06 RX ADMIN — ASPIRIN 81 MG: 81 TABLET, COATED ORAL at 08:05

## 2022-07-06 RX ADMIN — TAMSULOSIN HYDROCHLORIDE 0.4 MG: 0.4 CAPSULE ORAL at 21:22

## 2022-07-06 RX ADMIN — IPRATROPIUM BROMIDE 0.5 MG: 0.5 SOLUTION RESPIRATORY (INHALATION) at 07:02

## 2022-07-06 RX ADMIN — RANOLAZINE 500 MG: 500 TABLET, FILM COATED, EXTENDED RELEASE ORAL at 08:05

## 2022-07-06 RX ADMIN — IPRATROPIUM BROMIDE 0.5 MG: 0.5 SOLUTION RESPIRATORY (INHALATION) at 00:36

## 2022-07-06 RX ADMIN — HYDROCODONE BITARTRATE AND ACETAMINOPHEN 1 TABLET: 10; 325 TABLET ORAL at 21:22

## 2022-07-06 RX ADMIN — GUAIFENESIN 1200 MG: 600 TABLET, EXTENDED RELEASE ORAL at 08:05

## 2022-07-06 RX ADMIN — CARVEDILOL 6.25 MG: 6.25 TABLET, FILM COATED ORAL at 08:05

## 2022-07-06 NOTE — PLAN OF CARE
Goal Outcome Evaluation:               Pt is resting in bed. Urine is still blood tinged. No new c/o, VSS, will continue to monitor.

## 2022-07-06 NOTE — PROGRESS NOTES
[facility] HOSPITALIST PROGRESS NOTE    Subjective     History:   Chong White is a 62 y.o. male admitted on 6/23/2022 secondary to <principal problem not specified>     Procedures:  6/26/22: Right subclavian central line placement   6/28/22: LUCERO with electrical cardioversion   6/29/22: Midline catheter placement right basilic vein  6/30/22: Nuclear stress test  · A pharmacological stress test was performed using regadenoson without low-level exercise.  · Findings consistent with an indeterminate ECG stress test.  · Myocardial perfusion imaging indicates a normal myocardial perfusion study with no evidence of ischemia.  · ). Enlarged LV cavity size. Abnormal LV wall motion consistent with severe global hypokinesis.  · (Calculated EF = 23%).  · Impressions are consistent with a low risk study.      CC: Follow up CHF, Afib     Patient seen and examined with CANDACE Cruz. Awake and alert. States he feels overall improved but remains edematous. Reports LE edema and abdominal bloating. Dyspnea improved. No reported CP. Reports leg pain and requests additional pain medication. Diuresing well. No acute events overnight per RN.     History taken from: patient, chart, and RN.      Objective     Vital Signs  Temp:  [97.8 °F (36.6 °C)-99.6 °F (37.6 °C)] 99.6 °F (37.6 °C)  Heart Rate:  [61-72] 72  Resp:  [18-20] 18  BP: ()/(53-72) 116/72    Intake/Output Summary (Last 24 hours) at 7/6/2022 1629  Last data filed at 7/6/2022 1451  Gross per 24 hour   Intake 2150 ml   Output 6450 ml   Net -4300 ml         Physical Exam:  General:    Awake, alert, in no acute distress   Heart:      Normal S1 and S2. Regular rate and rhythm. No significant murmur, rubs or gallops appreciated.   Lungs:     Respirations regular, even and unlabored. Lungs clear to auscultation B/L. No wheezes, rales or rhonchi.   Abdomen:   Soft and nontender. Mildly distended.No guarding, rebound tenderness or organomegaly noted. Bowel sounds  present x 4.   Extremities:  3+ bilateral lower extremity edema with scrotal edema. Moves UE and LE equally B/L.     Results Review:    Results from last 7 days   Lab Units 07/06/22  0146 07/05/22  0026 07/04/22  0051 07/03/22  0103 06/30/22  0113   WBC 10*3/mm3 7.10 7.68 7.79 6.36 6.05   HEMOGLOBIN g/dL 12.7* 12.8* 12.4* 12.1* 12.4*   PLATELETS 10*3/mm3 222 207 190 154 140     Results from last 7 days   Lab Units 07/06/22  0146 07/05/22  0026 07/04/22  0051 07/03/22  0103 07/02/22  0437 07/01/22  0749 06/30/22  0113   SODIUM mmol/L 133* 130* 136 136 137 136 135*   POTASSIUM mmol/L 4.3 4.6 4.6 4.8 4.4 4.7 4.6   CHLORIDE mmol/L 96* 95* 96* 97* 97* 101 96*   CO2 mmol/L 26.6 28.0 32.2* 29.3* 29.3* 27.0 30.9*   BUN mg/dL 17 23 23 23 24* 27* 29*   CREATININE mg/dL 1.19 1.19 1.29* 1.22 1.10 1.23 1.08   CALCIUM mg/dL 8.6 8.8 8.7 8.7 8.8 8.8 8.8   GLUCOSE mg/dL 78 89 101* 94 81 93 99                       Imaging Results (Last 24 Hours)     ** No results found for the last 24 hours. **            Medications:  amiodarone, 400 mg, Oral, Q12H  apixaban, 5 mg, Oral, Q12H  aspirin, 81 mg, Oral, Daily  carvedilol, 6.25 mg, Oral, BID With Meals  cetirizine, 10 mg, Oral, Daily  furosemide, 40 mg, Intravenous, Q12H  gabapentin, 600 mg, Oral, Nightly  guaiFENesin, 1,200 mg, Oral, Q12H  ipratropium, 0.5 mg, Nebulization, 4x Daily - RT  lactated ringers, 125 mL/hr, Intravenous, Once  nicotine, 1 patch, Transdermal, Q24H  ranolazine, 500 mg, Oral, Q12H  sacubitril-valsartan, 1 tablet, Oral, Q12H  sodium chloride, 10 mL, Intravenous, Q12H  sodium chloride, 10 mL, Intravenous, Q12H  sodium chloride, 10 mL, Intravenous, Q12H  sodium chloride, 10 mL, Intravenous, Q12H  sodium chloride, 10 mL, Intravenous, Q12H  sodium chloride, 10 mL, Intravenous, Q12H  tamsulosin, 0.4 mg, Oral, Nightly      Pharmacy Consult,             Assessment & Plan   Afib with RVR: Previously given metoprolol, digoxin and required Cardizem gtt. S/P cardioversion.  Currently maintaining NSR. Cont amiodarone as outlined. Cont Eliquis for stroke prevention. Monitor on telemetry. Cardiology input appreciated.     Acute on chronic systolic CHF: Echo reveals an EF of 21-25, mild LVH and diastolic dysfunction. Previously on Dobutamine but this was stopped 2/2 arrhythmias. Diuresing well. Cont scheduled IV Lasix. Cont Coreg and Entresto. LifeVest previously arranged. Monitor volume status.     CAD: Recent echo as above. S/P LHC in 9/21 revealing chronic occlusion to LAD with collaterals from the RCA. S/P nuclear stress test this admission with no evidence of ischemia. Cont current medical management.     COPD: Stable without an acute exacerbation.     Tobacco abuse: Cont NRT and encourage cessation.     DVT PPX: Eliquis      Disposition: Likely home when medically stable.       Oneal Reed DO  07/06/22  16:29 EDT

## 2022-07-06 NOTE — PROGRESS NOTES
Update to previous assistance note regarding [employer]:  [employer] prior authorization approved for $0 copay.    Thank you,  Miranda George, DorothyD

## 2022-07-06 NOTE — PLAN OF CARE
Goal Outcome Evaluation:           Progress: no change  Outcome Evaluation: Patient has done well today.  PRN medication given per order.  Will continue to monitor.

## 2022-07-07 ENCOUNTER — APPOINTMENT (OUTPATIENT)
Dept: ULTRASOUND IMAGING | Facility: HOSPITAL | Age: 62
End: 2022-07-07

## 2022-07-07 LAB
ALBUMIN SERPL-MCNC: 3.52 G/DL (ref 3.5–5.2)
ALBUMIN/GLOB SERPL: 1.5 G/DL
ALP SERPL-CCNC: 75 U/L (ref 39–117)
ALT SERPL W P-5'-P-CCNC: 22 U/L (ref 1–41)
ANION GAP SERPL CALCULATED.3IONS-SCNC: 9.7 MMOL/L (ref 5–15)
AST SERPL-CCNC: 20 U/L (ref 1–40)
BASOPHILS # BLD AUTO: 0.05 10*3/MM3 (ref 0–0.2)
BASOPHILS NFR BLD AUTO: 0.7 % (ref 0–1.5)
BILIRUB SERPL-MCNC: 0.5 MG/DL (ref 0–1.2)
BUN SERPL-MCNC: 20 MG/DL (ref 8–23)
BUN/CREAT SERPL: 16.1 (ref 7–25)
CALCIUM SPEC-SCNC: 8.6 MG/DL (ref 8.6–10.5)
CHLORIDE SERPL-SCNC: 99 MMOL/L (ref 98–107)
CO2 SERPL-SCNC: 28.3 MMOL/L (ref 22–29)
CREAT SERPL-MCNC: 1.24 MG/DL (ref 0.76–1.27)
DEPRECATED RDW RBC AUTO: 48.2 FL (ref 37–54)
EGFRCR SERPLBLD CKD-EPI 2021: 65.7 ML/MIN/1.73
EOSINOPHIL # BLD AUTO: 0.16 10*3/MM3 (ref 0–0.4)
EOSINOPHIL NFR BLD AUTO: 2.3 % (ref 0.3–6.2)
ERYTHROCYTE [DISTWIDTH] IN BLOOD BY AUTOMATED COUNT: 13.9 % (ref 12.3–15.4)
GLOBULIN UR ELPH-MCNC: 2.4 GM/DL
GLUCOSE SERPL-MCNC: 95 MG/DL (ref 65–99)
HCT VFR BLD AUTO: 37.3 % (ref 37.5–51)
HGB BLD-MCNC: 12.2 G/DL (ref 13–17.7)
IMM GRANULOCYTES # BLD AUTO: 0.03 10*3/MM3 (ref 0–0.05)
IMM GRANULOCYTES NFR BLD AUTO: 0.4 % (ref 0–0.5)
LYMPHOCYTES # BLD AUTO: 1.25 10*3/MM3 (ref 0.7–3.1)
LYMPHOCYTES NFR BLD AUTO: 17.9 % (ref 19.6–45.3)
MCH RBC QN AUTO: 30.9 PG (ref 26.6–33)
MCHC RBC AUTO-ENTMCNC: 32.7 G/DL (ref 31.5–35.7)
MCV RBC AUTO: 94.4 FL (ref 79–97)
MONOCYTES # BLD AUTO: 0.72 10*3/MM3 (ref 0.1–0.9)
MONOCYTES NFR BLD AUTO: 10.3 % (ref 5–12)
NEUTROPHILS NFR BLD AUTO: 4.78 10*3/MM3 (ref 1.7–7)
NEUTROPHILS NFR BLD AUTO: 68.4 % (ref 42.7–76)
NRBC BLD AUTO-RTO: 0 /100 WBC (ref 0–0.2)
PLATELET # BLD AUTO: 231 10*3/MM3 (ref 140–450)
PMV BLD AUTO: 9.9 FL (ref 6–12)
POTASSIUM SERPL-SCNC: 4.7 MMOL/L (ref 3.5–5.2)
PROT SERPL-MCNC: 5.9 G/DL (ref 6–8.5)
RBC # BLD AUTO: 3.95 10*6/MM3 (ref 4.14–5.8)
SODIUM SERPL-SCNC: 137 MMOL/L (ref 136–145)
WBC NRBC COR # BLD: 6.99 10*3/MM3 (ref 3.4–10.8)

## 2022-07-07 PROCEDURE — 25010000002 FUROSEMIDE PER 20 MG: Performed by: INTERNAL MEDICINE

## 2022-07-07 PROCEDURE — 99233 SBSQ HOSP IP/OBS HIGH 50: CPT | Performed by: INTERNAL MEDICINE

## 2022-07-07 PROCEDURE — 93970 EXTREMITY STUDY: CPT

## 2022-07-07 PROCEDURE — 94761 N-INVAS EAR/PLS OXIMETRY MLT: CPT

## 2022-07-07 PROCEDURE — 93970 EXTREMITY STUDY: CPT | Performed by: RADIOLOGY

## 2022-07-07 PROCEDURE — 94799 UNLISTED PULMONARY SVC/PX: CPT

## 2022-07-07 PROCEDURE — 85025 COMPLETE CBC W/AUTO DIFF WBC: CPT | Performed by: INTERNAL MEDICINE

## 2022-07-07 PROCEDURE — 99232 SBSQ HOSP IP/OBS MODERATE 35: CPT | Performed by: INTERNAL MEDICINE

## 2022-07-07 PROCEDURE — 80053 COMPREHEN METABOLIC PANEL: CPT | Performed by: INTERNAL MEDICINE

## 2022-07-07 RX ADMIN — HYDROCODONE BITARTRATE AND ACETAMINOPHEN 1 TABLET: 10; 325 TABLET ORAL at 22:09

## 2022-07-07 RX ADMIN — AMIODARONE HYDROCHLORIDE 400 MG: 200 TABLET ORAL at 08:03

## 2022-07-07 RX ADMIN — GABAPENTIN 600 MG: 300 CAPSULE ORAL at 20:47

## 2022-07-07 RX ADMIN — RANOLAZINE 500 MG: 500 TABLET, FILM COATED, EXTENDED RELEASE ORAL at 08:03

## 2022-07-07 RX ADMIN — CARVEDILOL 6.25 MG: 6.25 TABLET, FILM COATED ORAL at 17:26

## 2022-07-07 RX ADMIN — ASPIRIN 81 MG: 81 TABLET, COATED ORAL at 08:04

## 2022-07-07 RX ADMIN — SACUBITRIL AND VALSARTAN 1 TABLET: 24; 26 TABLET, FILM COATED ORAL at 20:47

## 2022-07-07 RX ADMIN — NICOTINE TRANSDERMAL SYSTEM 1 PATCH: 21 PATCH, EXTENDED RELEASE TRANSDERMAL at 09:24

## 2022-07-07 RX ADMIN — AMIODARONE HYDROCHLORIDE 400 MG: 200 TABLET ORAL at 20:47

## 2022-07-07 RX ADMIN — HYDROCODONE BITARTRATE AND ACETAMINOPHEN 1 TABLET: 10; 325 TABLET ORAL at 18:17

## 2022-07-07 RX ADMIN — FUROSEMIDE 40 MG: 10 INJECTION, SOLUTION INTRAVENOUS at 20:46

## 2022-07-07 RX ADMIN — GUAIFENESIN 1200 MG: 600 TABLET, EXTENDED RELEASE ORAL at 08:03

## 2022-07-07 RX ADMIN — HYDROCODONE BITARTRATE AND ACETAMINOPHEN 1 TABLET: 10; 325 TABLET ORAL at 01:04

## 2022-07-07 RX ADMIN — GUAIFENESIN 1200 MG: 600 TABLET, EXTENDED RELEASE ORAL at 20:47

## 2022-07-07 RX ADMIN — CARVEDILOL 6.25 MG: 6.25 TABLET, FILM COATED ORAL at 08:04

## 2022-07-07 RX ADMIN — SACUBITRIL AND VALSARTAN 1 TABLET: 24; 26 TABLET, FILM COATED ORAL at 08:03

## 2022-07-07 RX ADMIN — CETIRIZINE HYDROCHLORIDE 10 MG: 10 TABLET, FILM COATED ORAL at 08:03

## 2022-07-07 RX ADMIN — FUROSEMIDE 40 MG: 10 INJECTION, SOLUTION INTRAVENOUS at 08:03

## 2022-07-07 RX ADMIN — HYDROCODONE BITARTRATE AND ACETAMINOPHEN 1 TABLET: 10; 325 TABLET ORAL at 13:07

## 2022-07-07 RX ADMIN — APIXABAN 5 MG: 5 TABLET, FILM COATED ORAL at 20:47

## 2022-07-07 RX ADMIN — HYDROCODONE BITARTRATE AND ACETAMINOPHEN 1 TABLET: 10; 325 TABLET ORAL at 08:15

## 2022-07-07 RX ADMIN — Medication 10 ML: at 09:23

## 2022-07-07 RX ADMIN — APIXABAN 5 MG: 5 TABLET, FILM COATED ORAL at 08:04

## 2022-07-07 RX ADMIN — IPRATROPIUM BROMIDE 0.5 MG: 0.5 SOLUTION RESPIRATORY (INHALATION) at 00:39

## 2022-07-07 RX ADMIN — TAMSULOSIN HYDROCHLORIDE 0.4 MG: 0.4 CAPSULE ORAL at 20:47

## 2022-07-07 RX ADMIN — Medication 10 ML: at 20:46

## 2022-07-07 RX ADMIN — RANOLAZINE 500 MG: 500 TABLET, FILM COATED, EXTENDED RELEASE ORAL at 20:47

## 2022-07-07 NOTE — PROGRESS NOTES
[facility] HOSPITALIST PROGRESS NOTE    Subjective     History:   Chong White is a 62 y.o. male admitted on 6/23/2022 secondary to <principal problem not specified>     Procedures:  6/26/22: Right subclavian central line placement   6/28/22: LUCERO with electrical cardioversion   6/29/22: Midline catheter placement right basilic vein  6/30/22: Nuclear stress test  · A pharmacological stress test was performed using regadenoson without low-level exercise.  · Findings consistent with an indeterminate ECG stress test.  · Myocardial perfusion imaging indicates a normal myocardial perfusion study with no evidence of ischemia.  · ). Enlarged LV cavity size. Abnormal LV wall motion consistent with severe global hypokinesis.  · (Calculated EF = 23%).  · Impressions are consistent with a low risk study.      CC: Follow up CHF, Afib     Patient seen and examined with CANDACE Burris. Awake and alert. States he feels overall improved but remains edematous. Continues to report LE pain. Dyspnea improved. No reported CP. Continues to diurese but slowed from previous. No acute events overnight per RN.     History taken from: patient, chart, and RN.      Objective     Vital Signs  Temp:  [97.1 °F (36.2 °C)-98.9 °F (37.2 °C)] 98.2 °F (36.8 °C)  Heart Rate:  [66-97] 69  Resp:  [18-20] 18  BP: ()/(51-66) 91/51    Intake/Output Summary (Last 24 hours) at 7/7/2022 1650  Last data filed at 7/7/2022 1255  Gross per 24 hour   Intake 1650 ml   Output 2600 ml   Net -950 ml         Physical Exam:  General:    Awake, alert, in no acute distress   Heart:      Normal S1 and S2. Regular rate and rhythm. No significant murmur, rubs or gallops appreciated.   Lungs:     Respirations regular, even and unlabored. Lungs clear to auscultation B/L. No wheezes, rales or rhonchi.   Abdomen:   Soft and nontender. Mildly distended.No guarding, rebound tenderness or organomegaly noted. Bowel sounds present x 4.   Extremities:  Lower  extremities wrapped with ACE wraps. Moves UE and LE equally B/L.     Results Review:    Results from last 7 days   Lab Units 07/07/22  0323 07/06/22  0146 07/05/22  0026 07/04/22  0051 07/03/22  0103   WBC 10*3/mm3 6.99 7.10 7.68 7.79 6.36   HEMOGLOBIN g/dL 12.2* 12.7* 12.8* 12.4* 12.1*   PLATELETS 10*3/mm3 231 222 207 190 154     Results from last 7 days   Lab Units 07/07/22  0323 07/06/22  0146 07/05/22  0026 07/04/22  0051 07/03/22  0103 07/02/22  0437 07/01/22  0749   SODIUM mmol/L 137 133* 130* 136 136 137 136   POTASSIUM mmol/L 4.7 4.3 4.6 4.6 4.8 4.4 4.7   CHLORIDE mmol/L 99 96* 95* 96* 97* 97* 101   CO2 mmol/L 28.3 26.6 28.0 32.2* 29.3* 29.3* 27.0   BUN mg/dL 20 17 23 23 23 24* 27*   CREATININE mg/dL 1.24 1.19 1.19 1.29* 1.22 1.10 1.23   CALCIUM mg/dL 8.6 8.6 8.8 8.7 8.7 8.8 8.8   GLUCOSE mg/dL 95 78 89 101* 94 81 93     Results from last 7 days   Lab Units 07/07/22  0323   BILIRUBIN mg/dL 0.5   ALK PHOS U/L 75   AST (SGOT) U/L 20   ALT (SGPT) U/L 22                   Imaging Results (Last 24 Hours)     ** No results found for the last 24 hours. **            Medications:  amiodarone, 400 mg, Oral, Q12H  apixaban, 5 mg, Oral, Q12H  aspirin, 81 mg, Oral, Daily  carvedilol, 6.25 mg, Oral, BID With Meals  cetirizine, 10 mg, Oral, Daily  furosemide, 40 mg, Intravenous, Q12H  gabapentin, 600 mg, Oral, Nightly  guaiFENesin, 1,200 mg, Oral, Q12H  ipratropium, 0.5 mg, Nebulization, 4x Daily - RT  lactated ringers, 125 mL/hr, Intravenous, Once  nicotine, 1 patch, Transdermal, Q24H  ranolazine, 500 mg, Oral, Q12H  sacubitril-valsartan, 1 tablet, Oral, Q12H  sodium chloride, 10 mL, Intravenous, Q12H  sodium chloride, 10 mL, Intravenous, Q12H  sodium chloride, 10 mL, Intravenous, Q12H  sodium chloride, 10 mL, Intravenous, Q12H  sodium chloride, 10 mL, Intravenous, Q12H  sodium chloride, 10 mL, Intravenous, Q12H  tamsulosin, 0.4 mg, Oral, Nightly      Pharmacy Consult,             Assessment & Plan   Afib with RVR:  Previously given metoprolol, digoxin and required Cardizem gtt. S/P cardioversion. Currently maintaining NSR. Cont amiodarone as outlined. Cont Eliquis for stroke prevention. Monitor on telemetry. Cardiology input appreciated.     Acute on chronic systolic CHF: Echo reveals an EF of 21-25, mild LVH and diastolic dysfunction. Previously on Dobutamine but this was stopped 2/2 arrhythmias. Diuresing well. Cont scheduled IV Lasix. Cont Coreg and Entresto. LifeVest previously arranged. Venous duplex US of bilateral lower extremities ordered. Monitor volume status.     CAD: Recent echo as above. S/P LHC in 9/21 revealing chronic occlusion to LAD with collaterals from the RCA. S/P nuclear stress test this admission with no evidence of ischemia. Cont current medical management.     COPD: Stable without an acute exacerbation.     Tobacco abuse: Cont NRT and encourage cessation.     DVT PPX: Eliquis      Disposition: Likely home when medically stable.       Oneal Reed DO  07/07/22  16:50 EDT

## 2022-07-07 NOTE — PLAN OF CARE
Goal Outcome Evaluation:           Progress: improving  Outcome Evaluation: Pt has rested in bed throughout the day. Pt ambulated to bathroom and performed personal hygiene independently. Ace wraps were changed on pt's BLE. Pt has c/o some pain, given PRN pain meds as ordered. No acute changes noted. Safety measures in place and call light within reach.

## 2022-07-07 NOTE — PLAN OF CARE
Goal Outcome Evaluation:  Plan of Care Reviewed With: patient        Progress: no change  Outcome Evaluation: Pt resting in bed during assessment. Pt C/O some pain, prn pain meds given per order. Vital signs stable, no acute changes at this time. Will continue to monitor.

## 2022-07-08 LAB
ANION GAP SERPL CALCULATED.3IONS-SCNC: 10.4 MMOL/L (ref 5–15)
BASOPHILS # BLD AUTO: 0.05 10*3/MM3 (ref 0–0.2)
BASOPHILS NFR BLD AUTO: 0.7 % (ref 0–1.5)
BUN SERPL-MCNC: 19 MG/DL (ref 8–23)
BUN/CREAT SERPL: 15.8 (ref 7–25)
CALCIUM SPEC-SCNC: 8.7 MG/DL (ref 8.6–10.5)
CHLORIDE SERPL-SCNC: 98 MMOL/L (ref 98–107)
CO2 SERPL-SCNC: 27.6 MMOL/L (ref 22–29)
CREAT SERPL-MCNC: 1.2 MG/DL (ref 0.76–1.27)
DEPRECATED RDW RBC AUTO: 48.6 FL (ref 37–54)
EGFRCR SERPLBLD CKD-EPI 2021: 68.4 ML/MIN/1.73
EOSINOPHIL # BLD AUTO: 0.17 10*3/MM3 (ref 0–0.4)
EOSINOPHIL NFR BLD AUTO: 2.3 % (ref 0.3–6.2)
ERYTHROCYTE [DISTWIDTH] IN BLOOD BY AUTOMATED COUNT: 14.1 % (ref 12.3–15.4)
GLUCOSE SERPL-MCNC: 118 MG/DL (ref 65–99)
HCT VFR BLD AUTO: 37 % (ref 37.5–51)
HGB BLD-MCNC: 12 G/DL (ref 13–17.7)
IMM GRANULOCYTES # BLD AUTO: 0.04 10*3/MM3 (ref 0–0.05)
IMM GRANULOCYTES NFR BLD AUTO: 0.5 % (ref 0–0.5)
LYMPHOCYTES # BLD AUTO: 1.19 10*3/MM3 (ref 0.7–3.1)
LYMPHOCYTES NFR BLD AUTO: 16 % (ref 19.6–45.3)
MCH RBC QN AUTO: 30.6 PG (ref 26.6–33)
MCHC RBC AUTO-ENTMCNC: 32.4 G/DL (ref 31.5–35.7)
MCV RBC AUTO: 94.4 FL (ref 79–97)
MONOCYTES # BLD AUTO: 0.74 10*3/MM3 (ref 0.1–0.9)
MONOCYTES NFR BLD AUTO: 9.9 % (ref 5–12)
NEUTROPHILS NFR BLD AUTO: 5.25 10*3/MM3 (ref 1.7–7)
NEUTROPHILS NFR BLD AUTO: 70.6 % (ref 42.7–76)
NRBC BLD AUTO-RTO: 0 /100 WBC (ref 0–0.2)
PLATELET # BLD AUTO: 234 10*3/MM3 (ref 140–450)
PMV BLD AUTO: 9.4 FL (ref 6–12)
POTASSIUM SERPL-SCNC: 4.5 MMOL/L (ref 3.5–5.2)
RBC # BLD AUTO: 3.92 10*6/MM3 (ref 4.14–5.8)
SODIUM SERPL-SCNC: 136 MMOL/L (ref 136–145)
WBC NRBC COR # BLD: 7.44 10*3/MM3 (ref 3.4–10.8)

## 2022-07-08 PROCEDURE — 25010000002 FUROSEMIDE PER 20 MG: Performed by: INTERNAL MEDICINE

## 2022-07-08 PROCEDURE — 99232 SBSQ HOSP IP/OBS MODERATE 35: CPT | Performed by: INTERNAL MEDICINE

## 2022-07-08 PROCEDURE — 94799 UNLISTED PULMONARY SVC/PX: CPT

## 2022-07-08 PROCEDURE — 85025 COMPLETE CBC W/AUTO DIFF WBC: CPT | Performed by: INTERNAL MEDICINE

## 2022-07-08 PROCEDURE — 94761 N-INVAS EAR/PLS OXIMETRY MLT: CPT

## 2022-07-08 PROCEDURE — 80048 BASIC METABOLIC PNL TOTAL CA: CPT | Performed by: INTERNAL MEDICINE

## 2022-07-08 RX ADMIN — FUROSEMIDE 40 MG: 10 INJECTION, SOLUTION INTRAVENOUS at 20:56

## 2022-07-08 RX ADMIN — GABAPENTIN 600 MG: 300 CAPSULE ORAL at 20:28

## 2022-07-08 RX ADMIN — TAMSULOSIN HYDROCHLORIDE 0.4 MG: 0.4 CAPSULE ORAL at 20:56

## 2022-07-08 RX ADMIN — IPRATROPIUM BROMIDE 0.5 MG: 0.5 SOLUTION RESPIRATORY (INHALATION) at 18:39

## 2022-07-08 RX ADMIN — HYDROCODONE BITARTRATE AND ACETAMINOPHEN 1 TABLET: 10; 325 TABLET ORAL at 03:52

## 2022-07-08 RX ADMIN — APIXABAN 5 MG: 5 TABLET, FILM COATED ORAL at 20:56

## 2022-07-08 RX ADMIN — GUAIFENESIN 1200 MG: 600 TABLET, EXTENDED RELEASE ORAL at 08:09

## 2022-07-08 RX ADMIN — ASPIRIN 81 MG: 81 TABLET, COATED ORAL at 08:09

## 2022-07-08 RX ADMIN — AMIODARONE HYDROCHLORIDE 400 MG: 200 TABLET ORAL at 08:08

## 2022-07-08 RX ADMIN — FUROSEMIDE 40 MG: 10 INJECTION, SOLUTION INTRAVENOUS at 08:09

## 2022-07-08 RX ADMIN — GUAIFENESIN 1200 MG: 600 TABLET, EXTENDED RELEASE ORAL at 20:56

## 2022-07-08 RX ADMIN — Medication 10 ML: at 08:26

## 2022-07-08 RX ADMIN — APIXABAN 5 MG: 5 TABLET, FILM COATED ORAL at 08:09

## 2022-07-08 RX ADMIN — HYDROCODONE BITARTRATE AND ACETAMINOPHEN 1 TABLET: 10; 325 TABLET ORAL at 19:57

## 2022-07-08 RX ADMIN — SACUBITRIL AND VALSARTAN 1 TABLET: 24; 26 TABLET, FILM COATED ORAL at 08:08

## 2022-07-08 RX ADMIN — HYDROCODONE BITARTRATE AND ACETAMINOPHEN 1 TABLET: 10; 325 TABLET ORAL at 08:12

## 2022-07-08 RX ADMIN — CARVEDILOL 6.25 MG: 6.25 TABLET, FILM COATED ORAL at 08:09

## 2022-07-08 RX ADMIN — CETIRIZINE HYDROCHLORIDE 10 MG: 10 TABLET, FILM COATED ORAL at 08:08

## 2022-07-08 RX ADMIN — AMIODARONE HYDROCHLORIDE 400 MG: 200 TABLET ORAL at 20:56

## 2022-07-08 RX ADMIN — RANOLAZINE 500 MG: 500 TABLET, FILM COATED, EXTENDED RELEASE ORAL at 08:09

## 2022-07-08 RX ADMIN — IPRATROPIUM BROMIDE 0.5 MG: 0.5 SOLUTION RESPIRATORY (INHALATION) at 12:18

## 2022-07-08 RX ADMIN — NICOTINE TRANSDERMAL SYSTEM 1 PATCH: 21 PATCH, EXTENDED RELEASE TRANSDERMAL at 08:13

## 2022-07-08 RX ADMIN — IPRATROPIUM BROMIDE 0.5 MG: 0.5 SOLUTION RESPIRATORY (INHALATION) at 06:46

## 2022-07-08 RX ADMIN — SACUBITRIL AND VALSARTAN 1 TABLET: 24; 26 TABLET, FILM COATED ORAL at 20:56

## 2022-07-08 RX ADMIN — RANOLAZINE 500 MG: 500 TABLET, FILM COATED, EXTENDED RELEASE ORAL at 20:56

## 2022-07-08 NOTE — PROGRESS NOTES
[facility] HOSPITALIST PROGRESS NOTE    Subjective     History:   Chong White is a 62 y.o. male admitted on 6/23/2022 secondary to <principal problem not specified>     Procedures:  6/26/22: Right subclavian central line placement   6/28/22: LUCERO with electrical cardioversion   6/29/22: Midline catheter placement right basilic vein  6/30/22: Nuclear stress test  · A pharmacological stress test was performed using regadenoson without low-level exercise.  · Findings consistent with an indeterminate ECG stress test.  · Myocardial perfusion imaging indicates a normal myocardial perfusion study with no evidence of ischemia.  · ). Enlarged LV cavity size. Abnormal LV wall motion consistent with severe global hypokinesis.  · (Calculated EF = 23%).  · Impressions are consistent with a low risk study.      CC: Follow up CHF, Afib     Patient seen and examined with CANDACE Gaviria. Sleeping upon my arrival but easily awakens. Awake and alert. Continues to report LE pain and swelling. Reports intermittent dyspnea. No reported CP. Continues to diurese. No acute events overnight per RN.     History taken from: patient, chart, and RN.      Objective     Vital Signs  Temp:  [97.7 °F (36.5 °C)-98.5 °F (36.9 °C)] 98.5 °F (36.9 °C)  Heart Rate:  [60-84] 65  Resp:  [18-20] 18  BP: ()/(56-84) 110/59    Intake/Output Summary (Last 24 hours) at 7/8/2022 1923  Last data filed at 7/8/2022 1500  Gross per 24 hour   Intake 1020 ml   Output 5600 ml   Net -4580 ml         Physical Exam:  General:    Awake, alert, in no acute distress   Heart:      Normal S1 and S2. Regular rate and rhythm. No significant murmur, rubs or gallops appreciated.   Lungs:     Respirations regular, even and unlabored. Lungs clear to auscultation B/L. No wheezes, rales or rhonchi.   Abdomen:   Soft and nontender. Mildly distended.No guarding, rebound tenderness or organomegaly noted. Bowel sounds present x 4.   Extremities:  Lower extremities wrapped  with ACE wraps with 3+ edema. Moves UE and LE equally B/L.     Results Review:    Results from last 7 days   Lab Units 07/08/22  0102 07/07/22  0323 07/06/22  0146 07/05/22  0026 07/04/22  0051 07/03/22  0103   WBC 10*3/mm3 7.44 6.99 7.10 7.68 7.79 6.36   HEMOGLOBIN g/dL 12.0* 12.2* 12.7* 12.8* 12.4* 12.1*   PLATELETS 10*3/mm3 234 231 222 207 190 154     Results from last 7 days   Lab Units 07/08/22  0102 07/07/22  0323 07/06/22  0146 07/05/22  0026 07/04/22  0051 07/03/22  0103 07/02/22  0437   SODIUM mmol/L 136 137 133* 130* 136 136 137   POTASSIUM mmol/L 4.5 4.7 4.3 4.6 4.6 4.8 4.4   CHLORIDE mmol/L 98 99 96* 95* 96* 97* 97*   CO2 mmol/L 27.6 28.3 26.6 28.0 32.2* 29.3* 29.3*   BUN mg/dL 19 20 17 23 23 23 24*   CREATININE mg/dL 1.20 1.24 1.19 1.19 1.29* 1.22 1.10   CALCIUM mg/dL 8.7 8.6 8.6 8.8 8.7 8.7 8.8   GLUCOSE mg/dL 118* 95 78 89 101* 94 81     Results from last 7 days   Lab Units 07/07/22 0323   BILIRUBIN mg/dL 0.5   ALK PHOS U/L 75   AST (SGOT) U/L 20   ALT (SGPT) U/L 22                   Imaging Results (Last 24 Hours)     Procedure Component Value Units Date/Time    US Venous Doppler Lower Extremity Bilateral (duplex) [[id number]] Collected: 07/08/22 0734     Updated: 07/08/22 0736    Narrative:      US VENOUS DOPPLER LOWER EXTREMITY BILATERAL (DUPLEX)-     CLINICAL INDICATION: Swelling; I48.91-Unspecified atrial fibrillation;  I50.42-Chronic combined systolic (congestive) and diastolic (congestive)  heart failure; I42.8-Other cardiomyopathies        COMPARISON: None available      TECHNIQUE: Color Doppler imaging was used with compression and  augmentation to evaluate the lower extremity deep venous system.     FINDINGS:   There is patent spontaneous flow from the common femoral vein through  the posterior tibial veins.  There was no internal clot or area of noncompressibility.  Normal augmentation was elicited where applicable.       Impression:      No DVT in the lower extremities on today's exam.       This report was finalized on 7/8/2022 7:34 AM by Dr. Сергей Bravo MD.               Medications:  amiodarone, 400 mg, Oral, Q12H  apixaban, 5 mg, Oral, Q12H  aspirin, 81 mg, Oral, Daily  carvedilol, 6.25 mg, Oral, BID With Meals  cetirizine, 10 mg, Oral, Daily  furosemide, 40 mg, Intravenous, Q12H  gabapentin, 600 mg, Oral, Nightly  guaiFENesin, 1,200 mg, Oral, Q12H  ipratropium, 0.5 mg, Nebulization, 4x Daily - RT  lactated ringers, 125 mL/hr, Intravenous, Once  nicotine, 1 patch, Transdermal, Q24H  ranolazine, 500 mg, Oral, Q12H  sacubitril-valsartan, 1 tablet, Oral, Q12H  sodium chloride, 10 mL, Intravenous, Q12H  sodium chloride, 10 mL, Intravenous, Q12H  sodium chloride, 10 mL, Intravenous, Q12H  sodium chloride, 10 mL, Intravenous, Q12H  sodium chloride, 10 mL, Intravenous, Q12H  sodium chloride, 10 mL, Intravenous, Q12H  tamsulosin, 0.4 mg, Oral, Nightly      Pharmacy Consult,             Assessment & Plan   Afib with RVR: Previously given metoprolol, digoxin and required Cardizem gtt. S/P cardioversion. Currently maintaining NSR. Cont amiodarone as outlined. Cont Eliquis for stroke prevention. Monitor on telemetry. Cardiology input appreciated.     Acute on chronic systolic CHF: Echo reveals an EF of 21-25, mild LVH and diastolic dysfunction. Previously on Dobutamine but this was stopped 2/2 arrhythmias. Diuresing well. Cont scheduled IV Lasix. Cont Coreg and Entresto. Unable to add spironolactone at this time 2/2 borderline hypotension. LifeVest previously ordered. Venous duplex US of bilateral lower extremities negative for DVT. Monitor volume status.     CAD: Recent echo as above. S/P LHC in 9/21 revealing chronic occlusion to LAD with collaterals from the RCA. S/P nuclear stress test this admission with no evidence of ischemia. Cont current medical management.     COPD: Stable without an acute exacerbation.     Tobacco abuse: Cont NRT and encourage cessation.     DVT PPX: Eliquis      Disposition:  Likely home when medically stable.       Oneal Reed,   07/08/22  19:23 EDT

## 2022-07-08 NOTE — PROGRESS NOTES
Patient Identification:  Name:  Chong White  Age:  62 y.o.  Sex:  male  :  1960  MRN:  [id number]  Visit Number:  [id number]    Chief Complaint: HFrEF, atrial fibrillation, CAD      Subjective:  Patient seen and examined at bedside.  Continues to have significant volume overload, dyspnea on minimal exertion, +3 bilateral lower extremity edema.  ----------------------------------------------------------------------------------------------------------------------  Current Hospital Meds:  amiodarone, 400 mg, Oral, Q12H  apixaban, 5 mg, Oral, Q12H  aspirin, 81 mg, Oral, Daily  carvedilol, 6.25 mg, Oral, BID With Meals  cetirizine, 10 mg, Oral, Daily  furosemide, 40 mg, Intravenous, Q12H  gabapentin, 600 mg, Oral, Nightly  guaiFENesin, 1,200 mg, Oral, Q12H  ipratropium, 0.5 mg, Nebulization, 4x Daily - RT  lactated ringers, 125 mL/hr, Intravenous, Once  nicotine, 1 patch, Transdermal, Q24H  ranolazine, 500 mg, Oral, Q12H  sacubitril-valsartan, 1 tablet, Oral, Q12H  sodium chloride, 10 mL, Intravenous, Q12H  sodium chloride, 10 mL, Intravenous, Q12H  sodium chloride, 10 mL, Intravenous, Q12H  sodium chloride, 10 mL, Intravenous, Q12H  sodium chloride, 10 mL, Intravenous, Q12H  sodium chloride, 10 mL, Intravenous, Q12H  tamsulosin, 0.4 mg, Oral, Nightly      Pharmacy Consult,       ----------------------------------------------------------------------------------------------------------------------  Vital Signs:  Temp:  [97.1 °F (36.2 °C)-98.9 °F (37.2 °C)] 98.2 °F (36.8 °C)  Heart Rate:  [66-97] 86  Resp:  [18-20] 18  BP: ()/(51-68) 117/68      22  0500 22  0500 22  1620   Weight: 92.7 kg (204 lb 5.9 oz) 92 kg (202 lb 13.2 oz) 91.6 kg (202 lb)     Body mass index is 30.71 kg/m².    Intake/Output Summary (Last 24 hours) at 2022 4240  Last data filed at 2022 1726  Gross per 24 hour   Intake 1530 ml   Output 2600 ml   Net -1070 ml     Diet Regular; Cardiac, Daily Fluid Restriction,  Low Sodium; Other; 1,800; 2,000 mg Na  ----------------------------------------------------------------------------------------------------------------------  Physical exam:  Constitutional:  Well-developed and well-nourished.  No respiratory distress.      HENT:  Head:  Normocephalic and atraumatic.  Mouth:  Moist mucous membranes.    Eyes:  Conjunctivae and EOM are normal.  Pupils are equal, round, and reactive to light.  No scleral icterus.    Neck:  Neck supple.  No JVD present.    Cardiovascular:  Normal rate, regular rhythm and normal heart sounds with no murmur.  Pulmonary/Chest: Increased work of breathing at this moment as he had just come out of the shower, no wheezes, no crackles, with normal breath sounds and good air movement.  Abdominal:  Soft.  Bowel sounds are normal.  No distension and no tenderness.   Musculoskeletal:  No edema, no tenderness, and no deformity.  No red or swollen joints anywhere.    Neurological:  Alert and oriented to person, place, and time.  No cranial nerve deficit.  No tongue deviation.  No facial droop.  No slurred speech.   Skin:  Skin is warm and dry. No rash noted. No pallor.   Peripheral vascular: +3 edema bilateral lower extremity up to the knees.    ----------------------------------------------------------------------------------------------------------------------  Tele: Patient refused telemetry  EK/3/2022 normal sinus rhythm at 62 bpm.  ----------------------------------------------------------------------------------------------------------------------      Results from last 7 days   Lab Units 226 22  0026   WBC 10*3/mm3 6.99 7.10 7.68   HEMOGLOBIN g/dL 12.2* 12.7* 12.8*   HEMATOCRIT % 37.3* 39.3 39.1   MCV fL 94.4 95.4 94.7   MCHC g/dL 32.7 32.3 32.7   PLATELETS 10*3/mm3 231 222 207         Results from last 7 days   Lab Units 22  0026   SODIUM mmol/L 137 133* 130*   POTASSIUM mmol/L 4.7 4.3  4.6   CHLORIDE mmol/L 99 96* 95*   CO2 mmol/L 28.3 26.6 28.0   BUN mg/dL 20 17 23   CREATININE mg/dL 1.24 1.19 1.19   CALCIUM mg/dL 8.6 8.6 8.8   GLUCOSE mg/dL 95 78 89   ALBUMIN g/dL 3.52  --   --    BILIRUBIN mg/dL 0.5  --   --    ALK PHOS U/L 75  --   --    AST (SGOT) U/L 20  --   --    ALT (SGPT) U/L 22  --   --    Estimated Creatinine Clearance: 67.9 mL/min (by C-G formula based on SCr of 1.24 mg/dL).    No results found for: AMMONIA      No results found for: BLOODCX  No results found for: URINECX  No results found for: WOUNDCX  No results found for: STOOLCX    I have personally looked at the labs and they are summarized above.  ----------------------------------------------------------------------------------------------------------------------  Imaging Results (Last 24 Hours)     Procedure Component Value Units Date/Time    US Venous Doppler Lower Extremity Bilateral (duplex) [[id number]] Resulted: 07/07/22 1933     Updated: 07/07/22 1933        ----------------------------------------------------------------------------------------------------------------------    Assessment:  62-year-old male with past medical history as stated including hypertension, hyperlipidemia, active long-term smoker, COPD, CAD (LAD  with collaterals from RCA on [facility] 2020), chronic HFrEF with a EF 20 to 25%, presented with A. fib with RVR and acute CHF exacerbation.  Now status post LUCERO cardioversion 6/28/2022 and maintaining normal sinus rhythm.  Being diuresed for HFrEF.      1.  A. fib with RVR: Status post LUCERO/DCCV 6/28/2022; now in NSR; on amiodarone and Eliquis  2.  Acute on chronic HFrEF; EF 20 to 25% chronically since at least 2020; still with significant fluid overload.  Diuresing well with IV Lasix 40 mg twice daily.    3.  CAD: [facility] 9/2020 with LAD  and collaterals from RCA.  Stress test this admission 6/29/2022 with normal perfusion and EF of 23%.  Clinically stable from CAD standpoint.  4. COPD/long-term tobacco  abuse    Plan:   -For A. fib: Patient now in normal sinus rhythm and stable.  Continue Eliquis.  Given significant fluid overload which may trigger atrial arrhythmias, would continue amiodarone 400 mg twice daily for now.  The dose can be decreased as outpatient.  -Continue Eliquis for stroke prevention in atrial fibrillation.    -For CAD: continue aspirin and Coreg, statin, aspirin.    -For HFrEF: requires more diuresis and would continue IV Lasix 40 twice daily at this time as the blood pressure is soft and he is responding well to it.  Creatinine stable.  - Continue Coreg, Entresto.      -Increase physical activity as tolerated, physical therapy.  Compression stockings/Ace wraps for lower extremity, low-salt diet and fluid restriction.      Chito Allen MD  07/07/22  21:53 EDT

## 2022-07-08 NOTE — PLAN OF CARE
Goal Outcome Evaluation:  Plan of Care Reviewed With: patient        Progress: improving  Outcome Evaluation: Patient resting in bed during assessment. VSS. No acute changes at this time. Will continue to monitor.

## 2022-07-08 NOTE — CASE MANAGEMENT/SOCIAL WORK
Discharge Planning Assessment   [facility]     Patient Name: Chong White  MRN: [id number]  Today's Date: 7/8/2022    Admit Date: 6/23/2022       Discharge Plan     Row Name 07/08/22 1847       Plan    Plan [employer] has been delivered. Pt lives with his son and he plans to return home at discharge. Pt did not utilize home health services prior to admission. Pt has a cane and oxygen via [facility]. Pt request a new cane.  has provided pt with housing list. [facility] per [facility] Toney completed intake process with pt and plans to assist him after discharge with community resources. SS to follow.              Continued Care and Services - Admitted Since 6/23/2022     Durable Medical Equipment     Service Provider Request Status Selected Services Address Phone Fax Patient Preferred    [facility]  Pending - No Request Sent N/A [id number] [address] [phone] --              Expected Discharge Date and Time     Expected Discharge Date Expected Discharge Time    Jul 11, 2022       ELLE Faye

## 2022-07-09 LAB
ANION GAP SERPL CALCULATED.3IONS-SCNC: 9.9 MMOL/L (ref 5–15)
BASOPHILS # BLD AUTO: 0.05 10*3/MM3 (ref 0–0.2)
BASOPHILS NFR BLD AUTO: 0.7 % (ref 0–1.5)
BUN SERPL-MCNC: 22 MG/DL (ref 8–23)
BUN/CREAT SERPL: 17.7 (ref 7–25)
CALCIUM SPEC-SCNC: 8.6 MG/DL (ref 8.6–10.5)
CHLORIDE SERPL-SCNC: 98 MMOL/L (ref 98–107)
CO2 SERPL-SCNC: 28.1 MMOL/L (ref 22–29)
CREAT SERPL-MCNC: 1.24 MG/DL (ref 0.76–1.27)
DEPRECATED RDW RBC AUTO: 48 FL (ref 37–54)
EGFRCR SERPLBLD CKD-EPI 2021: 65.7 ML/MIN/1.73
EOSINOPHIL # BLD AUTO: 0.14 10*3/MM3 (ref 0–0.4)
EOSINOPHIL NFR BLD AUTO: 1.9 % (ref 0.3–6.2)
ERYTHROCYTE [DISTWIDTH] IN BLOOD BY AUTOMATED COUNT: 14 % (ref 12.3–15.4)
GLUCOSE SERPL-MCNC: 82 MG/DL (ref 65–99)
HCT VFR BLD AUTO: 38 % (ref 37.5–51)
HGB BLD-MCNC: 12.4 G/DL (ref 13–17.7)
IMM GRANULOCYTES # BLD AUTO: 0.04 10*3/MM3 (ref 0–0.05)
IMM GRANULOCYTES NFR BLD AUTO: 0.5 % (ref 0–0.5)
LYMPHOCYTES # BLD AUTO: 1.25 10*3/MM3 (ref 0.7–3.1)
LYMPHOCYTES NFR BLD AUTO: 16.8 % (ref 19.6–45.3)
MCH RBC QN AUTO: 30.6 PG (ref 26.6–33)
MCHC RBC AUTO-ENTMCNC: 32.6 G/DL (ref 31.5–35.7)
MCV RBC AUTO: 93.8 FL (ref 79–97)
MONOCYTES # BLD AUTO: 0.77 10*3/MM3 (ref 0.1–0.9)
MONOCYTES NFR BLD AUTO: 10.3 % (ref 5–12)
NEUTROPHILS NFR BLD AUTO: 5.21 10*3/MM3 (ref 1.7–7)
NEUTROPHILS NFR BLD AUTO: 69.8 % (ref 42.7–76)
NRBC BLD AUTO-RTO: 0 /100 WBC (ref 0–0.2)
PLATELET # BLD AUTO: 263 10*3/MM3 (ref 140–450)
PMV BLD AUTO: 9.6 FL (ref 6–12)
POTASSIUM SERPL-SCNC: 4.6 MMOL/L (ref 3.5–5.2)
RBC # BLD AUTO: 4.05 10*6/MM3 (ref 4.14–5.8)
SODIUM SERPL-SCNC: 136 MMOL/L (ref 136–145)
WBC NRBC COR # BLD: 7.46 10*3/MM3 (ref 3.4–10.8)

## 2022-07-09 PROCEDURE — 94761 N-INVAS EAR/PLS OXIMETRY MLT: CPT

## 2022-07-09 PROCEDURE — 99232 SBSQ HOSP IP/OBS MODERATE 35: CPT | Performed by: SPECIALIST

## 2022-07-09 PROCEDURE — 85025 COMPLETE CBC W/AUTO DIFF WBC: CPT | Performed by: INTERNAL MEDICINE

## 2022-07-09 PROCEDURE — 99232 SBSQ HOSP IP/OBS MODERATE 35: CPT | Performed by: INTERNAL MEDICINE

## 2022-07-09 PROCEDURE — 94799 UNLISTED PULMONARY SVC/PX: CPT

## 2022-07-09 PROCEDURE — 80048 BASIC METABOLIC PNL TOTAL CA: CPT | Performed by: INTERNAL MEDICINE

## 2022-07-09 PROCEDURE — 25010000002 FUROSEMIDE PER 20 MG: Performed by: INTERNAL MEDICINE

## 2022-07-09 RX ORDER — SPIRONOLACTONE 25 MG/1
25 TABLET ORAL DAILY
Status: DISCONTINUED | OUTPATIENT
Start: 2022-07-09 | End: 2022-07-12 | Stop reason: HOSPADM

## 2022-07-09 RX ADMIN — GUAIFENESIN 1200 MG: 600 TABLET, EXTENDED RELEASE ORAL at 20:17

## 2022-07-09 RX ADMIN — HYDROCODONE BITARTRATE AND ACETAMINOPHEN 1 TABLET: 10; 325 TABLET ORAL at 14:22

## 2022-07-09 RX ADMIN — SPIRONOLACTONE 25 MG: 25 TABLET ORAL at 16:31

## 2022-07-09 RX ADMIN — IPRATROPIUM BROMIDE 0.5 MG: 0.5 SOLUTION RESPIRATORY (INHALATION) at 12:27

## 2022-07-09 RX ADMIN — SACUBITRIL AND VALSARTAN 1 TABLET: 24; 26 TABLET, FILM COATED ORAL at 20:18

## 2022-07-09 RX ADMIN — FUROSEMIDE 40 MG: 10 INJECTION, SOLUTION INTRAVENOUS at 08:51

## 2022-07-09 RX ADMIN — IPRATROPIUM BROMIDE 0.5 MG: 0.5 SOLUTION RESPIRATORY (INHALATION) at 00:11

## 2022-07-09 RX ADMIN — IPRATROPIUM BROMIDE 0.5 MG: 0.5 SOLUTION RESPIRATORY (INHALATION) at 18:59

## 2022-07-09 RX ADMIN — AMIODARONE HYDROCHLORIDE 400 MG: 200 TABLET ORAL at 08:50

## 2022-07-09 RX ADMIN — SACUBITRIL AND VALSARTAN 1 TABLET: 24; 26 TABLET, FILM COATED ORAL at 08:49

## 2022-07-09 RX ADMIN — Medication 10 ML: at 08:50

## 2022-07-09 RX ADMIN — APIXABAN 5 MG: 5 TABLET, FILM COATED ORAL at 20:18

## 2022-07-09 RX ADMIN — GUAIFENESIN 1200 MG: 600 TABLET, EXTENDED RELEASE ORAL at 08:50

## 2022-07-09 RX ADMIN — RANOLAZINE 500 MG: 500 TABLET, FILM COATED, EXTENDED RELEASE ORAL at 08:50

## 2022-07-09 RX ADMIN — HYDROCODONE BITARTRATE AND ACETAMINOPHEN 1 TABLET: 10; 325 TABLET ORAL at 20:24

## 2022-07-09 RX ADMIN — AMIODARONE HYDROCHLORIDE 400 MG: 200 TABLET ORAL at 20:18

## 2022-07-09 RX ADMIN — Medication 10 ML: at 20:18

## 2022-07-09 RX ADMIN — IPRATROPIUM BROMIDE 0.5 MG: 0.5 SOLUTION RESPIRATORY (INHALATION) at 07:04

## 2022-07-09 RX ADMIN — HYDROCODONE BITARTRATE AND ACETAMINOPHEN 1 TABLET: 10; 325 TABLET ORAL at 08:58

## 2022-07-09 RX ADMIN — ASPIRIN 81 MG: 81 TABLET, COATED ORAL at 08:50

## 2022-07-09 RX ADMIN — RANOLAZINE 500 MG: 500 TABLET, FILM COATED, EXTENDED RELEASE ORAL at 20:18

## 2022-07-09 RX ADMIN — NICOTINE TRANSDERMAL SYSTEM 1 PATCH: 21 PATCH, EXTENDED RELEASE TRANSDERMAL at 08:51

## 2022-07-09 RX ADMIN — CETIRIZINE HYDROCHLORIDE 10 MG: 10 TABLET, FILM COATED ORAL at 08:50

## 2022-07-09 RX ADMIN — FUROSEMIDE 40 MG: 10 INJECTION, SOLUTION INTRAVENOUS at 20:18

## 2022-07-09 RX ADMIN — TAMSULOSIN HYDROCHLORIDE 0.4 MG: 0.4 CAPSULE ORAL at 20:18

## 2022-07-09 RX ADMIN — Medication 10 ML: at 08:51

## 2022-07-09 RX ADMIN — GABAPENTIN 600 MG: 300 CAPSULE ORAL at 20:17

## 2022-07-09 RX ADMIN — HYDROCODONE BITARTRATE AND ACETAMINOPHEN 1 TABLET: 10; 325 TABLET ORAL at 04:23

## 2022-07-09 RX ADMIN — APIXABAN 5 MG: 5 TABLET, FILM COATED ORAL at 08:50

## 2022-07-09 NOTE — PROGRESS NOTES
LOS: 16 days     Name: Chong White  Age/Sex: 62 y.o. male  :  1960        PCP: Amy Yanez APRN    Active Problems:    Atrial fibrillation with RVR (HCC)    Following for: Acute on chronic HFrEF    Telemetry Monitoring: Sinus rhythm    Interval history: Patient reports shortness of breath has greatly improved still has some dyspnea.  Denies any chest pains.  Maintaining sinus rhythm.            Subjective     ROS  10 point review of systems negative except as above  Vital Signs  Vitals:    22 1000 22 1115 22 1227 22 1236   BP: 96/66 99/58     BP Location: Right arm Left arm     Patient Position:  Sitting     Pulse: 65  65 64   Resp: 20  (!) 32 28   Temp: 98.6 °F (37 °C)      TempSrc: Oral      SpO2: 98%  97% 99%   Weight:       Height:         Body mass index is 30.71 kg/m².      Intake/Output Summary (Last 24 hours) at 2022 1331  Last data filed at 2022 0300  Gross per 24 hour   Intake 720 ml   Output 2050 ml   Net -1330 ml       Constitutional:       General: Not in acute distress.     Appearance: Healthy appearance. Well-developed and not in distress. Not diaphoretic.   Eyes:      Conjunctiva/sclera: Conjunctivae normal.      Pupils: Pupils are equal, round, and reactive to light.   HENT:      Head: Normocephalic and atraumatic.   Neck:      Vascular: No carotid bruit or JVD.   Pulmonary:      Effort: Pulmonary effort is normal. No respiratory distress.      Breath sounds: Normal breath sounds.   Cardiovascular:      Normal rate. Regular rhythm.      Comments: Ace bandages wrapped around both lower extremities up to the knees.  Edema:     Peripheral edema absent.   Skin:     General: Skin is cool.   Neurological:      Mental Status: Alert, oriented to person, place, and time and oriented to person, place and time.         Results Review:     Results from last 7 days   Lab Units 22  0428 22  0102 22  0323 22  0146 22  0026  07/04/22  0051 07/03/22  0103   WBC 10*3/mm3 7.46 7.44 6.99 7.10 7.68 7.79 6.36   HEMOGLOBIN g/dL 12.4* 12.0* 12.2* 12.7* 12.8* 12.4* 12.1*   PLATELETS 10*3/mm3 263 234 231 222 207 190 154     Results from last 7 days   Lab Units 07/09/22  0428 07/08/22  0102 07/07/22  0323 07/06/22  0146 07/05/22  0026 07/04/22  0051 07/03/22  0103   SODIUM mmol/L 136 136 137 133* 130* 136 136   POTASSIUM mmol/L 4.6 4.5 4.7 4.3 4.6 4.6 4.8   CHLORIDE mmol/L 98 98 99 96* 95* 96* 97*   CO2 mmol/L 28.1 27.6 28.3 26.6 28.0 32.2* 29.3*   BUN mg/dL 22 19 20 17 23 23 23   CREATININE mg/dL 1.24 1.20 1.24 1.19 1.19 1.29* 1.22   CALCIUM mg/dL 8.6 8.7 8.6 8.6 8.8 8.7 8.7   GLUCOSE mg/dL 82 118* 95 78 89 101* 94   ALT (SGPT) U/L  --   --  22  --   --   --   --    AST (SGOT) U/L  --   --  20  --   --   --   --                  I reviewed the patient's new clinical results.  I reviewed the patient's new imaging results and agree with the interpretation.  I personally viewed and interpreted the patient's EKG/Telemetry data    Medication Review:   amiodarone, 400 mg, Oral, Q12H  apixaban, 5 mg, Oral, Q12H  aspirin, 81 mg, Oral, Daily  carvedilol, 6.25 mg, Oral, BID With Meals  cetirizine, 10 mg, Oral, Daily  furosemide, 40 mg, Intravenous, Q12H  gabapentin, 600 mg, Oral, Nightly  guaiFENesin, 1,200 mg, Oral, Q12H  ipratropium, 0.5 mg, Nebulization, 4x Daily - RT  lactated ringers, 125 mL/hr, Intravenous, Once  nicotine, 1 patch, Transdermal, Q24H  ranolazine, 500 mg, Oral, Q12H  sacubitril-valsartan, 1 tablet, Oral, Q12H  sodium chloride, 10 mL, Intravenous, Q12H  sodium chloride, 10 mL, Intravenous, Q12H  sodium chloride, 10 mL, Intravenous, Q12H  sodium chloride, 10 mL, Intravenous, Q12H  sodium chloride, 10 mL, Intravenous, Q12H  sodium chloride, 10 mL, Intravenous, Q12H  tamsulosin, 0.4 mg, Oral, Nightly      Pharmacy Consult,         Assessment:  1. Acute on chronic HFrEF, improving  2. Ischemic cardiomyopathy EF 25% or less  3. Coronary  artery disease with a  of the LAD with collaterals from the RCA  4. Persistent atrial fibrillation with episodes of RVR status post LUCERO cardioversion maintaining sinus rhythm        Recommendations:  1.  Patient has been diuresing well over the last few days, with a stable creatinine now his kidney function is better we will start him also on spironolactone 25 mg/day and monitor his creatinine and potassium  2.  Continue with the current dose of amiodarone for now  3 continue with anticoagulation    I discussed the patients findings and my recommendations with patient and family    Pancho Edwards PA-C  Electronically signed by Thong Delarosa MD, 07/09/22, 1:41 PM EDT.  07/09/22  13:31 EDT

## 2022-07-09 NOTE — PROGRESS NOTES
[facility] HOSPITALIST PROGRESS NOTE    Subjective     History:   Chong White is a 62 y.o. male admitted on 6/23/2022 secondary to <principal problem not specified>     Procedures:  6/26/22: Right subclavian central line placement   6/28/22: LUCERO with electrical cardioversion   6/29/22: Midline catheter placement right basilic vein  6/30/22: Nuclear stress test  · A pharmacological stress test was performed using regadenoson without low-level exercise.  · Findings consistent with an indeterminate ECG stress test.  · Myocardial perfusion imaging indicates a normal myocardial perfusion study with no evidence of ischemia.  · ). Enlarged LV cavity size. Abnormal LV wall motion consistent with severe global hypokinesis.  · (Calculated EF = 23%).  · Impressions are consistent with a low risk study.      CC: Follow up CHF, Afib     Patient seen and examined with CADNACE Ochoa. Sleeping upon my arrival but easily awakens. Awake and alert. Continues to report LE pain and swelling but reports some improvement in his swelling. Reports intermittent dyspnea but overall improved. No reported CP. Continues to diurese. No acute events overnight per RN.     History taken from: patient, chart, and RN.      Objective     Vital Signs  Temp:  [97.6 °F (36.4 °C)-98.9 °F (37.2 °C)] 98.5 °F (36.9 °C)  Heart Rate:  [63-84] 66  Resp:  [18-32] 22  BP: ()/(50-84) 118/72    Intake/Output Summary (Last 24 hours) at 7/9/2022 1712  Last data filed at 7/9/2022 1300  Gross per 24 hour   Intake 1080 ml   Output 2850 ml   Net -1770 ml         Physical Exam:  General:    Awake, alert, in no acute distress   Heart:      Normal S1 and S2. Regular rate and rhythm. No significant murmur, rubs or gallops appreciated.   Lungs:     Respirations regular, even and unlabored. Lungs clear to auscultation B/L. No wheezes, rales or rhonchi.   Abdomen:   Soft and nontender. Mildly distended.No guarding, rebound tenderness or organomegaly noted.  Bowel sounds present x 4.   Extremities:  Lower extremities wrapped with ACE wraps. Moves UE and LE equally B/L.     Results Review:    Results from last 7 days   Lab Units 07/09/22 0428 07/08/22 0102 07/07/22  0323 07/06/22  0146 07/05/22  0026 07/04/22  0051 07/03/22  0103   WBC 10*3/mm3 7.46 7.44 6.99 7.10 7.68 7.79 6.36   HEMOGLOBIN g/dL 12.4* 12.0* 12.2* 12.7* 12.8* 12.4* 12.1*   PLATELETS 10*3/mm3 263 234 231 222 207 190 154     Results from last 7 days   Lab Units 07/09/22 0428 07/08/22 0102 07/07/22 0323 07/06/22  0146 07/05/22  0026 07/04/22  0051 07/03/22  0103   SODIUM mmol/L 136 136 137 133* 130* 136 136   POTASSIUM mmol/L 4.6 4.5 4.7 4.3 4.6 4.6 4.8   CHLORIDE mmol/L 98 98 99 96* 95* 96* 97*   CO2 mmol/L 28.1 27.6 28.3 26.6 28.0 32.2* 29.3*   BUN mg/dL 22 19 20 17 23 23 23   CREATININE mg/dL 1.24 1.20 1.24 1.19 1.19 1.29* 1.22   CALCIUM mg/dL 8.6 8.7 8.6 8.6 8.8 8.7 8.7   GLUCOSE mg/dL 82 118* 95 78 89 101* 94     Results from last 7 days   Lab Units 07/07/22  0323   BILIRUBIN mg/dL 0.5   ALK PHOS U/L 75   AST (SGOT) U/L 20   ALT (SGPT) U/L 22                   Imaging Results (Last 24 Hours)     ** No results found for the last 24 hours. **            Medications:  amiodarone, 400 mg, Oral, Q12H  apixaban, 5 mg, Oral, Q12H  aspirin, 81 mg, Oral, Daily  carvedilol, 6.25 mg, Oral, BID With Meals  cetirizine, 10 mg, Oral, Daily  furosemide, 40 mg, Intravenous, Q12H  gabapentin, 600 mg, Oral, Nightly  guaiFENesin, 1,200 mg, Oral, Q12H  ipratropium, 0.5 mg, Nebulization, 4x Daily - RT  lactated ringers, 125 mL/hr, Intravenous, Once  nicotine, 1 patch, Transdermal, Q24H  ranolazine, 500 mg, Oral, Q12H  sacubitril-valsartan, 1 tablet, Oral, Q12H  sodium chloride, 10 mL, Intravenous, Q12H  sodium chloride, 10 mL, Intravenous, Q12H  sodium chloride, 10 mL, Intravenous, Q12H  sodium chloride, 10 mL, Intravenous, Q12H  sodium chloride, 10 mL, Intravenous, Q12H  sodium chloride, 10 mL, Intravenous,  Q12H  spironolactone, 25 mg, Oral, Daily  tamsulosin, 0.4 mg, Oral, Nightly      Pharmacy Consult,             Assessment & Plan   Afib with RVR: Previously given metoprolol, digoxin and required Cardizem gtt. S/P cardioversion. Currently maintaining NSR. Cont amiodarone as outlined. Cont Eliquis for stroke prevention. Monitor on telemetry. Cardiology input appreciated.     Acute on chronic systolic CHF: Echo reveals an EF of 21-25, mild LVH and diastolic dysfunction. Previously on Dobutamine but this was stopped 2/2 arrhythmias. Diuresing well. Cont scheduled IV Lasix. Cont Coreg and Entresto. Cardiology has ordered spironolactone today. LifeVest has been delivered. Venous duplex US of bilateral lower extremities negative for DVT. Monitor volume status.     CAD: Recent echo as above. S/P LHC in 9/21 revealing chronic occlusion to LAD with collaterals from the RCA. S/P nuclear stress test this admission with no evidence of ischemia. Cont current medical management.     COPD: Stable without an acute exacerbation.     Tobacco abuse: Cont NRT and encourage cessation.     DVT PPX: Eliquis      Disposition: Likely home when medically stable.       Oneal Reed DO  07/09/22  17:12 EDT

## 2022-07-09 NOTE — PLAN OF CARE
Goal Outcome Evaluation:              Outcome Evaluation: Pt resting in bed. No acute changes at this time. Call light in reach. Bed lowered and locked, alarm on. No requests at this time. Will continue to monitor.

## 2022-07-10 LAB
ANION GAP SERPL CALCULATED.3IONS-SCNC: 11.2 MMOL/L (ref 5–15)
BUN SERPL-MCNC: 29 MG/DL (ref 8–23)
BUN/CREAT SERPL: 25.2 (ref 7–25)
CALCIUM SPEC-SCNC: 8.9 MG/DL (ref 8.6–10.5)
CHLORIDE SERPL-SCNC: 97 MMOL/L (ref 98–107)
CO2 SERPL-SCNC: 26.8 MMOL/L (ref 22–29)
CREAT SERPL-MCNC: 1.15 MG/DL (ref 0.76–1.27)
EGFRCR SERPLBLD CKD-EPI 2021: 72 ML/MIN/1.73
GLUCOSE SERPL-MCNC: 97 MG/DL (ref 65–99)
POTASSIUM SERPL-SCNC: 4.6 MMOL/L (ref 3.5–5.2)
SODIUM SERPL-SCNC: 135 MMOL/L (ref 136–145)

## 2022-07-10 PROCEDURE — 25010000002 FUROSEMIDE PER 20 MG: Performed by: INTERNAL MEDICINE

## 2022-07-10 PROCEDURE — 94761 N-INVAS EAR/PLS OXIMETRY MLT: CPT

## 2022-07-10 PROCEDURE — 94799 UNLISTED PULMONARY SVC/PX: CPT

## 2022-07-10 PROCEDURE — 80048 BASIC METABOLIC PNL TOTAL CA: CPT | Performed by: INTERNAL MEDICINE

## 2022-07-10 PROCEDURE — 99232 SBSQ HOSP IP/OBS MODERATE 35: CPT | Performed by: SPECIALIST

## 2022-07-10 PROCEDURE — 99232 SBSQ HOSP IP/OBS MODERATE 35: CPT | Performed by: INTERNAL MEDICINE

## 2022-07-10 RX ORDER — VERICIGUAT 2.5 MG/1
1 TABLET, FILM COATED ORAL
Qty: 30 TABLET | Refills: 0 | Status: SHIPPED | OUTPATIENT
Start: 2022-07-10 | End: 2022-07-26 | Stop reason: SDUPTHER

## 2022-07-10 RX ADMIN — GUAIFENESIN 1200 MG: 600 TABLET, EXTENDED RELEASE ORAL at 08:26

## 2022-07-10 RX ADMIN — RANOLAZINE 500 MG: 500 TABLET, FILM COATED, EXTENDED RELEASE ORAL at 08:26

## 2022-07-10 RX ADMIN — FUROSEMIDE 40 MG: 10 INJECTION, SOLUTION INTRAVENOUS at 08:26

## 2022-07-10 RX ADMIN — APIXABAN 5 MG: 5 TABLET, FILM COATED ORAL at 08:26

## 2022-07-10 RX ADMIN — Medication 10 ML: at 21:12

## 2022-07-10 RX ADMIN — IPRATROPIUM BROMIDE 0.5 MG: 0.5 SOLUTION RESPIRATORY (INHALATION) at 00:24

## 2022-07-10 RX ADMIN — AMIODARONE HYDROCHLORIDE 400 MG: 200 TABLET ORAL at 08:26

## 2022-07-10 RX ADMIN — HYDROCODONE BITARTRATE AND ACETAMINOPHEN 1 TABLET: 10; 325 TABLET ORAL at 06:29

## 2022-07-10 RX ADMIN — APIXABAN 5 MG: 5 TABLET, FILM COATED ORAL at 21:12

## 2022-07-10 RX ADMIN — Medication 10 ML: at 08:27

## 2022-07-10 RX ADMIN — ASPIRIN 81 MG: 81 TABLET, COATED ORAL at 08:26

## 2022-07-10 RX ADMIN — RANOLAZINE 500 MG: 500 TABLET, FILM COATED, EXTENDED RELEASE ORAL at 21:12

## 2022-07-10 RX ADMIN — SACUBITRIL AND VALSARTAN 1 TABLET: 24; 26 TABLET, FILM COATED ORAL at 08:26

## 2022-07-10 RX ADMIN — HYDROCODONE BITARTRATE AND ACETAMINOPHEN 1 TABLET: 10; 325 TABLET ORAL at 10:54

## 2022-07-10 RX ADMIN — AMIODARONE HYDROCHLORIDE 400 MG: 200 TABLET ORAL at 21:11

## 2022-07-10 RX ADMIN — TAMSULOSIN HYDROCHLORIDE 0.4 MG: 0.4 CAPSULE ORAL at 21:12

## 2022-07-10 RX ADMIN — NICOTINE TRANSDERMAL SYSTEM 1 PATCH: 21 PATCH, EXTENDED RELEASE TRANSDERMAL at 08:27

## 2022-07-10 RX ADMIN — GABAPENTIN 600 MG: 300 CAPSULE ORAL at 21:11

## 2022-07-10 RX ADMIN — FUROSEMIDE 40 MG: 10 INJECTION, SOLUTION INTRAVENOUS at 21:11

## 2022-07-10 RX ADMIN — CETIRIZINE HYDROCHLORIDE 10 MG: 10 TABLET, FILM COATED ORAL at 08:26

## 2022-07-10 RX ADMIN — IPRATROPIUM BROMIDE 0.5 MG: 0.5 SOLUTION RESPIRATORY (INHALATION) at 13:15

## 2022-07-10 RX ADMIN — HYDROCODONE BITARTRATE AND ACETAMINOPHEN 1 TABLET: 10; 325 TABLET ORAL at 17:50

## 2022-07-10 RX ADMIN — SPIRONOLACTONE 25 MG: 25 TABLET ORAL at 08:27

## 2022-07-10 RX ADMIN — GUAIFENESIN 1200 MG: 600 TABLET, EXTENDED RELEASE ORAL at 21:11

## 2022-07-10 RX ADMIN — CARVEDILOL 6.25 MG: 6.25 TABLET, FILM COATED ORAL at 08:26

## 2022-07-10 RX ADMIN — VERICIGUAT 2.5 MG: 2.5 TABLET, FILM COATED ORAL at 17:45

## 2022-07-10 RX ADMIN — Medication 10 ML: at 21:13

## 2022-07-10 RX ADMIN — SACUBITRIL AND VALSARTAN 1 TABLET: 24; 26 TABLET, FILM COATED ORAL at 21:11

## 2022-07-10 RX ADMIN — IPRATROPIUM BROMIDE 0.5 MG: 0.5 SOLUTION RESPIRATORY (INHALATION) at 07:21

## 2022-07-10 NOTE — PLAN OF CARE
Goal Outcome Evaluation:  Plan of Care Reviewed With: patient        Progress: no change  Outcome Evaluation: Pt resting in bed. Complains of pain, prn given. VSS

## 2022-07-10 NOTE — PROGRESS NOTES
[facility] HOSPITALIST PROGRESS NOTE    Subjective     History:   Chong White is a 62 y.o. male admitted on 6/23/2022 secondary to <principal problem not specified>     Procedures:  6/26/22: Right subclavian central line placement   6/28/22: LUCERO with electrical cardioversion   6/29/22: Midline catheter placement right basilic vein  6/30/22: Nuclear stress test  · A pharmacological stress test was performed using regadenoson without low-level exercise.  · Findings consistent with an indeterminate ECG stress test.  · Myocardial perfusion imaging indicates a normal myocardial perfusion study with no evidence of ischemia.  · ). Enlarged LV cavity size. Abnormal LV wall motion consistent with severe global hypokinesis.  · (Calculated EF = 23%).  · Impressions are consistent with a low risk study.      CC: Follow up CHF, Afib     Patient seen and examined with CANDACE Morfin. Awake and alert. Reports intermittent dyspnea but overall improved. Reports soreness in his legs but pain overall improved. No reported CP. Continues to diurese well. No acute events overnight per RN.     History taken from: patient, chart, and RN.      Objective     Vital Signs  Temp:  [97.4 °F (36.3 °C)-98.6 °F (37 °C)] 97.4 °F (36.3 °C)  Heart Rate:  [60-68] 60  Resp:  [16-36] 24  BP: ()/(50-72) 95/54    Intake/Output Summary (Last 24 hours) at 7/10/2022 1430  Last data filed at 7/10/2022 1059  Gross per 24 hour   Intake 540 ml   Output 3200 ml   Net -2660 ml         Physical Exam:  General:    Awake, alert, in no acute distress   Heart:      Normal S1 and S2. Regular rate and rhythm. No significant murmur, rubs or gallops appreciated.   Lungs:     Respirations regular, even and unlabored. Lungs clear to auscultation B/L. No wheezes, rales or rhonchi.   Abdomen:   Soft and nontender. Mildly distended.No guarding, rebound tenderness or organomegaly noted. Bowel sounds present x 4.   Extremities:  Lower extremities wrapped with  ACE wraps but edema appears much improved. Moves UE and LE equally B/L.     Results Review:    Results from last 7 days   Lab Units 07/09/22  0428 07/08/22  0102 07/07/22  0323 07/06/22  0146 07/05/22  0026 07/04/22  0051   WBC 10*3/mm3 7.46 7.44 6.99 7.10 7.68 7.79   HEMOGLOBIN g/dL 12.4* 12.0* 12.2* 12.7* 12.8* 12.4*   PLATELETS 10*3/mm3 263 234 231 222 207 190     Results from last 7 days   Lab Units 07/10/22  0415 07/09/22  0428 07/08/22  0102 07/07/22  0323 07/06/22  0146 07/05/22  0026 07/04/22  0051   SODIUM mmol/L 135* 136 136 137 133* 130* 136   POTASSIUM mmol/L 4.6 4.6 4.5 4.7 4.3 4.6 4.6   CHLORIDE mmol/L 97* 98 98 99 96* 95* 96*   CO2 mmol/L 26.8 28.1 27.6 28.3 26.6 28.0 32.2*   BUN mg/dL 29* 22 19 20 17 23 23   CREATININE mg/dL 1.15 1.24 1.20 1.24 1.19 1.19 1.29*   CALCIUM mg/dL 8.9 8.6 8.7 8.6 8.6 8.8 8.7   GLUCOSE mg/dL 97 82 118* 95 78 89 101*     Results from last 7 days   Lab Units 07/07/22  0323   BILIRUBIN mg/dL 0.5   ALK PHOS U/L 75   AST (SGOT) U/L 20   ALT (SGPT) U/L 22                   Imaging Results (Last 24 Hours)     ** No results found for the last 24 hours. **            Medications:  amiodarone, 400 mg, Oral, Q12H  apixaban, 5 mg, Oral, Q12H  aspirin, 81 mg, Oral, Daily  carvedilol, 6.25 mg, Oral, BID With Meals  cetirizine, 10 mg, Oral, Daily  furosemide, 40 mg, Intravenous, Q12H  gabapentin, 600 mg, Oral, Nightly  guaiFENesin, 1,200 mg, Oral, Q12H  ipratropium, 0.5 mg, Nebulization, 4x Daily - RT  lactated ringers, 125 mL/hr, Intravenous, Once  nicotine, 1 patch, Transdermal, Q24H  ranolazine, 500 mg, Oral, Q12H  sacubitril-valsartan, 1 tablet, Oral, Q12H  sodium chloride, 10 mL, Intravenous, Q12H  sodium chloride, 10 mL, Intravenous, Q12H  sodium chloride, 10 mL, Intravenous, Q12H  sodium chloride, 10 mL, Intravenous, Q12H  sodium chloride, 10 mL, Intravenous, Q12H  sodium chloride, 10 mL, Intravenous, Q12H  spironolactone, 25 mg, Oral, Daily  tamsulosin, 0.4 mg, Oral,  Nightly      Pharmacy Consult,   Pharmacy Consult,             Assessment & Plan   Afib with RVR: Previously given metoprolol, digoxin and required Cardizem gtt. S/P cardioversion. Currently maintaining NSR. Cont amiodarone as outlined. Cont Eliquis for stroke prevention. Monitor on telemetry. Cardiology input appreciated.     Acute on chronic systolic CHF: Echo reveals an EF of 21-25, mild LVH and diastolic dysfunction. Previously on Dobutamine but this was stopped 2/2 arrhythmias. Diuresing well. Cont scheduled IV Lasix. Cont Coreg and Entresto. Cardiology has initiated spironolactone. LifeVest has been delivered. Venous duplex US of bilateral lower extremities negative for DVT. Monitor volume status. Cards considering transitioning to PO diuretics tomorrow. Cardiology input appreciated.     CAD: Recent echo as above. S/P LHC in 9/21 revealing chronic occlusion to LAD with collaterals from the RCA. S/P nuclear stress test this admission with no evidence of ischemia. Cont current medical management.     COPD: Stable without an acute exacerbation.     Tobacco abuse: Cont NRT and encourage cessation.     DVT PPX: Eliquis      Disposition: Likely home when medically stable. Cardiology considering switching to PO diuretics tomorrow.       Oneal Reed,   07/10/22  14:30 EDT

## 2022-07-10 NOTE — PLAN OF CARE
Goal Outcome Evaluation:  Plan of Care Reviewed With: patient        Progress: improving  Outcome Evaluation: pt resting in bed. VSS. ambulated to shower this afternoon. prn pain meds given as requested. no other acute issues noted at this time. will continue with plan of care.

## 2022-07-10 NOTE — PROGRESS NOTES
Patient Identification:  Name:  Chong White  Age:  62 y.o.  Sex:  male  :  1960  MRN:  [id number]  Visit Number:  [id number]    Chief Complaint:   Acute on chronic HFrEF, atrial fibrillation    Subjective:    Patient feels a lot better he still short of breath but significantly better edema is coming down significantly according to him with no chest pain  ----------------------------------------------------------------------------------------------------------------------  Current Hospital Meds:  amiodarone, 400 mg, Oral, Q12H  apixaban, 5 mg, Oral, Q12H  aspirin, 81 mg, Oral, Daily  carvedilol, 6.25 mg, Oral, BID With Meals  cetirizine, 10 mg, Oral, Daily  furosemide, 40 mg, Intravenous, Q12H  gabapentin, 600 mg, Oral, Nightly  guaiFENesin, 1,200 mg, Oral, Q12H  ipratropium, 0.5 mg, Nebulization, 4x Daily - RT  lactated ringers, 125 mL/hr, Intravenous, Once  nicotine, 1 patch, Transdermal, Q24H  ranolazine, 500 mg, Oral, Q12H  sacubitril-valsartan, 1 tablet, Oral, Q12H  sodium chloride, 10 mL, Intravenous, Q12H  sodium chloride, 10 mL, Intravenous, Q12H  sodium chloride, 10 mL, Intravenous, Q12H  sodium chloride, 10 mL, Intravenous, Q12H  sodium chloride, 10 mL, Intravenous, Q12H  sodium chloride, 10 mL, Intravenous, Q12H  spironolactone, 25 mg, Oral, Daily  tamsulosin, 0.4 mg, Oral, Nightly      Pharmacy Consult,       ----------------------------------------------------------------------------------------------------------------------  Vital Signs:  Temp:  [97.4 °F (36.3 °C)-98.6 °F (37 °C)] 97.4 °F (36.3 °C)  Heart Rate:  [60-68] 68  Resp:  [16-32] 18  BP: ()/(50-72) 95/54  Vital Signs (last 72 hrs)        0700  / 0659  0700   0659  0700  07/10 0659 07/10 0700  07/10 1216   Most Recent      Temp (°F) 97.7 -  98.9    97.6 -  98.9    97.7 -  98.6      97.4     97.4 (36.3) 07/10 1000    Heart Rate 61 -  86    60 -  84    60 -  68    62 -  68     68 07/10 1000    Resp 18 -   20    18 -  20    16 -  32    18 -  28     18 07/10 1000    BP 91/51 -  117/68    88/64 -  122/84    96/50 -  125/71      95/54     95/54 07/10 1000    SpO2 (%) 91 -  98    94 -  100    92 -  99      98     98 07/10 0727            06/29/22  0500 07/01/22  0500 07/01/22  1620   Weight: 92.7 kg (204 lb 5.9 oz) 92 kg (202 lb 13.2 oz) 91.6 kg (202 lb)     Body mass index is 30.71 kg/m².    Intake/Output Summary (Last 24 hours) at 7/10/2022 1216  Last data filed at 7/10/2022 1059  Gross per 24 hour   Intake 780 ml   Output 4500 ml   Net -3720 ml     Diet Regular; Cardiac, Daily Fluid Restriction, Low Sodium; Other; 1,800; 2,000 mg Na  ----------------------------------------------------------------------------------------------------------------------  Physical exam:    HEENT:  Head:  Normocephalic and atraumatic.     Eyes:  Conjunctivae and EOM are normal.  Pupils are equal, round, and reactive to light.  No scleral icterus.    Neck:  Neck supple.  No JVD present.  No bruit.  Cardiovascular: Normal rate, regular rhythm, S1 S2+, NO S3 / S4  Pulmonary/Chest:  Vesicular breath sounds B/L, Clear to auscultation, with no added sounds.  Abdominal:  Soft.  Bowel sounds are normal.  No distension and no tenderness.  No organomegaly.  Neurological:  Alert and oriented to person, place, and time. No focal defecits  Skin:  Skin is warm and dry. No rash noted. No pallor.   Musculoskeletal:  No tenderness, and no deformity.  No red or swollen joints anywhere.   Lower extremities: Mild bilateral lower extremity edema, Peripheral vascular:  2+ Pulses B/L DP.  ----------------------------------------------------------------------------------------------------------------------    ----------------------------------------------------------------------------------------------------------------------      Results from last 7 days   Lab Units 07/09/22  0428 07/08/22  0102 07/07/22  0323   WBC 10*3/mm3 7.46 7.44 6.99   HEMOGLOBIN g/dL 12.4*  12.0* 12.2*   HEMATOCRIT % 38.0 37.0* 37.3*   MCV fL 93.8 94.4 94.4   MCHC g/dL 32.6 32.4 32.7   PLATELETS 10*3/mm3 263 234 231         Results from last 7 days   Lab Units 07/10/22  0415 07/09/22  0428 07/08/22  0102 07/07/22  0323   SODIUM mmol/L 135* 136 136 137   POTASSIUM mmol/L 4.6 4.6 4.5 4.7   CHLORIDE mmol/L 97* 98 98 99   CO2 mmol/L 26.8 28.1 27.6 28.3   BUN mg/dL 29* 22 19 20   CREATININE mg/dL 1.15 1.24 1.20 1.24   CALCIUM mg/dL 8.9 8.6 8.7 8.6   GLUCOSE mg/dL 97 82 118* 95   ALBUMIN g/dL  --   --   --  3.52   BILIRUBIN mg/dL  --   --   --  0.5   ALK PHOS U/L  --   --   --  75   AST (SGOT) U/L  --   --   --  20   ALT (SGPT) U/L  --   --   --  22   Estimated Creatinine Clearance: 73.2 mL/min (by C-G formula based on SCr of 1.15 mg/dL).    No results found for: AMMONIA      No results found for: BLOODCX  No results found for: URINECX  No results found for: WOUNDCX  No results found for: STOOLCX    I have personally looked at the labs and they are summarized above.  ----------------------------------------------------------------------------------------------------------------------  Imaging Results (Last 24 Hours)     ** No results found for the last 24 hours. **        ----------------------------------------------------------------------------------------------------------------------    Assessment:  1. Acute on chronic HFrEF, improving  2. Ischemic cardiomyopathy EF 25% or less  3. Coronary artery disease with a  of the LAD with collaterals from the RCA  4. Persistent atrial fibrillation with episodes of RVR status post LUCERO cardioversion maintaining sinus rhythm      Plan:  1.  Patient had excellent diuresis overnight so far is tolerating spironolactone with stable creatinine and potassium continue current management  2.  Continue current dose of GDMT as his blood pressure problem not related higher dosing we will consult pharmacy for cause for verquvo  3.  Potentially can switch to p.o. diuretics  tomorrow  4.  LifeVest delivered      Thong Delarosa MD   07/10/22 12:16 EDT

## 2022-07-11 LAB
ALBUMIN SERPL-MCNC: 3.42 G/DL (ref 3.5–5.2)
ALBUMIN/GLOB SERPL: 1.2 G/DL
ALP SERPL-CCNC: 78 U/L (ref 39–117)
ALT SERPL W P-5'-P-CCNC: 45 U/L (ref 1–41)
ANION GAP SERPL CALCULATED.3IONS-SCNC: 11.7 MMOL/L (ref 5–15)
AST SERPL-CCNC: 29 U/L (ref 1–40)
BASOPHILS # BLD AUTO: 0.05 10*3/MM3 (ref 0–0.2)
BASOPHILS NFR BLD AUTO: 0.7 % (ref 0–1.5)
BILIRUB SERPL-MCNC: 0.4 MG/DL (ref 0–1.2)
BUN SERPL-MCNC: 30 MG/DL (ref 8–23)
BUN/CREAT SERPL: 23.6 (ref 7–25)
CALCIUM SPEC-SCNC: 8.9 MG/DL (ref 8.6–10.5)
CHLORIDE SERPL-SCNC: 98 MMOL/L (ref 98–107)
CO2 SERPL-SCNC: 25.3 MMOL/L (ref 22–29)
CREAT SERPL-MCNC: 1.27 MG/DL (ref 0.76–1.27)
DEPRECATED RDW RBC AUTO: 48.5 FL (ref 37–54)
EGFRCR SERPLBLD CKD-EPI 2021: 63.9 ML/MIN/1.73
EOSINOPHIL # BLD AUTO: 0.17 10*3/MM3 (ref 0–0.4)
EOSINOPHIL NFR BLD AUTO: 2.2 % (ref 0.3–6.2)
ERYTHROCYTE [DISTWIDTH] IN BLOOD BY AUTOMATED COUNT: 13.8 % (ref 12.3–15.4)
GLOBULIN UR ELPH-MCNC: 2.8 GM/DL
GLUCOSE SERPL-MCNC: 122 MG/DL (ref 65–99)
HCT VFR BLD AUTO: 41 % (ref 37.5–51)
HGB BLD-MCNC: 13.2 G/DL (ref 13–17.7)
IMM GRANULOCYTES # BLD AUTO: 0.06 10*3/MM3 (ref 0–0.05)
IMM GRANULOCYTES NFR BLD AUTO: 0.8 % (ref 0–0.5)
LYMPHOCYTES # BLD AUTO: 1.3 10*3/MM3 (ref 0.7–3.1)
LYMPHOCYTES NFR BLD AUTO: 17 % (ref 19.6–45.3)
MCH RBC QN AUTO: 30.6 PG (ref 26.6–33)
MCHC RBC AUTO-ENTMCNC: 32.2 G/DL (ref 31.5–35.7)
MCV RBC AUTO: 95.1 FL (ref 79–97)
MONOCYTES # BLD AUTO: 0.68 10*3/MM3 (ref 0.1–0.9)
MONOCYTES NFR BLD AUTO: 8.9 % (ref 5–12)
NEUTROPHILS NFR BLD AUTO: 5.4 10*3/MM3 (ref 1.7–7)
NEUTROPHILS NFR BLD AUTO: 70.4 % (ref 42.7–76)
NRBC BLD AUTO-RTO: 0 /100 WBC (ref 0–0.2)
PLATELET # BLD AUTO: 266 10*3/MM3 (ref 140–450)
PMV BLD AUTO: 9.2 FL (ref 6–12)
POTASSIUM SERPL-SCNC: 4.7 MMOL/L (ref 3.5–5.2)
PROT SERPL-MCNC: 6.2 G/DL (ref 6–8.5)
QT INTERVAL: 468 MS
QTC INTERVAL: 475 MS
RBC # BLD AUTO: 4.31 10*6/MM3 (ref 4.14–5.8)
SODIUM SERPL-SCNC: 135 MMOL/L (ref 136–145)
WBC NRBC COR # BLD: 7.66 10*3/MM3 (ref 3.4–10.8)

## 2022-07-11 PROCEDURE — B24BZZ4 ULTRASONOGRAPHY OF HEART WITH AORTA, TRANSESOPHAGEAL: ICD-10-PCS | Performed by: INTERNAL MEDICINE

## 2022-07-11 PROCEDURE — 93010 ELECTROCARDIOGRAM REPORT: CPT | Performed by: INTERNAL MEDICINE

## 2022-07-11 PROCEDURE — 99232 SBSQ HOSP IP/OBS MODERATE 35: CPT | Performed by: SPECIALIST

## 2022-07-11 PROCEDURE — 85025 COMPLETE CBC W/AUTO DIFF WBC: CPT | Performed by: INTERNAL MEDICINE

## 2022-07-11 PROCEDURE — 93005 ELECTROCARDIOGRAM TRACING: CPT | Performed by: HOSPITALIST

## 2022-07-11 PROCEDURE — 94799 UNLISTED PULMONARY SVC/PX: CPT

## 2022-07-11 PROCEDURE — 80053 COMPREHEN METABOLIC PANEL: CPT | Performed by: INTERNAL MEDICINE

## 2022-07-11 PROCEDURE — 99232 SBSQ HOSP IP/OBS MODERATE 35: CPT | Performed by: INTERNAL MEDICINE

## 2022-07-11 PROCEDURE — 94761 N-INVAS EAR/PLS OXIMETRY MLT: CPT

## 2022-07-11 RX ORDER — FUROSEMIDE 40 MG/1
40 TABLET ORAL DAILY
Status: DISCONTINUED | OUTPATIENT
Start: 2022-07-12 | End: 2022-07-12 | Stop reason: HOSPADM

## 2022-07-11 RX ADMIN — AMIODARONE HYDROCHLORIDE 400 MG: 200 TABLET ORAL at 20:54

## 2022-07-11 RX ADMIN — GUAIFENESIN 1200 MG: 600 TABLET, EXTENDED RELEASE ORAL at 20:54

## 2022-07-11 RX ADMIN — GUAIFENESIN 1200 MG: 600 TABLET, EXTENDED RELEASE ORAL at 09:17

## 2022-07-11 RX ADMIN — Medication 10 ML: at 20:56

## 2022-07-11 RX ADMIN — APIXABAN 5 MG: 5 TABLET, FILM COATED ORAL at 09:18

## 2022-07-11 RX ADMIN — CETIRIZINE HYDROCHLORIDE 10 MG: 10 TABLET, FILM COATED ORAL at 09:18

## 2022-07-11 RX ADMIN — Medication 10 ML: at 20:55

## 2022-07-11 RX ADMIN — TAMSULOSIN HYDROCHLORIDE 0.4 MG: 0.4 CAPSULE ORAL at 20:55

## 2022-07-11 RX ADMIN — GABAPENTIN 600 MG: 300 CAPSULE ORAL at 20:59

## 2022-07-11 RX ADMIN — APIXABAN 5 MG: 5 TABLET, FILM COATED ORAL at 20:55

## 2022-07-11 RX ADMIN — Medication 10 ML: at 09:21

## 2022-07-11 RX ADMIN — ASPIRIN 81 MG: 81 TABLET, COATED ORAL at 09:18

## 2022-07-11 RX ADMIN — SACUBITRIL AND VALSARTAN 1 TABLET: 24; 26 TABLET, FILM COATED ORAL at 20:54

## 2022-07-11 RX ADMIN — HYDROCODONE BITARTRATE AND ACETAMINOPHEN 1 TABLET: 10; 325 TABLET ORAL at 23:01

## 2022-07-11 RX ADMIN — NICOTINE TRANSDERMAL SYSTEM 1 PATCH: 21 PATCH, EXTENDED RELEASE TRANSDERMAL at 09:19

## 2022-07-11 RX ADMIN — HYDROCODONE BITARTRATE AND ACETAMINOPHEN 1 TABLET: 10; 325 TABLET ORAL at 18:12

## 2022-07-11 RX ADMIN — AMIODARONE HYDROCHLORIDE 400 MG: 200 TABLET ORAL at 09:17

## 2022-07-11 RX ADMIN — HYDROCODONE BITARTRATE AND ACETAMINOPHEN 1 TABLET: 10; 325 TABLET ORAL at 10:08

## 2022-07-11 RX ADMIN — RANOLAZINE 500 MG: 500 TABLET, FILM COATED, EXTENDED RELEASE ORAL at 20:55

## 2022-07-11 RX ADMIN — IPRATROPIUM BROMIDE 0.5 MG: 0.5 SOLUTION RESPIRATORY (INHALATION) at 00:54

## 2022-07-11 RX ADMIN — RANOLAZINE 500 MG: 500 TABLET, FILM COATED, EXTENDED RELEASE ORAL at 09:17

## 2022-07-11 RX ADMIN — HYDROCODONE BITARTRATE AND ACETAMINOPHEN 1 TABLET: 10; 325 TABLET ORAL at 14:26

## 2022-07-11 RX ADMIN — CARVEDILOL 6.25 MG: 6.25 TABLET, FILM COATED ORAL at 18:12

## 2022-07-11 NOTE — PROGRESS NOTES
Patient Identification:  Name:  Chong White  Age:  62 y.o.  Sex:  male  :  1960  MRN:  [id number]  Visit Number:  [id number]    Chief Complaint:   Acute on chronic HFrEF, atrial fibrillation with RVR    Subjective:    Patient states that he feels a lot better today no chest pain no shortness of breath much less edema  ----------------------------------------------------------------------------------------------------------------------  Current Hospital Meds:  amiodarone, 400 mg, Oral, Q12H  apixaban, 5 mg, Oral, Q12H  aspirin, 81 mg, Oral, Daily  carvedilol, 6.25 mg, Oral, BID With Meals  cetirizine, 10 mg, Oral, Daily  gabapentin, 600 mg, Oral, Nightly  guaiFENesin, 1,200 mg, Oral, Q12H  ipratropium, 0.5 mg, Nebulization, 4x Daily - RT  lactated ringers, 125 mL/hr, Intravenous, Once  nicotine, 1 patch, Transdermal, Q24H  ranolazine, 500 mg, Oral, Q12H  sacubitril-valsartan, 1 tablet, Oral, Q12H  sodium chloride, 10 mL, Intravenous, Q12H  sodium chloride, 10 mL, Intravenous, Q12H  sodium chloride, 10 mL, Intravenous, Q12H  sodium chloride, 10 mL, Intravenous, Q12H  sodium chloride, 10 mL, Intravenous, Q12H  sodium chloride, 10 mL, Intravenous, Q12H  spironolactone, 25 mg, Oral, Daily  tamsulosin, 0.4 mg, Oral, Nightly  Vericiguat, 1 tablet, Oral, Daily With Dinner      Pharmacy Consult,   Pharmacy Consult,       ----------------------------------------------------------------------------------------------------------------------  Vital Signs:  Temp:  [97.6 °F (36.4 °C)-98.7 °F (37.1 °C)] 98.7 °F (37.1 °C)  Heart Rate:  [60-70] 70  Resp:  [14-36] 18  BP: ()/(41-60) 92/54  Vital Signs (last 72 hrs)        0700   0659  0700  07/10 0659 07/10 07 0659  1041   Most Recent      Temp (°F) 97.6 -  98.9    97.7 -  98.6    97.4 -  98.6      98.7     98.7 (37.1)  1028    Heart Rate 60 -  84    60 -  68    60 -  69    69 -  70     70  1028    Resp 18 -  20    16  -  32    14 -  36      18     18 07/11 1028    BP 88/64 -  122/84    96/50 -  125/71    73/41 -  101/60    90/58 -  102/60     92/54 07/11 1028    SpO2 (%) 94 -  100    92 -  99    96 -  98      94     94 07/11 0711            06/29/22  0500 07/01/22  0500 07/01/22  1620   Weight: 92.7 kg (204 lb 5.9 oz) 92 kg (202 lb 13.2 oz) 91.6 kg (202 lb)     Body mass index is 30.71 kg/m².    Intake/Output Summary (Last 24 hours) at 7/11/2022 1041  Last data filed at 7/11/2022 0800  Gross per 24 hour   Intake 1700 ml   Output 3350 ml   Net -1650 ml     Diet Regular; Cardiac, Daily Fluid Restriction, Low Sodium; Other; 1,800; 2,000 mg Na  ----------------------------------------------------------------------------------------------------------------------  Physical exam:    HEENT:  Head:  Normocephalic and atraumatic.     Eyes:  Conjunctivae and EOM are normal.  Pupils are equal, round, and reactive to light.  No scleral icterus.    Neck:  Neck supple.  No JVD present.  No bruit.  Cardiovascular: Normal rate, regular rhythm, S1 S2+, NO S3 / S4  Pulmonary/Chest:  Vesicular breath sounds B/L, Clear to auscultation, with no added sounds.  Abdominal:  Soft.  Bowel sounds are normal.  No distension and no tenderness.  No organomegaly.  Neurological:  Alert and oriented to person, place, and time. No focal defecits  Skin:  Skin is warm and dry. No rash noted. No pallor.   Musculoskeletal:  No tenderness, and no deformity.  No red or swollen joints anywhere.   Lower extremities: Mild lower extremity edema, Peripheral vascular:  2+ Pulses B/L DP.  ----------------------------------------------------------------------------------------------------------------------    ----------------------------------------------------------------------------------------------------------------------      Results from last 7 days   Lab Units 07/11/22  0429 07/09/22  0428 07/08/22  0102   WBC 10*3/mm3 7.66 7.46 7.44   HEMOGLOBIN g/dL 13.2 12.4* 12.0*    HEMATOCRIT % 41.0 38.0 37.0*   MCV fL 95.1 93.8 94.4   MCHC g/dL 32.2 32.6 32.4   PLATELETS 10*3/mm3 266 263 234         Results from last 7 days   Lab Units 07/11/22  0429 07/10/22  0415 07/09/22  0428 07/08/22  0102 07/07/22  0323   SODIUM mmol/L 135* 135* 136   < > 137   POTASSIUM mmol/L 4.7 4.6 4.6   < > 4.7   CHLORIDE mmol/L 98 97* 98   < > 99   CO2 mmol/L 25.3 26.8 28.1   < > 28.3   BUN mg/dL 30* 29* 22   < > 20   CREATININE mg/dL 1.27 1.15 1.24   < > 1.24   CALCIUM mg/dL 8.9 8.9 8.6   < > 8.6   GLUCOSE mg/dL 122* 97 82   < > 95   ALBUMIN g/dL 3.42*  --   --   --  3.52   BILIRUBIN mg/dL 0.4  --   --   --  0.5   ALK PHOS U/L 78  --   --   --  75   AST (SGOT) U/L 29  --   --   --  20   ALT (SGPT) U/L 45*  --   --   --  22    < > = values in this interval not displayed.   Estimated Creatinine Clearance: 66.3 mL/min (by C-G formula based on SCr of 1.27 mg/dL).    No results found for: AMMONIA      No results found for: BLOODCX  No results found for: URINECX  No results found for: WOUNDCX  No results found for: STOOLCX    I have personally looked at the labs and they are summarized above.  ----------------------------------------------------------------------------------------------------------------------  Imaging Results (Last 24 Hours)     ** No results found for the last 24 hours. **        ----------------------------------------------------------------------------------------------------------------------    Assessment:  1. Acute on chronic HFrEF, improving  2. Ischemic cardiomyopathy EF 25% or less  3. Coronary artery disease with a  of the LAD with collaterals from the RCA  4. Persistent atrial fibrillation with episodes of RVR status post LUCERO cardioversion maintaining sinus rhythm      Plan:  1.  Patient had been a little bit hypotensive today medication was held he was started yesterday on Verquvo is possible this could have contributed we will switch him to p.o. diuretics  2.  Ambulate once blood  pressure is more stable  3.  Possible discharge home tomorrow if blood pressure stable and patient is doing fine      Thong Delarosa MD   07/11/22 10:41 EDT

## 2022-07-11 NOTE — PLAN OF CARE
Goal Outcome Evaluation:  Plan of Care Reviewed With: patient        Progress: improving  Outcome Evaluation: pt resting in bed. BP has ran low this AM- diuretics/bp meds held. pt had complaints of cathetor irritating him at insertion site. Salguero removed per protocol. MD aware. pt self voided 300ml since removal, denies any burning/pain on urination. prn pain med given this shift. zguard applied to pt bottom, ace wraps reapplied. notable decrease in swelling in lower extremities. no other acute issues noted at this time. will continue with plan of care.

## 2022-07-11 NOTE — CASE MANAGEMENT/SOCIAL WORK
Discharge Planning Assessment   [place]     Patient Name: Chong White  MRN: [id number]  Today's Date: 7/11/2022    Admit Date: 6/23/2022       Discharge Plan     Row Name 07/11/22 1612       Plan    Plan Lifevest has been delivered. Pt lives with his son and he plans to return home at discharge. Pt did not utilize home health services prior to admission. Pt has a cane and oxygen via [facility]. Pt request a new cane.  has provided pt with housing list. [facility] per Pepper Toney completed intake process with pt and plans to assist him after discharge with community resources. SS to follow.              Continued Care and Services - Admitted Since 6/23/2022     Durable Medical Equipment     Service Provider Request Status Selected Services Address Phone Fax Patient Preferred    [facility]  Pending - No Request Sent N/A [id number] [place] [address] [phone] --              Expected Discharge Date and Time     Expected Discharge Date Expected Discharge Time    Jul 12, 2022         ELLE Faye

## 2022-07-11 NOTE — PROGRESS NOTES
[facility] HOSPITALIST PROGRESS NOTE     Patient Identification:  Name:  Chong White  Age:  62 y.o.  Sex:  male  :  1960  MRN:  [id number]  Visit Number:  [id number]  Primary Care Provider:  Amy Yanez APRN    Length of stay:  18    Chief complaint: Scrotal and bilateral lower extremity edema    Subjective:    Patient with dramatic improvement in bilateral lower extremity edema and scrotal edema.  Patient states he is doing much better today.  He is eager to return home.  No new events overnight.  ----------------------------------------------------------------------------------------------------------------------  Current Hospital Meds:  amiodarone, 400 mg, Oral, Q12H  apixaban, 5 mg, Oral, Q12H  aspirin, 81 mg, Oral, Daily  carvedilol, 6.25 mg, Oral, BID With Meals  cetirizine, 10 mg, Oral, Daily  [START ON 2022] furosemide, 40 mg, Oral, Daily  gabapentin, 600 mg, Oral, Nightly  guaiFENesin, 1,200 mg, Oral, Q12H  ipratropium, 0.5 mg, Nebulization, 4x Daily - RT  lactated ringers, 125 mL/hr, Intravenous, Once  nicotine, 1 patch, Transdermal, Q24H  ranolazine, 500 mg, Oral, Q12H  sacubitril-valsartan, 1 tablet, Oral, Q12H  sodium chloride, 10 mL, Intravenous, Q12H  sodium chloride, 10 mL, Intravenous, Q12H  sodium chloride, 10 mL, Intravenous, Q12H  sodium chloride, 10 mL, Intravenous, Q12H  sodium chloride, 10 mL, Intravenous, Q12H  sodium chloride, 10 mL, Intravenous, Q12H  spironolactone, 25 mg, Oral, Daily  tamsulosin, 0.4 mg, Oral, Nightly      Pharmacy Consult,   Pharmacy Consult,       ----------------------------------------------------------------------------------------------------------------------  Vital Signs:  Temp:  [98 °F (36.7 °C)-98.7 °F (37.1 °C)] 98.2 °F (36.8 °C)  Heart Rate:  [64-73] 73  Resp:  [14-18] 18  BP: ()/(41-62) 113/62      22  0500 22  0500 22  1620   Weight: 92.7 kg (204 lb 5.9 oz) 92 kg (202 lb 13.2 oz) 91.6 kg (202 lb)      Body mass index is 30.71 kg/m².    Intake/Output Summary (Last 24 hours) at 7/11/2022 1536  Last data filed at 7/11/2022 1419  Gross per 24 hour   Intake 1700 ml   Output 2300 ml   Net -600 ml     Diet Regular; Cardiac, Daily Fluid Restriction, Low Sodium; Other; 1,800; 2,000 mg Na  ----------------------------------------------------------------------------------------------------------------------  Physical exam:  Constitutional: Well-nourished  male in no apparent distress.     HENT:  Head:  Normocephalic and atraumatic.  Mouth:  Moist mucous membranes.    Eyes:  Conjunctivae and EOM are normal.  Pupils are equal, round, and reactive to light.  No scleral icterus.    Neck:  Neck supple. No thyromegaly.  No JVD present.    Cardiovascular:  Regular rate and rhythm with no murmurs, rubs, clicks or gallops appreciated.  Pulmonary/Chest:  Clear to auscultation bilaterally with no crackles, wheezes or rhonchi appreciated.  Abdominal:  Soft. Nontender. Nondistended  Bowel sounds are normal in all four quadrants. No organomegally appreciated.   Musculoskeletal:  trace bilateral lower extremity edema, no tenderness, and no deformity.  No red or swollen joints anywhere.    Patient with significant scrotal edema  Neurological:  Alert, Cranial nerves II-XII intact with no focal deficits.  No facial droop.  No slurred speech.   Skin:  Warm and dry to palpation with no rashes or lesions appreciated.  Peripheral vascular:  2+ radial and pedal pulses in bilateral upper and lower extremities.  Psychiatric:  Alert and oriented x3  -------------------------------------------------------------------------------  ----------------------------------------------------------------------------------------------------------------------      Results from last 7 days   Lab Units 07/11/22  0429 07/09/22  0428 07/08/22  0102   WBC 10*3/mm3 7.66 7.46 7.44   HEMOGLOBIN g/dL 13.2 12.4* 12.0*   HEMATOCRIT % 41.0 38.0 37.0*   MCV fL  95.1 93.8 94.4   MCHC g/dL 32.2 32.6 32.4   PLATELETS 10*3/mm3 266 263 234         Results from last 7 days   Lab Units 07/11/22  0429 07/10/22  0415 07/09/22 0428 07/08/22  0102 07/07/22  0323   SODIUM mmol/L 135* 135* 136   < > 137   POTASSIUM mmol/L 4.7 4.6 4.6   < > 4.7   CHLORIDE mmol/L 98 97* 98   < > 99   CO2 mmol/L 25.3 26.8 28.1   < > 28.3   BUN mg/dL 30* 29* 22   < > 20   CREATININE mg/dL 1.27 1.15 1.24   < > 1.24   CALCIUM mg/dL 8.9 8.9 8.6   < > 8.6   GLUCOSE mg/dL 122* 97 82   < > 95   ALBUMIN g/dL 3.42*  --   --   --  3.52   BILIRUBIN mg/dL 0.4  --   --   --  0.5   ALK PHOS U/L 78  --   --   --  75   AST (SGOT) U/L 29  --   --   --  20   ALT (SGPT) U/L 45*  --   --   --  22    < > = values in this interval not displayed.   Estimated Creatinine Clearance: 66.3 mL/min (by C-G formula based on SCr of 1.27 mg/dL).    No results found for: AMMONIA      No results found for: BLOODCX  No results found for: URINECX  No results found for: WOUNDCX  No results found for: STOOLCX    I have personally looked at the labs and they are summarized above.  ----------------------------------------------------------------------------------------------------------------------  Imaging Results (Last 24 Hours)     ** No results found for the last 24 hours. **        ----------------------------------------------------------------------------------------------------------------------  Assessment and Plan:    1.  A. fib with RVR - Patient continues to remain in normal sinus rhythm.  We will continue amiodarone 400 mg p.o. twice daily until tomorrow, then decrease to 200 mg p.o. daily. Continue carvedilol, Entresto and Eliquis.    2.  Acute on chronic systolic CHF -acute phase essentially resolved, have transition to oral diuretics.  Appreciate cardiology recommendations.    3.  Acute kidney injury -resolved    4.  COPD - no wheezing on exam today.  We will continue supportive care for now.    Disposition likely discharge home  tomorrow    Napoleon Del Angel, DO   07/11/22   15:36 EDT

## 2022-07-11 NOTE — NURSING NOTE
Wound consult for an open area to the midline gluteal cleft. Area present with a linear break in the epidermis from moisture and intertriginous dermatitis. PT report this is a chronic issue, and that it heals and opens occasionally. New order for Z-guard Q shift and PRN.

## 2022-07-11 NOTE — PLAN OF CARE
Goal Outcome Evaluation:              Outcome Evaluation: Patient has rested in bed, no complaints or request at this time, VSS, Will continue to monitor.

## 2022-07-12 ENCOUNTER — READMISSION MANAGEMENT (OUTPATIENT)
Dept: CALL CENTER | Facility: HOSPITAL | Age: 62
End: 2022-07-12

## 2022-07-12 VITALS
BODY MASS INDEX: 30.62 KG/M2 | RESPIRATION RATE: 20 BRPM | HEART RATE: 60 BPM | WEIGHT: 202 LBS | SYSTOLIC BLOOD PRESSURE: 102 MMHG | HEIGHT: 68 IN | DIASTOLIC BLOOD PRESSURE: 61 MMHG | TEMPERATURE: 97.6 F | OXYGEN SATURATION: 98 %

## 2022-07-12 PROBLEM — I48.91 ATRIAL FIBRILLATION WITH RVR: Status: RESOLVED | Noted: 2022-06-23 | Resolved: 2022-07-12

## 2022-07-12 PROBLEM — I50.22 CHRONIC SYSTOLIC HEART FAILURE: Status: ACTIVE | Noted: 2022-07-12

## 2022-07-12 LAB
ANION GAP SERPL CALCULATED.3IONS-SCNC: 7.6 MMOL/L (ref 5–15)
BUN SERPL-MCNC: 30 MG/DL (ref 8–23)
BUN/CREAT SERPL: 26.5 (ref 7–25)
CALCIUM SPEC-SCNC: 9 MG/DL (ref 8.6–10.5)
CHLORIDE SERPL-SCNC: 98 MMOL/L (ref 98–107)
CO2 SERPL-SCNC: 27.4 MMOL/L (ref 22–29)
CREAT SERPL-MCNC: 1.13 MG/DL (ref 0.76–1.27)
DEPRECATED RDW RBC AUTO: 48.7 FL (ref 37–54)
EGFRCR SERPLBLD CKD-EPI 2021: 73.5 ML/MIN/1.73
ERYTHROCYTE [DISTWIDTH] IN BLOOD BY AUTOMATED COUNT: 13.9 % (ref 12.3–15.4)
GLUCOSE SERPL-MCNC: 84 MG/DL (ref 65–99)
HCT VFR BLD AUTO: 40.8 % (ref 37.5–51)
HGB BLD-MCNC: 13.2 G/DL (ref 13–17.7)
MCH RBC QN AUTO: 30.8 PG (ref 26.6–33)
MCHC RBC AUTO-ENTMCNC: 32.4 G/DL (ref 31.5–35.7)
MCV RBC AUTO: 95.3 FL (ref 79–97)
PLATELET # BLD AUTO: 282 10*3/MM3 (ref 140–450)
PMV BLD AUTO: 9.2 FL (ref 6–12)
POTASSIUM SERPL-SCNC: 5.4 MMOL/L (ref 3.5–5.2)
RBC # BLD AUTO: 4.28 10*6/MM3 (ref 4.14–5.8)
SODIUM SERPL-SCNC: 133 MMOL/L (ref 136–145)
WBC NRBC COR # BLD: 6.54 10*3/MM3 (ref 3.4–10.8)

## 2022-07-12 PROCEDURE — 99239 HOSP IP/OBS DSCHRG MGMT >30: CPT | Performed by: INTERNAL MEDICINE

## 2022-07-12 PROCEDURE — 94799 UNLISTED PULMONARY SVC/PX: CPT

## 2022-07-12 PROCEDURE — 80048 BASIC METABOLIC PNL TOTAL CA: CPT | Performed by: INTERNAL MEDICINE

## 2022-07-12 PROCEDURE — 85027 COMPLETE CBC AUTOMATED: CPT | Performed by: INTERNAL MEDICINE

## 2022-07-12 RX ORDER — ATORVASTATIN CALCIUM 40 MG/1
40 TABLET, FILM COATED ORAL DAILY
Qty: 90 TABLET | Refills: 1 | Status: SHIPPED | OUTPATIENT
Start: 2022-07-12 | End: 2022-07-26 | Stop reason: SDUPTHER

## 2022-07-12 RX ORDER — RANOLAZINE 500 MG/1
500 TABLET, EXTENDED RELEASE ORAL EVERY 12 HOURS SCHEDULED
Qty: 60 TABLET | Refills: 1 | Status: SHIPPED | OUTPATIENT
Start: 2022-07-12 | End: 2022-07-26 | Stop reason: SDUPTHER

## 2022-07-12 RX ORDER — CARVEDILOL 6.25 MG/1
6.25 TABLET ORAL 2 TIMES DAILY WITH MEALS
Qty: 60 TABLET | Refills: 1 | Status: SHIPPED | OUTPATIENT
Start: 2022-07-12 | End: 2022-07-26 | Stop reason: SDUPTHER

## 2022-07-12 RX ORDER — ATORVASTATIN CALCIUM 40 MG/1
40 TABLET, FILM COATED ORAL NIGHTLY
Status: DISCONTINUED | OUTPATIENT
Start: 2022-07-12 | End: 2022-07-12 | Stop reason: HOSPADM

## 2022-07-12 RX ORDER — AMIODARONE HYDROCHLORIDE 200 MG/1
200 TABLET ORAL DAILY
Qty: 30 TABLET | Refills: 1 | Status: SHIPPED | OUTPATIENT
Start: 2022-07-12 | End: 2022-07-26 | Stop reason: SDUPTHER

## 2022-07-12 RX ADMIN — SPIRONOLACTONE 25 MG: 25 TABLET ORAL at 09:11

## 2022-07-12 RX ADMIN — APIXABAN 5 MG: 5 TABLET, FILM COATED ORAL at 09:11

## 2022-07-12 RX ADMIN — SACUBITRIL AND VALSARTAN 1 TABLET: 24; 26 TABLET, FILM COATED ORAL at 09:10

## 2022-07-12 RX ADMIN — Medication 10 ML: at 09:14

## 2022-07-12 RX ADMIN — GUAIFENESIN 1200 MG: 600 TABLET, EXTENDED RELEASE ORAL at 09:10

## 2022-07-12 RX ADMIN — CETIRIZINE HYDROCHLORIDE 10 MG: 10 TABLET, FILM COATED ORAL at 09:11

## 2022-07-12 RX ADMIN — Medication 10 ML: at 09:13

## 2022-07-12 RX ADMIN — RANOLAZINE 500 MG: 500 TABLET, FILM COATED, EXTENDED RELEASE ORAL at 09:10

## 2022-07-12 RX ADMIN — HYDROCODONE BITARTRATE AND ACETAMINOPHEN 1 TABLET: 10; 325 TABLET ORAL at 11:46

## 2022-07-12 RX ADMIN — IPRATROPIUM BROMIDE 0.5 MG: 0.5 SOLUTION RESPIRATORY (INHALATION) at 07:20

## 2022-07-12 RX ADMIN — HYDROCODONE BITARTRATE AND ACETAMINOPHEN 1 TABLET: 10; 325 TABLET ORAL at 03:08

## 2022-07-12 RX ADMIN — ASPIRIN 81 MG: 81 TABLET, COATED ORAL at 09:10

## 2022-07-12 RX ADMIN — AMIODARONE HYDROCHLORIDE 400 MG: 200 TABLET ORAL at 09:11

## 2022-07-12 RX ADMIN — Medication 10 ML: at 09:12

## 2022-07-12 RX ADMIN — NICOTINE TRANSDERMAL SYSTEM 1 PATCH: 21 PATCH, EXTENDED RELEASE TRANSDERMAL at 09:00

## 2022-07-12 RX ADMIN — CARVEDILOL 6.25 MG: 6.25 TABLET, FILM COATED ORAL at 09:11

## 2022-07-12 RX ADMIN — HYDROCODONE BITARTRATE AND ACETAMINOPHEN 1 TABLET: 10; 325 TABLET ORAL at 07:22

## 2022-07-12 RX ADMIN — FUROSEMIDE 40 MG: 40 TABLET ORAL at 09:11

## 2022-07-12 NOTE — PLAN OF CARE
Goal Outcome Evaluation:  Pt resting in bed. Prn pain medication given this shift. Pt has voided with no issues. VSS. Will continue plan of care.

## 2022-07-12 NOTE — DISCHARGE INSTR - APPOINTMENTS
You have an appointment scheduled with Olivia PASCUAL on 7-26-22 AT 11:30    You have an appointment with Amy PASCUAL  ON 7-19-22 AT 9:30

## 2022-07-12 NOTE — DISCHARGE PLACEMENT REQUEST
"Chong White (62 y.o. Male)             Date of Birth   1960    Social Security Number       Address   [address] [facility] [address]    Home Phone       MRN   [id number]       Scientologist   Shinto    Marital Status   Single                            Admission Date   6/23/22    Admission Type   Emergency    Admitting Provider   Napoleon Del Angel DO    Attending Provider   Napoleon Del Angel DO    Department, Room/Bed   [address], 3348/1S       Discharge Date       Discharge Disposition   Home or Self Care    Discharge Destination                               Attending Provider: Napoleon Del Angel DO    Allergies: Penicillins    Isolation: None   Infection: None   Code Status: CPR   Advance Care Planning Activity    Ht: 172.7 cm (68\")   Wt: 91.6 kg (202 lb)    Admission Cmt: None   Principal Problem: None                Active Insurance as of 6/23/2022     Primary Coverage     Payor Plan Insurance Group Employer/Plan Group    [place] [id number]     Payor Plan Address Payor Plan Phone Number Payor Plan Fax Number Effective Dates    [employer] [address] [address] [phone]  4/1/2020 - None Entered    [facility] [id number]       Subscriber Name Subscriber Birth Date Member ID       CHONG WHITE 1960 [id number]                 Emergency Contacts      (Rel.) Home Phone Work Phone Mobile Phone    KENDRICK WHITE (Sister) -- -- [phone]    RICKLARONOTTO (Daughter) [phone] -- [phone]            Discharge Order (From admission, onward)     Start     Ordered    07/12/22 0858  Discharge patient  Once        Expected Discharge Date: 07/12/22    Expected Discharge Time: Morning    Discharge Disposition: Home or Self Care    Physician of Record for Attribution - Please select from Treatment Team: NAPOLEON DEL ANGEL [[id number]]    Review needed by CMO to determine Physician of Record: No     "   Question Answer Comment   Physician of Record for Attribution - Please select from Treatment Team EMILEE GRAHAM    Review needed by CMO to determine Physician of Record No        07/12/22 0838

## 2022-07-12 NOTE — CASE MANAGEMENT/SOCIAL WORK
Discharge Planning Assessment   [facility]     Patient Name: Chong White  MRN: [id number]  Today's Date: 7/12/2022    Admit Date: 6/23/2022       Discharge Plan     Row Name 07/12/22 1020       Plan    Final Discharge Disposition Code 01 - home or self-care    Final Note Pt is being discharged home today. No needs identified.    12:40: SS spoke to pt who request transportation. SS contacted [facility] per Hailey [phone] to arrange transportation. SS faxed facesheet and discharge order to [facility] [phone]. No other needs identified.    13:18: SS received call [facility] per Hailey who states ETA is 14:00. No other needs identified.              Continued Care and Services - Admitted Since 6/23/2022     Durable Medical Equipment     Service Provider Request Status Selected Services Address Phone Fax Patient Preferred    [facility]  Pending - No Request Sent N/A [id number] [address] [phone] --              Expected Discharge Date and Time     Expected Discharge Date Expected Discharge Time    Jul 12, 2022         ELLE Faye

## 2022-07-12 NOTE — DISCHARGE SUMMARY
[facility] HOSPITALIST MEDICINE DISCHARGE SUMMARY    Patient Identification:  Name:  Chong White  Age:  62 y.o.  Sex:  male  :  1960  MRN:  [id number]  Visit Number:  [id number]    Date of Admission: 2022  Date of Discharge: 2022    PCP: Amy Yanez, APRN    DISCHARGE DIAGNOSIS   1.  A. fib with RVR  2.  Acute on chronic systolic CHF  3.  Acute kidney injury  4.  COPD      CONSULTS  1. Dr. Delarosa, Cardiology      PROCEDURES PERFORMED   1.  Patient did undergo transesophageal echocardiogram with electrical cardioversion on 2022 for persistent A. fib with RVR.  Patient tolerated the procedure well with no postprocedural complications.  Please see procedure note for specific details.      HOSPITAL COURSE  Mr. White is a 62 y.o. male who presented to [facility] ED on 2022 with a chief complaint of shortness of breath.  Patient has a past medical history markable for atrial fibrillation, chronic systolic CHF, tobacco abuse, CAD, essential hypertension and GERD.  Should be noted patient is a poor historian and exact details surrounding admission are somewhat unclear.  However, patient stated approximately 5 days prior to evaluation in the emergency department he had worsening shortness of breath with increasing edema in bilateral lower extremities.  For this reason, patient did present to the emergency department for further treatment and evaluation.  Initial evaluation emergency department did consist of basic laboratory work as well as physical exam and vital signs.  Please see initial H&P for specific details.  Initial evaluation found patient to be in atrial fibrillation with RVR with rates as high as 150 and 160 bpm.  Patient was given metoprolol and placed on a Cardizem drip and then loaded with digoxin in the emergency department.  Patient had persistent tachycardia with rates in the 120s and 130s.  Patient also had findings consistent with pulmonary edema  on chest x-ray and as such was admitted for further treatment and evaluation.    Patient was started on Eliquis 5 mg p.o. twice daily and cardiology consultation was obtained.  In regards to CHF, patient was diuresed with Lasix 40 mg IV x1 dose in the emergency department.  After further evaluation by cardiology services, recommendation was made to attempt rate control with beta-blockers and digoxin.  Patient was also started on amiodarone with plan to consider direct-current cardioversion later in patient's hospital stay.  Patient was placed on a fluid restricted diet and diuresis was continued throughout the remainder patient's hospital stay.  Patient did undergo LUCERO with electrical cardioversion on 6/28/2022 with successful conversion to sinus rhythm.  Since return to normal sinus rhythm, patient was continued on IV diuresis for over an additional week.  Patient had tremendous response to IV diuretics with a total net negative urine output greater than 20 L.  Patient did have dramatic improvement in bilateral lower extremity edema as well as scrotal edema.  Patient states he feels back to his baseline functional status on discharge date.  With this in mind, it is felt patient has reached maximum medical benefit of current hospitalization and will be discharged home in stable condition today.  The beforementioned plan was thoroughly discussed with the patient and he expresses understanding and willingness to proceed with the beforementioned plan.  Have recommended patient follow-up with cardiology services within 2 weeks of discharge.  It should be noted LifeVest was placed prior to patient being discharged.  Patient also had nuclear stress test performed during this admission which demonstrated no evidence of ischemia.    VITAL SIGNS:      06/29/22  0500 07/01/22  0500 07/01/22  1620   Weight: 92.7 kg (204 lb 5.9 oz) 92 kg (202 lb 13.2 oz) 91.6 kg (202 lb)     Body mass index is 30.71 kg/m².    PHYSICAL  EXAM:  Constitutional: Well-nourished  male in no apparent distress.     HENT:  Head:  Normocephalic and atraumatic.  Mouth:  Moist mucous membranes.    Eyes:  Conjunctivae and EOM are normal.  Pupils are equal, round, and reactive to light.  No scleral icterus.    Neck:  Neck supple. No thyromegaly.  No JVD present.    Cardiovascular:  Regular rate and rhythm with no murmurs, rubs, clicks or gallops appreciated.  Pulmonary/Chest:  Clear to auscultation bilaterally with no crackles, wheezes or rhonchi appreciated.  Abdominal:  Soft. Nontender. Nondistended  Bowel sounds are normal in all four quadrants. No organomegally appreciated.   Musculoskeletal:  trace bilateral lower extremity edema, no tenderness, and no deformity.  No red or swollen joints anywhere.    Patient with significant scrotal edema  Neurological:  Alert, Cranial nerves II-XII intact with no focal deficits.  No facial droop.  No slurred speech.   Skin:  Warm and dry to palpation with no rashes or lesions appreciated.  Peripheral vascular:  2+ radial and pedal pulses in bilateral upper and lower extremities.  Psychiatric:  Alert and oriented x3    DISCHARGE DISPOSITION   Stable    DISCHARGE MEDICATIONS:     Discharge Medications      New Medications      Instructions Start Date   amiodarone 200 MG tablet  Commonly known as: Pacerone   200 mg, Oral, Daily      atorvastatin 40 MG tablet  Commonly known as: Lipitor   40 mg, Oral, Daily      carvedilol 6.25 MG tablet  Commonly known as: COREG   6.25 mg, Oral, 2 Times Daily With Meals      Eliquis 5 MG tablet tablet  Generic drug: apixaban   5 mg, Oral, Every 12 Hours Scheduled      Entresto 24-26 MG tablet  Generic drug: sacubitril-valsartan   1 tablet, Oral, Every 12 Hours Scheduled      ranolazine 500 MG 12 hr tablet  Commonly known as: RANEXA   500 mg, Oral, Every 12 Hours Scheduled      Verquvo 2.5 MG tablet  Generic drug: Vericiguat   2.5 mg, Oral, Daily With Dinner, Hold for SBP less than  90.         Continue These Medications      Instructions Start Date   aspirin 81 MG EC tablet   81 mg, Oral, Daily      cyclobenzaprine 10 MG tablet  Commonly known as: FLEXERIL   10 mg, Oral, Every 12 Hours PRN      furosemide 40 MG tablet  Commonly known as: LASIX   40 mg, Oral, Daily, Prior to [facility] Admission, Patient was on:  filled on 06/17/22 x 5 doses      gabapentin 600 MG tablet  Commonly known as: NEURONTIN   600 mg, Oral, Nightly      loratadine 10 MG tablet  Commonly known as: CLARITIN   10 mg, Oral, Daily PRN      tamsulosin 0.4 MG capsule 24 hr capsule  Commonly known as: FLOMAX   1 capsule, Oral, Nightly         Stop These Medications    lisinopril 5 MG tablet  Commonly known as: PRINIVIL,ZESTRIL     meloxicam 15 MG tablet  Commonly known as: MOBIC              Your Scheduled Appointments    Jul 26, 2022 11:30 AM  Follow Up with SAMARA Almonte  [facility] CARDIOLOGY [facility] [address]  [facility] [address] [phone]   -Bring photo ID, insurance card, and list of medications to appointment  -If testing was completed outside of [facility] then patient must bring images on a disc  -Copay will be collected at time of appointment  -Established patients should arrive 10 minutes prior to appointment       Aug 08, 2022  3:00 PM  New Patient with Jose Arechiga DO  [facility] CARDIOLOGY [address]  [facility] [address] [phone]   -Bring photo ID, insurance card, and list of medications to appointment  -If testing was completed outside of [facility] then patient must bring images on a disc  -Copay will be collected at time of appointment  -New patients should arrive 15 minutes prior to appointment       Additional instructions:    You have an appointment scheduled with Olivia PASCUAL on 7-26-22 AT 11:30    You have an appointment with Amy PASCUAL  ON 7-19-22 AT 9:30           Additional  Instructions for the Follow-ups that You Need to Schedule     Discharge Follow-up with Specified Provider: Dr. Delarosa; 2 Weeks   As directed      To: Dr. Delarosa    Follow Up: 2 Weeks    Follow Up Details: chf            Follow-up Information     [facility] .    Specialty: Cardiology  Contact information:  [address] [place] [address] [phone]           Amy Yanez APRN .    Specialty: Family Medicine  Contact information:  [address]  [place] [id number]  [phone]                         TEST  RESULTS PENDING AT DISCHARGE       The ASCVD Risk score (Mago SALVADOR Jr., et al., 2013) failed to calculate for the following reasons:    The patient has a prior MI or stroke diagnosis     Napoleon Del Angel DO  07/12/22  18:41 EDT    Please note that this discharge summary required more than 30 minutes to complete.    Please send a copy of this dictation to the following providers:  Amy Yanez APRN

## 2022-07-13 NOTE — OUTREACH NOTE
Prep Survey    Flowsheet Row Responses   [facility] facility patient discharged from? [facility]   Is LACE score < 7 ? No   Emergency Room discharge w/ pulse ox? No   Eligibility Readm Mgmt   Discharge diagnosis A. fib with RVR   Does the patient have one of the following disease processes/diagnoses(primary or secondary)? CHF   Does the patient have Home health ordered? No   Is there a DME ordered? Yes   What DME was ordered? [facility] per [facility]   Medication alerts for this patient Amiodarone HCL, Eliquis    Prep survey completed? Yes          LISA LEA - Registered Nurse

## 2022-07-14 ENCOUNTER — READMISSION MANAGEMENT (OUTPATIENT)
Dept: CALL CENTER | Facility: HOSPITAL | Age: 62
End: 2022-07-14

## 2022-07-14 NOTE — OUTREACH NOTE
"CHF Week 1 Survey    Flowsheet Row Responses   [facility] patient discharged from? [facility]   Does the patient have one of the following disease processes/diagnoses(primary or secondary)? CHF   CHF Week 1 attempt successful? Yes   Call start time 0948   Call end time 1003   Is patient permission given to speak with other caregiver? Yes   List who call center can speak with Daughter-Gwen.   Meds reviewed with patient/caregiver? Yes   Is the patient having any side effects they believe may be caused by any medication additions or changes? No   Does the patient have all medications ordered at discharge? Yes   Is the patient taking all medications as directed (includes completed medication regime)? No   What is preventing the patient from taking all medications as directed? Other   Nursing Interventions Advised patient to call provider   Medication comments States has run out of Tamsulosin. Advised to call PCP for refill.   Comments regarding appointments HF clinic appt 07/15/22. PCP appt 07/19/22. Cardiology appt 07/26/22.   Does the patient have a primary care provider?  Yes   Does the patient have an appointment with their PCP within 7 days of discharge? Yes   Has the patient kept scheduled appointments due by today? N/A   Has home health visited the patient within 72 hours of discharge? N/A   Has all DME been delivered? Yes   DME comments Has Life Vest.   Pulse Ox monitoring None   Psychosocial issues? Yes   Psychosocial comments States is caring for son who has schizophrenia. States was \"in a hurry\" to get home to care for his son.   Did the patient receive a copy of their discharge instructions? Yes   Nursing interventions Reviewed instructions with patient   What is the patient's perception of their health status since discharge? Same   Nursing interventions Nurse provided patient education   Is the patient weighing daily? No   Does the patient have scales? Yes  [States will need to get new batteries for " his scale.]   Daily weight interventions Education provided on importance of daily weight   Is the patient able to teach back Heart Failure diet management? Yes   Is the patient able to teach back Heart Failure Zones? Yes   Is the patient able to teach back signs and symptoms of worsening condition? (i.e. weight gain, shortness of air, etc.) Yes   If the patient is a current smoker, are they able to teach back resources for cessation? Smoking cessation medications, Smoking cessation classes, Smoking cessation support groups, [id number]   Is the patient/caregiver able to teach back the hierarchy of who to call/visit for symptoms/problems? PCP, Specialist, Home health nurse, Urgent Care, ED, 911 Yes    CHF Week 1 call completed? Yes   Wrap up additional comments States is feeling about the same. States had some chest pain last night with SOA, but none today. States does feel like having heart palpitations. Advised to return to ER if having palpitations, chest pain or worsening SOA. Denies any edema today. States having issues with Life Vest today, and waiting for company representative to arrive to assess it. Denies any needs at this time.          LELA AKINS - Registered Nurse

## 2022-07-15 ENCOUNTER — HOSPITAL ENCOUNTER (OUTPATIENT)
Dept: CARDIOLOGY | Facility: HOSPITAL | Age: 62
Discharge: HOME | End: 2022-07-15
Admitting: PHYSICIAN ASSISTANT

## 2022-07-15 VITALS
HEART RATE: 68 BPM | DIASTOLIC BLOOD PRESSURE: 58 MMHG | OXYGEN SATURATION: 91 % | SYSTOLIC BLOOD PRESSURE: 99 MMHG | WEIGHT: 162.2 LBS | BODY MASS INDEX: 24.58 KG/M2 | HEIGHT: 68 IN

## 2022-07-15 DIAGNOSIS — G62.9 NEUROPATHY: ICD-10-CM

## 2022-07-15 DIAGNOSIS — I50.22 CHRONIC SYSTOLIC HEART FAILURE: Primary | ICD-10-CM

## 2022-07-15 LAB
ABSOLUTE LUNG FLUID CONTENT: 36 % (ref 20–35)
ANION GAP SERPL CALCULATED.3IONS-SCNC: 10.2 MMOL/L (ref 5–15)
BUN SERPL-MCNC: 22 MG/DL (ref 8–23)
BUN/CREAT SERPL: 21.2 (ref 7–25)
CALCIUM SPEC-SCNC: 9.4 MG/DL (ref 8.6–10.5)
CHLORIDE SERPL-SCNC: 102 MMOL/L (ref 98–107)
CO2 SERPL-SCNC: 24.8 MMOL/L (ref 22–29)
CREAT SERPL-MCNC: 1.04 MG/DL (ref 0.76–1.27)
EGFRCR SERPLBLD CKD-EPI 2021: 81.2 ML/MIN/1.73
GLUCOSE SERPL-MCNC: 83 MG/DL (ref 65–99)
MAGNESIUM SERPL-MCNC: 2.3 MG/DL (ref 1.6–2.4)
NT-PROBNP SERPL-MCNC: 1358 PG/ML (ref 0–900)
POTASSIUM SERPL-SCNC: 4.7 MMOL/L (ref 3.5–5.2)
SODIUM SERPL-SCNC: 137 MMOL/L (ref 136–145)

## 2022-07-15 PROCEDURE — 83735 ASSAY OF MAGNESIUM: CPT | Performed by: PHYSICIAN ASSISTANT

## 2022-07-15 PROCEDURE — 80048 BASIC METABOLIC PNL TOTAL CA: CPT | Performed by: PHYSICIAN ASSISTANT

## 2022-07-15 PROCEDURE — 83880 ASSAY OF NATRIURETIC PEPTIDE: CPT

## 2022-07-15 NOTE — PROGRESS NOTES
Heart Failure Clinic  Pharmacist Note     Chong White is a 62 y.o. male seen in the Heart Failure Clinic for HFrEF with an EF of 23% on 6/30/22. He was just discharged from the hospital after a 19 day stay for an acute on chronic systolic CHF. Chong White reports a fair understanding of medications. He was discharged on 7 new medications and reports being adherent to them over the past 3 days at home. He reports that he just looks at the bottles to see how to take them. He reports some episodes of dizziness. He has not been weighing himself or taking his BP.       Medication Use:   Hx of med intolerances:  None related to HF  Retail Rx Management: Uses [facility]    Past Medical History:   Diagnosis Date   • Atrial fibrillation (HCC)    • GERD (gastroesophageal reflux disease)    • Hypertension    • Myocardial infarction (HCC)      ALLERGIES: Penicillins  Current Outpatient Medications   Medication Sig Dispense Refill   • amiodarone (Pacerone) 200 MG tablet Take 1 tablet by mouth Daily. 30 tablet 1   • apixaban (ELIQUIS) 5 MG tablet tablet Take 1 tablet by mouth Every 12 (Twelve) Hours. Indications: Atrial Fibrillation 60 tablet 1   • aspirin 81 MG EC tablet Take 81 mg by mouth Daily.     • atorvastatin (Lipitor) 40 MG tablet Take 1 tablet by mouth Daily. 90 tablet 1   • carvedilol (COREG) 6.25 MG tablet Take 1 tablet by mouth 2 (Two) Times a Day With Meals. 60 tablet 1   • furosemide (LASIX) 40 MG tablet Take 40 mg by mouth Daily. Prior to [facility] Admission, Patient was on:  filled on 06/17/22 x 5 doses     • ranolazine (RANEXA) 500 MG 12 hr tablet Take 1 tablet by mouth Every 12 (Twelve) Hours. 60 tablet 1   • sacubitril-valsartan (ENTRESTO) 24-26 MG tablet Take 1 tablet by mouth Every 12 (Twelve) Hours. 60 tablet 1   • tamsulosin (FLOMAX) 0.4 MG capsule 24 hr capsule Take 1 capsule by mouth Every Night.     • Vericiguat (Verquvo) 2.5 MG tablet Take 1 tablet by mouth Daily With Dinner. Hold for SBP  "less than 90. 30 tablet 0   • cyclobenzaprine (FLEXERIL) 10 MG tablet Take 10 mg by mouth Every 12 (Twelve) Hours As Needed for Muscle Spasms.     • gabapentin (NEURONTIN) 600 MG tablet Take 600 mg by mouth Every Night.     • loratadine (CLARITIN) 10 MG tablet Take 10 mg by mouth Daily As Needed for Allergies.       No current facility-administered medications for this encounter.       Vaccination History:   Pneumonia:   Annual Influenza:   COVID 19:     Objective  Vitals:    07/15/22 1200   BP: 99/58   BP Location: Right arm   Patient Position: Sitting   Pulse: 68   SpO2: 91%   Weight: 73.6 kg (162 lb 3.2 oz)   Height: 172.7 cm (68\")     Wt Readings from Last 3 Encounters:   07/15/22 73.6 kg (162 lb 3.2 oz)   07/01/22 91.6 kg (202 lb)   06/23/22 85.7 kg (189 lb)         07/15/22  1200   Weight: 73.6 kg (162 lb 3.2 oz)     Lab Results   Component Value Date    GLUCOSE 83 07/15/2022    BUN 22 07/15/2022    CREATININE 1.04 07/15/2022    EGFRIFNONA 84 02/23/2022    EGFRIFAFRI 106 10/29/2020    BCR 21.2 07/15/2022    K 4.7 07/15/2022    CO2 24.8 07/15/2022    CALCIUM 9.4 07/15/2022    PROTENTOTREF 6.6 10/29/2020    ALBUMIN 3.42 (L) 07/11/2022    LABIL2 1.9 10/29/2020    AST 29 07/11/2022    ALT 45 (H) 07/11/2022     Lab Results   Component Value Date    WBC 6.54 07/12/2022    HGB 13.2 07/12/2022    HCT 40.8 07/12/2022    MCV 95.3 07/12/2022     07/12/2022     Lab Results   Component Value Date    TROPONINT <0.010 06/27/2022     Lab Results   Component Value Date    PROBNP 1,358.0 (H) 07/15/2022     Results for orders placed during the hospital encounter of 06/23/22    Adult Transesophageal Echo (LUCERO) W/ Cont if Necessary Per Protocol    Interpretation Summary  · Left ventricular ejection fraction appears to be less than 20%. Left ventricular systolic function is severely decreased.  · The left ventricular cavity is moderate to severely dilated.  · Moderately reduced right ventricular systolic function noted.  · " No evidence of a left atrial appendage thrombus was present.  · The aortic valve is structurally normal. Mild aortic valve regurgitation is present.  · Moderate mitral valve regurgitation is present. No significant mitral valve stenosis is present.  · Moderate tricuspid valve regurgitation is present.  · There is no evidence of pericardial effusion.  · Comments: Proceed with electrical cardioversion.         GDMT    Drug Class   Drug   Dose Last Dose Adjustment Additional Titration   Notes   ACEi/ARB/ARNI Entresto 24/26 7/12/22     Beta Blocker Carvedilol 6.25mg 7/12/22     MRA        SGLT2i    N/A     Vericiguat 2.5mg 7/12/22 *during hospitalization         Drug Therapy Problems    1. Drug Interactions Screening: Loratadine and Amiodarone  2. Adherence to Meds- been non-adherent in the past   3. Daily BP and WT logs and Vericiguat monitoring    Recommendations:     1. Pt reports not taking the loratadine. Recommended pt to not take in the future but to let make his PCP aware of any new symptoms requiring an antihistamine.  2. Educated Pt on the importance of medication adherence.  He did not bring in his meds at this visit but reported that he would take his new meds that they gave him at our pharmacy upon discharge regularly. He is disregarding all the prior meds he had been on before his hospitalization except for his ASA and his prn meds. I did explained that when he starts running low on his medications and he does not have an appointment coming up, to call us so that we can make sure to get him refills and schedule him an appointment. I stressed this and the importance of following up with us and his other doctors.   3. I gave the pt a new BP monitor and explained what he was looking at as far as the top and bottom number of his BP and his pulse rate. I educated him on his Vericiguat dosing and holding a dose if his SBP (top number for pt) was less than 90 as instructed in the hospital. Pt seemed to  understand.     Discharge medications have been reviewed and reconciled.    Patient was educated on heart failure medications and the importance of medication adherence. All questions were addressed and patient expressed understanding. Used teach-back method to assess understanding.     Thank you for allowing me to participate in the care of your patient,    Destini Toscano [facility]  07/15/22  12:26 EDT

## 2022-07-15 NOTE — PROGRESS NOTES
Heart Failure Clinic    Date: 07/15/22     Vitals:    07/15/22 1200   BP: 99/58   Pulse: 68   SpO2: 91%        Method of arrival: Other  had to wheel pt in    Weighing self daily: No    Monitoring Heart Failure Zones: Yes    Today's HF Zone: Yellow     Taking medications as prescribed: Yes    Edema No    Shortness of Air: Yes    Number of pillows used at night:<2    Educational Materials given:  no                                                                         ReDS Value:   36-41 Possible Hypervolemic Status  36%    Melodie Esquivel MA 07/15/22 12:02 EDT

## 2022-07-15 NOTE — PROGRESS NOTES
[facility]  NIKI Connelly Ray G, MD  [address]  [address]  [place] [address]    Thank you for asking me to see Chong White for congestive heart failure.    History of Present Illness     This is a 62 y.o. male with known past medical history of atrial fibrillation, chronic systolic CHF with distant history of documented IVDA, tobacco abuse, CAD, essential hypertension and GERD.  He was last hospitalized from 06/23/22 through 07/12/22 with atrial fibrillation with RVR, acute on chronic systolic CHF, MAISHA, and COPD.      He underwent LUCERO guided cardioversion and was started on amiodarone in addition to Eliquis, Entresto, Verquvo, Ranolazine, Coreg, and atorvastatin.      He is a patient known to the clinic with recent hospitalization and medication adjustments.      Chong White is a 62 y.o. male who presents for today for CHF follow-up.  The patient is typically seen by Amy Yanez APRN.  Patient's primary cardiologist is Dr. Lopez.     • Last known EF is less than 20%. Current medications prior to first visit directed toward underlying heart failure.   (LifeVest in place).      • Last known hospitalization and/or ED visit:  As outlined above with recent discharge.         Initial visit data/details regarding HF:   • Dyspnea: Ongoing dyspnea on exertion that has improved since hospitalization.   • Lower extremity swelling: Improved since hospitalization without pitting edema  • Abdominal swelling: Improved.  • Home weight: unclear but is 40 lbs down from his last visit at present.   • Home BP: (NEW BP CUFF PROVIDED IN CLINIC ON 07/15/22; new BP log continued).   • Daily activities of living:  Uses cane; requests new cane; provided during visit.   • Pillows/lying flat: 2 pillows  • Chest pain free but reports ongoing fatigue.   • Has upcoming appointment with Dr. Arechiga to discuss possible ICD.   Discussed importance of keeping appointment as patient  voices his hatred for LifeVest.   • Voices periodic dizziness with change in position.  We discussed importance of slow change in position.   • Hold parameters were discussed for blood pressures medications including both SBP hold parameters and HR holding parameters.    • Requests a note be sent to [facility] regarding his son being able to come with him to appointments to assist with information retention and compliance.    • Ongoing debility discussed; declines cardiac rehab when offered, declines physical therapy.   • Requests new cane, as his is broken. (Order sent to [facility] per patient request; also faxed).         Review of Systems - Review of Systems   Constitutional: Positive for malaise/fatigue. Negative for chills, decreased appetite and fever.   HENT: Negative for congestion and ear pain.    Eyes: Negative for blurred vision.   Cardiovascular: Positive for dyspnea on exertion. Negative for chest pain, leg swelling, near-syncope and syncope.   Respiratory: Positive for shortness of breath. Negative for cough.    Endocrine: Negative for cold intolerance and heat intolerance.   Hematologic/Lymphatic: Negative for adenopathy and bleeding problem.   Skin: Negative for color change and nail changes.   Musculoskeletal: Negative for arthritis, back pain and falls.   Gastrointestinal: Negative for bloating and abdominal pain.   Genitourinary: Negative for bladder incontinence and dysuria.   Neurological: Negative for aphonia, difficulty with concentration and disturbances in coordination.   Psychiatric/Behavioral: Negative for altered mental status and hallucinations.   Allergic/Immunologic: Negative for environmental allergies and HIV exposure.        All other systems were reviewed and were negative.    Patient Active Problem List   Diagnosis   • Atrial fibrillation (HCC)   • Abnormal nuclear stress test   • Neuropathy   • ASCVD (arteriosclerotic cardiovascular disease)   • Tobacco abuse   • Non-ischemic  cardiomyopathy (HCC)   • Chronic combined systolic and diastolic congestive heart failure (HCC)   • Chronic pain   • Chronic low back pain   • Paralysis (HCC)   • Chronic systolic heart failure (CMS/HCC)       family history includes Heart disease in his maternal grandmother and mother.     reports that he has been smoking cigarettes. He started smoking about 53 years ago. He has been smoking about 2.00 packs per day. He has never used smokeless tobacco. He reports that he does not drink alcohol and does not use drugs.    Allergies   Allergen Reactions   • Penicillins Hives         Current Outpatient Medications:   •  amiodarone (Pacerone) 200 MG tablet, Take 1 tablet by mouth Daily., Disp: 30 tablet, Rfl: 1  •  apixaban (ELIQUIS) 5 MG tablet tablet, Take 1 tablet by mouth Every 12 (Twelve) Hours. Indications: Atrial Fibrillation, Disp: 60 tablet, Rfl: 1  •  aspirin 81 MG EC tablet, Take 81 mg by mouth Daily., Disp: , Rfl:   •  atorvastatin (Lipitor) 40 MG tablet, Take 1 tablet by mouth Daily., Disp: 90 tablet, Rfl: 1  •  carvedilol (COREG) 6.25 MG tablet, Take 1 tablet by mouth 2 (Two) Times a Day With Meals., Disp: 60 tablet, Rfl: 1  •  furosemide (LASIX) 40 MG tablet, Take 40 mg by mouth Daily. Prior to [facility] Admission, Patient was on:  filled on 06/17/22 x 5 doses, Disp: , Rfl:   •  ranolazine (RANEXA) 500 MG 12 hr tablet, Take 1 tablet by mouth Every 12 (Twelve) Hours., Disp: 60 tablet, Rfl: 1  •  sacubitril-valsartan (ENTRESTO) 24-26 MG tablet, Take 1 tablet by mouth Every 12 (Twelve) Hours., Disp: 60 tablet, Rfl: 1  •  tamsulosin (FLOMAX) 0.4 MG capsule 24 hr capsule, Take 1 capsule by mouth Every Night., Disp: , Rfl:   •  Vericiguat (Verquvo) 2.5 MG tablet, Take 1 tablet by mouth Daily With Dinner. Hold for SBP less than 90., Disp: 30 tablet, Rfl: 0  •  cyclobenzaprine (FLEXERIL) 10 MG tablet, Take 10 mg by mouth Every 12 (Twelve) Hours As Needed for Muscle Spasms., Disp: , Rfl:   •  gabapentin (NEURONTIN)  600 MG tablet, Take 600 mg by mouth Every Night., Disp: , Rfl:   •  loratadine (CLARITIN) 10 MG tablet, Take 10 mg by mouth Daily As Needed for Allergies., Disp: , Rfl:       Physical Exam:  I have reviewed the patient's current vital signs as documented in the patient's EMR.   Vitals:    07/15/22 1200   BP: 99/58   Pulse: 68   SpO2: 91%     Body mass index is 24.66 kg/m².       07/15/22  1200   Weight: 73.6 kg (162 lb 3.2 oz)            Physical Exam  Vitals and nursing note reviewed.   Constitutional:       Appearance: Normal appearance.   HENT:      Head: Normocephalic and atraumatic.      Mouth/Throat:      Lips: Pink.      Mouth: Mucous membranes are moist.   Eyes:      General: Lids are normal.      Conjunctiva/sclera:      Right eye: Right conjunctiva is not injected.      Left eye: Left conjunctiva is not injected.   Pulmonary:      Effort: No tachypnea or bradypnea.      Breath sounds: No decreased breath sounds, wheezing, rhonchi or rales.   Chest:      Chest wall: No mass or lacerations.   Abdominal:      General: Bowel sounds are normal.      Palpations: Abdomen is soft.      Tenderness: There is no abdominal tenderness.      Comments: Mildly protuberant abdomen   Musculoskeletal:      Cervical back: Full passive range of motion without pain. No edema or erythema.      Right lower leg: No swelling. No edema.      Left lower leg: No swelling. No edema.   Skin:     General: Skin is warm and moist.   Neurological:      Mental Status: He is alert and oriented to person, place, and time.   Psychiatric:         Attention and Perception: Attention normal.         Mood and Affect: Mood normal.         Behavior: Behavior is cooperative.          JVP: Volume/Pulsation: Normal.        DATA REVIEWED:     EKG. I personally reviewed and interpreted the EKG.        ---------------------------------------------------  TTE/LUCERO:  Results for orders placed during the hospital encounter of 06/23/22    Adult Transesophageal  Echo (LUCERO) W/ Cont if Necessary Per Protocol    Interpretation Summary  · Left ventricular ejection fraction appears to be less than 20%. Left ventricular systolic function is severely decreased.  · The left ventricular cavity is moderate to severely dilated.  · Moderately reduced right ventricular systolic function noted.  · No evidence of a left atrial appendage thrombus was present.  · The aortic valve is structurally normal. Mild aortic valve regurgitation is present.  · Moderate mitral valve regurgitation is present. No significant mitral valve stenosis is present.  · Moderate tricuspid valve regurgitation is present.  · There is no evidence of pericardial effusion.  · Comments: Proceed with electrical cardioversion.      My interpretation of the TTE is below.     -----------------------------------------------------  CXR/Imaging:   Imaging Results (Most Recent)     None          I personally reviewed and interpreted the CXR.      -----------------------------------------------------  CT:   XR Chest 1 View    Result Date: 7/4/2022  Overall no significant interval change. Signer Name: Aleisha Ceja MD  Signed: 7/4/2022 9:54 AM  Workstation Name: [facility]  Radiology [facility]    XR Chest 1 View    Result Date: 6/26/2022  Right subclavian central line in satisfactory position. Signer Name: DANILO Fiore MD  Signed: 6/26/2022 5:47 PM  Workstation Name: [email or web address]  Radiology [facility]    XR Chest 1 View    Result Date: 6/25/2022  1. Cardiomegaly with bilateral pleural effusions and bibasilar atelectasis or pneumonia. There may be some component of mild volume overload. No pneumothorax. Signer Name: Maxi Nguyen MD  Signed: 6/25/2022 10:46 AM  Workstation Name: [email or web address]  Radiology [facility]    XR Chest 1 View    Result Date: 6/23/2022  1.  Slightly worsened right lower lobe airspace disease. 2.  Tiny bilateral pleural effusions. 3.  Cardiomegaly.  This report  was finalized on 6/23/2022 12:28 PM by Dr. Colt Elder MD.      CT Angiogram Chest Pulmonary Embolism    Result Date: 6/23/2022  1.  No pulmonary embolism. 2.  Small right and tiny left pleural effusion. 3.  Cardiomegaly. 4.  Patchy left lower lobe airspace disease that may represent pulmonary edema but potentially with some superimposed pneumonia. Probably right lung base pulmonary edema.  This report was finalized on 6/23/2022 2:07 PM by Dr. Colt Elder MD.      US Venous Doppler Lower Extremity Bilateral (duplex)    Result Date: 7/8/2022  No DVT in the lower extremities on today's exam.  This report was finalized on 7/8/2022 7:34 AM by Dr. Сергей Bravo MD.      XR Chest AP    Result Date: 6/16/2022    CHF/edema.  This report was finalized on 6/16/2022 11:55 AM by Dr. Chidi Dean MD.      I personally reviewed the images of the CT scan.  My personal interpretation is below.      ----------------------------------------------------    PFTs:    No visible PFTs available within system.   --------------------------------------------------------------------------------------------------    Laboratory evaluations:    Lab Results   Component Value Date    GLUCOSE 83 07/15/2022    BUN 22 07/15/2022    CREATININE 1.04 07/15/2022    EGFRIFNONA 84 02/23/2022    EGFRIFAFRI 106 10/29/2020    BCR 21.2 07/15/2022    K 4.7 07/15/2022    CO2 24.8 07/15/2022    CALCIUM 9.4 07/15/2022    PROTENTOTREF 6.6 10/29/2020    ALBUMIN 3.42 (L) 07/11/2022    LABIL2 1.9 10/29/2020    AST 29 07/11/2022    ALT 45 (H) 07/11/2022     Lab Results   Component Value Date    WBC 6.54 07/12/2022    HGB 13.2 07/12/2022    HCT 40.8 07/12/2022    MCV 95.3 07/12/2022     07/12/2022     Lab Results   Component Value Date    CHOL 133 09/03/2020    TRIG 51 09/03/2020    HDL 42 09/03/2020    LDL 81 09/03/2020     Lab Results   Component Value Date    TSH 5.080 (H) 06/23/2022     Lab Results   Component Value Date    TROPONINT <0.010 06/27/2022      No results found for: HGBA1C  No results found for: DDIMER  Lab Results   Component Value Date    ALT 45 (H) 07/11/2022     No results found for: HGBA1C  Lab Results   Component Value Date    CREATININE 1.04 07/15/2022     No results found for: IRON, TIBC, FERRITIN  Lab Results   Component Value Date    INR 1.44 (H) 06/23/2022    INR 1.41 (H) 06/16/2022    INR 1.07 08/18/2021    PROTIME 17.9 (H) 06/23/2022    PROTIME 17.6 (H) 06/16/2022    PROTIME 14.3 08/18/2021             PAH RISK ASSESSMENT:      1. Chronic systolic heart failure (CMS/HCC)    2. Neuropathy          ORDERS PLACED TODAY:  Orders Placed This Encounter   Procedures   • Cane   • Basic Metabolic Panel   • proBNP   • Magnesium   • Basic Metabolic Panel   • Magnesium   • ReDs Vest        Diagnoses and all orders for this visit:    1. Chronic systolic heart failure (CMS/HCC) (Primary)  -     Basic Metabolic Panel; Future  -     proBNP  -     Magnesium; Future  -     Basic Metabolic Panel; Standing  -     Basic Metabolic Panel  -     Magnesium; Standing  -     Magnesium  -     Cane    2. Neuropathy  -     Cane    Other orders  -     ReDs Vest; Standing  -     ReDs Vest             MEDS ORDERED TODAY:    No orders of the defined types were placed in this encounter.       ---------------------------------------------------------------------------------------------------------------------------          ASSESSMENT/PLAN:      Diagnosis Plan   1. Chronic systolic heart failure (CMS/HCC)  Basic Metabolic Panel    proBNP    Magnesium    Basic Metabolic Panel    Basic Metabolic Panel    Magnesium    Magnesium    Cane   2. Neuropathy  Cane       CHF acuity:35096} severe systolic and diastolic heart failure. Right Heart Failure. Etiology previously documented to be non-ishcemic. LVEF less than 20%.         Today, Patient appears euvolemic.and with  Moderate perfusion. The patient's hemodynamics are currently acceptable. HR in room was found to be in 60s.   "BP/MAP was reviewed and there is notroom for medication up-titration.  Clinical trajectory was assessed and hasimproved.     CHF GOAL DIRECTED MEDICAL THERAPY FOR PATIENT ADDRESSED/ADJUSTED:       GDMT    Drug Class   Drug   Dose Last Dose Adjustment Additional Titration   Notes   ACEi/ARB/ARNI Enstresto 24-26mg qd 07/12/22  Tolerating with borderline low BPs.    Beta Blocker Coreg  6.25mg BID 07/12/22  Taking Amiodarone for Afib as well.      MRA xx xx xx  Recent hyperkalemia during hospitalization   SGLT2i xx xx xx N/A    Verquevo recently started; GDMT discussed with pharmacy.      -CHF Specific BB:   •  Continue Carvedilol  6.25mg BID at/approaching target dose kept at the same given borderline blood pressures.  (started on 07/12/22).  • HR 68 during office visit.   • Life vest trends reviewed with patient appearing to not be frequently wearing vest until visit today per review of data transmission; previously counseled on vest compliance and importance with increased risk of arrhythmias with known cardiomyopathy coupled with risk of sudden cardiac death.  Patient voices he will try to wear vest.         -ACE/ARB/ARNi:   • Current dose: Continue Entresto 24-26mg qd with hold parameters for SBP less than 95.  SBP following medications this AM is 99.  (started on 07/12/22).  BP is borderline.  Will hold on upward titration during this visit with recent initiation of current dose while hospitalized.   • Baseline creatinine previously known to be 0.9-1.3 per review of recent laboratory values.  Reviewed today and found to be 1.04.  Discussed with  Cindy.     -SGLT2 inhibitor therapy to be considered in future visit.     -Diuretic regimen:   • ReDs Vest reading for 07/15/22 was 36 with prior reading of 34.  ProBNP was improved at value of 1358, which is one of patients lower values in some time. He does overall feel as if he is clinically improving and tells me this is the \"skinniest\" his legs have ever looked. " We reviewed ReDs Vest reading.   • Weights reviewed within system with significant drop in weight since hospitalization with nearly 40 lbs loss.  Instructed patient to continue weighing. He voiced having a home scale.   • Provided new weight log for patient with dates specifically written out for logging.    • Also offered patient ink pen to document log with, though he did decline and state that he had pens at home to use.  (confirmed home scale twice).    • Will hold off on diuresis adjustment and continue current Lasix home dose & Entresto dose given recent significant diuresis and adjustments with borderline blood pressures on this date.   • BMP, Mag, & ProBNP reviewed with patient.    • Last proBNP on 07/04/22 was 2,239 but was improved from prior value.    • CT chest imaging reviewed from June 2022 with effusions present.      -Fluid restriction/Sodium restriction:   • Requested 2000 ml restriction  • Patient has been asked to weigh daily and was provided with a printed diuretic strategy.  • 1,500 mg Na restriction was discussed.    -Acute vs. Chronic underlying conditions other than HF addressed during visit:   • Requested cane for debility.  Sent to [employer] (faxed as well).    • Requested documentation be sent to [employer] to see if son could ride with him to his appointments.    · Life Vest compliance: In place during visit, reports he dislikes vest.  Has appointment to be evaluated for possible ICD with known EF quite low for  Greater than 2 years .    · Anemia is common in heart failure.  Typically, this can come from anemia of chronic disease. Last Hemoglobin is WNL.    · Upcoming follow-up appointments were reviewed and discussed, including ICD evaluation appointment.        ----------------------------------------------------------------------  --------------------------------------------------------------------------------          45 minutes out of 60 minutes face to face spent counseling  patient extensively on dietary Na+ intake, importance of activity, weight monitoring, compliance with medications in addition to importance of titration with goal directed medical therapy and follow up appointments.            This document has been electronically signed by Veronica Kim PA-C  July 15, 2022 12:51 EDT      Part of this note may be an electronic transcription/translation of spoken language to printed text using the Dragon Dictation System.      Upcoming appointments:      07/26/22 CHF clinic  07/27/22 Olivia Steele NP  08/08/22 Dr. Ashley

## 2022-07-22 ENCOUNTER — READMISSION MANAGEMENT (OUTPATIENT)
Dept: CALL CENTER | Facility: HOSPITAL | Age: 62
End: 2022-07-22

## 2022-07-22 NOTE — OUTREACH NOTE
CHF Week 2 Survey    Flowsheet Row Responses   [facility] facility patient discharged from? [facility]   Does the patient have one of the following disease processes/diagnoses(primary or secondary)? CHF   Week 2 attempt successful? No   Unsuccessful attempts Attempt 1          MARCUS BALDWIN - Registered Nurse

## 2022-07-26 ENCOUNTER — OFFICE VISIT (OUTPATIENT)
Dept: CARDIOLOGY | Facility: CLINIC | Age: 62
End: 2022-07-26

## 2022-07-26 ENCOUNTER — READMISSION MANAGEMENT (OUTPATIENT)
Dept: CALL CENTER | Facility: HOSPITAL | Age: 62
End: 2022-07-26

## 2022-07-26 VITALS
WEIGHT: 162.2 LBS | BODY MASS INDEX: 24.58 KG/M2 | RESPIRATION RATE: 16 BRPM | HEIGHT: 68 IN | HEART RATE: 57 BPM | DIASTOLIC BLOOD PRESSURE: 77 MMHG | SYSTOLIC BLOOD PRESSURE: 128 MMHG

## 2022-07-26 DIAGNOSIS — I25.10 ASCVD (ARTERIOSCLEROTIC CARDIOVASCULAR DISEASE): ICD-10-CM

## 2022-07-26 DIAGNOSIS — I42.0 CARDIOMYOPATHY, DILATED: ICD-10-CM

## 2022-07-26 DIAGNOSIS — I48.0 PAROXYSMAL ATRIAL FIBRILLATION: ICD-10-CM

## 2022-07-26 DIAGNOSIS — I50.22 CHRONIC SYSTOLIC HEART FAILURE: Primary | ICD-10-CM

## 2022-07-26 PROCEDURE — 93000 ELECTROCARDIOGRAM COMPLETE: CPT | Performed by: NURSE PRACTITIONER

## 2022-07-26 PROCEDURE — 99214 OFFICE O/P EST MOD 30 MIN: CPT | Performed by: NURSE PRACTITIONER

## 2022-07-26 RX ORDER — AMIODARONE HYDROCHLORIDE 200 MG/1
200 TABLET ORAL DAILY
Qty: 30 TABLET | Refills: 5 | Status: SHIPPED | OUTPATIENT
Start: 2022-07-26 | End: 2022-08-29 | Stop reason: SDUPTHER

## 2022-07-26 RX ORDER — ATORVASTATIN CALCIUM 40 MG/1
40 TABLET, FILM COATED ORAL DAILY
Qty: 30 TABLET | Refills: 5 | Status: SHIPPED | OUTPATIENT
Start: 2022-07-26 | End: 2022-12-22 | Stop reason: SDUPTHER

## 2022-07-26 RX ORDER — ASPIRIN 81 MG/1
81 TABLET ORAL DAILY
Qty: 30 TABLET | Refills: 5 | Status: SHIPPED | OUTPATIENT
Start: 2022-07-26 | End: 2022-10-13 | Stop reason: SDUPTHER

## 2022-07-26 RX ORDER — CARVEDILOL 6.25 MG/1
6.25 TABLET ORAL 2 TIMES DAILY WITH MEALS
Qty: 60 TABLET | Refills: 5 | Status: SHIPPED | OUTPATIENT
Start: 2022-07-26 | End: 2022-10-13 | Stop reason: SDUPTHER

## 2022-07-26 RX ORDER — RANOLAZINE 500 MG/1
500 TABLET, EXTENDED RELEASE ORAL EVERY 12 HOURS SCHEDULED
Qty: 60 TABLET | Refills: 5 | Status: SHIPPED | OUTPATIENT
Start: 2022-07-26 | End: 2022-10-13

## 2022-07-26 RX ORDER — VERICIGUAT 2.5 MG/1
1 TABLET, FILM COATED ORAL
Qty: 30 TABLET | Refills: 5 | Status: SHIPPED | OUTPATIENT
Start: 2022-07-26 | End: 2022-08-29 | Stop reason: SDUPTHER

## 2022-07-26 NOTE — PROGRESS NOTES
Amy Yanez APRN  Chong White  1960  07/26/2022    Patient Active Problem List   Diagnosis   • Atrial fibrillation (HCC)   • Abnormal nuclear stress test   • Neuropathy   • ASCVD (arteriosclerotic cardiovascular disease)   • Tobacco abuse   • Non-ischemic cardiomyopathy (HCC)   • Chronic combined systolic and diastolic congestive heart failure (HCC)   • Chronic pain   • Chronic low back pain   • Paralysis (HCC)   • Chronic systolic heart failure (CMS/HCC)       Dear Amy Yanez APRN:    Subjective     Chief Complaint   Patient presents with   • Follow-up     Recent hosp for A fib/cardioversion   • Palpitations     Some    • Shortness of Breath     Worse at night   • Edema     feet   • Med Management     No info           History of Present Illness:    Chong White is a 62 y.o. male with a past medical history of paroxysmal atrial fibrillation, HFrEF, essential hypertension, tobacco abuse, ischemic cardiomyopathy, and coronary artery disease. He presents today for hospital follow up. He was admitted to [facility] on 6/23/22 due to acute heart failure and atrial fibrillation with RVR. He ultimately underwent LUCERO guided electrical cardioversion and converted to sinus rhythm. He has been placed on amiodarone. He diuresed well with IV diuretics and was ultimately transitioned to PO diuretics. He is wearing a Life Vest (EF <20% on recent LUCERO) and has been referred to EP. Previously he was referred to EP but did not keep these appointments. He is following with the heart failure clinic now as well. His breathing has markedly improved. He has lost about 27 lbs since his last visit here 4 weeks ago. He denies any recent chest pains or palpitations. Denies bleeding issues with Eliquis.          Allergies   Allergen Reactions   • Penicillins Hives   :      Current Outpatient Medications:   •  amiodarone (Pacerone) 200 MG tablet, Take 1 tablet by mouth Daily., Disp: 30 tablet, Rfl: 5  •  apixaban  (ELIQUIS) 5 MG tablet tablet, Take 1 tablet by mouth Every 12 (Twelve) Hours. Indications: Atrial Fibrillation, Disp: 60 tablet, Rfl: 5  •  aspirin 81 MG EC tablet, Take 1 tablet by mouth Daily., Disp: 30 tablet, Rfl: 5  •  atorvastatin (Lipitor) 40 MG tablet, Take 1 tablet by mouth Daily., Disp: 30 tablet, Rfl: 5  •  carvedilol (COREG) 6.25 MG tablet, Take 1 tablet by mouth 2 (Two) Times a Day With Meals., Disp: 60 tablet, Rfl: 5  •  ranolazine (RANEXA) 500 MG 12 hr tablet, Take 1 tablet by mouth Every 12 (Twelve) Hours., Disp: 60 tablet, Rfl: 5  •  sacubitril-valsartan (ENTRESTO) 24-26 MG tablet, Take 1 tablet by mouth Every 12 (Twelve) Hours., Disp: 60 tablet, Rfl: 5  •  Vericiguat (Verquvo) 2.5 MG tablet, Take 1 tablet by mouth Daily With Dinner. Hold for SBP less than 90., Disp: 30 tablet, Rfl: 5  •  cyclobenzaprine (FLEXERIL) 10 MG tablet, Take 10 mg by mouth Every 12 (Twelve) Hours As Needed for Muscle Spasms., Disp: , Rfl:   •  furosemide (LASIX) 40 MG tablet, Take 40 mg by mouth Daily. Prior to [facility] [place] Admission, Patient was on:  filled on 06/17/22 x 5 doses, Disp: , Rfl:   •  gabapentin (NEURONTIN) 600 MG tablet, Take 600 mg by mouth Every Night., Disp: , Rfl:   •  loratadine (CLARITIN) 10 MG tablet, Take 10 mg by mouth Daily As Needed for Allergies., Disp: , Rfl:   •  tamsulosin (FLOMAX) 0.4 MG capsule 24 hr capsule, Take 1 capsule by mouth Every Night., Disp: , Rfl:       The following portions of the patient's history were reviewed and updated as appropriate: allergies, current medications, past family history, past medical history, past social history, past surgical history and problem list.    Social History     Tobacco Use   • Smoking status: Current Every Day Smoker     Packs/day: 2.00     Types: Cigarettes     Start date: 3/1/1969   • Smokeless tobacco: Never Used   • Tobacco comment: Has nicotine patches and states has only had approximately 10 cigarettes since discharge on 9/11/2020. Not using  "patch at this time   Vaping Use   • Vaping Use: Never used   Substance Use Topics   • Alcohol use: Never   • Drug use: Never       ROS    Objective   Vitals:    07/26/22 1158   BP: 128/77   Pulse: 57   Resp: 16   Weight: 73.6 kg (162 lb 3.2 oz)   Height: 172.7 cm (68\")     Body mass index is 24.66 kg/m².        Vitals reviewed.   Constitutional:       Appearance: Healthy appearance. Well-developed and not in distress.   HENT:      Head: Normocephalic and atraumatic.   Pulmonary:      Effort: Pulmonary effort is normal.      Breath sounds: Normal breath sounds. No wheezing. No rales.   Chest:      Comments: Wearing life vest  Cardiovascular:      Normal rate. Regular rhythm.      Murmurs: There is no murmur.      . No S3 and S4 gallop.   Edema:     Peripheral edema absent.   Abdominal:      General: Bowel sounds are normal.      Palpations: Abdomen is soft.   Musculoskeletal:      Comments: Antalgic gait, ambulates with cane Skin:     General: Skin is warm and dry.   Neurological:      Mental Status: Alert, oriented to person, place, and time and oriented to person, place and time.   Psychiatric:         Mood and Affect: Mood normal.         Behavior: Behavior normal.         Lab Results   Component Value Date     07/15/2022    K 4.7 07/15/2022     07/15/2022    CO2 24.8 07/15/2022    BUN 22 07/15/2022    CREATININE 1.04 07/15/2022    GLUCOSE 83 07/15/2022    CALCIUM 9.4 07/15/2022    AST 29 07/11/2022    ALT 45 (H) 07/11/2022    ALKPHOS 78 07/11/2022    LABIL2 1.9 10/29/2020     No results found for: CKTOTAL  Lab Results   Component Value Date    WBC 6.54 07/12/2022    HGB 13.2 07/12/2022    HCT 40.8 07/12/2022     07/12/2022     Lab Results   Component Value Date    INR 1.44 (H) 06/23/2022    INR 1.41 (H) 06/16/2022    INR 1.07 08/18/2021     Lab Results   Component Value Date    MG 2.3 07/15/2022     Lab Results   Component Value Date    TSH 5.080 (H) 06/23/2022    TRIG 51 09/03/2020    HDL 42 " 09/03/2020    LDL 81 09/03/2020      No results found for: BNP          ECG 12 Lead    Date/Time: 7/26/2022 12:22 PM  Performed by: Olivia Steele APRN  Authorized by: Olivia Steele APRN   Comparison: compared with previous ECG   Similar to previous ECG  Rhythm: sinus bradycardia  BPM: 53  Q waves: V2, V3 and V1                  Assessment & Plan    Diagnosis Plan   1. Chronic systolic heart failure (CMS/HCC)  aspirin 81 MG EC tablet    Vericiguat (Verquvo) 2.5 MG tablet    amiodarone (Pacerone) 200 MG tablet    apixaban (ELIQUIS) 5 MG tablet tablet    carvedilol (COREG) 6.25 MG tablet    sacubitril-valsartan (ENTRESTO) 24-26 MG tablet    atorvastatin (Lipitor) 40 MG tablet    ranolazine (RANEXA) 500 MG 12 hr tablet    CBC & Differential    Comprehensive Metabolic Panel    TSH    ECG 12 Lead   2. Paroxysmal atrial fibrillation .  aspirin 81 MG EC tablet    Vericiguat (Verquvo) 2.5 MG tablet    amiodarone (Pacerone) 200 MG tablet    apixaban (ELIQUIS) 5 MG tablet tablet    carvedilol (COREG) 6.25 MG tablet    sacubitril-valsartan (ENTRESTO) 24-26 MG tablet    atorvastatin (Lipitor) 40 MG tablet    ranolazine (RANEXA) 500 MG 12 hr tablet    CBC & Differential    Comprehensive Metabolic Panel    TSH    ECG 12 Lead   3. Cardiomyopathy, dilated (possibly ischemic and nonischemic).  aspirin 81 MG EC tablet    Vericiguat (Verquvo) 2.5 MG tablet    amiodarone (Pacerone) 200 MG tablet    apixaban (ELIQUIS) 5 MG tablet tablet    carvedilol (COREG) 6.25 MG tablet    sacubitril-valsartan (ENTRESTO) 24-26 MG tablet    atorvastatin (Lipitor) 40 MG tablet    ranolazine (RANEXA) 500 MG 12 hr tablet    CBC & Differential    Comprehensive Metabolic Panel    TSH    ECG 12 Lead   4. ASCVD (arteriosclerotic cardiovascular disease) with 100% occlusion of the proximal LAD with excellent collaterals from RCA  aspirin 81 MG EC tablet    Vericiguat (Verquvo) 2.5 MG tablet    amiodarone (Pacerone) 200 MG tablet    apixaban (ELIQUIS)  5 MG tablet tablet    carvedilol (COREG) 6.25 MG tablet    sacubitril-valsartan (ENTRESTO) 24-26 MG tablet    atorvastatin (Lipitor) 40 MG tablet    ranolazine (RANEXA) 500 MG 12 hr tablet    CBC & Differential    Comprehensive Metabolic Panel    TSH    ECG 12 Lead                Recommendations:    1. Chronic systolic heart failure - Appears to be well compensated today. He will follow up with heart failure clinic tomorrow and have additional labs. Continue Lasix and verquvo.  2. Paroxysmal atrial fibrillation - maintaining sinus rhythm on amiodarone, anticoagulated with Eliquis. Will order CBC, LFT's and TSH. He will need annual chest x-ray and annual eye exam. He voiced understanding.   3. Cardiomyopathy - continue carvedilol and Entresto. He has an appointment with EP on 8/8/22.  4. ASCVD - No recent chest pains. continue low dose aspirin, carvedilol, and atorvastatin.   5. Follow up in 6 weeks or sooner if needed.         Return in about 6 weeks (around 9/6/2022) for Recheck.    As always, I appreciate very much the opportunity to participate in the cardiovascular care of your patients.      With Best Regards,    SAMARA Almonte

## 2022-07-26 NOTE — OUTREACH NOTE
CHF Week 2 Survey    Flowsheet Row Responses   [facility] facility patient discharged from? [facility]   Does the patient have one of the following disease processes/diagnoses(primary or secondary)? CHF   Week 2 attempt successful? No   Unsuccessful attempts Attempt 2          NUHA LEA - Registered Nurse

## 2022-07-27 ENCOUNTER — HOSPITAL ENCOUNTER (OUTPATIENT)
Dept: CARDIOLOGY | Facility: HOSPITAL | Age: 62
Discharge: HOME | End: 2022-07-27
Admitting: PHYSICIAN ASSISTANT

## 2022-07-27 VITALS
SYSTOLIC BLOOD PRESSURE: 111 MMHG | OXYGEN SATURATION: 95 % | BODY MASS INDEX: 24.28 KG/M2 | DIASTOLIC BLOOD PRESSURE: 59 MMHG | HEART RATE: 63 BPM | HEIGHT: 68 IN | WEIGHT: 160.2 LBS

## 2022-07-27 DIAGNOSIS — I50.42 CHRONIC COMBINED SYSTOLIC AND DIASTOLIC CONGESTIVE HEART FAILURE: ICD-10-CM

## 2022-07-27 DIAGNOSIS — I50.22 CHRONIC SYSTOLIC HEART FAILURE: Primary | ICD-10-CM

## 2022-07-27 LAB
ABSOLUTE LUNG FLUID CONTENT: 28 % (ref 20–35)
ANION GAP SERPL CALCULATED.3IONS-SCNC: 9.7 MMOL/L (ref 5–15)
BUN SERPL-MCNC: 14 MG/DL (ref 8–23)
BUN/CREAT SERPL: 12.1 (ref 7–25)
CALCIUM SPEC-SCNC: 9.2 MG/DL (ref 8.6–10.5)
CHLORIDE SERPL-SCNC: 104 MMOL/L (ref 98–107)
CO2 SERPL-SCNC: 25.3 MMOL/L (ref 22–29)
CREAT SERPL-MCNC: 1.16 MG/DL (ref 0.76–1.27)
EGFRCR SERPLBLD CKD-EPI 2021: 71.2 ML/MIN/1.73
GLUCOSE SERPL-MCNC: 99 MG/DL (ref 65–99)
MAGNESIUM SERPL-MCNC: 2.3 MG/DL (ref 1.6–2.4)
NT-PROBNP SERPL-MCNC: 3585 PG/ML (ref 0–900)
POTASSIUM SERPL-SCNC: 4.3 MMOL/L (ref 3.5–5.2)
SODIUM SERPL-SCNC: 139 MMOL/L (ref 136–145)

## 2022-07-27 PROCEDURE — 36415 COLL VENOUS BLD VENIPUNCTURE: CPT | Performed by: PHYSICIAN ASSISTANT

## 2022-07-27 PROCEDURE — 80048 BASIC METABOLIC PNL TOTAL CA: CPT | Performed by: PHYSICIAN ASSISTANT

## 2022-07-27 PROCEDURE — 83880 ASSAY OF NATRIURETIC PEPTIDE: CPT

## 2022-07-27 PROCEDURE — 94726 PLETHYSMOGRAPHY LUNG VOLUMES: CPT | Performed by: PHYSICIAN ASSISTANT

## 2022-07-27 PROCEDURE — 83735 ASSAY OF MAGNESIUM: CPT | Performed by: PHYSICIAN ASSISTANT

## 2022-07-27 RX ORDER — FUROSEMIDE 40 MG/1
40 TABLET ORAL DAILY
Qty: 90 TABLET | Refills: 0 | Status: SHIPPED | OUTPATIENT
Start: 2022-07-27 | End: 2022-10-13 | Stop reason: HOSPADM

## 2022-07-27 NOTE — PROGRESS NOTES
Heart Failure Clinic  Pharmacist Note     Chong White is a 62 y.o. male seen in the Heart Failure Clinic for HFrEF with an EF <20 per most recent ECHO. He was discharged from the hospital 7/12/22  for  acute on chronic systolic CHF and Afib w/ RVR. Chong White reports he is not very familiar with names of his medications. He reports he just takes what we gave him at discharge; however he does report still taking ASA; reports having loratadine and flomax but not using; reports needing gabapentin and flexeril but not having. Unsure if he has been taking Lasix; I called [facility] and hasn't been filled since middle of June x 5 days so unlikely he has been taking. He was discharged from [facility] on 7 new medications and reports being adherent at home. Has held some doses of Verquvo in the AM due to hold parameters but checks again in the PM and has been able to take the Verquvo at that time, so has not actually missed any doses. Pt had c/o dizziness at prior visit but reports this has improved.      Medication Use:   Hx of med intolerances:  None related to HF  Ret[facility]; Indigent Medication Assistance notes done during admission indicate Entresto had no copay & Verquvo and Jardiance PAs approved with no copay.    Past Medical History:   Diagnosis Date   • Atrial fibrillation (HCC)    • GERD (gastroesophageal reflux disease)    • Hypertension    • Myocardial infarction (HCC)      ALLERGIES: Penicillins  Current Outpatient Medications   Medication Sig Dispense Refill   • amiodarone (Pacerone) 200 MG tablet Take 1 tablet by mouth Daily. 30 tablet 5   • apixaban (ELIQUIS) 5 MG tablet tablet Take 1 tablet by mouth Every 12 (Twelve) Hours. Indications: Atrial Fibrillation 60 tablet 5   • aspirin 81 MG EC tablet Take 1 tablet by mouth Daily. 30 tablet 5   • atorvastatin (Lipitor) 40 MG tablet Take 1 tablet by mouth Daily. 30 tablet 5   • carvedilol (COREG) 6.25 MG tablet Take 1 tablet by  "mouth 2 (Two) Times a Day With Meals. 60 tablet 5   • furosemide (LASIX) 40 MG tablet Take 1 tablet by mouth Daily for 90 days. 90 tablet 0   • ranolazine (RANEXA) 500 MG 12 hr tablet Take 1 tablet by mouth Every 12 (Twelve) Hours. 60 tablet 5   • sacubitril-valsartan (ENTRESTO) 24-26 MG tablet Take 1 tablet by mouth Every 12 (Twelve) Hours. 60 tablet 5   • Vericiguat (Verquvo) 2.5 MG tablet Take 1 tablet by mouth Daily With Dinner. Hold for SBP less than 90. 30 tablet 5   • empagliflozin (Jardiance) 10 MG tablet tablet Take 1 tablet by mouth Daily for 30 days. 30 tablet 0     No current facility-administered medications for this encounter.       Vaccination History:   Pneumonia: PPSV23 in 2020 per Epic records. Pt believes he is UTD; if not, 1 dose PCV15 or PCV20 is recommended.  Annual Influenza: assess next flu season  COVID 19: received Uvaldo x 1 dose 8/19/21    Objective  Vitals:    07/27/22 1326   BP: 111/59   BP Location: Left arm   Patient Position: Sitting   Cuff Size: Adult   Pulse: 63   SpO2: 95%   Weight: 72.7 kg (160 lb 3.2 oz)   Height: 172.7 cm (68\")     Wt Readings from Last 3 Encounters:   07/27/22 72.7 kg (160 lb 3.2 oz)   07/26/22 73.6 kg (162 lb 3.2 oz)   07/15/22 73.6 kg (162 lb 3.2 oz)         07/27/22  1326   Weight: 72.7 kg (160 lb 3.2 oz)     Lab Results   Component Value Date    GLUCOSE 99 07/27/2022    BUN 14 07/27/2022    CREATININE 1.16 07/27/2022    EGFRIFNONA 84 02/23/2022    EGFRIFAFRI 106 10/29/2020    BCR 12.1 07/27/2022    K 4.3 07/27/2022    CO2 25.3 07/27/2022    CALCIUM 9.2 07/27/2022    PROTENTOTREF 6.6 10/29/2020    ALBUMIN 3.42 (L) 07/11/2022    LABIL2 1.9 10/29/2020    AST 29 07/11/2022    ALT 45 (H) 07/11/2022     Lab Results   Component Value Date    WBC 6.54 07/12/2022    HGB 13.2 07/12/2022    HCT 40.8 07/12/2022    MCV 95.3 07/12/2022     07/12/2022     Lab Results   Component Value Date    TROPONINT <0.010 06/27/2022     Lab Results   Component Value Date    " PROBNP 3,585.0 (H) 07/27/2022     Results for orders placed during the hospital encounter of 06/23/22    Adult Transesophageal Echo (LUCERO) W/ Cont if Necessary Per Protocol    Interpretation Summary  · Left ventricular ejection fraction appears to be less than 20%. Left ventricular systolic function is severely decreased.  · The left ventricular cavity is moderate to severely dilated.  · Moderately reduced right ventricular systolic function noted.  · No evidence of a left atrial appendage thrombus was present.  · The aortic valve is structurally normal. Mild aortic valve regurgitation is present.  · Moderate mitral valve regurgitation is present. No significant mitral valve stenosis is present.  · Moderate tricuspid valve regurgitation is present.  · There is no evidence of pericardial effusion.  · Comments: Proceed with electrical cardioversion.         GDMT    Drug Class   Drug   Dose Last Dose Adjustment Additional Titration   Notes   ACEi/ARB/ARNI Entresto 24/26 7/12/22     Beta Blocker Carvedilol 6.25mg 7/12/22     MRA     hyperK in hospital 7/12   SGLT2i Jardiance 10mg 7/27/22 N/A     Vericiguat 2.5mg 7/12/22 *during hospitalization         Drug Therapy Problems    1. Drug Interactions Screening: amiodarone + atorvastatin may increase effects of atorvastatin  2. No BP log today; pt reports he left it at cardiologist's office yesterday  3. Reports worsening symptoms at [facility] including some SOA and edema. BNP increased since last check.    Recommendations:     1. Pt denies any unusual muscle pain at this time.  2. Provided with new log today. Pt reports BP 90s-100s/60s-70s at home. Unable to report HR readings. Would recommend reassessing for ability to titrate Entresto or Coreg when BP log available.  3. Consider resuming Lasix for symptom improvement.    Discharge medications have been reviewed and reconciled.    Patient was educated on heart failure medications and the importance of medication adherence. All  questions were addressed and patient expressed understanding. Used teach-back method to assess understanding.     Thank you for allowing me to participate in the care of your patient,    Miranda George, PharmD  07/27/22  14:19 EDT

## 2022-07-27 NOTE — PROGRESS NOTES
[facility]  NIKI Connelly Linda C., APRN  [address]  [address]  [place] [address]    Thank you for asking me to see Chong White for congestive heart failure.    History of Present Illness     This is a 62 y.o. male with known past medical history of atrial fibrillation, chronic systolic CHF with distant history of documented IVDA, tobacco abuse, CAD, essential hypertension and GERD.  He was last hospitalized from 06/23/22 through 07/12/22 with atrial fibrillation with RVR, acute on chronic systolic CHF, MAISHA, and COPD.      He underwent LUCERO guided cardioversion and was started on amiodarone in addition to Eliquis, Entresto, Verquvo, Ranolazine, Coreg, and atorvastatin.      Chong White is a 62 y.o. male who presents for today for CHF follow-up.  The patient is typically seen by Amy Yanez APRN.  Patient's primary cardiologist is Dr. Lopez.     • Last known EF is less than 20%. Current medications prior to first visit directed toward underlying heart failure.   (LifeVest in place).      • Last known hospitalization and/or ED visit:  As outlined above with recent discharge.     • Last HF clinic visit: 07/15/22 with me with recent hospital started medication regimen continued including Coreg 6.25mg BID, Entresto 24-26mg BID, Lasix,  MRA was not on board due to recent hyperkalemia during hospitalized.          Initial visit data/details regarding HF:   • Dyspnea on exertion has improved  • Lower extremity swelling significantly improved  • Abdominal swelling: Improving.     • Home weight:(Scale provided on 07/15/22)   • Home BP:  Has been keeping log but left with Olivia.   • Daily activities of living:  Uses cane ([facility] cane prescription [facility] per [facility] request during [facility] Cane tip is sticking out and not usable.  Faxed walker order to [facility] and gave handwritten order to patient.    • Pillows/lying flat: 2 pillows  • Chest pain free at the time of evaluation.   • Has upcoming appointment with Dr. Arechiga to discuss possible ICD.   Discussed importance of keeping appointment as patient voices his hatred for LifeVest.  • Voices periodic dizziness has improved since last visit.   • Hold parameters were discussed for blood pressures medications including both SBP hold parameters and HR holding parameters.   • Ongoing debility discussed; declines cardiac rehab when offered, declines physical therapy.    • Declines wheel chair.     While in the office,  [employer] and [employer] staff repaired patient's cane, as he reported could not get a new cane at this time.            Review of Systems - Review of Systems   Constitutional: Positive for malaise/fatigue. Negative for chills, decreased appetite and fever.   HENT: Negative for congestion and ear pain.    Eyes: Negative for blurred vision.   Cardiovascular: Positive for dyspnea on exertion. Negative for chest pain, leg swelling, near-syncope and syncope.   Respiratory: Positive for shortness of breath. Negative for cough.    Endocrine: Negative for cold intolerance and heat intolerance.   Hematologic/Lymphatic: Negative for adenopathy and bleeding problem.   Skin: Negative for color change and nail changes.   Musculoskeletal: Negative for arthritis, back pain and falls.   Gastrointestinal: Negative for bloating and abdominal pain.   Genitourinary: Negative for bladder incontinence and dysuria.   Neurological: Negative for aphonia, difficulty with concentration and disturbances in coordination.   Psychiatric/Behavioral: Negative for altered mental status and hallucinations.   Allergic/Immunologic: Negative for environmental allergies and HIV exposure.        All other systems were reviewed and were negative.    Patient Active Problem List   Diagnosis   • Atrial fibrillation (HCC)   • Abnormal nuclear stress test   • Neuropathy   • ASCVD  (arteriosclerotic cardiovascular disease)   • Tobacco abuse   • Non-ischemic cardiomyopathy (HCC)   • Chronic combined systolic and diastolic congestive heart failure (HCC)   • Chronic pain   • Chronic low back pain   • Paralysis (HCC)   • Chronic systolic heart failure (CMS/HCC)       family history includes Heart disease in his maternal grandmother and mother.     reports that he has been smoking cigarettes. He started smoking about 53 years ago. He has been smoking about 2.00 packs per day. He has never used smokeless tobacco. He reports that he does not drink alcohol and does not use drugs.    Allergies   Allergen Reactions   • Penicillins Hives         Current Outpatient Medications:   •  amiodarone (Pacerone) 200 MG tablet, Take 1 tablet by mouth Daily., Disp: 30 tablet, Rfl: 5  •  apixaban (ELIQUIS) 5 MG tablet tablet, Take 1 tablet by mouth Every 12 (Twelve) Hours. Indications: Atrial Fibrillation, Disp: 60 tablet, Rfl: 5  •  aspirin 81 MG EC tablet, Take 1 tablet by mouth Daily., Disp: 30 tablet, Rfl: 5  •  atorvastatin (Lipitor) 40 MG tablet, Take 1 tablet by mouth Daily., Disp: 30 tablet, Rfl: 5  •  carvedilol (COREG) 6.25 MG tablet, Take 1 tablet by mouth 2 (Two) Times a Day With Meals., Disp: 60 tablet, Rfl: 5  •  furosemide (LASIX) 40 MG tablet, Take 1 tablet by mouth Daily for 90 days., Disp: 90 tablet, Rfl: 0  •  ranolazine (RANEXA) 500 MG 12 hr tablet, Take 1 tablet by mouth Every 12 (Twelve) Hours., Disp: 60 tablet, Rfl: 5  •  sacubitril-valsartan (ENTRESTO) 24-26 MG tablet, Take 1 tablet by mouth Every 12 (Twelve) Hours., Disp: 60 tablet, Rfl: 5  •  Vericiguat (Verquvo) 2.5 MG tablet, Take 1 tablet by mouth Daily With Dinner. Hold for SBP less than 90., Disp: 30 tablet, Rfl: 5  •  empagliflozin (Jardiance) 10 MG tablet tablet, Take 1 tablet by mouth Daily for 30 days., Disp: 30 tablet, Rfl: 0      Physical Exam:  I have reviewed the patient's current vital signs as documented in the patient's EMR.          Vitals:    07/27/22 1326   BP: 111/59   Pulse: 63   SpO2: 95%     Body mass index is 24.36 kg/m².       07/27/22  1326   Weight: 72.7 kg (160 lb 3.2 oz)          Physical Exam  Vitals and nursing note reviewed.   Constitutional:       Appearance: Normal appearance.   HENT:      Head: Normocephalic and atraumatic.      Mouth/Throat:      Lips: Pink.      Mouth: Mucous membranes are moist.   Eyes:      General: Lids are normal.      Conjunctiva/sclera:      Right eye: Right conjunctiva is not injected.      Left eye: Left conjunctiva is not injected.   Pulmonary:      Effort: No tachypnea or bradypnea.      Breath sounds: No decreased breath sounds, wheezing, rhonchi or rales.   Chest:      Chest wall: No mass or lacerations.   Abdominal:      General: Bowel sounds are normal.      Palpations: Abdomen is soft.      Tenderness: There is no abdominal tenderness.      Comments: Mildly protuberant abdomen   Musculoskeletal:      Cervical back: Full passive range of motion without pain. No edema or erythema.      Right lower leg: No swelling. No edema.      Left lower leg: No swelling. No edema.   Skin:     General: Skin is warm and moist.   Neurological:      Mental Status: He is alert and oriented to person, place, and time.   Psychiatric:         Attention and Perception: Attention normal.         Mood and Affect: Mood normal.         Behavior: Behavior is cooperative.         JVP: Volume/Pulsation: Normal.        DATA REVIEWED:     EKG. I personally reviewed and interpreted the EKG.        ---------------------------------------------------  TTE/LUCERO:  Results for orders placed during the hospital encounter of 06/23/22    Adult Transesophageal Echo (LUCERO) W/ Cont if Necessary Per Protocol    Interpretation Summary  · Left ventricular ejection fraction appears to be less than 20%. Left ventricular systolic function is severely decreased.  · The left ventricular cavity is moderate to severely dilated.  · Moderately  reduced right ventricular systolic function noted.  · No evidence of a left atrial appendage thrombus was present.  · The aortic valve is structurally normal. Mild aortic valve regurgitation is present.  · Moderate mitral valve regurgitation is present. No significant mitral valve stenosis is present.  · Moderate tricuspid valve regurgitation is present.  · There is no evidence of pericardial effusion.  · Comments: Proceed with electrical cardioversion.      My interpretation of the TTE is below.     -----------------------------------------------------  CXR/Imaging:   Imaging Results (Most Recent)     None          I personally reviewed and interpreted the CXR.      -----------------------------------------------------  CT:   XR Chest 1 View    Result Date: 7/4/2022  Overall no significant interval change. Signer Name: Aleisha Ceja MD  Signed: 7/4/2022 9:54 AM  [facility]    XR Chest 1 View    Result Date: 6/26/2022  Right subclavian central line in satisfactory position. Signer Name: DAINLO Fiore MD  Signed: 6/26/2022 5:47 PM  Workstation Name: [email or web address]    US Venous Doppler Lower Extremity Bilateral (duplex)    Result Date: 7/8/2022  No DVT in the lower extremities on today's exam.  This report was finalized on 7/8/2022 7:34 AM by Dr. Сергей Bravo MD.      I personally reviewed the images of the CT scan.  My personal interpretation is below.      ----------------------------------------------------    PFTs:    No visible PFTs available within system.   --------------------------------------------------------------------------------------------------    Laboratory evaluations:    Lab Results   Component Value Date    GLUCOSE 99 07/27/2022    BUN 14 07/27/2022    CREATININE 1.16 07/27/2022    EGFRIFNONA 84 02/23/2022    EGFRIFAFRI 106 10/29/2020    BCR 12.1 07/27/2022    K 4.3 07/27/2022    CO2 25.3 07/27/2022     CALCIUM 9.2 07/27/2022    PROTENTOTREF 6.6 10/29/2020    ALBUMIN 3.42 (L) 07/11/2022    LABIL2 1.9 10/29/2020    AST 29 07/11/2022    ALT 45 (H) 07/11/2022     Lab Results   Component Value Date    WBC 6.54 07/12/2022    HGB 13.2 07/12/2022    HCT 40.8 07/12/2022    MCV 95.3 07/12/2022     07/12/2022     Lab Results   Component Value Date    CHOL 133 09/03/2020    TRIG 51 09/03/2020    HDL 42 09/03/2020    LDL 81 09/03/2020     Lab Results   Component Value Date    TSH 5.080 (H) 06/23/2022     Lab Results   Component Value Date    TROPONINT <0.010 06/27/2022     No results found for: HGBA1C  No results found for: DDIMER  Lab Results   Component Value Date    ALT 45 (H) 07/11/2022     No results found for: HGBA1C  Lab Results   Component Value Date    CREATININE 1.16 07/27/2022     No results found for: IRON, TIBC, FERRITIN  Lab Results   Component Value Date    INR 1.44 (H) 06/23/2022    INR 1.41 (H) 06/16/2022    INR 1.07 08/18/2021    PROTIME 17.9 (H) 06/23/2022    PROTIME 17.6 (H) 06/16/2022    PROTIME 14.3 08/18/2021         PAH RISK ASSESSMENT:      1. Chronic systolic heart failure (CMS/HCC)    2. Chronic combined systolic and diastolic congestive heart failure (HCC)          ORDERS PLACED TODAY:  Orders Placed This Encounter   Procedures   • Walker   • BNP   • Basic Metabolic Panel   • Magnesium   • ReDs Vest        Diagnoses and all orders for this visit:    1. Chronic systolic heart failure (CMS/HCC) (Primary)  -     BNP  -     Basic Metabolic Panel; Standing  -     Magnesium; Standing  -     ReDs Vest  -     Basic Metabolic Panel  -     Magnesium    2. Chronic combined systolic and diastolic congestive heart failure (HCC)  -     Walker    Other orders  -     empagliflozin (Jardiance) 10 MG tablet tablet; Take 1 tablet by mouth Daily for 30 days.  Dispense: 30 tablet; Refill: 0  -     furosemide (LASIX) 40 MG tablet; Take 1 tablet by mouth Daily for 90 days.  Dispense: 90 tablet; Refill: 0              MEDS ORDERED TODAY:    New Medications Ordered This Visit   Medications   • empagliflozin (Jardiance) 10 MG tablet tablet     Sig: Take 1 tablet by mouth Daily for 30 days.     Dispense:  30 tablet     Refill:  0   • furosemide (LASIX) 40 MG tablet     Sig: Take 1 tablet by mouth Daily for 90 days.     Dispense:  90 tablet     Refill:  0        ---------------------------------------------------------------------------------------------------------------------------          ASSESSMENT/PLAN:      Diagnosis Plan   1. Chronic systolic heart failure (CMS/HCC)  BNP    Basic Metabolic Panel    Magnesium    ReDs Vest    Basic Metabolic Panel    Magnesium   2. Chronic combined systolic and diastolic congestive heart failure (HCC)  Walker       CHF acuity:19333} severe systolic and diastolic heart failure. Right Heart Failure. Etiology previously documented to be non-ishcemic. LVEF less than 20%.         Today, Patient appears euvolemic.and with  Moderate perfusion. The patient's hemodynamics are currently acceptable. HR in room was found to be in 60s.  BP/MAP was reviewed and there is notroom for medication up-titration.  Clinical trajectory was assessed and hasimproved.     CHF GOAL DIRECTED MEDICAL THERAPY FOR PATIENT ADDRESSED/ADJUSTED:       GDMT    Drug Class   Drug   Dose Last Dose Adjustment Additional Titration   Notes   ACEi/ARB/ARNI Enstresto 24-26mg qd 07/12/22  Tolerating with borderline low BPs.    Beta Blocker Coreg  6.25mg BID 07/12/22  Taking Amiodarone for Afib as well.      MRA xx xx xx  Recent hyperkalemia during hospitalization   SGLT2i Start Jardiance 10mg START 07/27/22 N/A    Sonya recently started; GDMT discussed with pharmacy.      -CHF Specific BB:   •  Continue Carvedilol  6.25mg BID at/approaching target dose kept at the same given borderline blood pressures.  (started on 07/12/22).  • HR 68 during office visit.   • Life vest trends reviewed with patient appearing to not be frequently  "wearing vest until visit today per review of data transmission; previously counseled on vest compliance and importance with increased risk of arrhythmias with known cardiomyopathy coupled with risk of sudden cardiac death.  Patient voices he will try to wear vest.       -MRA:   · Continue Spironolactone 25mg qd.       -ACE/ARB/ARNi:   • Current dose: Continue Entresto 24-26mg qd with hold parameters for SBP less than 95.  SBP following medications this AM is 99.  (started on 07/12/22).  BP is borderline.  Continue to monitor.   • Baseline creatinine previously known to be 0.9-1.3 per review of recent laboratory values.       SGLT2 inhibition therapy.   • The patient has HFrEF with LV EF<40%, is NYHA FC-II-IV, HF hospitilization within the past 12 months, and abnormal BNP consistent with EMPEROR-Reduced trial. The pt does not have exclusion criteria: no ACS/TIA within 90 days; no AHF consideration; no severe VHD; no ICD/CRT in the past 90 days; not in ADHF. Empagliflozin was recommended at 10mg a day to reduce CV death and/or HF hospitlization. The fact this is typically used to treat DM2 was discussed. The benefit of the trial extended to patients without DM, so this information was conveyed to the pt.   • A BMP at initiation to verify GFR >45 was recommended, as was interval GFR surveillance.  • Pt was advised side effects, some severe, including hypersensitivity and Boogie's; in addition to common side effects of UTIs and female genital mycotic infections were discussed.  • Recommend starting  Jardiance (empagliflozin) 10mg with quarterly assessment of GFR.   • RTC one week with BMP.       -Diuretic regimen:   • ReDs Vest reading for 07/27/22 was 28 with prior reading of 36 on visit just one week ago.  ProBNP was somewhat elevated in comparison to prior value, which is one of patients lower values in some time. He does overall feel as if he is clinically improving and tells me this is the \"skinniest\" his legs " have ever looked. We reviewed ReDs Vest reading.   • Mr. White continued to do well with his weights and his ReDs Vest readings.    • Continue current Lasix 40mg daily.    • Provided new weight log for patient with dates specifically written out for logging.  (reports he gave last log to NP Olivia yesterday).   • Will hold off on diuresis adjustment and continue current Lasix home dose & Entresto dose given recent significant diuresis and adjustments with borderline blood pressures on this date.   • BMP, Mag, & ProBNP reviewed with patient.    • Last proBNP on 07/04/22 was 2,239 but was improved from prior value.    • CT chest imaging reviewed from June 2022 with effusions present.      -Fluid restriction/Sodium restriction:   • Requested 2000 ml restriction  • Patient has been asked to weigh daily and was provided with a printed diuretic strategy.  • 1,500 mg Na restriction was discussed.    -Acute vs. Chronic underlying conditions other than HF addressed during visit:   · Life Vest compliance: In place during visit, reports he dislikes vest. Has appointment to be evaluated for possible ICD with known EF quite low for > 2 years .   Reviewed within [employer] system and patient is attempting to wear more often over the past week in small doses.   · Debility:  Walker sent to [facility] to assist with debility likely 2/2 combined systolic & diastolic CHF.     · Anemia is common in heart failure.  Typically, this can come from anemia of chronic disease. Last Hemoglobin is WNL.    · Upcoming follow-up appointments were reviewed and discussed including ICD evaluation appointment (08/08/2022 with Dr. Arechiga)      ----------------------------------------------------------------------  --------------------------------------------------------------------------------          45 minutes out of 60 minutes face to face spent counseling patient extensively on dietary Na+ intake, importance of activity, weight monitoring,  compliance with medications in addition to importance of titration with goal directed medical therapy and follow up appointments.            This document has been electronically signed by Veronica Kim PA-C  July 27, 2022 16:29 EDT      Part of this note may be an electronic transcription/translation of spoken language to printed text using the Dragon Dictation System.      Upcoming appointments:     08/08/22 Dr. Ashley

## 2022-07-27 NOTE — PROGRESS NOTES
Heart Failure Clinic    Date: 07/27/22     Vitals:    07/27/22 1326   BP: 111/59   Pulse: 63   SpO2: 95%    weight 160.2    Method of arrival: Ambulatory with cane    Weighing self daily: Yes    Monitoring Heart Failure Zones: Yes    Today's HF Zone: Yellow     Taking medications as prescribed: Yes    Edema No    Shortness of Air: No    Number of pillows used at night:<2    Educational Materials given:  avs                                                                         ReDS Value: 28   25-35 Optimal Value Status      Esteban Levine RN 07/27/22 13:29 EDT

## 2022-08-03 ENCOUNTER — READMISSION MANAGEMENT (OUTPATIENT)
Dept: CALL CENTER | Facility: HOSPITAL | Age: 62
End: 2022-08-03

## 2022-08-03 NOTE — OUTREACH NOTE
CHF Week 3 Survey    Flowsheet Row Responses   [facility] patient discharged from? [facility]   Does the patient have one of the following disease processes/diagnoses(primary or secondary)? CHF   Week 3 attempt successful? Yes   Call start time 1536   Call end time 1542   Meds reviewed with patient/caregiver? Yes   Is the patient taking all medications as directed (includes completed medication regime)? Yes   Medication comments Pt reports having all medications.   Comments regarding appointments Pt to have pacemaker placed.   Does the patient have a primary care provider?  Yes   Has the patient kept scheduled appointments due by today? Yes   Pulse Ox monitoring None   What is the patient's perception of their health status since discharge? Improving   Is the patient weighing daily? No   CHF Week 3 call completed? Yes   Wrap up additional comments Pt reports feeling well. Pt is not wearing life vest although encouraged to do so. Pt is checking bp daily reporting wnl. Pt does not weigh daily. Pt states breathing is good w/no soa at this time.          CHUY HUTCHINSON - Registered Nurse

## 2022-08-08 ENCOUNTER — OFFICE VISIT (OUTPATIENT)
Dept: CARDIOLOGY | Facility: CLINIC | Age: 62
End: 2022-08-08

## 2022-08-08 VITALS
WEIGHT: 160 LBS | HEART RATE: 56 BPM | BODY MASS INDEX: 24.25 KG/M2 | OXYGEN SATURATION: 97 % | SYSTOLIC BLOOD PRESSURE: 98 MMHG | DIASTOLIC BLOOD PRESSURE: 64 MMHG | HEIGHT: 68 IN

## 2022-08-08 DIAGNOSIS — Z79.01 CHRONIC ANTICOAGULATION: ICD-10-CM

## 2022-08-08 DIAGNOSIS — I48.0 PAROXYSMAL ATRIAL FIBRILLATION: ICD-10-CM

## 2022-08-08 DIAGNOSIS — I25.5 ISCHEMIC CARDIOMYOPATHY: Primary | ICD-10-CM

## 2022-08-08 DIAGNOSIS — Z79.899 LONG TERM CURRENT USE OF ANTIARRHYTHMIC DRUG: ICD-10-CM

## 2022-08-08 DIAGNOSIS — I50.42 CHRONIC COMBINED SYSTOLIC AND DIASTOLIC CONGESTIVE HEART FAILURE: ICD-10-CM

## 2022-08-08 DIAGNOSIS — Z72.0 TOBACCO ABUSE: ICD-10-CM

## 2022-08-08 PROBLEM — R94.39 ABNORMAL NUCLEAR STRESS TEST: Status: RESOLVED | Noted: 2020-09-01 | Resolved: 2022-08-08

## 2022-08-08 PROBLEM — G89.29 CHRONIC PAIN: Status: RESOLVED | Noted: 2020-10-12 | Resolved: 2022-08-08

## 2022-08-08 PROCEDURE — 93000 ELECTROCARDIOGRAM COMPLETE: CPT | Performed by: INTERNAL MEDICINE

## 2022-08-08 PROCEDURE — 99204 OFFICE O/P NEW MOD 45 MIN: CPT | Performed by: INTERNAL MEDICINE

## 2022-08-08 RX ORDER — METOLAZONE 2.5 MG/1
5 TABLET ORAL DAILY
Qty: 90 TABLET | Refills: 3 | Status: SHIPPED | OUTPATIENT
Start: 2022-08-08 | End: 2022-08-11

## 2022-08-08 NOTE — PROGRESS NOTES
"        Cardiac Electrophysiology Outpatient Consult Note            [facility]    Consult Note     Chong White  [id number]  08/08/2022       Primary Care Physician: Amy Yanez APRN    Referred By: Veronica Moeller APRN    Subjective     Chief Complaint:   Diagnoses and all orders for this visit:    1. Ischemic cardiomyopathy (Primary)    2. Paroxysmal atrial fibrillation (HCC)    3. Chronic combined systolic and diastolic congestive heart failure (HCC)    4. Chronic anticoagulation    5. Long term current use of antiarrhythmic drug      Chief Complaint   Patient presents with   • Chronic Systolic Heart Failure   • Atrial Fibrillation   • Cardiomyopathy       History of Present Illness:   Chong White is a 62 y.o. male who presents to my electrophysiology clinic for evaluation of ischemic cardiomyopathy with persistent low ejection fraction and paroxysmal atrial fibrillation.  He has had a long history of cardiomyopathy with persistent class III systolic heart failure.  Also states he has had paroxysmal atrial fibrillation for several years.  He has had multiple MIs, multiple heart catheterizations and stents mostly in [place].  Those records are not available for review.  He was recently admitted to [facility] with A. fib with RVR in the setting of acute decompensated systolic heart failure.  Recent myocardial perfusion study showed no reversible ischemia and ejection fraction of 23%.  He underwent a LUCERO with cardioversion to sinus rhythm also showing an ejection fraction of 20%.  This is compared to an echocardiogram from 2020 showing EF of 20 to 25%.  Has been started on amiodarone and since discharge from the hospital has maintained sinus rhythm.  He is a moderately poor historian.  He states since released from the hospital his breathing has been \"okay\".  He reports having had a sharp chest pain yesterday and mildly increased lower extremity edema.  He " "states his blood pressure is \"low\".  He denies dizziness or syncope.  He has no known history of sleep apnea.  He continues to smoke.    Past Medical History:   Patient Active Problem List    Diagnosis Date Noted   • Ischemic cardiomyopathy 09/22/2020     Priority: High     Note Last Updated: 8/8/2022     · Echo, 9/2/2020:The left ventricular cavity is borderline dilated with severe global wall hypokinesis and severe left ventricular systolic dysfunction, EF 21-25%. Left ventricular diastolic dysfunction (grade II) consistent with pseudonormalization.  · LUCERO, 6/28/2022: Left ventricular ejection fraction appears to be less than 20%. Left ventricular systolic function is severely decreased.  · MPS, 6/30/2022: Abnormal LV wall motion consistent with severe global hypokinesis. (Calculated EF = 23%).     • Atrial fibrillation (HCC) 09/01/2020     Priority: High     Note Last Updated: 8/8/2022     · ECV, 9/4/2020  · ECV, 6/28/2022     • Chronic combined systolic and diastolic congestive heart failure (HCC) 09/22/2020     Priority: Medium   • ASCVD (arteriosclerotic cardiovascular disease) 09/16/2020     Priority: Medium     Note Last Updated: 8/8/2022     · LHC, 9/4/2020: occlusion of the ostial LAD with remarkably good collateral connection from rCA filling the LAD with brisk flow to the point of occlusion in the prox LAD.  RCA and CX are free of any significant disease.      • Hypertension      Priority: Low   • Chronic anticoagulation      Priority: Low   • Long term current use of antiarrhythmic drug      Priority: Low   • Tobacco abuse 09/16/2020     Priority: Low   • Neuropathy 09/15/2020   • Chronic low back pain 08/22/2019   • Paralysis (HCC) 01/01/1979       Past Surgical History:   Past Surgical History:   Procedure Laterality Date   • CARDIAC CATHETERIZATION N/A 9/4/2020    Procedure: Left Heart Cath;  Surgeon: Herman Sen MD;  Location: [facility] CATH INVASIVE LOCATION;  Service: Cardiology;  Laterality: " N/A;   • CORONARY STENT PLACEMENT         Social History:   Social History     Socioeconomic History   • Marital status: Single   Tobacco Use   • Smoking status: Current Every Day Smoker     Packs/day: 0.50     Types: Cigarettes     Start date: 3/1/1969   • Smokeless tobacco: Never Used   • Tobacco comment: Has nicotine patches and states has only had approximately 10 cigarettes since discharge on 9/11/2020. Not using patch at this time   Vaping Use   • Vaping Use: Never used   Substance and Sexual Activity   • Alcohol use: Never   • Drug use: Never   • Sexual activity: Defer       Medications:     Current Outpatient Medications:   •  amiodarone (Pacerone) 200 MG tablet, Take 1 tablet by mouth Daily., Disp: 30 tablet, Rfl: 5  •  apixaban (ELIQUIS) 5 MG tablet tablet, Take 1 tablet by mouth Every 12 (Twelve) Hours. Indications: Atrial Fibrillation, Disp: 60 tablet, Rfl: 5  •  aspirin 81 MG EC tablet, Take 1 tablet by mouth Daily., Disp: 30 tablet, Rfl: 5  •  atorvastatin (Lipitor) 40 MG tablet, Take 1 tablet by mouth Daily., Disp: 30 tablet, Rfl: 5  •  carvedilol (COREG) 6.25 MG tablet, Take 1 tablet by mouth 2 (Two) Times a Day With Meals., Disp: 60 tablet, Rfl: 5  •  empagliflozin (Jardiance) 10 MG tablet tablet, Take 1 tablet by mouth Daily for 30 days., Disp: 30 tablet, Rfl: 0  •  furosemide (LASIX) 40 MG tablet, Take 1 tablet by mouth Daily for 90 days., Disp: 90 tablet, Rfl: 0  •  ranolazine (RANEXA) 500 MG 12 hr tablet, Take 1 tablet by mouth Every 12 (Twelve) Hours., Disp: 60 tablet, Rfl: 5  •  sacubitril-valsartan (ENTRESTO) 24-26 MG tablet, Take 1 tablet by mouth Every 12 (Twelve) Hours., Disp: 60 tablet, Rfl: 5  •  Vericiguat (Verquvo) 2.5 MG tablet, Take 1 tablet by mouth Daily With Dinner. Hold for SBP less than 90., Disp: 30 tablet, Rfl: 5    Allergies:   Allergies   Allergen Reactions   • Penicillins Hives       Objective   Vital Signs:   Vitals:    08/08/22 1449   BP: 98/64   BP Location: Left arm  "  Patient Position: Sitting   Pulse: 56   SpO2: 97%   Weight: 72.6 kg (160 lb)   Height: 172.7 cm (67.99\")       PHYSICAL EXAM  General appearance: Awake, alert, cooperative  Head: Normocephalic, without obvious abnormality, atraumatic  Eyes: Conjunctivae/corneas clear, EOMs intact  Neck: no adenopathy, no carotid bruit, no JVD and thyroid: not enlarged  Lungs: clear to auscultation bilaterally and no rhonchi or crackles\", ' symmetric  Heart: regular rate and rhythm, S1, S2 normal, no murmur, click, rub or gallop  Abdomen: Soft, non-tender, bowel sounds normal,  no organomegaly  Extremities: extremities normal, atraumatic, no cyanosis or edema  Skin: Skin color, turgor normal, no rashes or lesions  Neurologic: Grossly normal     Lab Results   Component Value Date    GLUCOSE 99 07/27/2022    CALCIUM 9.2 07/27/2022     07/27/2022    K 4.3 07/27/2022    CO2 25.3 07/27/2022     07/27/2022    BUN 14 07/27/2022    CREATININE 1.16 07/27/2022    EGFRIFAFRI 106 10/29/2020    EGFRIFNONA 84 02/23/2022    BCR 12.1 07/27/2022    ANIONGAP 9.7 07/27/2022     Lab Results   Component Value Date    WBC 6.54 07/12/2022    HGB 13.2 07/12/2022    HCT 40.8 07/12/2022    MCV 95.3 07/12/2022     07/12/2022     Lab Results   Component Value Date    INR 1.44 (H) 06/23/2022    INR 1.41 (H) 06/16/2022    INR 1.07 08/18/2021    PROTIME 17.9 (H) 06/23/2022    PROTIME 17.6 (H) 06/16/2022    PROTIME 14.3 08/18/2021     Lab Results   Component Value Date    TSH 5.080 (H) 06/23/2022       Cardiac Testing:      I personally viewed and interpreted the patient's EKG/Telemetry/lab data      ECG 12 Lead    Date/Time: 8/8/2022 3:54 PM  Performed by: Jose Arechiga DO  Authorized by: Jose Arechiga, DO   Comparison: compared with previous ECG   Similar to previous ECG  Rhythm: sinus bradycardia            Tobacco Cessation: Cessation Counseling Provided   Obstructive Sleep Apnea Screening: N/A    Assessment & Plan    Diagnoses and all " orders for this visit:    1. Ischemic cardiomyopathy (Primary)    2. Paroxysmal atrial fibrillation (HCC)    3. Chronic combined systolic and diastolic congestive heart failure (HCC)    4. Chronic anticoagulation    5. Long term current use of antiarrhythmic drug         Diagnosis Plan   1. Ischemic cardiomyopathy   ischemic cardiomyopathy.  Ejection fraction 21%.    Has not been on guideline directed medical therapy and maximally tolerated doses for 90 days yet.    He is only recently started to be more compliant with medications.    Discussed with him that he may be candidate for an ICD for the primary prevention of sudden cardiac arrest.  In the meantime he will continue to wear his LifeVest.  That said however we will wait an additional 90 days.  He is not compliant with his medications completely and faithfully.  We will check an echocardiogram in 90 days and I will see him back immediately afterwards for restratification purposes for an ICD    If he qualifies for an ICD a dual-chamber device would be indicated as he will likely benefit from atrial pacing support due to iatrogenic sinus node dysfunction from antiarrhythmic drug therapy and significant beta-blockade.   2. Paroxysmal atrial fibrillation (HCC)   automatic.  Rhythm control strategy.  Amiodarone therapy.    Significant interaction for this patient between heart failure and atrial fibrillation episodes.   3. Chronic combined systolic and diastolic congestive heart failure (HCC)   slight heart failure exacerbation today.    Continue diuretics    Add Zaroxolyn 5 mg daily.    Follow-up with Dr. Lopez.   4. Chronic anticoagulation   from anticoagulation is indicated for the primary prevention of stroke.  Significant risk of cardioembolic stroke.   5. Long term current use of antiarrhythmic drug   amiodarone.  Recently started.    Continue to monitor.    May benefit from alternative antiarrhythmic drug therapy however we will defer changing this  medication at this point.     Body mass index is 24.33 kg/m².    I spent 46 minutes in consultation with this patient which included more than 65% of this time in direct face-to-face counseling, physical examination and discussion of my assessment and findings and shared decision making with the patient.  The remainder of the time not spent face to face was performing one, some or all of the following actions:  preparing to see this patient ( eg. Review of tests),  ordering medications, tests or procedures ), care coordination, discussion of the plan with other healthcare providers, documenting clinical information in Epic well as independently interpreting results and communicating results to patient, family and or caregiver.  All time noted occurred on the date of service.    Follow Up:       Thank you for allowing me to participate in the care of your patient. Please to not hesitate to contact me with additional questions or concerns.      Jose Arechiga DO, [facility], [facility]  Cardiac Electrophysiologist  [facility]    Scribed for Jose Arechiga DO Electronically signed by SOFÍA Cowart, 08/08/22, 3:42 PM EDT.

## 2022-08-11 ENCOUNTER — HOSPITAL ENCOUNTER (OUTPATIENT)
Dept: CARDIOLOGY | Facility: HOSPITAL | Age: 62
Discharge: HOME | End: 2022-08-11
Admitting: PHYSICIAN ASSISTANT

## 2022-08-11 VITALS
DIASTOLIC BLOOD PRESSURE: 61 MMHG | OXYGEN SATURATION: 95 % | SYSTOLIC BLOOD PRESSURE: 102 MMHG | HEIGHT: 67 IN | BODY MASS INDEX: 24.08 KG/M2 | WEIGHT: 153.4 LBS | HEART RATE: 58 BPM

## 2022-08-11 DIAGNOSIS — G89.29 CHRONIC MIDLINE BACK PAIN, UNSPECIFIED BACK LOCATION: ICD-10-CM

## 2022-08-11 DIAGNOSIS — Z72.0 TOBACCO ABUSE: ICD-10-CM

## 2022-08-11 DIAGNOSIS — I50.42 CHRONIC COMBINED SYSTOLIC AND DIASTOLIC CONGESTIVE HEART FAILURE: Primary | ICD-10-CM

## 2022-08-11 DIAGNOSIS — R42 DIZZINESS: ICD-10-CM

## 2022-08-11 DIAGNOSIS — I48.91 ATRIAL FIBRILLATION, UNSPECIFIED TYPE: ICD-10-CM

## 2022-08-11 DIAGNOSIS — M54.9 CHRONIC MIDLINE BACK PAIN, UNSPECIFIED BACK LOCATION: ICD-10-CM

## 2022-08-11 DIAGNOSIS — Z79.899 LONG TERM CURRENT USE OF ANTIARRHYTHMIC DRUG: ICD-10-CM

## 2022-08-11 LAB
ABSOLUTE LUNG FLUID CONTENT: 24 % (ref 20–35)
ANION GAP SERPL CALCULATED.3IONS-SCNC: 8.9 MMOL/L (ref 5–15)
BUN SERPL-MCNC: 10 MG/DL (ref 8–23)
BUN/CREAT SERPL: 9.7 (ref 7–25)
CALCIUM SPEC-SCNC: 8.9 MG/DL (ref 8.6–10.5)
CHLORIDE SERPL-SCNC: 99 MMOL/L (ref 98–107)
CO2 SERPL-SCNC: 30.1 MMOL/L (ref 22–29)
CREAT SERPL-MCNC: 1.03 MG/DL (ref 0.76–1.27)
EGFRCR SERPLBLD CKD-EPI 2021: 82.1 ML/MIN/1.73
GLUCOSE SERPL-MCNC: 92 MG/DL (ref 65–99)
MAGNESIUM SERPL-MCNC: 2.4 MG/DL (ref 1.6–2.4)
NT-PROBNP SERPL-MCNC: 677.6 PG/ML (ref 0–900)
POTASSIUM SERPL-SCNC: 4.5 MMOL/L (ref 3.5–5.2)
SODIUM SERPL-SCNC: 138 MMOL/L (ref 136–145)

## 2022-08-11 PROCEDURE — 99214 OFFICE O/P EST MOD 30 MIN: CPT | Performed by: PHYSICIAN ASSISTANT

## 2022-08-11 PROCEDURE — 83735 ASSAY OF MAGNESIUM: CPT | Performed by: PHYSICIAN ASSISTANT

## 2022-08-11 PROCEDURE — 36415 COLL VENOUS BLD VENIPUNCTURE: CPT | Performed by: PHYSICIAN ASSISTANT

## 2022-08-11 PROCEDURE — 93292 WCD DEVICE INTERROGATE: CPT | Performed by: PHYSICIAN ASSISTANT

## 2022-08-11 PROCEDURE — 83880 ASSAY OF NATRIURETIC PEPTIDE: CPT

## 2022-08-11 PROCEDURE — 94726 PLETHYSMOGRAPHY LUNG VOLUMES: CPT | Performed by: PHYSICIAN ASSISTANT

## 2022-08-11 PROCEDURE — 80048 BASIC METABOLIC PNL TOTAL CA: CPT | Performed by: PHYSICIAN ASSISTANT

## 2022-08-11 RX ORDER — NICOTINE 21 MG/24HR
1 PATCH, TRANSDERMAL 24 HOURS TRANSDERMAL EVERY 24 HOURS
Qty: 28 PATCH | Refills: 3 | Status: SHIPPED | OUTPATIENT
Start: 2022-08-11 | End: 2022-09-08

## 2022-08-11 NOTE — PROGRESS NOTES
[facility]  NIKI Connelly Linda C., APRN  [address]  [address]  [place] [address]    Thank you for asking me to see Chong White for congestive heart failure.    History of Present Illness     This is a 62 y.o. male with known past medical history of atrial fibrillation, chronic systolic CHF with distant history of documented IVDA, tobacco abuse, CAD, essential hypertension and GERD.  He was last hospitalized from 06/23/22 through 07/12/22 with atrial fibrillation with RVR, acute on chronic systolic CHF, MAISHA, and COPD.      He underwent LUCERO guided cardioversion and was started on amiodarone in addition to Eliquis, Entresto, Verquvo, Ranolazine, Coreg, and atorvastatin.      hCong White is a 62 y.o. male who presents for today for CHF follow-up.  The patient is typically seen by Amy Yanez APRN.  Patient's primary cardiologist is Dr. Lopez.     • Last known EF is less than 20%. Current medications prior to first visit directed toward underlying heart failure.   (LifeVest in place).      • Last known hospitalization and/or ED visit:  As outlined above with recent discharge.     • Last HF clinic visit: 07/15/22 with me with recent hospital started medication regimen continued including Coreg 6.25mg BID, Entresto 24-26mg BID, Lasix,  MRA was not on board due to recent hyperkalemia during hospitalized.      • HF clinic visit 07/27/22 patient was started on Jardiance.         Initial visit data/details regarding HF:   • Dyspnea on exertion has improved  • Lower extremity swelling significantly improved  • Abdominal swelling: Improving.     • Home weight:(Scale provided on 07/15/22)   • Home BP:  Has been keeping log but left with Olivia.   • Daily activities of living:  Uses cane ([facility] cane prescription [facility] per [facility] request during [facility]15/[facility] Cane tip is sticking out and not usable.  Faxed walker order  "to [facility] and gave handwritten order to patient on 07/27/22 visit.    • Pillows/lying flat: 2 pillows  • Chest pain free at the time of evaluation.   • Patient reports he feels as if his mouth is too dry at times and that his legs are \"getting too skinny\".  We discuss 50 lb weight loss since just the end of June with diuresis.  He reports he is supposed to start a new \"water pill\" but has not yet picked it up.    • Hold parameters were discussed for blood pressures medications including both SBP hold parameters and HR holding parameters.   • Ongoing debility discussed; declines cardiac rehab when offered, declines physical therapy.    • Declines wheel chair.   • Utilizing previously repaired cane.    • Reports ongoing chronic back pain.    • Mr. White reports event of left side facial numbness that resolved on its own several days ago.  He reports it stopped and he did not seek emergency treatment.  Encouraged Mr. White to seek emergency care for future events.            Review of Systems - Review of Systems   Constitutional: Negative for chills, decreased appetite, fever and malaise/fatigue.   HENT: Negative for congestion and ear pain.    Eyes: Negative for blurred vision.   Cardiovascular: Negative for chest pain, dyspnea on exertion, leg swelling, near-syncope and syncope.        Dyspnea on exertion has improved   Respiratory: Positive for shortness of breath. Negative for cough.    Endocrine: Negative for cold intolerance and heat intolerance.   Hematologic/Lymphatic: Negative for adenopathy and bleeding problem.   Skin: Negative for color change and nail changes.   Musculoskeletal: Negative for arthritis, back pain and falls.   Gastrointestinal: Negative for bloating and abdominal pain.   Genitourinary: Negative for bladder incontinence and dysuria.   Neurological: Negative for aphonia, difficulty with concentration and disturbances in coordination.   Psychiatric/Behavioral: Negative for altered mental " status and hallucinations.   Allergic/Immunologic: Negative for environmental allergies and HIV exposure.        All other systems were reviewed and were negative.    Patient Active Problem List   Diagnosis   • Atrial fibrillation (HCC)   • Neuropathy   • ASCVD (arteriosclerotic cardiovascular disease)   • Tobacco abuse   • Ischemic cardiomyopathy   • Chronic combined systolic and diastolic congestive heart failure (HCC)   • Chronic low back pain   • Paralysis (HCC)   • Hypertension   • Chronic anticoagulation   • Long term current use of antiarrhythmic drug       family history includes Heart disease in his maternal grandmother and mother.     reports that he has been smoking cigarettes. He started smoking about 53 years ago. He has been smoking about 0.50 packs per day. He has never used smokeless tobacco. He reports that he does not drink alcohol and does not use drugs.    Allergies   Allergen Reactions   • Penicillins Hives         Current Outpatient Medications:   •  amiodarone (Pacerone) 200 MG tablet, Take 1 tablet by mouth Daily., Disp: 30 tablet, Rfl: 5  •  apixaban (ELIQUIS) 5 MG tablet tablet, Take 1 tablet by mouth Every 12 (Twelve) Hours. Indications: Atrial Fibrillation, Disp: 60 tablet, Rfl: 5  •  aspirin 81 MG EC tablet, Take 1 tablet by mouth Daily., Disp: 30 tablet, Rfl: 5  •  atorvastatin (Lipitor) 40 MG tablet, Take 1 tablet by mouth Daily., Disp: 30 tablet, Rfl: 5  •  carvedilol (COREG) 6.25 MG tablet, Take 1 tablet by mouth 2 (Two) Times a Day With Meals., Disp: 60 tablet, Rfl: 5  •  empagliflozin (Jardiance) 10 MG tablet tablet, Take 1 tablet by mouth Daily for 30 days., Disp: 30 tablet, Rfl: 2  •  furosemide (LASIX) 40 MG tablet, Take 1 tablet by mouth Daily for 90 days., Disp: 90 tablet, Rfl: 0  •  ranolazine (RANEXA) 500 MG 12 hr tablet, Take 1 tablet by mouth Every 12 (Twelve) Hours., Disp: 60 tablet, Rfl: 5  •  sacubitril-valsartan (ENTRESTO) 24-26 MG tablet, Take 1 tablet by mouth Every  12 (Twelve) Hours., Disp: 60 tablet, Rfl: 5  •  Vericiguat (Verquvo) 2.5 MG tablet, Take 1 tablet by mouth Daily With Dinner. Hold for SBP less than 90., Disp: 30 tablet, Rfl: 5  •  nicotine (NICODERM CQ) 21 MG/24HR patch, Place 1 patch on the skin as directed by provider Daily for 28 days., Disp: 28 patch, Rfl: 3      Physical Exam:  I have reviewed the patient's current vital signs as documented in the patient's EMR.         Vitals:    08/11/22 1301   BP: 102/61   Pulse: 58   SpO2: 95%     Body mass index is 24.03 kg/m².       08/11/22  1301   Weight: 69.6 kg (153 lb 6.4 oz)          Physical Exam  Vitals and nursing note reviewed.   Constitutional:       Appearance: Normal appearance.   HENT:      Head: Normocephalic and atraumatic.      Mouth/Throat:      Lips: Pink.      Mouth: Mucous membranes are moist.   Eyes:      General: Lids are normal.      Conjunctiva/sclera:      Right eye: Right conjunctiva is not injected.      Left eye: Left conjunctiva is not injected.   Pulmonary:      Effort: No tachypnea or bradypnea.      Breath sounds: No decreased breath sounds, wheezing, rhonchi or rales.   Chest:      Chest wall: No mass or lacerations.   Abdominal:      General: Bowel sounds are normal.      Palpations: Abdomen is soft.      Tenderness: There is no abdominal tenderness. There is no right CVA tenderness or left CVA tenderness.      Comments: Abdominal protuberance improved   Musculoskeletal:      Cervical back: Full passive range of motion without pain. No edema or erythema.      Right lower leg: No swelling. No edema.      Left lower leg: No swelling. No edema.      Comments: Patient has ongoing unstable gait with a cane   Skin:     General: Skin is warm and moist.   Neurological:      Mental Status: He is alert and oriented to person, place, and time.   Psychiatric:         Attention and Perception: Attention normal.         Mood and Affect: Mood normal.         Behavior: Behavior is cooperative.          JVP: Volume/Pulsation: Normal.        DATA REVIEWED:     EKG. I personally reviewed and interpreted the EKG.        ---------------------------------------------------  TTE/LUCERO:  Results for orders placed during the hospital encounter of 06/23/22    Adult Transesophageal Echo (LUCERO) W/ Cont if Necessary Per Protocol    Interpretation Summary  · Left ventricular ejection fraction appears to be less than 20%. Left ventricular systolic function is severely decreased.  · The left ventricular cavity is moderate to severely dilated.  · Moderately reduced right ventricular systolic function noted.  · No evidence of a left atrial appendage thrombus was present.  · The aortic valve is structurally normal. Mild aortic valve regurgitation is present.  · Moderate mitral valve regurgitation is present. No significant mitral valve stenosis is present.  · Moderate tricuspid valve regurgitation is present.  · There is no evidence of pericardial effusion.  · Comments: Proceed with electrical cardioversion.      My interpretation of the TTE is below.     -----------------------------------------------------  CXR/Imaging:   Imaging Results (Most Recent)     None          I personally reviewed and interpreted the CXR.      -----------------------------------------------------  CT:   No radiology results for the last 30 days.  I personally reviewed the images of the CT scan.  My personal interpretation is below.      ----------------------------------------------------    PFTs:    No visible PFTs available within system.   --------------------------------------------------------------------------------------------------    Laboratory evaluations:    Lab Results   Component Value Date    GLUCOSE 92 08/11/2022    BUN 10 08/11/2022    CREATININE 1.03 08/11/2022    EGFRIFNONA 84 02/23/2022    EGFRIFAFRI 106 10/29/2020    BCR 9.7 08/11/2022    K 4.5 08/11/2022    CO2 30.1 (H) 08/11/2022    CALCIUM 8.9 08/11/2022    PROTENTOTREF 6.6  10/29/2020    ALBUMIN 3.42 (L) 07/11/2022    LABIL2 1.9 10/29/2020    AST 29 07/11/2022    ALT 45 (H) 07/11/2022     Lab Results   Component Value Date    WBC 6.54 07/12/2022    HGB 13.2 07/12/2022    HCT 40.8 07/12/2022    MCV 95.3 07/12/2022     07/12/2022     Lab Results   Component Value Date    CHOL 133 09/03/2020    TRIG 51 09/03/2020    HDL 42 09/03/2020    LDL 81 09/03/2020     Lab Results   Component Value Date    TSH 5.080 (H) 06/23/2022     Lab Results   Component Value Date    TROPONINT <0.010 06/27/2022     No results found for: HGBA1C  No results found for: DDIMER  Lab Results   Component Value Date    ALT 45 (H) 07/11/2022     No results found for: HGBA1C  Lab Results   Component Value Date    CREATININE 1.03 08/11/2022     No results found for: IRON, TIBC, FERRITIN  Lab Results   Component Value Date    INR 1.44 (H) 06/23/2022    INR 1.41 (H) 06/16/2022    INR 1.07 08/18/2021    PROTIME 17.9 (H) 06/23/2022    PROTIME 17.6 (H) 06/16/2022    PROTIME 14.3 08/18/2021         PAH RISK ASSESSMENT:      1. Chronic combined systolic and diastolic congestive heart failure (HCC)    2. Dizziness    3. Atrial fibrillation, unspecified type (HCC)    4. Tobacco abuse    5. Chronic midline back pain, unspecified back location    6. Long term current use of antiarrhythmic drug          ORDERS PLACED TODAY:  Orders Placed This Encounter   Procedures   • BNP   • Basic Metabolic Panel   • Magnesium   • ReDs Vest        Diagnoses and all orders for this visit:    1. Chronic combined systolic and diastolic congestive heart failure (HCC) (Primary)  -     BNP  -     Basic Metabolic Panel; Standing  -     Magnesium; Standing  -     ReDs Vest  -     Basic Metabolic Panel  -     Magnesium    2. Dizziness    3. Atrial fibrillation, unspecified type (HCC)  Overview:  · ECV, 9/4/2020  · ECV, 6/28/2022      4. Tobacco abuse    5. Chronic midline back pain, unspecified back location    6. Long term current use of  antiarrhythmic drug    Other orders  -     empagliflozin (Jardiance) 10 MG tablet tablet; Take 1 tablet by mouth Daily for 30 days.  Dispense: 30 tablet; Refill: 2  -     nicotine (NICODERM CQ) 21 MG/24HR patch; Place 1 patch on the skin as directed by provider Daily for 28 days.  Dispense: 28 patch; Refill: 3           MEDS ORDERED TODAY:    New Medications Ordered This Visit   Medications   • empagliflozin (Jardiance) 10 MG tablet tablet     Sig: Take 1 tablet by mouth Daily for 30 days.     Dispense:  30 tablet     Refill:  2   • nicotine (NICODERM CQ) 21 MG/24HR patch     Sig: Place 1 patch on the skin as directed by provider Daily for 28 days.     Dispense:  28 patch     Refill:  3        ---------------------------------------------------------------------------------------------------------------------------          ASSESSMENT/PLAN:      Diagnosis Plan   1. Chronic combined systolic and diastolic congestive heart failure (HCC)  BNP    Basic Metabolic Panel    Magnesium    ReDs Vest    Basic Metabolic Panel    Magnesium   2. Dizziness     3. Atrial fibrillation, unspecified type (HCC)     4. Tobacco abuse     5. Chronic midline back pain, unspecified back location     6. Long term current use of antiarrhythmic drug         not acutely decompensated chronic severe systolic and diastolic heart failure. Right Heart Failure. Etiology previously documented to be non-ishcemic. LVEF less than 20%.         Today, Patient appears euvolemic.and with  Moderate perfusion. The patient's hemodynamics are currently acceptable. HR in room was found to be in 60s.  BP/MAP was reviewed and there is notroom for medication up-titration.  Clinical trajectory was assessed and hasimproved.     CHF GOAL DIRECTED MEDICAL THERAPY FOR PATIENT ADDRESSED/ADJUSTED:       GDMT    Drug Class   Drug   Dose Last Dose Adjustment Additional Titration   Notes   ACEi/ARB/ARNI Enstresto 24-26mg qd 07/12/22  Tolerating with borderline low BPs.     Beta Blocker Coreg  6.25mg BID 07/12/22  Taking Amiodarone for Afib as well.      MRA Xx  Stopped in hospital due to hyperkalemia xx xx  Recent hyperkalemia during hospitalization   SGLT2i Jardiance 10mg  07/27/22 N/A    Sonya recently started; GDMT discussed with pharmacy.      -CHF Specific BB:   •  Continue Carvedilol  6.25mg BID at/approaching target dose kept at the same given borderline blood pressures.    • Plan to titrate at next visit if pressures remain stable.   • Wearable CardioDefibrillator [employer] LifeVest interrogation report has been obtained and reviewed through the [employer].   Report was reviewed with patient including wear time trends, heart rate trends, treatable events, baselines, in addition to average daily steps and activities.  Additionally, debrillator system was unplugged and replugged during evaluation.  Purpose and importance of the wearable cardioverter defibrillator were discussed.    • HR in the 60s at baseline on lifevest review.    • EP visit with Dr. Arechiga record reviewed.      -MRA:   · Stopped previously due to hyperkalemia.     -ACE/ARB/ARNi:   • Current dose: Continue Entresto 24-26mg qd with hold parameters for SBP less than 95.   • Baseline creatinine previously known to be 0.9-1.3 per review of recent laboratory values.  Renal function remains acceptable upon review today.       SGLT2 inhibition therapy.   • A BMP at initiation to verify GFR >45 was recommended, as was interval GFR surveillance.  • Pt was advised side effects, some severe, including hypersensitivity and Boogie's; in addition to common side effects of UTIs and female genital mycotic infections were discussed.  • Continuing Jardiance (empagliflozin) 10mg with quarterly assessment of GFR.   • Denies  side effects.        -Diuretic regimen:   • ReDs Vest reading for 08/11/22 was 24 with prior reading of 28 on visit just one week ago.  ProBNP has now normalized upon review.     •   • Mr. White continued to do well with his weights and his ReDs Vest readings.    • He was placed on Metolazone by Dr. Arechiga.  Advised to hold for now given reports of patient feeling dry and dizzy at times coupled with 50 lb weight loss and patient feeling dry.    • RTC in 1 month and will further adjust pending.    • BMP, Mag, & ProBNP reviewed with patient.    • Last proBNP on 07/04/22 was 2,239 but was improved from prior value.    • CT chest imaging reviewed from June 2022 with effusions present.      -Fluid restriction/Sodium restriction:   • Requested 2000 ml restriction  • Patient has been asked to weigh daily and was provided with a printed diuretic strategy.  • 1,500 mg Na restriction was discussed.    -Acute vs. Chronic underlying conditions other than HF addressed during visit:   · Left side facial numbness  Around one week ago:   Continue ASA, statin, and Eliquis.  Advised to follow-up with PCP for further imaging referral.  Advised to seek emergency care if this happens again.   · Debility: Ongoing-continue using cane.   · Anemia is common in heart failure.  Typically, this can come from anemia of chronic disease. Last Hemoglobin is WNL.    · Afib: Continue Amiodarone & Eliquis.   · Chronic low back pain: Encouraged patient to follow-up with PCP for possible pain management referral for chronic and ongoing pain.        ----------------------------------------------------------------------  --------------------------------------------------------------------------------          45 minutes out of 60 minutes face to face spent counseling patient extensively on dietary Na+ intake, importance of activity, weight monitoring, compliance with medications in addition to importance of titration with goal directed medical therapy and follow up appointments.            This document has been electronically signed by Veronica Kim PA-C  August 11, 2022 14:21 EDT      Part of this note may be an  electronic transcription/translation of spoken language to printed text using the Dragon Dictation System.

## 2022-08-11 NOTE — PROGRESS NOTES
" Heart Failure Clinic  Pharmacist Note     Chong White is a 62 y.o. male seen in the Heart Failure Clinic for HFrEF with an EF <20 per most recent ECHO. He was discharged from the hospital 7/12/22  for  acute on chronic systolic CHF and Afib w/ RVR. Chong White reports he is not very familiar with names of his medications. He reports he just takes the 7 medications that he was given at discharge in June in addition to the newly started Jardiance that he received last time from our pharmacy and aspirin 81mg. He reports having to hold 2 doses of Verquvo in the AM due to hold parameters.  Pt does not keep a log of BP or weights, but reports no swelling and that he is SOB \"all of the time.\" He also reports that he is dizzy/lightheaded all of the time. Patient reports that he has not picked up his new medications that was prescribed for him (metolazone) on Monday from his cardiologist in [place]. Patient is on Lasix 40mg daily.      Medication Use:   Hx of med intolerances:  None related to HF  Retail Rx Management: Uses [facility]; Indigent Medication Assistance notes done during admission indicate Entresto had no copay & Verquvo and Jardiance PAs approved with no copay.    Past Medical History:   Diagnosis Date   • Atrial fibrillation (HCC)    • GERD (gastroesophageal reflux disease)    • Hypertension    • Myocardial infarction (HCC)      ALLERGIES: Penicillins  Current Outpatient Medications   Medication Sig Dispense Refill   • amiodarone (Pacerone) 200 MG tablet Take 1 tablet by mouth Daily. 30 tablet 5   • apixaban (ELIQUIS) 5 MG tablet tablet Take 1 tablet by mouth Every 12 (Twelve) Hours. Indications: Atrial Fibrillation 60 tablet 5   • aspirin 81 MG EC tablet Take 1 tablet by mouth Daily. 30 tablet 5   • atorvastatin (Lipitor) 40 MG tablet Take 1 tablet by mouth Daily. 30 tablet 5   • carvedilol (COREG) 6.25 MG tablet Take 1 tablet by mouth 2 (Two) Times a Day With Meals. 60 tablet 5   • " "empagliflozin (Jardiance) 10 MG tablet tablet Take 1 tablet by mouth Daily for 30 days. 30 tablet 2   • furosemide (LASIX) 40 MG tablet Take 1 tablet by mouth Daily for 90 days. 90 tablet 0   • ranolazine (RANEXA) 500 MG 12 hr tablet Take 1 tablet by mouth Every 12 (Twelve) Hours. 60 tablet 5   • sacubitril-valsartan (ENTRESTO) 24-26 MG tablet Take 1 tablet by mouth Every 12 (Twelve) Hours. 60 tablet 5   • Vericiguat (Verquvo) 2.5 MG tablet Take 1 tablet by mouth Daily With Dinner. Hold for SBP less than 90. 30 tablet 5   • nicotine (NICODERM CQ) 21 MG/24HR patch Place 1 patch on the skin as directed by provider Daily for 28 days. 28 patch 3     No current facility-administered medications for this encounter.       Vaccination History:   Pneumonia: PPSV23 in 2020 per Epic records. Pt believes he is UTD; if not, 1 dose PCV15 or PCV20 is recommended.  Annual Influenza: assess next flu season  COVID 19: received Uvaldo x 1 dose 8/19/21    Objective  Vitals:    08/11/22 1301   BP: 102/61   BP Location: Left arm   Patient Position: Sitting   Cuff Size: Adult   Pulse: 58   SpO2: 95%   Weight: 69.6 kg (153 lb 6.4 oz)   Height: 170.2 cm (67\")     Wt Readings from Last 3 Encounters:   08/11/22 69.6 kg (153 lb 6.4 oz)   08/08/22 72.6 kg (160 lb)   07/27/22 72.7 kg (160 lb 3.2 oz)         08/11/22  1301   Weight: 69.6 kg (153 lb 6.4 oz)     Lab Results   Component Value Date    GLUCOSE 92 08/11/2022    BUN 10 08/11/2022    CREATININE 1.03 08/11/2022    EGFRIFNONA 84 02/23/2022    EGFRIFAFRI 106 10/29/2020    BCR 9.7 08/11/2022    K 4.5 08/11/2022    CO2 30.1 (H) 08/11/2022    CALCIUM 8.9 08/11/2022    PROTENTOTREF 6.6 10/29/2020    ALBUMIN 3.42 (L) 07/11/2022    LABIL2 1.9 10/29/2020    AST 29 07/11/2022    ALT 45 (H) 07/11/2022     Lab Results   Component Value Date    WBC 6.54 07/12/2022    HGB 13.2 07/12/2022    HCT 40.8 07/12/2022    MCV 95.3 07/12/2022     07/12/2022     Lab Results   Component Value Date    " TROPONINT <0.010 06/27/2022     Lab Results   Component Value Date    PROBNP 677.6 08/11/2022     Results for orders placed during the hospital encounter of 06/23/22    Adult Transesophageal Echo (LUCERO) W/ Cont if Necessary Per Protocol    Interpretation Summary  · Left ventricular ejection fraction appears to be less than 20%. Left ventricular systolic function is severely decreased.  · The left ventricular cavity is moderate to severely dilated.  · Moderately reduced right ventricular systolic function noted.  · No evidence of a left atrial appendage thrombus was present.  · The aortic valve is structurally normal. Mild aortic valve regurgitation is present.  · Moderate mitral valve regurgitation is present. No significant mitral valve stenosis is present.  · Moderate tricuspid valve regurgitation is present.  · There is no evidence of pericardial effusion.  · Comments: Proceed with electrical cardioversion.         GDMT    Drug Class   Drug   Dose Last Dose Adjustment Additional Titration   Notes   ACEi/ARB/ARNI Entresto 24/26 7/12/22     Beta Blocker Carvedilol 6.25mg 7/12/22     MRA     hyperK in hospital 7/12   SGLT2i Jardiance 10mg 7/27/22 N/A     Vericiguat 2.5mg 7/12/22 *during hospitalization         Drug Therapy Problems    1. No BP log today and patient was unsure of what he was taking name-wise  2. GDMT    Recommendations:     1. Provided with new log today and I opened it up to show him where to log everything, to help him understand the ease of use and its importance. Also recommended recording his weights for better assessment of the patient's medications. I also strongly suggested that he bring in his actual medication bottles at the next visit, so that we could see what he is actually taking and the frequencies of the medications. He reports that he just looks at the bottles to see how often to take each day.   2. Would recommend reassessing for ability to titrate Entresto or Coreg when BP log  available. Patient was going to start the new metolazone prescribed for him on Monday after picking it up from the pharmacy. Recommend that the patient does not take it. I called [facility] to have them put it on hold. Patient has several other prescriptions that are ready for him there. I made the patient aware. No other recommendations at this time.       Patient was educated on heart failure medications and the importance of medication adherence. All questions were addressed and patient expressed understanding. Used teach-back method to assess understanding.     Thank you for allowing me to participate in the care of your patient,    Destini Toscano [facility]  08/11/22  14:11 EDT

## 2022-08-11 NOTE — PROGRESS NOTES
Heart Failure Clinic    Date: 08/11/22     Vitals:    08/11/22 1301   BP: 102/61   Pulse: 58   SpO2: 95%      Weight 153.4  Method of arrival: Ambulatory with cane    Weighing self daily: No    Monitoring Heart Failure Zones: Yes    Today's HF Zone: Yellow     Taking medications as prescribed: Yes    Edema No    Shortness of Air: Yes but no worse than usual, no energy    Number of pillows used at night:<2 on couch    Educational Materials given:  avs                                                                         ReDS Value: 24  21-24 Low Normal      Esteban Levine RN 08/11/22 13:03 EDT

## 2022-08-12 ENCOUNTER — TELEPHONE (OUTPATIENT)
Dept: OTHER | Facility: OTHER | Age: 62
End: 2022-08-12

## 2022-08-12 NOTE — RESEARCH
I spoke with patient to follow up with his  recent office visit where he was supplied  info in the Jewel trial. He declines the trial at this point. He is concerned about his memory and his ability to change the device and manage the naveed interactions. He will continue to wear the  Lifevest.

## 2022-08-29 DIAGNOSIS — I25.10 ASCVD (ARTERIOSCLEROTIC CARDIOVASCULAR DISEASE): ICD-10-CM

## 2022-08-29 DIAGNOSIS — I42.0 CARDIOMYOPATHY, DILATED: ICD-10-CM

## 2022-08-29 DIAGNOSIS — I48.0 PAROXYSMAL ATRIAL FIBRILLATION: ICD-10-CM

## 2022-08-29 DIAGNOSIS — I50.22 CHRONIC SYSTOLIC HEART FAILURE: ICD-10-CM

## 2022-08-29 RX ORDER — VERICIGUAT 2.5 MG/1
1 TABLET, FILM COATED ORAL
Qty: 30 TABLET | Refills: 5 | Status: SHIPPED | OUTPATIENT
Start: 2022-08-29 | End: 2022-10-13 | Stop reason: HOSPADM

## 2022-08-29 RX ORDER — AMIODARONE HYDROCHLORIDE 200 MG/1
200 TABLET ORAL DAILY
Qty: 30 TABLET | Refills: 5 | Status: SHIPPED | OUTPATIENT
Start: 2022-08-29 | End: 2022-10-13 | Stop reason: SDUPTHER

## 2022-09-06 ENCOUNTER — OFFICE VISIT (OUTPATIENT)
Dept: CARDIOLOGY | Facility: CLINIC | Age: 62
End: 2022-09-06

## 2022-09-06 VITALS
BODY MASS INDEX: 24.96 KG/M2 | WEIGHT: 159 LBS | HEIGHT: 67 IN | HEART RATE: 60 BPM | DIASTOLIC BLOOD PRESSURE: 67 MMHG | SYSTOLIC BLOOD PRESSURE: 105 MMHG

## 2022-09-06 DIAGNOSIS — I48.0 PAROXYSMAL ATRIAL FIBRILLATION: Primary | ICD-10-CM

## 2022-09-06 DIAGNOSIS — I42.0 CARDIOMYOPATHY, DILATED: ICD-10-CM

## 2022-09-06 DIAGNOSIS — I25.10 ASCVD (ARTERIOSCLEROTIC CARDIOVASCULAR DISEASE): ICD-10-CM

## 2022-09-06 PROCEDURE — 99213 OFFICE O/P EST LOW 20 MIN: CPT | Performed by: NURSE PRACTITIONER

## 2022-09-06 NOTE — PROGRESS NOTES
Amy Yanez APRN  Chong White  1960  09/06/2022    Patient Active Problem List   Diagnosis   • Atrial fibrillation (HCC)   • Neuropathy   • ASCVD (arteriosclerotic cardiovascular disease)   • Tobacco abuse   • Ischemic cardiomyopathy   • Chronic combined systolic and diastolic congestive heart failure (HCC)   • Chronic low back pain   • Paralysis (HCC)   • Hypertension   • Chronic anticoagulation   • Long term current use of antiarrhythmic drug       Dear Amy Yanez, SAMARA:    Subjective     Chief Complaint   Patient presents with   • Follow-up           History of Present Illness:    Chong White is a 62 y.o. male with a past medical history ofparoxysmal atrial fibrillation, HFrEF, essential hypertension, tobacco abuse, ischemic cardiomyopathy, and coronary artery disease.  He presents today for cardiology follow-up.  Was evaluated by EP recently.  It was recommended that he continue GDMT for his cardiomyopathy for a total of 90 days.  Previously, the patient had been noncompliant with medications and had not taken them on a consistent basis.  The patient denies any recent chest pains or shortness of breath.  Denies leg edema.  He is following with the heart failure clinic and has been euvolemic.  He is wearing his LifeVest.  He denies any bleeding issues with Eliquis.  He occasionally has mild palpitations, but has been maintaining sinus rhythm on amiodarone.  Previously CMP and TSH ordered but not completed.          Allergies   Allergen Reactions   • Penicillins Hives   :      Current Outpatient Medications:   •  amiodarone (Pacerone) 200 MG tablet, Take 1 tablet by mouth Daily., Disp: 30 tablet, Rfl: 5  •  apixaban (ELIQUIS) 5 MG tablet tablet, Take 1 tablet by mouth Every 12 (Twelve) Hours. Indications: Atrial Fibrillation, Disp: 60 tablet, Rfl: 5  •  aspirin 81 MG EC tablet, Take 1 tablet by mouth Daily., Disp: 30 tablet, Rfl: 5  •  atorvastatin (Lipitor) 40 MG tablet, Take 1 tablet by mouth  "Daily., Disp: 30 tablet, Rfl: 5  •  carvedilol (COREG) 6.25 MG tablet, Take 1 tablet by mouth 2 (Two) Times a Day With Meals., Disp: 60 tablet, Rfl: 5  •  empagliflozin (Jardiance) 10 MG tablet tablet, Take 1 tablet by mouth Daily for 30 days., Disp: 30 tablet, Rfl: 2  •  furosemide (LASIX) 40 MG tablet, Take 1 tablet by mouth Daily for 90 days., Disp: 90 tablet, Rfl: 0  •  nicotine (NICODERM CQ) 21 MG/24HR patch, Place 1 patch on the skin as directed by provider Daily for 28 days., Disp: 28 patch, Rfl: 3  •  ranolazine (RANEXA) 500 MG 12 hr tablet, Take 1 tablet by mouth Every 12 (Twelve) Hours., Disp: 60 tablet, Rfl: 5  •  sacubitril-valsartan (ENTRESTO) 24-26 MG tablet, Take 1 tablet by mouth Every 12 (Twelve) Hours., Disp: 60 tablet, Rfl: 5  •  Vericiguat (Verquvo) 2.5 MG tablet, Take 1 tablet by mouth Daily With Dinner. Hold for SBP less than 90., Disp: 30 tablet, Rfl: 5      The following portions of the patient's history were reviewed and updated as appropriate: allergies, current medications, past family history, past medical history, past social history, past surgical history and problem list.    Social History     Tobacco Use   • Smoking status: Current Every Day Smoker     Packs/day: 0.50     Types: Cigarettes     Start date: 3/1/1969   • Smokeless tobacco: Never Used   • Tobacco comment: Has nicotine patches and states has only had approximately 10 cigarettes since discharge on 9/11/2020. Not using patch at this time   Vaping Use   • Vaping Use: Never used   Substance Use Topics   • Alcohol use: Never   • Drug use: Never       Review of Systems   Constitutional: Negative for decreased appetite and malaise/fatigue.   Cardiovascular: Positive for palpitations. Negative for chest pain and dyspnea on exertion.   Respiratory: Negative for cough and shortness of breath.        Objective   Vitals:    09/06/22 1419   BP: 105/67   Pulse: 60   Weight: 72.1 kg (159 lb)   Height: 170.2 cm (67.01\")     Body mass " index is 24.9 kg/m².        Vitals reviewed.   Constitutional:       Appearance: Healthy appearance. Well-developed and not in distress.      Comments: Wearing LifeVest   HENT:      Head: Normocephalic and atraumatic.   Pulmonary:      Effort: Pulmonary effort is normal.      Breath sounds: Normal breath sounds. No wheezing. No rales.   Cardiovascular:      Normal rate. Regular rhythm.      Murmurs: There is no murmur.      . No S3 and S4 gallop.   Edema:     Peripheral edema absent.   Abdominal:      General: Bowel sounds are normal.      Palpations: Abdomen is soft.   Musculoskeletal:      Comments: Antalgic gait, ambulates with cane Skin:     General: Skin is warm and dry.   Neurological:      Mental Status: Alert, oriented to person, place, and time and oriented to person, place and time.   Psychiatric:         Mood and Affect: Mood normal.         Behavior: Behavior normal.         Lab Results   Component Value Date     08/11/2022    K 4.5 08/11/2022    CL 99 08/11/2022    CO2 30.1 (H) 08/11/2022    BUN 10 08/11/2022    CREATININE 1.03 08/11/2022    GLUCOSE 92 08/11/2022    CALCIUM 8.9 08/11/2022    AST 29 07/11/2022    ALT 45 (H) 07/11/2022    ALKPHOS 78 07/11/2022    LABIL2 1.9 10/29/2020     No results found for: CKTOTAL  Lab Results   Component Value Date    WBC 6.54 07/12/2022    HGB 13.2 07/12/2022    HCT 40.8 07/12/2022     07/12/2022     Lab Results   Component Value Date    INR 1.44 (H) 06/23/2022    INR 1.41 (H) 06/16/2022    INR 1.07 08/18/2021     Lab Results   Component Value Date    MG 2.4 08/11/2022     Lab Results   Component Value Date    TSH 5.080 (H) 06/23/2022    TRIG 51 09/03/2020    HDL 42 09/03/2020    LDL 81 09/03/2020      No results found for: BNP        Procedures      Assessment & Plan    Diagnosis Plan   1. Paroxysmal atrial fibrillation (HCC)  Comprehensive Metabolic Panel    TSH   2. Cardiomyopathy, dilated (possibly ischemic and nonischemic).     3. ASCVD  (arteriosclerotic cardiovascular disease) with 100% occlusion of the proximal LAD with excellent collaterals from RCA                  Recommendations:    1. Paroxysmal atrial fibrillation-maintaining sinus rhythm on amiodarone, anticoagulated with Eliquis.  I did order a CMP and TSH.  We will try to coordinate this with heart failure clinic labs.  2. Dilated cardiomyopathy- continue GDMT.  Following with EP and will reevaluate LV function in 90 days.  3. ASCVD-no recent anginal symptoms.  Continue low-dose aspirin, atorvastatin, and carvedilol.  4. Follow-up in 2 to 3 months or sooner if needed.        Return in about 3 months (around 12/6/2022) for Recheck.    As always, I appreciate very much the opportunity to participate in the cardiovascular care of your patients.      With Best Regards,    SAMARA Almonte

## 2022-09-15 ENCOUNTER — HOSPITAL ENCOUNTER (OUTPATIENT)
Dept: CARDIOLOGY | Facility: HOSPITAL | Age: 62
Discharge: HOME | End: 2022-09-15
Admitting: PHYSICIAN ASSISTANT

## 2022-09-15 VITALS
OXYGEN SATURATION: 96 % | WEIGHT: 159.6 LBS | HEART RATE: 62 BPM | SYSTOLIC BLOOD PRESSURE: 110 MMHG | HEIGHT: 67 IN | DIASTOLIC BLOOD PRESSURE: 61 MMHG | BODY MASS INDEX: 25.05 KG/M2

## 2022-09-15 DIAGNOSIS — I48.0 PAROXYSMAL ATRIAL FIBRILLATION: ICD-10-CM

## 2022-09-15 DIAGNOSIS — I25.5 ISCHEMIC CARDIOMYOPATHY: ICD-10-CM

## 2022-09-15 DIAGNOSIS — I50.42 CHRONIC COMBINED SYSTOLIC AND DIASTOLIC CONGESTIVE HEART FAILURE: Primary | ICD-10-CM

## 2022-09-15 LAB
ABSOLUTE LUNG FLUID CONTENT: 30 % (ref 20–35)
ALBUMIN SERPL-MCNC: 4.37 G/DL (ref 3.5–5.2)
ALBUMIN/GLOB SERPL: 1.5 G/DL
ALP SERPL-CCNC: 85 U/L (ref 39–117)
ALT SERPL W P-5'-P-CCNC: 20 U/L (ref 1–41)
ANION GAP SERPL CALCULATED.3IONS-SCNC: 11.2 MMOL/L (ref 5–15)
AST SERPL-CCNC: 17 U/L (ref 1–40)
BILIRUB SERPL-MCNC: 0.5 MG/DL (ref 0–1.2)
BUN SERPL-MCNC: 13 MG/DL (ref 8–23)
BUN/CREAT SERPL: 14.9 (ref 7–25)
CALCIUM SPEC-SCNC: 9.3 MG/DL (ref 8.6–10.5)
CHLORIDE SERPL-SCNC: 103 MMOL/L (ref 98–107)
CO2 SERPL-SCNC: 26.8 MMOL/L (ref 22–29)
CREAT SERPL-MCNC: 0.87 MG/DL (ref 0.76–1.27)
EGFRCR SERPLBLD CKD-EPI 2021: 97.6 ML/MIN/1.73
GLOBULIN UR ELPH-MCNC: 2.9 GM/DL
GLUCOSE SERPL-MCNC: 106 MG/DL (ref 65–99)
MAGNESIUM SERPL-MCNC: 2.2 MG/DL (ref 1.6–2.4)
NT-PROBNP SERPL-MCNC: 329.3 PG/ML (ref 0–900)
POTASSIUM SERPL-SCNC: 4.5 MMOL/L (ref 3.5–5.2)
PROT SERPL-MCNC: 7.3 G/DL (ref 6–8.5)
SODIUM SERPL-SCNC: 141 MMOL/L (ref 136–145)
TSH SERPL DL<=0.05 MIU/L-ACNC: 3.01 UIU/ML (ref 0.27–4.2)

## 2022-09-15 PROCEDURE — 84443 ASSAY THYROID STIM HORMONE: CPT

## 2022-09-15 PROCEDURE — 99215 OFFICE O/P EST HI 40 MIN: CPT | Performed by: PHYSICIAN ASSISTANT

## 2022-09-15 PROCEDURE — 83735 ASSAY OF MAGNESIUM: CPT | Performed by: PHYSICIAN ASSISTANT

## 2022-09-15 PROCEDURE — 83880 ASSAY OF NATRIURETIC PEPTIDE: CPT

## 2022-09-15 PROCEDURE — 36415 COLL VENOUS BLD VENIPUNCTURE: CPT

## 2022-09-15 PROCEDURE — 93292 WCD DEVICE INTERROGATE: CPT | Performed by: PHYSICIAN ASSISTANT

## 2022-09-15 PROCEDURE — 94726 PLETHYSMOGRAPHY LUNG VOLUMES: CPT | Performed by: PHYSICIAN ASSISTANT

## 2022-09-15 PROCEDURE — 80053 COMPREHEN METABOLIC PANEL: CPT

## 2022-09-15 NOTE — PROGRESS NOTES
Heart Failure Clinic  Pharmacist Note     Chong White is a 62 y.o. male seen in the Heart Failure Clinic for HFrEF with an EF <20 per most recent ECHO. He was discharged from the hospital 7/12/22 for acute on chronic systolic CHF and Afib w/ RVR. Chong White reports he is not very familiar with names of his medications. He brought in a bag of his medications for me to go through. He reports adherence to them all, except for his Amiodarone, which he ran out of and has not taken the past 2-3 days. The pt has not been taking his BP and therefore does not know how it has been running. He has continued to take the Verquvo regardless. He reports that he feels fine throughout the day with SOB when he is moving around and denies any swelling. He also reports that he is dizzy/lightheaded some of the time and reports trouble sleeping through the night and experiences chest pains and SOB. Patient is on Lasix 40mg daily.      Medication Use:   Hx of med intolerances:  None related to HF  Retail Rx Management: Uses [facility] [place]; Indigent Medication Assistance notes done during admission indicate Entresto had no copay & Verquvo and Jardiance PAs approved with no copay.    Past Medical History:   Diagnosis Date   • Atrial fibrillation (HCC)    • GERD (gastroesophageal reflux disease)    • Hypertension    • Myocardial infarction (HCC)      ALLERGIES: Penicillins  Current Outpatient Medications   Medication Sig Dispense Refill   • apixaban (ELIQUIS) 5 MG tablet tablet Take 1 tablet by mouth Every 12 (Twelve) Hours. Indications: Atrial Fibrillation 60 tablet 5   • aspirin 81 MG EC tablet Take 1 tablet by mouth Daily. 30 tablet 5   • atorvastatin (Lipitor) 40 MG tablet Take 1 tablet by mouth Daily. 30 tablet 5   • carvedilol (COREG) 6.25 MG tablet Take 1 tablet by mouth 2 (Two) Times a Day With Meals. 60 tablet 5   • empagliflozin (Jardiance) 10 MG tablet tablet Take 1 tablet by mouth Daily for 30 days. 30 tablet 2   •  "furosemide (LASIX) 40 MG tablet Take 1 tablet by mouth Daily for 90 days. 90 tablet 0   • ranolazine (RANEXA) 500 MG 12 hr tablet Take 1 tablet by mouth Every 12 (Twelve) Hours. 60 tablet 5   • sacubitril-valsartan (ENTRESTO) 24-26 MG tablet Take 1 tablet by mouth Every 12 (Twelve) Hours. 60 tablet 5   • Vericiguat (Verquvo) 2.5 MG tablet Take 1 tablet by mouth Daily With Dinner. Hold for SBP less than 90. 30 tablet 5   • amiodarone (Pacerone) 200 MG tablet Take 1 tablet by mouth Daily. 30 tablet 5     No current facility-administered medications for this encounter.       Vaccination History:   Pneumonia: PPSV23 in 2020 per Epic records. Pt believes he is UTD; if not, 1 dose PCV15 or PCV20 is recommended.  Annual Influenza: assess next flu season  COVID 19: received Uvaldo x 1 dose 8/19/21    Objective  Vitals:    09/15/22 1317   BP: 110/61   BP Location: Left arm   Patient Position: Sitting   Cuff Size: Adult   Pulse: 62   SpO2: 96%   Weight: 72.4 kg (159 lb 9.6 oz)   Height: 170.2 cm (67\")     Wt Readings from Last 3 Encounters:   09/15/22 72.4 kg (159 lb 9.6 oz)   09/06/22 72.1 kg (159 lb)   08/11/22 69.6 kg (153 lb 6.4 oz)         09/15/22  1317   Weight: 72.4 kg (159 lb 9.6 oz)     Lab Results   Component Value Date    GLUCOSE 106 (H) 09/15/2022    BUN 13 09/15/2022    CREATININE 0.87 09/15/2022    EGFRIFNONA 84 02/23/2022    EGFRIFAFRI 106 10/29/2020    BCR 14.9 09/15/2022    K 4.5 09/15/2022    CO2 26.8 09/15/2022    CALCIUM 9.3 09/15/2022    PROTENTOTREF 6.6 10/29/2020    ALBUMIN 4.37 09/15/2022    LABIL2 1.9 10/29/2020    AST 17 09/15/2022    ALT 20 09/15/2022     Lab Results   Component Value Date    WBC 6.54 07/12/2022    HGB 13.2 07/12/2022    HCT 40.8 07/12/2022    MCV 95.3 07/12/2022     07/12/2022     Lab Results   Component Value Date    TROPONINT <0.010 06/27/2022     Lab Results   Component Value Date    PROBNP 329.3 09/15/2022     Results for orders placed during the hospital encounter of " 06/23/22    Adult Transesophageal Echo (LUCERO) W/ Cont if Necessary Per Protocol    Interpretation Summary  · Left ventricular ejection fraction appears to be less than 20%. Left ventricular systolic function is severely decreased.  · The left ventricular cavity is moderate to severely dilated.  · Moderately reduced right ventricular systolic function noted.  · No evidence of a left atrial appendage thrombus was present.  · The aortic valve is structurally normal. Mild aortic valve regurgitation is present.  · Moderate mitral valve regurgitation is present. No significant mitral valve stenosis is present.  · Moderate tricuspid valve regurgitation is present.  · There is no evidence of pericardial effusion.  · Comments: Proceed with electrical cardioversion.         GDMT    Drug Class   Drug   Dose Last Dose Adjustment Additional Titration   Notes   ACEi/ARB/ARNI Entresto 24/26 7/12/22     Beta Blocker Carvedilol 6.25mg 7/12/22     MRA     hyperK in hospital 7/12   SGLT2i Jardiance 10mg 7/27/22 N/A     Vericiguat 2.5mg 7/12/22 *during hospitalization         Drug Therapy Problems    1. No BP log today   2. Medication adherence- Amiodarone  3. Episodes of chest pain at night  4. GDMT    Recommendations:     1. Provided with new log sheets today and educated him on the importance of taking and logging his BP every day and bringing the log into us.   2. Pt brought in his actual medication bottles today and I was able to go in and verify each medication and dose. He had no amiodarone in his bottles and when I asked him about it, he said he probably had not taken it for a few days. Helped patient to deduce that he needing to call in refills from [facility] on his Amiodarone and Verquvo, as he has refills available on the Rx. Patient to call for refills at his pharmacy.   3. Counseled patient to go to ER with any chest pain symptoms. Veronica to go over LIFE-vest log with patient.   4. Would recommend reassessing for ability to  titrate Entresto, Coreg or Verquvo when BP log available.     Patient was educated on heart failure medications and the importance of medication adherence. All questions were addressed and patient expressed understanding. Used teach-back method to assess understanding.     Thank you for allowing me to participate in the care of your patient,    Destini Toscano [facility]  09/15/22  15:17 EDT

## 2022-09-15 NOTE — PROGRESS NOTES
Heart Failure Clinic    Date: 09/15/22     Vitals:    09/15/22 1317   BP: 110/61   Pulse: 62   SpO2: 96%    weight 159.6    Method of arrival: Ambulatory with cane    Weighing self daily: Most days    Monitoring Heart Failure Zones: Most days    Today's HF Zone: Green    Taking medications as prescribed: Yes    Edema No    Shortness of Air: No    Number of pillows used at night:Recliner    Educational Materials given:  avs                                                                         ReDS Value: 30  25-35 Optimal Value Status      Esteban Levine RN 09/15/22 13:19 EDT

## 2022-09-15 NOTE — PROGRESS NOTES
[facility]  NIKI Connelly Linda C., APRN  [address]  [address]  [place] [address]    Thank you for asking me to see Chong White for congestive heart failure.    History of Present Illness     This is a 62 y.o. male with known past medical history of:  · Paroxysmal atrial fibrillation  · Chronic systolic CHF with distant history of documented IVDA  · Tobacco abuse  · ASCVD  · Essential hypertension  · GERD.      Chong White is a 62 y.o. male who presents for today for CHF follow-up.  The patient is typically seen by Amy Yanez APRN.  Patient's primary cardiologist is Dr. Lopez.     • Last known EF is less than 20%. Current medications prior to first visit directed toward underlying heart failure.   (LifeVest in place).      · Last known hospitalization and/or ED visit: He was last hospitalized from 06/23/22 through 07/12/22 with atrial fibrillation with RVR, acute on chronic systolic CHF, MAISHA, and COPD.      He underwent LUCERO guided cardioversion and was started on amiodarone in addition to Eliquis, Entresto, Verquvo, Ranolazine, Coreg, and atorvastatin.    •     • Last HF clinic visit: 07/15/22 with me with recent hospital started medication regimen continued including Coreg 6.25mg BID, Entresto 24-26mg BID, Lasix,  MRA was not on board due to recent hyperkalemia during hospitalized.      • HF clinic visit 07/27/22 patient was started on Jardiance.   • HF clinic visit on 08/11/22, Metalozone was stopped given stable weights and proBNP with patient feeling dry and dizzy at times.          Initial visit data/details regarding HF:   • Dyspnea on exertion: Improved  • Lower extremity swelling significantly improved  • Abdominal swelling: Improving.     • Home weight:Not monitoring; has tools  • Home BP: Not monitoring, has tools. Importance of keeping log discussed.   • Daily activities of living:  Performing ADLs independently.   • Pillows/lying flat:  2 pillows  • Chest pain free at the time of evaluation.    • Hold parameters were discussed for blood pressures medications including both SBP hold parameters and HR holding parameters.   • Ongoing debility discussed; declines cardiac rehab when offered, declines physical therapy. Continues to decline wheel chair.   • Patient reports he awoke short of breath x1 and self recorded on his wearable cardiodebrillator. He denies chest pain.  He is unsure if he snores or has been checked for JUSTUS.      • He reports he is feeling significant better          Review of Systems - Review of Systems   Constitutional: Negative for chills, decreased appetite, fever and malaise/fatigue.   HENT: Negative for congestion and ear pain.    Eyes: Negative for blurred vision.   Cardiovascular: Negative for chest pain, dyspnea on exertion, leg swelling, near-syncope and syncope.        Dyspnea on exertion has improved   Respiratory: Negative for cough and shortness of breath.    Endocrine: Negative for cold intolerance and heat intolerance.   Hematologic/Lymphatic: Negative for adenopathy and bleeding problem.   Skin: Negative for color change and nail changes.   Musculoskeletal: Negative for arthritis, back pain and falls.   Gastrointestinal: Negative for bloating and abdominal pain.   Genitourinary: Negative for bladder incontinence and dysuria.   Neurological: Negative for aphonia, difficulty with concentration and disturbances in coordination.   Psychiatric/Behavioral: Negative for altered mental status and hallucinations.   Allergic/Immunologic: Negative for environmental allergies and HIV exposure.        All other systems were reviewed and were negative.    Patient Active Problem List   Diagnosis   • Atrial fibrillation (HCC)   • Neuropathy   • ASCVD (arteriosclerotic cardiovascular disease)   • Tobacco abuse   • Ischemic cardiomyopathy   • Chronic combined systolic and diastolic congestive heart failure (HCC)   • Chronic low back  pain   • Paralysis (HCC)   • Hypertension   • Chronic anticoagulation   • Long term current use of antiarrhythmic drug       family history includes Heart disease in his maternal grandmother and mother.     reports that he has been smoking cigarettes. He started smoking about 53 years ago. He has been smoking about 0.50 packs per day. He has never used smokeless tobacco. He reports that he does not drink alcohol and does not use drugs.    Allergies   Allergen Reactions   • Penicillins Hives         Current Outpatient Medications:   •  apixaban (ELIQUIS) 5 MG tablet tablet, Take 1 tablet by mouth Every 12 (Twelve) Hours. Indications: Atrial Fibrillation, Disp: 60 tablet, Rfl: 5  •  aspirin 81 MG EC tablet, Take 1 tablet by mouth Daily., Disp: 30 tablet, Rfl: 5  •  atorvastatin (Lipitor) 40 MG tablet, Take 1 tablet by mouth Daily., Disp: 30 tablet, Rfl: 5  •  carvedilol (COREG) 6.25 MG tablet, Take 1 tablet by mouth 2 (Two) Times a Day With Meals., Disp: 60 tablet, Rfl: 5  •  empagliflozin (Jardiance) 10 MG tablet tablet, Take 1 tablet by mouth Daily for 30 days., Disp: 30 tablet, Rfl: 2  •  furosemide (LASIX) 40 MG tablet, Take 1 tablet by mouth Daily for 90 days., Disp: 90 tablet, Rfl: 0  •  ranolazine (RANEXA) 500 MG 12 hr tablet, Take 1 tablet by mouth Every 12 (Twelve) Hours., Disp: 60 tablet, Rfl: 5  •  sacubitril-valsartan (ENTRESTO) 24-26 MG tablet, Take 1 tablet by mouth Every 12 (Twelve) Hours., Disp: 60 tablet, Rfl: 5  •  Vericiguat (Verquvo) 2.5 MG tablet, Take 1 tablet by mouth Daily With Dinner. Hold for SBP less than 90., Disp: 30 tablet, Rfl: 5  •  amiodarone (Pacerone) 200 MG tablet, Take 1 tablet by mouth Daily., Disp: 30 tablet, Rfl: 5      Physical Exam:  I have reviewed the patient's current vital signs as documented in the patient's EMR.         Vitals:    09/15/22 1317   BP: 110/61   Pulse: 62   SpO2: 96%     Body mass index is 25 kg/m².       09/15/22  1317   Weight: 72.4 kg (159 lb 9.6 oz)         Physical Exam  Vitals and nursing note reviewed.   Constitutional:       Appearance: Normal appearance.   HENT:      Head: Normocephalic and atraumatic.      Mouth/Throat:      Lips: Pink.      Mouth: Mucous membranes are moist.   Eyes:      General: Lids are normal.      Conjunctiva/sclera:      Right eye: Right conjunctiva is not injected.      Left eye: Left conjunctiva is not injected.   Pulmonary:      Effort: No tachypnea or bradypnea.      Breath sounds: No decreased breath sounds, wheezing, rhonchi or rales.   Chest:      Chest wall: No mass or lacerations.   Abdominal:      General: Bowel sounds are normal.      Palpations: Abdomen is soft.      Tenderness: There is no abdominal tenderness. There is no right CVA tenderness or left CVA tenderness.      Comments: Abdominal protuberance improved   Musculoskeletal:      Cervical back: Full passive range of motion without pain. No edema or erythema.      Right lower leg: No swelling. No edema.      Left lower leg: No swelling. No edema.      Comments: Patient has ongoing unstable gait with a cane   Skin:     General: Skin is warm and moist.   Neurological:      Mental Status: He is alert and oriented to person, place, and time.   Psychiatric:         Attention and Perception: Attention normal.         Mood and Affect: Mood normal.         Behavior: Behavior is cooperative.       JVP: Volume/Pulsation: Normal.        DATA REVIEWED:     EKG. I personally reviewed and interpreted the EKG.        ---------------------------------------------------  TTE/LUCERO:  Results for orders placed during the hospital encounter of 06/23/22    Adult Transesophageal Echo (LUCERO) W/ Cont if Necessary Per Protocol    Interpretation Summary  · Left ventricular ejection fraction appears to be less than 20%. Left ventricular systolic function is severely decreased.  · The left ventricular cavity is moderate to severely dilated.  · Moderately reduced right ventricular systolic function  noted.  · No evidence of a left atrial appendage thrombus was present.  · The aortic valve is structurally normal. Mild aortic valve regurgitation is present.  · Moderate mitral valve regurgitation is present. No significant mitral valve stenosis is present.  · Moderate tricuspid valve regurgitation is present.  · There is no evidence of pericardial effusion.  · Comments: Proceed with electrical cardioversion.      -----------------------------------------------------  CXR/Imaging:   Imaging Results (Most Recent)     None          I personally reviewed and interpreted the CXR.      -----------------------------------------------------  CT:   No radiology results for the last 30 days.  I personally reviewed the images of the CT scan.  My personal interpretation is below.      ----------------------------------------------------    PFTs:    No visible PFTs available within system.   --------------------------------------------------------------------------------------------------    Laboratory evaluations:    Lab Results   Component Value Date    GLUCOSE 106 (H) 09/15/2022    BUN 13 09/15/2022    CREATININE 0.87 09/15/2022    EGFRIFNONA 84 02/23/2022    EGFRIFAFRI 106 10/29/2020    BCR 14.9 09/15/2022    K 4.5 09/15/2022    CO2 26.8 09/15/2022    CALCIUM 9.3 09/15/2022    PROTENTOTREF 6.6 10/29/2020    ALBUMIN 4.37 09/15/2022    LABIL2 1.9 10/29/2020    AST 17 09/15/2022    ALT 20 09/15/2022     Lab Results   Component Value Date    WBC 6.54 07/12/2022    HGB 13.2 07/12/2022    HCT 40.8 07/12/2022    MCV 95.3 07/12/2022     07/12/2022     Lab Results   Component Value Date    CHOL 133 09/03/2020    TRIG 51 09/03/2020    HDL 42 09/03/2020    LDL 81 09/03/2020     Lab Results   Component Value Date    TSH 3.010 09/15/2022     Lab Results   Component Value Date    TROPONINT <0.010 06/27/2022     No results found for: HGBA1C  No results found for: DDIMER  Lab Results   Component Value Date    ALT 20 09/15/2022      No results found for: HGBA1C  Lab Results   Component Value Date    CREATININE 0.87 09/15/2022     No results found for: IRON, TIBC, FERRITIN  Lab Results   Component Value Date    INR 1.44 (H) 06/23/2022    INR 1.41 (H) 06/16/2022    INR 1.07 08/18/2021    PROTIME 17.9 (H) 06/23/2022    PROTIME 17.6 (H) 06/16/2022    PROTIME 14.3 08/18/2021         PAH RISK ASSESSMENT:      1. Chronic combined systolic and diastolic congestive heart failure (HCC)    2. Ischemic cardiomyopathy    3. Paroxysmal atrial fibrillation (HCC)          ORDERS PLACED TODAY:  Orders Placed This Encounter   Procedures   • BNP   • Magnesium   • Comprehensive Metabolic Panel   • TSH   • Ambulatory Referral to Sleep Medicine   • ReDs Vest        Diagnoses and all orders for this visit:    1. Chronic combined systolic and diastolic congestive heart failure (HCC) (Primary)  -     BNP  -     Cancel: Basic Metabolic Panel; Standing  -     Magnesium; Standing  -     ReDs Vest  -     Cancel: Basic Metabolic Panel  -     Magnesium  -     Comprehensive Metabolic Panel  -     TSH  -     Ambulatory Referral to Sleep Medicine    2. Ischemic cardiomyopathy  Overview:  · Echo, 9/2/2020:The left ventricular cavity is borderline dilated with severe global wall hypokinesis and severe left ventricular systolic dysfunction, EF 21-25%. Left ventricular diastolic dysfunction (grade II) consistent with pseudonormalization.  · LUCERO, 6/28/2022: Left ventricular ejection fraction appears to be less than 20%. Left ventricular systolic function is severely decreased.  · MPS, 6/30/2022: Abnormal LV wall motion consistent with severe global hypokinesis. (Calculated EF = 23%).    Orders:  -     Ambulatory Referral to Sleep Medicine    3. Paroxysmal atrial fibrillation (HCC)  Overview:  · ECV, 9/4/2020  · ECV, 6/28/2022    Orders:  -     Ambulatory Referral to Sleep Medicine           MEDS ORDERED TODAY:    No orders of the defined types were placed in this encounter.        ---------------------------------------------------------------------------------------------------------------------------          ASSESSMENT/PLAN:      Diagnosis Plan   1. Chronic combined systolic and diastolic congestive heart failure (HCC)  BNP    Magnesium    ReDs Vest    Magnesium    Comprehensive Metabolic Panel    TSH    Ambulatory Referral to Sleep Medicine   2. Ischemic cardiomyopathy  Ambulatory Referral to Sleep Medicine   3. Paroxysmal atrial fibrillation (HCC)  Ambulatory Referral to Sleep Medicine       not acutely decompensated chronic severe systolic and diastolic heart failure. Right Heart Failure. Etiology previously documented to be non-ishcemic. LVEF less than 20%.         Today, Patient appears euvolemic.and with  Moderate perfusion. The patient's hemodynamics are currently acceptable. HR in room was found to be in 60s.  BP/MAP was reviewed and there is no troom for medication up-titration.  Clinical trajectory was assessed and hasimproved.     CHF GOAL DIRECTED MEDICAL THERAPY FOR PATIENT ADDRESSED/ADJUSTED:       GDMT    Drug Class   Drug   Dose Last Dose Adjustment Additional Titration   Notes   ACEi/ARB/ARNI Enstresto 24-26mg qd 07/12/22  Tolerating with borderline low BPs.    Beta Blocker Coreg  6.25mg BID 07/12/22  Taking Amiodarone for Afib as well.      MRA Xx  Stopped in hospital due to hyperkalemia xx xx  Recent hyperkalemia during hospitalization   SGLT2i Jardiance 10mg  07/27/22 N/A    *Verquevo recently started; GDMT discussed with pharmacy.      -CHF Specific BB:   •  Continue Carvedilol  6.25mg BID at/approaching target dose kept at the same given borderline blood pressures.    • Given in case patient is usually borderline low blood pressures in office and lack of goal of keeping log it is difficult to upward titrate medications.  While ZOLL wearable cardio defibrillator is showing average heart rate in the 70s home blood pressure remains unclear.  • Continue to request  patient keep BP log.     • Wearable CardioDefibrillator Zoll LifeVest interrogation report has been obtained and reviewed through the [employer] [facility].   Report was reviewed with patient including wear time trends, heart rate trends, treatable events, baselines, in addition to average daily steps and activities.  Additionally, debrillator system was unplugged and replugged during evaluation.  Purpose and importance of the wearable cardioverter defibrillator were discussed.    o HR in the 70s at baseline on ZPM review.   o 72% of time in LifeVest lying flat.    o Manual recorded event on 09/10/22 reviewed and looks good.  Reviewed advance for patient about short of breath with patient including strips recorded on wearable cardio defibrillator.   • Last visit with Olivia Steele NP reviewed.     -MRA:   · Stopped previously due to hyperkalemia.     -ACE/ARB/ARNi:   • Current dose: Continue Entresto 24-26mg qd with hold parameters for SBP less than 95.   • Baseline creatinine previously known to be 0.9-1.3 per review of recent laboratory values.  Renal function remains acceptable upon review today.       SGLT2 inhibition therapy.   • A BMP at initiation to verify GFR >45 was recommended, as was interval GFR surveillance.  • Pt was advised side effects, some severe, including hypersensitivity and Boogie's; in addition to common side effects of UTIs and female genital mycotic infections were discussed.  • Continuing Jardiance (empagliflozin) 10mg with quarterly assessment of GFR.   • Denies  side effects.        -Diuretic regimen:   • ReDs Vest reading for 09/15/22 was 30 and WNL.    • ProBNP today is within normal limits at 329.  Per review of prior values he was in the 5-6000 just a few months ago.  • Continue daily Lasix and spironolactone for now.  • We will consider further stepping Lasix down to a lower dose in future visit  • BMP, Mag, & ProBNP reviewed with patient.    • CT chest imaging  reviewed from June 2022 with effusions present.      -Fluid restriction/Sodium restriction:   • Requested 2000 ml restriction  • Patient has been asked to weigh daily and was provided with a printed diuretic strategy.  • 1,500 mg Na restriction was discussed.    -Secondary pharmacological medications for HFrEF:   · Would like to further upward titrate Marqibo however given patient's difficulty with keeping blood pressure log we have been unable to do this.    · As he is stable and improving significantly from heart failure standpoint we will go off on blind upward titration.    -Acute vs. Chronic underlying conditions other than HF addressed during visit:   · Debility:   · Ongoing-continue using cane.   · Anemia is common in heart failure.    · Typically, this can come from anemia of chronic disease.   · Last Hemoglobin is WNL.    · P. Afib, appears to be in SR  · Continue Amiodarone.   · Checked TSH & CMP per discussion with Olivia Steele NP with both values WNL.    · On ZOLL LifeVest self recorded moments patient appears to be in sinus rhythm.   · Continue Eliquis.   · No further events of facial numbness.   · Chronic low back pain:   · Encouraged patient to follow-up with PCP for possible pain management referral for chronic and ongoing pain.        ----------------------------------------------------------------------  --------------------------------------------------------------------------------          45 minutes out of 60 minutes face to face spent counseling patient extensively on dietary Na+ intake, importance of activity, weight monitoring, compliance with medications in addition to importance of titration with goal directed medical therapy and follow up appointments.            This document has been electronically signed by Veronica Kim PA-C  September 15, 2022 15:40 EDT      Part of this note may be an electronic transcription/translation of spoken language to printed text using the  [employer] System.

## 2022-10-13 ENCOUNTER — HOSPITAL ENCOUNTER (OUTPATIENT)
Dept: CARDIOLOGY | Facility: HOSPITAL | Age: 62
Discharge: HOME | End: 2022-10-13

## 2022-10-13 VITALS
WEIGHT: 161.6 LBS | HEART RATE: 70 BPM | DIASTOLIC BLOOD PRESSURE: 64 MMHG | HEIGHT: 67 IN | OXYGEN SATURATION: 97 % | BODY MASS INDEX: 25.36 KG/M2 | SYSTOLIC BLOOD PRESSURE: 110 MMHG

## 2022-10-13 DIAGNOSIS — Z79.01 CHRONIC ANTICOAGULATION: ICD-10-CM

## 2022-10-13 DIAGNOSIS — I50.42 CHRONIC COMBINED SYSTOLIC AND DIASTOLIC CONGESTIVE HEART FAILURE: Primary | ICD-10-CM

## 2022-10-13 DIAGNOSIS — I50.22 CHRONIC SYSTOLIC HEART FAILURE: ICD-10-CM

## 2022-10-13 DIAGNOSIS — I42.0 CARDIOMYOPATHY, DILATED: ICD-10-CM

## 2022-10-13 DIAGNOSIS — I25.10 ASCVD (ARTERIOSCLEROTIC CARDIOVASCULAR DISEASE): ICD-10-CM

## 2022-10-13 DIAGNOSIS — R53.81 DEBILITY: ICD-10-CM

## 2022-10-13 DIAGNOSIS — I48.0 PAROXYSMAL ATRIAL FIBRILLATION: ICD-10-CM

## 2022-10-13 PROCEDURE — 99215 OFFICE O/P EST HI 40 MIN: CPT | Performed by: PHYSICIAN ASSISTANT

## 2022-10-13 PROCEDURE — 93292 WCD DEVICE INTERROGATE: CPT | Performed by: PHYSICIAN ASSISTANT

## 2022-10-13 RX ORDER — VERICIGUAT 5 MG/1
5 TABLET, FILM COATED ORAL DAILY
Qty: 90 TABLET | Refills: 0 | Status: SHIPPED | OUTPATIENT
Start: 2022-10-13 | End: 2022-12-22 | Stop reason: SDUPTHER

## 2022-10-13 RX ORDER — ASPIRIN 81 MG/1
81 TABLET ORAL DAILY
Qty: 90 TABLET | Refills: 0 | Status: SHIPPED | OUTPATIENT
Start: 2022-10-13 | End: 2022-12-22 | Stop reason: SDUPTHER

## 2022-10-13 RX ORDER — RANOLAZINE 500 MG/1
500 TABLET, EXTENDED RELEASE ORAL EVERY 12 HOURS SCHEDULED
Qty: 180 TABLET | Refills: 0 | Status: SHIPPED | OUTPATIENT
Start: 2022-10-13 | End: 2022-12-22

## 2022-10-13 RX ORDER — AMIODARONE HYDROCHLORIDE 200 MG/1
200 TABLET ORAL DAILY
Qty: 90 TABLET | Refills: 0 | Status: SHIPPED | OUTPATIENT
Start: 2022-10-13 | End: 2022-12-22 | Stop reason: SDUPTHER

## 2022-10-13 RX ORDER — CARVEDILOL 6.25 MG/1
6.25 TABLET ORAL 2 TIMES DAILY WITH MEALS
Qty: 180 TABLET | Refills: 0 | Status: SHIPPED | OUTPATIENT
Start: 2022-10-13 | End: 2022-12-22 | Stop reason: SDUPTHER

## 2022-10-13 RX ORDER — FUROSEMIDE 20 MG/1
20 TABLET ORAL DAILY
Qty: 90 TABLET | Refills: 0 | Status: SHIPPED | OUTPATIENT
Start: 2022-10-13 | End: 2023-01-23 | Stop reason: SDUPTHER

## 2022-10-13 NOTE — PROGRESS NOTES
[facility]    Date: 10/13/22     Vitals:    10/13/22 1304   BP: 110/64   Pulse: 70   SpO2: 97%        Method of arrival: Ambulatory with cane    Weighing self daily: Yes    Monitoring Heart Failure Zones: Yes    Today's HF Zone: Green    Taking medications as prescribed: Yes    Edema No    Shortness of Air: No    Number of pillows used at night:<2    Educational Materials given:  miguelina Levine RN 10/13/22 13:06 EDT

## 2022-10-13 NOTE — PROGRESS NOTES
[facility]  NIKI Connelly Linda C., APRN  [address]  [address]  [place] [address]    Thank you for asking me to see Chong White for congestive heart failure.    History of Present Illness     This is a 62 y.o. male with known past medical history of:  · Paroxysmal atrial fibrillation  · Chronic systolic CHF with distant history of documented IVDA  · Tobacco abuse  · ASCVD  · C on 09/2020 with flush occlusion of the LAD at the ostium noted to fill from RCA injection without known evidence of disease.  Distal Lcx with 50% disease.  RCA large with mild luminal irregularities.    · Essential hypertension  · GERD.      Chong White is a 62 y.o. male who presents for today for CHF follow-up.  The patient is typically seen by Amy Yanez APRN.  Patient's primary cardiologist is Dr. Lopez.     • Last known EF is less than 20%. Current medications prior to first visit directed toward underlying heart failure.   (LifeVest in place).      · Last known hospitalization and/or ED visit: He was last hospitalized from 06/23/22 through 07/12/22 with atrial fibrillation with RVR, acute on chronic systolic CHF, MAISHA, and COPD.      He underwent LUCERO guided cardioversion and was started on amiodarone in addition to Eliquis, Entresto, Verquvo, Ranolazine, Coreg, and atorvastatin.          Initial visit data/details regarding HF:   • Dyspnea on exertion: Improved  • Lower extremity swelling significantly improved  • Abdominal swelling: Improving.     • Home weight:Not monitoring; has tools  • Home BP: Not monitoring, has tools. Importance of keeping log discussed.   • Daily activities of living:  Performing ADLs independently.   • Pillows/lying flat: 2 pillows  • HF zone: Green.   • He reports he feels the side effects of ongoing debility, but reports he does not usually get along with physical therapy services and was told in the distant past to not move forward with  physical therapy.           Review of Systems - Review of Systems   Constitutional: Negative for chills, decreased appetite, fever and malaise/fatigue.   HENT: Negative for congestion and ear pain.    Eyes: Negative for blurred vision.   Cardiovascular: Negative for chest pain, dyspnea on exertion, leg swelling, near-syncope and syncope.        Dyspnea on exertion has improved   Respiratory: Negative for cough and shortness of breath.    Endocrine: Negative for cold intolerance and heat intolerance.   Hematologic/Lymphatic: Negative for adenopathy and bleeding problem.   Skin: Negative for color change and nail changes.   Musculoskeletal: Negative for arthritis, back pain and falls.   Gastrointestinal: Negative for bloating and abdominal pain.   Genitourinary: Negative for bladder incontinence and dysuria.   Neurological: Negative for aphonia, difficulty with concentration and disturbances in coordination.   Psychiatric/Behavioral: Negative for altered mental status and hallucinations.   Allergic/Immunologic: Negative for environmental allergies and HIV exposure.        All other systems were reviewed and were negative.    Patient Active Problem List   Diagnosis   • Atrial fibrillation (HCC)   • Neuropathy   • ASCVD (arteriosclerotic cardiovascular disease)   • Tobacco abuse   • Ischemic cardiomyopathy   • Chronic combined systolic and diastolic congestive heart failure (HCC)   • Chronic low back pain   • Paralysis (HCC)   • Hypertension   • Chronic anticoagulation   • Long term current use of antiarrhythmic drug       family history includes Heart disease in his maternal grandmother and mother.     reports that he has been smoking cigarettes. He started smoking about 53 years ago. He has been smoking an average of .5 packs per day. He has never used smokeless tobacco. He reports that he does not drink alcohol and does not use drugs.    Allergies   Allergen Reactions   • Penicillins Hives         Current Outpatient  Medications:   •  amiodarone (Pacerone) 200 MG tablet, Take 1 tablet by mouth Daily for 90 days., Disp: 90 tablet, Rfl: 0  •  apixaban (ELIQUIS) 5 MG tablet tablet, Take 1 tablet by mouth Every 12 (Twelve) Hours Indications: Atrial Fibrillation, Disp: 180 tablet, Rfl: 0  •  aspirin 81 MG EC tablet, Take 1 tablet by mouth Daily for 90 days., Disp: 90 tablet, Rfl: 0  •  atorvastatin (Lipitor) 40 MG tablet, Take 1 tablet by mouth Daily., Disp: 30 tablet, Rfl: 5  •  carvedilol (COREG) 6.25 MG tablet, Take 1 tablet by mouth 2 (Two) Times a Day With Meals, Disp: 180 tablet, Rfl: 0  •  empagliflozin (Jardiance) 10 MG tablet tablet, Take 1 tablet by mouth Daily for 90 days., Disp: 90 tablet, Rfl: 0  •  ranolazine (RANEXA) 500 MG 12 hr tablet, Take 1 tablet by mouth Every 12 (Twelve) Hours for 90 days., Disp: 180 tablet, Rfl: 0  •  sacubitril-valsartan (ENTRESTO) 24-26 MG tablet, Take 1 tablet by mouth Every 12 (Twelve) Hours for 90 days., Disp: 180 tablet, Rfl: 0  •  furosemide (Lasix) 20 MG tablet, Take 1 tablet by mouth Daily for 90 days., Disp: 90 tablet, Rfl: 0  •  influenza vac split quad (Flulaval Quadrivalent) 0.5 ML suspension prefilled syringe injection, Inject 0.5 mL into the appropriate muscle as directed by prescriber 1 (One) Time for 1 dose., Disp: 0.5 mL, Rfl: 0  •  Vericiguat (Verquvo) 5 MG tablet, Take 1 tablet by mouth Daily, Disp: 90 tablet, Rfl: 0      Physical Exam:  I have reviewed the patient's current vital signs as documented in the patient's EMR.         Vitals:    10/13/22 1304   BP: 110/64   Pulse: 70   SpO2: 97%     Body mass index is 25.31 kg/m².       10/13/22  1304   Weight: 73.3 kg (161 lb 9.6 oz)        Physical Exam  Vitals and nursing note reviewed.   Constitutional:       Appearance: Normal appearance.      Comments: Ambulated independently with cane.   HENT:      Head: Normocephalic and atraumatic.      Mouth/Throat:      Lips: Pink.      Mouth: Mucous membranes are moist.   Eyes:       General: Lids are normal.      Conjunctiva/sclera:      Right eye: Right conjunctiva is not injected.      Left eye: Left conjunctiva is not injected.   Pulmonary:      Effort: No tachypnea or bradypnea.      Breath sounds: No decreased breath sounds, wheezing, rhonchi or rales.   Chest:      Chest wall: No mass or lacerations.   Abdominal:      General: Bowel sounds are normal.      Palpations: Abdomen is soft.      Tenderness: There is no abdominal tenderness. There is no right CVA tenderness or left CVA tenderness.      Comments: Abdominal protuberance improved   Musculoskeletal:      Cervical back: Full passive range of motion without pain. No edema or erythema.      Right lower leg: No swelling. No edema.      Left lower leg: No swelling. No edema.      Comments: Patient has ongoing unstable gait with a cane   Skin:     General: Skin is warm and moist.   Neurological:      Mental Status: He is alert and oriented to person, place, and time.   Psychiatric:         Attention and Perception: Attention normal.         Mood and Affect: Mood normal.         Behavior: Behavior is cooperative.       JVP: Volume/Pulsation: Normal.        DATA REVIEWED:     EKG. I personally reviewed and interpreted the EKG.        ---------------------------------------------------  TTE/LUCERO:  Results for orders placed during the hospital encounter of 06/23/22    Adult Transesophageal Echo (LUCERO) W/ Cont if Necessary Per Protocol    Interpretation Summary  · Left ventricular ejection fraction appears to be less than 20%. Left ventricular systolic function is severely decreased.  · The left ventricular cavity is moderate to severely dilated.  · Moderately reduced right ventricular systolic function noted.  · No evidence of a left atrial appendage thrombus was present.  · The aortic valve is structurally normal. Mild aortic valve regurgitation is present.  · Moderate mitral valve regurgitation is present. No significant mitral valve stenosis  is present.  · Moderate tricuspid valve regurgitation is present.  · There is no evidence of pericardial effusion.  · Comments: Proceed with electrical cardioversion.      -----------------------------------------------------  CXR/Imaging:   Imaging Results (Most Recent)     None          I personally reviewed and interpreted the CXR.      -----------------------------------------------------  CT:   No radiology results for the last 30 days.  I personally reviewed the images of the CT scan.  My personal interpretation is below.      ----------------------------------------------------    PFTs:    No visible PFTs available within system.   --------------------------------------------------------------------------------------------------    Laboratory evaluations:    Lab Results   Component Value Date    GLUCOSE 106 (H) 09/15/2022    BUN 13 09/15/2022    CREATININE 0.87 09/15/2022    EGFRIFNONA 84 02/23/2022    EGFRIFAFRI 106 10/29/2020    BCR 14.9 09/15/2022    K 4.5 09/15/2022    CO2 26.8 09/15/2022    CALCIUM 9.3 09/15/2022    PROTENTOTREF 6.6 10/29/2020    ALBUMIN 4.37 09/15/2022    LABIL2 1.9 10/29/2020    AST 17 09/15/2022    ALT 20 09/15/2022     Lab Results   Component Value Date    WBC 6.54 07/12/2022    HGB 13.2 07/12/2022    HCT 40.8 07/12/2022    MCV 95.3 07/12/2022     07/12/2022     Lab Results   Component Value Date    CHOL 133 09/03/2020    TRIG 51 09/03/2020    HDL 42 09/03/2020    LDL 81 09/03/2020     Lab Results   Component Value Date    TSH 3.010 09/15/2022     Lab Results   Component Value Date    TROPONINT <0.010 06/27/2022     No results found for: HGBA1C  No results found for: DDIMER  Lab Results   Component Value Date    ALT 20 09/15/2022     No results found for: HGBA1C  Lab Results   Component Value Date    CREATININE 0.87 09/15/2022     No results found for: IRON, TIBC, FERRITIN  Lab Results   Component Value Date    INR 1.44 (H) 06/23/2022    INR 1.41 (H) 06/16/2022    INR 1.07  08/18/2021    PROTIME 17.9 (H) 06/23/2022    PROTIME 17.6 (H) 06/16/2022    PROTIME 14.3 08/18/2021         PAH RISK ASSESSMENT:      1. Chronic combined systolic and diastolic congestive heart failure (HCC)    2. Chronic anticoagulation    3. ASCVD (arteriosclerotic cardiovascular disease)    4. Debility    5. Chronic systolic heart failure (CMS/HCC)    6. Paroxysmal atrial fibrillation .    7. Cardiomyopathy, dilated (possibly ischemic and nonischemic).    8. ASCVD (arteriosclerotic cardiovascular disease) with 100% occlusion of the proximal LAD with excellent collaterals from RCA          ORDERS PLACED TODAY:  No orders of the defined types were placed in this encounter.       Diagnoses and all orders for this visit:    1. Chronic combined systolic and diastolic congestive heart failure (HCC) (Primary)    2. Chronic anticoagulation    3. ASCVD (arteriosclerotic cardiovascular disease)  Overview:  · [facility], 9/4/2020: occlusion of the ostial LAD with remarkably good collateral connection from rCA filling the LAD with brisk flow to the point of occlusion in the prox LAD.  RCA and CX are free of any significant disease.     Orders:  -     apixaban (ELIQUIS) 5 MG tablet tablet; Take 1 tablet by mouth Every 12 (Twelve) Hours Indications: Atrial Fibrillation  Dispense: 180 tablet; Refill: 0  -     carvedilol (COREG) 6.25 MG tablet; Take 1 tablet by mouth 2 (Two) Times a Day With Meals  Dispense: 180 tablet; Refill: 0  -     sacubitril-valsartan (ENTRESTO) 24-26 MG tablet; Take 1 tablet by mouth Every 12 (Twelve) Hours for 90 days.  Dispense: 180 tablet; Refill: 0  -     aspirin 81 MG EC tablet; Take 1 tablet by mouth Daily for 90 days.  Dispense: 90 tablet; Refill: 0  -     amiodarone (Pacerone) 200 MG tablet; Take 1 tablet by mouth Daily for 90 days.  Dispense: 90 tablet; Refill: 0  -     ranolazine (RANEXA) 500 MG 12 hr tablet; Take 1 tablet by mouth Every 12 (Twelve) Hours for 90 days.  Dispense: 180 tablet; Refill:  0    4. Debility    5. Chronic systolic heart failure (CMS/[facility])  -     apixaban (ELIQUIS) 5 MG tablet tablet; Take 1 tablet by mouth Every 12 (Twelve) Hours Indications: Atrial Fibrillation  Dispense: 180 tablet; Refill: 0  -     carvedilol (COREG) 6.25 MG tablet; Take 1 tablet by mouth 2 (Two) Times a Day With Meals  Dispense: 180 tablet; Refill: 0  -     sacubitril-valsartan (ENTRESTO) 24-26 MG tablet; Take 1 tablet by mouth Every 12 (Twelve) Hours for 90 days.  Dispense: 180 tablet; Refill: 0  -     aspirin 81 MG EC tablet; Take 1 tablet by mouth Daily for 90 days.  Dispense: 90 tablet; Refill: 0  -     amiodarone (Pacerone) 200 MG tablet; Take 1 tablet by mouth Daily for 90 days.  Dispense: 90 tablet; Refill: 0  -     ranolazine (RANEXA) 500 MG 12 hr tablet; Take 1 tablet by mouth Every 12 (Twelve) Hours for 90 days.  Dispense: 180 tablet; Refill: 0    6. Paroxysmal atrial fibrillation .  Overview:  · [facility], 9/4/2020  · [facility], 6/28/2022    Orders:  -     apixaban (ELIQUIS) 5 MG tablet tablet; Take 1 tablet by mouth Every 12 (Twelve) Hours Indications: Atrial Fibrillation  Dispense: 180 tablet; Refill: 0  -     carvedilol (COREG) 6.25 MG tablet; Take 1 tablet by mouth 2 (Two) Times a Day With Meals  Dispense: 180 tablet; Refill: 0  -     sacubitril-valsartan (ENTRESTO) 24-26 MG tablet; Take 1 tablet by mouth Every 12 (Twelve) Hours for 90 days.  Dispense: 180 tablet; Refill: 0  -     aspirin 81 MG EC tablet; Take 1 tablet by mouth Daily for 90 days.  Dispense: 90 tablet; Refill: 0  -     amiodarone (Pacerone) 200 MG tablet; Take 1 tablet by mouth Daily for 90 days.  Dispense: 90 tablet; Refill: 0  -     ranolazine (RANEXA) 500 MG 12 hr tablet; Take 1 tablet by mouth Every 12 (Twelve) Hours for 90 days.  Dispense: 180 tablet; Refill: 0    7. Cardiomyopathy, dilated (possibly ischemic and nonischemic).  -     apixaban (ELIQUIS) 5 MG tablet tablet; Take 1 tablet by mouth Every 12 (Twelve) Hours Indications: Atrial  Fibrillation  Dispense: 180 tablet; Refill: 0  -     carvedilol (COREG) 6.25 MG tablet; Take 1 tablet by mouth 2 (Two) Times a Day With Meals  Dispense: 180 tablet; Refill: 0  -     sacubitril-valsartan (ENTRESTO) 24-26 MG tablet; Take 1 tablet by mouth Every 12 (Twelve) Hours for 90 days.  Dispense: 180 tablet; Refill: 0  -     aspirin 81 MG EC tablet; Take 1 tablet by mouth Daily for 90 days.  Dispense: 90 tablet; Refill: 0  -     amiodarone (Pacerone) 200 MG tablet; Take 1 tablet by mouth Daily for 90 days.  Dispense: 90 tablet; Refill: 0  -     ranolazine (RANEXA) 500 MG 12 hr tablet; Take 1 tablet by mouth Every 12 (Twelve) Hours for 90 days.  Dispense: 180 tablet; Refill: 0    8. ASCVD (arteriosclerotic cardiovascular disease) with 100% occlusion of the proximal LAD with excellent collaterals from RCA  Overview:  · [facility], 9/4/2020: occlusion of the ostial LAD with remarkably good collateral connection from rCA filling the LAD with brisk flow to the point of occlusion in the prox LAD.  RCA and CX are free of any significant disease.     Orders:  -     apixaban (ELIQUIS) 5 MG tablet tablet; Take 1 tablet by mouth Every 12 (Twelve) Hours Indications: Atrial Fibrillation  Dispense: 180 tablet; Refill: 0  -     carvedilol (COREG) 6.25 MG tablet; Take 1 tablet by mouth 2 (Two) Times a Day With Meals  Dispense: 180 tablet; Refill: 0  -     sacubitril-valsartan (ENTRESTO) 24-26 MG tablet; Take 1 tablet by mouth Every 12 (Twelve) Hours for 90 days.  Dispense: 180 tablet; Refill: 0  -     aspirin 81 MG EC tablet; Take 1 tablet by mouth Daily for 90 days.  Dispense: 90 tablet; Refill: 0  -     amiodarone (Pacerone) 200 MG tablet; Take 1 tablet by mouth Daily for 90 days.  Dispense: 90 tablet; Refill: 0  -     ranolazine (RANEXA) 500 MG 12 hr tablet; Take 1 tablet by mouth Every 12 (Twelve) Hours for 90 days.  Dispense: 180 tablet; Refill: 0    Other orders  -     Vericiguat (Verquvo) 5 MG tablet; Take 1 tablet by mouth  Daily  Dispense: 90 tablet; Refill: 0  -     empagliflozin (Jardiance) 10 MG tablet tablet; Take 1 tablet by mouth Daily for 90 days.  Dispense: 90 tablet; Refill: 0  -     furosemide (Lasix) 20 MG tablet; Take 1 tablet by mouth Daily for 90 days.  Dispense: 90 tablet; Refill: 0           MEDS ORDERED TODAY:    New Medications Ordered This Visit   Medications   • Vericiguat (Verquvo) 5 MG tablet     Sig: Take 1 tablet by mouth Daily     Dispense:  90 tablet     Refill:  0   • empagliflozin (Jardiance) 10 MG tablet tablet     Sig: Take 1 tablet by mouth Daily for 90 days.     Dispense:  90 tablet     Refill:  0   • apixaban (ELIQUIS) 5 MG tablet tablet     Sig: Take 1 tablet by mouth Every 12 (Twelve) Hours Indications: Atrial Fibrillation     Dispense:  180 tablet     Refill:  0   • carvedilol (COREG) 6.25 MG tablet     Sig: Take 1 tablet by mouth 2 (Two) Times a Day With Meals     Dispense:  180 tablet     Refill:  0   • sacubitril-valsartan (ENTRESTO) 24-26 MG tablet     Sig: Take 1 tablet by mouth Every 12 (Twelve) Hours for 90 days.     Dispense:  180 tablet     Refill:  0   • furosemide (Lasix) 20 MG tablet     Sig: Take 1 tablet by mouth Daily for 90 days.     Dispense:  90 tablet     Refill:  0   • aspirin 81 MG EC tablet     Sig: Take 1 tablet by mouth Daily for 90 days.     Dispense:  90 tablet     Refill:  0   • amiodarone (Pacerone) 200 MG tablet     Sig: Take 1 tablet by mouth Daily for 90 days.     Dispense:  90 tablet     Refill:  0   • ranolazine (RANEXA) 500 MG 12 hr tablet     Sig: Take 1 tablet by mouth Every 12 (Twelve) Hours for 90 days.     Dispense:  180 tablet     Refill:  0        ---------------------------------------------------------------------------------------------------------------------------          ASSESSMENT/PLAN:      Diagnosis Plan   1. Chronic combined systolic and diastolic congestive heart failure (HCC)        2. Chronic anticoagulation        3. ASCVD (arteriosclerotic  cardiovascular disease)        4. Debility        5. Chronic systolic heart failure (CMS/HCC)  apixaban (ELIQUIS) 5 MG tablet tablet    carvedilol (COREG) 6.25 MG tablet    sacubitril-valsartan (ENTRESTO) 24-26 MG tablet    aspirin 81 MG EC tablet    amiodarone (Pacerone) 200 MG tablet    ranolazine (RANEXA) 500 MG 12 hr tablet      6. Paroxysmal atrial fibrillation .  apixaban (ELIQUIS) 5 MG tablet tablet    carvedilol (COREG) 6.25 MG tablet    sacubitril-valsartan (ENTRESTO) 24-26 MG tablet    aspirin 81 MG EC tablet    amiodarone (Pacerone) 200 MG tablet    ranolazine (RANEXA) 500 MG 12 hr tablet      7. Cardiomyopathy, dilated (possibly ischemic and nonischemic).  apixaban (ELIQUIS) 5 MG tablet tablet    carvedilol (COREG) 6.25 MG tablet    sacubitril-valsartan (ENTRESTO) 24-26 MG tablet    aspirin 81 MG EC tablet    amiodarone (Pacerone) 200 MG tablet    ranolazine (RANEXA) 500 MG 12 hr tablet      8. ASCVD (arteriosclerotic cardiovascular disease) with 100% occlusion of the proximal LAD with excellent collaterals from RCA  apixaban (ELIQUIS) 5 MG tablet tablet    carvedilol (COREG) 6.25 MG tablet    sacubitril-valsartan (ENTRESTO) 24-26 MG tablet    aspirin 81 MG EC tablet    amiodarone (Pacerone) 200 MG tablet    ranolazine (RANEXA) 500 MG 12 hr tablet          not acutely decompensated chronic severe systolic and diastolic heart failure. Right Heart Failure. Etiology previously documented to be non-ishcemic. LVEF less than 20%.         Today, Patient appears euvolemic.and with  Moderate perfusion. The patient's hemodynamics are currently acceptable. HR in room was found to be in 60s.  BP/MAP was reviewed and there is no troom for medication up-titration.  Clinical trajectory was assessed and hasimproved.     CHF GOAL DIRECTED MEDICAL THERAPY FOR PATIENT ADDRESSED/ADJUSTED:       GDMT:HFrEF    Drug Class   Drug   Dose Last Dose Adjustment Additional Titration   Notes   ACEi/ARB/ARNI Enstresto 24-26mg qd  07/12/22  Tolerating with borderline low BPs.    Beta Blocker Coreg  6.25mg BID 07/12/22  Taking Amiodarone for Afib as well.      MRA Xx  Stopped in hospital due to hyperkalemia xx xxx  Recent hyperkalemia during hospitalization   SGLT2i Jardiance 10mg  07/27/22 N/A        Secondary GDMT:   · Verquevo recently started; See below    -CHF Specific BB:   •  Continue Carvedilol  6.25mg BID at/approaching target dose kept at the same given borderline blood pressures.    • Repeat echocardiogram in November with follow-up visit after to further evaluation EF.  EP visit on 12/05/22.    • Continue to request patient keep BP log.   • Wearable CardioDefibrillator Stim[employer]ix GIt interrogation report has been obtained and reviewed through the [employer].   Report was reviewed with patient including wear time trends, heart rate trends, treatable events, baselines, in addition to average daily steps and activities.  Additionally, debrillator system was unplugged and replugged during evaluation.  Purpose and importance of the wearable cardioverter defibrillator were discussed.    o Average daily HR of 70 on ZPM review.    o Avg daily steps is 352 with 23% of time flat and 47% of time wearing vest upright.   o No recent known treatable events.      -MRA:   · Stopped previously due to hyperkalemia.     -ACE/ARB/ARNi:   • Current dose: Continue Entresto 24-26mg qd with hold parameters for SBP less than 95.  (90 day supply sent to [facility] and provided prior to him leaving clinic.)  • Baseline creatinine previously known to be 0.9-1.3 per review of recent laboratory values.  Renal function remains acceptable upon review today.       SGLT2 inhibition therapy.   • A BMP at initiation to verify GFR >45 was recommended, as was interval GFR surveillance.  • Pt was advised side effects, some severe, including hypersensitivity and Boogie's; in addition to common side effects of UTIs and female genital mycotic  infections were discussed.  • Continuing Jardiance (empagliflozin) 10mg with quarterly assessment of GFR. (90 day supply sent to [facility] and provided prior to leaving clinic.)   • Denies  side effects.        -Diuretic regimen:   • ReDs Vest reading for 10/13/22 could not be obtained due to .    • Last proBNP within normal limits; obtain labs with next visit.    • Decrease daily Lasix to 20mg qd.    • Patient found old bottle of metolazone and was taking due to medication confusion.  This was disposed of during his visit and he was instructed to no longer take.    • BMP, Mag, & ProBNP reviewed with patient on last visit. Will repeat during next visit in 6 weeks.    • CT chest imaging reviewed from June 2022 with effusions present.      -Fluid restriction/Sodium restriction:   • Requested 2000 ml restriction  • Patient has been asked to weigh daily and was provided with a printed diuretic strategy.  • 1,500 mg Na restriction was discussed.    -Secondary pharmacological medications for HFrEF:   · Increase Verquevo to 5mg qd given marginal effect on BPs.      -Acute vs. Chronic underlying conditions other than HF addressed during visit:   · Debility:   · Ongoing-continue using cane.     · Anemia is common in heart failure.    · Typically, this can come from anemia of chronic disease.   · Last Hemoglobin is WNL.      · P. Afib, appears to be in SR  · Continue Amiodarone.   · Checked TSH & CMP on prior visitper discussion with Olivia Steele NP with both values WNL.    · On ZOLL LifeVest patient appears to be in sinus rhythm.   · Continue Eliquis (90 day supply sent to [facility] and provided prior to leaving clinic).   · No further events of facial numbness.     · Chronic low back pain:   · Discussed follow-up with PCP.          Patient's course of care is complicated by medication confusions at time. Thus today he opted to switch to our [facility] for heart failure medications with medicines  brought to his room with instructions reviewed by both myself and pharmacist prior to leaving clinic today.   While we had medications delivered prior to him leaving the clinic today, he called the [facility] bus to pick him up prior to medications being delivered. Discussed with pharmacist Destini with plans to call and mail to patient.     ----------------------------------------------------------------------  --------------------------------------------------------------------------------          >45 minutes out of 60 minutes face to face spent counseling patient extensively on dietary Na+ intake, importance of activity, weight monitoring, compliance with medications in addition to importance of titration with goal directed medical therapy and follow up appointments.            This document has been electronically signed by Veronica Kim PA-C  October 13, 2022 15:54 EDT      Part of this note may be an electronic transcription/translation of spoken language to printed text using the Dragon Dictation System.

## 2022-10-13 NOTE — PROGRESS NOTES
[facility] Failure [facility]  Pharmacist Note     Chong White is a 62 y.o. male seen in the Heart Failure Clinic for HFrEF with an EF <20 per most recent ECHO. He was discharged from the hospital 7/12/22 for acute on chronic systolic CHF and Afib w/ RVR. Chong White reports he is not very familiar with names of his medications. He brought in a bag of his medications for me to go through again and he reports adherence to them all, except for Jardiance, which he has been out of for 1 month. Metolazone was also in his bag and he reports taking it per the label which was 5mg daily. He is also on Lasix 40mg. He denies any swelling and reports that his weights are steady (takes them qod). The pt has not been taking his BP and therefore does not know how it has been running. He has continued to take the Verquvo regardless. He reports some lightheadedness, but it more concerned with his current back pain and reports SOB in relation to that and his history of smoking.        Medication Use:   Hx of med intolerances:  None related to HF  Retail Rx Management: Uses [facility]; Indigent Medication Assistance notes done during admission indicate Entresto had no copay & Verquvo and Jardiance PAs approved with no copay.    Past Medical History:   Diagnosis Date   • Atrial fibrillation (HCC)    • GERD (gastroesophageal reflux disease)    • Hypertension    • Myocardial infarction (HCC)      ALLERGIES: Penicillins  Current Outpatient Medications   Medication Sig Dispense Refill   • amiodarone (Pacerone) 200 MG tablet Take 1 tablet by mouth Daily for 90 days. 90 tablet 0   • apixaban (ELIQUIS) 5 MG tablet tablet Take 1 tablet by mouth Every 12 (Twelve) Hours Indications: Atrial Fibrillation 180 tablet 0   • aspirin 81 MG EC tablet Take 1 tablet by mouth Daily for 90 days. 90 tablet 0   • atorvastatin (Lipitor) 40 MG tablet Take 1 tablet by mouth Daily. 30 tablet 5   • carvedilol (COREG) 6.25 MG tablet Take 1 tablet by mouth 2  "(Two) Times a Day With Meals 180 tablet 0   • empagliflozin (Jardiance) 10 MG tablet tablet Take 1 tablet by mouth Daily for 90 days. 90 tablet 0   • ranolazine (RANEXA) 500 MG 12 hr tablet Take 1 tablet by mouth Every 12 (Twelve) Hours for 90 days. 180 tablet 0   • sacubitril-valsartan (ENTRESTO) 24-26 MG tablet Take 1 tablet by mouth Every 12 (Twelve) Hours for 90 days. 180 tablet 0   • furosemide (Lasix) 20 MG tablet Take 1 tablet by mouth Daily for 90 days. 90 tablet 0   • influenza vac split quad (Flulaval Quadrivalent) 0.5 ML suspension prefilled syringe injection Inject 0.5 mL into the appropriate muscle as directed by prescriber 1 (One) Time for 1 dose. 0.5 mL 0   • Vericiguat (Verquvo) 5 MG tablet Take 1 tablet by mouth Daily 90 tablet 0     No current facility-administered medications for this encounter.       Vaccination History:   Pneumonia: PPSV23 in 2020 per Epic records. Pt believes he is UTD; if not, 1 dose PCV15 or PCV20 is recommended.  Annual Influenza: UTD 10/13/22  COVID 19: received Uvaldo x 1 dose 8/19/21    Objective  Vitals:    10/13/22 1304   BP: 110/64   BP Location: Left arm   Patient Position: Sitting   Cuff Size: Adult   Pulse: 70   SpO2: 97%   Weight: 73.3 kg (161 lb 9.6 oz)   Height: 170.2 cm (67\")     Wt Readings from Last 3 Encounters:   10/13/22 73.3 kg (161 lb 9.6 oz)   09/15/22 72.4 kg (159 lb 9.6 oz)   09/06/22 72.1 kg (159 lb)         10/13/22  1304   Weight: 73.3 kg (161 lb 9.6 oz)     Lab Results   Component Value Date    GLUCOSE 106 (H) 09/15/2022    BUN 13 09/15/2022    CREATININE 0.87 09/15/2022    EGFRIFNONA 84 02/23/2022    EGFRIFAFRI 106 10/29/2020    BCR 14.9 09/15/2022    K 4.5 09/15/2022    CO2 26.8 09/15/2022    CALCIUM 9.3 09/15/2022    PROTENTOTREF 6.6 10/29/2020    ALBUMIN 4.37 09/15/2022    LABIL2 1.9 10/29/2020    AST 17 09/15/2022    ALT 20 09/15/2022     Lab Results   Component Value Date    WBC 6.54 07/12/2022    HGB 13.2 07/12/2022    HCT 40.8 07/12/2022 "    MCV 95.3 07/12/2022     07/12/2022     Lab Results   Component Value Date    TROPONINT <0.010 06/27/2022     Lab Results   Component Value Date    PROBNP 329.3 09/15/2022     Results for orders placed during the hospital encounter of 06/23/22    Adult Transesophageal Echo (LUCERO) W/ Cont if Necessary Per Protocol    Interpretation Summary  · Left ventricular ejection fraction appears to be less than 20%. Left ventricular systolic function is severely decreased.  · The left ventricular cavity is moderate to severely dilated.  · Moderately reduced right ventricular systolic function noted.  · No evidence of a left atrial appendage thrombus was present.  · The aortic valve is structurally normal. Mild aortic valve regurgitation is present.  · Moderate mitral valve regurgitation is present. No significant mitral valve stenosis is present.  · Moderate tricuspid valve regurgitation is present.  · There is no evidence of pericardial effusion.  · Comments: Proceed with electrical cardioversion.         GDMT    Drug Class   Drug   Dose Last Dose Adjustment Additional Titration   Notes   ACEi/ARB/ARNI Entresto 24/26 7/12/22     Beta Blocker Carvedilol 6.25mg 7/12/22     MRA     hyperK in hospital 7/12   SGLT2i Jardiance 10mg 7/27/22 N/A     Vericiguat 5mg 10/13/22 increased         Drug Therapy Problems    1. No BP log today   2. Medication adherence- Jardiance  3. GDMT  4. Vaccinations    Recommendations:     1. Educated him on the importance of taking and logging his BP every day and bringing the log into us. At the very least, I instructed him to take his BP if he was feeling lightheaded to assess his BP at that time to decipher if he should hold some of his medications.   2. Counseled pt on the importance of adherence to his medications. Pt had refills remaining on his RX, he just never called it in. Counseled pt to reach out to us if he was ever out of any medications so that we could help to get them for him.  Veronica refilled all of his medications for 90 day supply from our apothecary to be delivered to him in his room. Pt was sat in the waiting room to wait for his medications. Pt left before they were delivered. I had left him a message to see if we can mail them to him.   3. Recommend stopping Metolazone (I disposed of the medication in the pharmacy) and decreasing Lasix to 20mg daily. Recommend increasing Verquvo to 5mg as BP today in clinic was 110/64.    4. Gave pt flu shot today    Patient was educated on heart failure medications and the importance of medication adherence. All questions were addressed and patient expressed understanding. Used teach-back method to assess understanding.     Thank you for allowing me to participate in the care of your patient,    Destini Toscano RPH  10/13/22  15:59 EDT

## 2022-10-25 ENCOUNTER — OFFICE VISIT (OUTPATIENT)
Dept: PULMONOLOGY | Facility: CLINIC | Age: 62
End: 2022-10-25

## 2022-10-25 VITALS
BODY MASS INDEX: 24.25 KG/M2 | WEIGHT: 160 LBS | DIASTOLIC BLOOD PRESSURE: 62 MMHG | OXYGEN SATURATION: 98 % | SYSTOLIC BLOOD PRESSURE: 88 MMHG | TEMPERATURE: 97.7 F | HEART RATE: 70 BPM | HEIGHT: 68 IN

## 2022-10-25 DIAGNOSIS — G47.33 OSA (OBSTRUCTIVE SLEEP APNEA): Primary | ICD-10-CM

## 2022-10-25 PROCEDURE — 99213 OFFICE O/P EST LOW 20 MIN: CPT | Performed by: NURSE PRACTITIONER

## 2022-10-25 RX ORDER — PANTOPRAZOLE SODIUM 40 MG/1
40 TABLET, DELAYED RELEASE ORAL DAILY
Qty: 30 TABLET | Refills: 5 | Status: SHIPPED | OUTPATIENT
Start: 2022-10-25

## 2022-10-28 ENCOUNTER — PATIENT ROUNDING (BHMG ONLY) (OUTPATIENT)
Dept: PULMONOLOGY | Facility: CLINIC | Age: 62
End: 2022-10-28

## 2022-10-28 NOTE — PROGRESS NOTES
October 28, 2022    Hello, may I speak with Chong White?    My name is Marlen Walton      I am  with [facility]  [address]  [place] [facility] [address] [phone].    Before we get started may I verify your date of birth? 1960    I am calling to officially welcome you to our practice and ask about your recent visit. Is this a good time to talk? yes    Tell me about your visit with us. What things went well?  Good Visit , Nice girls        We're always looking for ways to make our patients' experiences even better. Do you have recommendations on ways we may improve?  no    Overall were you satisfied with your first visit to our practice? yes       I appreciate you taking the time to speak with me today. Is there anything else I can do for you? no      Thank you, and have a great day.

## 2022-11-15 ENCOUNTER — TRANSCRIBE ORDERS (OUTPATIENT)
Dept: ADMINISTRATIVE | Facility: HOSPITAL | Age: 62
End: 2022-11-15

## 2022-11-15 DIAGNOSIS — M54.12 BRACHIAL NEURITIS: ICD-10-CM

## 2022-11-15 DIAGNOSIS — S14.109A BRAIN/CRANIAL NERVE INJURY WITH NERVE/SPINAL CORD INJURY, NECK LEVEL: Primary | ICD-10-CM

## 2022-11-15 DIAGNOSIS — S06.9XAA BRAIN/CRANIAL NERVE INJURY WITH NERVE/SPINAL CORD INJURY, NECK LEVEL: Primary | ICD-10-CM

## 2022-11-15 DIAGNOSIS — S04.9XXA BRAIN/CRANIAL NERVE INJURY WITH NERVE/SPINAL CORD INJURY, NECK LEVEL: Primary | ICD-10-CM

## 2022-11-15 DIAGNOSIS — R93.7 MUSCULOSKELETAL SYSTEM IMAGING ABNORMALITY: ICD-10-CM

## 2022-11-16 ENCOUNTER — HOSPITAL ENCOUNTER (OUTPATIENT)
Dept: CARDIOLOGY | Facility: HOSPITAL | Age: 62
Discharge: HOME | End: 2022-11-16
Admitting: INTERNAL MEDICINE

## 2022-11-16 DIAGNOSIS — I50.42 CHRONIC COMBINED SYSTOLIC AND DIASTOLIC CONGESTIVE HEART FAILURE: ICD-10-CM

## 2022-11-16 DIAGNOSIS — I25.5 ISCHEMIC CARDIOMYOPATHY: ICD-10-CM

## 2022-11-16 PROCEDURE — 93306 TTE W/DOPPLER COMPLETE: CPT | Performed by: INTERNAL MEDICINE

## 2022-11-16 PROCEDURE — 93306 TTE W/DOPPLER COMPLETE: CPT

## 2022-11-21 LAB
BH CV ECHO MEAS - ACS: 2.1 CM
BH CV ECHO MEAS - AO MAX PG: 5.1 MMHG
BH CV ECHO MEAS - AO MEAN PG: 3 MMHG
BH CV ECHO MEAS - AO ROOT DIAM: 3.4 CM
BH CV ECHO MEAS - AO V2 MAX: 113 CM/SEC
BH CV ECHO MEAS - AO V2 VTI: 22.4 CM
BH CV ECHO MEAS - EDV(MOD-SP4): 81.5 ML
BH CV ECHO MEAS - EF(MOD-SP4): 58.5 %
BH CV ECHO MEAS - ESV(MOD-SP4): 33.8 ML
BH CV ECHO MEAS - LA DIMENSION: 2.6 CM
BH CV ECHO MEAS - LAT PEAK E' VEL: 6.1 CM/SEC
BH CV ECHO MEAS - LV DIASTOLIC VOL/BSA (35-75): 43.8 CM2
BH CV ECHO MEAS - LV SYSTOLIC VOL/BSA (12-30): 18.2 CM2
BH CV ECHO MEAS - LVOT AREA: 4.9 CM2
BH CV ECHO MEAS - LVOT DIAM: 2.5 CM
BH CV ECHO MEAS - MED PEAK E' VEL: 6.3 CM/SEC
BH CV ECHO MEAS - MV A MAX VEL: 84.8 CM/SEC
BH CV ECHO MEAS - MV E MAX VEL: 46.5 CM/SEC
BH CV ECHO MEAS - MV E/A: 0.55
BH CV ECHO MEAS - PA ACC TIME: 0.09 SEC
BH CV ECHO MEAS - PA PR(ACCEL): 40.8 MMHG
BH CV ECHO MEAS - RAP SYSTOLE: 10 MMHG
BH CV ECHO MEAS - RVSP: 21 MMHG
BH CV ECHO MEAS - SI(MOD-SP4): 25.7 ML/M2
BH CV ECHO MEAS - SV(MOD-SP4): 47.7 ML
BH CV ECHO MEAS - TAPSE (>1.6): 1.79 CM
BH CV ECHO MEAS - TR MAX PG: 11 MMHG
BH CV ECHO MEAS - TR MAX VEL: 166 CM/SEC
BH CV ECHO MEASUREMENTS AVERAGE E/E' RATIO: 7.5
LEFT ATRIUM VOLUME INDEX: 16.4 ML/M2
MAXIMAL PREDICTED HEART RATE: 158 BPM
STRESS TARGET HR: 134 BPM

## 2022-11-23 ENCOUNTER — OFFICE VISIT (OUTPATIENT)
Dept: CARDIOLOGY | Facility: CLINIC | Age: 62
End: 2022-11-23

## 2022-11-23 VITALS — OXYGEN SATURATION: 98 % | SYSTOLIC BLOOD PRESSURE: 142 MMHG | HEART RATE: 64 BPM | DIASTOLIC BLOOD PRESSURE: 81 MMHG

## 2022-11-23 DIAGNOSIS — I50.22 CHRONIC SYSTOLIC HEART FAILURE: ICD-10-CM

## 2022-11-23 DIAGNOSIS — I42.0 CARDIOMYOPATHY, DILATED: ICD-10-CM

## 2022-11-23 DIAGNOSIS — I25.10 ASCVD (ARTERIOSCLEROTIC CARDIOVASCULAR DISEASE): Primary | ICD-10-CM

## 2022-11-23 DIAGNOSIS — I48.0 PAROXYSMAL ATRIAL FIBRILLATION: ICD-10-CM

## 2022-11-23 PROCEDURE — 99213 OFFICE O/P EST LOW 20 MIN: CPT | Performed by: NURSE PRACTITIONER

## 2022-11-23 RX ORDER — IBUPROFEN 600 MG/1
TABLET ORAL
COMMUNITY
Start: 2022-11-11 | End: 2022-12-22

## 2022-11-23 NOTE — PROGRESS NOTES
Amy Yanez, APRN  Chong White  1960  11/23/2022    Patient Active Problem List   Diagnosis   • Atrial fibrillation (HCC)   • Neuropathy   • ASCVD (arteriosclerotic cardiovascular disease)   • Tobacco abuse   • Ischemic cardiomyopathy   • Chronic combined systolic and diastolic congestive heart failure (HCC)   • Chronic low back pain   • Paralysis (HCC)   • Hypertension   • Chronic anticoagulation   • Long term current use of antiarrhythmic drug       Dear Amy Yanez, APRN:    Subjective     Chief Complaint   Patient presents with   • Follow-up     ROUTINE           History of Present Illness:    Chong White is a 62 y.o. male with a past medical history of of paroxysmal atrial fibrillation, HFrEF, essential hypertension, tobacco abuse, cardiomyopathy, coronary artery disease, and history of medical noncompliance.  He presents today for cardiology follow-up.  He reports that he has been taking his medication as prescribed for the last 90 days.  Echocardiogram was ordered by Dr. Arechiga to reevaluate LV function.  Echo revealed normal LV wall motion with an EF of 56 to 60% and grade 1 LV diastolic dysfunction.  The patient reports he has been feeling well.  Denies any chest pain, shortness of breath, or leg edema.  He has occasional, mild palpitations.  Denies any bleeding issues with Eliquis. He is wearing his Life Vest.          Allergies   Allergen Reactions   • Penicillins Hives   :      Current Outpatient Medications:   •  amiodarone (Pacerone) 200 MG tablet, Take 1 tablet by mouth Daily for 90 days., Disp: 90 tablet, Rfl: 0  •  apixaban (ELIQUIS) 5 MG tablet tablet, Take 1 tablet by mouth Every 12 (Twelve) Hours Indications: Atrial Fibrillation, Disp: 180 tablet, Rfl: 0  •  aspirin 81 MG EC tablet, Take 1 tablet by mouth Daily for 90 days., Disp: 90 tablet, Rfl: 0  •  atorvastatin (Lipitor) 40 MG tablet, Take 1 tablet by mouth Daily., Disp: 30 tablet, Rfl: 5  •  carvedilol (COREG) 6.25 MG tablet,  Take 1 tablet by mouth 2 (Two) Times a Day With Meals, Disp: 180 tablet, Rfl: 0  •  empagliflozin (Jardiance) 10 MG tablet tablet, Take 1 tablet by mouth Daily for 90 days., Disp: 90 tablet, Rfl: 0  •  furosemide (Lasix) 20 MG tablet, Take 1 tablet by mouth Daily for 90 days., Disp: 90 tablet, Rfl: 0  •  ibuprofen (ADVIL,MOTRIN) 600 MG tablet, , Disp: , Rfl:   •  Nicotine Step 1 21 MG/24HR patch, , Disp: , Rfl:   •  pantoprazole (PROTONIX) 40 MG EC tablet, Take 1 tablet by mouth Daily., Disp: 30 tablet, Rfl: 5  •  ranolazine (RANEXA) 500 MG 12 hr tablet, Take 1 tablet by mouth Every 12 (Twelve) Hours for 90 days., Disp: 180 tablet, Rfl: 0  •  sacubitril-valsartan (ENTRESTO) 24-26 MG tablet, Take 1 tablet by mouth Every 12 (Twelve) Hours for 90 days., Disp: 180 tablet, Rfl: 0  •  Vericiguat (Verquvo) 5 MG tablet, Take 1 tablet by mouth Daily, Disp: 90 tablet, Rfl: 0      The following portions of the patient's history were reviewed and updated as appropriate: allergies, current medications, past family history, past medical history, past social history, past surgical history and problem list.    Social History     Tobacco Use   • Smoking status: Every Day     Packs/day: 0.50     Types: Cigarettes     Start date: 3/1/1969   • Smokeless tobacco: Never   • Tobacco comments:     Has nicotine patches and states has only had approximately 10 cigarettes since discharge on 9/11/2020. Not using patch at this time   Vaping Use   • Vaping Use: Never used   Substance Use Topics   • Alcohol use: Never   • Drug use: Never       Review of Systems   Constitutional: Negative for decreased appetite and malaise/fatigue.   Cardiovascular: Negative for chest pain, dyspnea on exertion and palpitations.   Respiratory: Negative for cough and shortness of breath.        Objective   Vitals:    11/23/22 1100   BP: 142/81   Pulse: 64   SpO2: 98%     There is no height or weight on file to calculate BMI.        Vitals reviewed.   Constitutional:        Appearance: Healthy appearance. Well-developed and not in distress.      Comments: Life Vest   HENT:      Head: Normocephalic and atraumatic.   Pulmonary:      Effort: Pulmonary effort is normal.      Breath sounds: Normal breath sounds. No wheezing. No rales.   Cardiovascular:      Normal rate. Regular rhythm.      Murmurs: There is no murmur.      . No S3 and S4 gallop.   Edema:     Peripheral edema absent.   Abdominal:      General: Bowel sounds are normal.      Palpations: Abdomen is soft.   Skin:     General: Skin is warm and dry.   Neurological:      Mental Status: Alert, oriented to person, place, and time and oriented to person, place and time.   Psychiatric:         Mood and Affect: Mood normal.         Behavior: Behavior normal.         Lab Results   Component Value Date     09/15/2022    K 4.5 09/15/2022     09/15/2022    CO2 26.8 09/15/2022    BUN 13 09/15/2022    CREATININE 0.87 09/15/2022    GLUCOSE 106 (H) 09/15/2022    CALCIUM 9.3 09/15/2022    AST 17 09/15/2022    ALT 20 09/15/2022    ALKPHOS 85 09/15/2022    LABIL2 1.9 10/29/2020     No results found for: CKTOTAL  Lab Results   Component Value Date    WBC 6.54 07/12/2022    HGB 13.2 07/12/2022    HCT 40.8 07/12/2022     07/12/2022     Lab Results   Component Value Date    INR 1.44 (H) 06/23/2022    INR 1.41 (H) 06/16/2022    INR 1.07 08/18/2021     Lab Results   Component Value Date    MG 2.2 09/15/2022     Lab Results   Component Value Date    TSH 3.010 09/15/2022    TRIG 51 09/03/2020    HDL 42 09/03/2020    LDL 81 09/03/2020      No results found for: BNP        Procedures      Assessment & Plan    Diagnosis Plan   1. ASCVD (arteriosclerotic cardiovascular disease)  BNP    TSH    Comprehensive Metabolic Panel      2. Chronic systolic heart failure (CMS/HCC)  BNP    TSH    Comprehensive Metabolic Panel      3. Paroxysmal atrial fibrillation .  BNP    TSH    Comprehensive Metabolic Panel      4. Cardiomyopathy, dilated  (possibly ischemic and nonischemic).  BNP    TSH    Comprehensive Metabolic Panel                   Recommendations:    1. ASCVD-no recent anginal symptoms.  Continue low-dose aspirin, statin, carvedilol, and Ranexa.  2. Cardiomyopathy-  EF on recent echocardiogram has recovered to 56 to 60%.  I discussed this with the patient.  He is going to maintain follow-up with EP.  We will discontinue LifeVest. Entresto, carvedilol, and Verquvo.  3. Paroxysmal atrial fibrillation- maintaining sinus rhythm.  Continue amiodarone and Eliquis.  4. Chronic systolic heart failure-EF recovered.  Well compensated today.  BNP, TSH, and CMP ordered.  5. Follow-up in 3 months or sooner if needed.        Return in about 3 months (around 2/23/2023) for Recheck.    As always, I appreciate very much the opportunity to participate in the cardiovascular care of your patients.      With Best Regards,    SAMARA Almonte

## 2022-12-13 ENCOUNTER — APPOINTMENT (OUTPATIENT)
Dept: MRI IMAGING | Facility: HOSPITAL | Age: 62
End: 2022-12-13

## 2022-12-22 ENCOUNTER — HOSPITAL ENCOUNTER (OUTPATIENT)
Dept: CARDIOLOGY | Facility: HOSPITAL | Age: 62
Discharge: HOME | End: 2022-12-22
Admitting: PHYSICIAN ASSISTANT

## 2022-12-22 VITALS
WEIGHT: 157.4 LBS | DIASTOLIC BLOOD PRESSURE: 62 MMHG | SYSTOLIC BLOOD PRESSURE: 111 MMHG | HEART RATE: 76 BPM | BODY MASS INDEX: 23.86 KG/M2 | HEIGHT: 68 IN | OXYGEN SATURATION: 98 %

## 2022-12-22 DIAGNOSIS — I48.0 PAROXYSMAL ATRIAL FIBRILLATION: ICD-10-CM

## 2022-12-22 DIAGNOSIS — I50.22 CHRONIC SYSTOLIC HEART FAILURE: ICD-10-CM

## 2022-12-22 DIAGNOSIS — I25.10 ASCVD (ARTERIOSCLEROTIC CARDIOVASCULAR DISEASE): ICD-10-CM

## 2022-12-22 DIAGNOSIS — I25.5 ISCHEMIC CARDIOMYOPATHY: Primary | ICD-10-CM

## 2022-12-22 DIAGNOSIS — I42.0 CARDIOMYOPATHY, DILATED: ICD-10-CM

## 2022-12-22 LAB
ABSOLUTE LUNG FLUID CONTENT: 25 % (ref 20–35)
ANION GAP SERPL CALCULATED.3IONS-SCNC: 11.1 MMOL/L (ref 5–15)
BUN SERPL-MCNC: 28 MG/DL (ref 8–23)
BUN/CREAT SERPL: 28 (ref 7–25)
CALCIUM SPEC-SCNC: 9.7 MG/DL (ref 8.6–10.5)
CHLORIDE SERPL-SCNC: 91 MMOL/L (ref 98–107)
CO2 SERPL-SCNC: 36.9 MMOL/L (ref 22–29)
CREAT SERPL-MCNC: 1 MG/DL (ref 0.76–1.27)
EGFRCR SERPLBLD CKD-EPI 2021: 85.1 ML/MIN/1.73
GLUCOSE SERPL-MCNC: 66 MG/DL (ref 65–99)
MAGNESIUM SERPL-MCNC: 2.4 MG/DL (ref 1.6–2.4)
NT-PROBNP SERPL-MCNC: 42.5 PG/ML (ref 0–900)
POTASSIUM SERPL-SCNC: 3.2 MMOL/L (ref 3.5–5.2)
SODIUM SERPL-SCNC: 139 MMOL/L (ref 136–145)

## 2022-12-22 PROCEDURE — 99215 OFFICE O/P EST HI 40 MIN: CPT | Performed by: PHYSICIAN ASSISTANT

## 2022-12-22 PROCEDURE — 80048 BASIC METABOLIC PNL TOTAL CA: CPT | Performed by: PHYSICIAN ASSISTANT

## 2022-12-22 PROCEDURE — 83880 ASSAY OF NATRIURETIC PEPTIDE: CPT | Performed by: PHYSICIAN ASSISTANT

## 2022-12-22 PROCEDURE — 83735 ASSAY OF MAGNESIUM: CPT | Performed by: PHYSICIAN ASSISTANT

## 2022-12-22 PROCEDURE — 94726 PLETHYSMOGRAPHY LUNG VOLUMES: CPT | Performed by: PHYSICIAN ASSISTANT

## 2022-12-22 RX ORDER — VERICIGUAT 5 MG/1
5 TABLET, FILM COATED ORAL DAILY
Qty: 90 TABLET | Refills: 0 | Status: SHIPPED | OUTPATIENT
Start: 2022-12-22 | End: 2023-01-23 | Stop reason: SDUPTHER

## 2022-12-22 RX ORDER — MULTIPLE VITAMINS W/ MINERALS TAB 9MG-400MCG
1 TAB ORAL DAILY
Qty: 90 TABLET | Refills: 2 | Status: SHIPPED | OUTPATIENT
Start: 2022-12-22 | End: 2022-12-23

## 2022-12-22 RX ORDER — METOLAZONE 2.5 MG/1
5 TABLET ORAL DAILY
COMMUNITY
End: 2022-12-22 | Stop reason: HOSPADM

## 2022-12-22 RX ORDER — POTASSIUM CHLORIDE 20 MEQ/1
20 TABLET, EXTENDED RELEASE ORAL DAILY
Qty: 5 TABLET | Refills: 0 | Status: SHIPPED | OUTPATIENT
Start: 2022-12-22 | End: 2022-12-27

## 2022-12-22 RX ORDER — ORPHENADRINE CITRATE 100 MG/1
100 TABLET, EXTENDED RELEASE ORAL 2 TIMES DAILY
COMMUNITY
End: 2023-02-21

## 2022-12-22 RX ORDER — AMIODARONE HYDROCHLORIDE 200 MG/1
200 TABLET ORAL DAILY
Qty: 90 TABLET | Refills: 0 | Status: SHIPPED | OUTPATIENT
Start: 2022-12-22 | End: 2023-01-23 | Stop reason: SDUPTHER

## 2022-12-22 RX ORDER — CARVEDILOL 6.25 MG/1
6.25 TABLET ORAL 2 TIMES DAILY WITH MEALS
Qty: 180 TABLET | Refills: 0 | Status: SHIPPED | OUTPATIENT
Start: 2022-12-22 | End: 2023-01-23 | Stop reason: SDUPTHER

## 2022-12-22 RX ORDER — TAMSULOSIN HYDROCHLORIDE 0.4 MG/1
1 CAPSULE ORAL DAILY
Qty: 30 CAPSULE | Refills: 1 | Status: SHIPPED | OUTPATIENT
Start: 2022-12-22

## 2022-12-22 RX ORDER — RANOLAZINE 500 MG/1
500 TABLET, EXTENDED RELEASE ORAL EVERY 12 HOURS SCHEDULED
Qty: 180 TABLET | Refills: 0 | Status: SHIPPED | OUTPATIENT
Start: 2022-12-22 | End: 2023-01-23 | Stop reason: SDUPTHER

## 2022-12-22 RX ORDER — ATORVASTATIN CALCIUM 40 MG/1
40 TABLET, FILM COATED ORAL DAILY
Qty: 30 TABLET | Refills: 5 | Status: SHIPPED | OUTPATIENT
Start: 2022-12-22

## 2022-12-22 RX ORDER — ASPIRIN 81 MG/1
81 TABLET ORAL DAILY
Qty: 90 TABLET | Refills: 0 | Status: SHIPPED | OUTPATIENT
Start: 2022-12-22 | End: 2023-01-23 | Stop reason: SDUPTHER

## 2022-12-22 NOTE — PROGRESS NOTES
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 PCP.      ----------------------------------------------------------------------  --------------------------------------------------------------------------------          >45 minutes out of 60 minutes face to face spent counseling patient extensively on dietary Na+ intake, importance of activity, weight monitoring, compliance with medications in addition to importance of titration with goal directed medical therapy and follow up appointments.            This document has been electronically signed by Veronica Kim PA-C  December 22, 2022 12:59 EST      Part of this note may be an electronic transcription/translation of spoken language to printed text using the Dragon Dictation System.

## 2022-12-22 NOTE — PROGRESS NOTES
Heart Failure Clinic    Date: 12/22/22     Vitals:    12/22/22 1055   BP: 111/62   Pulse: 76   SpO2: 98%      Weight 157.4  Method of arrival: Ambulatory with cane    Weighing self daily: No    Monitoring Heart Failure Zones: No    Today's HF Zone: Green    Taking medications as prescribed: Yes    Edema No    Shortness of Air: No    Number of pillows used at night:<2    Educational Materials given:  avs                                                                         ReDS Value: 25  25-35 Optimal Value Status      Esteban Levine RN 12/22/22 11:00 EST

## 2022-12-22 NOTE — ADDENDUM NOTE
Encounter addended by: Gwen Gutiérrez RP on: 12/22/2022 3:07 PM   Actions taken: Clinical Note Signed

## 2022-12-22 NOTE — PROGRESS NOTES
Heart Failure Clinic  Pharmacist Note     Chong White is a 62 y.o. male seen in the Heart Failure Clinic for HFpEF with an EF 56-60 per most recent ECHO. Chong White reports he is not very familiar with names of his medications. He brought in a bag of his medications for me to go through. He reports that missed one to two doses since the last visit and currently reports to be adherent to them. Based on his last [facility] visit, Metolazone was supposed to be discontinued however patient has continued taking it. He is also on Lasix 40mg. He states that his swelling comes and goes but is manageable for the most part. He reports that his weights are steady around 160 lbs. The pt has not been taking his BP consistently, his in-clinic BP reading was 111/62 mmHg and thinks it may be similar to the home readings. He confirms having lightheadedness, dizziness and orthostatic hypotension. He has continued to take the Verquvo regardless. He reports to have SOB at rest and with activity.    Hospitalizations: He was discharged from the hospital 7/12/22 for acute on chronic systolic CHF and Afib w/ RVR.    Medication Use:   Hx of med intolerances:  None related to HF  Retail Rx Management: Only uses [facility]; Indigent Medication Assistance notes done during admission indicate Entresto had no copay & Verquvo and Jardiance PAs approved with no copay.    Past Medical History:   Diagnosis Date   • Atrial fibrillation (HCC)    • GERD (gastroesophageal reflux disease)    • Hypertension    • Myocardial infarction (HCC)      ALLERGIES: Penicillins  Current Outpatient Medications   Medication Sig Dispense Refill   • amiodarone (Pacerone) 200 MG tablet Take 1 tablet by mouth Daily for 90 days. 90 tablet 0   • apixaban (ELIQUIS) 5 MG tablet tablet Take 1 tablet by mouth Every 12 (Twelve) Hours Indications: Atrial Fibrillation 180 tablet 0   • aspirin 81 MG EC tablet Take 1 tablet by mouth Daily for 90 days. 90 tablet 0   •  "atorvastatin (Lipitor) 40 MG tablet Take 1 tablet by mouth Daily. 30 tablet 5   • carvedilol (COREG) 6.25 MG tablet Take 1 tablet by mouth 2 (Two) Times a Day With Meals 180 tablet 0   • empagliflozin (Jardiance) 10 MG tablet tablet Take 1 tablet by mouth Daily for 90 days. 90 tablet 0   • furosemide (Lasix) 20 MG tablet Take 1 tablet by mouth Daily for 90 days. 90 tablet 0   • orphenadrine (NORFLEX) 100 MG 12 hr tablet Take 100 mg by mouth 2 (Two) Times a Day.     • pantoprazole (PROTONIX) 40 MG EC tablet Take 1 tablet by mouth Daily. 30 tablet 5   • ranolazine (RANEXA) 500 MG 12 hr tablet Take 1 tablet by mouth Every 12 (Twelve) Hours for 90 days. 180 tablet 0   • sacubitril-valsartan (ENTRESTO) 24-26 MG tablet Take 1 tablet by mouth Every 12 (Twelve) Hours for 90 days. 180 tablet 0   • Vericiguat (Verquvo) 5 MG tablet Take 1 tablet by mouth Daily 90 tablet 0   • multivitamin with minerals (Multivitamin Adults) tablet tablet Take 1 tablet by mouth Daily for 1 day. 90 tablet 2   • Nicotine Step 1 21 MG/24HR patch      • potassium chloride (K-DUR,KLOR-CON) 20 MEQ CR tablet Take 1 tablet by mouth Daily for 5 days. 5 tablet 0   • tamsulosin (FLOMAX) 0.4 MG capsule 24 hr capsule Take 1 capsule by mouth Daily. 30 capsule 1     No current facility-administered medications for this encounter.       Vaccination History:   Pneumonia: PPSV23 in 2020 per Epic records. Pt believes he is UTD; if not, 1 dose PCV15 or PCV20 is recommended.  Annual Influenza: UTD 10/13/22  COVID 19: received Uvaldo x 1 dose 8/19/21    Objective  Vitals:    12/22/22 1055   BP: 111/62   BP Location: Right arm   Patient Position: Sitting   Cuff Size: Adult   Pulse: 76   SpO2: 98%   Weight: 71.4 kg (157 lb 6.4 oz)   Height: 172.7 cm (68\")     Wt Readings from Last 3 Encounters:   12/22/22 71.4 kg (157 lb 6.4 oz)   10/25/22 72.6 kg (160 lb)   10/13/22 73.3 kg (161 lb 9.6 oz)         12/22/22  1055   Weight: 71.4 kg (157 lb 6.4 oz)     Lab Results "   Component Value Date    GLUCOSE 66 12/22/2022    BUN 28 (H) 12/22/2022    CREATININE 1.00 12/22/2022    EGFRIFNONA 84 02/23/2022    EGFRIFAFRI 106 10/29/2020    BCR 28.0 (H) 12/22/2022    K 3.2 (L) 12/22/2022    CO2 36.9 (H) 12/22/2022    CALCIUM 9.7 12/22/2022    PROTENTOTREF 6.6 10/29/2020    ALBUMIN 4.37 09/15/2022    LABIL2 1.9 10/29/2020    AST 17 09/15/2022    ALT 20 09/15/2022     Lab Results   Component Value Date    WBC 6.54 07/12/2022    HGB 13.2 07/12/2022    HCT 40.8 07/12/2022    MCV 95.3 07/12/2022     07/12/2022     Lab Results   Component Value Date    TROPONINT <0.010 06/27/2022     Lab Results   Component Value Date    PROBNP 42.5 12/22/2022     Results for orders placed during the hospital encounter of 11/16/22    Adult Transthoracic Echo Complete W/ Cont if Necessary Per Protocol    Interpretation Summary  •  Normal left ventricular cavity size and wall thickness noted. All left ventricular wall segments contract normally  •  Left ventricular ejection fraction appears to be 56 - 60%.  •  Left ventricular diastolic function is consistent with (grade I) impaired relaxation.  •  The aortic valve is structurally normal with no regurgitation or stenosis present.  •  The mitral valve is structurally normal with no regurgitation or significant stenosis present.  •  There is no evidence of pericardial effusion. .         GDMT    Drug Class   Drug   Dose Last Dose Adjustment Additional Titration   Notes   ACEi/ARB/ARNI Entresto 24/26 7/12/22 Needed    Beta Blocker Carvedilol 6.25mg 7/12/22 Needed    MRA     hyperK in hospital 7/12   SGLT2i Jardiance 10mg 7/27/22 N/A     Vericiguat 5mg 10/13/22 increased Needed        Drug Therapy Problems    1. No BP log today   2. Adherence  3. Refills  4. GDMT  5. Metolazone/hypokalemia (3.2 mmol/L)  6. NSAIDs       Recommendations:     1. Educated him on the importance of taking and logging his BP every day and bringing the log into us. Will need to address at  next appointment to at least to take his BP if he was feeling lightheaded to assess his BP at that time to decipher if he should hold some of his medications.   2. Patient missed few doses of Atorvastatin and Amiodarone. Educated about the importance of adherence and when he is down to 3-5 tablets, to contact the pharmacy in advance to refill medication to avoid missing any doses. Contacted [facility] to fill medications that had remaining refills on them such as Amiodarone, Protonix and Nicotine patch.  3. Veronica refilled all of his medications and sent them to [facility].   4. Patient had Verquvo 2.5 mg and 5 mg in the bag, patient denies using 2.5 mg and has been compliant with 5 mg, will dispose the 2.5 mg tablet at the pharmacy. Will continue current GDMT therapy and if appropriate based on renal function and BP levels, consider titrating to target doses.  5. Veronica to stop Metolazone and continue Lasix and prescribed Tamsulosin to help with bladder emptying delay. Added potassium supplement to help manage hypokalemia.  6. Recommended patient to stop PRN Ibuprofen due to contraindication with HF and switch to APAP PRN.    Patient was educated on heart failure medications and the importance of medication adherence. All questions were addressed and patient expressed understanding. Used teach-back method to assess understanding.     Thank you for allowing me to participate in the care of your patient,    Gwen Gutiérrez, [facility]  12/22/22  14:44 EST

## 2022-12-22 NOTE — ADDENDUM NOTE
Encounter addended by: Esteban Levine RN on: 12/22/2022 2:33 PM   Actions taken: Care Plan modified, Order list changed, Diagnosis association updated, Result filed, Charge Capture section accepted

## 2023-01-11 ENCOUNTER — TELEPHONE (OUTPATIENT)
Dept: PULMONOLOGY | Facility: CLINIC | Age: 63
End: 2023-01-11
Payer: MEDICAID

## 2023-01-11 NOTE — TELEPHONE ENCOUNTER
----- Message from SAMARA Berger sent at 1/11/2023  2:51 PM EST -----  Incomplete sleep study, will you call and see if he wants to repeat in lab or at home.    ----- Message -----  From: Herminio Chao Incoming  Sent: 1/10/2023   8:44 AM EST  To: SAMARA Berger

## 2023-01-17 DIAGNOSIS — G47.33 OSA (OBSTRUCTIVE SLEEP APNEA): Primary | ICD-10-CM

## 2023-01-23 DIAGNOSIS — I48.0 PAROXYSMAL ATRIAL FIBRILLATION: ICD-10-CM

## 2023-01-23 DIAGNOSIS — I50.22 CHRONIC SYSTOLIC HEART FAILURE: ICD-10-CM

## 2023-01-23 DIAGNOSIS — I25.10 ASCVD (ARTERIOSCLEROTIC CARDIOVASCULAR DISEASE): ICD-10-CM

## 2023-01-23 DIAGNOSIS — I42.0 CARDIOMYOPATHY, DILATED: ICD-10-CM

## 2023-01-23 RX ORDER — CARVEDILOL 6.25 MG/1
6.25 TABLET ORAL 2 TIMES DAILY WITH MEALS
Qty: 180 TABLET | Refills: 0 | Status: SHIPPED | OUTPATIENT
Start: 2023-01-23 | End: 2023-02-21 | Stop reason: SDUPTHER

## 2023-01-23 RX ORDER — VERICIGUAT 5 MG/1
5 TABLET, FILM COATED ORAL DAILY
Qty: 90 TABLET | Refills: 0 | Status: SHIPPED | OUTPATIENT
Start: 2023-01-23 | End: 2023-02-21

## 2023-01-23 RX ORDER — RANOLAZINE 500 MG/1
500 TABLET, EXTENDED RELEASE ORAL EVERY 12 HOURS SCHEDULED
Qty: 180 TABLET | Refills: 0 | Status: SHIPPED | OUTPATIENT
Start: 2023-01-23 | End: 2023-04-23

## 2023-01-23 RX ORDER — AMIODARONE HYDROCHLORIDE 200 MG/1
200 TABLET ORAL DAILY
Qty: 90 TABLET | Refills: 0 | Status: SHIPPED | OUTPATIENT
Start: 2023-01-23 | End: 2023-03-27

## 2023-01-23 RX ORDER — ASPIRIN 81 MG/1
81 TABLET ORAL DAILY
Qty: 90 TABLET | Refills: 0 | Status: SHIPPED | OUTPATIENT
Start: 2023-01-23 | End: 2023-04-23

## 2023-01-23 RX ORDER — FUROSEMIDE 20 MG/1
20 TABLET ORAL DAILY
Qty: 90 TABLET | Refills: 0 | Status: SHIPPED | OUTPATIENT
Start: 2023-01-23 | End: 2023-04-23

## 2023-02-21 ENCOUNTER — APPOINTMENT (OUTPATIENT)
Dept: VACCINE CLINIC | Facility: HOSPITAL | Age: 63
End: 2023-02-21
Payer: MEDICAID

## 2023-02-21 ENCOUNTER — HOSPITAL ENCOUNTER (OUTPATIENT)
Dept: MRI IMAGING | Facility: HOSPITAL | Age: 63
Discharge: HOME | End: 2023-02-21
Payer: MEDICAID

## 2023-02-21 ENCOUNTER — HOSPITAL ENCOUNTER (OUTPATIENT)
Dept: CARDIOLOGY | Facility: HOSPITAL | Age: 63
Discharge: HOME | End: 2023-02-21
Payer: MEDICAID

## 2023-02-21 VITALS
BODY MASS INDEX: 24.52 KG/M2 | HEIGHT: 68 IN | WEIGHT: 161.8 LBS | OXYGEN SATURATION: 98 % | HEART RATE: 70 BPM | DIASTOLIC BLOOD PRESSURE: 83 MMHG | SYSTOLIC BLOOD PRESSURE: 137 MMHG

## 2023-02-21 DIAGNOSIS — R93.7 MUSCULOSKELETAL SYSTEM IMAGING ABNORMALITY: ICD-10-CM

## 2023-02-21 DIAGNOSIS — S04.9XXA BRAIN/CRANIAL NERVE INJURY WITH NERVE/SPINAL CORD INJURY, NECK LEVEL: ICD-10-CM

## 2023-02-21 DIAGNOSIS — I50.42 CHRONIC COMBINED SYSTOLIC AND DIASTOLIC CONGESTIVE HEART FAILURE: Primary | ICD-10-CM

## 2023-02-21 DIAGNOSIS — S14.109A BRAIN/CRANIAL NERVE INJURY WITH NERVE/SPINAL CORD INJURY, NECK LEVEL: ICD-10-CM

## 2023-02-21 DIAGNOSIS — I42.0 CARDIOMYOPATHY, DILATED: ICD-10-CM

## 2023-02-21 DIAGNOSIS — I25.10 ASCVD (ARTERIOSCLEROTIC CARDIOVASCULAR DISEASE): ICD-10-CM

## 2023-02-21 DIAGNOSIS — I48.0 PAROXYSMAL ATRIAL FIBRILLATION: ICD-10-CM

## 2023-02-21 DIAGNOSIS — I50.22 CHRONIC SYSTOLIC HEART FAILURE: ICD-10-CM

## 2023-02-21 DIAGNOSIS — M54.12 BRACHIAL NEURITIS: ICD-10-CM

## 2023-02-21 DIAGNOSIS — S06.9XAA BRAIN/CRANIAL NERVE INJURY WITH NERVE/SPINAL CORD INJURY, NECK LEVEL: ICD-10-CM

## 2023-02-21 LAB — ABSOLUTE LUNG FLUID CONTENT: 31 % (ref 20–35)

## 2023-02-21 PROCEDURE — 99215 OFFICE O/P EST HI 40 MIN: CPT | Performed by: PHYSICIAN ASSISTANT

## 2023-02-21 PROCEDURE — 72141 MRI NECK SPINE W/O DYE: CPT

## 2023-02-21 PROCEDURE — 72141 MRI NECK SPINE W/O DYE: CPT | Performed by: RADIOLOGY

## 2023-02-21 PROCEDURE — 94726 PLETHYSMOGRAPHY LUNG VOLUMES: CPT | Performed by: PHYSICIAN ASSISTANT

## 2023-02-21 RX ORDER — CARVEDILOL 6.25 MG/1
6.25 TABLET ORAL 2 TIMES DAILY WITH MEALS
Qty: 180 TABLET | Refills: 0 | Status: SHIPPED | OUTPATIENT
Start: 2023-02-21 | End: 2023-05-22

## 2023-02-21 RX ORDER — GABAPENTIN 800 MG/1
800 TABLET ORAL 2 TIMES DAILY
COMMUNITY
Start: 2023-02-02

## 2023-02-21 RX ORDER — VERICIGUAT 5 MG/1
5 TABLET, FILM COATED ORAL DAILY
Qty: 90 TABLET | Refills: 0 | Status: SHIPPED | OUTPATIENT
Start: 2023-02-21 | End: 2023-05-22

## 2023-02-21 NOTE — PROGRESS NOTES
Heart Failure Clinic  Pharmacist Note     Chong White is a 62 y.o. male seen in the Heart Failure Clinic for combined systolic and diastolic HF. Chong White reports he did miss about 1 week of his medications but has been taking them since that time.  He did not have his medications today but I have requested a medication list from [facility].     He reports that he has a scale and a BP cuff but does not weigh himself or check his BP at home.     Medication Use:   Hx of med intolerances:  None related to HF  Retail Rx Management: [facility]    Past Medical History:   Diagnosis Date   • Atrial fibrillation (HCC)    • GERD (gastroesophageal reflux disease)    • Hypertension    • Myocardial infarction (HCC)      ALLERGIES: Penicillins  Current Outpatient Medications   Medication Sig Dispense Refill   • amiodarone (Pacerone) 200 MG tablet Take 1 tablet by mouth Daily for 90 days. 90 tablet 0   • apixaban (ELIQUIS) 5 MG tablet tablet Take 1 tablet by mouth Every 12 (Twelve) Hours Indications: Atrial Fibrillation 180 tablet 0   • aspirin 81 MG EC tablet Take 1 tablet by mouth Daily for 90 days. 90 tablet 0   • carvedilol (COREG) 6.25 MG tablet Take 1 tablet by mouth 2 (Two) Times a Day With Meals 180 tablet 0   • empagliflozin (Jardiance) 10 MG tablet tablet Take 1 tablet by mouth Daily for 90 days. 90 tablet 0   • furosemide (Lasix) 20 MG tablet Take 1 tablet by mouth Daily for 90 days. 90 tablet 0   • gabapentin (NEURONTIN) 800 MG tablet Take 800 mg by mouth 2 (Two) Times a Day.     • pantoprazole (PROTONIX) 40 MG EC tablet Take 1 tablet by mouth Daily. 30 tablet 5   • ranolazine (RANEXA) 500 MG 12 hr tablet Take 1 tablet by mouth Every 12 (Twelve) Hours for 90 days. 180 tablet 0   • sacubitril-valsartan (ENTRESTO) 24-26 MG tablet Take 1 tablet by mouth Every 12 (Twelve) Hours for 90 days. 180 tablet 0   • Vericiguat (Verquvo) 5 MG tablet Take 1 tablet by mouth Daily 90 tablet 0   • atorvastatin  "(Lipitor) 40 MG tablet Take 1 tablet by mouth Daily. 30 tablet 5   • Pneumococcal 20-Yareli Conj Vacc (Prevnar 20) 0.5 ML suspension prefilled syringe vaccine Inject into the appropriate muscle as directed by prescriber. 0.5 mL 0   • tamsulosin (FLOMAX) 0.4 MG capsule 24 hr capsule Take 1 capsule by mouth Daily. 30 capsule 1     No current facility-administered medications for this encounter.       Vaccination History:   Pneumonia: PCV 20 Feb 2023  Annual Influenza: UTD 10/13/22  COVID 19: Booster Feb 2023    Objective  Vitals:    02/21/23 1041   BP: 137/83   BP Location: Left arm   Patient Position: Sitting   Cuff Size: Adult   Pulse: 70   SpO2: 98%   Weight: 73.4 kg (161 lb 12.8 oz)   Height: 172.7 cm (68\")     Wt Readings from Last 3 Encounters:   02/21/23 73.4 kg (161 lb 12.8 oz)   12/22/22 71.4 kg (157 lb 6.4 oz)   10/25/22 72.6 kg (160 lb)         02/21/23  1041   Weight: 73.4 kg (161 lb 12.8 oz)     Lab Results   Component Value Date    GLUCOSE 66 12/22/2022    BUN 28 (H) 12/22/2022    CREATININE 1.00 12/22/2022    EGFRIFNONA 84 02/23/2022    EGFRIFAFRI 106 10/29/2020    BCR 28.0 (H) 12/22/2022    K 3.2 (L) 12/22/2022    CO2 36.9 (H) 12/22/2022    CALCIUM 9.7 12/22/2022    PROTENTOTREF 6.6 10/29/2020    ALBUMIN 4.37 09/15/2022    LABIL2 1.9 10/29/2020    AST 17 09/15/2022    ALT 20 09/15/2022     Lab Results   Component Value Date    WBC 6.54 07/12/2022    HGB 13.2 07/12/2022    HCT 40.8 07/12/2022    MCV 95.3 07/12/2022     07/12/2022     Lab Results   Component Value Date    TROPONINT <0.010 06/27/2022     Lab Results   Component Value Date    PROBNP 42.5 12/22/2022     Results for orders placed during the hospital encounter of 11/16/22    Adult Transthoracic Echo Complete W/ Cont if Necessary Per Protocol    Interpretation Summary  •  Normal left ventricular cavity size and wall thickness noted. All left ventricular wall segments contract normally  •  Left ventricular ejection fraction appears to be 56 " - 60%.  •  Left ventricular diastolic function is consistent with (grade I) impaired relaxation.  •  The aortic valve is structurally normal with no regurgitation or stenosis present.  •  The mitral valve is structurally normal with no regurgitation or significant stenosis present.  •  There is no evidence of pericardial effusion. .         GDMT    Drug Class   Drug   Dose Last Dose Adjustment Additional Titration   Notes   ACEi/ARB/ARNI Entresto 24/26 7/12/22 Needed    Beta Blocker Carvedilol 6.25mg 7/12/22 Needed    SGLT2i Jardiance 10mg 7/27/22 N/A     Vericiguat 5mg 10/13/22 increased         Drug Therapy Problems    1. Medication Non-compliance  2. Needs vaccinations       Recommendations:     1. Continue to address the importance of medication adherence at each visit. [facility] is best for him since he lives close and they deliver.   2. Patient received pneumococcal and COVID booster today.     Patient was educated on heart failure medications and the importance of medication adherence. All questions were addressed and patient would benefit from additional education at each visit.     Thank you for allowing me to participate in the care of your patient,    Georgia Hartley, PharmD  02/21/23  14:32 EST

## 2023-02-21 NOTE — PROGRESS NOTES
[facility]  NIKI Connelly Linda C., APRN  [address]  [address]  [place] [address]    Thank you for asking me to see Chong White for congestive heart failure.    History of Present Illness     This is a 62 y.o. male with known past medical history of:  · Paroxysmal atrial fibrillation  · Chronic systolic CHF with distant history of documented IVDA  · Recent TTE with recovered EF.   · Tobacco abuse  · ASCVD  · LHC on 09/2020 with flush occlusion of the LAD at the ostium noted to fill from RCA injection without known evidence of disease.  Distal Lcx with 50% disease.  RCA large with mild luminal irregularities.    · Essential hypertension  · GERD.      Chong White is a 62 y.o. male who presents for today for CHF follow-up.  The patient is typically seen by Amy Yanez APRN.  Patient's primary cardiologist is Dr. Lopez.     • Last known EF recovered.  · Last known hospitalization and/or ED visit: He was last hospitalized from 06/23/22 through 07/12/22 with atrial fibrillation with RVR, acute on chronic systolic CHF, MAISHA, and COPD.      He underwent LUCERO guided cardioversion and was started on amiodarone in addition to Eliquis, Entresto, Verquvo, Ranolazine, Coreg, and atorvastatin.          Initial visit data/details regarding HF:   • Dyspnea on exertion: Improved  • Lower extremity swelling significantly improved  • Abdominal swelling: Improving.     • Home weight:Not monitoring; has tools to do so  • Home BP: Not monitoring, has tools to do so. Importance of keeping log discussed.   • Daily activities of living:  Performing ADLs independently.   • Pillows/lying flat: 2 pillows  • HF zone: Green.   • Mr. White is doing well overall. He has ongoing issue with his RUE numbness intermittently.  He is currently having this worked up in relation to an old neck and back injury with concern for nerve damage.    • He denies chest pain and shortness of breath.   He has no swelling.        Review of Systems - Review of Systems   Constitutional: Negative for chills, decreased appetite, fever and malaise/fatigue.   HENT: Negative for congestion and ear pain.    Eyes: Negative for blurred vision.   Cardiovascular: Negative for chest pain, dyspnea on exertion, leg swelling, near-syncope and syncope.        Dyspnea on exertion has improved   Respiratory: Negative for cough and shortness of breath.    Endocrine: Negative for cold intolerance and heat intolerance.   Hematologic/Lymphatic: Negative for adenopathy and bleeding problem.   Skin: Negative for color change and nail changes.   Musculoskeletal: Negative for arthritis, back pain and falls.   Gastrointestinal: Negative for bloating and abdominal pain.   Genitourinary: Negative for bladder incontinence and dysuria.   Neurological: Negative for aphonia, difficulty with concentration and disturbances in coordination.   Psychiatric/Behavioral: Negative for altered mental status and hallucinations.   Allergic/Immunologic: Negative for environmental allergies and HIV exposure.        All other systems were reviewed and were negative.    Patient Active Problem List   Diagnosis   • Atrial fibrillation (HCC)   • Neuropathy   • ASCVD (arteriosclerotic cardiovascular disease)   • Tobacco abuse   • Ischemic cardiomyopathy   • Chronic combined systolic and diastolic congestive heart failure (HCC)   • Chronic low back pain   • Paralysis (HCC)   • Hypertension   • Chronic anticoagulation   • Long term current use of antiarrhythmic drug       family history includes Heart disease in his maternal grandmother and mother.     reports that he has been smoking cigarettes. He started smoking about 54 years ago. He has been smoking an average of .5 packs per day. He has never used smokeless tobacco. He reports that he does not drink alcohol and does not use drugs.    Allergies   Allergen Reactions   • Penicillins Hives         Current Outpatient  Medications:   •  carvedilol (COREG) 6.25 MG tablet, Take 1 tablet by mouth 2 (Two) Times a Day With Meals, Disp: 180 tablet, Rfl: 0  •  empagliflozin (Jardiance) 10 MG tablet tablet, Take 1 tablet by mouth Daily for 90 days., Disp: 90 tablet, Rfl: 0  •  gabapentin (NEURONTIN) 800 MG tablet, Take 800 mg by mouth 2 (Two) Times a Day., Disp: , Rfl:   •  sacubitril-valsartan (ENTRESTO) 24-26 MG tablet, Take 1 tablet by mouth Every 12 (Twelve) Hours for 90 days., Disp: 180 tablet, Rfl: 0  •  Vericiguat (Verquvo) 5 MG tablet, Take 1 tablet by mouth Daily, Disp: 90 tablet, Rfl: 0  •  amiodarone (Pacerone) 200 MG tablet, Take 1 tablet by mouth Daily for 90 days., Disp: 90 tablet, Rfl: 0  •  apixaban (ELIQUIS) 5 MG tablet tablet, Take 1 tablet by mouth Every 12 (Twelve) Hours Indications: Atrial Fibrillation, Disp: 180 tablet, Rfl: 0  •  aspirin 81 MG EC tablet, Take 1 tablet by mouth Daily for 90 days., Disp: 90 tablet, Rfl: 0  •  atorvastatin (Lipitor) 40 MG tablet, Take 1 tablet by mouth Daily., Disp: 30 tablet, Rfl: 5  •  furosemide (Lasix) 20 MG tablet, Take 1 tablet by mouth Daily for 90 days., Disp: 90 tablet, Rfl: 0  •  orphenadrine (NORFLEX) 100 MG 12 hr tablet, Take 100 mg by mouth 2 (Two) Times a Day., Disp: , Rfl:   •  pantoprazole (PROTONIX) 40 MG EC tablet, Take 1 tablet by mouth Daily., Disp: 30 tablet, Rfl: 5  •  Pneumococcal 20-Yareli Conj Vacc (Prevnar 20) 0.5 ML suspension prefilled syringe vaccine, Inject into the appropriate muscle as directed by prescriber., Disp: 0.5 mL, Rfl: 0  •  ranolazine (RANEXA) 500 MG 12 hr tablet, Take 1 tablet by mouth Every 12 (Twelve) Hours for 90 days., Disp: 180 tablet, Rfl: 0  •  tamsulosin (FLOMAX) 0.4 MG capsule 24 hr capsule, Take 1 capsule by mouth Daily., Disp: 30 capsule, Rfl: 1      Physical Exam:  I have reviewed the patient's current vital signs as documented in the patient's EMR.         Vitals:    02/21/23 1041   BP: 137/83   Pulse: 70   SpO2: 98%     Body mass  index is 24.6 kg/m².       02/21/23  1041   Weight: 73.4 kg (161 lb 12.8 oz)        Physical Exam  Vitals and nursing note reviewed.   Constitutional:       Appearance: Normal appearance.      Comments: Ambulated independently with cane.   HENT:      Head: Normocephalic and atraumatic.      Mouth/Throat:      Lips: Pink.      Mouth: Mucous membranes are moist.   Eyes:      General: Lids are normal.      Conjunctiva/sclera:      Right eye: Right conjunctiva is not injected.      Left eye: Left conjunctiva is not injected.   Pulmonary:      Effort: No tachypnea or bradypnea.      Breath sounds: No decreased breath sounds, wheezing, rhonchi or rales.   Chest:      Chest wall: No mass or lacerations.   Abdominal:      General: Bowel sounds are normal.      Palpations: Abdomen is soft.      Tenderness: There is no abdominal tenderness. There is no right CVA tenderness or left CVA tenderness.      Comments: Abdominal protuberance improved   Musculoskeletal:      Cervical back: Full passive range of motion without pain. No edema or erythema.      Right lower leg: No swelling. No edema.      Left lower leg: No swelling. No edema.      Comments: Patient has ongoing unstable gait with a cane   Skin:     General: Skin is warm and moist.   Neurological:      Mental Status: He is alert and oriented to person, place, and time.   Psychiatric:         Attention and Perception: Attention normal.         Mood and Affect: Mood normal.         Behavior: Behavior is cooperative.       JVP: Volume/Pulsation: Normal.        DATA REVIEWED:     EKG. I personally reviewed and interpreted the EKG.        ---------------------------------------------------  TTE/LUCERO:  Results for orders placed during the hospital encounter of 11/16/22    Adult Transthoracic Echo Complete W/ Cont if Necessary Per Protocol    Interpretation Summary  •  Normal left ventricular cavity size and wall thickness noted. All left ventricular wall segments contract  normally  •  Left ventricular ejection fraction appears to be 56 - 60%.  •  Left ventricular diastolic function is consistent with (grade I) impaired relaxation.  •  The aortic valve is structurally normal with no regurgitation or stenosis present.  •  The mitral valve is structurally normal with no regurgitation or significant stenosis present.  •  There is no evidence of pericardial effusion. .      -----------------------------------------------------  CXR/Imaging:   Imaging Results (Most Recent)     None          I personally reviewed and interpreted the CXR.      -----------------------------------------------------  CT:   MRI Cervical Spine Without Contrast    Result Date: 2/21/2023   1. Stable exam demonstrating multilevel degenerative disc disease in combination with facet arthropathy and congenital shortening of pedicles resulting in diffuse spinal stenosis as detailed above. 2. Stable 10 mm length cord signal abnormality of the cervical spinal cord at the C7 level which may reflect syrinx or cord myelomalacia. No change from previous. This is at the level of a chronic compression deformity of C7 which may be posttraumatic in etiology. 3. No acute fracture or traumatic malalignment.  This report was finalized on 2/21/2023 10:18 AM by Dr. Chidi Dean MD.      I personally reviewed the images of the CT scan.  My personal interpretation is below.      ----------------------------------------------------    PFTs:    No visible PFTs available within system.   --------------------------------------------------------------------------------------------------    Laboratory evaluations:    Lab Results   Component Value Date    GLUCOSE 66 12/22/2022    BUN 28 (H) 12/22/2022    CREATININE 1.00 12/22/2022    EGFRIFNONA 84 02/23/2022    EGFRIFAFRI 106 10/29/2020    BCR 28.0 (H) 12/22/2022    K 3.2 (L) 12/22/2022    CO2 36.9 (H) 12/22/2022    CALCIUM 9.7 12/22/2022    PROTENTOTREF 6.6 10/29/2020    ALBUMIN 4.37  09/15/2022    LABIL2 1.9 10/29/2020    AST 17 09/15/2022    ALT 20 09/15/2022     Lab Results   Component Value Date    WBC 6.54 07/12/2022    HGB 13.2 07/12/2022    HCT 40.8 07/12/2022    MCV 95.3 07/12/2022     07/12/2022     Lab Results   Component Value Date    CHOL 133 09/03/2020    TRIG 51 09/03/2020    HDL 42 09/03/2020    LDL 81 09/03/2020     Lab Results   Component Value Date    TSH 3.010 09/15/2022     Lab Results   Component Value Date    TROPONINT <0.010 06/27/2022     No results found for: HGBA1C  No results found for: DDIMER  Lab Results   Component Value Date    ALT 20 09/15/2022     No results found for: HGBA1C  Lab Results   Component Value Date    CREATININE 1.00 12/22/2022     No results found for: IRON, TIBC, FERRITIN  Lab Results   Component Value Date    INR 1.44 (H) 06/23/2022    INR 1.41 (H) 06/16/2022    INR 1.07 08/18/2021    PROTIME 17.9 (H) 06/23/2022    PROTIME 17.6 (H) 06/16/2022    PROTIME 14.3 08/18/2021         PAH RISK ASSESSMENT:      1. Chronic combined systolic and diastolic congestive heart failure (HCC)    2. Chronic systolic heart failure (CMS/HCC)    3. Paroxysmal atrial fibrillation .    4. Cardiomyopathy, dilated (possibly ischemic and nonischemic).    5. ASCVD (arteriosclerotic cardiovascular disease) with 100% occlusion of the proximal LAD with excellent collaterals from RCA          ORDERS PLACED TODAY:  Orders Placed This Encounter   Procedures   • ReDs Vest        Diagnoses and all orders for this visit:    1. Chronic combined systolic and diastolic congestive heart failure (HCC) (Primary)  -     ReDs Vest    2. Chronic systolic heart failure (CMS/HCC)  -     sacubitril-valsartan (ENTRESTO) 24-26 MG tablet; Take 1 tablet by mouth Every 12 (Twelve) Hours for 90 days.  Dispense: 180 tablet; Refill: 0  -     carvedilol (COREG) 6.25 MG tablet; Take 1 tablet by mouth 2 (Two) Times a Day With Meals  Dispense: 180 tablet; Refill: 0    3. Paroxysmal atrial fibrillation  .  Overview:  · [facility], 9/4/2020  · ECV, 6/28/2022    Orders:  -     sacubitril-valsartan (ENTRESTO) 24-26 MG tablet; Take 1 tablet by mouth Every 12 (Twelve) Hours for 90 days.  Dispense: 180 tablet; Refill: 0  -     carvedilol (COREG) 6.25 MG tablet; Take 1 tablet by mouth 2 (Two) Times a Day With Meals  Dispense: 180 tablet; Refill: 0    4. Cardiomyopathy, dilated (possibly ischemic and nonischemic).  -     sacubitril-valsartan (ENTRESTO) 24-26 MG tablet; Take 1 tablet by mouth Every 12 (Twelve) Hours for 90 days.  Dispense: 180 tablet; Refill: 0  -     carvedilol (COREG) 6.25 MG tablet; Take 1 tablet by mouth 2 (Two) Times a Day With Meals  Dispense: 180 tablet; Refill: 0    5. ASCVD (arteriosclerotic cardiovascular disease) with 100% occlusion of the proximal LAD with excellent collaterals from RCA  Overview:  · [facility], 9/4/2020: occlusion of the ostial LAD with remarkably good collateral connection from rCA filling the LAD with brisk flow to the point of occlusion in the prox LAD.  RCA and CX are free of any significant disease.     Orders:  -     sacubitril-valsartan (ENTRESTO) 24-26 MG tablet; Take 1 tablet by mouth Every 12 (Twelve) Hours for 90 days.  Dispense: 180 tablet; Refill: 0  -     carvedilol (COREG) 6.25 MG tablet; Take 1 tablet by mouth 2 (Two) Times a Day With Meals  Dispense: 180 tablet; Refill: 0    Other orders  -     Vericiguat (Verquvo) 5 MG tablet; Take 1 tablet by mouth Daily  Dispense: 90 tablet; Refill: 0  -     empagliflozin (Jardiance) 10 MG tablet tablet; Take 1 tablet by mouth Daily for 90 days.  Dispense: 90 tablet; Refill: 0           MEDS ORDERED TODAY:    New Medications Ordered This Visit   Medications   • sacubitril-valsartan (ENTRESTO) 24-26 MG tablet     Sig: Take 1 tablet by mouth Every 12 (Twelve) Hours for 90 days.     Dispense:  180 tablet     Refill:  0   • Vericiguat (Verquvo) 5 MG tablet     Sig: Take 1 tablet by mouth Daily     Dispense:  90 tablet     Refill:  0   •  empagliflozin (Jardiance) 10 MG tablet tablet     Sig: Take 1 tablet by mouth Daily for 90 days.     Dispense:  90 tablet     Refill:  0   • carvedilol (COREG) 6.25 MG tablet     Sig: Take 1 tablet by mouth 2 (Two) Times a Day With Meals     Dispense:  180 tablet     Refill:  0        ---------------------------------------------------------------------------------------------------------------------------          ASSESSMENT/PLAN:      Diagnosis Plan   1. Chronic combined systolic and diastolic congestive heart failure (HCC)  ReDs Vest      2. Chronic systolic heart failure (CMS/HCC)  sacubitril-valsartan (ENTRESTO) 24-26 MG tablet    carvedilol (COREG) 6.25 MG tablet      3. Paroxysmal atrial fibrillation .  sacubitril-valsartan (ENTRESTO) 24-26 MG tablet    carvedilol (COREG) 6.25 MG tablet      4. Cardiomyopathy, dilated (possibly ischemic and nonischemic).  sacubitril-valsartan (ENTRESTO) 24-26 MG tablet    carvedilol (COREG) 6.25 MG tablet      5. ASCVD (arteriosclerotic cardiovascular disease) with 100% occlusion of the proximal LAD with excellent collaterals from RCA  sacubitril-valsartan (ENTRESTO) 24-26 MG tablet    carvedilol (COREG) 6.25 MG tablet          not acutely decompensated chronic severe systolic and diastolic heart failure. Right Heart Failure. Etiology previously documented to be non-ishcemic. LVEF recovered on last EF of 56-60%.         Today, Patient appears euvolemic.and with  Moderate perfusion. The patient's hemodynamics are currently acceptable. HR in room was found to be in 60s.  BP/MAP was reviewed and there is no troom for medication up-titration.  Clinical trajectory was assessed and hasimproved.     CHF GOAL DIRECTED MEDICAL THERAPY FOR PATIENT ADDRESSED/ADJUSTED:       GDMT:HFrEF    Drug Class   Drug   Dose Last Dose Adjustment Additional Titration   Notes   ACEi/ARB/ARNI Enstresto 24-26mg qd   Tolerating with borderline low BPs.    Beta Blocker Coreg  6.25mg BID   Taking  Amiodarone for Afib as well.      MRA Xx  Stopped in hospital due to hyperkalemia xx xxx  Recent hyperkalemia during hospitalization   SGLT2i Jardiance 10mg   N/A        Secondary GDMT:   · Verquevo being tolerated well.  Continue current dose. Would like to upward titrate; however, there is difficulty with indentifying compliance.  EF has improved since starting, thus will continue this dose.      -CHF Specific BB:   •  Continue Carvedilol  6.25mg BID at/approaching target dose kept at the same given borderline blood pressures.    • Continue to request patient keep BP log; he reports he forgot on this visit.      -MRA:   · Stopped previously due to hyperkalemia.     -ACE/ARB/ARNi:   • Current dose: Continue Entresto 24-26mg qd with hold parameters for SBP less than 95.   • Baseline creatinine previously known to be 0.9-1.3 per review of recent laboratory values.  Renal function remains acceptable upon review today.       SGLT2 inhibition therapy.   • A BMP at initiation to verify GFR >45 was recommended, as was interval GFR surveillance.  • Pt was advised side effects, some severe, including hypersensitivity and Boogie's; in addition to common side effects of UTIs and female genital mycotic infections were discussed.  • Continuing Jardiance (empagliflozin) 10mg with quarterly assessment of GFR. (90 day supply sent to apothecary and provided prior to leaving clinic.)   • Denies  side effects.        -Diuretic regimen:   • ReDs Vest reading for 02/21/23 was found to be 31.   • Continue Lasix 20mg qd.   • BMP, Mag, & ProBNP reviewed with patient on last visit.   • CT chest imaging reviewed from June 2022 with effusions present.      -Fluid restriction/Sodium restriction:   • Requested 2000 ml restriction  • Patient has been asked to weigh daily and was provided with a printed diuretic strategy.  • 1,500 mg Na restriction was discussed.    -Secondary pharmacological medications for HFrEF:   · Continue Verquevo to  5mg qd given marginal effect on BPs.      -Acute vs. Chronic underlying conditions other than HF addressed during visit:     · Debility:   · Ongoing-continue using cane.     · Anemia is common in heart failure.    · Typically, this can come from anemia of chronic disease.   · Last Hemoglobin is WNL.      · P. Afib, appears to be in SR  · Continue Amiodarone.   · Checked TSH & CMP on prior visitper discussion with Olivia Steele NP with both values WNL.    · Continue Eliquis (90 day supply sent to [facility] and provided prior to leaving clinic).   · No further events of facial numbness.     · Chronic low back pain:   · Chronic neck pain with intermittent RUE numbness:   · Had MRI.   · Continue f/u with PCP.      ----------------------------------------------------------------------  --------------------------------------------------------------------------------          >45 minutes out of 60 minutes face to face spent counseling patient extensively on dietary Na+ intake, importance of activity, weight monitoring, compliance with medications in addition to importance of titration with goal directed medical therapy and follow up appointments.            This document has been electronically signed by Veronica Kim PA-C  February 21, 2023 11:02 EST      Part of this note may be an electronic transcription/translation of spoken language to printed text using the Dragon Dictation System.

## 2023-02-21 NOTE — PROGRESS NOTES
[facility]    Date: 02/21/23     Vitals:    02/21/23 1041   BP: 137/83   Pulse: 70   SpO2: 98%      Weight 161.8  Method of arrival: Other wheelchair    Weighing self daily: Yes    Monitoring Heart Failure Zones: Yes    Today's HF Zone: Green    Taking medications as prescribed: Yes    Edema No    Shortness of Air: No    Number of pillows used at night:>3    Educational Materials given:  avs                                                                         ReDS Value: 31  25-35 Optimal Value Status      Esteban Levine RN 02/21/23 10:41 EST

## 2023-03-02 ENCOUNTER — OFFICE VISIT (OUTPATIENT)
Dept: PULMONOLOGY | Facility: CLINIC | Age: 63
End: 2023-03-02
Payer: MEDICAID

## 2023-03-02 VITALS
DIASTOLIC BLOOD PRESSURE: 60 MMHG | RESPIRATION RATE: 18 BRPM | WEIGHT: 168 LBS | OXYGEN SATURATION: 98 % | SYSTOLIC BLOOD PRESSURE: 102 MMHG | BODY MASS INDEX: 25.54 KG/M2 | TEMPERATURE: 98 F | HEART RATE: 71 BPM

## 2023-03-02 DIAGNOSIS — G47.00 INSOMNIA, UNSPECIFIED TYPE: Primary | ICD-10-CM

## 2023-03-02 DIAGNOSIS — E66.3 OVERWEIGHT: ICD-10-CM

## 2023-03-02 DIAGNOSIS — G47.33 OSA (OBSTRUCTIVE SLEEP APNEA): ICD-10-CM

## 2023-03-02 PROCEDURE — 99214 OFFICE O/P EST MOD 30 MIN: CPT | Performed by: NURSE PRACTITIONER

## 2023-03-02 RX ORDER — ZOLPIDEM TARTRATE 5 MG/1
5 TABLET ORAL NIGHTLY PRN
Qty: 30 TABLET | Refills: 2 | Status: SHIPPED | OUTPATIENT
Start: 2023-03-02

## 2023-03-02 NOTE — PROGRESS NOTES
"Chief Complaint  Sleep Apnea    Subjective        Chong White presents to [facility]  History of Present Illness     Mr. White is a  63 year old male with a medical history significant for atrial fibrillation, GERD, hypertension and MI.    He presents today for follow up on sleep apnea.  He is scheduled to have sleep study completed in lab.  He tells me that he is still having problems initiating sleep.      Objective   Vital Signs:  /60   Pulse 71   Temp 98 °F (36.7 °C) (Temporal)   Resp 18   Wt 76.2 kg (168 lb)   SpO2 98%   BMI 25.54 kg/m²   Estimated body mass index is 25.54 kg/m² as calculated from the following:    Height as of 2/21/23: 172.7 cm (68\").    Weight as of this encounter: 76.2 kg (168 lb).       BMI is >= 25 and <30. (Overweight) The following options were offered after discussion;: exercise counseling/recommendations and nutrition counseling/recommendations      Physical Exam     GENERAL APPEARANCE: Well developed, well nourished, alert and cooperative, and appears to be in no acute distress.    HEAD: normocephalic. Atraumatic.    EYES: PERRL, EOMI. Vision is grossly intact.    THROAT: Oral cavity and pharynx normal. No inflammation, swelling, exudate, or lesions.     NECK: Neck supple.  No thyromegaly.    CARDIAC: Normal S1 and S2. No S3, S4 or murmurs. Rhythm is regular.     RESPIRATORY:Bilateral air entry positive. Bilateral diminished breath sounds. No wheezing, crackles or rhonchi noted.    GI: Positive bowel sounds. Soft, nondistended, nontender.     MUSCULOSKELETAL: No significant deformity or joint abnormality. No edema. Peripheral pulses intact. No varicosities.    NEUROLOGICAL: Strength and sensation symmetric and intact throughout.     PSYCHIATRIC: The mental examination revealed the patient was oriented to person, place, and time.     Result Review :  The following data was reviewed by: SAMARA Berger on " 03/02/2023:  Common labs    Common Labs 8/11/22 9/15/22 12/22/22   Glucose 92 106 (A) 66   BUN 10 13 28 (A)   Creatinine 1.03 0.87 1.00   Sodium 138 141 139   Potassium 4.5 4.5 3.2 (A)   Chloride 99 103 91 (A)   Calcium 8.9 9.3 9.7   Albumin  4.37    Total Bilirubin  0.5    Alkaline Phosphatase  85    AST (SGOT)  17    ALT (SGPT)  20    (A) Abnormal value       Comments are available for some flowsheets but are not being displayed.                  Assessment and Plan   Diagnoses and all orders for this visit:    1. Insomnia, unspecified type (Primary)  -     zolpidem (Ambien) 5 MG tablet; Take 1 tablet by mouth At Night As Needed for Sleep.  Dispense: 30 tablet; Refill: 2    2. JUSTUS (obstructive sleep apnea)    3. Overweight        He is scheduled to have in lab sleep study completed.   Will start him on ambien 5 mg nightly for insomnia.  The problem of recurrent insomnia is discussed. Avoidance of caffeine sources is strongly encouraged. Sleep hygiene issues are reviewed.     The patient was extensively educated on the consequences of untreated obstructive sleep apnea namely cardiovascular/metabolic disorder, neurocognitive deficit, daytime sleepiness, motor vehicle accidents, depression, mood disorders and reduced quality of life.  At the end of conversation, the patient voices understanding of the disease process and treatment modality.  Patient also understands the risk of untreated obstructive sleep apnea and benefit benefits of the treatment.    Counseling time was greater than 10 minutes.            Follow Up   Return in about 3 months (around 6/2/2023).  Patient was given instructions and counseling regarding his condition or for health maintenance advice. Please see specific information pulled into the AVS if appropriate.

## 2023-03-27 ENCOUNTER — OFFICE VISIT (OUTPATIENT)
Dept: CARDIOLOGY | Facility: CLINIC | Age: 63
End: 2023-03-27
Payer: MEDICAID

## 2023-03-27 VITALS
DIASTOLIC BLOOD PRESSURE: 74 MMHG | HEIGHT: 68 IN | SYSTOLIC BLOOD PRESSURE: 118 MMHG | HEART RATE: 68 BPM | OXYGEN SATURATION: 96 % | WEIGHT: 167 LBS | BODY MASS INDEX: 25.31 KG/M2

## 2023-03-27 DIAGNOSIS — M54.9 BACK PAIN, UNSPECIFIED BACK LOCATION, UNSPECIFIED BACK PAIN LATERALITY, UNSPECIFIED CHRONICITY: Primary | ICD-10-CM

## 2023-03-27 DIAGNOSIS — I50.22 CHRONIC SYSTOLIC (CONGESTIVE) HEART FAILURE: ICD-10-CM

## 2023-03-27 DIAGNOSIS — Z79.899 LONG TERM CURRENT USE OF ANTIARRHYTHMIC DRUG: ICD-10-CM

## 2023-03-27 DIAGNOSIS — I25.5 ISCHEMIC CARDIOMYOPATHY: ICD-10-CM

## 2023-03-27 DIAGNOSIS — I48.0 PAROXYSMAL ATRIAL FIBRILLATION: ICD-10-CM

## 2023-03-27 DIAGNOSIS — I48.0 PAROXYSMAL ATRIAL FIBRILLATION: Primary | ICD-10-CM

## 2023-03-27 DIAGNOSIS — Z79.01 CHRONIC ANTICOAGULATION: ICD-10-CM

## 2023-03-27 PROCEDURE — 3074F SYST BP LT 130 MM HG: CPT | Performed by: INTERNAL MEDICINE

## 2023-03-27 PROCEDURE — 99214 OFFICE O/P EST MOD 30 MIN: CPT | Performed by: INTERNAL MEDICINE

## 2023-03-27 PROCEDURE — 93000 ELECTROCARDIOGRAM COMPLETE: CPT | Performed by: INTERNAL MEDICINE

## 2023-03-27 PROCEDURE — 3078F DIAST BP <80 MM HG: CPT | Performed by: INTERNAL MEDICINE

## 2023-03-27 RX ORDER — CYCLOBENZAPRINE HCL 10 MG
10 TABLET ORAL 3 TIMES DAILY PRN
COMMUNITY

## 2023-03-27 NOTE — PROGRESS NOTES
Cardiac Electrophysiology Outpatient Follow Up Note            [facility]    Follow Up Office Visit      Chong White Sr.  [id number]  03/27/2023  [unfilled]  [unfilled]    Primary Care Physician: Amy Yanez APRN    Referred By: No ref. provider found    Subjective     Chief Complaint:   Diagnoses and all orders for this visit:    1. Paroxysmal atrial fibrillation . (Primary)    2. Chronic anticoagulation    3. Long term current use of antiarrhythmic drug    4. Chronic systolic (congestive) heart failure (HCC)    5. Ischemic cardiomyopathy      Chief Complaint   Patient presents with   • Cardiomyopathy       History of Present Illness:   Chong White Sr. is a 63 y.o. male who presents to my electrophysiology clinic for follow up of above complaints.  Chong is doing well since I last saw him last summer.  His ejection fraction has dramatically improved.  This is with guideline directed medical therapy.  When I last met him we were discussing the option of a defibrillator.  At that point I felt it was important to wait until he was on guideline directed medical therapy and reassess his ejection fraction.    He is happy now that his ejection fraction has normalized he no longer is having conversations about receiving an ICD.    His A-fib does continue to bother him.  He has paroxysms of A-fib which make him feel short of breath and fatigue.  When he is not in A-fib he feels fine.        Past Medical History:   Past Medical History:   Diagnosis Date   • Atrial fibrillation (HCC)    • GERD (gastroesophageal reflux disease)    • Hypertension    • Myocardial infarction (HCC)        Past Surgical History:   Past Surgical History:   Procedure Laterality Date   • CARDIAC CATHETERIZATION N/A 09/04/2020    Procedure: Left Heart Cath;  Surgeon: Herman Sen MD;  Location: [facility] CATH INVASIVE LOCATION;  Service: Cardiology;  Laterality: N/A;   •  CORONARY STENT PLACEMENT     • FACIAL RECONSTRUCTION SURGERY Right 1995       Family History:   Family History   Problem Relation Age of Onset   • Heart disease Mother    • Heart disease Maternal Grandmother        Social History:   Social History     Socioeconomic History   • Marital status: Single   Tobacco Use   • Smoking status: Every Day     Packs/day: 0.50     Types: Cigarettes     Start date: 3/1/1969   • Smokeless tobacco: Never   • Tobacco comments:     Has nicotine patches and states has only had approximately 10 cigarettes since discharge on 9/11/2020. Not using patch at this time   Vaping Use   • Vaping Use: Never used   Substance and Sexual Activity   • Alcohol use: Never   • Drug use: Never   • Sexual activity: Defer       Medications:     Current Outpatient Medications:   •  apixaban (ELIQUIS) 5 MG tablet tablet, Take 1 tablet by mouth Every 12 (Twelve) Hours Indications: Atrial Fibrillation, Disp: 180 tablet, Rfl: 0  •  aspirin 81 MG EC tablet, Take 1 tablet by mouth Daily for 90 days., Disp: 90 tablet, Rfl: 0  •  carvedilol (COREG) 6.25 MG tablet, Take 1 tablet by mouth 2 (Two) Times a Day With Meals, Disp: 180 tablet, Rfl: 0  •  cyclobenzaprine (FLEXERIL) 10 MG tablet, Take 1 tablet by mouth 3 (Three) Times a Day As Needed for Muscle Spasms., Disp: , Rfl:   •  empagliflozin (Jardiance) 10 MG tablet tablet, Take 1 tablet by mouth Daily for 90 days., Disp: 90 tablet, Rfl: 0  •  furosemide (Lasix) 20 MG tablet, Take 1 tablet by mouth Daily for 90 days., Disp: 90 tablet, Rfl: 0  •  ranolazine (RANEXA) 500 MG 12 hr tablet, Take 1 tablet by mouth Every 12 (Twelve) Hours for 90 days., Disp: 180 tablet, Rfl: 0  •  sacubitril-valsartan (ENTRESTO) 24-26 MG tablet, Take 1 tablet by mouth Every 12 (Twelve) Hours for 90 days., Disp: 180 tablet, Rfl: 0  •  tamsulosin (FLOMAX) 0.4 MG capsule 24 hr capsule, Take 1 capsule by mouth Daily., Disp: 30 capsule, Rfl: 1  •  Vericiguat (Verquvo) 5 MG tablet, Take 1 tablet  "by mouth Daily, Disp: 90 tablet, Rfl: 0  •  zolpidem (Ambien) 5 MG tablet, Take 1 tablet by mouth At Night As Needed for Sleep., Disp: 30 tablet, Rfl: 2  •  atorvastatin (Lipitor) 40 MG tablet, Take 1 tablet by mouth Daily., Disp: 30 tablet, Rfl: 5  •  gabapentin (NEURONTIN) 800 MG tablet, Take 1 tablet by mouth 2 (Two) Times a Day., Disp: , Rfl:   •  pantoprazole (PROTONIX) 40 MG EC tablet, Take 1 tablet by mouth Daily., Disp: 30 tablet, Rfl: 5  •  Pneumococcal 20-Yareli Conj Vacc (Prevnar 20) 0.5 ML suspension prefilled syringe vaccine, Inject into the appropriate muscle as directed by prescriber., Disp: 0.5 mL, Rfl: 0    Allergies:   Allergies   Allergen Reactions   • Penicillins Hives       Objective   Vital Signs:   Vitals:    03/27/23 1424   BP: 118/74   BP Location: Right arm   Patient Position: Sitting   Pulse: 68   SpO2: 96%   Weight: 75.8 kg (167 lb)   Height: 172.7 cm (68\")       PHYSICAL EXAM  General appearance: Awake, alert, cooperative  Head: Normocephalic, without obvious abnormality, atraumatic  Eyes: Conjunctivae/corneas clear, EOMs intact  Neck: no adenopathy, no carotid bruit, no JVD and thyroid: not enlarged  Lungs: clear to auscultation bilaterally and no rhonchi or crackles\", ' symmetric  Heart: regular rate and rhythm, S1, S2 normal, no murmur, click, rub or gallop  Abdomen: Soft, non-tender, bowel sounds normal,  no organomegaly  Extremities: extremities normal, atraumatic, no cyanosis or edema  Skin: Skin color, turgor normal, no rashes or lesions  Neurologic: Grossly normal     Lab Results   Component Value Date    GLUCOSE 66 12/22/2022    CALCIUM 9.7 12/22/2022     12/22/2022    K 3.2 (L) 12/22/2022    CO2 36.9 (H) 12/22/2022    CL 91 (L) 12/22/2022    BUN 28 (H) 12/22/2022    CREATININE 1.00 12/22/2022    EGFRIFAFRI 106 10/29/2020    EGFRIFNONA 84 02/23/2022    BCR 28.0 (H) 12/22/2022    ANIONGAP 11.1 12/22/2022     Lab Results   Component Value Date    WBC 6.54 07/12/2022    HGB 13.2 " 07/12/2022    HCT 40.8 07/12/2022    MCV 95.3 07/12/2022     07/12/2022     Lab Results   Component Value Date    INR 1.44 (H) 06/23/2022    INR 1.41 (H) 06/16/2022    INR 1.07 08/18/2021    PROTIME 17.9 (H) 06/23/2022    PROTIME 17.6 (H) 06/16/2022    PROTIME 14.3 08/18/2021     Lab Results   Component Value Date    TSH 3.010 09/15/2022       Cardiac Testing:     I personally viewed and interpreted the patient's EKG/Telemetry/lab data      ECG 12 Lead    Date/Time: 3/27/2023 3:00 PM  Performed by: Jose Arechiga DO  Authorized by: Jose Arechiga DO   Comparison: compared with previous ECG   Similar to previous ECG  Rhythm: sinus rhythm            Tobacco Cessation: N/A  Obstructive Sleep Apnea Screening: Completed    Advance Care Planning   ACP discussion was declined by the patient. Patient does not have an advance directive, declines further assistance.       Assessment & Plan    Diagnoses and all orders for this visit:    1. Paroxysmal atrial fibrillation . (Primary)    2. Chronic anticoagulation    3. Long term current use of antiarrhythmic drug    4. Chronic systolic (congestive) heart failure (HCC)    5. Ischemic cardiomyopathy         Diagnosis Plan   1. Paroxysmal atrial fibrillation .    Highly symptomatic recurrent paroxysmal atrial fibrillation.  This is in spite of him taking amiodarone.  Has been on amiodarone for now some time.    We reviewed with him rhythm control strategy.  We reviewed that a rhythm control strategy for him is clearly indicated.  He has severe symptoms.  They are happening now in spite of amiodarone therapy.  We discussed that a second choice antiarrhythmic drug is unlikely to give him durable freedom from his symptoms at this point.  We would need to wait 90 days to clear out the amiodarone to initiate a new antiarrhythmic drug also.    We also discussed the option of catheter ablation.    I reviewed the specific risk-benefit profile the procedure.  I quoted the patient a  1 to 2% chance of significant procedure complication including but not limited to bleeding at the groin, cardiac perforation, tamponade, stroke, myocardial infarction, death phrenic nerve injury, pulmonary vein stenosis, catheter entrapment of the mitral valve annulus, esophageal injury as well as fistula and other potential complications.    The patient and I made a mutually shared decision ( shared decision making model ) that the patient would be best served by proceeding with the above invasive heart procedure.  This is a high risk invasive cardiac procedure which comes with significant risk of morbidity and mortality.  This patient has significant underlying  heart disease.  Chong White Sr. understands these risks and   has made an informed decision to proceed.    Rhythmia  General anesthesia  Preop CT  Do not hold anticoagulation  Stop amiodarone now.          2. Chronic anticoagulation   lifelong anticoagulation.      3. Long term current use of antiarrhythmic drug   stop amiodarone.      4. Chronic systolic (congestive) heart failure (HCC)   normalization of LV systolic function.    Guideline directed medical therapy has been effective.      5. Ischemic cardiomyopathy   prior cardiac stent.  No anginal symptoms.  Normalization of LV systolic function.  Overall doing well.        Body mass index is 25.39 kg/m².    I spent 48 minutes in consultation with this patient which included more than 65% of this time in direct face-to-face counseling, physical examination and discussion of my assessment and findings and this shared decision making with the patient.  The remainder of the time not spent face-to-face was performing one, some or all of the following actions: preparing to see the patient (e.g. reviewing tests, prior clinicians' notes, etc), ordering medications, tests or procedures, coordination of care, discussion of the plan with other healthcare providers, documenting clinical information in epic as  well as independently interpreting results and communication of these results to the patient family and/or caregiver(s).  Please note that this explicitly excludes time spent on other separate billable services such as performing procedures or test interpretation, when applicable.      Follow Up:       Thank you for allowing me to participate in the care of your patient. Please to not hesitate to contact me with additional questions or concerns.      Jose Arechiga DO, FACC, RS  Cardiac Electrophysiologist  [facility] / [facility]

## 2023-04-05 ENCOUNTER — PREP FOR SURGERY (OUTPATIENT)
Dept: OTHER | Facility: HOSPITAL | Age: 63
End: 2023-04-05
Payer: MEDICAID

## 2023-04-05 DIAGNOSIS — I48.0 PAROXYSMAL ATRIAL FIBRILLATION: Primary | ICD-10-CM

## 2023-04-05 RX ORDER — NITROGLYCERIN 0.4 MG/1
0.4 TABLET SUBLINGUAL
OUTPATIENT
Start: 2023-04-05

## 2023-04-05 RX ORDER — ACETAMINOPHEN 325 MG/1
650 TABLET ORAL EVERY 4 HOURS PRN
OUTPATIENT
Start: 2023-04-05

## 2023-04-05 RX ORDER — ONDANSETRON 2 MG/ML
4 INJECTION INTRAMUSCULAR; INTRAVENOUS EVERY 6 HOURS PRN
OUTPATIENT
Start: 2023-04-05

## 2023-04-05 RX ORDER — SODIUM CHLORIDE 9 MG/ML
40 INJECTION, SOLUTION INTRAVENOUS AS NEEDED
OUTPATIENT
Start: 2023-04-05

## 2023-04-12 ENCOUNTER — TELEPHONE (OUTPATIENT)
Dept: CARDIOLOGY | Facility: CLINIC | Age: 63
End: 2023-04-12
Payer: MEDICAID

## 2023-04-12 NOTE — TELEPHONE ENCOUNTER
Patient notified and aware that I faxed the completed form to the [facility] Transportation Cabinet at [phone].

## 2023-04-19 ENCOUNTER — APPOINTMENT (OUTPATIENT)
Dept: GENERAL RADIOLOGY | Facility: HOSPITAL | Age: 63
End: 2023-04-19
Payer: MEDICAID

## 2023-04-19 ENCOUNTER — HOSPITAL ENCOUNTER (OUTPATIENT)
Facility: HOSPITAL | Age: 63
Setting detail: OBSERVATION
Discharge: HOME | End: 2023-04-20
Attending: STUDENT IN AN ORGANIZED HEALTH CARE EDUCATION/TRAINING PROGRAM | Admitting: PHYSICIAN ASSISTANT
Payer: MEDICAID

## 2023-04-19 ENCOUNTER — OFFICE VISIT (OUTPATIENT)
Dept: PAIN MEDICINE | Facility: CLINIC | Age: 63
End: 2023-04-19
Payer: MEDICAID

## 2023-04-19 ENCOUNTER — TELEPHONE (OUTPATIENT)
Dept: PAIN MEDICINE | Facility: CLINIC | Age: 63
End: 2023-04-19

## 2023-04-19 VITALS
BODY MASS INDEX: 25.01 KG/M2 | TEMPERATURE: 97.1 F | HEART RATE: 63 BPM | SYSTOLIC BLOOD PRESSURE: 144 MMHG | RESPIRATION RATE: 18 BRPM | HEIGHT: 68 IN | OXYGEN SATURATION: 96 % | DIASTOLIC BLOOD PRESSURE: 88 MMHG | WEIGHT: 165 LBS

## 2023-04-19 DIAGNOSIS — R07.9 CHEST PAIN, UNSPECIFIED TYPE: Primary | ICD-10-CM

## 2023-04-19 DIAGNOSIS — G89.29 CHRONIC BILATERAL LOW BACK PAIN WITH BILATERAL SCIATICA: ICD-10-CM

## 2023-04-19 DIAGNOSIS — S12.690D COMPRESSION FRACTURE OF C7 VERTEBRA WITH ROUTINE HEALING, SUBSEQUENT ENCOUNTER: Primary | ICD-10-CM

## 2023-04-19 DIAGNOSIS — M54.41 CHRONIC BILATERAL LOW BACK PAIN WITH BILATERAL SCIATICA: ICD-10-CM

## 2023-04-19 DIAGNOSIS — M54.42 CHRONIC BILATERAL LOW BACK PAIN WITH BILATERAL SCIATICA: ICD-10-CM

## 2023-04-19 DIAGNOSIS — Z51.81 THERAPEUTIC DRUG MONITORING: ICD-10-CM

## 2023-04-19 DIAGNOSIS — M54.12 CERVICAL RADICULOPATHY: Primary | ICD-10-CM

## 2023-04-19 LAB
ALBUMIN SERPL-MCNC: 3.9 G/DL (ref 3.5–5.2)
ALBUMIN/GLOB SERPL: 1.3 G/DL
ALP SERPL-CCNC: 73 U/L (ref 39–117)
ALT SERPL W P-5'-P-CCNC: 11 U/L (ref 1–41)
ANION GAP SERPL CALCULATED.3IONS-SCNC: 7.4 MMOL/L (ref 5–15)
AST SERPL-CCNC: 14 U/L (ref 1–40)
BASOPHILS # BLD AUTO: 0.03 10*3/MM3 (ref 0–0.2)
BASOPHILS NFR BLD AUTO: 0.5 % (ref 0–1.5)
BILIRUB SERPL-MCNC: 0.3 MG/DL (ref 0–1.2)
BUN SERPL-MCNC: 22 MG/DL (ref 8–23)
BUN/CREAT SERPL: 23.9 (ref 7–25)
CALCIUM SPEC-SCNC: 9.2 MG/DL (ref 8.6–10.5)
CHLORIDE SERPL-SCNC: 106 MMOL/L (ref 98–107)
CO2 SERPL-SCNC: 27.6 MMOL/L (ref 22–29)
CREAT SERPL-MCNC: 0.92 MG/DL (ref 0.76–1.27)
DEPRECATED RDW RBC AUTO: 45.1 FL (ref 37–54)
EGFRCR SERPLBLD CKD-EPI 2021: 93.5 ML/MIN/1.73
EOSINOPHIL # BLD AUTO: 0.05 10*3/MM3 (ref 0–0.4)
EOSINOPHIL NFR BLD AUTO: 0.8 % (ref 0.3–6.2)
ERYTHROCYTE [DISTWIDTH] IN BLOOD BY AUTOMATED COUNT: 12.8 % (ref 12.3–15.4)
FLUAV RNA RESP QL NAA+PROBE: NOT DETECTED
FLUBV RNA RESP QL NAA+PROBE: NOT DETECTED
GEN 5 2HR TROPONIN T REFLEX: 8 NG/L
GLOBULIN UR ELPH-MCNC: 3.1 GM/DL
GLUCOSE SERPL-MCNC: 101 MG/DL (ref 65–99)
HCT VFR BLD AUTO: 47.5 % (ref 37.5–51)
HGB BLD-MCNC: 16 G/DL (ref 13–17.7)
HOLD SPECIMEN: NORMAL
HOLD SPECIMEN: NORMAL
IMM GRANULOCYTES # BLD AUTO: 0.04 10*3/MM3 (ref 0–0.05)
IMM GRANULOCYTES NFR BLD AUTO: 0.6 % (ref 0–0.5)
LYMPHOCYTES # BLD AUTO: 1.56 10*3/MM3 (ref 0.7–3.1)
LYMPHOCYTES NFR BLD AUTO: 24 % (ref 19.6–45.3)
MCH RBC QN AUTO: 32.4 PG (ref 26.6–33)
MCHC RBC AUTO-ENTMCNC: 33.7 G/DL (ref 31.5–35.7)
MCV RBC AUTO: 96.2 FL (ref 79–97)
MONOCYTES # BLD AUTO: 0.52 10*3/MM3 (ref 0.1–0.9)
MONOCYTES NFR BLD AUTO: 8 % (ref 5–12)
NEUTROPHILS NFR BLD AUTO: 4.29 10*3/MM3 (ref 1.7–7)
NEUTROPHILS NFR BLD AUTO: 66.1 % (ref 42.7–76)
NRBC BLD AUTO-RTO: 0 /100 WBC (ref 0–0.2)
PLATELET # BLD AUTO: 196 10*3/MM3 (ref 140–450)
PMV BLD AUTO: 9.8 FL (ref 6–12)
POTASSIUM SERPL-SCNC: 4.4 MMOL/L (ref 3.5–5.2)
PROT SERPL-MCNC: 7 G/DL (ref 6–8.5)
QT INTERVAL: 386 MS
QTC INTERVAL: 425 MS
RBC # BLD AUTO: 4.94 10*6/MM3 (ref 4.14–5.8)
SARS-COV-2 RNA RESP QL NAA+PROBE: NOT DETECTED
SODIUM SERPL-SCNC: 141 MMOL/L (ref 136–145)
TROPONIN T DELTA: 0 NG/L
TROPONIN T SERPL HS-MCNC: 8 NG/L
WBC NRBC COR # BLD: 6.49 10*3/MM3 (ref 3.4–10.8)
WHOLE BLOOD HOLD COAG: NORMAL
WHOLE BLOOD HOLD SPECIMEN: NORMAL

## 2023-04-19 PROCEDURE — 85025 COMPLETE CBC W/AUTO DIFF WBC: CPT | Performed by: PHYSICIAN ASSISTANT

## 2023-04-19 PROCEDURE — G0378 HOSPITAL OBSERVATION PER HR: HCPCS

## 2023-04-19 PROCEDURE — 99285 EMERGENCY DEPT VISIT HI MDM: CPT

## 2023-04-19 PROCEDURE — C9803 HOPD COVID-19 SPEC COLLECT: HCPCS

## 2023-04-19 PROCEDURE — 83036 HEMOGLOBIN GLYCOSYLATED A1C: CPT | Performed by: PHYSICIAN ASSISTANT

## 2023-04-19 PROCEDURE — 36415 COLL VENOUS BLD VENIPUNCTURE: CPT

## 2023-04-19 PROCEDURE — 80053 COMPREHEN METABOLIC PANEL: CPT | Performed by: PHYSICIAN ASSISTANT

## 2023-04-19 PROCEDURE — 93005 ELECTROCARDIOGRAM TRACING: CPT | Performed by: PHYSICIAN ASSISTANT

## 2023-04-19 PROCEDURE — 84484 ASSAY OF TROPONIN QUANT: CPT | Performed by: PHYSICIAN ASSISTANT

## 2023-04-19 PROCEDURE — 84439 ASSAY OF FREE THYROXINE: CPT | Performed by: PHYSICIAN ASSISTANT

## 2023-04-19 PROCEDURE — 71045 X-RAY EXAM CHEST 1 VIEW: CPT

## 2023-04-19 PROCEDURE — 87636 SARSCOV2 & INF A&B AMP PRB: CPT | Performed by: STUDENT IN AN ORGANIZED HEALTH CARE EDUCATION/TRAINING PROGRAM

## 2023-04-19 PROCEDURE — 99223 1ST HOSP IP/OBS HIGH 75: CPT | Performed by: PHYSICIAN ASSISTANT

## 2023-04-19 PROCEDURE — 84443 ASSAY THYROID STIM HORMONE: CPT | Performed by: PHYSICIAN ASSISTANT

## 2023-04-19 RX ORDER — BUPRENORPHINE 20 UG/H
20 PATCH TRANSDERMAL
Qty: 2 PATCH | Refills: 0 | Status: ON HOLD | OUTPATIENT
Start: 2023-05-03 | End: 2023-04-20

## 2023-04-19 RX ORDER — SODIUM CHLORIDE 0.9 % (FLUSH) 0.9 %
10 SYRINGE (ML) INJECTION AS NEEDED
Status: DISCONTINUED | OUTPATIENT
Start: 2023-04-19 | End: 2023-04-20 | Stop reason: HOSPADM

## 2023-04-19 RX ORDER — BUPRENORPHINE 10 UG/H
1 PATCH TRANSDERMAL
Qty: 2 PATCH | Refills: 0 | Status: ON HOLD | OUTPATIENT
Start: 2023-04-19 | End: 2023-04-20

## 2023-04-19 RX ORDER — ASPIRIN 81 MG/1
324 TABLET, CHEWABLE ORAL ONCE
Status: DISCONTINUED | OUTPATIENT
Start: 2023-04-19 | End: 2023-04-20 | Stop reason: HOSPADM

## 2023-04-19 NOTE — TELEPHONE ENCOUNTER
PATIENTS PHARMACY WAS OUT OF [facility]. I ASKED PATIENT FOR ALTERNATIVE PHARMACY. HE REFUSED TO GIVE ALTERNATIVE AND SAID HE WOULD NOT  ANYWAYS.
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
## 2023-04-19 NOTE — TELEPHONE ENCOUNTER
Caller: [facility]    Relationship to patient: PROVIDER    Best call back number: [phone]    Patient is needing: PHARMACY UNABLE TO FILL THE BUPRENORPHINE PATCHES DUE TO BEING OUT OF STOCK - UNABLE TO WARM TRANSFER

## 2023-04-19 NOTE — ED NOTES
MEDICAL SCREENING:    Reason for Visit: chest pain, SOA      Patient initially seen in triage.  The patient was advised further evaluation and diagnostic testing will be needed, some of the treatment and testing will be initiated in the lobby in order to begin the process.  The patient will be returned to the waiting area for the time being and possibly be re-assessed by a subsequent ED provider.  The patient will be brought back to the treatment area in as timely manner as possible.       Abdullahi Yanez PA-C  04/19/23 1825

## 2023-04-20 VITALS
RESPIRATION RATE: 18 BRPM | TEMPERATURE: 97.8 F | BODY MASS INDEX: 24.35 KG/M2 | SYSTOLIC BLOOD PRESSURE: 163 MMHG | HEIGHT: 68 IN | WEIGHT: 160.7 LBS | HEART RATE: 58 BPM | DIASTOLIC BLOOD PRESSURE: 91 MMHG | OXYGEN SATURATION: 97 %

## 2023-04-20 PROBLEM — R07.9 CHEST PAIN: Status: RESOLVED | Noted: 2023-04-19 | Resolved: 2023-04-20

## 2023-04-20 LAB
AMPHET+METHAMPHET UR QL: NEGATIVE
AMPHETAMINES UR QL: NEGATIVE
ANION GAP SERPL CALCULATED.3IONS-SCNC: 7.8 MMOL/L (ref 5–15)
BARBITURATES UR QL SCN: NEGATIVE
BASOPHILS # BLD AUTO: 0.04 10*3/MM3 (ref 0–0.2)
BASOPHILS NFR BLD AUTO: 0.6 % (ref 0–1.5)
BENZODIAZ UR QL SCN: NEGATIVE
BUN SERPL-MCNC: 21 MG/DL (ref 8–23)
BUN/CREAT SERPL: 24.1 (ref 7–25)
BUPRENORPHINE SERPL-MCNC: NEGATIVE NG/ML
CALCIUM SPEC-SCNC: 8.8 MG/DL (ref 8.6–10.5)
CANNABINOIDS SERPL QL: NEGATIVE
CHLORIDE SERPL-SCNC: 107 MMOL/L (ref 98–107)
CHOLEST SERPL-MCNC: 182 MG/DL (ref 0–200)
CO2 SERPL-SCNC: 26.2 MMOL/L (ref 22–29)
COCAINE UR QL: NEGATIVE
CREAT SERPL-MCNC: 0.87 MG/DL (ref 0.76–1.27)
DEPRECATED RDW RBC AUTO: 45.1 FL (ref 37–54)
EGFRCR SERPLBLD CKD-EPI 2021: 97 ML/MIN/1.73
EOSINOPHIL # BLD AUTO: 0.07 10*3/MM3 (ref 0–0.4)
EOSINOPHIL NFR BLD AUTO: 1.1 % (ref 0.3–6.2)
ERYTHROCYTE [DISTWIDTH] IN BLOOD BY AUTOMATED COUNT: 12.7 % (ref 12.3–15.4)
GLUCOSE SERPL-MCNC: 92 MG/DL (ref 65–99)
HBA1C MFR BLD: 4.9 % (ref 4.8–5.6)
HCT VFR BLD AUTO: 45.4 % (ref 37.5–51)
HDLC SERPL-MCNC: 47 MG/DL (ref 40–60)
HGB BLD-MCNC: 14.9 G/DL (ref 13–17.7)
IMM GRANULOCYTES # BLD AUTO: 0.05 10*3/MM3 (ref 0–0.05)
IMM GRANULOCYTES NFR BLD AUTO: 0.8 % (ref 0–0.5)
LDLC SERPL CALC-MCNC: 115 MG/DL (ref 0–100)
LDLC/HDLC SERPL: 2.4 {RATIO}
LYMPHOCYTES # BLD AUTO: 1.65 10*3/MM3 (ref 0.7–3.1)
LYMPHOCYTES NFR BLD AUTO: 25.2 % (ref 19.6–45.3)
MAGNESIUM SERPL-MCNC: 2.4 MG/DL (ref 1.6–2.4)
MCH RBC QN AUTO: 31.4 PG (ref 26.6–33)
MCHC RBC AUTO-ENTMCNC: 32.8 G/DL (ref 31.5–35.7)
MCV RBC AUTO: 95.8 FL (ref 79–97)
METHADONE UR QL SCN: NEGATIVE
MONOCYTES # BLD AUTO: 0.57 10*3/MM3 (ref 0.1–0.9)
MONOCYTES NFR BLD AUTO: 8.7 % (ref 5–12)
NEUTROPHILS NFR BLD AUTO: 4.18 10*3/MM3 (ref 1.7–7)
NEUTROPHILS NFR BLD AUTO: 63.6 % (ref 42.7–76)
NRBC BLD AUTO-RTO: 0 /100 WBC (ref 0–0.2)
OPIATES UR QL: NEGATIVE
OXYCODONE UR QL SCN: NEGATIVE
PCP UR QL SCN: NEGATIVE
PLATELET # BLD AUTO: 171 10*3/MM3 (ref 140–450)
PMV BLD AUTO: 10.2 FL (ref 6–12)
POTASSIUM SERPL-SCNC: 4.1 MMOL/L (ref 3.5–5.2)
PROPOXYPH UR QL: NEGATIVE
RBC # BLD AUTO: 4.74 10*6/MM3 (ref 4.14–5.8)
SODIUM SERPL-SCNC: 141 MMOL/L (ref 136–145)
T4 FREE SERPL-MCNC: 1.67 NG/DL (ref 0.93–1.7)
TRICYCLICS UR QL SCN: NEGATIVE
TRIGL SERPL-MCNC: 110 MG/DL (ref 0–150)
TSH SERPL DL<=0.05 MIU/L-ACNC: 6.5 UIU/ML (ref 0.27–4.2)
VLDLC SERPL-MCNC: 20 MG/DL (ref 5–40)
WBC NRBC COR # BLD: 6.56 10*3/MM3 (ref 3.4–10.8)

## 2023-04-20 PROCEDURE — G0378 HOSPITAL OBSERVATION PER HR: HCPCS

## 2023-04-20 PROCEDURE — 80048 BASIC METABOLIC PNL TOTAL CA: CPT | Performed by: PHYSICIAN ASSISTANT

## 2023-04-20 PROCEDURE — 99236 HOSP IP/OBS SAME DATE HI 85: CPT | Performed by: INTERNAL MEDICINE

## 2023-04-20 PROCEDURE — 25010000002 HEPARIN (PORCINE) PER 1000 UNITS: Performed by: PHYSICIAN ASSISTANT

## 2023-04-20 PROCEDURE — 80061 LIPID PANEL: CPT | Performed by: PHYSICIAN ASSISTANT

## 2023-04-20 PROCEDURE — 96372 THER/PROPH/DIAG INJ SC/IM: CPT

## 2023-04-20 PROCEDURE — 85025 COMPLETE CBC W/AUTO DIFF WBC: CPT | Performed by: PHYSICIAN ASSISTANT

## 2023-04-20 PROCEDURE — 83735 ASSAY OF MAGNESIUM: CPT | Performed by: PHYSICIAN ASSISTANT

## 2023-04-20 PROCEDURE — 80306 DRUG TEST PRSMV INSTRMNT: CPT | Performed by: PHYSICIAN ASSISTANT

## 2023-04-20 RX ORDER — CHOLECALCIFEROL (VITAMIN D3) 125 MCG
5 CAPSULE ORAL NIGHTLY PRN
Status: DISCONTINUED | OUTPATIENT
Start: 2023-04-20 | End: 2023-04-20 | Stop reason: HOSPADM

## 2023-04-20 RX ORDER — NITROGLYCERIN 0.4 MG/1
0.4 TABLET SUBLINGUAL
Status: DISCONTINUED | OUTPATIENT
Start: 2023-04-20 | End: 2023-04-20 | Stop reason: HOSPADM

## 2023-04-20 RX ORDER — FUROSEMIDE 20 MG/1
20 TABLET ORAL DAILY
Status: CANCELLED | OUTPATIENT
Start: 2023-04-20

## 2023-04-20 RX ORDER — ASPIRIN 81 MG/1
81 TABLET, CHEWABLE ORAL DAILY
Status: DISCONTINUED | OUTPATIENT
Start: 2023-04-20 | End: 2023-04-20 | Stop reason: HOSPADM

## 2023-04-20 RX ORDER — ATORVASTATIN CALCIUM 40 MG/1
40 TABLET, FILM COATED ORAL NIGHTLY
Qty: 90 TABLET | Refills: 1 | Status: SHIPPED | OUTPATIENT
Start: 2023-04-20

## 2023-04-20 RX ORDER — ACETAMINOPHEN 325 MG/1
650 TABLET ORAL EVERY 6 HOURS PRN
Status: DISCONTINUED | OUTPATIENT
Start: 2023-04-20 | End: 2023-04-20 | Stop reason: HOSPADM

## 2023-04-20 RX ORDER — CARVEDILOL 6.25 MG/1
6.25 TABLET ORAL 2 TIMES DAILY WITH MEALS
Status: CANCELLED | OUTPATIENT
Start: 2023-04-20

## 2023-04-20 RX ORDER — HYDROXYZINE HYDROCHLORIDE 25 MG/1
25 TABLET, FILM COATED ORAL NIGHTLY PRN
Status: DISCONTINUED | OUTPATIENT
Start: 2023-04-20 | End: 2023-04-20 | Stop reason: HOSPADM

## 2023-04-20 RX ORDER — AMIODARONE HYDROCHLORIDE 200 MG/1
200 TABLET ORAL DAILY
COMMUNITY
End: 2023-04-20 | Stop reason: HOSPADM

## 2023-04-20 RX ORDER — GABAPENTIN 400 MG/1
800 CAPSULE ORAL 2 TIMES DAILY PRN
Status: CANCELLED | OUTPATIENT
Start: 2023-04-20

## 2023-04-20 RX ORDER — ASPIRIN 81 MG/1
81 TABLET ORAL DAILY
Status: CANCELLED | OUTPATIENT
Start: 2023-04-20 | End: 2023-04-23

## 2023-04-20 RX ORDER — ZOLPIDEM TARTRATE 5 MG/1
5 TABLET ORAL NIGHTLY PRN
Status: CANCELLED | OUTPATIENT
Start: 2023-04-20

## 2023-04-20 RX ORDER — ATORVASTATIN CALCIUM 40 MG/1
40 TABLET, FILM COATED ORAL NIGHTLY
Status: DISCONTINUED | OUTPATIENT
Start: 2023-04-20 | End: 2023-04-20 | Stop reason: HOSPADM

## 2023-04-20 RX ORDER — HEPARIN SODIUM 5000 [USP'U]/ML
5000 INJECTION, SOLUTION INTRAVENOUS; SUBCUTANEOUS EVERY 12 HOURS SCHEDULED
Status: DISCONTINUED | OUTPATIENT
Start: 2023-04-20 | End: 2023-04-20 | Stop reason: HOSPADM

## 2023-04-20 RX ORDER — RANOLAZINE 500 MG/1
500 TABLET, EXTENDED RELEASE ORAL EVERY 12 HOURS SCHEDULED
Status: CANCELLED | OUTPATIENT
Start: 2023-04-20

## 2023-04-20 RX ORDER — ISOSORBIDE MONONITRATE 30 MG/1
30 TABLET, EXTENDED RELEASE ORAL
Status: DISCONTINUED | OUTPATIENT
Start: 2023-04-20 | End: 2023-04-20 | Stop reason: HOSPADM

## 2023-04-20 RX ORDER — AMIODARONE HYDROCHLORIDE 200 MG/1
200 TABLET ORAL DAILY
Status: CANCELLED | OUTPATIENT
Start: 2023-04-20

## 2023-04-20 RX ADMIN — HYDROXYZINE HYDROCHLORIDE 25 MG: 25 TABLET ORAL at 02:29

## 2023-04-20 RX ADMIN — Medication 5 MG: at 02:29

## 2023-04-20 RX ADMIN — ASPIRIN 81 MG: 81 TABLET, CHEWABLE ORAL at 08:46

## 2023-04-20 RX ADMIN — HEPARIN SODIUM 5000 UNITS: 5000 INJECTION INTRAVENOUS; SUBCUTANEOUS at 08:46

## 2023-04-20 RX ADMIN — ACETAMINOPHEN 650 MG: 325 TABLET ORAL at 08:50

## 2023-04-20 NOTE — H&P
[facility] Medicine Services  HISTORY & PHYSICAL    Patient Identification:  Name:  Chong White Sr.  Age:  63 y.o.  Sex:  male  :  1960  MRN:  [id number]   Visit Number:  [id number]  Admit Date: 2023   Primary Care Physician:  Amy Yanez APRN     Subjective     Chief complaint:   Chief Complaint   Patient presents with   • Chest Pain   • Shortness of Breath     PT STATES CP AND SOB FOR A FEW DAYS HAS A HEART CATH SCHEDULED FOR THE TH AT  PT STATES HE HAS HAD 13 HEART ATTACKS      History of presenting illness:   Patient is a 63 y.o. male with past medical history significant for coronary artery disease status post stenting in the past, essential hypertension, hyperlipidemia, history of ischemic cardiomyopathy with recovered EF, grade 1 diastolic dysfunction, paroxysmal atrial fibrillation chronically anticoagulated with Eliquis, chronic pain/DDD, GERD, BPH, insomnia, and ongoing tobacco abuse that presented to the [facility] emergency department for evaluation of chest pain.  Patient reports 1 to 2-day history of central chest pain/pressure with radiation into his back.  Patient also reports left upper extremity pain.  Patient localizes a lot of his pain in the left shoulder/scapula.  Patient reports associated dyspnea, orthopnea.  Denies any lower extremity edema.  Patient states that he was seen by the pain clinic today, states he was hoping to get some relief but did not get any help, thus once he got home he came to the ED for evaluation.  Per review of chart, patient with significant cervical DDD, patient not currently candidate for neurosurgical intervention due to significant cardiac disease.  It appears that per the documentation, he was prescribed Suboxone patch.  Patient denies any aggravators or relievers of his symptoms.  He reports his symptoms are not similar to previous MI.  Patient claims to have 13 MIs in the past.  He follows with  cardiology in the outpatient setting as well as heart failure clinic and EP.  He denies any cough or upper respiratory complaints.  No fevers or chills.  Denies any abdominal pain, nausea, vomiting, change vomits.  No headaches or dizziness, no LOC.    Upon arrival to the ED, vitals were temperature 98, heart rate 72, respiratory rate 16, blood pressure 140/84, oxygen saturation 94% on room air.  Initial high-sensitivity troponin T 8 with repeat at 8, delta 0.  CMP with glucose 101, otherwise unremarkable.  CBC unremarkable.  COVID-19 and influenza screening negative.  Chest x-ray with no evidence of acute cardiopulmonary disease.  Known ED medication administration: 324 mg p.o. aspirin x1 via EMS in route    Patient has been admitted to the telemetry floor for further evaluation and treatment.     Present during exam: CNA  ---------------------------------------------------------------------------------------------------------------------   Review of Systems   Constitutional: Negative for activity change, appetite change, chills, diaphoresis, fatigue and fever.   HENT: Negative for congestion and sore throat.    Eyes: Negative for discharge and visual disturbance.   Respiratory: Positive for shortness of breath. Negative for cough, chest tightness and wheezing.    Cardiovascular: Positive for chest pain. Negative for palpitations and leg swelling.   Gastrointestinal: Negative for abdominal pain, constipation, diarrhea, nausea and vomiting.   Genitourinary: Negative for decreased urine volume, dysuria, frequency and urgency.   Musculoskeletal: Positive for back pain (Upper back and neck pain), myalgias (Left shoulder) and neck pain (Chronic). Negative for arthralgias.   Skin: Negative for color change and wound.   Neurological: Negative for dizziness, syncope, weakness, light-headedness and headaches.   Psychiatric/Behavioral: Negative for confusion. The patient is not nervous/anxious.        ---------------------------------------------------------------------------------------------------------------------   Past Medical History:   Diagnosis Date   • Atrial fibrillation    • GERD (gastroesophageal reflux disease)    • Hypertension    • Myocardial infarction      Past Surgical History:   Procedure Laterality Date   • CARDIAC CATHETERIZATION N/A 09/04/2020    Procedure: Left Heart Cath;  Surgeon: eHrman Sen MD;  Location: [facility] CATH INVASIVE LOCATION;  Service: Cardiology;  Laterality: N/A;   • CORONARY STENT PLACEMENT     • FACIAL RECONSTRUCTION SURGERY Right 1995     Family History   Problem Relation Age of Onset   • Heart disease Mother    • Heart disease Maternal Grandmother      Social History     Socioeconomic History   • Marital status: Single   Tobacco Use   • Smoking status: Every Day     Packs/day: 0.50     Types: Cigarettes     Start date: 3/1/1969   • Smokeless tobacco: Never   • Tobacco comments:     Has nicotine patches and states has only had approximately 10 cigarettes since discharge on 9/11/2020. Not using patch at this time   Vaping Use   • Vaping Use: Never used   Substance and Sexual Activity   • Alcohol use: Never   • Drug use: Never   • Sexual activity: Defer     ---------------------------------------------------------------------------------------------------------------------   Allergies:  Penicillins  ---------------------------------------------------------------------------------------------------------------------   Medications below are reported home medications pulling from within the system; at this time, these medications have not been reconciled unless otherwise specified and are in the verification process for further verifcation as current home medications.    Prior to Admission Medications     Prescriptions Last Dose Informant Patient Reported? Taking?    apixaban (ELIQUIS) 5 MG tablet tablet   No No    Take 1 tablet by mouth Every 12 (Twelve) Hours  Indications: Atrial Fibrillation    Patient not taking:  Reported on 4/19/2023    aspirin 81 MG EC tablet   No No    Take 1 tablet by mouth Daily for 90 days.    atorvastatin (Lipitor) 40 MG tablet   No No    Take 1 tablet by mouth Daily.    Patient not taking:  Reported on 4/19/2023    Buprenorphine 10 MCG/HR patch weekly   No No    Place 1 patch on the skin as directed by provider Every 7 (Seven) Days. X 2 weeks, then 20 mcg patch x 2 weeks    Buprenorphine 20 MCG/HR patch weekly   No No    Place 20 mcg on the skin as directed by provider Every 7 (Seven) Days.    carvedilol (COREG) 6.25 MG tablet   No No    Take 1 tablet by mouth 2 (Two) Times a Day With Meals    Patient not taking:  Reported on 4/19/2023    cyclobenzaprine (FLEXERIL) 10 MG tablet   Yes No    Take 1 tablet by mouth 3 (Three) Times a Day As Needed for Muscle Spasms.    Patient not taking:  Reported on 4/19/2023    empagliflozin (Jardiance) 10 MG tablet tablet   No No    Take 1 tablet by mouth Daily for 90 days.    Patient not taking:  Reported on 4/19/2023    furosemide (Lasix) 20 MG tablet   No No    Take 1 tablet by mouth Daily for 90 days.    Patient not taking:  Reported on 4/19/2023    gabapentin (NEURONTIN) 800 MG tablet   Yes No    Take 1 tablet by mouth 2 (Two) Times a Day.    Patient not taking:  Reported on 4/19/2023    pantoprazole (PROTONIX) 40 MG EC tablet   No No    Take 1 tablet by mouth Daily.    Patient not taking:  Reported on 4/19/2023    Pneumococcal 20-Yareli Conj Vacc (Prevnar 20) 0.5 ML suspension prefilled syringe vaccine   No No    Inject into the appropriate muscle as directed by prescriber.    Patient not taking:  Reported on 4/19/2023    ranolazine (RANEXA) 500 MG 12 hr tablet   No No    Take 1 tablet by mouth Every 12 (Twelve) Hours for 90 days.    Patient not taking:  Reported on 4/19/2023    sacubitril-valsartan (ENTRESTO) 24-26 MG tablet   No No    Take 1 tablet by mouth Every 12 (Twelve) Hours for 90 days.    Patient  not taking:  Reported on 4/19/2023    tamsulosin (FLOMAX) 0.4 MG capsule 24 hr capsule   No No    Take 1 capsule by mouth Daily.    Patient not taking:  Reported on 4/19/2023    Vericiguat (Verquvo) 5 MG tablet   No No    Take 1 tablet by mouth Daily    Patient not taking:  Reported on 4/19/2023    zolpidem (Ambien) 5 MG tablet   No No    Take 1 tablet by mouth At Night As Needed for Sleep.    Patient not taking:  Reported on 4/19/2023        ---------------------------------------------------------------------------------------------------------------------    Objective     Hospital Scheduled Meds:  aspirin, 324 mg, Oral, Once         Current listed hospital scheduled medications may not yet reflect those currently placed in orders that are signed and held, awaiting patient's arrival to floor/unit.    ---------------------------------------------------------------------------------------------------------------------   Vital Signs:  Temp:  [97.1 °F (36.2 °C)-98 °F (36.7 °C)] 98 °F (36.7 °C)  Heart Rate:  [63-74] 64  Resp:  [16-18] 16  BP: (114-147)/(71-89) 135/81  Mean Arterial Pressure (Non-Invasive) for the past 24 hrs (Last 3 readings):   Noninvasive MAP (mmHg)   04/20/23 0030 104   04/20/23 0015 105   04/20/23 0000 100     SpO2 Percentage    04/20/23 0000 04/20/23 0015 04/20/23 0030   SpO2: 93% 94% 93%     SpO2:  [92 %-98 %] 93 %  on   ;   Device (Oxygen Therapy): room air    Body mass index is 25.09 kg/m².  Wt Readings from Last 3 Encounters:   04/19/23 74.8 kg (165 lb)   04/19/23 74.8 kg (165 lb)   03/27/23 75.8 kg (167 lb)       ---------------------------------------------------------------------------------------------------------------------   Physical Exam:  Physical Exam  Nursing note reviewed.   Constitutional:       General: He is awake. He is not in acute distress.     Appearance: He is well-developed. He is not toxic-appearing.      Comments: Sitting up on edge of bed.  No acute distress noted.  No  family present at bedside.   HENT:      Head: Normocephalic and atraumatic.      Mouth/Throat:      Mouth: Mucous membranes are moist.   Eyes:      Conjunctiva/sclera: Conjunctivae normal.      Pupils: Pupils are equal, round, and reactive to light.   Cardiovascular:      Rate and Rhythm: Normal rate and regular rhythm.      Pulses:           Dorsalis pedis pulses are 2+ on the right side and 2+ on the left side.      Heart sounds: Normal heart sounds. No murmur heard.    No friction rub. No gallop.   Pulmonary:      Effort: Pulmonary effort is normal. No tachypnea, accessory muscle usage or respiratory distress.      Breath sounds: Normal breath sounds and air entry. No wheezing, rhonchi or rales.      Comments: On room air.  Speaks in full sentences without dyspnea.  Chest:      Chest wall: No tenderness.   Abdominal:      General: Bowel sounds are normal. There is no distension.      Palpations: Abdomen is soft.      Tenderness: There is no abdominal tenderness. There is no guarding or rebound.   Musculoskeletal:      Right shoulder: No deformity or tenderness. Normal range of motion. Normal strength.      Left shoulder: No deformity or tenderness. Normal range of motion. Normal strength.      Cervical back: Neck supple.      Right lower leg: No edema.      Left lower leg: No edema.   Skin:     General: Skin is warm and dry.      Capillary Refill: Capillary refill takes less than 2 seconds.   Neurological:      General: No focal deficit present.      Mental Status: He is alert and oriented to person, place, and time.      Sensory: Sensation is intact.      Motor: Motor function is intact.      Comments: Awake and alert. Follows commands. Answers questions appropriately. Moves all extremities equally. Strength and sensation intact. No focal neuro deficit on exam.   Psychiatric:         Attention and Perception: Attention normal.         Mood and Affect: Mood and affect normal.         Speech: Speech normal.          Behavior: Behavior is cooperative.       ---------------------------------------------------------------------------------------------------------------------  EKG:  See cardiology read as per below     Telemetry:    Sinus 60s with occasional PVC, SPO2 97% on room air    I have personally reviewed the EKG/Telemetry strip  ---------------------------------------------------------------------------------------------------------------------   Results from last 7 days   Lab Units 04/19/23 2045 04/19/23 1841   HSTROP T ng/L 8 8           Results from last 7 days   Lab Units 04/19/23 1841   WBC 10*3/mm3 6.49   HEMOGLOBIN g/dL 16.0   HEMATOCRIT % 47.5   MCV fL 96.2   MCHC g/dL 33.7   PLATELETS 10*3/mm3 196     Results from last 7 days   Lab Units 04/19/23 1841   SODIUM mmol/L 141   POTASSIUM mmol/L 4.4   CHLORIDE mmol/L 106   CO2 mmol/L 27.6   BUN mg/dL 22   CREATININE mg/dL 0.92   CALCIUM mg/dL 9.2   GLUCOSE mg/dL 101*   ALBUMIN g/dL 3.9   BILIRUBIN mg/dL 0.3   ALK PHOS U/L 73   AST (SGOT) U/L 14   ALT (SGPT) U/L 11   Estimated Creatinine Clearance: 87 mL/min (by C-G formula based on SCr of 0.92 mg/dL).    Lab Results   Component Value Date    TSH 3.010 09/15/2022    FREET4 1.49 06/23/2022     Microbiology Results (last 10 days)     Procedure Component Value - Date/Time    COVID-19 and FLU A/B PCR - Swab, Nasopharynx [[id number]]  (Normal) Collected: 04/19/23 2146    Lab Status: Final result Specimen: Swab from Nasopharynx Updated: 04/19/23 2220     COVID19 Not Detected     Influenza A PCR Not Detected     Influenza B PCR Not Detected    Narrative:      Fact sheet for providers: https://www.fda.gov/media/857586/download    Fact sheet for patients: https://www.fda.gov/media/859451/download    Test performed by PCR.         I have personally reviewed the above laboratory results.   ---------------------------------------------------------------------------------------------------------------------  Imaging Results (Last  7 Days)     Procedure Component Value Units Date/Time    XR Chest 1 View [[id number]] Collected: 04/19/23 1919     Updated: 04/19/23 1921    Narrative:      CR Chest 1 Vw    INDICATION:   Chest pain.     COMPARISON:    7/4/2022    FINDINGS:  Portable AP view(s) of the chest.  The heart and mediastinal contours are normal. The lungs are clear. No pneumothorax or pleural effusion. Vascular markings are normal. Lower lung fields have cleared since the previous chest x-ray.      Impression:      No acute cardiopulmonary findings.    Signer Name: Ernesto Cespedes MD   Signed: 4/19/2023 7:19 PM   Workstation Name: [id number]    [facility] have personally reviewed the above radiology results.     Last Echocardiogram:  Results for orders placed during the hospital encounter of 11/16/22    Adult Transthoracic Echo Complete W/ Cont if Necessary Per Protocol    Interpretation Summary  •  Normal left ventricular cavity size and wall thickness noted. All left ventricular wall segments contract normally  •  Left ventricular ejection fraction appears to be 56 - 60%.  •  Left ventricular diastolic function is consistent with (grade I) impaired relaxation.  •  The aortic valve is structurally normal with no regurgitation or stenosis present.  •  The mitral valve is structurally normal with no regurgitation or significant stenosis present.  •  There is no evidence of pericardial effusion. .    Left Heart Catheterization 9/4/2020  Impression:   Flush occlusion of the ostial LAD with remarkably good collateral connection from rCA filling the LAD with brisk flow to the point of occlusion in the prox LAD.   RCA and CX are free of any significant disease.   Plan:   Patient to be placed on beta blockers, lipid therapy, aspirin, and ACE inhibitor.    No intervention needed as the collateral connection is large and excellent and serving as bypass connection with brisk flow all the way to the prox LAD.  EP  evaluation per Gen cardiology.     Herman Sen MD  09/04/20    Stress Test with Myocardial Perfusion Imaging 6/2022  • A pharmacological stress test was performed using regadenoson without low-level exercise.  • Findings consistent with an indeterminate ECG stress test.  • Myocardial perfusion imaging indicates a normal myocardial perfusion study with no evidence of ischemia.  • Enlarged LV cavity size. Abnormal LV wall motion consistent with severe global hypokinesis.  • (Calculated EF = 23%).  • Impressions are consistent with a low risk study.  ---------------------------------------------------------------------------------------------------------------------    Assessment & Plan      ACUTE HOSPITAL PROBLEMS    -Chest pain   -Coronary artery disease s/p stenting in the past   -Essential hypertension   -Hyperlipidemia   -History of non ischemic cardiomyopathy with recovered EF, grade I diastolic dysfunction   • Atypical features, concerns secondary to musculoskeletal disease  • HS troponin negative, Delta 0  • EKG without acute ischemic changes  • Previous LHC from 2020 with flush occlusion of the ostial LAD with remarkably good collateral connection from RCA filling the LAD with brisk flow to the point of occlusion in the proximal LAD.  Per review of chart, patient medically managed at that time with GDMT.  • Obtain AM lipid panel, A1C, TSH   • Continue aspirin and statin   • Obtain updated TTE   • Cardiology consulted, input/assistance is much appreciated. Defer ischemic work up to cardiology recommendations.   • Closely monitor patient on telemetry   • BP stable, plan to continue home antihypertensive regimen once reconciled per pharmacy.   • Closely monitor vitals per hospital protocol, adjust medications as necessary   • Repeat labs in AM     -F/E/N  • No IV fluids. Replace electrolytes per protocol as necessary. NPO diet.     CHRONIC MEDICAL PROBLEMS    -Paroxysmal atrial fibrillation, chronically  anticoagulated with Eliquis: Currently in NSR and rate controlled. Cont home antiarrhythmic regimen once reconciled per pharmacy. Continue home Eliquis for anticoagulation. Closely monitor on telemetry.  Per review of chart, patient scheduled for outpatient EP cardiac ablation in 5/2023 in the outpatient setting.   -Chronic pain/DDD: Supportive care. Cont home regimen once reconciled per pharmacy.  Obtain UDS.  -GERD: PPI   -BPH: Supportive care. Cont home medication regimen once reconciled per pharmacy.   -Insomnia: PRN melatonin available   -Ongoing tobacco abuse: Strongly encourage cessation.   ---------------------------------------------------  DVT Prophylaxis: Eliquis to serve   GI Prophylaxis: PPI   Activity: Up with assistance as tolerated   ---------------------------------------------------  OBSERVATION status, however if further evaluation or treatment plans warrant, status may change.  Based upon current information, I predict patient's care encounter to be less than or equal to 2 midnights.    Code Status: FULL CODE   ---------------------------------------------------  Disposition/Discharge planning: Plans on home at discharge, likely 24-48 hours   ---------------------------------------------------  I have discussed the patient's assessment and plan with the patient, nursing staff, and attending physician Dr. Valdo MD.     Arpita Pandey PA-C  Hospitalist Service -- [facility]   Pager: [phone]    04/20/23  00:59 EDT    Attending Physician: Dr. Valdo MD.       ---------------------------------------------------------------------------------------------------------------------

## 2023-04-20 NOTE — ED PROVIDER NOTES
Subjective   History of Present Illness  63-year-old male with past medical history of coronary artery disease presents to the ER with primary complaint of left-sided chest pain.  Mild shortness of breath.  No nausea or vomiting.  No diaphoresis.  Symptoms have been present for the last 1 to 2 days.  No obvious aggravating or alleviating factors.  Patient confirmed a history of a previous left heart catheterization with multiple abnormalities.  Vital signs stable.        Review of Systems   Respiratory: Positive for shortness of breath.    Cardiovascular: Positive for chest pain.   All other systems reviewed and are negative.      Past Medical History:   Diagnosis Date   • Atrial fibrillation    • GERD (gastroesophageal reflux disease)    • Hypertension    • Myocardial infarction        Allergies   Allergen Reactions   • Penicillins Hives       Past Surgical History:   Procedure Laterality Date   • CARDIAC CATHETERIZATION N/A 09/04/2020    Procedure: Left Heart Cath;  Surgeon: Herman Sen MD;  Location: [facility] CATH INVASIVE LOCATION;  Service: Cardiology;  Laterality: N/A;   • CORONARY STENT PLACEMENT     • FACIAL RECONSTRUCTION SURGERY Right 1995       Family History   Problem Relation Age of Onset   • Heart disease Mother    • Heart disease Maternal Grandmother        Social History     Socioeconomic History   • Marital status: Single   Tobacco Use   • Smoking status: Every Day     Packs/day: 0.50     Types: Cigarettes     Start date: 3/1/1969   • Smokeless tobacco: Never   • Tobacco comments:     Has nicotine patches and states has only had approximately 10 cigarettes since discharge on 9/11/2020. Not using patch at this time   Vaping Use   • Vaping Use: Never used   Substance and Sexual Activity   • Alcohol use: Never   • Drug use: Never   • Sexual activity: Defer           Objective   Physical Exam  Constitutional:       General: He is not in acute distress.     Appearance: He is well-developed. He is not  ill-appearing.   HENT:      Head: Normocephalic and atraumatic.   Eyes:      Extraocular Movements: Extraocular movements intact.      Pupils: Pupils are equal, round, and reactive to light.   Neck:      Vascular: No JVD.   Cardiovascular:      Rate and Rhythm: Normal rate and regular rhythm.      Heart sounds: Normal heart sounds. No murmur heard.  Pulmonary:      Effort: No tachypnea, accessory muscle usage or respiratory distress.      Breath sounds: Normal breath sounds. No stridor. No decreased breath sounds, wheezing, rhonchi or rales.   Chest:      Chest wall: No deformity, tenderness or crepitus.   Abdominal:      General: Bowel sounds are normal.      Palpations: Abdomen is soft.      Tenderness: There is no abdominal tenderness. There is no guarding or rebound.   Musculoskeletal:         General: Normal range of motion.      Cervical back: Normal range of motion and neck supple.      Right lower leg: No tenderness. No edema.      Left lower leg: No tenderness. No edema.   Lymphadenopathy:      Cervical: No cervical adenopathy.   Skin:     General: Skin is warm and dry.      Coloration: Skin is not cyanotic.      Findings: No ecchymosis or erythema.   Neurological:      General: No focal deficit present.      Mental Status: He is alert and oriented to person, place, and time.      Cranial Nerves: No cranial nerve deficit.      Motor: No weakness.   Psychiatric:         Mood and Affect: Mood normal. Mood is not anxious.         Behavior: Behavior normal. Behavior is not agitated.         Procedures           ED Course  ED Course as of 04/19/23 2321 Wed Apr 19, 2023   1824 EKG notes sinus rhythm.  73 bpm.  No acute ST elevation.  QTc 425    Electronically signed by Kenyon Campbell DO, 04/19/23, 6:24 PM EDT.   [SF]      ED Course User Index  [SF] Kenyon Campbell DO      XR Chest 1 View    Result Date: 4/19/2023  No acute cardiopulmonary findings. Signer Name: Ernesto Cespedes MD  Signed: 4/19/2023 7:19 PM   Workstation Name: [id number]  [facility]      Results for orders placed or performed during the hospital encounter of 04/19/23   COVID-19 and FLU A/B PCR - Swab, Nasopharynx    Specimen: Nasopharynx; Swab   Result Value Ref Range    COVID19 Not Detected Not Detected - Ref. Range    Influenza A PCR Not Detected Not Detected    Influenza B PCR Not Detected Not Detected   Comprehensive Metabolic Panel    Specimen: Blood   Result Value Ref Range    Glucose 101 (H) 65 - 99 mg/dL    BUN 22 8 - 23 mg/dL    Creatinine 0.92 0.76 - 1.27 mg/dL    Sodium 141 136 - 145 mmol/L    Potassium 4.4 3.5 - 5.2 mmol/L    Chloride 106 98 - 107 mmol/L    CO2 27.6 22.0 - 29.0 mmol/L    Calcium 9.2 8.6 - 10.5 mg/dL    Total Protein 7.0 6.0 - 8.5 g/dL    Albumin 3.9 3.5 - 5.2 g/dL    ALT (SGPT) 11 1 - 41 U/L    AST (SGOT) 14 1 - 40 U/L    Alkaline Phosphatase 73 39 - 117 U/L    Total Bilirubin 0.3 0.0 - 1.2 mg/dL    Globulin 3.1 gm/dL    A/G Ratio 1.3 g/dL    BUN/Creatinine Ratio 23.9 7.0 - 25.0    Anion Gap 7.4 5.0 - 15.0 mmol/L    eGFR 93.5 >60.0 mL/min/1.73   High Sensitivity Troponin T    Specimen: Blood   Result Value Ref Range    HS Troponin T 8 <15 ng/L   CBC Auto Differential    Specimen: Blood   Result Value Ref Range    WBC 6.49 3.40 - 10.80 10*3/mm3    RBC 4.94 4.14 - 5.80 10*6/mm3    Hemoglobin 16.0 13.0 - 17.7 g/dL    Hematocrit 47.5 37.5 - 51.0 %    MCV 96.2 79.0 - 97.0 fL    MCH 32.4 26.6 - 33.0 pg    MCHC 33.7 31.5 - 35.7 g/dL    RDW 12.8 12.3 - 15.4 %    RDW-SD 45.1 37.0 - 54.0 fl    MPV 9.8 6.0 - 12.0 fL    Platelets 196 140 - 450 10*3/mm3    Neutrophil % 66.1 42.7 - 76.0 %    Lymphocyte % 24.0 19.6 - 45.3 %    Monocyte % 8.0 5.0 - 12.0 %    Eosinophil % 0.8 0.3 - 6.2 %    Basophil % 0.5 0.0 - 1.5 %    Immature Grans % 0.6 (H) 0.0 - 0.5 %    Neutrophils, Absolute 4.29 1.70 - 7.00 10*3/mm3    Lymphocytes, Absolute 1.56 0.70 - 3.10 10*3/mm3    Monocytes, Absolute 0.52 0.10 - 0.90 10*3/mm3     Eosinophils, Absolute 0.05 0.00 - 0.40 10*3/mm3    Basophils, Absolute 0.03 0.00 - 0.20 10*3/mm3    Immature Grans, Absolute 0.04 0.00 - 0.05 10*3/mm3    nRBC 0.0 0.0 - 0.2 /100 WBC   High Sensitivity Troponin T 2Hr    Specimen: Arm, Right; Blood   Result Value Ref Range    HS Troponin T 8 <15 ng/L    Troponin T Delta 0 >=-4 - <+4 ng/L   ECG 12 Lead Chest Pain   Result Value Ref Range    QT Interval 386 ms    QTC Interval 425 ms   Green Top (Gel)   Result Value Ref Range    Extra Tube Hold for add-ons.    Lavender Top   Result Value Ref Range    Extra Tube hold for add-on    Gold Top - SST   Result Value Ref Range    Extra Tube Hold for add-ons.    Light Blue Top   Result Value Ref Range    Extra Tube Hold for add-ons.                      HEART Score: 4                      Medical Decision Making  CBC and CMP unremarkable.  Troponin trended and unremarkable.  EKG unremarkable.  Chest x-ray unremarkable.  Left heart catheterization notes multiple abnormalities with collateral flow.  Concerns for the need for observation.  Recommend admission for further work up and treatment.  Hospitalist team consulted and made aware of the patient.  Consults and orders placed per hospitalist request.  Patient was agreeable to admission plan.  Vitals stable on admission.    Amount and/or Complexity of Data Reviewed  Labs: ordered. Decision-making details documented in ED Course.  Radiology: ordered. Decision-making details documented in ED Course.  ECG/medicine tests: ordered.      Risk  OTC drugs.  Prescription drug management.  Decision regarding hospitalization.          Final diagnoses:   Chest pain, unspecified type       ED Disposition  ED Disposition     ED Disposition   Decision to Admit    Condition   --    Comment   Level of Care: Telemetry [5]   Diagnosis: Chest pain [126629]               No follow-up provider specified.       Medication List      No changes were made to your prescriptions during this visit.           Kenyon Campbell,   04/19/23 3892

## 2023-04-20 NOTE — PLAN OF CARE
Pt resting in bed. No complaints of chest pain since arrival to floor as of now. Stable VS. Bed in lowest positioning and call light within reach. Continue care plan.

## 2023-04-20 NOTE — CASE MANAGEMENT/SOCIAL WORK
Case Management Discharge Note      Final Note: Patient is being discharged home on this date 4/20/23.  No needs identified.         Selected Continued Care - Admitted Since 4/19/2023      Final Discharge Disposition Code: 01 - home or self-care

## 2023-04-20 NOTE — DISCHARGE SUMMARY
[facility] HOSPITALIST MEDICINE DISCHARGE SUMMARY    Patient Identification:  Name:  Chong White Sr.  Age:  63 y.o.  Sex:  male  :  1960  MRN:  [id number]  Visit Number:  [id number]    Date of Admission: 2023  Date of Discharge: 2023    PCP: Amy Yanez, APRN    DISCHARGE DIAGNOSIS   1.  Chest pain  2.  Essential hypertension  3.  Hyperlipidemia  4.  Paroxysmal A-fib  5.  Chronic pain syndrome      CONSULTS  1. Dr. Koehler, Cardiology      PROCEDURES PERFORMED   None      HOSPITAL COURSE  Mr. White is a 63 y.o. male who presented to [facility] ED on 2023 with a chief complaint of chest pain.  Patient has a past medical history markable for CAD status post PCI, essential hypertension, hyperlipidemia, grade 1 diastolic dysfunction, paroxysmal A-fib and chronic pain syndrome.  Patient reports a 1 to 2-day history of chest pain/pressure which radiated into his back and into his left upper extremity.  He also reported associated shortness of breath.  He denied any lower extremity edema.  Patient was seen in a pain clinic on the date of presentation to the emergency department.  He was given a Suboxone patch but he states this did not alleviate his symptoms.  As such, he presented to the emergency department for further treatment and evaluation.  Initial evaluation in the emergency department did consist of basic laboratory work as well as physical exam and vital signs.  Initial vital signs found patient's blood pressure 140/84, respirations 16, heart rate 72, temperature 98 and oxygen saturation 94% on room air.  Initial lab work did include CBC and CMP both of which were unremarkable.  Cardiac enzymes were obtained with initial high-sensitivity troponin of 8 with a repeat of 8 correlating to a delta of 0.  EKG was obtained which demonstrated normal sinus rhythm with heart rate in the 70s with no evidence of ST depression or elevation or any signs of ischemia.   After further review, it is revealed patient had stress test performed in July 2022 which was unremarkable.  It appears patient was ruled out for acute coronary syndrome in the emergency department but nevertheless, emergency room provider felt patient was at high risk for adverse cardiac events and did request admission for further evaluation and treatment.    Patient was admitted to our telemetry unit and cardiology consultation was obtained.  After thorough evaluation from cardiology services, it was felt patient's chest pain was likely not cardiac in nature and was at low risk for any immediate cardiac events.  Recommendation was made for patient to be discharged home with suggested follow-up with primary cardiologist in 1 to 2 weeks.  Patient was continued on home dose aspirin, carvedilol, Entresto and Jardiance and was started on atorvastatin 40 mg p.o. nightly.  He will also be continued on Eliquis for paroxysmal A-fib and will follow-up with electrophysiology services early next month for consideration of pulmonary vein ablation.  With this in mind, it is felt patient has reached maximum medical benefit of current hospitalization and will be discharged home in stable condition today.  The beforementioned plan was thoroughly discussed with the patient and he expressed his understanding and willingness to proceed with the beforementioned plan.    VITAL SIGNS:      04/19/23  1821 04/20/23  0155 04/20/23  0500   Weight: 74.8 kg (165 lb) 72.9 kg (160 lb 11.2 oz) 72.9 kg (160 lb 11.2 oz) (admit weight less than 24hrs.)     Body mass index is 24.43 kg/m².    PHYSICAL EXAM:  General -well-nourished  male appearing stated age in no apparent distress  HEENT -head normocephalic and atraumatic, pupils equally round and reactive to light  Lungs -clear to auscultation bilaterally with no wheezes, rales or rhonchi appreciated  Cardiovascular -regular rate and rhythm with no murmurs, rubs or clicks appreciated  GI  -abdomen soft, nontender nondistended  Neurological -cranial nerves II through XII intact with no focal deficit or unintentional motor movement appreciated    DISCHARGE DISPOSITION   Stable    DISCHARGE MEDICATIONS:     Discharge Medications      New Medications      Instructions Start Date   atorvastatin 40 MG tablet  Commonly known as: LIPITOR   40 mg, Oral, Nightly         Continue These Medications      Instructions Start Date   apixaban 5 MG tablet tablet  Commonly known as: ELIQUIS   5 mg, Oral, 2 Times Daily      aspirin 81 MG EC tablet   81 mg, Oral, Daily      carvedilol 6.25 MG tablet  Commonly known as: COREG   Take 1 tablet by mouth 2 (Two) Times a Day With Meals      empagliflozin 10 MG tablet tablet  Commonly known as: Jardiance   10 mg, Oral, Daily      furosemide 20 MG tablet  Commonly known as: Lasix   20 mg, Oral, Daily      gabapentin 800 MG tablet  Commonly known as: NEURONTIN   800 mg, Oral, 2 Times Daily PRN      ranolazine 500 MG 12 hr tablet  Commonly known as: RANEXA   500 mg, Oral, Every 12 Hours Scheduled      sacubitril-valsartan 24-26 MG tablet  Commonly known as: ENTRESTO   1 tablet, Oral, Every 12 Hours Scheduled      Verquvo 5 MG tablet  Generic drug: Vericiguat   Take 1 tablet by mouth Daily      zolpidem 5 MG tablet  Commonly known as: Ambien   5 mg, Oral, Nightly PRN         Stop These Medications    amiodarone 200 MG tablet  Commonly known as: PACERONE              Your Scheduled Appointments    Apr 24, 2023  2:15 PM  Follow Up with SAMARA Almonte  [facility] ([place]) [address]  [place] [address] [phone]   -Bring photo ID, insurance card, and list of medications to appointment  -If testing was completed outside of [facility] then patient must bring images on a disc  -Copay will be collected at time of appointment  -Established patients should arrive 10 minutes prior to appointment     Apr 26, 2023 11:15 AM  DSM HEPATITIS C  Management with COR HEPATITIS C CLINIC  [facility] DSM [facility] ([place]) [address] [facility] [address]  [phone]      May 09, 2023 11:30 AM  Pat Cath Lab with PAT [facility] ([place]) [address] [facility] [address] [phone]      May 09, 2023  1:00 PM  CT dillon angio chest w wo contrast with DILLON CT 1  [facility] CT ([place]) [address] [facility] [address] [phone]   NPO 2 HRS PRIOR  We do not have childcare at [facility].  Please only bring children who are mature enough to be left unattended in the waiting room.     May 17, 2023  1:00 PM  Follow Up with Damian Hoyt MD  [facility] PAIN MANAGEMENT ([place]) [address] [facility] [address]  [address]  [facility] [address] [phone]   Arrive 15 minutes prior to appointment.     May 22, 2023 10:30 AM  Follow Up with COR HEART FAIL CLIN  [facility] HEART FAILURE CLINIC ([place]) [address] [facility] [id number] [phone]   Arrive 15 minutes prior to appointment.     Jul 03, 2023  1:30 PM  Follow Up with Jose Arechiga DO  [facility] CARDIOLOGY MAIN CAMPUS ([place]) [address] [facility] [address]  [address]  [facility] [address]  [phone]   -Bring photo ID, insurance card, and list of medications to appointment  -If testing was completed outside of [facility] then patient must bring images on a disc  -Copay will be collected at time of appointment  -Established patients should arrive 15 minutes prior to appointment     Jul 05, 2023  1:15 PM  Follow Up with SAMAAR Berger  [facility] PULMONARY & CRITICAL CARE MEDICINE ([place]) [address] [facility] [address]  [facility] [address] [phone]   Established: Please bring outside images or reports.            Additional Instructions for the Follow-ups that You Need to Schedule     Discharge Follow-up with Specialty: Primary  Cardiologist; 2 Weeks   As directed      Specialty: Primary Cardiologist    Follow Up: 2 Weeks    Follow Up Details: Chest Pain            Follow-up Information     Amy Yanez APRN .    Specialty: Family Medicine  Contact information:  [address]  [address] [address] [phone]                         TEST  RESULTS PENDING AT DISCHARGE       The ASCVD Risk score (Julianne LIU, et al., 2019) failed to calculate for the following reasons:    The patient has a prior MI or stroke diagnosis     Napoleon Del Angel DO  04/20/23  16:23 EDT    Please note that this discharge summary required more than 30 minutes to complete.    Please send a copy of this dictation to the following providers:  Amy Yanez APRN

## 2023-04-20 NOTE — CONSULTS
Cardiology  CONSULT NOTE    Consults    Patient Identification:  Name:  Chong White Sr.  Age:  63 y.o.  Sex:  male  :  1960  MRN:  [id number]  Visit Number:  [id number]  Primary care provider:  Amy Yanez APRN    Subjective       Reason for the consult:  Chest pain    Chief complaints:  Chest pain      History of presenting illness:    Chong White Sr. is a 63 y.o. male presented to ED with history of chest pain.  He has history of chest pain for long time.  And he reported that his chest pain is left-sided, anterior and in the lower part, and has been constant for the last 2 weeks, with variable intensity and increases with change of position and associated with local tenderness.  His chest pain is less likely angina and appears to be noncardiac- musculoskeletal in origin.  Troponin 3 levels are negative and ECG showed no acute ischemic changes.  A nuclear stress test done 2022 showed no ischemia.    He has history of dyspnea on exertion.  Functional class II-III.  No leg edema.  Infrequent brief palpitations but no dizziness.    He has history of arteriosclerotic CAD, s/p stent  of LAD with established collaterals from LAD and RCA 2020, prior MI ischemic cardiomyopathy and heart failure with improved EF.  His EF has been around 25 to 30% since  and echo done 2022 showed LVEF of > 50%.  He has history of paroxysmal A-fib.  S/p electrical cardioversion.  Being considered for pulmonary vein ablation and being followed up with electrophysiologist at [facility].  He has seen him in 2023 when he discontinued amiodarone as it was not efficacious and consider diameter antiarrhythmic drug 90 days after stopping amiodarone.    He has history of hypertension, hyperlipidemia and smoking.    --------------------------------------------------------------------------------------------------------------------  Review of Systems:  Constitutional-fatigue, ENT-none,  cardiovascular-as above, respiratory-dyspnea GI-acid reflux, psychiatric-anxiety.  Complete review done.    ---------------------------------------------------------------------------------------------------------------------   Past History:  Family History   Problem Relation Age of Onset   • Heart disease Mother    • Heart disease Maternal Grandmother      Past Medical History:   Diagnosis Date   • Atrial fibrillation    • GERD (gastroesophageal reflux disease)    • Hypertension    • Myocardial infarction      Past Surgical History:   Procedure Laterality Date   • CARDIAC CATHETERIZATION N/A 09/04/2020    Procedure: Left Heart Cath;  Surgeon: Herman Sen MD;  Location: [facility]ATH [facility];  Service: Cardiology;  Laterality: N/A;   • CORONARY STENT PLACEMENT     • FACIAL RECONSTRUCTION SURGERY Right 1995     Social History     Socioeconomic History   • Marital status: Single   Tobacco Use   • Smoking status: Every Day     Packs/day: 0.50     Types: Cigarettes     Start date: 3/1/1969   • Smokeless tobacco: Never   • Tobacco comments:     Has nicotine patches and states has only had approximately 10 cigarettes since discharge on 9/11/2020. Not using patch at this time   Vaping Use   • Vaping Use: Never used   Substance and Sexual Activity   • Alcohol use: Never   • Drug use: Never   • Sexual activity: Defer     ---------------------------------------------------------------------------------------------------------------------   Allergies:  Penicillins  ---------------------------------------------------------------------------------------------------------------------     Hospital Meds:  aspirin, 324 mg, Oral, Once  aspirin, 81 mg, Oral, Daily  atorvastatin, 40 mg, Oral, Nightly  heparin (porcine), 5,000 Units, Subcutaneous, Q12H         ---------------------------------------------------------------------------------------------------------------------     Objective     Vital Signs:  Temp:  [98 °F  (36.7 °C)-98.6 °F (37 °C)] 98.3 °F (36.8 °C)  Heart Rate:  [62-74] 63  Resp:  [16-18] 18  BP: (114-160)/(64-96) 148/91      04/19/23  1821 04/20/23  0155 04/20/23  0500   Weight: 74.8 kg (165 lb) 72.9 kg (160 lb 11.2 oz) 72.9 kg (160 lb 11.2 oz) (admit weight less than 24hrs.)     Body mass index is 24.43 kg/m².  ---------------------------------------------------------------------------------------------------------------------   Physical exam:       General Appearance:  HEENT:   Neck:     Alert, cooperative, in no acute distress  Grossly normal   No JVD    Lungs:   Clear to auscultation,respirations regular, No added sounds     Heart:  Regular rhythm and normal rate, normal S1 and S2, no        murmur, no gallop, no rub, no click    Chest Wall:  No abnormalities observed.  Tenderness was present in the left anterior chest in the lower part.    Abdomen   Soft, nontender, no masses, no organomegaly and bowel sounds are heard.     Extremities: No pedal edema    Pulses: Pulses palpable and equal bilaterally    Neurologic: No focal deficits        Telemetry:  Sinus rhythm    ---------------------------------------------------------------------------------------------------------------------   ECG:   ECG/EMG Results (last 24 hours)     Procedure Component Value Units Date/Time    ECG 12 Lead Chest Pain [[id number]] Collected: 04/19/23 1814     Updated: 04/19/23 1909     QT Interval 386 ms      QTC Interval 425 ms     Narrative:      Test Reason : Chest Pain  Blood Pressure :   */*   mmHG  Vent. Rate :  73 BPM     Atrial Rate :  73 BPM     P-R Int : 170 ms          QRS Dur :  86 ms      QT Int : 386 ms       P-R-T Axes :  77   9  18 degrees     QTc Int : 425 ms    Normal sinus rhythm  Anteroseptal infarct (cited on or before 01-SEP-2020)  Abnormal ECG  Confirmed by Ryne Koehler ([id number]) on 4/19/2023 7:09:34 PM    Referred By: SHAZIA           Confirmed By: Ryne Koehler           --------------------------------------------------------------------------------------------------------------------   Results from last 7 days   Lab Units 04/20/23 0400 04/19/23 1841   WBC 10*3/mm3 6.56 6.49   HEMOGLOBIN g/dL 14.9 16.0   HEMATOCRIT % 45.4 47.5   MCV fL 95.8 96.2   MCHC g/dL 32.8 33.7   PLATELETS 10*3/mm3 171 196         Results from last 7 days   Lab Units 04/20/23 0400 04/19/23 1841   SODIUM mmol/L 141 141   POTASSIUM mmol/L 4.1 4.4   MAGNESIUM mg/dL 2.4  --    CHLORIDE mmol/L 107 106   CO2 mmol/L 26.2 27.6   BUN mg/dL 21 22   CREATININE mg/dL 0.87 0.92   CALCIUM mg/dL 8.8 9.2   GLUCOSE mg/dL 92 101*   ALBUMIN g/dL  --  3.9   BILIRUBIN mg/dL  --  0.3   ALK PHOS U/L  --  73   AST (SGOT) U/L  --  14   ALT (SGPT) U/L  --  11   Estimated Creatinine Clearance: 89.6 mL/min (by C-G formula based on SCr of 0.87 mg/dL).  No results found for: AMMONIA  Results from last 7 days   Lab Units 04/19/23 2045 04/19/23  1841   HSTROP T ng/L 8 8         Lab Results   Component Value Date    HGBA1C 4.90 04/19/2023     Lab Results   Component Value Date    TSH 6.500 (H) 04/19/2023    FREET4 1.67 04/19/2023     No results found for: PREGTESTUR, PREGSERUM, HCG, HCGQUANT  Pain Management Panel         Latest Ref Rng & Units 4/20/2023 9/1/2020   Pain Management Panel   Amphetamine, Urine Qual Negative Negative   Negative     Barbiturates Screen, Urine Negative Negative   Negative     Benzodiazepine Screen, Urine Negative Negative   Negative     Buprenorphine, Screen, Urine Negative Negative   Positive     Cocaine Screen, Urine Negative Negative   Negative     Methadone Screen , Urine Negative Negative   Negative     Methamphetamine, Ur Negative Negative            Multiple values from one day are sorted in reverse-chronological order           No results found for: BLOODCX  No results found for: URINECX  No results found for: WOUNDCX  No results found for: STOOLCX  Results from last 7 days   Lab Units  04/20/23  0400   CHOLESTEROL mg/dL 182   TRIGLYCERIDES mg/dL 110   HDL CHOL mg/dL 47   LDL CHOL mg/dL 115*     ---------------------------------------------------------------------------------------------------------------------   Radiology:    No results found for this or any previous visit.      Results for orders placed during the hospital encounter of 09/01/20    XR Chest PA & Lateral    Narrative  EXAMINATION: XR CHEST PA AND LATERAL-    CLINICAL INDICATION:     possible pna; I48.91-Unspecified atrial  fibrillation    TECHNIQUE:  XR CHEST PA AND LATERAL-    COMPARISON: NONE    FINDINGS:  Bilateral interstitial thickening.  Central bronchial thickening with perihilar bronchial cuffing.  Heart size, mediastinum, and pulmonary vascularity are unremarkable.  No pneumothorax.  Trace effusions noted.  No acute osseous findings.    Impression  Bilateral atypical pneumonia. Superimposed CHF with  interstitial edema also considered given presence of trace pleural  effusions.    This report was finalized on 9/3/2020 10:08 AM by Dr. Chidi Dean MD.      Results for orders placed during the hospital encounter of 04/19/23    XR Chest 1 View    Narrative  CR Chest 1 Vw    INDICATION:  Chest pain.    COMPARISON:  7/4/2022    FINDINGS:  Portable AP view(s) of the chest.  The heart and mediastinal contours are normal. The lungs are clear. No pneumothorax or pleural effusion. Vascular markings are normal. Lower lung fields have cleared since the previous chest x-ray.    Impression  No acute cardiopulmonary findings.    Signer Name: Ernesto Cespedes MD  Signed: 4/19/2023 7:19 PM  Workstation Name: [id number]  [facility]      Results for orders placed during the hospital encounter of 06/23/22    CT Angiogram Chest Pulmonary Embolism    Narrative  EXAM:  CT Angiography Chest With Intravenous Contrast    EXAM DATE:  6/23/2022 1:37 PM    CLINICAL HISTORY:  Pulmonary embolism (PE) suspected, positive  D-dimer    TECHNIQUE:  Axial computed tomographic angiography images of the chest with  intravenous contrast.  This CT exam was performed using one or more of  the following dose reduction techniques:  automated exposure control,  adjustment of the mA and/or kV according to patient size, and/or use of  iterative reconstruction technique.  MIP reconstructed images were created and reviewed.    COMPARISON:  09/01/2020    FINDINGS:  Pulmonary arteries:  Unremarkable.  No pulmonary embolism.  Aorta:  No acute findings.  No thoracic aortic aneurysm.  Lungs:  Patchy left lower lobe airspace disease that may represent  pulmonary edema but potentially with some superimposed pneumonia.  Probably right lung base pulmonary edema.  No mass.  Pleural space:  Small right and tiny left pleural effusion.  No  pneumothorax.  Heart:  Cardiomegaly.  No significant pericardial effusion.  No  evidence of RV dysfunction.  Bones/joints:  No acute fracture.  No dislocation.  Soft tissues:  Unremarkable.  Lymph nodes:  Unremarkable.  No enlarged lymph nodes.  Liver:  Low-attenuation liver lesions again noted.    Impression  1.  No pulmonary embolism.  2.  Small right and tiny left pleural effusion.  3.  Cardiomegaly.  4.  Patchy left lower lobe airspace disease that may represent pulmonary  edema but potentially with some superimposed pneumonia. Probably right  lung base pulmonary edema.    This report was finalized on 6/23/2022 2:07 PM by Dr. Colt Elder MD.      I have personally reviewed the radiology images and read the final radiology report.        Assessment      1.  Chest pain.  The characters suggest noncardiac.  Most likely musculoskeletal in origin.  No MI.  2.  Arteriosclerotic CAD s/p stent in LAD.   of LAD with established collaterals -9/2020  3.  Heart failure with improved EF.  Echo 8/2022 showed EF > 50%.  Compensated.  4.  Ischemic cardiomyopathy  5.  Paroxysmal atrial fibrillation.  In sinus rhythm.  6.  Multiple  cardiac risk factors-hypertension, hyperlipidemia and smoking    Recommendations     1.  His chest pain is most likely noncardiac based on the clinical features.  ACS less likely.  Nuclear stress test 6/2022 was normal.  Low risk for immediate cardiac events.  He is stable for discharge and suggest follow-up with his primary cardiologist in 1 to 2 weeks.  Discussed with Dr. Olson    2.  I reviewed his home medications.  He is on appropriate drug therapy for IHD and HFrEF which include aspirin, atorvastatin, carvedilol, Entresto, Jardiance and verquo.  I agree with them.  Added Imdur.    3.  He is anticoagulated on Eliquis for for PAF.  He is being considered for rhythm control strategy by with an antiarrhythmic drug versus pulmonary vein ablation by his electrophysiologist,  at [place] with whom he has an appointment for follow-up.    Thank you f Dr. Del Angel or the opportunity to participate in the care of your patient. Please do not hesitate to call with any questions or concerns.     Ryne Koehler MD, [facility],  04/20/23  14:50 EDT

## 2023-04-21 ENCOUNTER — READMISSION MANAGEMENT (OUTPATIENT)
Dept: CALL CENTER | Facility: HOSPITAL | Age: 63
End: 2023-04-21
Payer: MEDICAID

## 2023-04-21 NOTE — OUTREACH NOTE
Prep Survey    Flowsheet Row Responses   [facility] facility patient discharged from? [place]   Is LACE score < 7 ? No   Eligibility Readm Mgmt   Discharge diagnosis Chest pain   Does the patient have one of the following disease processes/diagnoses(primary or secondary)? Other   Does the patient have Home health ordered? No   Is there a DME ordered? No   Prep survey completed? Yes          Jana GARDNER - Registered Nurse

## 2023-04-21 NOTE — DISCHARGE INSTR - APPOINTMENTS
Pt  has  an  apointment  for   rosina cordero  for April 25  at  2:15  and  kye ingram  for April 24  at 2 :15  pt  aware  of  apointments  vai telephone  call   per  this  writer

## 2023-04-24 ENCOUNTER — DOCUMENTATION (OUTPATIENT)
Dept: PAIN MEDICINE | Facility: CLINIC | Age: 63
End: 2023-04-24
Payer: MEDICAID

## 2023-04-24 ENCOUNTER — TELEPHONE (OUTPATIENT)
Dept: NEUROSURGERY | Facility: CLINIC | Age: 63
End: 2023-04-24

## 2023-04-24 DIAGNOSIS — I48.0 PAROXYSMAL ATRIAL FIBRILLATION: ICD-10-CM

## 2023-04-24 DIAGNOSIS — G47.33 OSA (OBSTRUCTIVE SLEEP APNEA): Primary | ICD-10-CM

## 2023-04-24 DIAGNOSIS — I42.0 CARDIOMYOPATHY, DILATED: ICD-10-CM

## 2023-04-24 DIAGNOSIS — G89.29 CHRONIC BILATERAL LOW BACK PAIN WITHOUT SCIATICA: Primary | ICD-10-CM

## 2023-04-24 DIAGNOSIS — I25.10 ASCVD (ARTERIOSCLEROTIC CARDIOVASCULAR DISEASE): ICD-10-CM

## 2023-04-24 DIAGNOSIS — I50.22 CHRONIC SYSTOLIC HEART FAILURE: ICD-10-CM

## 2023-04-24 DIAGNOSIS — M54.50 CHRONIC BILATERAL LOW BACK PAIN WITHOUT SCIATICA: Primary | ICD-10-CM

## 2023-04-24 RX ORDER — AMIODARONE HYDROCHLORIDE 200 MG/1
TABLET ORAL
Qty: 30 TABLET | Refills: 0 | OUTPATIENT
Start: 2023-04-24

## 2023-04-24 RX ORDER — FUROSEMIDE 20 MG/1
TABLET ORAL
Qty: 30 TABLET | Refills: 0 | Status: SHIPPED | OUTPATIENT
Start: 2023-04-24

## 2023-04-24 RX ORDER — BUPRENORPHINE 20 UG/H
20 PATCH TRANSDERMAL
Qty: 2 PATCH | Refills: 0 | Status: SHIPPED | OUTPATIENT
Start: 2023-05-08

## 2023-04-24 RX ORDER — RANOLAZINE 500 MG/1
TABLET, EXTENDED RELEASE ORAL
Qty: 60 TABLET | Refills: 0 | Status: SHIPPED | OUTPATIENT
Start: 2023-04-24

## 2023-04-24 RX ORDER — BUPRENORPHINE 10 UG/H
1 PATCH TRANSDERMAL
Qty: 2 PATCH | Refills: 0 | Status: SHIPPED | OUTPATIENT
Start: 2023-04-24

## 2023-04-24 NOTE — PROGRESS NOTES
PLEASE READ YOUR DISCHARGE INSTRUCTIONS ENTIRELY AS IT CONTAINS IMPORTANT INFORMATION.    -Drink half of your body weight in fluid ounces daily.  -Take simethicone for bloating or gas pain.     Continue follow-up with your OBGYN.  Below is a list of [facility] PCP clinics for you to establish care with.    -Please go to the ER if you experience worsening abdominal pain, blood in your vomit or stool, high fever, dizziness, fainting, swelling of your abdomen, inability to pass gas or stool, or inability to urinate.       -Please return or see your primary care doctor if you develop new or worsening symptoms in next 2-5 days.  Strict clinic versus ER precautions given.    Please arrange follow up with your primary medical clinic as soon as possible. You must understand that you've received an Urgent Care treatment only and that you may be released before all of your medical problems are known or treated. You, the patient, will arrange for follow up as instructed. If your symptoms worsen or fail to improve you should go to the Emergency Room.    WE CANNOT RULE OUT ALL POSSIBLE CAUSES OF YOUR SYMPTOMS IN THE URGENT CARE SETTING PLEASE GO TO THE ER IF YOU FEELS YOUR CONDITION IS WORSENING OR YOU WOULD LIKE EMERGENT EVALUATION.          Call 911  Call 911 if any of these occur:  Trouble breathing  Confused  Very drowsy or trouble awakening  Fainting or loss of consciousness  Rapid heart rate  Chest pain  Seizure  Stiff neck  When to seek medical advice  Call your healthcare provider right away if any of these occur:  Increasing abdominal pain or constant lower right abdominal pain  Continued vomiting (unable to keep liquids down)  Diarrhea for more than 2 days in adults and 24 hours in children  Stools containing blood or pus or black tarry stools  Dark urine, reduced urine output  Weakness, dizziness  Drowsiness  Fever of 100.4°F (38.0°C), oral, or higher; or not better with fever medicine  New rash  If you are  Prior Auth Submitted for Butrans Patches 4/24/2023 via [email or web address]  Key: [id number]    Medication denied. Given to Ena for further discussion with Dr. Hoyt.    experiencing muscle weakness or arthritis symptoms during or after your gastroenteritis is gone          [email or web address]

## 2023-04-24 NOTE — TELEPHONE ENCOUNTER
DAY OF SURGERY/PROCEDURE  GUIDELINES    As a patient at the [facility], you can expect quality medical and nursing care that is centered on your individual needs. It is our goal to make your surgical experience as comfortable and excellent as possible.  ________________________________________________________________________    The following instructions are general guidelines, if any information on this sheet is different from what your doctor has instructed you to do, please follow your doctor's instructions. Please arrive on 8/19 @ 1130      Enter through entrance C. Check in at registration     Upon arrival you will be taken to the pre-operative area to get ready for surgery, your family will stay in the waiting room and visit with you once you are ready for surgery. Due to special limitations please limit visitation to 1-2 members of your family at a time. When it is time for surgery your family will return to the waiting room. Nothing to eat, drink, smoke, suck or chew after midnight (no water, gum, mints, cigarettes, cigars, pipes, snuff, chewing tobacco, etc.) or your surgery may be canceled. Take a shower or bath on the morning of your surgery/procedure (Hibiclens if directed)    Brush your teeth, but do not swallow any water    IN CASE OF ILLNESS - If you have a cold or flu symptoms (high fever, runny nose, sore throat, cough, etc.) rash, nausea, vomiting, loose stools, and/or recent contact with someone who has a contagious disease (chick pox, measles, etc.) please call your doctor before coming to the surgery center    DO NOT take anticoagulants (blood thinners, aspirin or aspirin-containing products) as instructed by your physician. Wear loose, comfortable clothing that is easy to put on and take off. They will remain in post-op with the nurse.     If you will be returning home the same day as your surgery, you will need to have a responsible adult (25years of age or Pt called and advised that he is unable to get is butrans patch in [place], that the pharmacy's there won't fill it, and he is curious if we could get it sent to [facility] in the hospital on campus. Spoke with Marnie at the [facility] and she advised that we can get them for the pt and mail them as well, they will need a new RX for the medication.  Sending a message to Dr. Hoyt for a new RX to be sent to our pharmacy.     PT MADE AWARE OF THE ABOVE INFO.

## 2023-04-24 NOTE — TELEPHONE ENCOUNTER
Caller: JOYCELYN DO    Relationship to patient: SELF    Best call back number: [phone]    Patient is needing: PATIENT STATES THAT NO PHARMACY WILL PRESCRIBE HIS BUTRAN PATCH 10 MG.  HE IS UNSURE WHAT TO DO AND WOULD LIKE A CALL BACK.

## 2023-04-24 NOTE — TELEPHONE ENCOUNTER
Provider:  Damian Hoyt  Surgery/Procedure:  trish  Surgery/Procedure Date:  na  Last visit:   4-19-23  Next visit: 5-17-23     Reason for call: Patient called and said they he has been unable to find the pain patch that Dr. Hoyt had written for him.  He would like to know what to do next or if there is something else that can be called in? I have called and gave him the correct number for PM.

## 2023-04-25 ENCOUNTER — READMISSION MANAGEMENT (OUTPATIENT)
Dept: CALL CENTER | Facility: HOSPITAL | Age: 63
End: 2023-04-25
Payer: MEDICAID

## 2023-04-25 NOTE — OUTREACH NOTE
Medical Week 1 Survey    Flowsheet Row Responses   [facility] patient discharged from? [place]   Does the patient have one of the following disease processes/diagnoses(primary or secondary)? Other   Week 1 attempt successful? Yes   Call start time 1602   Call end time 1609   Discharge diagnosis Chest pain   Meds reviewed with patient/caregiver? Yes   Is the patient having any side effects they believe may be caused by any medication additions or changes? No   Does the patient have all medications ordered at discharge? Yes   Is the patient taking all medications as directed (includes completed medication regime)? Yes   Does the patient have a primary care provider?  Yes   Does the patient have an appointment with their PCP within 7 days of discharge? Yes   Has the patient kept scheduled appointments due by today? Yes   Has home health visited the patient within 72 hours of discharge? N/A   Psychosocial issues? No   Did the patient receive a copy of their discharge instructions? Yes   Nursing interventions Reviewed instructions with patient   What is the patient's perception of their health status since discharge? Improving   Is the patient/caregiver able to teach back signs and symptoms related to disease process for when to call PCP? Yes   Is the patient/caregiver able to teach back signs and symptoms related to disease process for when to call 911? Yes   Is the patient/caregiver able to teach back the hierarchy of who to call/visit for symptoms/problems? PCP, Specialist, Home health nurse, Urgent Care, ED, 911 Yes   Week 1 call completed? Yes          Va DAVIS - Registered Nurse

## 2023-05-03 ENCOUNTER — OFFICE VISIT (OUTPATIENT)
Dept: NEUROSURGERY | Facility: CLINIC | Age: 63
End: 2023-05-03
Payer: MEDICAID

## 2023-05-03 ENCOUNTER — READMISSION MANAGEMENT (OUTPATIENT)
Dept: CALL CENTER | Facility: HOSPITAL | Age: 63
End: 2023-05-03
Payer: MEDICAID

## 2023-05-03 VITALS
DIASTOLIC BLOOD PRESSURE: 60 MMHG | WEIGHT: 164.6 LBS | BODY MASS INDEX: 24.95 KG/M2 | TEMPERATURE: 97.6 F | SYSTOLIC BLOOD PRESSURE: 102 MMHG | HEIGHT: 68 IN

## 2023-05-03 DIAGNOSIS — M54.50 CHRONIC BILATERAL LOW BACK PAIN WITHOUT SCIATICA: ICD-10-CM

## 2023-05-03 DIAGNOSIS — G89.29 CHRONIC NECK PAIN: Primary | ICD-10-CM

## 2023-05-03 DIAGNOSIS — Z72.0 TOBACCO ABUSE: ICD-10-CM

## 2023-05-03 DIAGNOSIS — M54.2 CHRONIC NECK PAIN: Primary | ICD-10-CM

## 2023-05-03 DIAGNOSIS — G89.29 CHRONIC BILATERAL LOW BACK PAIN WITHOUT SCIATICA: ICD-10-CM

## 2023-05-03 PROCEDURE — 3078F DIAST BP <80 MM HG: CPT | Performed by: NEUROLOGICAL SURGERY

## 2023-05-03 PROCEDURE — 99204 OFFICE O/P NEW MOD 45 MIN: CPT | Performed by: NEUROLOGICAL SURGERY

## 2023-05-03 PROCEDURE — 3074F SYST BP LT 130 MM HG: CPT | Performed by: NEUROLOGICAL SURGERY

## 2023-05-03 RX ORDER — OXYCODONE AND ACETAMINOPHEN 10; 325 MG/1; MG/1
1 TABLET ORAL EVERY 6 HOURS PRN
Qty: 30 TABLET | Refills: 0 | Status: CANCELLED | OUTPATIENT
Start: 2023-05-03

## 2023-05-03 NOTE — PROGRESS NOTES
Subjective     Chief Complaint: Neck pain    Patient ID: Chong White Sr. is a 63 y.o. male seen for consultation today at the request of  Damian Hoyt MD    History of Present Illness    This is a 63-year-old man who presents to my office ostensibly for evaluation of chronic neck and low back pain although he does have an essentially positive review of systems with regards to somatic and musculoskeletal complaints.    He suffered a spinal cord injury in the late 70s which resulted in quadriplegia.  He was ultimately able to rehabilitate to walking.  He does ambulate with a cane.  He was left with significant neurological problems including neuropathy and chronic gait instability.  He also has chronic spasms and fasciculations of his left lower extremity.  His medical comorbidities are significant for tobacco abuse.  He is tried physical therapy and pain management for years for the management of his chronic neck and low back pain and reports diminishing returns at this point.  He is mostly concerned at this point about some left-sided neck pain which radiates into the medial border of his scapula and along the trapezius on the left side as well.  He does not endorse any symptoms that sound like daniela radiculopathy in either the cervical or lumbar regions.    The following portions of the patient's history were reviewed and updated as appropriate: allergies, current medications, past family history, past medical history, past social history, past surgical history and problem list.    Family history:   Family History   Problem Relation Age of Onset   • Heart disease Mother    • Heart disease Maternal Grandmother        Social history:   Social History     Socioeconomic History   • Marital status: Single   Tobacco Use   • Smoking status: Every Day     Packs/day: 0.50     Types: Cigarettes     Start date: 3/1/1969   • Smokeless tobacco: Never   • Tobacco comments:     Has nicotine patches and states has only  had approximately 10 cigarettes since discharge on 9/11/2020. Not using patch at this time   Vaping Use   • Vaping Use: Never used   Substance and Sexual Activity   • Alcohol use: Never   • Drug use: Never   • Sexual activity: Defer       Review of Systems   Constitutional: Negative for activity change, appetite change, chills, diaphoresis, fatigue, fever and unexpected weight change.   HENT: Negative for congestion, dental problem, drooling, ear discharge, ear pain, facial swelling, hearing loss, mouth sores, nosebleeds, postnasal drip, rhinorrhea, sinus pressure, sinus pain, sneezing, sore throat, tinnitus, trouble swallowing and voice change.    Eyes: Negative for photophobia, pain, discharge, redness, itching and visual disturbance.   Respiratory: Negative for apnea, cough, choking, chest tightness, shortness of breath, wheezing and stridor.    Cardiovascular: Negative for chest pain, palpitations and leg swelling.   Gastrointestinal: Negative for abdominal distention, abdominal pain, anal bleeding, blood in stool, constipation, diarrhea, nausea, rectal pain and vomiting.   Endocrine: Negative for cold intolerance, heat intolerance, polydipsia, polyphagia and polyuria.   Genitourinary: Negative for decreased urine volume, difficulty urinating, dysuria, enuresis, flank pain, frequency, genital sores, hematuria, penile discharge, penile pain, penile swelling, scrotal swelling, testicular pain and urgency.   Musculoskeletal: Positive for back pain, neck pain and neck stiffness. Negative for arthralgias, gait problem, joint swelling and myalgias.   Skin: Negative for color change, pallor, rash and wound.   Allergic/Immunologic: Negative for environmental allergies, food allergies and immunocompromised state.   Neurological: Positive for numbness. Negative for dizziness, tremors, seizures, syncope, facial asymmetry, speech difficulty, weakness, light-headedness and headaches.   Hematological: Negative for adenopathy.  "Does not bruise/bleed easily.   Psychiatric/Behavioral: Negative for agitation, behavioral problems, confusion, decreased concentration, dysphoric mood, hallucinations, self-injury, sleep disturbance and suicidal ideas. The patient is not nervous/anxious and is not hyperactive.        Objective   Blood pressure 102/60, temperature 97.6 °F (36.4 °C), temperature source Infrared, height 172.7 cm (68\"), weight 74.7 kg (164 lb 9.6 oz).  Body mass index is 25.03 kg/m².    Physical Exam  Vitals reviewed.   Constitutional:       General: He is not in acute distress.     Appearance: He is well-developed. He is not diaphoretic.   HENT:      Head: Normocephalic and atraumatic.   Pulmonary:      Effort: Pulmonary effort is normal.   Musculoskeletal:      Cervical back: Tenderness present. Pain with movement present. Decreased range of motion.      Comments: Restricted range of motion in flexion, extension, lateral bending, and rotation.  Lhermitte sign is absent.  Spurling sign is absent.  No focal motor weakness proximally distally in the upper extremities   Skin:     General: Skin is warm and dry.   Neurological:      Mental Status: He is alert and oriented to person, place, and time.      Deep Tendon Reflexes: Reflexes abnormal.      Reflex Scores:       Brachioradialis reflexes are 2+ on the right side and 1+ on the left side.       Patellar reflexes are 1+ on the right side and 1+ on the left side.       Achilles reflexes are 1+ on the right side and 1+ on the left side.     Comments: Clonus is absent.  Cristóbal sign is absent.   Psychiatric:         Behavior: Behavior normal.         Assessment & Plan     Independent Review of Radiographic Studies:      Available for my review is a MRI of the cervical spine which was performed on 2/21/2023.  A comparison study from 4/13/2021 is also available for my review.  This study from 2023 demonstrates advanced degenerative disc disease with a relatively large, broad-based disc " osteophyte complex at C5-6.  The spinal canal is congenitally narrowed and there is diffuse central canal stenosis which for the most part is moderate.  There is a focus of prolonged signal within the spinal cord parenchyma posterior to that C7 vertebral body.  This was present on the study from 2021 does not appear appreciably changed.  The extent of the degenerative disc disease at 5 6 has progressed over the last 2 years.  There is some spurring at C5-6 eccentric to the right side which might be contributing to her right C6 radiculopathy however this does not appear to be appreciably changed in the last 2 years.    Medical Decision Making:      This is a 63-year-old man with chronic neck and low back pain as well as a remote history of a spinal cord injury.  There is no role for surgical intervention in the management of his neck discomfort.  He does not have any MRI correlates to his left shoulder pain.  He does have some lateral recess stenosis which could potentially be contributing to a right-sided cervical radiculopathy but he is really not complaining of much in the way of right-sided symptoms.  The majority of his symptoms appear to be related to axial/musculoskeletal neck pain.  My recommendation is for pain management and physical therapy for this problem.    He is troubled by persistent low back pain although he does have an MRI which is 2 years old, he does report that his symptoms have worsened since then, so I will repeat an MRI of his lumbar spine.  I will follow-up with him once the study has been completed to discuss what role surgery might play.  I did give him a prescription for some oxycodone as he has a longstanding history of opioid use, but currently cannot find anybody in [place] who is willing to write prescriptions for him.    I advised him that I will not be able to fill this prescription moving forward any longer.    Diagnoses and all orders for this visit:    1. Chronic neck pain  (Primary)    2. Chronic bilateral low back pain without sciatica    3. Tobacco abuse        No follow-ups on file.           This document signed by KOSTA Lomas MD May 3, 2023 13:19 EDT

## 2023-05-03 NOTE — OUTREACH NOTE
Medical Week 2 Survey    Flowsheet Row Responses   [facility] patient discharged from? [facility]   Does the patient have one of the following disease processes/diagnoses(primary or secondary)? Other   Week 2 attempt successful? Yes   Call start time 1124   Call end time 1125   Meds reviewed with patient/caregiver? Yes   Is the patient having any side effects they believe may be caused by any medication additions or changes? No   Does the patient have all medications ordered at discharge? Yes   Is the patient taking all medications as directed (includes completed medication regime)? Yes   Does the patient have a primary care provider?  Yes   Comments regarding PCP Patient at Dr. Lomas's office waiting to be seen during this call.    Has the patient kept scheduled appointments due by today? Yes   Has home health visited the patient within 72 hours of discharge? N/A   Psychosocial issues? No   What is the patient's perception of their health status since discharge? Same   Week 2 Call Completed? Yes   Wrap up additional comments Patient kept call very brief as he was at Dr. Lomas's office waiting to be seen during this call.           Argenis DAVIS - Licensed Nurse

## 2023-05-04 ENCOUNTER — TELEPHONE (OUTPATIENT)
Dept: NEUROSURGERY | Facility: CLINIC | Age: 63
End: 2023-05-04
Payer: MEDICAID

## 2023-05-04 NOTE — TELEPHONE ENCOUNTER
"  Caller: Chong White Sr.    Relationship: Self    Best call back number: [phone]    Who are you requesting to speak with (clinical staff, provider,  specific staff member): CLINICAL    What was the call regarding: PATIENT CALLED IN THINKING WE WERE DR. DOZIER OFFICE.  STATES HE WAS TOLD HE WAS GOING TO BE SENT A PRESCRIPTION IN TO HIS PHARMACY AND THEY HAVE NOT RECEIVED IT.  PLEASE CALL PATIENT WITH ANY UPDATES.  PER LAST OVN:  'I did give him a prescription for some oxycodone as he has a longstanding history of opioid use, but currently cannot find anybody in [place] who is willing to write prescriptions for him.\"    THANK YOU!    Do you require a callback: YES  "

## 2023-05-05 ENCOUNTER — TELEPHONE (OUTPATIENT)
Dept: PAIN MEDICINE | Facility: CLINIC | Age: 63
End: 2023-05-05
Payer: MEDICAID

## 2023-05-05 NOTE — TELEPHONE ENCOUNTER
Mr. White called back. He was under the impression that he was leaving the appointment earlier this week with a prescription for oxycodone. I let him know that that is not the case, that in the note it was said that with his history of opioid use Dr. Lomas was not going to prescribe any narcotics, and that on another message sent to his MA he stated that patient is receiving Suboxone and would need to speak with his suboxone provider for pain control. Patient stated he does not take suboxone and never has, he does have a son with the same name and states that his son does take suboxone. I ran a Long and it does not show suboxone being prescribed, however did tell patient that we typically do not prescribe pain medication anyhow, unless the patient has had a recent surgery, and reiterated with his substance use history Dr. Lomas is not comfortable to prescribe any narcotics.     Long:  Pat ID Date Filled RX # Drug Name Prescriber Name Dispenser Name Qty Days MED PMT Rpt To  1 04/07/2023 [id number] Gabapentin 800MG Yanez, Amy EVANGELINA [facility]   [facility]  [address] [place]  Date Written New/Refill Dosage Form Prescriber [facility]  03/06/2023 Refill TABS [facility]  Pat ID Date Filled RX # Drug Name Prescriber Name Dispenser Name Qty Days MED PMT Rpt To  1 03/30/2023 [id number] Zolpidem Tartrate 5MG Norbert [facility]   [facility]  [address] [place]  Date Written New/Refill Dosage Form Prescriber [facility]  03/02/2023 Refill TABS [facility]  Pat ID Date Filled RX # Drug Name Prescriber Name Dispenser Name Qty Days MED PMT Rpt To  1 03/06/2023 [id number] Gabapentin 800MG Yanez, Amy [facility]   [facility]  [address] [place]  Date Written New/Refill Dosage Form Prescriber [facility]  03/06/2023 New TABS [facility]  Pat ID Date Filled RX # Drug Name Prescriber Name Dispenser Name Qty Days MED PMT Rpt To  1 03/02/2023 [id number] Zolpidem Tartrate 5MG Toussaint,  K[facility]en [facility]   PHARMACY, [facility]  [address] [place]  Date Written New/Refill Dosage Form Prescriber [facility] Dispenser [facility]  03/02/2023 New TABS [facility] [place]

## 2023-05-05 NOTE — TELEPHONE ENCOUNTER
Patient has called our office repeatedly back to back requesting to speak with Dr. Lomas's staff regarding an RX he said he was supposed to be given at [facility] on 5/3/2023.    Gave him NSA number to call and check on this status.    [facility] is not part of Pain Mgmt. Patient has been repeatedly informed.  Patient states understanding.

## 2023-05-09 ENCOUNTER — PRE-ADMISSION TESTING (OUTPATIENT)
Dept: PREADMISSION TESTING | Facility: HOSPITAL | Age: 63
End: 2023-05-09
Payer: MEDICAID

## 2023-05-09 ENCOUNTER — HOSPITAL ENCOUNTER (OUTPATIENT)
Dept: CT IMAGING | Facility: HOSPITAL | Age: 63
Discharge: HOME | End: 2023-05-09
Admitting: INTERNAL MEDICINE
Payer: MEDICAID

## 2023-05-09 DIAGNOSIS — I48.0 PAROXYSMAL ATRIAL FIBRILLATION: ICD-10-CM

## 2023-05-09 LAB
ANION GAP SERPL CALCULATED.3IONS-SCNC: 8 MMOL/L (ref 5–15)
BUN SERPL-MCNC: 14 MG/DL (ref 8–23)
BUN/CREAT SERPL: 14.7 (ref 7–25)
CALCIUM SPEC-SCNC: 9.4 MG/DL (ref 8.6–10.5)
CHLORIDE SERPL-SCNC: 103 MMOL/L (ref 98–107)
CO2 SERPL-SCNC: 30 MMOL/L (ref 22–29)
CREAT SERPL-MCNC: 0.95 MG/DL (ref 0.76–1.27)
DEPRECATED RDW RBC AUTO: 42.9 FL (ref 37–54)
EGFRCR SERPLBLD CKD-EPI 2021: 89.9 ML/MIN/1.73
ERYTHROCYTE [DISTWIDTH] IN BLOOD BY AUTOMATED COUNT: 12.5 % (ref 12.3–15.4)
GLUCOSE SERPL-MCNC: 86 MG/DL (ref 65–99)
HCT VFR BLD AUTO: 50.6 % (ref 37.5–51)
HGB BLD-MCNC: 17 G/DL (ref 13–17.7)
MAGNESIUM SERPL-MCNC: 2.5 MG/DL (ref 1.6–2.4)
MCH RBC QN AUTO: 31.5 PG (ref 26.6–33)
MCHC RBC AUTO-ENTMCNC: 33.6 G/DL (ref 31.5–35.7)
MCV RBC AUTO: 93.9 FL (ref 79–97)
PLATELET # BLD AUTO: 189 10*3/MM3 (ref 140–450)
PMV BLD AUTO: 9.8 FL (ref 6–12)
POTASSIUM SERPL-SCNC: 4.6 MMOL/L (ref 3.5–5.2)
RBC # BLD AUTO: 5.39 10*6/MM3 (ref 4.14–5.8)
SODIUM SERPL-SCNC: 141 MMOL/L (ref 136–145)
WBC NRBC COR # BLD: 8.05 10*3/MM3 (ref 3.4–10.8)

## 2023-05-09 PROCEDURE — 85027 COMPLETE CBC AUTOMATED: CPT

## 2023-05-09 PROCEDURE — 36415 COLL VENOUS BLD VENIPUNCTURE: CPT

## 2023-05-09 PROCEDURE — 83735 ASSAY OF MAGNESIUM: CPT

## 2023-05-09 PROCEDURE — 80048 BASIC METABOLIC PNL TOTAL CA: CPT

## 2023-05-09 PROCEDURE — 25510000001 IOPAMIDOL PER 1 ML: Performed by: INTERNAL MEDICINE

## 2023-05-09 PROCEDURE — 71275 CT ANGIOGRAPHY CHEST: CPT

## 2023-05-09 RX ORDER — AMIODARONE HYDROCHLORIDE 200 MG/1
200 TABLET ORAL DAILY
COMMUNITY

## 2023-05-09 RX ORDER — CYCLOBENZAPRINE HCL 10 MG
10 TABLET ORAL 3 TIMES DAILY PRN
COMMUNITY

## 2023-05-09 RX ORDER — ASPIRIN 81 MG/1
81 TABLET, CHEWABLE ORAL DAILY
COMMUNITY

## 2023-05-09 RX ORDER — BACLOFEN 20 MG/1
20 TABLET ORAL 3 TIMES DAILY
COMMUNITY

## 2023-05-09 RX ADMIN — IOPAMIDOL 80 ML: 755 INJECTION, SOLUTION INTRAVENOUS at 12:31

## 2023-05-09 NOTE — PAT
An arrival time for procedure was not provided during PAT visit. If patient had any questions or concerns about their arrival time, they were instructed to contact their surgeon/physician.  Additionally, if the patient referred to an arrival time that was acquired from their my chart account, patient was encouraged to verify that time with their surgeon/physician. Arrival times are NOT provided in Pre Admission Testing Department.

## 2023-05-09 NOTE — DISCHARGE INSTRUCTIONS
"Dear Patient,    Do NOT eat, drink, or smoke after midnight the night before your procedure.   Take your medications as instructed by your doctor.    Glasses and jewelry may be worn, but dentures must be removed prior to your procedure.    Leave any items you consider valuable at home.      MORNING of your Procedure, please bring the following:     -Photo ID and insurance card(s)    -ALL medications in their ORIGINAL CONTAINERS    -Co-pay and/or deductible required by your insurance   -Copy of living will or power of  document (if not brought to    Pre-Admission Testing department)   -CPAP mask and tubing, not your machine (if applicable)    -Relaxation aids (music, books, magazines)   -Skin Prep Instruction Sheet (if applicable)   -Relaxation Aids    Check in on the 2nd floor in the [facility].  Your procedure will be performed in the cath lab or EP lab.  During your procedure, your family will wait in the cath lab waiting area where you checked in.      Need to make arrangements for transportation prior to discharge.    A handout regarding \"Heart Healthy Eating\" was provided today to encourage healthy eating habits.    Booklet published by [employer] was given in Pre-Admission testing.  This booklet is for informational purposes only.  If you have any questions about your procedure, please speak with your physician.      Please note:  If you are scheduled to have one of the following procedures: Pulmonary Vein Ablation, Lead Extraction, MitraClip, Cerebral Coilings or Embolization, please let your family know that after your procedure you will be going to recovery unit on the 2nd floor of the [facility].  When the physician is finished speaking with your family after your procedure is completed, your family will be directed or escorted to the surgery waiting area in the [facility].  This is where your family will wait until you are given a room assignment and then your family will be directed to the " appropriate unit.

## 2023-05-10 ENCOUNTER — READMISSION MANAGEMENT (OUTPATIENT)
Dept: CALL CENTER | Facility: HOSPITAL | Age: 63
End: 2023-05-10
Payer: MEDICAID

## 2023-05-10 NOTE — OUTREACH NOTE
Medical Week 3 Survey    Flowsheet Row Responses   [facility] patient discharged from? [facility]   Does the patient have one of the following disease processes/diagnoses(primary or secondary)? Other   Week 3 attempt successful? Yes   Call start time 1209   Call end time 1210   Discharge diagnosis Chest pain   Meds reviewed with patient/caregiver? Yes   Is the patient having any side effects they believe may be caused by any medication additions or changes? No   Does the patient have all medications ordered at discharge? Yes   Is the patient taking all medications as directed (includes completed medication regime)? Yes   Does the patient have a primary care provider?  Yes   Does the patient have an appointment with their PCP within 7 days of discharge? Yes   Has the patient kept scheduled appointments due by today? Yes   Has home health visited the patient within 72 hours of discharge? N/A   Psychosocial issues? No   What is the patient's perception of their health status since discharge? Same   Week 3 Call Completed? Yes   Graduated Yes          Yenny Ward Registered Nurse

## 2023-05-12 NOTE — NURSING NOTE
" PRE-PVA ASSESSMENT  Chong White Sr. 1960   [address] [facility] [id number] [phone]    Information obtained from: [x] Medical record review  [x] Patient report  Scheduled for: PVA on 5/16/23 with Dr. Arechiga  Allergies   Allergen Reactions   • Codeine GI Intolerance   • Penicillins Hives       AFib Specific History:  AFIBTYPE: paroxysmal    CHADS-VASc Risk Assessment            2 Total Score    1 CHF    1 Hypertension        Criteria that do not apply:    Age >/= 75    DM    PRIOR STROKE/TIA/THROMBO    Vascular Disease    Age 65-74    Sex: Female        Anticoagulation: Eliquis 5 mg bid NO MISSED DOSES   Cardioversion x 4  Failed AAD(s): amiodarone   Prior Ablation: No    Is Mr. hWite aware of his AFib? Yes   Onset: several years  Exacerbations: sleeping    Frequency: \"still happens\"  Alleviations: none     Duration: up to days      Symptoms:   [] Palpitations:    [] Chest Discomfort:    [] Dizziness:    [] Presyncope:    [] Lightheadedness:   [] Syncope:    [x] Fatigue:    [] Other:    [x] Short of Breath:     Last Echo(s):  [x] TTE Date: 11/21/22           •  Normal left ventricular cavity size and wall thickness noted. All left ventricular wall segments contract normally  •  Left ventricular ejection fraction appears to be 56 - 60%.  •  Left ventricular diastolic function is consistent with (grade I) impaired relaxation.  •  The aortic valve is structurally normal with no regurgitation or stenosis present.  •  The mitral valve is structurally normal with no regurgitation or significant stenosis present.  •  There is no evidence of pericardial effusion. .  LA 2.6 cm    [x] LUCERO Date: 6/28/22  • Left ventricular ejection fraction appears to be less than 20%. Left ventricular systolic function is severely decreased.  • The left ventricular cavity is moderate to severely dilated.  • Moderately reduced right ventricular systolic function noted.  • No evidence of a left atrial " appendage thrombus was present.  • The aortic valve is structurally normal. Mild aortic valve regurgitation is present.  • Moderate mitral valve regurgitation is present. No significant mitral valve stenosis is present.  • Moderate tricuspid valve regurgitation is present.  • There is no evidence of pericardial effusion.  • Comments: Proceed with electrical cardioversion.        Past medical History:   [x] Diabetes             Tx Jardiance                  Hemoglobin A1C   Date Value Ref Range Status   04/19/2023 4.90 4.80 - 5.60 % Final               TSH   Date Value Ref Range Status   04/19/2023 6.500 (H) 0.270 - 4.200 uIU/mL Final   09/15/2022 3.010 0.270 - 4.200 uIU/mL Final     [x] HTN        [x] Tx coreg         [x] Controlled    [x] Heart Failure    [] CVA     No                           [] TIA  No        [x] Ischemic CM         [] Hemorrhagic         [] Nonischemic         [] Embolic        [] Diastolic    [x] CAD         [x] MI            [x] Dyslipidemia on lipitor [x] Statin indicated    [x] Ischemic Evaluation    [x] Stress Test: 6/30/22 Myocardial perfusion imaging indicates a normal myocardial perfusion study with no evidence of ischemia.  • ). Enlarged LV cavity size. Abnormal LV wall motion consistent with severe global hypokinesis.  • (Calculated EF = 23%).       [x] Heart Cath: [facility], 9/4/2020: occlusion of the ostial LAD with remarkably good collateral connection from rCA filling the LAD with brisk flow to the point of occlusion in the prox LAD.  RCA and CX are free of any significant disease.    [x] Sleep Apnea Diagnosed per patient no machine uses ambien         [] Obesity No BMI 25.39    [] Depression  No  [] Anxiety No                 [] Urologic History No        [] Urologic cancer surgery         [] Severe decrease in flow of urine stream        [] Recent unexplained gross hematuria       [] Hx urethral stricture     Other Pertinent PMH: GERD    Social / Lifestyle History:   [x]   Tobacco:                      [x] Current: .5 PPD since 1969   []   Alcohol: Denies   [x]   Caffeine: very little coffee   []   Recreational Drugs: Denies    []   Stress Issues: No   []   Exercise: walking    Summary of Patient Contact:    I spoke with Mr. White about his upcoming PVA.   He was well informed about the procedure from prior discussion with Dr. Arechiga and from reading the provided literature.  We discussed the procedure at length including risks, anesthesia, intra-op procedures, recovery, bedrest, sheath removal, discharge criteria, normal post-procedure expectations, and success rates.  I answered a few remaining questions. Mr. White verbalized understanding and he is ready to proceed.        Julianne Arias RN

## 2023-05-15 ENCOUNTER — TELEPHONE (OUTPATIENT)
Dept: PAIN MEDICINE | Facility: CLINIC | Age: 63
End: 2023-05-15

## 2023-05-15 NOTE — TELEPHONE ENCOUNTER
Provider: DR DOZIER  Caller: JOYCELYN DO SR   Relationship to Patient: PATIENT   Pharmacy: [facility] [place] - [address] - [phone] [facility] [phone] FX    Phone Number: [phone]  Reason for Call: ORIGINALLY CALLED TO RESCHEDULE FOLLOW UP APPOINTMENT, SCHEDULED 05/17/23.  BECAUSE OF SCHEDULED CONFLICTS WITH HEART DOCTOR APPOINTMENTS PATIENT HAD TO RESCHEDULE TO  07/19/23.      PATIENT STATES THE RX BUPRENORPHINE  20 MCG/HR PATCH WEEKLY HE WAS NEVER ABLE TO GET THIS RX FILLED BECAUSE HIS INSURANCE WILL NOT PAY FOR IT.  PATIENT WOULD LIKE TO KNOW IF THERE IS AN ALTERNATIVE OPTION OF ANYTHING ELSE HE COULD BE PRESCRIBED THAT HIS INSURANCE MAY COVER ?  When was the patient last seen: 04/19/23

## 2023-05-22 ENCOUNTER — HOSPITAL ENCOUNTER (OUTPATIENT)
Dept: CARDIOLOGY | Facility: HOSPITAL | Age: 63
Discharge: HOME | End: 2023-05-22
Admitting: PHYSICIAN ASSISTANT
Payer: MEDICAID

## 2023-05-22 VITALS
DIASTOLIC BLOOD PRESSURE: 86 MMHG | BODY MASS INDEX: 25.01 KG/M2 | HEART RATE: 51 BPM | WEIGHT: 165 LBS | OXYGEN SATURATION: 99 % | SYSTOLIC BLOOD PRESSURE: 139 MMHG | HEIGHT: 68 IN

## 2023-05-22 DIAGNOSIS — I50.42 CHRONIC COMBINED SYSTOLIC AND DIASTOLIC CONGESTIVE HEART FAILURE: Primary | ICD-10-CM

## 2023-05-22 DIAGNOSIS — I25.10 ASCVD (ARTERIOSCLEROTIC CARDIOVASCULAR DISEASE): ICD-10-CM

## 2023-05-22 DIAGNOSIS — I42.0 CARDIOMYOPATHY, DILATED: ICD-10-CM

## 2023-05-22 DIAGNOSIS — I48.0 PAROXYSMAL ATRIAL FIBRILLATION: ICD-10-CM

## 2023-05-22 DIAGNOSIS — I50.22 CHRONIC SYSTOLIC HEART FAILURE: ICD-10-CM

## 2023-05-22 LAB — ABSOLUTE LUNG FLUID CONTENT: 27 % (ref 20–35)

## 2023-05-22 PROCEDURE — 94726 PLETHYSMOGRAPHY LUNG VOLUMES: CPT | Performed by: PHYSICIAN ASSISTANT

## 2023-05-22 RX ORDER — CARVEDILOL 6.25 MG/1
3.12 TABLET ORAL 2 TIMES DAILY WITH MEALS
Qty: 180 TABLET | Refills: 0 | Status: SHIPPED | OUTPATIENT
Start: 2023-05-22

## 2023-05-22 RX ORDER — GABAPENTIN 800 MG/1
800 TABLET ORAL 2 TIMES DAILY
COMMUNITY

## 2023-05-22 RX ORDER — VERICIGUAT 5 MG/1
5 TABLET, FILM COATED ORAL DAILY
Qty: 90 TABLET | Refills: 0 | Status: SHIPPED | OUTPATIENT
Start: 2023-05-22 | End: 2023-08-20

## 2023-05-22 NOTE — ADDENDUM NOTE
Encounter addended by: Gwen Gutiérrez [facility] on: 5/22/2023 2:35 PM   Actions taken: Order Reconciliation Section accessed, Home Medications modified, Clinical Note Signed

## 2023-05-22 NOTE — PROGRESS NOTES
Heart Failure Clinic    Date: 05/22/23     Vitals:    05/22/23 1026   BP: 139/86   Pulse: 51   SpO2: 99%      Weight 165  Method of arrival: Ambulatory with cane    Weighing self daily: Most days    Monitoring Heart Failure Zones: Yes    Today's HF Zone: Yellow     Taking medications as prescribed: Yes    Edema No    Shortness of Air: Yes with activity    Number of pillows used at night:<2    Educational Materials given:  avs                                                                         ReDS Value: 27  25-35 Optimal Value Status      Esteban Levine RN 05/22/23 10:29 EDT

## 2023-05-22 NOTE — ADDENDUM NOTE
Encounter addended by: Gwen Gutiérrez RP on: 5/22/2023 1:20 PM   Actions taken: Clinical Note Signed

## 2023-05-22 NOTE — PROGRESS NOTES
Heart Failure Clinic  Pharmacist Note     Chong White Sr. is a 63 y.o. male seen in the Heart Failure Clinic for combined systolic and diastolic HF. Chong White Sr. reports he has not missed any doses of his medications. He did not have his medications today but I have requested a medication list from [facility]. He reports that he has a BP cuff but does not check his BP at home. He reports to feeling lightheaded/dizzy everyday. His BP today was 139/86 with HR 51. He does not check his weight at home and reports that it has been misplaced and requested for a new one.     Medication Use:   Hx of med intolerances:  None related to HF  Retail Rx Management: [facility]    Past Medical History:   Diagnosis Date   • Arthritis    • Atrial fibrillation    • CHF (congestive heart failure)    • COPD (chronic obstructive pulmonary disease)    • Coronary artery disease    • GERD (gastroesophageal reflux disease)    • Heart attack     X 13 per patient, last one 2002 (9 heart attacks 2001- 1 cardiac stent placed)   • History of hepatitis C 2001   • Hypertension    • Myocardial infarction    • Tinnitus    • Wears reading eyeglasses      ALLERGIES: Codeine and Penicillins  Current Outpatient Medications   Medication Sig Dispense Refill   • amiodarone (PACERONE) 200 MG tablet Take 1 tablet by mouth Daily.     • apixaban (ELIQUIS) 5 MG tablet tablet Take 1 tablet by mouth 2 (Two) Times a Day.     • aspirin 81 MG chewable tablet Chew 1 tablet Daily.     • atorvastatin (LIPITOR) 40 MG tablet Take 1 tablet by mouth Every Night. 90 tablet 1   • carvedilol (COREG) 6.25 MG tablet Take 0.5 tablets by mouth 2 (Two) Times a Day With Meals. 180 tablet 0   • empagliflozin (Jardiance) 10 MG tablet tablet Take 1 tablet by mouth Daily for 90 days. 90 tablet 0   • furosemide (LASIX) 20 MG tablet TAKE ONE TABLET BY MOUTH DAILY (Patient taking differently: Take 1 tablet by mouth Daily.) 30 tablet 0   • gabapentin  "(NEURONTIN) 800 MG tablet Take 1 tablet by mouth 2 (Two) Times a Day.     • ranolazine (RANEXA) 500 MG 12 hr tablet TAKE ONE TABLET BY MOUTH EVERY 12 HOURS 60 tablet 0   • sacubitril-valsartan (ENTRESTO) 24-26 MG tablet Take 1 tablet by mouth Every 12 (Twelve) Hours for 90 days. 180 tablet 0   • Vericiguat (Verquvo) 5 MG tablet Take 1 tablet by mouth Daily 90 tablet 0   • zolpidem (Ambien) 5 MG tablet Take 1 tablet by mouth At Night As Needed for Sleep. 30 tablet 2   • baclofen (LIORESAL) 20 MG tablet Take 1 tablet by mouth 3 (Three) Times a Day. (Patient not taking: Reported on 5/12/2023)     • Buprenorphine 10 MCG/HR patch weekly Place 1 patch on the skin as directed by provider Every 7 (Seven) Days For 2 weeks, Then use 20mcg/hr patch until follow up appointment. (Patient not taking: Reported on 5/12/2023) 2 patch 0   • Buprenorphine 20 MCG/HR patch weekly Place 20 mcg on the skin as directed by provider Every 7 (Seven) Days. (Patient not taking: Reported on 5/12/2023) 2 patch 0   • cyclobenzaprine (FLEXERIL) 10 MG tablet Take 1 tablet by mouth 3 (Three) Times a Day As Needed for Muscle Spasms. (Patient not taking: Reported on 5/12/2023)       No current facility-administered medications for this encounter.       Vaccination History:   Pneumonia: PCV 20 Feb 2023  Annual Influenza: UTD 10/13/22  COVID 19: Booster Feb 2023    Objective  Vitals:    05/22/23 1026   BP: 139/86   BP Location: Left arm   Patient Position: Sitting   Cuff Size: Adult   Pulse: 51   SpO2: 99%   Weight: 74.8 kg (165 lb)   Height: 172.7 cm (68\")     Wt Readings from Last 3 Encounters:   05/22/23 74.8 kg (165 lb)   05/03/23 74.7 kg (164 lb 9.6 oz)   04/20/23 72.9 kg (160 lb 11.2 oz)         05/22/23  1026   Weight: 74.8 kg (165 lb)     Lab Results   Component Value Date    GLUCOSE 86 05/09/2023    BUN 14 05/09/2023    CREATININE 0.95 05/09/2023    EGFRIFNONA 84 02/23/2022    EGFRIFAFRI 106 10/29/2020    BCR 14.7 05/09/2023    K 4.6 05/09/2023 "    CO2 30.0 (H) 05/09/2023    CALCIUM 9.4 05/09/2023    PROTENTOTREF 6.6 10/29/2020    ALBUMIN 3.9 04/19/2023    LABIL2 1.9 10/29/2020    AST 14 04/19/2023    ALT 11 04/19/2023     Lab Results   Component Value Date    WBC 8.05 05/09/2023    HGB 17.0 05/09/2023    HCT 50.6 05/09/2023    MCV 93.9 05/09/2023     05/09/2023     Lab Results   Component Value Date    TROPONINT 8 04/19/2023     Lab Results   Component Value Date    PROBNP 42.5 12/22/2022     Results for orders placed during the hospital encounter of 11/16/22    Adult Transthoracic Echo Complete W/ Cont if Necessary Per Protocol    Interpretation Summary  •  Normal left ventricular cavity size and wall thickness noted. All left ventricular wall segments contract normally  •  Left ventricular ejection fraction appears to be 56 - 60%.  •  Left ventricular diastolic function is consistent with (grade I) impaired relaxation.  •  The aortic valve is structurally normal with no regurgitation or stenosis present.  •  The mitral valve is structurally normal with no regurgitation or significant stenosis present.  •  There is no evidence of pericardial effusion. .         GDMT    Drug Class   Drug   Dose Last Dose Adjustment Additional Titration   Notes   ACEi/ARB/ARNI Entresto 24/26 7/12/22 Needed    Beta Blocker Carvedilol 6.25mg 7/12/22 Needed    SGLT2i Jardiance 10mg 7/27/22 N/A     Vericiguat 5mg 10/13/22 increased         Drug Therapy Problems    1. Medication Non-compliance  2. Refill request  3. Weighing scale      Recommendations:     1. Continue to address the importance of medication adherence at each visit. [facility] is best for him since he lives close and they deliver.   2. Veronica to send refills on all [facility] medications today  3. Encouraged patient to check weight daily, provided patient with weighing scale.    Patient was educated on heart failure medications and the importance of medication adherence. All questions were addressed and  patient would benefit from additional education at each visit.     Thank you for allowing me to participate in the care of your patient,    Gwen Gutiérrez, [facility]  05/22/23  14:35 EDT

## 2023-05-22 NOTE — PROGRESS NOTES
Heart Failure Clinic  NIKI Connelly Linda C., APRN  [address]  [address]  [place] [address]    Thank you for asking me to see Chong White Sr. for congestive heart failure.    History of Present Illness     This is a 63 y.o. male with known past medical history of:  · Paroxysmal atrial fibrillation  · Chronic systolic CHF with distant history of documented IVDA  · Recent TTE with recovered EF.   · Tobacco abuse  · ASCVD  · LHC on 09/2020 with flush occlusion of the LAD at the ostium noted to fill from RCA injection without known evidence of disease.  Distal Lcx with 50% disease.  RCA large with mild luminal irregularities.    · Essential hypertension  · GERD.      Chong White Sr. is a 63 y.o. male who presents for today for CHF follow-up.  The patient is typically seen by Amy Yanez APRN.  Patient's primary cardiologist is Dr. Lopez.     • Last known EF recovered.  · Last known hospitalization and/or ED visit: He was last hospitalized from 06/23/22 through 07/12/22 with atrial fibrillation with RVR, acute on chronic systolic CHF, MAISHA, and COPD.      He underwent LUCERO guided cardioversion and was started on amiodarone in addition to Eliquis, Entresto, Verquvo, Ranolazine, Coreg, and atorvastatin.          05/22/23 visit data/details regarding HF:   • Dyspnea on exertion: Present with activity.   • Lower extremity swelling significantly improved  • Abdominal swelling: Improving.     • Home weight:Not monitoring; has tools to do so  • Home BP: Not monitoring, has tools to do so. Importance of keeping log discussed.   • Daily activities of living:  Performing ADLs independently.   • Pillows/lying flat: 2 pillows  • HF zone: Yellow  • Mr. White reports fatigue. He reports intermittent chest pain that is left sided and resolves on its own. He reports he was supposed to have cardiac catheterization in [place] but has rescheduled, as he did not have a ride on  the day the procedure was planned.  He reports he has been very restless.  He reports he started going to a pain clinic but the medication was not covered by his insurance.        Review of Systems - Review of Systems   Constitutional: Positive for malaise/fatigue. Negative for chills, decreased appetite and fever.   HENT: Negative for congestion and ear pain.    Eyes: Negative for blurred vision.   Cardiovascular: Positive for chest pain. Negative for dyspnea on exertion, leg swelling, near-syncope and syncope.        Dyspnea on exertion has improved   Respiratory: Negative for cough and shortness of breath.    Endocrine: Negative for cold intolerance and heat intolerance.   Hematologic/Lymphatic: Negative for adenopathy and bleeding problem.   Skin: Negative for color change and nail changes.   Musculoskeletal: Negative for arthritis, back pain and falls.   Gastrointestinal: Negative for bloating and abdominal pain.   Genitourinary: Negative for bladder incontinence and dysuria.   Neurological: Negative for aphonia, difficulty with concentration and disturbances in coordination.   Psychiatric/Behavioral: Negative for altered mental status and hallucinations. The patient has insomnia.    Allergic/Immunologic: Negative for environmental allergies and HIV exposure.        All other systems were reviewed and were negative.    Patient Active Problem List   Diagnosis   • Atrial fibrillation   • Neuropathy   • ASCVD (arteriosclerotic cardiovascular disease)   • Tobacco abuse   • Ischemic cardiomyopathy   • Chronic combined systolic and diastolic congestive heart failure (HCC)   • Chronic neck pain   • Chronic low back pain   • Paralysis   • Hypertension   • Chronic anticoagulation   • Long term current use of antiarrhythmic drug       family history includes Heart disease in his maternal grandmother and mother.     reports that he has been smoking cigarettes. He started smoking about 54 years ago. He has been smoking an  average of 1 pack per day. He has never used smokeless tobacco. He reports that he does not currently use drugs after having used the following drugs: Marijuana. He reports that he does not drink alcohol.    Allergies   Allergen Reactions   • Codeine GI Intolerance   • Penicillins Hives         Current Outpatient Medications:   •  amiodarone (PACERONE) 200 MG tablet, Take 1 tablet by mouth Daily., Disp: , Rfl:   •  apixaban (ELIQUIS) 5 MG tablet tablet, Take 1 tablet by mouth 2 (Two) Times a Day., Disp: , Rfl:   •  aspirin 81 MG chewable tablet, Chew 1 tablet Daily., Disp: , Rfl:   •  atorvastatin (LIPITOR) 40 MG tablet, Take 1 tablet by mouth Every Night., Disp: 90 tablet, Rfl: 1  •  baclofen (LIORESAL) 20 MG tablet, Take 1 tablet by mouth 3 (Three) Times a Day. (Patient not taking: Reported on 5/12/2023), Disp: , Rfl:   •  Buprenorphine 10 MCG/HR patch weekly, Place 1 patch on the skin as directed by provider Every 7 (Seven) Days For 2 weeks, Then use 20mcg/hr patch until follow up appointment. (Patient not taking: Reported on 5/12/2023), Disp: 2 patch, Rfl: 0  •  Buprenorphine 20 MCG/HR patch weekly, Place 20 mcg on the skin as directed by provider Every 7 (Seven) Days. (Patient not taking: Reported on 5/12/2023), Disp: 2 patch, Rfl: 0  •  carvedilol (COREG) 6.25 MG tablet, Take 1 tablet by mouth 2 (Two) Times a Day With Meals (Patient not taking: Reported on 5/12/2023), Disp: 180 tablet, Rfl: 0  •  cyclobenzaprine (FLEXERIL) 10 MG tablet, Take 1 tablet by mouth 3 (Three) Times a Day As Needed for Muscle Spasms. (Patient not taking: Reported on 5/12/2023), Disp: , Rfl:   •  empagliflozin (Jardiance) 10 MG tablet tablet, Take 1 tablet by mouth Daily for 90 days., Disp: 90 tablet, Rfl: 0  •  furosemide (LASIX) 20 MG tablet, TAKE ONE TABLET BY MOUTH DAILY (Patient taking differently: Take 1 tablet by mouth Daily.), Disp: 30 tablet, Rfl: 0  •  ranolazine (RANEXA) 500 MG 12 hr tablet, TAKE ONE TABLET BY MOUTH EVERY 12  HOURS (Patient not taking: Reported on 5/12/2023), Disp: 60 tablet, Rfl: 0  •  sacubitril-valsartan (ENTRESTO) 24-26 MG tablet, Take 1 tablet by mouth Every 12 (Twelve) Hours for 90 days., Disp: 180 tablet, Rfl: 0  •  Vericiguat (Verquvo) 5 MG tablet, Take 1 tablet by mouth Daily (Patient not taking: Reported on 5/12/2023), Disp: 90 tablet, Rfl: 0  •  zolpidem (Ambien) 5 MG tablet, Take 1 tablet by mouth At Night As Needed for Sleep. (Patient not taking: Reported on 5/12/2023), Disp: 30 tablet, Rfl: 2      Physical Exam:  I have reviewed the patient's current vital signs as documented in the patient's EMR.         Vitals:    05/22/23 1026   BP: 139/86   Pulse: 51   SpO2: 99%     Body mass index is 25.09 kg/m².       05/22/23  1026   Weight: 74.8 kg (165 lb)        Physical Exam  Vitals and nursing note reviewed.   Constitutional:       Appearance: Normal appearance. He is well-groomed.      Comments: Ambulated independently with cane.   HENT:      Head: Normocephalic and atraumatic.      Mouth/Throat:      Lips: Pink.      Mouth: Mucous membranes are moist.   Eyes:      General: Lids are normal.      Conjunctiva/sclera:      Right eye: Right conjunctiva is not injected.      Left eye: Left conjunctiva is not injected.   Pulmonary:      Effort: No tachypnea or bradypnea.      Breath sounds: No decreased breath sounds, wheezing, rhonchi or rales.   Chest:      Chest wall: No mass or lacerations.   Abdominal:      General: Bowel sounds are normal.      Palpations: Abdomen is soft.      Tenderness: There is no abdominal tenderness. There is no right CVA tenderness or left CVA tenderness.      Comments: Abdominal protuberance improved   Musculoskeletal:      Cervical back: Full passive range of motion without pain. No edema or erythema.      Right lower leg: No swelling. No edema.      Left lower leg: No swelling. No edema.      Comments: Patient has ongoing unstable gait with a cane   Skin:     General: Skin is warm and  moist.   Neurological:      Mental Status: He is alert and oriented to person, place, and time.   Psychiatric:         Attention and Perception: Attention normal.         Mood and Affect: Mood normal.         Behavior: Behavior is cooperative.       JVP: Volume/Pulsation: Normal.        DATA REVIEWED:     EKG. I personally reviewed and interpreted the EKG.        ---------------------------------------------------  TTE/LUCERO:  Results for orders placed during the hospital encounter of 11/16/22    Adult Transthoracic Echo Complete W/ Cont if Necessary Per Protocol    Interpretation Summary  •  Normal left ventricular cavity size and wall thickness noted. All left ventricular wall segments contract normally  •  Left ventricular ejection fraction appears to be 56 - 60%.  •  Left ventricular diastolic function is consistent with (grade I) impaired relaxation.  •  The aortic valve is structurally normal with no regurgitation or stenosis present.  •  The mitral valve is structurally normal with no regurgitation or significant stenosis present.  •  There is no evidence of pericardial effusion. .      -----------------------------------------------------  CXR/Imaging:   Imaging Results (Most Recent)     None          I personally reviewed and interpreted the CXR.      -----------------------------------------------------      ----------------------------------------------------    PFTs:    No visible PFTs available within system.   --------------------------------------------------------------------------------------------------    Laboratory evaluations:    Lab Results   Component Value Date    GLUCOSE 86 05/09/2023    BUN 14 05/09/2023    CREATININE 0.95 05/09/2023    EGFRIFNONA 84 02/23/2022    EGFRIFAFRI 106 10/29/2020    BCR 14.7 05/09/2023    K 4.6 05/09/2023    CO2 30.0 (H) 05/09/2023    CALCIUM 9.4 05/09/2023    PROTENTOTREF 6.6 10/29/2020    ALBUMIN 3.9 04/19/2023    LABIL2 1.9 10/29/2020    AST 14 04/19/2023    ALT  11 04/19/2023     Lab Results   Component Value Date    WBC 8.05 05/09/2023    HGB 17.0 05/09/2023    HCT 50.6 05/09/2023    MCV 93.9 05/09/2023     05/09/2023     Lab Results   Component Value Date    CHOL 182 04/20/2023    TRIG 110 04/20/2023    HDL 47 04/20/2023     (H) 04/20/2023     Lab Results   Component Value Date    TSH 6.500 (H) 04/19/2023     Lab Results   Component Value Date    TROPONINT 8 04/19/2023     Lab Results   Component Value Date    HGBA1C 4.90 04/19/2023     No results found for: DDIMER  Lab Results   Component Value Date    ALT 11 04/19/2023     Lab Results   Component Value Date    HGBA1C 4.90 04/19/2023     Lab Results   Component Value Date    CREATININE 0.95 05/09/2023     No results found for: IRON, TIBC, FERRITIN  Lab Results   Component Value Date    INR 1.44 (H) 06/23/2022    INR 1.41 (H) 06/16/2022    INR 1.07 08/18/2021    PROTIME 17.9 (H) 06/23/2022    PROTIME 17.6 (H) 06/16/2022    PROTIME 14.3 08/18/2021         PAH RISK ASSESSMENT:      1. Chronic combined systolic and diastolic congestive heart failure (HCC)          ORDERS PLACED TODAY:  Orders Placed This Encounter   Procedures   • ReDs Vest        Diagnoses and all orders for this visit:    1. Chronic combined systolic and diastolic congestive heart failure (HCC) (Primary)  -     ReDs Vest             MEDS ORDERED TODAY:    No orders of the defined types were placed in this encounter.       ---------------------------------------------------------------------------------------------------------------------------          ASSESSMENT/PLAN:      Diagnosis Plan   1. Chronic combined systolic and diastolic congestive heart failure (HCC)  ReDs Vest          not acutely decompensated chronic severe systolic and diastolic heart failure. Right Heart Failure. Etiology previously documented to be non-ishcemic. LVEF recovered on last EF of 56-60%.         Today, Patient appears euvolemic.and with  Moderate perfusion. The  patient's hemodynamics are currently acceptable. HR in room was found to be in 60s.  BP/MAP was reviewed and there is no troom for medication up-titration.  Clinical trajectory was assessed and hasimproved.     CHF GOAL DIRECTED MEDICAL THERAPY FOR PATIENT ADDRESSED/ADJUSTED:       GDMT:HFrecoveredEF    Drug Class   Drug   Dose Last Dose Adjustment Additional Titration   Notes   ACEi/ARB/ARNI Enstresto 24-26mg qd   Tolerating with borderline low BPs.    Beta Blocker Coreg   Decreased to 3.125 mg BID due to bradycardia and weakness  Taking Amiodarone for Afib as well.      MRA Xx  Stopped in hospital due to hyperkalemia xx xxx  Recent hyperkalemia during hospitalization   SGLT2i Jardiance 10mg   N/A        Secondary GDMT:   · Verquevo being tolerated well.  Continue current dose. Would like to upward titrate; however, there is difficulty with indentifying compliance.  EF has improved since starting, thus will continue this dose.      -CHF Specific BB:   •  Continue Carvedilol  6.25mg BID reduced to 3.125mg BID given bradycardia and reported fatigue.      -MRA:   · Stopped previously due to hyperkalemia.     -ACE/ARB/ARNi:   • Current dose: Continue Entresto 24-26mg qd with hold parameters for SBP less than 95.   • Baseline creatinine previously known to be 0.9-1.3 per review of recent laboratory values.  Renal function remains acceptable upon review today.       SGLT2 inhibition therapy.   • A BMP at initiation to verify GFR >45 was recommended, as was interval GFR surveillance.  • Pt was advised side effects, some severe, including hypersensitivity and Boogie's; in addition to common side effects of UTIs and female genital mycotic infections were discussed.  • Continuing Jardiance (empagliflozin) 10mg with quarterly assessment of GFR. (90 day supply sent to [facility] and provided prior to leaving clinic.)   • Denies  side effects.        -Diuretic regimen:   • ReDs Vest reading for 05/22/23 was found to be 27.    • Continue Lasix 20mg qd.   • BMP, Mag, & ProBNP reviewed with patient on last visit.   • CT chest imaging reviewed from June 2022 with effusions present.      -Fluid restriction/Sodium restriction:   • Requested 2000 ml restriction  • Patient has been asked to weigh daily and was provided with a printed diuretic strategy.  • 1,500 mg Na restriction was discussed.    -Secondary pharmacological medications for HFrEF:   · Continue Verquevo to 5mg qd given marginal effect on BPs.      -Acute vs. Chronic underlying conditions other than HF addressed during visit:     · Chest pain:   · Keep scheduled cardiac catheterization.   · Continue ASA & statin.   · LHC reviewed from 2020 with large and excellent collaterals.   · 09/2022 Stress test unremarkable.   · CTA chest with concern for possibly chronic pulmonary vein thrombosis on the left side.  Continue f/u with Dr. Arechiga.     · Debility:   · Ongoing-continue using cane.     · Anemia is common in heart failure.    · Typically, this can come from anemia of chronic disease.   · Last Hemoglobin is WNL.      · P. Afib, appears to be in SR  · Continue f/u with EP.    · Continue Eliquis (90 day supply sent to [facility] and provided prior to leaving clinic).   · No further events of facial numbness.     · Chronic low back pain:   · Chronic neck pain with intermittent RUE numbness:   · Had MRI.   · Continue f/u with PCP.      ----------------------------------------------------------------------  --------------------------------------------------------------------------------          >45 minutes out of 60 minutes face to face spent counseling patient extensively on dietary Na+ intake, importance of activity, weight monitoring, compliance with medications in addition to importance of titration with goal directed medical therapy and follow up appointments.            This document has been electronically signed by NIKI King 22, 2023 10:34 EDT      Part of  this note may be an electronic transcription/translation of spoken language to printed text using the Dragon Dictation System.

## 2023-05-24 ENCOUNTER — DISEASE STATE MANAGEMENT VISIT (OUTPATIENT)
Dept: PHARMACY | Facility: HOSPITAL | Age: 63
End: 2023-05-24

## 2023-05-24 ENCOUNTER — TELEPHONE (OUTPATIENT)
Dept: CARDIOLOGY | Facility: CLINIC | Age: 63
End: 2023-05-24

## 2023-05-24 VITALS
HEART RATE: 74 BPM | DIASTOLIC BLOOD PRESSURE: 66 MMHG | WEIGHT: 164.4 LBS | HEIGHT: 68 IN | BODY MASS INDEX: 24.92 KG/M2 | SYSTOLIC BLOOD PRESSURE: 114 MMHG

## 2023-05-24 DIAGNOSIS — R76.8 HEPATITIS C ANTIBODY POSITIVE IN BLOOD: Primary | ICD-10-CM

## 2023-05-24 PROCEDURE — 87522 HEPATITIS C REVRS TRNSCRPJ: CPT | Performed by: PHYSICIAN ASSISTANT

## 2023-05-24 NOTE — PROGRESS NOTES
No chief complaint on file.    Chong White  is a 63 y.o. male who presents to the office today at the request of Self Referring for No chief complaint on file..    HPI    He found out about having Hepatitis C infection approx 22 years ago. He has not had prior treatment for hepatitis. Reports no known personal history of liver disease including other viral hepatitis. There is no known family history of liver disease or cirrhosis. He denies previous IVDU and intranasal drug use. He does have nonprofessional tattoos. Denies having previous alcoholism. He does not currently drink alcohol. He denies current illicit drug use including marijuana. No recent liver imaging. He has not had recent labs. He is unsure of previous vaccinations for Hepatitis A and B. He is currently unemployed, disabled. The patient receives support from his daughter.    Review of Systems   Constitutional: Negative for activity change, appetite change and fatigue.   HENT: Negative for trouble swallowing and voice change.    Respiratory: Negative for cough and choking.    Cardiovascular: Negative for leg swelling.   Gastrointestinal: Negative for abdominal distention, abdominal pain, anal bleeding, blood in stool, constipation, diarrhea, nausea, rectal pain and vomiting.   Endocrine: Negative for polyphagia.   Genitourinary: Negative for flank pain.   Musculoskeletal: Positive for arthralgias, back pain, myalgias and neck pain.   Skin: Negative for color change and pallor.   Allergic/Immunologic: Negative for food allergies.   Neurological: Negative for weakness.   Psychiatric/Behavioral: Negative for confusion.       ACTIVE PROBLEMS:   Specialty Problems        Cardiology Problems    Atrial fibrillation        Chronic combined systolic and diastolic congestive heart failure (HCC)        Ischemic cardiomyopathy           PAST MEDICAL HISTORY:  Past Medical History:   Diagnosis Date   • Arthritis    • Atrial fibrillation    • CHF (congestive  heart failure)    • COPD (chronic obstructive pulmonary disease)    • Coronary artery disease    • GERD (gastroesophageal reflux disease)    • Heart attack     X 13 per patient, last one 2002 (9 heart attacks 2001- 1 cardiac stent placed)   • History of hepatitis C 2001   • Hypertension    • Myocardial infarction    • Tinnitus    • Wears reading eyeglasses        SURGICAL HISTORY:  Past Surgical History:   Procedure Laterality Date   • CARDIAC CATHETERIZATION N/A 09/04/2020    Procedure: Left Heart Cath;  Surgeon: Herman Sen MD;  Location: [facility] CATH INVASIVE LOCATION;  Service: Cardiology;  Laterality: N/A;   • COLONOSCOPY     • CORONARY STENT PLACEMENT     • FACIAL RECONSTRUCTION SURGERY Right 1995       FAMILY HISTORY:  Family History   Problem Relation Age of Onset   • Heart disease Mother    • Heart disease Maternal Grandmother        SOCIAL HISTORY:  Social History     Tobacco Use   • Smoking status: Every Day     Packs/day: 1.00     Types: Cigarettes     Start date: 3/1/1969   • Smokeless tobacco: Never   Substance Use Topics   • Alcohol use: Never       CURRENT MEDICATION:    Current Outpatient Medications:   •  amiodarone (PACERONE) 200 MG tablet, Take 1 tablet by mouth Daily., Disp: , Rfl:   •  apixaban (ELIQUIS) 5 MG tablet tablet, Take 1 tablet by mouth 2 (Two) Times a Day., Disp: , Rfl:   •  aspirin 81 MG chewable tablet, Chew 1 tablet Daily., Disp: , Rfl:   •  atorvastatin (LIPITOR) 40 MG tablet, Take 1 tablet by mouth Every Night., Disp: 90 tablet, Rfl: 1  •  carvedilol (COREG) 6.25 MG tablet, Take 0.5 tablets by mouth 2 (Two) Times a Day With Meals., Disp: 180 tablet, Rfl: 0  •  empagliflozin (Jardiance) 10 MG tablet tablet, Take 1 tablet by mouth Daily for 90 days., Disp: 90 tablet, Rfl: 0  •  furosemide (LASIX) 20 MG tablet, TAKE ONE TABLET BY MOUTH DAILY (Patient taking differently: Take 1 tablet by mouth Daily.), Disp: 30 tablet, Rfl: 0  •  gabapentin (NEURONTIN) 800 MG tablet, Take 1  "tablet by mouth 2 (Two) Times a Day., Disp: , Rfl:   •  ranolazine (RANEXA) 500 MG 12 hr tablet, TAKE ONE TABLET BY MOUTH EVERY 12 HOURS, Disp: 60 tablet, Rfl: 0  •  sacubitril-valsartan (ENTRESTO) 24-26 MG tablet, Take 1 tablet by mouth Every 12 (Twelve) Hours for 90 days., Disp: 180 tablet, Rfl: 0  •  Vericiguat (Verquvo) 5 MG tablet, Take 1 tablet by mouth Daily, Disp: 90 tablet, Rfl: 0  •  zolpidem (Ambien) 5 MG tablet, Take 1 tablet by mouth At Night As Needed for Sleep., Disp: 30 tablet, Rfl: 2    ALLERGIES:  Codeine and Penicillins    VISIT VITALS:  Blood Pressure 114/66   Pulse 74   Height 172.7 cm (68\")   Weight 74.6 kg (164 lb 6.4 oz)   Body Mass Index 25.00 kg/m²     PHYSICAL EXAMINATION:  Physical Exam  Constitutional:       General: He is not in acute distress.     Appearance: He is well-developed. He is not diaphoretic.   HENT:      Head: Normocephalic and atraumatic.      Right Ear: External ear normal.      Left Ear: External ear normal.      Nose: Nose normal.      Mouth/Throat:      Pharynx: No oropharyngeal exudate.   Eyes:      General: No scleral icterus.        Right eye: No discharge.         Left eye: No discharge.      Conjunctiva/sclera: Conjunctivae normal.      Pupils: Pupils are equal, round, and reactive to light.   Neck:      Thyroid: No thyromegaly.      Vascular: No JVD.      Trachea: No tracheal deviation.   Cardiovascular:      Rate and Rhythm: Normal rate and regular rhythm.      Heart sounds: Normal heart sounds. No murmur heard.    No friction rub. No gallop.   Pulmonary:      Effort: Pulmonary effort is normal. No respiratory distress.      Breath sounds: Normal breath sounds. No stridor. No wheezing or rales.   Chest:      Chest wall: No tenderness.   Abdominal:      General: Bowel sounds are normal. There is no distension.      Palpations: Abdomen is soft. There is no mass.      Tenderness: There is no abdominal tenderness. There is no guarding or rebound.      Hernia: No " hernia is present.   Genitourinary:     Rectum: Guaiac result negative.   Musculoskeletal:      Cervical back: Normal range of motion and neck supple.   Lymphadenopathy:      Cervical: No cervical adenopathy.   Skin:     General: Skin is warm and dry.      Coloration: Skin is not pale.      Findings: No erythema or rash.   Neurological:      Mental Status: He is alert and oriented to person, place, and time.      Cranial Nerves: No cranial nerve deficit.      Motor: No abnormal muscle tone.      Coordination: Coordination normal.      Deep Tendon Reflexes: Reflexes are normal and symmetric. Reflexes normal.   Psychiatric:         Behavior: Behavior normal.         Thought Content: Thought content normal.         Judgment: Judgment normal.         Assessment & Plan      Diagnosis Plan   1. Hepatitis C antibody positive in blood  HCV RNA By PCR, Qn Rfx Sarah    AFP Tumor Marker    CBC & Differential    Comprehensive Metabolic Panel    HCV FibroSURE    HCV RNA By PCR, Qn Rfx Sarah    Hepatitis A Antibody, Total    Hepatitis B Core Antibody, Total    Hepatitis B Surface Antibody    Hepatitis B Surface Antigen    Protime-INR    Urine Drug Screen - Urine, Clean Catch    US Liver        The patient will complete initial lab work and liver US in order to begin Hepatitis C treatment.  The patient will return in two weeks to discuss results and begin treatment if warranted.  Return in about 2 weeks (around 6/7/2023) for Recheck.           Raffy Shetty PA-C

## 2023-05-24 NOTE — TELEPHONE ENCOUNTER
Caller: Chong White Sr.    Relationship: Self    Best call back number:[phone]    What is the best time to reach you: ANY    Who are you requesting to speak with (clinical staff, provider,  specific staff member): ANY    What was the call regarding:  PT IS WANTING AN APPT WITH DR. VALADEZ OR SARITA TO DISCUSS HIS ABLATION THAT HE IS HAVING IN June. HE IS HAVING SECOND THOUGHTS AND WANTS MORE INFORMATION REGARDING ABLATION.    Do you require a callback: YES

## 2023-05-26 ENCOUNTER — HOSPITAL ENCOUNTER (OUTPATIENT)
Dept: MRI IMAGING | Facility: HOSPITAL | Age: 63
Discharge: HOME | End: 2023-05-26
Payer: MEDICAID

## 2023-05-26 DIAGNOSIS — G89.29 CHRONIC BILATERAL LOW BACK PAIN WITHOUT SCIATICA: ICD-10-CM

## 2023-05-26 DIAGNOSIS — G89.29 CHRONIC NECK PAIN: ICD-10-CM

## 2023-05-26 DIAGNOSIS — M54.50 CHRONIC BILATERAL LOW BACK PAIN WITHOUT SCIATICA: ICD-10-CM

## 2023-05-26 DIAGNOSIS — M54.2 CHRONIC NECK PAIN: ICD-10-CM

## 2023-05-26 PROCEDURE — 72148 MRI LUMBAR SPINE W/O DYE: CPT | Performed by: RADIOLOGY

## 2023-05-26 PROCEDURE — 72148 MRI LUMBAR SPINE W/O DYE: CPT

## 2023-05-27 LAB
HCV GENTYP SERPL NAA+PROBE: NORMAL
HCV RNA SERPL NAA+PROBE-ACNC: NORMAL IU/ML
HCV RNA SERPL NAA+PROBE-LOG IU: NORMAL LOG10 IU/ML
LABORATORY COMMENT REPORT: NORMAL

## 2023-06-05 ENCOUNTER — OFFICE VISIT (OUTPATIENT)
Dept: NEUROSURGERY | Facility: CLINIC | Age: 63
End: 2023-06-05
Payer: MEDICAID

## 2023-06-05 VITALS — HEIGHT: 68 IN | WEIGHT: 164 LBS | BODY MASS INDEX: 24.86 KG/M2

## 2023-06-05 DIAGNOSIS — M54.50 CHRONIC BILATERAL LOW BACK PAIN WITHOUT SCIATICA: Primary | ICD-10-CM

## 2023-06-05 DIAGNOSIS — G89.29 CHRONIC BILATERAL LOW BACK PAIN WITHOUT SCIATICA: Primary | ICD-10-CM

## 2023-06-05 PROCEDURE — 99442 PR PHYS/QHP TELEPHONE EVALUATION 11-20 MIN: CPT | Performed by: NEUROLOGICAL SURGERY

## 2023-06-05 RX ORDER — HYDROCODONE BITARTRATE AND ACETAMINOPHEN 10; 325 MG/1; MG/1
1 TABLET ORAL EVERY 6 HOURS PRN
Qty: 45 TABLET | Refills: 0 | Status: SHIPPED | OUTPATIENT
Start: 2023-06-05

## 2023-06-05 NOTE — PROGRESS NOTES
"Subjective     Chief Complaint: Low back pain    Patient ID: Chong White Sr. is a 63 y.o. male is here today for follow-up.    Neck Pain       This is a 63-year-old man who I saw in clinic last month for chronic low back pain.  He had not had a recent MRI of his lumbar spine so agreed to order the MRI in follow-up with him to determine if there was any structural problems that might benefit from surgical intervention.  Telephone consultation was established today due to difficulties the patient has with traveling.  Reasonable attempts to perform a video visit were also undertaken unsuccessfully.  He reports no significant change in his symptoms since his last visit.    The following portions of the patient's history were reviewed and updated as appropriate: allergies, current medications, past family history, past medical history, past social history, past surgical history and problem list.    Family history:   Family History   Problem Relation Age of Onset    Heart disease Mother     Heart disease Maternal Grandmother        Social history:   Social History     Socioeconomic History    Marital status: Single   Tobacco Use    Smoking status: Every Day     Packs/day: 1.00     Types: Cigarettes     Start date: 3/1/1969    Smokeless tobacco: Never   Vaping Use    Vaping Use: Never used   Substance and Sexual Activity    Alcohol use: Never    Drug use: Not Currently     Types: Marijuana    Sexual activity: Defer       Review of Systems   Musculoskeletal:  Positive for neck pain.     Objective   Height 172.7 cm (68\"), weight 74.4 kg (164 lb).  Body mass index is 24.94 kg/m².    Physical Exam    Assessment & Plan     Independent Review of Radiographic Studies:      Available for my review is a MRI of the lumbar spine which was performed on May 26, 2023.  This demonstrates diffuse degenerative disc disease which for the most part is mild, potentially bordering on moderate at L4-5 and L5-S1.  The spinal canal is " congenitally narrowed.  Lumbar lordosis is decreased.  There are no significant abnormal foci of lateral recess or intraforaminal stenosis.  There are no Modic endplate changes.  There is no evidence of spondylolisthesis.    Medical Decision Making:      There is no role for surgery in the management of this patient's chronic back pain.  I made a referral to pain management and given him a one-time prescription for some narcotics to hopefully tide him over until he can get into more formal interventional pain management.  I be happy to follow-up with him on an as-needed basis moving forward.    Approximately 11 minutes was spent on today's phone call including time spent reviewing his prior records.    You have chosen to receive care through a telephone visit. Do you consent to use a telephone visit for your medical care today? Yes      Diagnoses and all orders for this visit:    1. Chronic bilateral low back pain without sciatica (Primary)  -     HYDROcodone-acetaminophen (NORCO)  MG per tablet; Take 1 tablet by mouth Every 6 (Six) Hours As Needed for Moderate Pain.  Dispense: 45 tablet; Refill: 0  -     Ambulatory Referral to Pain Management        No follow-ups on file.           This document signed by KOSTA Lomas MD June 5, 2023 13:09 EDT

## 2023-06-06 NOTE — PATIENT INSTRUCTIONS
Steps to Quit Smoking  Smoking tobacco is the leading cause of preventable death. It can affect almost every organ in the body. Smoking puts you and people around you at risk for many serious, long-lasting (chronic) diseases. Quitting smoking can be hard, but it is one of the best things that you can do for your health. It is never too late to quit.  How do I get ready to quit?  When you decide to quit smoking, make a plan to help you succeed. Before you quit:  · Pick a date to quit. Set a date within the next 2 weeks to give you time to prepare.  · Write down the reasons why you are quitting. Keep this list in places where you will see it often.  · Tell your family, friends, and co-workers that you are quitting. Their support is important.  · Talk with your doctor about the choices that may help you quit.  · Find out if your health insurance will pay for these treatments.  · Know the people, places, things, and activities that make you want to smoke (triggers). Avoid them.  What first steps can I take to quit smoking?  · Throw away all cigarettes at home, at work, and in your car.  · Throw away the things that you use when you smoke, such as ashtrays and lighters.  · Clean your car. Make sure to empty the ashtray.  · Clean your home, including curtains and carpets.  What can I do to help me quit smoking?  Talk with your doctor about taking medicines and seeing a counselor at the same time. You are more likely to succeed when you do both.  · If you are pregnant or breastfeeding, talk with your doctor about counseling or other ways to quit smoking. Do not take medicine to help you quit smoking unless your doctor tells you to do so.  To quit smoking:  Quit right away  · Quit smoking totally, instead of slowly cutting back on how much you smoke over a period of time.  · Go to counseling. You are more likely to quit if you go to counseling sessions regularly.  Take medicine  You may take medicines to help you quit. Some  medicines need a prescription, and some you can buy over-the-counter. Some medicines may contain a drug called nicotine to replace the nicotine in cigarettes. Medicines may:  · Help you to stop having the desire to smoke (cravings).  · Help to stop the problems that come when you stop smoking (withdrawal symptoms).  Your doctor may ask you to use:  · Nicotine patches, gum, or lozenges.  · Nicotine inhalers or sprays.  · Non-nicotine medicine that is taken by mouth.  Find resources  Find resources and other ways to help you quit smoking and remain smoke-free after you quit. These resources are most helpful when you use them often. They include:  · Online chats with a counselor.  · Phone quitlines.  · Printed self-help materials.  · Support groups or group counseling.  · Text messaging programs.  · Mobile phone apps. Use apps on your mobile phone or tablet that can help you stick to your quit plan. There are many free apps for mobile phones and tablets as well as websites. Examples include Quit Guide from the CDC and smokefree.gov    What things can I do to make it easier to quit?    · Talk to your family and friends. Ask them to support and encourage you.  · Call a phone quitline (5-824-QUITNOW), reach out to support groups, or work with a counselor.  · Ask people who smoke to not smoke around you.  · Avoid places that make you want to smoke, such as:  ? Bars.  ? Parties.  ? Smoke-break areas at work.  · Spend time with people who do not smoke.  · Lower the stress in your life. Stress can make you want to smoke. Try these things to help your stress:  ? Getting regular exercise.  ? Doing deep-breathing exercises.  ? Doing yoga.  ? Meditating.  ? Doing a body scan. To do this, close your eyes, focus on one area of your body at a time from head to toe. Notice which parts of your body are tense. Try to relax the muscles in those areas.  How will I feel when I quit smoking?  Day 1 to 3 weeks  Within the first 24 hours,  you may start to have some problems that come from quitting tobacco. These problems are very bad 2-3 days after you quit, but they do not often last for more than 2-3 weeks. You may get these symptoms:  · Mood swings.  · Feeling restless, nervous, angry, or annoyed.  · Trouble concentrating.  · Dizziness.  · Strong desire for high-sugar foods and nicotine.  · Weight gain.  · Trouble pooping (constipation).  · Feeling like you may vomit (nausea).  · Coughing or a sore throat.  · Changes in how the medicines that you take for other issues work in your body.  · Depression.  · Trouble sleeping (insomnia).  Week 3 and afterward  After the first 2-3 weeks of quitting, you may start to notice more positive results, such as:  · Better sense of smell and taste.  · Less coughing and sore throat.  · Slower heart rate.  · Lower blood pressure.  · Clearer skin.  · Better breathing.  · Fewer sick days.  Quitting smoking can be hard. Do not give up if you fail the first time. Some people need to try a few times before they succeed. Do your best to stick to your quit plan, and talk with your doctor if you have any questions or concerns.  Summary  · Smoking tobacco is the leading cause of preventable death. Quitting smoking can be hard, but it is one of the best things that you can do for your health.  · When you decide to quit smoking, make a plan to help you succeed.  · Quit smoking right away, not slowly over a period of time.  · When you start quitting, seek help from your doctor, family, or friends.  This information is not intended to replace advice given to you by your health care provider. Make sure you discuss any questions you have with your health care provider.  Document Released: 10/14/2010 Document Revised: 03/06/2020 Document Reviewed: 03/07/2020  Elsevier Patient Education © 2020 Elsevier Inc.    Heart Failure Action Plan  A heart failure action plan helps you understand what to do when you have symptoms of heart  failure. Follow the plan that was created by you and your health care provider. Review your plan each time you visit your health care provider.  Red zone  These signs and symptoms mean you should get medical help right away:  · You have trouble breathing when resting.  · You have a dry cough that is getting worse.  · You have swelling or pain in your legs or abdomen that is getting worse.  · You suddenly gain more than 2-3 lb (0.9-1.4 kg) in a day, or more than 5 lb (2.3 kg) in one week. This amount may be more or less depending on your condition.  · You have trouble staying awake or you feel confused.  · You have chest pain.  · You do not have an appetite.  · You pass out.  If you experience any of these symptoms:  · Call your local emergency services (911 in the [place] right away or seek help at the emergency department of the nearest hospital.  Yellow zone  These signs and symptoms mean your condition may be getting worse and you should make some changes:  · You have trouble breathing when you are active or you need to sleep with extra pillows.  · You have swelling in your legs or abdomen.  · You gain 2-3 lb (0.9-1.4 kg) in one day, or 5 lb (2.3 kg) in one week. This amount may be more or less depending on your condition.  · You get tired easily.  · You have trouble sleeping.  · You have a dry cough.  If you experience any of these symptoms:  · Contact your health care provider within the next day.  · Your health care provider may adjust your medicines.  Green zone  These signs mean you are doing well and can continue what you are doing:  · You do not have shortness of breath.  · You have very little swelling or no new swelling.  · Your weight is stable (no gain or loss).  · You have a normal activity level.  · You do not have chest pain or any other new symptoms.  Follow these instructions at home:  · Take over-the-counter and prescription medicines only as told by your health care provider.  · Weigh yourself  daily. Your target weight is __________ lb (__________ kg).  ? Call your health care provider if you gain more than __________ lb (__________ kg) in a day, or more than __________ lb (__________ kg) in one week.  · Eat a heart-healthy diet. Work with a diet and nutrition specialist (dietitian) to create an eating plan that is best for you.  · Keep all follow-up visits as told by your health care provider. This is important.  Where to find more information  · American Heart Association: www.heart.org  Summary  · Follow the action plan that was created by you and your health care provider.  · Get help right away if you have any symptoms in the Red zone.  This information is not intended to replace advice given to you by your health care provider. Make sure you discuss any questions you have with your health care provider.  Document Released: 01/27/2018 Document Revised: 11/30/2018 Document Reviewed: 01/27/2018  Elsevier Patient Education © 2020 Elsevier Inc.     There are no Wet Read(s) to document.

## 2023-06-07 ENCOUNTER — ANESTHESIA EVENT (OUTPATIENT)
Dept: CARDIOLOGY | Facility: HOSPITAL | Age: 63
End: 2023-06-07
Payer: MEDICAID

## 2023-06-07 RX ORDER — FENTANYL CITRATE 50 UG/ML
50 INJECTION, SOLUTION INTRAMUSCULAR; INTRAVENOUS
Status: CANCELLED | OUTPATIENT
Start: 2023-06-07

## 2023-06-07 RX ORDER — HYDROMORPHONE HYDROCHLORIDE 1 MG/ML
0.5 INJECTION, SOLUTION INTRAMUSCULAR; INTRAVENOUS; SUBCUTANEOUS
Status: CANCELLED | OUTPATIENT
Start: 2023-06-07

## 2023-06-08 ENCOUNTER — HOSPITAL ENCOUNTER (OUTPATIENT)
Facility: HOSPITAL | Age: 63
Setting detail: HOSPITAL OUTPATIENT SURGERY
Discharge: HOME | End: 2023-06-08
Attending: INTERNAL MEDICINE | Admitting: INTERNAL MEDICINE
Payer: MEDICAID

## 2023-06-08 ENCOUNTER — ANESTHESIA (OUTPATIENT)
Dept: CARDIOLOGY | Facility: HOSPITAL | Age: 63
End: 2023-06-08
Payer: MEDICAID

## 2023-06-08 VITALS
DIASTOLIC BLOOD PRESSURE: 96 MMHG | WEIGHT: 167.4 LBS | HEIGHT: 68 IN | BODY MASS INDEX: 25.37 KG/M2 | OXYGEN SATURATION: 96 % | TEMPERATURE: 97.7 F | SYSTOLIC BLOOD PRESSURE: 156 MMHG | RESPIRATION RATE: 16 BRPM | HEART RATE: 70 BPM

## 2023-06-08 DIAGNOSIS — I48.0 PAROXYSMAL ATRIAL FIBRILLATION: ICD-10-CM

## 2023-06-08 LAB
ANION GAP SERPL CALCULATED.3IONS-SCNC: 7 MMOL/L (ref 5–15)
BUN SERPL-MCNC: 11 MG/DL (ref 8–23)
BUN/CREAT SERPL: 12.2 (ref 7–25)
CALCIUM SPEC-SCNC: 9.6 MG/DL (ref 8.6–10.5)
CHLORIDE SERPL-SCNC: 101 MMOL/L (ref 98–107)
CO2 SERPL-SCNC: 30 MMOL/L (ref 22–29)
CREAT SERPL-MCNC: 0.9 MG/DL (ref 0.76–1.27)
DEPRECATED RDW RBC AUTO: 41.6 FL (ref 37–54)
EGFRCR SERPLBLD CKD-EPI 2021: 96 ML/MIN/1.73
ERYTHROCYTE [DISTWIDTH] IN BLOOD BY AUTOMATED COUNT: 12.2 % (ref 12.3–15.4)
GLUCOSE SERPL-MCNC: 88 MG/DL (ref 65–99)
HCT VFR BLD AUTO: 45.8 % (ref 37.5–51)
HGB BLD-MCNC: 15.8 G/DL (ref 13–17.7)
MCH RBC QN AUTO: 31.9 PG (ref 26.6–33)
MCHC RBC AUTO-ENTMCNC: 34.5 G/DL (ref 31.5–35.7)
MCV RBC AUTO: 92.3 FL (ref 79–97)
PLATELET # BLD AUTO: 165 10*3/MM3 (ref 140–450)
PMV BLD AUTO: 9.9 FL (ref 6–12)
POTASSIUM SERPL-SCNC: 4.6 MMOL/L (ref 3.5–5.2)
RBC # BLD AUTO: 4.96 10*6/MM3 (ref 4.14–5.8)
SODIUM SERPL-SCNC: 138 MMOL/L (ref 136–145)
WBC NRBC COR # BLD: 6.51 10*3/MM3 (ref 3.4–10.8)

## 2023-06-08 PROCEDURE — 80048 BASIC METABOLIC PNL TOTAL CA: CPT | Performed by: INTERNAL MEDICINE

## 2023-06-08 PROCEDURE — 85027 COMPLETE CBC AUTOMATED: CPT | Performed by: INTERNAL MEDICINE

## 2023-06-08 RX ORDER — MIDAZOLAM HYDROCHLORIDE 1 MG/ML
1 INJECTION INTRAMUSCULAR; INTRAVENOUS
Status: DISCONTINUED | OUTPATIENT
Start: 2023-06-08 | End: 2023-06-08 | Stop reason: HOSPADM

## 2023-06-08 RX ORDER — ACETAMINOPHEN 325 MG/1
650 TABLET ORAL EVERY 4 HOURS PRN
Status: DISCONTINUED | OUTPATIENT
Start: 2023-06-08 | End: 2023-06-08 | Stop reason: HOSPADM

## 2023-06-08 RX ORDER — SODIUM CHLORIDE 0.9 % (FLUSH) 0.9 %
10 SYRINGE (ML) INJECTION AS NEEDED
Status: DISCONTINUED | OUTPATIENT
Start: 2023-06-08 | End: 2023-06-08 | Stop reason: HOSPADM

## 2023-06-08 RX ORDER — SODIUM CHLORIDE 9 MG/ML
40 INJECTION, SOLUTION INTRAVENOUS AS NEEDED
Status: DISCONTINUED | OUTPATIENT
Start: 2023-06-08 | End: 2023-06-08 | Stop reason: HOSPADM

## 2023-06-08 RX ORDER — LIDOCAINE HYDROCHLORIDE 10 MG/ML
0.5 INJECTION, SOLUTION EPIDURAL; INFILTRATION; INTRACAUDAL; PERINEURAL ONCE AS NEEDED
Status: DISCONTINUED | OUTPATIENT
Start: 2023-06-08 | End: 2023-06-08 | Stop reason: HOSPADM

## 2023-06-08 RX ORDER — SODIUM CHLORIDE, SODIUM LACTATE, POTASSIUM CHLORIDE, CALCIUM CHLORIDE 600; 310; 30; 20 MG/100ML; MG/100ML; MG/100ML; MG/100ML
9 INJECTION, SOLUTION INTRAVENOUS CONTINUOUS
Status: DISCONTINUED | OUTPATIENT
Start: 2023-06-08 | End: 2023-06-08 | Stop reason: HOSPADM

## 2023-06-08 RX ORDER — NITROGLYCERIN 0.4 MG/1
0.4 TABLET SUBLINGUAL
Status: DISCONTINUED | OUTPATIENT
Start: 2023-06-08 | End: 2023-06-08 | Stop reason: HOSPADM

## 2023-06-08 RX ORDER — FAMOTIDINE 10 MG/ML
20 INJECTION, SOLUTION INTRAVENOUS ONCE
Status: DISCONTINUED | OUTPATIENT
Start: 2023-06-08 | End: 2023-06-08 | Stop reason: HOSPADM

## 2023-06-08 RX ORDER — SODIUM CHLORIDE 0.9 % (FLUSH) 0.9 %
10 SYRINGE (ML) INJECTION EVERY 12 HOURS SCHEDULED
Status: DISCONTINUED | OUTPATIENT
Start: 2023-06-08 | End: 2023-06-08 | Stop reason: HOSPADM

## 2023-06-08 RX ORDER — FAMOTIDINE 20 MG/1
20 TABLET, FILM COATED ORAL ONCE
Status: DISCONTINUED | OUTPATIENT
Start: 2023-06-08 | End: 2023-06-08 | Stop reason: HOSPADM

## 2023-06-08 RX ORDER — ONDANSETRON 2 MG/ML
4 INJECTION INTRAMUSCULAR; INTRAVENOUS EVERY 6 HOURS PRN
Status: DISCONTINUED | OUTPATIENT
Start: 2023-06-08 | End: 2023-06-08 | Stop reason: HOSPADM

## 2023-06-08 NOTE — ANESTHESIA PREPROCEDURE EVALUATION
Anesthesia Evaluation     Patient summary reviewed and Nursing notes reviewed   no history of anesthetic complications:   NPO Solid Status: > 8 hours  NPO Liquid Status: > 2 hours           Airway   Mallampati: II  TM distance: >3 FB  Neck ROM: full  Possible difficult intubation  Dental - normal exam     Pulmonary - normal exam   (+) a smoker Current, COPD,  (-) asthma, sleep apnea  Cardiovascular - normal exam  Exercise tolerance: good (4-7 METS)    ECG reviewed  PT is on anticoagulation therapy  Patient on routine beta blocker and Beta blocker given within 24 hours of surgery    (+) hypertension, cardiac stents dysrhythmias Atrial Fib, CHF     ROS comment: 11//22-lvef 56-60, gr1dd, nl av, nl mv, no effusion.    Neuro/Psych  (-) seizures, TIA  GI/Hepatic/Renal/Endo    (+) GERD well controlled, hepatitis C, diabetes mellitus, thyroid problem     Musculoskeletal     Abdominal    Substance History - negative use     OB/GYN          Other        ROS/Med Hx Other: Hgb 17 k 4.6   Eliquis  diffuse central canal stenosis , moderate                  Anesthesia Plan          Plan discussed with CRNA.    CODE STATUS:

## 2023-06-08 NOTE — H&P
[facility] Electrophysiology    Date of Hospital Visit: 23    Place of Service: [facility]    Patient Name: Chong White Sr.  :1960      Primary Care Provider: Amy Yanez APRN    Chief complaint/Reason for Consultation:  Atrial Fibrillation    Problem List:  Active Hospital Problems    Diagnosis  POA    **Atrial fibrillation [I48.91]  Yes     Priority: High     ECV, 2020  ECV, 2022      Ischemic cardiomyopathy [I25.5]  Yes     Priority: High     Echo, 2020:The left ventricular cavity is borderline dilated with severe global wall hypokinesis and severe left ventricular systolic dysfunction, EF 21-25%. Left ventricular diastolic dysfunction (grade II) consistent with pseudonormalization.  LUCERO, 2022: Left ventricular ejection fraction appears to be less than 20%. Left ventricular systolic function is severely decreased.  MPS, 2022: Abnormal LV wall motion consistent with severe global hypokinesis. (Calculated EF = 23%).  TTE 2022 56-60% with grade I diastolic dysfunction      ASCVD (arteriosclerotic cardiovascular disease) [I25.10]  Yes     Priority: Medium     LHC, 2020: occlusion of the ostial LAD with remarkably good collateral connection from rCA filling the LAD with brisk flow to the point of occlusion in the prox LAD.  RCA and CX are free of any significant disease.       Hypertension [I10]  Yes     Priority: Low    Chronic anticoagulation [Z79.01]  Not Applicable     Priority: Low    Long term current use of antiarrhythmic drug [Z79.899]  Not Applicable     Priority: Low             History of Present Illness:  63-year-old male with a long history of paroxysmal atrial fibrillation.  He was recently evaluated in the electrophysiology clinic and complained of recurrent, highly symptomatic atrial fibrillation and spite of antiarrhythmic therapy.  He presents today for pulmonary vein isolation.        Allergies   Allergen Reactions     Codeine GI Intolerance    Penicillins Hives       Current Outpatient Medications   Medication Instructions    amiodarone (PACERONE) 200 mg, Oral, Daily    apixaban (ELIQUIS) 5 mg, Oral, 2 Times Daily    aspirin 81 mg, Oral, Daily    atorvastatin (LIPITOR) 40 mg, Oral, Nightly    carvedilol (COREG) 3.125 mg, Oral, 2 Times Daily With Meals    empagliflozin (JARDIANCE) 10 mg, Oral, Daily    furosemide (LASIX) 20 MG tablet TAKE ONE TABLET BY MOUTH DAILY    gabapentin (NEURONTIN) 800 mg, Oral, 2 Times Daily    HYDROcodone-acetaminophen (NORCO)  MG per tablet 1 tablet, Oral, Every 6 Hours PRN    ranolazine (RANEXA) 500 MG 12 hr tablet TAKE ONE TABLET BY MOUTH EVERY 12 HOURS    sacubitril-valsartan (ENTRESTO) 24-26 MG tablet 1 tablet, Oral, Every 12 Hours Scheduled    Vericiguat (Verquvo) 5 MG tablet Take 1 tablet by mouth Daily    zolpidem (AMBIEN) 5 mg, Oral, Nightly PRN           Social History     Socioeconomic History    Marital status: Single   Tobacco Use    Smoking status: Every Day     Packs/day: 1.00     Types: Cigarettes     Start date: 3/1/1969    Smokeless tobacco: Never   Vaping Use    Vaping Use: Never used   Substance and Sexual Activity    Alcohol use: Never    Drug use: Not Currently     Types: Marijuana    Sexual activity: Defer       Family History   Problem Relation Age of Onset    Heart disease Mother     Heart disease Maternal Grandmother        REVIEW OF SYSTEMS:   Review of Systems   Constitutional: Positive for malaise/fatigue. Negative for diaphoresis and weight gain.   Cardiovascular:  Positive for palpitations. Negative for chest pain, claudication, dyspnea on exertion, irregular heartbeat, leg swelling, orthopnea, paroxysmal nocturnal dyspnea and syncope.   Respiratory:  Positive for shortness of breath. Negative for cough, sleep disturbances due to breathing and wheezing.    Hematologic/Lymphatic: Negative for bleeding problem.   Musculoskeletal:  Negative for muscle cramps, muscle  "weakness and myalgias.   Gastrointestinal:  Negative for heartburn.   Neurological:  Negative for weakness.          Objective:  Vitals:    06/08/23 1015   BP: 156/96   BP Location: Right arm   Patient Position: Lying   Pulse: 70   Resp: 16   Temp: 97.7 °F (36.5 °C)   TempSrc: Temporal   SpO2: 96%   Weight: 75.9 kg (167 lb 6.4 oz)   Height: 172.7 cm (68\")     Body mass index is 25.45 kg/m².      Constitutional:       Appearance: Well-developed.   Pulmonary:      Effort: Pulmonary effort is normal. No respiratory distress.      Breath sounds: Normal breath sounds. No wheezing. No rales.      Comments: Bases clear  Chest:      Chest wall: Not tender to palpatation.   Cardiovascular:      Normal rate. Regular rhythm.      Murmurs: There is no murmur.      No gallop.  No click. No rub.   Pulses:     Intact distal pulses.   Edema:     Peripheral edema absent.   Musculoskeletal: Normal range of motion.         Lab Review:               Estimated Creatinine Clearance: 85.4 mL/min (by C-G formula based on SCr of 0.95 mg/dL).  Lab Results   Component Value Date    INR 1.44 (H) 06/23/2022    INR 1.41 (H) 06/16/2022    INR 1.07 08/18/2021    PROTIME 17.9 (H) 06/23/2022    PROTIME 17.6 (H) 06/16/2022    PROTIME 14.3 08/18/2021            Assessment:   Highly symptomatic paroxysmal atrial fibrillation        Plan:   Pulmonary vein isolation        SOFÍA Pimentel    "

## 2023-06-08 NOTE — NURSING NOTE
Patient arrived for procedure and said he had no idea why he was here and became very agitated with Dr. Arechiga.  Dr. Arechiga did not feel comfortable doing his procedure and cancelled. When I took him his discharge papers he threw them across the room and walked out.  I notified security of the patient and the situation as he had no transportation with [facility] until 14:00.  Security observed the pt until he left campus at 11:30.

## 2023-06-08 NOTE — PROGRESS NOTES
"Chong White  presented for his elective outpatient catheter ablation of paroxysmal atrial fibrillation.  Please see my note from clinic visit with him on March 27, 2023.  At that time we had discussed that his LV systolic function had recovered with guideline directed medical therapy prescribed by his cardiologist Dr. Lopez, and that fortunately for the patient the no longer was in need of a defibrillator.  I congratulated him on this excellent news.  He seemed pleased to hear this.      At the same clinic visit we continued by having a detailed discussion about his recurrent symptoms of atrial fibrillation.  I offered him a catheter ablation procedure.  I reviewed his options in with him and he was interested in ablation.  We spoke at length.  He asked good questions.  I answered them.  He understood our plan and agreed to proceed with scheduling his ablation. We spent about 45 minutes together. A pre-ablation CT was ordered and obtained accordingly.    He presented today for his ablation procedure and indicated to our staff that he \" had no idea why he was here \" and that \" maybe i'm  going too have my arteries cleaned out \" ( his words to me ).   Upon asking him if he recalled our conversation in the clinic, he said \"no.\"  I discussed with him that he and I had focused on managing his AF and that he and I had after discussion planned on him coming for an AF ablation.  This was in an attempt to rekindle his memory.  He told me he did not recall the conversation, but it was \" up to me to do what was needed.\"  I discussed with him that AF ablation was a highly elective procedure and the fact that he had no idea why he was at the hospital gave me significant concern for two reasons: 1) informed consent requires he understand the procedure indication, methods, alternatives and risks, 2) successful and safe ablation requires that the patient understand the potential post-operative risks and that avoiding these " "complications requires his full participation ( for instance taking his anticoagulant without interruption, avoiding anything but minimal activity post procedure [ again, items we had previously talked about in March in my clinic ]).  In short, I told him that in order for him to have a successful procedure, I need him to understand why and what we are doing and I need him to participate in his own care.  In discussing this with him at the bedside, I perceived nothing that comforted me that he understood what I was talking about.    At this time, I told him that in my medical opinion he would be best served by not proceeding with the procedure today.  I told him that proceeding today would place him at undue risk as he clearly had no idea what an AF ablation was.  I suggested that we reschedule this procedure to a later date, give him some time and information to read, a  chance to think about the procedure, consider the risks and benefits in an unhurried fashion and have the chance to ask me informed questions.  At this point be became visibly angry and raised his voice.  He stated \"[facility], bring that bitch ( referring to his nurse ) back in here before I hurt someone to take all this shit off of me.\"  He was voice was loud and his tone threatening.  This was heard by numerous staff and likely other patients in the [facility] area.  He then ripped off his ECG monitoring equipment forcefully and threw it down.  I left the patient room concerned that he would become violent and out of concern for my own safety.  The [facility] staff and CRNA present outside the room looked concerned.  I discussed with them that the patient wanted to leave, and that they may need to call a \"code white.\"  I then left the area in attempt to diffuse the situation.  I was informed by the [facility] staff later that the patient threw his discharge papers and clip board in anger and left the building.        Jose Arechiga, DO, [facility], " [facility]  Cardiac Electrophysiologist  [facility]

## 2023-06-14 ENCOUNTER — DISEASE STATE MANAGEMENT VISIT (OUTPATIENT)
Dept: PHARMACY | Facility: HOSPITAL | Age: 63
End: 2023-06-14
Payer: MEDICAID

## 2023-06-14 ENCOUNTER — HOSPITAL ENCOUNTER (OUTPATIENT)
Dept: ULTRASOUND IMAGING | Facility: HOSPITAL | Age: 63
Discharge: HOME | End: 2023-06-14
Payer: MEDICAID

## 2023-06-14 VITALS
SYSTOLIC BLOOD PRESSURE: 107 MMHG | HEIGHT: 68 IN | WEIGHT: 167 LBS | HEART RATE: 73 BPM | BODY MASS INDEX: 25.31 KG/M2 | DIASTOLIC BLOOD PRESSURE: 73 MMHG

## 2023-06-14 DIAGNOSIS — R76.8 HEPATITIS C ANTIBODY POSITIVE IN BLOOD: ICD-10-CM

## 2023-06-14 DIAGNOSIS — K83.8 COMMON BILE DUCT DILATION: Primary | ICD-10-CM

## 2023-06-14 LAB
ALBUMIN SERPL-MCNC: 4.9 G/DL (ref 3.5–5.2)
ALBUMIN/GLOB SERPL: 1.5 G/DL
ALP SERPL-CCNC: 94 U/L (ref 39–117)
ALPHA-FETOPROTEIN: 3.17 NG/ML (ref 0–8.3)
ALT SERPL W P-5'-P-CCNC: 19 U/L (ref 1–41)
ANION GAP SERPL CALCULATED.3IONS-SCNC: 9.6 MMOL/L (ref 5–15)
AST SERPL-CCNC: 21 U/L (ref 1–40)
BASOPHILS # BLD AUTO: 0.05 10*3/MM3 (ref 0–0.2)
BASOPHILS NFR BLD AUTO: 0.7 % (ref 0–1.5)
BILIRUB SERPL-MCNC: 0.3 MG/DL (ref 0–1.2)
BUN SERPL-MCNC: 16 MG/DL (ref 8–23)
BUN/CREAT SERPL: 17.2 (ref 7–25)
CALCIUM SPEC-SCNC: 10.4 MG/DL (ref 8.6–10.5)
CHLORIDE SERPL-SCNC: 104 MMOL/L (ref 98–107)
CO2 SERPL-SCNC: 26.4 MMOL/L (ref 22–29)
CREAT SERPL-MCNC: 0.93 MG/DL (ref 0.76–1.27)
DEPRECATED RDW RBC AUTO: 40.5 FL (ref 37–54)
EGFRCR SERPLBLD CKD-EPI 2021: 92.3 ML/MIN/1.73
EOSINOPHIL # BLD AUTO: 0.06 10*3/MM3 (ref 0–0.4)
EOSINOPHIL NFR BLD AUTO: 0.9 % (ref 0.3–6.2)
ERYTHROCYTE [DISTWIDTH] IN BLOOD BY AUTOMATED COUNT: 12.4 % (ref 12.3–15.4)
GLOBULIN UR ELPH-MCNC: 3.2 GM/DL
GLUCOSE SERPL-MCNC: 93 MG/DL (ref 65–99)
HBV SURFACE AB SER RIA-ACNC: REACTIVE
HBV SURFACE AG SERPL QL IA: NORMAL
HCT VFR BLD AUTO: 51.4 % (ref 37.5–51)
HGB BLD-MCNC: 18.2 G/DL (ref 13–17.7)
IMM GRANULOCYTES # BLD AUTO: 0.03 10*3/MM3 (ref 0–0.05)
IMM GRANULOCYTES NFR BLD AUTO: 0.4 % (ref 0–0.5)
INR PPP: 0.92 (ref 0.9–1.1)
LYMPHOCYTES # BLD AUTO: 1.04 10*3/MM3 (ref 0.7–3.1)
LYMPHOCYTES NFR BLD AUTO: 15 % (ref 19.6–45.3)
MCH RBC QN AUTO: 31.7 PG (ref 26.6–33)
MCHC RBC AUTO-ENTMCNC: 35.4 G/DL (ref 31.5–35.7)
MCV RBC AUTO: 89.5 FL (ref 79–97)
MONOCYTES # BLD AUTO: 0.48 10*3/MM3 (ref 0.1–0.9)
MONOCYTES NFR BLD AUTO: 6.9 % (ref 5–12)
NEUTROPHILS NFR BLD AUTO: 5.27 10*3/MM3 (ref 1.7–7)
NEUTROPHILS NFR BLD AUTO: 76.1 % (ref 42.7–76)
NRBC BLD AUTO-RTO: 0 /100 WBC (ref 0–0.2)
PLATELET # BLD AUTO: 228 10*3/MM3 (ref 140–450)
PMV BLD AUTO: 10.6 FL (ref 6–12)
POTASSIUM SERPL-SCNC: 5 MMOL/L (ref 3.5–5.2)
PROT SERPL-MCNC: 8.1 G/DL (ref 6–8.5)
PROTHROMBIN TIME: 12.8 SECONDS (ref 12.1–14.7)
RBC # BLD AUTO: 5.74 10*6/MM3 (ref 4.14–5.8)
SODIUM SERPL-SCNC: 140 MMOL/L (ref 136–145)
WBC NRBC COR # BLD: 6.93 10*3/MM3 (ref 3.4–10.8)

## 2023-06-14 PROCEDURE — 87522 HEPATITIS C REVRS TRNSCRPJ: CPT | Performed by: PHYSICIAN ASSISTANT

## 2023-06-14 PROCEDURE — 85610 PROTHROMBIN TIME: CPT | Performed by: PHYSICIAN ASSISTANT

## 2023-06-14 PROCEDURE — 86708 HEPATITIS A ANTIBODY: CPT | Performed by: PHYSICIAN ASSISTANT

## 2023-06-14 PROCEDURE — 82105 ALPHA-FETOPROTEIN SERUM: CPT | Performed by: PHYSICIAN ASSISTANT

## 2023-06-14 PROCEDURE — 76705 ECHO EXAM OF ABDOMEN: CPT

## 2023-06-14 PROCEDURE — 80053 COMPREHEN METABOLIC PANEL: CPT | Performed by: PHYSICIAN ASSISTANT

## 2023-06-14 PROCEDURE — 85025 COMPLETE CBC W/AUTO DIFF WBC: CPT | Performed by: PHYSICIAN ASSISTANT

## 2023-06-14 PROCEDURE — 86706 HEP B SURFACE ANTIBODY: CPT | Performed by: PHYSICIAN ASSISTANT

## 2023-06-14 PROCEDURE — 76705 ECHO EXAM OF ABDOMEN: CPT | Performed by: RADIOLOGY

## 2023-06-14 PROCEDURE — 87340 HEPATITIS B SURFACE AG IA: CPT | Performed by: PHYSICIAN ASSISTANT

## 2023-06-14 PROCEDURE — 86704 HEP B CORE ANTIBODY TOTAL: CPT | Performed by: PHYSICIAN ASSISTANT

## 2023-06-14 NOTE — PROGRESS NOTES
Chief Complaint   Patient presents with    Hepatitis C       Chong White Sr. is a 63 y.o. male who presents to the office today for follow up appointment for Hepatitis C  .    HPI    The patient was seen for a follow up visit for Hep C treatment.  He completed liver US but did not complete initial labs.      Liver US:    1.  3 cm right hyperechoic liver mass most consistent with hemangioma.  2.  Common bile duct is prominent measuring 1 cm in diameter.  No common  bile duct stone is seen.        Review of Systems   Constitutional:  Negative for activity change, appetite change and fatigue.   HENT:  Negative for trouble swallowing and voice change.    Respiratory:  Negative for cough and choking.    Cardiovascular:  Negative for leg swelling.   Gastrointestinal:  Negative for abdominal distention, abdominal pain, anal bleeding, blood in stool, constipation, diarrhea, nausea, rectal pain and vomiting.   Endocrine: Negative for polyphagia.   Genitourinary:  Negative for flank pain.   Musculoskeletal:  Positive for back pain and gait problem.   Skin:  Negative for color change and pallor.   Allergic/Immunologic: Negative for food allergies.   Neurological:  Negative for weakness.   Psychiatric/Behavioral:  Negative for confusion.      ACTIVE PROBLEMS:   Specialty Problems          Cardiology Problems    Atrial fibrillation        Chronic combined systolic and diastolic congestive heart failure        Ischemic cardiomyopathy           PAST MEDICAL HISTORY:  Past Medical History:   Diagnosis Date    Arthritis     Atrial fibrillation     CHF (congestive heart failure)     COPD (chronic obstructive pulmonary disease)     Coronary artery disease     GERD (gastroesophageal reflux disease)     Heart attack     X 13 per patient, last one 2002 (9 heart attacks 2001- 1 cardiac stent placed)    History of hepatitis C 2001    Hypertension     Myocardial infarction     Tinnitus     Wears reading eyeglasses        SURGICAL  HISTORY:  Past Surgical History:   Procedure Laterality Date    CARDIAC CATHETERIZATION N/A 09/04/2020    Procedure: Left Heart Cath;  Surgeon: Herman Sen MD;  Location: [facility] CATH INVASIVE LOCATION;  Service: Cardiology;  Laterality: N/A;    COLONOSCOPY      CORONARY STENT PLACEMENT      FACIAL RECONSTRUCTION SURGERY Right 1995       FAMILY HISTORY:  Family History   Problem Relation Age of Onset    Heart disease Mother     Heart disease Maternal Grandmother        SOCIAL HISTORY:  Social History     Tobacco Use    Smoking status: Every Day     Packs/day: 1.00     Types: Cigarettes     Start date: 3/1/1969    Smokeless tobacco: Never   Substance Use Topics    Alcohol use: Never       CURRENT MEDICATION:    Current Outpatient Medications:     apixaban (ELIQUIS) 5 MG tablet tablet, Take 1 tablet by mouth 2 (Two) Times a Day., Disp: , Rfl:     aspirin 81 MG chewable tablet, Chew 1 tablet Daily., Disp: , Rfl:     atorvastatin (LIPITOR) 40 MG tablet, Take 1 tablet by mouth Every Night., Disp: 90 tablet, Rfl: 1    carvedilol (COREG) 6.25 MG tablet, Take 0.5 tablets by mouth 2 (Two) Times a Day With Meals., Disp: 180 tablet, Rfl: 0    empagliflozin (Jardiance) 10 MG tablet tablet, Take 1 tablet by mouth Daily for 90 days., Disp: 90 tablet, Rfl: 0    furosemide (LASIX) 20 MG tablet, TAKE ONE TABLET BY MOUTH DAILY (Patient taking differently: Take 1 tablet by mouth Daily.), Disp: 30 tablet, Rfl: 0    HYDROcodone-acetaminophen (NORCO)  MG per tablet, Take 1 tablet by mouth Every 6 (Six) Hours As Needed for Moderate Pain., Disp: 45 tablet, Rfl: 0    ranolazine (RANEXA) 500 MG 12 hr tablet, TAKE ONE TABLET BY MOUTH EVERY 12 HOURS, Disp: 60 tablet, Rfl: 0    sacubitril-valsartan (ENTRESTO) 24-26 MG tablet, Take 1 tablet by mouth Every 12 (Twelve) Hours for 90 days., Disp: 180 tablet, Rfl: 0    Vericiguat (Verquvo) 5 MG tablet, Take 1 tablet by mouth Daily, Disp: 90 tablet, Rfl: 0    zolpidem (Ambien) 5 MG tablet,  "Take 1 tablet by mouth At Night As Needed for Sleep., Disp: 30 tablet, Rfl: 2    ALLERGIES:  Codeine and Penicillins    VISIT VITALS:  Blood Pressure 107/73   Pulse 73   Height 172.7 cm (68\")   Weight 75.8 kg (167 lb)   Body Mass Index 25.39 kg/m²     Physical Exam  Constitutional:       General: He is not in acute distress.     Appearance: He is well-developed. He is not diaphoretic.   HENT:      Head: Normocephalic and atraumatic.      Right Ear: External ear normal.      Left Ear: External ear normal.      Nose: Nose normal.      Mouth/Throat:      Pharynx: No oropharyngeal exudate.   Eyes:      General: No scleral icterus.        Right eye: No discharge.         Left eye: No discharge.      Conjunctiva/sclera: Conjunctivae normal.      Pupils: Pupils are equal, round, and reactive to light.   Neck:      Thyroid: No thyromegaly.      Vascular: No JVD.      Trachea: No tracheal deviation.   Cardiovascular:      Rate and Rhythm: Normal rate and regular rhythm.      Heart sounds: Normal heart sounds. No murmur heard.    No friction rub. No gallop.   Pulmonary:      Effort: Pulmonary effort is normal. No respiratory distress.      Breath sounds: Normal breath sounds. No stridor. No wheezing or rales.   Chest:      Chest wall: No tenderness.   Abdominal:      General: Bowel sounds are normal. There is no distension.      Palpations: Abdomen is soft. There is no mass.      Tenderness: There is abdominal tenderness (RUQ). There is no guarding or rebound.      Hernia: No hernia is present.   Genitourinary:     Rectum: Guaiac result negative.   Musculoskeletal:      Cervical back: Normal range of motion and neck supple.   Lymphadenopathy:      Cervical: No cervical adenopathy.   Skin:     General: Skin is warm and dry.      Coloration: Skin is not pale.      Findings: No erythema or rash.   Neurological:      Mental Status: He is alert and oriented to person, place, and time.      Cranial Nerves: No cranial nerve " deficit.      Motor: No abnormal muscle tone.      Coordination: Coordination normal.      Deep Tendon Reflexes: Reflexes are normal and symmetric. Reflexes normal.   Psychiatric:         Behavior: Behavior normal.         Thought Content: Thought content normal.         Judgment: Judgment normal.       Assessment & Plan      Diagnosis Plan   1. Common bile duct dilation  MRI abdomen w wo contrast mrcp        Patient will have labs drawn today for initial testing for Hep C treatment.  He will be scheduled for MRCP due to dilated common bile duct reported on US.    Return in about 3 weeks (around 7/5/2023) for Recheck.         Raffy Shetty PA-C

## 2023-06-15 DIAGNOSIS — G47.00 INSOMNIA, UNSPECIFIED TYPE: ICD-10-CM

## 2023-06-15 LAB
HAV AB SER QL IA: NEGATIVE
HBV CORE AB SERPL QL IA: POSITIVE

## 2023-06-15 RX ORDER — ZOLPIDEM TARTRATE 5 MG/1
5 TABLET ORAL NIGHTLY PRN
Qty: 30 TABLET | Refills: 2 | Status: SHIPPED | OUTPATIENT
Start: 2023-06-15

## 2023-06-16 LAB — HCV RNA SERPL NAA+PROBE-ACNC: NORMAL IU/ML

## 2023-06-17 LAB
A2 MACROGLOB SERPL-MCNC: 171 MG/DL (ref 110–276)
ALT SERPL W P-5'-P-CCNC: 21 IU/L (ref 0–55)
APO A-I SERPL-MCNC: 157 MG/DL (ref 101–178)
BILIRUB SERPL-MCNC: 0.3 MG/DL (ref 0–1.2)
FIBROSIS SCORING:: NORMAL
FIBROSIS STAGE SERPL QL: NORMAL
GGT SERPL-CCNC: 16 IU/L (ref 0–65)
HAPTOGLOB SERPL-MCNC: 200 MG/DL (ref 32–363)
HCV AB SER QL: NORMAL
LABORATORY COMMENT REPORT: NORMAL
LIVER FIBR SCORE SERPL CALC.FIBROSURE: 0.1 (ref 0–0.21)
NECROINFLAMM ACTIVITY SCORING:: NORMAL
NECROINFLAMMATORY ACT GRADE SERPL QL: NORMAL
NECROINFLAMMATORY ACT SCORE SERPL: 0.06 (ref 0–0.17)
SERVICE CMNT-IMP: NORMAL
TEST PERFORMANCE INFO SPEC: NORMAL

## 2023-07-24 DIAGNOSIS — M54.12 CERVICAL RADICULOPATHY: Primary | ICD-10-CM

## 2023-07-25 ENCOUNTER — TELEPHONE (OUTPATIENT)
Dept: CARDIOLOGY | Facility: CLINIC | Age: 63
End: 2023-07-25
Payer: MEDICAID

## 2023-07-26 ENCOUNTER — DISEASE STATE MANAGEMENT VISIT (OUTPATIENT)
Dept: PHARMACY | Facility: HOSPITAL | Age: 63
End: 2023-07-26
Payer: MEDICAID

## 2023-07-26 VITALS
HEART RATE: 91 BPM | WEIGHT: 169 LBS | HEIGHT: 68 IN | BODY MASS INDEX: 25.61 KG/M2 | SYSTOLIC BLOOD PRESSURE: 118 MMHG | DIASTOLIC BLOOD PRESSURE: 75 MMHG

## 2023-07-26 DIAGNOSIS — R76.8 HEPATITIS B CORE ANTIBODY POSITIVE: Primary | ICD-10-CM

## 2023-07-26 PROCEDURE — 87350 HEPATITIS BE AG IA: CPT

## 2023-07-26 PROCEDURE — 86707 HEPATITIS BE ANTIBODY: CPT

## 2023-07-26 PROCEDURE — 87522 HEPATITIS C REVRS TRNSCRPJ: CPT

## 2023-07-26 NOTE — PROGRESS NOTES
Chief Complaint   Patient presents with    Hepatitis C       Chong White Sr. is a 63 y.o. male who presents to the office today for follow up appointment for Hepatitis C  .    HPI    The patient was seen for a follow up visit to discuss results of initial Hep C testing.  Liver US:  1.  3 cm right hyperechoic liver mass most consistent with hemangioma.         2.  Common bile duct is prominent measuring 1 cm in diameter.  No                  common bile duct stone is seen (Radiologist confirmed no followup                needed for hemangioma.  MRI ordered for CBC prominence)    Labs:  PT-INR normal; Hep B surface antigen nonreactive; Hep B surface antibody reactive; Hep B core antibody positive; Hep A negative; HCV [id number] fibrosis;   Normal liver enzymes; normal platelets, hemoglobin 18.2 hematocrit 51.4; AFP tumor marker.  HCV RNA quant with genotype not tested due to insufficient specimen.    Review of Systems   Constitutional:  Negative for activity change, appetite change and fatigue.   HENT:  Negative for trouble swallowing and voice change.    Respiratory:  Negative for cough and choking.    Cardiovascular:  Negative for leg swelling.   Gastrointestinal:  Negative for abdominal distention, abdominal pain, anal bleeding, blood in stool, constipation, diarrhea, nausea, rectal pain and vomiting.   Endocrine: Negative for polyphagia.   Genitourinary:  Negative for flank pain.   Musculoskeletal:  Negative for back pain.   Skin:  Negative for color change and pallor.   Allergic/Immunologic: Negative for food allergies.   Neurological:  Negative for weakness.   Psychiatric/Behavioral:  Negative for confusion.      ACTIVE PROBLEMS:   Specialty Problems          Cardiology Problems    Atrial fibrillation        Chronic combined systolic and diastolic congestive heart failure        Ischemic cardiomyopathy           PAST MEDICAL HISTORY:  Past Medical History:   Diagnosis Date    Arthritis     Atrial  fibrillation     CHF (congestive heart failure)     COPD (chronic obstructive pulmonary disease)     Coronary artery disease     GERD (gastroesophageal reflux disease)     Heart attack     X 13 per patient, last one 2002 (9 heart attacks 2001- 1 cardiac stent placed)    History of hepatitis C 2001    Hypertension     Myocardial infarction     Tinnitus     Wears reading eyeglasses        SURGICAL HISTORY:  Past Surgical History:   Procedure Laterality Date    CARDIAC CATHETERIZATION N/A 09/04/2020    Procedure: Left Heart Cath;  Surgeon: Herman Sen MD;  Location:  [facility] CATH INVASIVE LOCATION;  Service: Cardiology;  Laterality: N/A;    COLONOSCOPY      CORONARY STENT PLACEMENT      FACIAL RECONSTRUCTION SURGERY Right 1995       FAMILY HISTORY:  Family History   Problem Relation Age of Onset    Heart disease Mother     Heart disease Maternal Grandmother        SOCIAL HISTORY:  Social History     Tobacco Use    Smoking status: Every Day     Packs/day: 1.00     Types: Cigarettes     Start date: 3/1/1969    Smokeless tobacco: Never   Substance Use Topics    Alcohol use: Never       CURRENT MEDICATION:    Current Outpatient Medications:     apixaban (ELIQUIS) 5 MG tablet tablet, Take 1 tablet by mouth 2 (Two) Times a Day., Disp: 60 tablet, Rfl: 5    aspirin 81 MG EC tablet, , Disp: , Rfl:     atorvastatin (LIPITOR) 40 MG tablet, Take 1 tablet by mouth Every Night., Disp: 90 tablet, Rfl: 1    baclofen (LIORESAL) 20 MG tablet, , Disp: , Rfl:     carvedilol (COREG) 6.25 MG tablet, Take 0.5 tablets by mouth 2 (Two) Times a Day With Meals., Disp: 180 tablet, Rfl: 0    cyclobenzaprine (FLEXERIL) 10 MG tablet, , Disp: , Rfl:     empagliflozin (Jardiance) 10 MG tablet tablet, Take 1 tablet by mouth Daily., Disp: 30 tablet, Rfl: 5    furosemide (LASIX) 20 MG tablet, TAKE ONE TABLET BY MOUTH DAILY (Patient taking differently: Take 1 tablet by mouth Daily.), Disp: 30 tablet, Rfl: 0    gabapentin (NEURONTIN) 800 MG tablet, ,  "Disp: , Rfl:     HYDROcodone-acetaminophen (NORCO)  MG per tablet, Take 1 tablet by mouth Every 6 (Six) Hours As Needed for Moderate Pain., Disp: 45 tablet, Rfl: 0    lisinopril (PRINIVIL,ZESTRIL) 5 MG tablet, , Disp: , Rfl:     nabumetone (RELAFEN) 500 MG tablet, Take 1 tablet twice a day by oral route as directed for 30 days., Disp: , Rfl:     OXcarbazepine (TRILEPTAL) 600 MG tablet, , Disp: , Rfl:     pantoprazole (PROTONIX) 40 MG EC tablet, , Disp: , Rfl:     ranolazine (RANEXA) 500 MG 12 hr tablet, Take 1 tablet by mouth Every 12 (Twelve) Hours., Disp: 60 tablet, Rfl: 5    sacubitril-valsartan (ENTRESTO) 24-26 MG tablet, Take 1 tablet by mouth Every 12 (Twelve) Hours for 90 days., Disp: 180 tablet, Rfl: 0    sotalol (BETAPACE) 80 MG tablet, , Disp: , Rfl:     spironolactone (ALDACTONE) 25 MG tablet, , Disp: , Rfl:     Vericiguat (Verquvo) 5 MG tablet, Take 1 tablet by mouth Daily., Disp: 30 tablet, Rfl: 5    zolpidem (AMBIEN) 5 MG tablet, TAKE 1 TABLET BY MOUTH AT NIGHT AS NEEDED FOR SLEEP., Disp: 30 tablet, Rfl: 2    ALLERGIES:  Codeine and Penicillins    VISIT VITALS:  Blood Pressure 118/75   Pulse 91   Height 172.7 cm (68\")   Weight 76.7 kg (169 lb)   Body Mass Index 25.70 kg/m²     Physical Exam  Constitutional:       General: He is not in acute distress.     Appearance: He is well-developed. He is not diaphoretic.   HENT:      Head: Normocephalic and atraumatic.      Right Ear: External ear normal.      Left Ear: External ear normal.      Nose: Nose normal.      Mouth/Throat:      Pharynx: No oropharyngeal exudate.   Eyes:      General: No scleral icterus.        Right eye: No discharge.         Left eye: No discharge.      Conjunctiva/sclera: Conjunctivae normal.      Pupils: Pupils are equal, round, and reactive to light.   Neck:      Thyroid: No thyromegaly.      Vascular: No JVD.      Trachea: No tracheal deviation.   Cardiovascular:      Rate and Rhythm: Normal rate and regular rhythm.      " Heart sounds: Normal heart sounds. No murmur heard.    No friction rub. No gallop.   Pulmonary:      Effort: Pulmonary effort is normal. No respiratory distress.      Breath sounds: Normal breath sounds. No stridor. No wheezing or rales.   Chest:      Chest wall: No tenderness.   Abdominal:      General: Bowel sounds are normal. There is no distension.      Palpations: Abdomen is soft. There is no mass.      Tenderness: There is no abdominal tenderness. There is no guarding or rebound.      Hernia: No hernia is present.   Genitourinary:     Rectum: Guaiac result negative.   Musculoskeletal:      Cervical back: Normal range of motion and neck supple.   Lymphadenopathy:      Cervical: No cervical adenopathy.   Skin:     General: Skin is warm and dry.      Coloration: Skin is not pale.      Findings: No erythema or rash.   Neurological:      Mental Status: He is alert and oriented to person, place, and time.      Cranial Nerves: No cranial nerve deficit.      Motor: No abnormal muscle tone.      Coordination: Coordination normal.      Deep Tendon Reflexes: Reflexes are normal and symmetric. Reflexes normal.   Psychiatric:         Behavior: Behavior normal.         Thought Content: Thought content normal.         Judgment: Judgment normal.       Assessment & Plan      Diagnosis Plan   1. Hepatitis B core antibody positive  Hepatitis B E Antibody    Hepatitis B E Antigen    Hepatitis C RNA, Quantitative, PCR (graph)        Patient will have Hep B labs and Hep C RNA quant with genotype redrawn.  Treatment will be started once results are retrieved.  Return in about 2 weeks (around 8/9/2023) for Recheck.         Raffy Shetty PA-C

## 2023-07-27 LAB
HBV E AB SERPL QL IA: POSITIVE
HBV E AG SERPL QL IA: NEGATIVE

## 2023-07-28 LAB
HCV RNA SERPL NAA+PROBE-ACNC: NORMAL IU/ML
TEST INFORMATION: NORMAL

## 2023-08-02 ENCOUNTER — TELEPHONE (OUTPATIENT)
Dept: CARDIOLOGY | Facility: CLINIC | Age: 63
End: 2023-08-02

## 2023-08-02 NOTE — TELEPHONE ENCOUNTER
Caller: Chong White Sr.    Relationship: Self    Best call back number:[phone]    What is the best time to reach you: ANYTIME    Who are you requesting to speak with (clinical staff, provider,  specific staff member): CLINICAL      What was the call regarding: PATIENT WAS IN OFFICE ON 8-1-23 AND TOLD HE DIDN'T HAVE AN APPOINTMENT; HE HAS THE SCHEDULING PRINT OUT THAT SHOWS HE SHOULD HAVE BEEN AT OFFICE YESTERDAY. PATIENT WANTS TO MAKE SURE THIS IS NOT HELD AGAINST HIM.    Is it okay if the provider responds through MyChart: NO, PATIENT DOES NOT HAVE MYCHART

## 2023-08-09 ENCOUNTER — DISEASE STATE MANAGEMENT VISIT (OUTPATIENT)
Dept: PHARMACY | Facility: HOSPITAL | Age: 63
End: 2023-08-09
Payer: MEDICAID

## 2023-08-09 VITALS — BODY MASS INDEX: 25.31 KG/M2 | WEIGHT: 167 LBS | HEIGHT: 68 IN

## 2023-08-09 DIAGNOSIS — B18.1 CHRONIC VIRAL HEPATITIS B WITHOUT DELTA AGENT AND WITHOUT COMA: Primary | ICD-10-CM

## 2023-08-09 RX ORDER — TENOFOVIR DISOPROXIL FUMARATE 300 MG/1
300 TABLET, FILM COATED ORAL DAILY
Qty: 30 TABLET | Refills: 1 | Status: SHIPPED | OUTPATIENT
Start: 2023-08-09 | End: 2023-08-09

## 2023-08-09 NOTE — PROGRESS NOTES
Chief Complaint   Patient presents with    Hepatitis C       Chong White Sr. is a 63 y.o. male who presents to the office today for follow up appointment for Hepatitis C  .    HPI    The patient was seen for a follow up visit.  He had additional labs last visit for Hep C treatment.    Results from labs ordered last visit:    Last visit the following results were discussed:    Hep C RNA-HCV not detected; Hep B E antibody positive; Hep B E antigen  Liver US:  1.  3 cm right hyperechoic liver mass most consistent with hemangioma.         2.  Common bile duct is prominent measuring 1 cm in diameter.  No                  common bile duct stone is seen (Radiologist confirmed no followup                needed for hemangioma.  MRI ordered for CBC prominence)     Labs:  PT-INR normal; Hep B surface antigen nonreactive; Hep B surface antibody reactive; Hep B core antibody positive; Hep A negative; HCV [id number] fibrosis;   Normal liver enzymes; normal platelets, hemoglobin 18.2 hematocrit 51.4; AFP tumor marker.  HCV RNA quant with genotype not tested due to insufficient specimen.    Patient states that he is sure he contracted the Hep B infection greater than 6 months ago.      Review of Systems   Constitutional:  Negative for activity change, appetite change and fatigue.   HENT:  Negative for trouble swallowing and voice change.    Respiratory:  Negative for cough and choking.    Cardiovascular:  Negative for leg swelling.   Gastrointestinal:  Negative for abdominal distention, abdominal pain, anal bleeding, blood in stool, constipation, diarrhea, nausea, rectal pain and vomiting.   Endocrine: Negative for polyphagia.   Genitourinary:  Negative for flank pain.   Musculoskeletal:  Positive for back pain.   Skin:  Negative for color change and pallor.   Allergic/Immunologic: Negative for food allergies.   Neurological:  Negative for weakness.   Psychiatric/Behavioral:  Negative for confusion.      ACTIVE PROBLEMS:    Specialty Problems          Cardiology Problems    Atrial fibrillation        Chronic combined systolic and diastolic congestive heart failure        Ischemic cardiomyopathy           PAST MEDICAL HISTORY:  Past Medical History:   Diagnosis Date    Arthritis     Atrial fibrillation     CHF (congestive heart failure)     COPD (chronic obstructive pulmonary disease)     Coronary artery disease     GERD (gastroesophageal reflux disease)     Heart attack     X 13 per patient, last one 2002 (9 heart attacks 2001- 1 cardiac stent placed)    History of hepatitis C 2001    Hypertension     Myocardial infarction     Tinnitus     Wears reading eyeglasses        SURGICAL HISTORY:  Past Surgical History:   Procedure Laterality Date    CARDIAC CATHETERIZATION N/A 09/04/2020    Procedure: Left Heart Cath;  Surgeon: Herman Sen MD;  Location:  [facility];  Service: Cardiology;  Laterality: N/A;    COLONOSCOPY      CORONARY STENT PLACEMENT      FACIAL RECONSTRUCTION SURGERY Right 1995       FAMILY HISTORY:  Family History   Problem Relation Age of Onset    Heart disease Mother     Heart disease Maternal Grandmother        SOCIAL HISTORY:  Social History     Tobacco Use    Smoking status: Every Day     Packs/day: 1.00     Types: Cigarettes     Start date: 3/1/1969    Smokeless tobacco: Never   Substance Use Topics    Alcohol use: Never       CURRENT MEDICATION:    Current Outpatient Medications:     apixaban (ELIQUIS) 5 MG tablet tablet, Take 1 tablet by mouth 2 (Two) Times a Day., Disp: 60 tablet, Rfl: 5    aspirin 81 MG EC tablet, , Disp: , Rfl:     atorvastatin (LIPITOR) 40 MG tablet, Take 1 tablet by mouth Every Night., Disp: 90 tablet, Rfl: 1    baclofen (LIORESAL) 20 MG tablet, , Disp: , Rfl:     carvedilol (COREG) 6.25 MG tablet, Take 0.5 tablets by mouth 2 (Two) Times a Day With Meals., Disp: 180 tablet, Rfl: 0    cyclobenzaprine (FLEXERIL) 10 MG tablet, , Disp: , Rfl:     empagliflozin (Jardiance) 10  "MG tablet tablet, Take 1 tablet by mouth Daily., Disp: 30 tablet, Rfl: 5    gabapentin (NEURONTIN) 800 MG tablet, Take 1 tablet by mouth 2 (Two) Times a Day., Disp: , Rfl:     sacubitril-valsartan (ENTRESTO) 24-26 MG tablet, Take 1 tablet by mouth Every 12 (Twelve) Hours for 90 days., Disp: 180 tablet, Rfl: 0    furosemide (LASIX) 20 MG tablet, TAKE ONE TABLET BY MOUTH DAILY (Patient not taking: Reported on 8/9/2023), Disp: 30 tablet, Rfl: 0    ALLERGIES:  Codeine and Penicillins    VISIT VITALS:  Height 172.7 cm (68\")   Weight 75.8 kg (167 lb)   Body Mass Index 25.39 kg/mý     Physical Exam  Constitutional:       General: He is not in acute distress.     Appearance: He is well-developed. He is not diaphoretic.   HENT:      Head: Normocephalic and atraumatic.      Right Ear: External ear normal.      Left Ear: External ear normal.      Nose: Nose normal.      Mouth/Throat:      Pharynx: No oropharyngeal exudate.   Eyes:      General: No scleral icterus.        Right eye: No discharge.         Left eye: No discharge.      Conjunctiva/sclera: Conjunctivae normal.      Pupils: Pupils are equal, round, and reactive to light.   Neck:      Thyroid: No thyromegaly.      Vascular: No JVD.      Trachea: No tracheal deviation.   Cardiovascular:      Rate and Rhythm: Normal rate and regular rhythm.      Heart sounds: Normal heart sounds. No murmur heard.    No friction rub. No gallop.   Pulmonary:      Effort: Pulmonary effort is normal. No respiratory distress.      Breath sounds: Normal breath sounds. No stridor. No wheezing or rales.   Chest:      Chest wall: No tenderness.   Abdominal:      General: Bowel sounds are normal. There is no distension.      Palpations: Abdomen is soft. There is no mass.      Tenderness: There is no abdominal tenderness. There is no guarding or rebound.      Hernia: No hernia is present.   Genitourinary:     Rectum: Guaiac result negative.   Musculoskeletal:      Cervical back: Normal range of " motion and neck supple.   Lymphadenopathy:      Cervical: No cervical adenopathy.   Skin:     General: Skin is warm and dry.      Coloration: Skin is not pale.      Findings: No erythema or rash.   Neurological:      Mental Status: He is alert and oriented to person, place, and time.      Cranial Nerves: No cranial nerve deficit.      Motor: No abnormal muscle tone.      Coordination: Coordination normal.      Gait: Gait abnormal.      Deep Tendon Reflexes: Reflexes are normal and symmetric. Reflexes normal.   Psychiatric:         Behavior: Behavior normal.         Thought Content: Thought content normal.         Judgment: Judgment normal.       Assessment & Plan      Diagnosis Plan   1. Chronic viral hepatitis B without delta agent and without coma            HCV was not detected so patient does not have a current HCV infection.  His Hep B is nonreactive.  He was instructed to return if liver enzymes ever go up again in order to make sure Hep B infection has not resurfaced.  He was educated about always having positive antibodies for Hep C since he had past infection that his body cleared.  He was also educated that a new Hep C infection can be acquired if he has exposure to blood or body fluids of an infected individual.           Raffy Shetty PA-C

## 2023-08-10 RX ORDER — ASPIRIN 81 MG/1
81 TABLET ORAL DAILY
Qty: 30 TABLET | Refills: 5 | Status: SHIPPED | OUTPATIENT
Start: 2023-08-10

## 2023-08-15 ENCOUNTER — TELEPHONE (OUTPATIENT)
Dept: PAIN MEDICINE | Facility: CLINIC | Age: 63
End: 2023-08-15

## 2023-08-22 ENCOUNTER — HOSPITAL ENCOUNTER (OUTPATIENT)
Dept: CARDIOLOGY | Facility: HOSPITAL | Age: 63
Discharge: HOME | End: 2023-08-22
Admitting: PHYSICIAN ASSISTANT
Payer: MEDICAID

## 2023-08-22 VITALS
HEART RATE: 94 BPM | DIASTOLIC BLOOD PRESSURE: 97 MMHG | BODY MASS INDEX: 25.34 KG/M2 | SYSTOLIC BLOOD PRESSURE: 155 MMHG | HEIGHT: 68 IN | WEIGHT: 167.2 LBS | OXYGEN SATURATION: 97 %

## 2023-08-22 DIAGNOSIS — I25.10 ASCVD (ARTERIOSCLEROTIC CARDIOVASCULAR DISEASE): ICD-10-CM

## 2023-08-22 DIAGNOSIS — K04.7 ABSCESSED TOOTH: ICD-10-CM

## 2023-08-22 DIAGNOSIS — I50.22 CHRONIC SYSTOLIC HEART FAILURE: ICD-10-CM

## 2023-08-22 DIAGNOSIS — I42.0 CARDIOMYOPATHY, DILATED: ICD-10-CM

## 2023-08-22 DIAGNOSIS — I50.42 CHRONIC COMBINED SYSTOLIC AND DIASTOLIC CONGESTIVE HEART FAILURE: Primary | ICD-10-CM

## 2023-08-22 DIAGNOSIS — I48.0 PAROXYSMAL ATRIAL FIBRILLATION: ICD-10-CM

## 2023-08-22 LAB
ABSOLUTE LUNG FLUID CONTENT: 26 % (ref 20–35)
QT INTERVAL: 360 MS
QTC INTERVAL: 428 MS

## 2023-08-22 PROCEDURE — 93005 ELECTROCARDIOGRAM TRACING: CPT | Performed by: PHYSICIAN ASSISTANT

## 2023-08-22 PROCEDURE — 94726 PLETHYSMOGRAPHY LUNG VOLUMES: CPT | Performed by: PHYSICIAN ASSISTANT

## 2023-08-22 PROCEDURE — 99215 OFFICE O/P EST HI 40 MIN: CPT | Performed by: PHYSICIAN ASSISTANT

## 2023-08-22 RX ORDER — RANOLAZINE 500 MG/1
500 TABLET, EXTENDED RELEASE ORAL 2 TIMES DAILY
COMMUNITY

## 2023-08-22 RX ORDER — VERICIGUAT 5 MG/1
5 TABLET, FILM COATED ORAL DAILY
COMMUNITY
End: 2023-08-22 | Stop reason: SDUPTHER

## 2023-08-22 RX ORDER — ISOSORBIDE MONONITRATE 30 MG/1
30 TABLET, EXTENDED RELEASE ORAL DAILY
Qty: 30 TABLET | Refills: 2 | Status: SHIPPED | OUTPATIENT
Start: 2023-08-22 | End: 2023-09-21

## 2023-08-22 RX ORDER — VERICIGUAT 5 MG/1
5 TABLET, FILM COATED ORAL DAILY
Qty: 30 TABLET | Refills: 3 | Status: SHIPPED | OUTPATIENT
Start: 2023-08-22

## 2023-08-22 RX ORDER — CLINDAMYCIN HYDROCHLORIDE 300 MG/1
300 CAPSULE ORAL 3 TIMES DAILY
Qty: 21 CAPSULE | Refills: 0 | Status: SHIPPED | OUTPATIENT
Start: 2023-08-22 | End: 2023-08-29

## 2023-08-22 RX ORDER — TENOFOVIR DISOPROXIL FUMARATE 300 MG/1
300 TABLET, FILM COATED ORAL DAILY
COMMUNITY

## 2023-08-22 NOTE — PROGRESS NOTES
[facility]  NIKI Connelly Linda C., APRN  [address]  [address]  [place] [address]    Thank you for asking me to see Chong White Sr. for congestive heart failure.    History of Present Illness     This is a 63 y.o. male with known past medical history of:  Paroxysmal atrial fibrillation  Jillian had scheduled AF ablation; however, after arriving he reported he did not know why he was there even with discussion with Dr. Arechiga in spite of their prior discussions and then apparently threatened staff members per 06/08/2023 documentation review. He ultimately threw discharge papers and a clipboard prior to leaving the building.   Chronic systolic CHF with distant history of documented IVDA  Recent TTE with recovered EF.   Tobacco abuse  ASCVD  [facility] on 09/2020 with flush occlusion of the LAD at the ostium noted to fill from RCA injection without known evidence of disease.  Distal Lcx with 50% disease.  RCA large with mild luminal irregularities.    Essential hypertension  GERD.      Chong White Sr. is a 63 y.o. male who presents for today for CHF follow-up.  The patient is typically seen by Amy Yanez APRN.  Patient's primary cardiologist is Dr. Lopez.     Last known EF recovered.  Last known hospitalization and/or ED visit: He was last hospitalized from 06/23/22 through 07/12/22 with atrial fibrillation with RVR, acute on chronic systolic CHF, MAISHA, and COPD.              08/22/23 visit data/details regarding HF:   Dyspnea on exertion: Present with activity.   Lower extremity swelling significantly improved  Abdominal swelling: Improving.     Home weight:Not monitoring; has tools to do so  Home BP: Not monitoring, has tools to do so. Importance of keeping log discussed.   Daily activities of living:  Performing ADLs independently.   Pillows/lying flat: 2 pillows  HF zone: Yellow  Mr. White reports ongoing intermittent chest pain.  He denies shortness  of breath. He reports a few weeks ago he began feeling very tired and weak but also ran out of many of his home medications.   He has upcoming stress test after missing his previously scheduled July 2023 stress test.    We discuss possibly starting low dose Imdur 30mg.  Please see assessment and plan.    He reports left-side abscessed tooth for which he has upcoming appointment with an oral surgeon. He reports he has previously been  on recent antibiotics.  It improved afterward but then again returned.        Review of Systems - Review of Systems   Constitutional: Positive for malaise/fatigue. Negative for chills, decreased appetite and fever.   HENT:  Negative for congestion and ear pain.    Eyes:  Negative for blurred vision.   Cardiovascular:  Positive for chest pain. Negative for dyspnea on exertion, leg swelling, near-syncope and syncope.        Dyspnea on exertion has improved   Respiratory:  Negative for cough and shortness of breath.    Endocrine: Negative for cold intolerance and heat intolerance.   Hematologic/Lymphatic: Negative for adenopathy and bleeding problem.   Skin:  Negative for color change and nail changes.   Musculoskeletal:  Negative for arthritis, back pain and falls.   Gastrointestinal:  Negative for bloating and abdominal pain.   Genitourinary:  Negative for bladder incontinence and dysuria.   Neurological:  Negative for aphonia, difficulty with concentration and disturbances in coordination.   Psychiatric/Behavioral:  Negative for altered mental status and hallucinations. The patient does not have insomnia.    Allergic/Immunologic: Negative for environmental allergies and HIV exposure.      All other systems were reviewed and were negative.    Patient Active Problem List   Diagnosis    Atrial fibrillation    Neuropathy    ASCVD (arteriosclerotic cardiovascular disease)    Tobacco abuse    Ischemic cardiomyopathy    Chronic combined systolic and diastolic congestive heart failure     Chronic neck pain    Chronic low back pain    Paralysis    Hypertension    Chronic anticoagulation    Long term current use of antiarrhythmic drug    Abscessed tooth       family history includes Heart disease in his maternal grandmother and mother.     reports that he has been smoking cigarettes. He started smoking about 54 years ago. He has been smoking an average of 1 pack per day. He has never used smokeless tobacco. He reports that he does not currently use drugs after having used the following drugs: Marijuana. He reports that he does not drink alcohol.    Allergies   Allergen Reactions    Codeine GI Intolerance    Penicillins Hives         Current Outpatient Medications:     apixaban (ELIQUIS) 5 MG tablet tablet, Take 1 tablet by mouth 2 (Two) Times a Day., Disp: 60 tablet, Rfl: 5    aspirin 81 MG EC tablet, Take 1 tablet by mouth Daily., Disp: 30 tablet, Rfl: 5    carvedilol (COREG) 6.25 MG tablet, Take 0.5 tablets by mouth 2 (Two) Times a Day With Meals. (Patient taking differently: Take 1 tablet by mouth 2 (Two) Times a Day With Meals.), Disp: 180 tablet, Rfl: 0    empagliflozin (Jardiance) 10 MG tablet tablet, Take 1 tablet by mouth Daily., Disp: 30 tablet, Rfl: 5    gabapentin (NEURONTIN) 800 MG tablet, Take 1 tablet by mouth 2 (Two) Times a Day., Disp: , Rfl:     ranolazine (RANEXA) 500 MG 12 hr tablet, Take 1 tablet by mouth 2 (Two) Times a Day., Disp: , Rfl:     sacubitril-valsartan (ENTRESTO) 24-26 MG tablet, Take 1 tablet by mouth Every 12 (Twelve) Hours for 90 days., Disp: 180 tablet, Rfl: 2    tenofovir (VIREAD) 300 MG tablet, Take 1 tablet by mouth Daily., Disp: , Rfl:     Vericiguat (Verquvo) 5 MG tablet, Take 1 tablet by mouth Daily., Disp: 30 tablet, Rfl: 3    atorvastatin (LIPITOR) 40 MG tablet, Take 1 tablet by mouth Every Night. (Patient not taking: Reported on 8/22/2023), Disp: 90 tablet, Rfl: 1    clindamycin (CLEOCIN) 300 MG capsule, Take 1 capsule by mouth 3 (Three) Times a Day for 7  days., Disp: 21 capsule, Rfl: 0    furosemide (LASIX) 20 MG tablet, TAKE ONE TABLET BY MOUTH DAILY (Patient not taking: Reported on 8/9/2023), Disp: 30 tablet, Rfl: 0    isosorbide mononitrate (IMDUR) 30 MG 24 hr tablet, Take 1 tablet by mouth Daily for 30 days., Disp: 30 tablet, Rfl: 2      Physical Exam:  I have reviewed the patient's current vital signs as documented in the patient's EMR.         Vitals:    08/22/23 1111   BP: 155/97   Pulse: 94   SpO2: 97%     Body mass index is 25.42 kg/mý.       08/22/23  1111   Weight: 75.8 kg (167 lb 3.2 oz)        Physical Exam  Vitals and nursing note reviewed.   Constitutional:       Appearance: Normal appearance. He is well-groomed.      Comments: Ambulated independently with cane.   HENT:      Head: Normocephalic and atraumatic.      Comments: Abscess tooth left side     Mouth/Throat:      Lips: Pink.      Mouth: Mucous membranes are moist.   Eyes:      General: Lids are normal.      Conjunctiva/sclera:      Right eye: Right conjunctiva is not injected.      Left eye: Left conjunctiva is not injected.   Pulmonary:      Effort: No tachypnea or bradypnea.      Breath sounds: No decreased breath sounds, wheezing, rhonchi or rales.   Chest:      Chest wall: No mass or lacerations.   Abdominal:      General: Bowel sounds are normal.      Palpations: Abdomen is soft.      Tenderness: There is no abdominal tenderness. There is no right CVA tenderness or left CVA tenderness.      Comments: Abdominal protuberance improved   Musculoskeletal:      Cervical back: Full passive range of motion without pain. No edema or erythema.      Right lower leg: No swelling. No edema.      Left lower leg: No swelling. No edema.      Comments: Patient has ongoing unstable gait with a cane   Skin:     General: Skin is warm and moist.   Neurological:      Mental Status: He is alert and oriented to person, place, and time.   Psychiatric:         Attention and Perception: Attention normal.          Mood and Affect: Mood normal.         Behavior: Behavior is cooperative.     JVP: Volume/Pulsation: Normal.        DATA REVIEWED:     EKG. I personally reviewed and interpreted the EKG.        ---------------------------------------------------  TTE/LUCERO:  Results for orders placed during the hospital encounter of 11/16/22    Adult Transthoracic Echo Complete W/ Cont if Necessary Per Protocol    Interpretation Summary    Normal left ventricular cavity size and wall thickness noted. All left ventricular wall segments contract normally    Left ventricular ejection fraction appears to be 56 - 60%.    Left ventricular diastolic function is consistent with (grade I) impaired relaxation.    The aortic valve is structurally normal with no regurgitation or stenosis present.    The mitral valve is structurally normal with no regurgitation or significant stenosis present.    There is no evidence of pericardial effusion. .      -----------------------------------------------------  CXR/Imaging:   Imaging Results (Most Recent)       None            I personally reviewed and interpreted the CXR.      -----------------------------------------------------      ----------------------------------------------------    PFTs:    No visible PFTs available within system.   --------------------------------------------------------------------------------------------------    Laboratory evaluations:    Lab Results   Component Value Date    GLUCOSE 93 06/14/2023    BUN 16 06/14/2023    CREATININE 0.93 06/14/2023    EGFRIFNONA 84 02/23/2022    EGFRIFAFRI 106 10/29/2020    BCR 17.2 06/14/2023    K 5.0 06/14/2023    CO2 26.4 06/14/2023    CALCIUM 10.4 06/14/2023    PROTENTOTREF 6.6 10/29/2020    ALBUMIN 4.9 06/14/2023    LABIL2 1.9 10/29/2020    AST 21 06/14/2023    ALT 19 06/14/2023     Lab Results   Component Value Date    WBC 6.93 06/14/2023    HGB 18.2 (H) 06/14/2023    HCT 51.4 (H) 06/14/2023    MCV 89.5 06/14/2023     06/14/2023      Lab Results   Component Value Date    CHOL 182 04/20/2023    TRIG 110 04/20/2023    HDL 47 04/20/2023     (H) 04/20/2023     Lab Results   Component Value Date    TSH 6.500 (H) 04/19/2023     Lab Results   Component Value Date    TROPONINT 8 04/19/2023     Lab Results   Component Value Date    HGBA1C 4.90 04/19/2023     No results found for: DDIMER  Lab Results   Component Value Date    ALT 19 06/14/2023     Lab Results   Component Value Date    HGBA1C 4.90 04/19/2023     Lab Results   Component Value Date    CREATININE 0.93 06/14/2023     No results found for: IRON, TIBC, FERRITIN  Lab Results   Component Value Date    INR 0.92 06/14/2023    INR 1.44 (H) 06/23/2022    INR 1.41 (H) 06/16/2022    PROTIME 12.8 06/14/2023    PROTIME 17.9 (H) 06/23/2022    PROTIME 17.6 (H) 06/16/2022         PAH RISK ASSESSMENT:      1. Chronic combined systolic and diastolic congestive heart failure    2. ASCVD (arteriosclerotic cardiovascular disease)    3. Chronic systolic heart failure (CMS/HCC)    4. ASCVD (arteriosclerotic cardiovascular disease) with 100% occlusion of the proximal LAD with excellent collaterals from RCA    5. Paroxysmal atrial fibrillation .    6. Cardiomyopathy, dilated (possibly ischemic and nonischemic).    7. Abscessed tooth          ORDERS PLACED TODAY:  Orders Placed This Encounter   Procedures    ReDs Vest    ECG 12 Lead Chest Pain        Diagnoses and all orders for this visit:    1. Chronic combined systolic and diastolic congestive heart failure (Primary)  -     ReDs Vest    2. ASCVD (arteriosclerotic cardiovascular disease)  Overview:  [facility], 9/4/2020: occlusion of the ostial LAD with remarkably good collateral connection from rCA filling the LAD with brisk flow to the point of occlusion in the prox LAD.  RCA and CX are free of any significant disease.     Orders:  -     sacubitril-valsartan (ENTRESTO) 24-26 MG tablet; Take 1 tablet by mouth Every 12 (Twelve) Hours for 90 days.  Dispense: 180  tablet; Refill: 2    3. Chronic systolic heart failure (CMS/HCC)  -     sacubitril-valsartan (ENTRESTO) 24-26 MG tablet; Take 1 tablet by mouth Every 12 (Twelve) Hours for 90 days.  Dispense: 180 tablet; Refill: 2    4. ASCVD (arteriosclerotic cardiovascular disease) with 100% occlusion of the proximal LAD with excellent collaterals from RCA  Overview:  [facility], 9/4/2020: occlusion of the ostial LAD with remarkably good collateral connection from rCA filling the LAD with brisk flow to the point of occlusion in the prox LAD.  RCA and CX are free of any significant disease.     Orders:  -     sacubitril-valsartan (ENTRESTO) 24-26 MG tablet; Take 1 tablet by mouth Every 12 (Twelve) Hours for 90 days.  Dispense: 180 tablet; Refill: 2    5. Paroxysmal atrial fibrillation .  Overview:  [facility], 9/4/2020  EC, 6/28/2022    Orders:  -     sacubitril-valsartan (ENTRESTO) 24-26 MG tablet; Take 1 tablet by mouth Every 12 (Twelve) Hours for 90 days.  Dispense: 180 tablet; Refill: 2    6. Cardiomyopathy, dilated (possibly ischemic and nonischemic).  -     sacubitril-valsartan (ENTRESTO) 24-26 MG tablet; Take 1 tablet by mouth Every 12 (Twelve) Hours for 90 days.  Dispense: 180 tablet; Refill: 2    7. Abscessed tooth    Other orders  -     ECG 12 Lead Chest Pain; Standing  -     ECG 12 Lead Chest Pain  -     Vericiguat (Verquvo) 5 MG tablet; Take 1 tablet by mouth Daily.  Dispense: 30 tablet; Refill: 3  -     isosorbide mononitrate (IMDUR) 30 MG 24 hr tablet; Take 1 tablet by mouth Daily for 30 days.  Dispense: 30 tablet; Refill: 2  -     clindamycin (CLEOCIN) 300 MG capsule; Take 1 capsule by mouth 3 (Three) Times a Day for 7 days.  Dispense: 21 capsule; Refill: 0             MEDS ORDERED TODAY:    New Medications Ordered This Visit   Medications    Vericiguat (Verquvo) 5 MG tablet     Sig: Take 1 tablet by mouth Daily.     Dispense:  30 tablet     Refill:  3    sacubitril-valsartan (ENTRESTO) 24-26 MG tablet     Sig: Take 1 tablet by  mouth Every 12 (Twelve) Hours for 90 days.     Dispense:  180 tablet     Refill:  2    isosorbide mononitrate (IMDUR) 30 MG 24 hr tablet     Sig: Take 1 tablet by mouth Daily for 30 days.     Dispense:  30 tablet     Refill:  2    clindamycin (CLEOCIN) 300 MG capsule     Sig: Take 1 capsule by mouth 3 (Three) Times a Day for 7 days.     Dispense:  21 capsule     Refill:  0     Abscess tooth        ---------------------------------------------------------------------------------------------------------------------------          ASSESSMENT/PLAN:      Diagnosis Plan   1. Chronic combined systolic and diastolic congestive heart failure  ReDs Vest      2. ASCVD (arteriosclerotic cardiovascular disease)        3. Chronic systolic heart failure (CMS/HCC)  sacubitril-valsartan (ENTRESTO) 24-26 MG tablet      4. ASCVD (arteriosclerotic cardiovascular disease) with 100% occlusion of the proximal LAD with excellent collaterals from RCA  sacubitril-valsartan (ENTRESTO) 24-26 MG tablet      5. Paroxysmal atrial fibrillation .  sacubitril-valsartan (ENTRESTO) 24-26 MG tablet      6. Cardiomyopathy, dilated (possibly ischemic and nonischemic).  sacubitril-valsartan (ENTRESTO) 24-26 MG tablet      7. Abscessed tooth            not acutely decompensated chronic severe systolic and diastolic heart failure. Right Heart Failure. Etiology previously documented to be non-ishcemic. LVEF recovered on last EF of 56-60%.         Today, Patient appears euvolemic.and with  Moderate perfusion. The patient's hemodynamics are currently acceptable. HR in room was found to be in 60s.  BP/MAP was reviewed and there is no troom for medication up-titration.  Clinical trajectory was assessed and hasimproved.     CHF GOAL DIRECTED MEDICAL THERAPY FOR PATIENT ADDRESSED/ADJUSTED:       GDMT:HFrecoveredEF    Drug Class   Drug   Dose Last Dose Adjustment Additional Titration   Notes   ACEi/ARB/ARNI Enstresto 24-26mg qd   Tolerating with borderline low BPs.     Beta Blocker Coreg  Decreased to 3.125 mg BID due to bradycardia and weakness  Taking Amiodarone for Afib as well.      MRA Xx  Stopped in hospital due to hyperkalemia xx xxx  Recent hyperkalemia during hospitalization   SGLT2i Jardiance 10mg   N/A      Secondaries Verquevo 5mg  Continued for now.        Secondary GDMT:   Verquevo being tolerated well.  Continue current dose. Would like to upward titrate; however, there is difficulty with indentifying compliance.  EF has improved since starting, thus will continue this dose.      -CHF Specific BB:    Continue Carvedilol  6.25mg BID reduced to 3.125mg BID given bradycardia and reported fatigue.      -MRA:   Stopped previously due to hyperkalemia.     -ACE/ARB/ARNi:   Current dose: Continue Entresto 24-26mg qd with hold parameters for SBP less than 95.   Baseline creatinine previously known to be 0.9-1.3 per review of recent laboratory values.  Renal function remains acceptable upon review today.       SGLT2 inhibition therapy.   A BMP at initiation to verify GFR >45 was recommended, as was interval GFR surveillance.  Pt was advised side effects, some severe, including hypersensitivity and Boogie's; in addition to common side effects of UTIs and female genital mycotic infections were discussed.  Continuing Jardiance (empagliflozin) 10mg with quarterly assessment of GFR. ([facility]   Denies  side effects.        -Diuretic regimen:   ReDs Vest reading for 08/22/23 was found to be 26.   Continue Lasix 20mg qd.   BMP, Mag, & ProBNP reviewed with patient on last visit.   CT chest imaging reviewed from June 2022 with effusions present.      -Fluid restriction/Sodium restriction:   Requested 2000 ml restriction  Patient has been asked to weigh daily and was provided with a printed diuretic strategy.  1,500 mg Na restriction was discussed.    -Secondary pharmacological medications for HFrEF:   Continue  Verquevo to 5mg qd given marginal effect on BPs.      -Acute vs. Chronic underlying conditions other than HF addressed during visit:     Chest pain:   Keep scheduled stress test.   Continue ASA & statin.   Start Imdur 30mg qd.   Continue Ranexa 500mg qd.   [facility] reviewed from 2020 with large and excellent collaterals.   09/2022 Stress test unremarkable.   CTA chest with concern for possibly chronic pulmonary vein thrombosis on the left side.  Continue f/u with Dr. Arechiga.     Debility:   Ongoing-continue using cane.     Anemia is common in heart failure.    Typically, this can come from anemia of chronic disease.   Last Hemoglobin is WNL.      P. Afib, appears to be in SR  Continue f/u with EP.    Continue Eliquis (90 day supply sent to [facility] and provided prior to leaving clinic).   Patient presented for ablation which was then not performed due to concerns regarding informed consent and patient reporting he was not sure what the elective procedure was.  Please see notes within his chart.    No further events of facial numbness.     Chronic low back pain:   Chronic neck pain with intermittent RUE numbness:   Had MRI.   Continue f/u with PCP.      ----------------------------------------------------------------------  --------------------------------------------------------------------------------          >45 minutes out of 60 minutes face to face spent counseling patient extensively on dietary Na+ intake, importance of activity, weight monitoring, compliance with medications in addition to importance of titration with goal directed medical therapy and follow up appointments.            This document has been electronically signed by Veronica Kim PA-C  August 22, 2023 12:43 EDT      Part of this note may be an electronic transcription/translation of spoken language to printed text using the Dragon Dictation System.

## 2023-08-22 NOTE — PROGRESS NOTES
Heart Failure Clinic    Date: 08/22/23     Vitals:    08/22/23 1111   BP: 155/97   Pulse: 94   SpO2: 97%    Weight 167.2    Method of arrival: Ambulatory with cane    Weighing self daily: No    Monitoring Heart Failure Zones: Most days    Today's HF Zone: Yellow     Taking medications as prescribed: Has questions regarding medications    Edema No    Shortness of Air: Yes, with activity    Number of pillows used at night:Recliner    Educational Materials given:  avs                                                                         ReDS Value: 26  25-35 Optimal Value Status      Esteban Levine RN 08/22/23 11:14 EDT

## 2023-08-29 ENCOUNTER — TELEPHONE (OUTPATIENT)
Dept: CARDIOLOGY | Facility: CLINIC | Age: 63
End: 2023-08-29
Payer: MEDICAID

## 2023-08-29 NOTE — TELEPHONE ENCOUNTER
Hub can read.    Called pt to see if he still planned on getting his stress test done. If so he will need to RS his apt next week. No answer VM is full.

## 2023-09-05 ENCOUNTER — HOSPITAL ENCOUNTER (OUTPATIENT)
Dept: NUCLEAR MEDICINE | Facility: HOSPITAL | Age: 63
Discharge: HOME | End: 2023-09-05
Payer: MEDICAID

## 2023-09-05 DIAGNOSIS — R07.2 PRECORDIAL PAIN: ICD-10-CM

## 2023-09-05 DIAGNOSIS — I25.10 ASCVD (ARTERIOSCLEROTIC CARDIOVASCULAR DISEASE): ICD-10-CM

## 2023-09-27 ENCOUNTER — OFFICE VISIT (OUTPATIENT)
Dept: SURGERY | Facility: CLINIC | Age: 63
End: 2023-09-27
Payer: MEDICAID

## 2023-09-27 VITALS
HEIGHT: 68 IN | SYSTOLIC BLOOD PRESSURE: 130 MMHG | WEIGHT: 168 LBS | BODY MASS INDEX: 25.46 KG/M2 | DIASTOLIC BLOOD PRESSURE: 84 MMHG

## 2023-09-27 DIAGNOSIS — D17.9 MULTIPLE LIPOMAS: Primary | ICD-10-CM

## 2023-09-27 PROCEDURE — 3079F DIAST BP 80-89 MM HG: CPT | Performed by: SURGERY

## 2023-09-27 PROCEDURE — 99214 OFFICE O/P EST MOD 30 MIN: CPT | Performed by: SURGERY

## 2023-09-27 PROCEDURE — 1160F RVW MEDS BY RX/DR IN RCRD: CPT | Performed by: SURGERY

## 2023-09-27 PROCEDURE — 1159F MED LIST DOCD IN RCRD: CPT | Performed by: SURGERY

## 2023-09-27 PROCEDURE — 3075F SYST BP GE 130 - 139MM HG: CPT | Performed by: SURGERY

## 2023-09-27 RX ORDER — SODIUM CHLORIDE 0.9 % (FLUSH) 0.9 %
10 SYRINGE (ML) INJECTION AS NEEDED
OUTPATIENT
Start: 2023-09-27

## 2023-09-27 RX ORDER — SODIUM CHLORIDE 9 MG/ML
40 INJECTION, SOLUTION INTRAVENOUS AS NEEDED
OUTPATIENT
Start: 2023-09-27

## 2023-09-27 RX ORDER — SODIUM CHLORIDE 0.9 % (FLUSH) 0.9 %
3 SYRINGE (ML) INJECTION EVERY 12 HOURS SCHEDULED
OUTPATIENT
Start: 2023-09-27

## 2023-09-27 NOTE — PROGRESS NOTES
Date of Service: 9/27/2023    Subjective   Chong White Sr. is a 63 y.o. male is being in consultation today at the request of Amy Yanez APRN    Chief Complaint: scalp and back lipoma    Chong White Sr. is a 63 y.o. male with right scalp and right posterior shoulder lipoma. He has noticed an increase in size to the lipoma on his scalp and it has become bothersome for him. He is interested in removal of the scalp lesion. He also complains of one posterior to his right shoulder. It has not grown but it is fairly sizeable and he is also interested in removal.    He has a hx of MI and stroke. He is on eliquis.     Past Medical History:   Diagnosis Date    Arthritis     Atrial fibrillation     CHF (congestive heart failure)     COPD (chronic obstructive pulmonary disease)     Coronary artery disease     GERD (gastroesophageal reflux disease)     Heart attack     X 13 per patient, last one 2002 (9 heart attacks 2001- 1 cardiac stent placed)    History of hepatitis C 2001    Hypertension     Myocardial infarction     Tinnitus     Wears reading eyeglasses        Past Surgical History:   Procedure Laterality Date    CARDIAC CATHETERIZATION N/A 09/04/2020    Procedure: Left Heart Cath;  Surgeon: Herman Sen MD;  Location: [facility] CATH INVASIVE LOCATION;  Service: Cardiology;  Laterality: N/A;    COLONOSCOPY      CORONARY STENT PLACEMENT      FACIAL RECONSTRUCTION SURGERY Right 1995         Family History   Problem Relation Age of Onset    Heart disease Mother     Heart disease Maternal Grandmother          Social History     Socioeconomic History    Marital status: Single   Tobacco Use    Smoking status: Every Day     Packs/day: 1.00     Types: Cigarettes     Start date: 3/1/1969    Smokeless tobacco: Never   Vaping Use    Vaping Use: Never used   Substance and Sexual Activity    Alcohol use: Never    Drug use: Not Currently     Types: Marijuana    Sexual activity: Defer     Partners: Female               "  Review of Systems   Pertinent items are noted in HPI     Constitutional: No fevers, chills or malaise. No unintentional weight loss   Eyes: Denies visual changes    Cardiovascular: Denies chest pain, palpitations   Respiratory: Denies cough or shortness of breath   Abdominal/Gastrointestinal: No abdominal pain, no nausea/vomiting   Genitourinary: Denies dysuria or hematuria   Musculoskeletal: Denies any chronic joint aches, pains or deformities              Skin: No lesions or rashes   Psychiatric: No recent mood changes   Neurologic: No paresthesias or loss of function        Objective       Physical Exam:      09/27/23  1353   Weight: 76.2 kg (168 lb)    Body mass index is 25.55 kg/m².  Constitution: /84 (BP Location: Left arm, Patient Position: Sitting, Cuff Size: Adult)   Ht 172.7 cm (67.99\")   Wt 76.2 kg (168 lb)   BMI 25.55 kg/m²  . No acute distress  Head: Normocephalic, atraumatic.   Eyes: Aligned without strabismus. Conjunctivae noninjected   Ears, Nose, Mouth: Normal dentition, no lesions noted  CV: Regular rate and rhythm   Respiratory: Symmetric chest expansion. No respiratory distress.   Gastrointestinal:  Soft, nontender, nondistended   Skin:  Right posterior ear lipoma, right posterior shoulder lipoma  Lymphatics: No abnormal cervical or supraclavicular adenopathy  Neurologic: Walks with a cane  Psychiatric: Normal mood    Assessment     Diagnoses and all orders for this visit:    1. Multiple lipomas (Primary)  -     Case Request; Standing  -     Follow Anesthesia Guidelines / Protocol; Standing  -     Verify / Perform Chlorhexidine Skin Prep; Standing  -     Verify / Perform Chlorhexidine Skin Prep if Indicated (If Not Already Completed); Standing  -     Notify Physician - Standard; Standing  -     Instructions on coughing, deep breathing, and incentive spirometry.; Standing  -     Insert Peripheral IV; Standing  -     Saline Lock & Maintain IV Access; Standing  -     sodium chloride 0.9 % " flush 3 mL  -     sodium chloride 0.9 % flush 10 mL  -     sodium chloride 0.9 % infusion 40 mL  -     ceFAZolin 2000 mg IVPB in 100 mL NS (VTB)  -     Obtain Informed Consent; Standing  -     Case Request      Chong White Sr. is a 63 y.o. male with two lipomas. His posterior scalp lipoma is symptomatic and he is interested in removal. While asleep, he is also asking for removal of his posterior shoulder lipoma on the right side. We will plan to perform this on 10/16. He will need cardiac clearance and approval to hold his eliquis for 2 days before his surgery.          Celeste Ochoa MD  [facility] Surgery

## 2023-10-10 ENCOUNTER — TELEPHONE (OUTPATIENT)
Dept: SURGERY | Facility: CLINIC | Age: 63
End: 2023-10-10
Payer: MEDICAID

## 2023-10-10 NOTE — TELEPHONE ENCOUNTER
Called patient to inform him of his Lipoma Excision scheduled for 10/16/2023 at 8:30 am. Patient was also informed of PreOp apt scheduled for 10/13. Patient verbalized understanding.

## 2023-10-17 ENCOUNTER — TELEPHONE (OUTPATIENT)
Dept: SURGERY | Facility: CLINIC | Age: 63
End: 2023-10-17
Payer: MEDICAID

## 2023-10-17 NOTE — TELEPHONE ENCOUNTER
Called patient to reschedule Lipoma Excision. Patient is now scheduled for Monday Oct 30th at 8:30am. Patient will not require another PAT apt. Patient was told to have someone accompany him on the day of surgery. Patient verbalized understanding.

## 2023-10-25 ENCOUNTER — TELEPHONE (OUTPATIENT)
Dept: SURGERY | Facility: CLINIC | Age: 63
End: 2023-10-25
Payer: MEDICAID

## 2023-10-25 NOTE — TELEPHONE ENCOUNTER
Called patient to inform him of Lipoma Excision scheduled for Monday 10/30 at 8:30 am. Patient verbalized understanding.

## 2023-11-14 ENCOUNTER — TELEPHONE (OUTPATIENT)
Dept: CARDIOLOGY | Facility: CLINIC | Age: 63
End: 2023-11-14
Payer: MEDICAID

## 2023-11-14 NOTE — TELEPHONE ENCOUNTER
Caller: Chong White Sr.    Relationship: Self    Best call back number: [phone]    What is the best time to reach you: ANYTIME     Who are you requesting to speak with (clinical staff, provider,  specific staff member): ANYONE     What was the call regarding: PATIENT WOULD LIKE TO KNOW IF THE FOLLOW UP APPT ON 11.20.23 IS NEEDED AS HE DIDN'T HAVE ABLATION DONE.

## 2023-11-16 ENCOUNTER — HOSPITAL ENCOUNTER (OUTPATIENT)
Dept: CARDIOLOGY | Facility: HOSPITAL | Age: 63
Discharge: HOME | End: 2023-11-16
Payer: MEDICAID

## 2023-11-16 VITALS
SYSTOLIC BLOOD PRESSURE: 158 MMHG | WEIGHT: 160.4 LBS | BODY MASS INDEX: 25.18 KG/M2 | HEART RATE: 75 BPM | OXYGEN SATURATION: 96 % | DIASTOLIC BLOOD PRESSURE: 96 MMHG | HEIGHT: 67 IN

## 2023-11-16 DIAGNOSIS — I42.0 CARDIOMYOPATHY, DILATED: ICD-10-CM

## 2023-11-16 DIAGNOSIS — I50.42 CHRONIC COMBINED SYSTOLIC AND DIASTOLIC CONGESTIVE HEART FAILURE: Primary | ICD-10-CM

## 2023-11-16 DIAGNOSIS — R07.9 CHEST PAIN, UNSPECIFIED TYPE: ICD-10-CM

## 2023-11-16 DIAGNOSIS — I25.10 ASCVD (ARTERIOSCLEROTIC CARDIOVASCULAR DISEASE): ICD-10-CM

## 2023-11-16 DIAGNOSIS — I50.22 CHRONIC SYSTOLIC HEART FAILURE: ICD-10-CM

## 2023-11-16 DIAGNOSIS — I48.0 PAROXYSMAL ATRIAL FIBRILLATION: ICD-10-CM

## 2023-11-16 LAB
ABSOLUTE LUNG FLUID CONTENT: 28 % (ref 20–35)
ANION GAP SERPL CALCULATED.3IONS-SCNC: 9.5 MMOL/L (ref 5–15)
BUN SERPL-MCNC: 13 MG/DL (ref 8–23)
BUN/CREAT SERPL: 12.9 (ref 7–25)
CALCIUM SPEC-SCNC: 9.2 MG/DL (ref 8.6–10.5)
CHLORIDE SERPL-SCNC: 101 MMOL/L (ref 98–107)
CO2 SERPL-SCNC: 28.5 MMOL/L (ref 22–29)
CREAT SERPL-MCNC: 1.01 MG/DL (ref 0.76–1.27)
EGFRCR SERPLBLD CKD-EPI 2021: 83.6 ML/MIN/1.73
GLUCOSE SERPL-MCNC: 101 MG/DL (ref 65–99)
MAGNESIUM SERPL-MCNC: 2.5 MG/DL (ref 1.6–2.4)
NT-PROBNP SERPL-MCNC: 451.3 PG/ML (ref 0–900)
POTASSIUM SERPL-SCNC: 4.8 MMOL/L (ref 3.5–5.2)
QT INTERVAL: 384 MS
QTC INTERVAL: 420 MS
SODIUM SERPL-SCNC: 139 MMOL/L (ref 136–145)

## 2023-11-16 PROCEDURE — 94726 PLETHYSMOGRAPHY LUNG VOLUMES: CPT | Performed by: PHYSICIAN ASSISTANT

## 2023-11-16 PROCEDURE — 80048 BASIC METABOLIC PNL TOTAL CA: CPT | Performed by: PHYSICIAN ASSISTANT

## 2023-11-16 PROCEDURE — 93005 ELECTROCARDIOGRAM TRACING: CPT | Performed by: PHYSICIAN ASSISTANT

## 2023-11-16 PROCEDURE — 83880 ASSAY OF NATRIURETIC PEPTIDE: CPT | Performed by: PHYSICIAN ASSISTANT

## 2023-11-16 PROCEDURE — 83735 ASSAY OF MAGNESIUM: CPT | Performed by: PHYSICIAN ASSISTANT

## 2023-11-16 RX ORDER — CARVEDILOL 12.5 MG/1
12.5 TABLET ORAL 2 TIMES DAILY WITH MEALS
Qty: 60 TABLET | Refills: 2 | Status: SHIPPED | OUTPATIENT
Start: 2023-11-16 | End: 2023-12-16

## 2023-11-16 RX ORDER — ISOSORBIDE MONONITRATE 30 MG/1
30 TABLET, EXTENDED RELEASE ORAL DAILY
Qty: 30 TABLET | Refills: 2 | Status: SHIPPED | OUTPATIENT
Start: 2023-11-16 | End: 2023-12-16

## 2023-11-16 NOTE — PROGRESS NOTES
[facility]    Date: 11/16/23     Vitals:    11/16/23 1549   BP: 158/96   Pulse: 75   SpO2: 96%    Weight 160.4    Method of arrival: Ambulatory with cane    Weighing self daily: no    Monitoring Heart Failure Zones: Most days    Today's HF Zone: Yellow     Taking medications as prescribed: Has questions regarding medications    Edema No    Shortness of Air: Yes with activity    Number of pillows used at night:<2    Educational Materials given:  avs                                                                         ReDS Value: 28  25-35 Optimal Value Status      Esteban Levine RN 11/16/23 15:49 EST
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
## 2023-11-16 NOTE — PROGRESS NOTES
Heart Failure Clinic  Pharmacist Note     Chong White Sr. is a 63 y.o. male seen in the Heart Failure Clinic for combined systolic and diastolic HF.   Chong White Sr. Reports a poor understanding of his medication regimen. He did not have his medications with him today and states he would be unable to verify medications when asked.     Reports worsening shortness of breath and only minimal swelling in his feet. Having pain/discomfort in his chest.    States he does not routinely check his blood pressure as he does not have a blood pressure cuff at home. He reports that he has a BP cuff but does not check his BP at home. Weighing at home and reports his weight is consistent.     States he may need refills and is out of some of his medications. Unsure which ones whenever asked.     Medication Use:   Hx of med intolerances:  None related to HF  Retail Rx Management: [facility]    Past Medical History:   Diagnosis Date    Arthritis     Atrial fibrillation     CHF (congestive heart failure)     COPD (chronic obstructive pulmonary disease)     Coronary artery disease     GERD (gastroesophageal reflux disease)     Heart attack     X 13 per patient, last one 2002 (9 heart attacks 2001- 1 cardiac stent placed)    History of hepatitis C 2001    Hypertension     Myocardial infarction     Tinnitus     Wears reading eyeglasses      ALLERGIES: Codeine and Penicillins  Current Outpatient Medications   Medication Sig Dispense Refill    furosemide (LASIX) 20 MG tablet TAKE ONE TABLET BY MOUTH DAILY 30 tablet 0    apixaban (ELIQUIS) 5 MG tablet tablet Take 1 tablet by mouth 2 (Two) Times a Day. 60 tablet 5    aspirin 81 MG EC tablet Take 1 tablet by mouth Daily. 30 tablet 5    atorvastatin (LIPITOR) 40 MG tablet Take 1 tablet by mouth Every Night. 90 tablet 1    carvedilol (Coreg) 12.5 MG tablet Take 1 tablet by mouth 2 (Two) Times a Day With Meals for 30 days. 60 tablet 2    empagliflozin (Jardiance) 10 MG tablet  "tablet Take 1 tablet by mouth Daily. 30 tablet 5    gabapentin (NEURONTIN) 800 MG tablet Take 1 tablet by mouth 2 (Two) Times a Day.      isosorbide mononitrate (IMDUR) 30 MG 24 hr tablet Take 1 tablet by mouth Daily for 30 days. 30 tablet 2    ranolazine (RANEXA) 500 MG 12 hr tablet Take 1 tablet by mouth 2 (Two) Times a Day.      sacubitril-valsartan (ENTRESTO) 24-26 MG tablet Take 1 tablet by mouth Every 12 (Twelve) Hours for 90 days. 180 tablet 2    tenofovir (VIREAD) 300 MG tablet Take 1 tablet by mouth Daily.      Vericiguat (Verquvo) 5 MG tablet Take 1 tablet by mouth Daily. 30 tablet 3     No current facility-administered medications for this encounter.       Vaccination History:   Pneumonia: PCV 20 Feb 2023  Annual Influenza: UTD 10/13/22  COVID 19: Booster Feb 2023    Objective  Vitals:    11/16/23 1549   BP: 158/96   BP Location: Left arm   Patient Position: Sitting   Cuff Size: Adult   Pulse: 75   SpO2: 96%   Weight: 72.8 kg (160 lb 6.4 oz)   Height: 170.2 cm (67\")     Wt Readings from Last 3 Encounters:   11/16/23 72.8 kg (160 lb 6.4 oz)   09/27/23 76.2 kg (168 lb)   08/22/23 75.8 kg (167 lb 3.2 oz)         11/16/23  1549   Weight: 72.8 kg (160 lb 6.4 oz)     Lab Results   Component Value Date    GLUCOSE 93 06/14/2023    BUN 16 06/14/2023    CREATININE 0.93 06/14/2023    EGFRIFNONA 84 02/23/2022    EGFRIFAFRI 106 10/29/2020    BCR 17.2 06/14/2023    K 5.0 06/14/2023    CO2 26.4 06/14/2023    CALCIUM 10.4 06/14/2023    PROTENTOTREF 6.6 10/29/2020    ALBUMIN 4.9 06/14/2023    LABIL2 1.9 10/29/2020    AST 21 06/14/2023    ALT 19 06/14/2023     Lab Results   Component Value Date    WBC 6.93 06/14/2023    HGB 18.2 (H) 06/14/2023    HCT 51.4 (H) 06/14/2023    MCV 89.5 06/14/2023     06/14/2023     Lab Results   Component Value Date    TROPONINT 8 04/19/2023     Lab Results   Component Value Date    PROBNP 42.5 12/22/2022     Results for orders placed during the hospital encounter of 11/16/22    Adult " Transthoracic Echo Complete W/ Cont if Necessary Per Protocol    Interpretation Summary    Normal left ventricular cavity size and wall thickness noted. All left ventricular wall segments contract normally    Left ventricular ejection fraction appears to be 56 - 60%.    Left ventricular diastolic function is consistent with (grade I) impaired relaxation.    The aortic valve is structurally normal with no regurgitation or stenosis present.    The mitral valve is structurally normal with no regurgitation or significant stenosis present.    There is no evidence of pericardial effusion. .         GDMT    Drug Class   Drug   Dose Last Dose Adjustment Additional Titration   Notes   ACEi/ARB/ARNI Entresto 24/26 7/12/22 Needed    Beta Blocker Carvedilol 6.25mg 7/12/22 Needed    SGLT2i Jardiance 10mg 7/27/22 N/A     Vericiguat 5mg 10/13/22 increased         Drug Therapy Problems    Chest Pain  Medication non-compliance/health literacy  Refill request  BP Cuff    Recommendations:     Notified provider of reported chest pain. Veronica ordered a STAT EKG which she states was unremarkable.   Patient is out of Imdur & Ranexa which is likely a contributing factor to the patient's reported chest pain.   May benefit from PRN nitroglycerin with BP monitoring  Patient has a cardiac stress test scheduled for tomorrow  Follow up as clinically indicated.   Continue to address the importance of medication adherence at each visit.  [facility] is best for him since he lives close and they deliver.   Notified Veronica of refill requests. Veronica to send refills on the medications that need them.   Patient needs a BP cuff so he can monitor his BP.     Patient was educated on heart failure medications and the importance of medication adherence.   All questions were addressed and patient would benefit from additional education at each visit.     Thank you for allowing me to participate in the care of your patient,    Brooklyn Mahoney,  PharmD  Community PGY1 Pharmacy Resident  [facility]  11/16/23

## 2023-11-20 NOTE — TELEPHONE ENCOUNTER
TRIED CALLING PT- UNABLE TO LVM DUE TO VM BEING FULL. THIS APPT IS NEEDED DUE TO PVA PROCEDURE FU PROTOCOLS

## 2023-11-27 ENCOUNTER — TELEPHONE (OUTPATIENT)
Dept: CARDIOLOGY | Facility: CLINIC | Age: 63
End: 2023-11-27
Payer: MEDICAID

## 2023-11-27 NOTE — TELEPHONE ENCOUNTER
Patient called and reported chest pain.  Instructed patient to go to ED with persistent chest pain. He was recently evaluated in clinic and reported chest pain but had appointment the next day for stress test.   He did miss his stress test last week for further evaluation and also missed his stress test back in June 2023.  Instructed him to go to ED if he has ongoing chest pain for further evaluation.          Veronica Kim PA-C  [facility]  11/27/23  11:51 EST

## 2023-11-28 ENCOUNTER — OFFICE VISIT (OUTPATIENT)
Age: 63
End: 2023-11-28
Payer: MEDICAID

## 2023-11-28 ENCOUNTER — HOSPITAL ENCOUNTER (OUTPATIENT)
Facility: HOSPITAL | Age: 63
Discharge: HOME | End: 2023-11-28
Admitting: SURGERY
Payer: MEDICAID

## 2023-11-28 VITALS — HEIGHT: 67 IN | BODY MASS INDEX: 25.11 KG/M2 | WEIGHT: 160 LBS

## 2023-11-28 DIAGNOSIS — D17.21 LIPOMA OF RIGHT SHOULDER: ICD-10-CM

## 2023-11-28 DIAGNOSIS — D17.21 LIPOMA OF RIGHT SHOULDER: Primary | ICD-10-CM

## 2023-11-28 DIAGNOSIS — D17.0 LIPOMA OF SCALP: ICD-10-CM

## 2023-11-28 PROCEDURE — 1159F MED LIST DOCD IN RCRD: CPT | Performed by: SURGERY

## 2023-11-28 PROCEDURE — 99214 OFFICE O/P EST MOD 30 MIN: CPT | Performed by: SURGERY

## 2023-11-28 PROCEDURE — 73200 CT UPPER EXTREMITY W/O DYE: CPT | Performed by: RADIOLOGY

## 2023-11-28 PROCEDURE — 1160F RVW MEDS BY RX/DR IN RCRD: CPT | Performed by: SURGERY

## 2023-11-28 PROCEDURE — 73200 CT UPPER EXTREMITY W/O DYE: CPT

## 2023-11-28 RX ORDER — SODIUM CHLORIDE 0.9 % (FLUSH) 0.9 %
3 SYRINGE (ML) INJECTION EVERY 12 HOURS SCHEDULED
OUTPATIENT
Start: 2023-11-28

## 2023-11-28 RX ORDER — SODIUM CHLORIDE 0.9 % (FLUSH) 0.9 %
10 SYRINGE (ML) INJECTION AS NEEDED
OUTPATIENT
Start: 2023-11-28

## 2023-11-28 RX ORDER — SODIUM CHLORIDE 9 MG/ML
40 INJECTION, SOLUTION INTRAVENOUS AS NEEDED
OUTPATIENT
Start: 2023-11-28

## 2023-11-28 NOTE — H&P (VIEW-ONLY)
"Patient Name:  Chong White Sr.  YOB: 1960  [id number]    Follow Up Note    Date of visit: 11/28/2023    Subjective   Subjective: Presents today for follow up of right scalp and right shoulder lipoma. His surgery had to be rescheduled due to transportation issues. He is still interested in having them removed and feels like they have both been growing.     ROS: complaints of mobility issues in right shoulder          Objective     Objective:     Ht 170.2 cm (67.01\")   Wt 72.6 kg (160 lb)   BMI 25.05 kg/m²       CV:  Regular rate and rhythm  L:  Bilateral lung rise and fall, no use of accessory muscles   Abd:  Soft, nontender, nondistended  Ext:  No cyanosis, clubbing, edema  Right shoulder: 2cm lipoma present, non-mobile  Right scalp: 2cm lipoma present       Assessment/ Plan:  Assessment   Diagnoses and all orders for this visit:    1. Lipoma of right shoulder (Primary)  -     CT Upper Extremity Right Without Contrast; Future  -     Case Request; Standing  -     Follow Anesthesia Guidelines / Protocol; Standing  -     Verify / Perform Chlorhexidine Skin Prep; Standing  -     Verify / Perform Chlorhexidine Skin Prep if Indicated (If Not Already Completed); Standing  -     Notify Physician - Standard; Standing  -     Instructions on coughing, deep breathing, and incentive spirometry.; Standing  -     Insert Peripheral IV; Standing  -     Saline Lock & Maintain IV Access; Standing  -     sodium chloride 0.9 % flush 3 mL  -     sodium chloride 0.9 % flush 10 mL  -     sodium chloride 0.9 % infusion 40 mL  -     ceFAZolin 2000 mg IVPB in 100 mL NS (VTB)  -     Obtain Informed Consent; Standing  -     Case Request    2. Lipoma of scalp    Mr White is a 62 yo M w/ symptomatic lipomas of his right shoulder and his right scalp. I have personally reviewed the imaging of his right shoulder with a clearly demarcated lipoma present that feels extramuscular. We will plan to remove these in the operating " room. Due to his significant transportation issues, he will require an overnight observation admission for the night of surgery.     Celeste Ochoa MD       General Surgeon  [facility]

## 2023-11-28 NOTE — PROGRESS NOTES
"Patient Name:  Chong White Sr.  YOB: 1960  [id number]    Follow Up Note    Date of visit: 11/28/2023    Subjective   Subjective: Presents today for follow up of right scalp and right shoulder lipoma. His surgery had to be rescheduled due to transportation issues. He is still interested in having them removed and feels like they have both been growing.     ROS: complaints of mobility issues in right shoulder          Objective     Objective:     Ht 170.2 cm (67.01\")   Wt 72.6 kg (160 lb)   BMI 25.05 kg/m²       CV:  Regular rate and rhythm  L:  Bilateral lung rise and fall, no use of accessory muscles   Abd:  Soft, nontender, nondistended  Ext:  No cyanosis, clubbing, edema  Right shoulder: 2cm lipoma present, non-mobile  Right scalp: 2cm lipoma present       Assessment/ Plan:  Assessment   Diagnoses and all orders for this visit:    1. Lipoma of right shoulder (Primary)  -     CT Upper Extremity Right Without Contrast; Future  -     Case Request; Standing  -     Follow Anesthesia Guidelines / Protocol; Standing  -     Verify / Perform Chlorhexidine Skin Prep; Standing  -     Verify / Perform Chlorhexidine Skin Prep if Indicated (If Not Already Completed); Standing  -     Notify Physician - Standard; Standing  -     Instructions on coughing, deep breathing, and incentive spirometry.; Standing  -     Insert Peripheral IV; Standing  -     Saline Lock & Maintain IV Access; Standing  -     sodium chloride 0.9 % flush 3 mL  -     sodium chloride 0.9 % flush 10 mL  -     sodium chloride 0.9 % infusion 40 mL  -     ceFAZolin 2000 mg IVPB in 100 mL NS (VTB)  -     Obtain Informed Consent; Standing  -     Case Request    2. Lipoma of scalp    Mr White is a 64 yo M w/ symptomatic lipomas of his right shoulder and his right scalp. I have personally reviewed the imaging of his right shoulder with a clearly demarcated lipoma present that feels extramuscular. We will plan to remove these in the operating " room. Due to his significant transportation issues, he will require an overnight observation admission for the night of surgery.     Celeste Ochoa MD       General Surgeon  [facility]

## 2023-12-01 ENCOUNTER — TELEPHONE (OUTPATIENT)
Dept: SURGERY | Facility: CLINIC | Age: 63
End: 2023-12-01
Payer: MEDICAID

## 2023-12-01 NOTE — TELEPHONE ENCOUNTER
Called patients Cardiologist Dr. Jose Arechiga to get Cardiac clearance to hold Plavix two days prior to Lipoma Excision scheduled 12/20 with Dr. Sheets. Was unable to reach Dr. Arechiga's nurse. Left message to return my call. Faxed Cardiac clearance form 11/29.

## 2023-12-04 ENCOUNTER — TELEPHONE (OUTPATIENT)
Dept: CARDIOLOGY | Facility: CLINIC | Age: 63
End: 2023-12-04
Payer: MEDICAID

## 2023-12-04 NOTE — TELEPHONE ENCOUNTER
Roxana with Dr. Sheets's office would like to know if the patient can hold Eliquis 2 days prior to having a lipoma excision on his right scalp and right shoulder?    (P)[phone]

## 2023-12-06 ENCOUNTER — TELEPHONE (OUTPATIENT)
Dept: SURGERY | Facility: CLINIC | Age: 63
End: 2023-12-06
Payer: MEDICAID

## 2023-12-06 NOTE — TELEPHONE ENCOUNTER
Called to inform patient that we have his cardiac clearance back from Dr. Jose Arechiga and he is cleared for surgery on Wednesday 12/20/23 with Dr. Sheets at 8:00 and his PAT is scheduled for Tuesday 12/19 at 10:15. Patient has verbalized understanding.

## 2023-12-13 ENCOUNTER — LAB (OUTPATIENT)
Dept: LAB | Facility: HOSPITAL | Age: 63
End: 2023-12-13
Payer: MEDICAID

## 2023-12-13 ENCOUNTER — DISEASE STATE MANAGEMENT VISIT (OUTPATIENT)
Dept: PHARMACY | Facility: HOSPITAL | Age: 63
End: 2023-12-13
Payer: MEDICAID

## 2023-12-13 VITALS
SYSTOLIC BLOOD PRESSURE: 175 MMHG | TEMPERATURE: 98.3 F | OXYGEN SATURATION: 94 % | HEART RATE: 87 BPM | WEIGHT: 160.4 LBS | BODY MASS INDEX: 25.12 KG/M2 | DIASTOLIC BLOOD PRESSURE: 96 MMHG

## 2023-12-13 DIAGNOSIS — R76.8 HEPATITIS B ANTIBODY POSITIVE: Primary | ICD-10-CM

## 2023-12-13 PROCEDURE — 36415 COLL VENOUS BLD VENIPUNCTURE: CPT

## 2023-12-13 PROCEDURE — 80053 COMPREHEN METABOLIC PANEL: CPT

## 2023-12-13 PROCEDURE — 87517 HEPATITIS B DNA QUANT: CPT

## 2023-12-13 PROCEDURE — 87340 HEPATITIS B SURFACE AG IA: CPT

## 2023-12-13 NOTE — PROGRESS NOTES
No chief complaint on file.      Chong White  is a 63 y.o. male who presents to the office today for follow up appointment for No chief complaint on file.  .    HPI    The patient was seen for a visit for Hepatitis B.  He was seen 4 months ago when it was discovered that he had positive Hep B core AB test.  He was educated at that time about monitoring liver enzymes and symptoms to monitor for Hep B reactivation. He was concerned and wanted to be checked.      Review of Systems   Constitutional:  Negative for activity change, appetite change and fatigue.   HENT:  Negative for trouble swallowing and voice change.    Respiratory:  Negative for cough and choking.    Cardiovascular:  Negative for leg swelling.   Gastrointestinal:  Negative for abdominal distention, abdominal pain, anal bleeding, blood in stool, constipation, diarrhea, nausea, rectal pain and vomiting.   Endocrine: Negative for polyphagia.   Genitourinary:  Negative for flank pain.   Musculoskeletal:  Positive for back pain.   Skin:  Negative for color change and pallor.   Allergic/Immunologic: Negative for food allergies.   Neurological:  Negative for weakness.   Psychiatric/Behavioral:  Negative for confusion.        ACTIVE PROBLEMS:   Specialty Problems          Cardiology Problems    Atrial fibrillation        Chronic combined systolic and diastolic congestive heart failure        Ischemic cardiomyopathy           PAST MEDICAL HISTORY:  Past Medical History:   Diagnosis Date    Arthritis     Atrial fibrillation     Balance problem     CHF (congestive heart failure)     COPD (chronic obstructive pulmonary disease)     Coronary artery disease     Dependence on cane     GERD (gastroesophageal reflux disease)     Heart attack     X 13 per patient, last one 2002 (9 heart attacks 2001- 1 cardiac stent placed)    History of hepatitis C 2001    had treatment now test neg    Hypertension     Lipoma     Myocardial infarction     Tinnitus     Wears  reading eyeglasses        SURGICAL HISTORY:  Past Surgical History:   Procedure Laterality Date    CARDIAC CATHETERIZATION N/A 09/04/2020    Procedure: Left Heart Cath;  Surgeon: Herman Sen MD;  Location: [facility] CATH INVASIVE LOCATION;  Service: Cardiology;  Laterality: N/A;    COLONOSCOPY      CORONARY STENT PLACEMENT      FACIAL RECONSTRUCTION SURGERY Right 1995       FAMILY HISTORY:  Family History   Problem Relation Age of Onset    Heart disease Mother     Heart disease Maternal Grandmother        SOCIAL HISTORY:  Social History     Tobacco Use    Smoking status: Every Day     Packs/day: 1     Types: Cigarettes     Start date: 3/1/1969    Smokeless tobacco: Never   Substance Use Topics    Alcohol use: Never       CURRENT MEDICATION:  No current facility-administered medications for this visit.  No current outpatient medications on file.    Facility-Administered Medications Ordered in Other Visits:     acetaminophen (TYLENOL) tablet 1,000 mg, 1,000 mg, Oral, Q6H, Celeste Sheets MD, 1,000 mg at 12/20/23 1349    aspirin EC tablet 81 mg, 81 mg, Oral, Daily, Celeste Sheets MD    carvedilol (COREG) tablet 12.5 mg, 12.5 mg, Oral, BID With Meals, Celeste Sheets MD    gabapentin (NEURONTIN) capsule 800 mg, 800 mg, Oral, Q12H, Celeste Sheets MD, 800 mg at 12/20/23 1211    heparin (porcine) 5000 UNIT/ML injection 5,000 Units, 5,000 Units, Subcutaneous, Q8H, Celeste Sheets MD, 5,000 Units at 12/20/23 1349    morphine injection 2 mg, 2 mg, Intravenous, Q3H PRN, Celeste Sheets MD    oxyCODONE (ROXICODONE) immediate release tablet 5 mg, 5 mg, Oral, Q4H PRN, Celeste Sheets MD, 5 mg at 12/20/23 1233    sacubitril-valsartan (ENTRESTO) 24-26 MG tablet 1 tablet, 1 tablet, Oral, BID, Celeste Sheets MD    ALLERGIES:  Codeine and Penicillins    VISIT VITALS:  Blood Pressure 175/96 (BP Location: Right arm, Patient Position: Sitting, Cuff Size: Adult)   Pulse 87   Temperature 98.3 °F (36.8  °C) (Temporal)   Weight 72.8 kg (160 lb 6.4 oz)   Oxygen Saturation 94%   Body Mass Index 25.12 kg/m²     Physical Exam  Constitutional:       General: He is not in acute distress.     Appearance: He is well-developed. He is not diaphoretic.   HENT:      Head: Normocephalic and atraumatic.      Right Ear: External ear normal.      Left Ear: External ear normal.      Nose: Nose normal.      Mouth/Throat:      Pharynx: No oropharyngeal exudate.   Eyes:      General: No scleral icterus.        Right eye: No discharge.         Left eye: No discharge.      Conjunctiva/sclera: Conjunctivae normal.      Pupils: Pupils are equal, round, and reactive to light.   Neck:      Thyroid: No thyromegaly.      Vascular: No JVD.      Trachea: No tracheal deviation.   Cardiovascular:      Rate and Rhythm: Normal rate and regular rhythm.      Heart sounds: Normal heart sounds. No murmur heard.     No friction rub. No gallop.   Pulmonary:      Effort: Pulmonary effort is normal. No respiratory distress.      Breath sounds: Normal breath sounds. No stridor. No wheezing or rales.   Chest:      Chest wall: No tenderness.   Abdominal:      General: Bowel sounds are normal. There is no distension.      Palpations: Abdomen is soft. There is no mass.      Tenderness: There is no abdominal tenderness. There is no guarding or rebound.      Hernia: No hernia is present.   Genitourinary:     Rectum: Guaiac result negative.   Musculoskeletal:      Cervical back: Normal range of motion and neck supple.   Lymphadenopathy:      Cervical: No cervical adenopathy.   Skin:     General: Skin is warm and dry.      Coloration: Skin is not pale.      Findings: No erythema or rash.   Neurological:      Mental Status: He is alert and oriented to person, place, and time.      Cranial Nerves: No cranial nerve deficit.      Motor: No abnormal muscle tone.      Coordination: Coordination normal.      Gait: Gait abnormal.      Deep Tendon Reflexes: Reflexes are  normal and symmetric. Reflexes normal.   Psychiatric:         Behavior: Behavior normal.         Thought Content: Thought content normal.         Judgment: Judgment normal.         Assessment & Plan      Diagnosis Plan   1. Hepatitis B antibody positive  Hepatitis B DNA, Quantitative, PCR    Comprehensive Metabolic Panel    Hepatitis B Surface Antigen        Labs ordered to assure Hep B has not reactivated.  He was educated to follow up with PCP every 6 months for labs to be monitored.           Raffy Shetty PA-C

## 2023-12-14 ENCOUNTER — TELEPHONE (OUTPATIENT)
Dept: SURGERY | Facility: CLINIC | Age: 63
End: 2023-12-14
Payer: MEDICAID

## 2023-12-14 LAB
ALBUMIN SERPL-MCNC: 4 G/DL (ref 3.5–5.2)
ALBUMIN/GLOB SERPL: 1.4 G/DL
ALP SERPL-CCNC: 89 U/L (ref 39–117)
ALT SERPL W P-5'-P-CCNC: 10 U/L (ref 1–41)
ANION GAP SERPL CALCULATED.3IONS-SCNC: 10.3 MMOL/L (ref 5–15)
AST SERPL-CCNC: 13 U/L (ref 1–40)
BILIRUB SERPL-MCNC: <0.2 MG/DL (ref 0–1.2)
BUN SERPL-MCNC: 16 MG/DL (ref 8–23)
BUN/CREAT SERPL: 19.3 (ref 7–25)
CALCIUM SPEC-SCNC: 9.4 MG/DL (ref 8.6–10.5)
CHLORIDE SERPL-SCNC: 104 MMOL/L (ref 98–107)
CO2 SERPL-SCNC: 24.7 MMOL/L (ref 22–29)
CREAT SERPL-MCNC: 0.83 MG/DL (ref 0.76–1.27)
EGFRCR SERPLBLD CKD-EPI 2021: 98.3 ML/MIN/1.73
GLOBULIN UR ELPH-MCNC: 2.8 GM/DL
GLUCOSE SERPL-MCNC: 89 MG/DL (ref 65–99)
HBV DNA SERPL NAA+PROBE-ACNC: NORMAL IU/ML
HBV DNA SERPL NAA+PROBE-LOG IU: NORMAL LOG10 IU/ML
HBV SURFACE AG SERPL QL IA: NORMAL
POTASSIUM SERPL-SCNC: 4.9 MMOL/L (ref 3.5–5.2)
PROT SERPL-MCNC: 6.8 G/DL (ref 6–8.5)
REF LAB TEST REF RANGE: NORMAL
SODIUM SERPL-SCNC: 139 MMOL/L (ref 136–145)

## 2023-12-14 NOTE — TELEPHONE ENCOUNTER
Called to inform patient that he will need to be at hospital for PAT on Tuesday 12/19 at 10:15am and his SX is scheduled for Wednesday 12/20 at 8:00 am. Patient verbalized understanding.

## 2023-12-19 ENCOUNTER — ANESTHESIA EVENT (OUTPATIENT)
Dept: PERIOP | Facility: HOSPITAL | Age: 63
End: 2023-12-19
Payer: MEDICAID

## 2023-12-20 ENCOUNTER — ANESTHESIA (OUTPATIENT)
Dept: PERIOP | Facility: HOSPITAL | Age: 63
End: 2023-12-20
Payer: MEDICAID

## 2023-12-20 ENCOUNTER — HOSPITAL ENCOUNTER (OUTPATIENT)
Facility: HOSPITAL | Age: 63
Discharge: HOME | End: 2023-12-21
Attending: SURGERY | Admitting: SURGERY
Payer: MEDICAID

## 2023-12-20 DIAGNOSIS — D17.21 LIPOMA OF RIGHT SHOULDER: ICD-10-CM

## 2023-12-20 DIAGNOSIS — D17.0 LIPOMA OF HEAD: Primary | ICD-10-CM

## 2023-12-20 PROBLEM — D17.9 LIPOMA: Status: ACTIVE | Noted: 2023-12-20

## 2023-12-20 PROBLEM — D17.9 LIPOMA: Status: RESOLVED | Noted: 2023-12-20 | Resolved: 2023-12-20

## 2023-12-20 LAB — GLUCOSE BLDC GLUCOMTR-MCNC: 86 MG/DL (ref 70–130)

## 2023-12-20 PROCEDURE — 25010000002 CEFAZOLIN PER 500 MG: Performed by: SURGERY

## 2023-12-20 PROCEDURE — 11404 EXC TR-EXT B9+MARG 3.1-4 CM: CPT | Performed by: SURGERY

## 2023-12-20 PROCEDURE — 25010000002 NEOSTIGMINE 10 MG/10ML SOLUTION: Performed by: NURSE ANESTHETIST, CERTIFIED REGISTERED

## 2023-12-20 PROCEDURE — 25010000002 HYDRALAZINE PER 20 MG: Performed by: ANESTHESIOLOGY

## 2023-12-20 PROCEDURE — 25010000002 PROPOFOL 200 MG/20ML EMULSION: Performed by: NURSE ANESTHETIST, CERTIFIED REGISTERED

## 2023-12-20 PROCEDURE — 25010000002 BUPIVACAINE 0.5 % SOLUTION: Performed by: SURGERY

## 2023-12-20 PROCEDURE — 25810000003 LACTATED RINGERS PER 1000 ML: Performed by: ANESTHESIOLOGY

## 2023-12-20 PROCEDURE — 25010000002 FENTANYL CITRATE (PF) 50 MCG/ML SOLUTION: Performed by: NURSE ANESTHETIST, CERTIFIED REGISTERED

## 2023-12-20 PROCEDURE — G0378 HOSPITAL OBSERVATION PER HR: HCPCS

## 2023-12-20 PROCEDURE — 25010000002 MIDAZOLAM PER 1 MG: Performed by: NURSE ANESTHETIST, CERTIFIED REGISTERED

## 2023-12-20 PROCEDURE — 25010000002 HEPARIN (PORCINE) PER 1000 UNITS: Performed by: SURGERY

## 2023-12-20 PROCEDURE — 82948 REAGENT STRIP/BLOOD GLUCOSE: CPT

## 2023-12-20 PROCEDURE — 25010000002 ONDANSETRON PER 1 MG: Performed by: NURSE ANESTHETIST, CERTIFIED REGISTERED

## 2023-12-20 RX ORDER — GABAPENTIN 400 MG/1
800 CAPSULE ORAL EVERY 12 HOURS SCHEDULED
Status: DISCONTINUED | OUTPATIENT
Start: 2023-12-20 | End: 2023-12-21 | Stop reason: HOSPADM

## 2023-12-20 RX ORDER — OXYCODONE HYDROCHLORIDE AND ACETAMINOPHEN 5; 325 MG/1; MG/1
1 TABLET ORAL ONCE AS NEEDED
Status: DISCONTINUED | OUTPATIENT
Start: 2023-12-20 | End: 2023-12-20 | Stop reason: HOSPADM

## 2023-12-20 RX ORDER — SODIUM CHLORIDE 9 MG/ML
40 INJECTION, SOLUTION INTRAVENOUS AS NEEDED
Status: DISCONTINUED | OUTPATIENT
Start: 2023-12-20 | End: 2023-12-20 | Stop reason: HOSPADM

## 2023-12-20 RX ORDER — MAGNESIUM HYDROXIDE 1200 MG/15ML
LIQUID ORAL AS NEEDED
Status: DISCONTINUED | OUTPATIENT
Start: 2023-12-20 | End: 2023-12-20 | Stop reason: HOSPADM

## 2023-12-20 RX ORDER — SODIUM CHLORIDE 0.9 % (FLUSH) 0.9 %
10 SYRINGE (ML) INJECTION AS NEEDED
Status: DISCONTINUED | OUTPATIENT
Start: 2023-12-20 | End: 2023-12-20 | Stop reason: HOSPADM

## 2023-12-20 RX ORDER — GLYCOPYRROLATE 0.2 MG/ML
INJECTION INTRAMUSCULAR; INTRAVENOUS AS NEEDED
Status: DISCONTINUED | OUTPATIENT
Start: 2023-12-20 | End: 2023-12-20 | Stop reason: SURG

## 2023-12-20 RX ORDER — MIDAZOLAM HYDROCHLORIDE 1 MG/ML
1 INJECTION INTRAMUSCULAR; INTRAVENOUS
Status: DISCONTINUED | OUTPATIENT
Start: 2023-12-20 | End: 2023-12-20 | Stop reason: HOSPADM

## 2023-12-20 RX ORDER — IPRATROPIUM BROMIDE AND ALBUTEROL SULFATE 2.5; .5 MG/3ML; MG/3ML
3 SOLUTION RESPIRATORY (INHALATION) ONCE AS NEEDED
Status: DISCONTINUED | OUTPATIENT
Start: 2023-12-20 | End: 2023-12-20 | Stop reason: HOSPADM

## 2023-12-20 RX ORDER — LIDOCAINE HYDROCHLORIDE 20 MG/ML
INJECTION, SOLUTION EPIDURAL; INFILTRATION; INTRACAUDAL; PERINEURAL AS NEEDED
Status: DISCONTINUED | OUTPATIENT
Start: 2023-12-20 | End: 2023-12-20 | Stop reason: SURG

## 2023-12-20 RX ORDER — BUPIVACAINE HYDROCHLORIDE 5 MG/ML
INJECTION, SOLUTION PERINEURAL AS NEEDED
Status: DISCONTINUED | OUTPATIENT
Start: 2023-12-20 | End: 2023-12-20 | Stop reason: HOSPADM

## 2023-12-20 RX ORDER — SODIUM CHLORIDE 0.9 % (FLUSH) 0.9 %
10 SYRINGE (ML) INJECTION EVERY 12 HOURS SCHEDULED
Status: DISCONTINUED | OUTPATIENT
Start: 2023-12-20 | End: 2023-12-20 | Stop reason: HOSPADM

## 2023-12-20 RX ORDER — SODIUM CHLORIDE, SODIUM LACTATE, POTASSIUM CHLORIDE, CALCIUM CHLORIDE 600; 310; 30; 20 MG/100ML; MG/100ML; MG/100ML; MG/100ML
125 INJECTION, SOLUTION INTRAVENOUS ONCE
Status: COMPLETED | OUTPATIENT
Start: 2023-12-20 | End: 2023-12-20

## 2023-12-20 RX ORDER — CARVEDILOL 6.25 MG/1
12.5 TABLET ORAL 2 TIMES DAILY WITH MEALS
Status: DISCONTINUED | OUTPATIENT
Start: 2023-12-20 | End: 2023-12-21 | Stop reason: HOSPADM

## 2023-12-20 RX ORDER — EPHEDRINE SULFATE 5 MG/ML
INJECTION INTRAVENOUS AS NEEDED
Status: DISCONTINUED | OUTPATIENT
Start: 2023-12-20 | End: 2023-12-20 | Stop reason: SURG

## 2023-12-20 RX ORDER — ASPIRIN 81 MG/1
81 TABLET ORAL DAILY
COMMUNITY

## 2023-12-20 RX ORDER — KETOROLAC TROMETHAMINE 30 MG/ML
30 INJECTION, SOLUTION INTRAMUSCULAR; INTRAVENOUS EVERY 6 HOURS PRN
Status: DISCONTINUED | OUTPATIENT
Start: 2023-12-20 | End: 2023-12-20 | Stop reason: HOSPADM

## 2023-12-20 RX ORDER — ASPIRIN 81 MG/1
81 TABLET ORAL DAILY
Status: DISCONTINUED | OUTPATIENT
Start: 2023-12-20 | End: 2023-12-21 | Stop reason: HOSPADM

## 2023-12-20 RX ORDER — HYDRALAZINE HYDROCHLORIDE 20 MG/ML
10 INJECTION INTRAMUSCULAR; INTRAVENOUS ONCE
Status: COMPLETED | OUTPATIENT
Start: 2023-12-20 | End: 2023-12-20

## 2023-12-20 RX ORDER — MIDAZOLAM HYDROCHLORIDE 1 MG/ML
INJECTION INTRAMUSCULAR; INTRAVENOUS AS NEEDED
Status: DISCONTINUED | OUTPATIENT
Start: 2023-12-20 | End: 2023-12-20 | Stop reason: SURG

## 2023-12-20 RX ORDER — NEOSTIGMINE METHYLSULFATE 1 MG/ML
INJECTION, SOLUTION INTRAVENOUS AS NEEDED
Status: DISCONTINUED | OUTPATIENT
Start: 2023-12-20 | End: 2023-12-20 | Stop reason: SURG

## 2023-12-20 RX ORDER — FENTANYL CITRATE 50 UG/ML
INJECTION, SOLUTION INTRAMUSCULAR; INTRAVENOUS AS NEEDED
Status: DISCONTINUED | OUTPATIENT
Start: 2023-12-20 | End: 2023-12-20 | Stop reason: SURG

## 2023-12-20 RX ORDER — FAMOTIDINE 10 MG/ML
INJECTION, SOLUTION INTRAVENOUS AS NEEDED
Status: DISCONTINUED | OUTPATIENT
Start: 2023-12-20 | End: 2023-12-20 | Stop reason: SURG

## 2023-12-20 RX ORDER — ROCURONIUM BROMIDE 10 MG/ML
INJECTION, SOLUTION INTRAVENOUS AS NEEDED
Status: DISCONTINUED | OUTPATIENT
Start: 2023-12-20 | End: 2023-12-20 | Stop reason: SURG

## 2023-12-20 RX ORDER — MEPERIDINE HYDROCHLORIDE 25 MG/ML
12.5 INJECTION INTRAMUSCULAR; INTRAVENOUS; SUBCUTANEOUS
Status: DISCONTINUED | OUTPATIENT
Start: 2023-12-20 | End: 2023-12-20 | Stop reason: HOSPADM

## 2023-12-20 RX ORDER — ACETAMINOPHEN 500 MG
1000 TABLET ORAL EVERY 6 HOURS
Status: DISCONTINUED | OUTPATIENT
Start: 2023-12-20 | End: 2023-12-21 | Stop reason: HOSPADM

## 2023-12-20 RX ORDER — FENTANYL CITRATE 50 UG/ML
50 INJECTION, SOLUTION INTRAMUSCULAR; INTRAVENOUS
Status: DISCONTINUED | OUTPATIENT
Start: 2023-12-20 | End: 2023-12-20 | Stop reason: HOSPADM

## 2023-12-20 RX ORDER — ONDANSETRON 2 MG/ML
4 INJECTION INTRAMUSCULAR; INTRAVENOUS AS NEEDED
Status: DISCONTINUED | OUTPATIENT
Start: 2023-12-20 | End: 2023-12-20 | Stop reason: HOSPADM

## 2023-12-20 RX ORDER — PROPOFOL 10 MG/ML
INJECTION, EMULSION INTRAVENOUS AS NEEDED
Status: DISCONTINUED | OUTPATIENT
Start: 2023-12-20 | End: 2023-12-20 | Stop reason: SURG

## 2023-12-20 RX ORDER — SODIUM CHLORIDE 0.9 % (FLUSH) 0.9 %
3 SYRINGE (ML) INJECTION EVERY 12 HOURS SCHEDULED
Status: DISCONTINUED | OUTPATIENT
Start: 2023-12-20 | End: 2023-12-20 | Stop reason: HOSPADM

## 2023-12-20 RX ORDER — SACUBITRIL AND VALSARTAN 24; 26 MG/1; MG/1
1 TABLET, FILM COATED ORAL 2 TIMES DAILY
COMMUNITY

## 2023-12-20 RX ORDER — ONDANSETRON 2 MG/ML
INJECTION INTRAMUSCULAR; INTRAVENOUS AS NEEDED
Status: DISCONTINUED | OUTPATIENT
Start: 2023-12-20 | End: 2023-12-20 | Stop reason: SURG

## 2023-12-20 RX ORDER — HEPARIN SODIUM 5000 [USP'U]/ML
5000 INJECTION, SOLUTION INTRAVENOUS; SUBCUTANEOUS EVERY 8 HOURS SCHEDULED
Status: DISCONTINUED | OUTPATIENT
Start: 2023-12-20 | End: 2023-12-21 | Stop reason: HOSPADM

## 2023-12-20 RX ORDER — OXYCODONE HYDROCHLORIDE 5 MG/1
5 TABLET ORAL EVERY 4 HOURS PRN
Status: DISCONTINUED | OUTPATIENT
Start: 2023-12-20 | End: 2023-12-21 | Stop reason: HOSPADM

## 2023-12-20 RX ORDER — SODIUM CHLORIDE, SODIUM LACTATE, POTASSIUM CHLORIDE, CALCIUM CHLORIDE 600; 310; 30; 20 MG/100ML; MG/100ML; MG/100ML; MG/100ML
100 INJECTION, SOLUTION INTRAVENOUS ONCE AS NEEDED
Status: DISCONTINUED | OUTPATIENT
Start: 2023-12-20 | End: 2023-12-20 | Stop reason: HOSPADM

## 2023-12-20 RX ORDER — METOPROLOL TARTRATE 1 MG/ML
INJECTION, SOLUTION INTRAVENOUS AS NEEDED
Status: DISCONTINUED | OUTPATIENT
Start: 2023-12-20 | End: 2023-12-20 | Stop reason: SURG

## 2023-12-20 RX ORDER — MORPHINE SULFATE 2 MG/ML
2 INJECTION, SOLUTION INTRAMUSCULAR; INTRAVENOUS
Status: DISCONTINUED | OUTPATIENT
Start: 2023-12-20 | End: 2023-12-21 | Stop reason: HOSPADM

## 2023-12-20 RX ORDER — ASPIRIN 81 MG/1
81 TABLET ORAL DAILY
Status: CANCELLED | OUTPATIENT
Start: 2023-12-20

## 2023-12-20 RX ADMIN — CEFAZOLIN 2000 MG: 2 INJECTION, POWDER, FOR SOLUTION INTRAMUSCULAR; INTRAVENOUS at 10:17

## 2023-12-20 RX ADMIN — GABAPENTIN 800 MG: 400 CAPSULE ORAL at 12:11

## 2023-12-20 RX ADMIN — SODIUM CHLORIDE, POTASSIUM CHLORIDE, SODIUM LACTATE AND CALCIUM CHLORIDE: 600; 310; 30; 20 INJECTION, SOLUTION INTRAVENOUS at 10:07

## 2023-12-20 RX ADMIN — OXYCODONE HYDROCHLORIDE 5 MG: 5 TABLET ORAL at 21:38

## 2023-12-20 RX ADMIN — LIDOCAINE HYDROCHLORIDE 60 MG: 20 INJECTION, SOLUTION EPIDURAL; INFILTRATION; INTRACAUDAL; PERINEURAL at 10:13

## 2023-12-20 RX ADMIN — FAMOTIDINE 20 MG: 10 INJECTION, SOLUTION INTRAVENOUS at 10:07

## 2023-12-20 RX ADMIN — NEOSTIGMINE METHYLSULFATE 3 MG: 0.5 INJECTION INTRAVENOUS at 10:52

## 2023-12-20 RX ADMIN — PROPOFOL 150 MG: 10 INJECTION, EMULSION INTRAVENOUS at 10:13

## 2023-12-20 RX ADMIN — HYDRALAZINE HYDROCHLORIDE 10 MG: 20 INJECTION INTRAMUSCULAR; INTRAVENOUS at 11:18

## 2023-12-20 RX ADMIN — OXYCODONE HYDROCHLORIDE 5 MG: 5 TABLET ORAL at 12:33

## 2023-12-20 RX ADMIN — ASPIRIN 81 MG: 81 TABLET, COATED ORAL at 20:36

## 2023-12-20 RX ADMIN — ROCURONIUM BROMIDE 30 MG: 10 SOLUTION INTRAVENOUS at 10:13

## 2023-12-20 RX ADMIN — GLYCOPYRROLATE 0.4 MG: 0.4 INJECTION INTRAMUSCULAR; INTRAVENOUS at 10:52

## 2023-12-20 RX ADMIN — FENTANYL CITRATE 50 MCG: 50 INJECTION INTRAMUSCULAR; INTRAVENOUS at 10:13

## 2023-12-20 RX ADMIN — EPHEDRINE SULFATE 10 MG: 5 INJECTION INTRAVENOUS at 10:46

## 2023-12-20 RX ADMIN — ACETAMINOPHEN 1000 MG: 500 TABLET ORAL at 13:49

## 2023-12-20 RX ADMIN — ACETAMINOPHEN 1000 MG: 500 TABLET ORAL at 18:01

## 2023-12-20 RX ADMIN — SODIUM CHLORIDE, POTASSIUM CHLORIDE, SODIUM LACTATE AND CALCIUM CHLORIDE 125 ML/HR: 600; 310; 30; 20 INJECTION, SOLUTION INTRAVENOUS at 08:48

## 2023-12-20 RX ADMIN — MIDAZOLAM HYDROCHLORIDE 2 MG: 1 INJECTION, SOLUTION INTRAMUSCULAR; INTRAVENOUS at 10:07

## 2023-12-20 RX ADMIN — ONDANSETRON 4 MG: 2 INJECTION INTRAMUSCULAR; INTRAVENOUS at 10:20

## 2023-12-20 RX ADMIN — HEPARIN SODIUM 5000 UNITS: 5000 INJECTION INTRAVENOUS; SUBCUTANEOUS at 13:49

## 2023-12-20 RX ADMIN — SACUBITRIL AND VALSARTAN 1 TABLET: 24; 26 TABLET, FILM COATED ORAL at 21:38

## 2023-12-20 RX ADMIN — FENTANYL CITRATE 50 MCG: 50 INJECTION INTRAMUSCULAR; INTRAVENOUS at 10:18

## 2023-12-20 RX ADMIN — GABAPENTIN 800 MG: 400 CAPSULE ORAL at 21:41

## 2023-12-20 RX ADMIN — HEPARIN SODIUM 5000 UNITS: 5000 INJECTION INTRAVENOUS; SUBCUTANEOUS at 21:38

## 2023-12-20 RX ADMIN — CARVEDILOL 12.5 MG: 6.25 TABLET, FILM COATED ORAL at 18:01

## 2023-12-20 RX ADMIN — METOPROLOL TARTRATE 2.5 MG: 1 INJECTION, SOLUTION INTRAVENOUS at 10:23

## 2023-12-20 RX ADMIN — EPHEDRINE SULFATE 10 MG: 5 INJECTION INTRAVENOUS at 10:43

## 2023-12-20 NOTE — PLAN OF CARE
Goal Outcome Evaluation:           Progress: no change  Outcome Evaluation: Pt resting in bed at this time. Pt stand by. Pt walked to bathroom and had a BM. No acute changes at this time. Complaints of pain to which PRN pain medication was given. Will continue with plan of care.

## 2023-12-20 NOTE — ANESTHESIA POSTPROCEDURE EVALUATION
Patient: Chong White Sr.    Procedure Summary       Date: 12/20/23 Room / Location:  [facility] OR 02 /  [facility] OR    Anesthesia Start: 1007 Anesthesia Stop: 1100    Procedure: LIPOMA EXCISION: right scalp and right shoulder (Right) Diagnosis:       Lipoma of right shoulder      (Lipoma of right shoulder [D17.21])    Surgeons: Celeste Sheets MD Provider: Elmer Izquierdo MD    Anesthesia Type: general ASA Status: 4            Anesthesia Type: general    Vitals  Vitals Value Taken Time   /82 12/20/23 1126   Temp 98.8 °F (37.1 °C) 12/20/23 1121   Pulse 87 12/20/23 1130   Resp 12 12/20/23 1126   SpO2 93 % 12/20/23 1130   Vitals shown include unfiled device data.        Post Anesthesia Care and Evaluation    Patient location during evaluation: PHASE II  Patient participation: complete - patient participated  Level of consciousness: awake and alert  Pain score: 1  Pain management: adequate    Airway patency: patent  Anesthetic complications: No anesthetic complications  PONV Status: controlled  Cardiovascular status: acceptable  Respiratory status: acceptable and room air  Hydration status: euvolemic  No anesthesia care post op

## 2023-12-20 NOTE — CASE MANAGEMENT/SOCIAL WORK
Discharge Planning Assessment  [facility]     Patient Name: Chong White Sr.  MRN: [id number]  Today's Date: 12/20/2023    Admit Date: 12/20/2023     Discharge Needs Assessment       Row Name 12/20/23 1738       Living Environment    People in Home alone    Current Living Arrangements home    Potentially Unsafe Housing Conditions other (see comments)  Rental in need of home repairs, heats with kerosene    In the past 12 months has the electric, gas, oil, or water company threatened to shut off services in your home? No    Primary Care Provided by self    Provides Primary Care For no one    Family Caregiver if Needed child(real), adult    Family Caregiver Names Dtr Gwen    Quality of Family Relationships helpful;involved;supportive       Transition Planning    Patient/Family Anticipates Transition to home    Transportation Anticipated family or friend will provide       Discharge Needs Assessment    Equipment Currently Used at Home cane, quad    Concerns to be Addressed discharge planning                   Discharge Plan       Row Name 12/20/23 2761       Plan    Plan Pt was admitted on 12/20/23. SS received Lead RN consult for housing list. SS spoke with Pt at bedside. Pt lives alone and plans to return home at discharge. Pt does not utilize home health services. Pt has a quad cane via unknown provider. Pt's PCP is Amy Yanez. Pt does not have a POA/living will. Pt voices he rents his home but it is in need of repairs and has kerosene heat. SS provided Pt with a housing list. Pt voiced understanding and plans to visit [facility] authority to begin applying for new apartments. Pt will need [employer] for transportation home. SS to follow.                   Susi Rivas, TUCKERW

## 2023-12-20 NOTE — ANESTHESIA PREPROCEDURE EVALUATION
Anesthesia Evaluation     Patient summary reviewed and Nursing notes reviewed   NPO Solid Status: > 8 hours  NPO Liquid Status: > 8 hours           Airway   Mallampati: II  TM distance: >3 FB  Neck ROM: full  No difficulty expected  Dental - normal exam     Pulmonary - normal exam    breath sounds clear to auscultation  (+) pneumonia (left lower lobe) , COPD,  Cardiovascular   Exercise tolerance: poor (<4 METS)    ECG reviewed  PT is on anticoagulation therapy  Patient on routine beta blocker and Beta blocker given within 24 hours of surgery  Rhythm: irregular  Rate: abnormal    (+) hypertension less than 2 medications, past MI , CAD, cardiac stents (one stent 2001) more than 12 months ago , dysrhythmias Paroxysmal Atrial Fib, CHF       Neuro/Psych  GI/Hepatic/Renal/Endo    (+) GERD, hepatitis C, liver disease    Musculoskeletal     (+) neck pain  Abdominal  - normal exam   Substance History   (+) drug use (smoker 2 ppd;  marjuiana 1 month ago;  other substances years ago)     OB/GYN          Other   arthritis,     ROS/Med Hx Other: 11/16/22    ·  Normal left ventricular cavity size and wall thickness noted. All left ventricular wall segments contract normally  ·  Left ventricular ejection fraction appears to be 56 - 60%.  ·  Left ventricular diastolic function is consistent with (grade I) impaired relaxation.  ·  The aortic valve is structurally normal with no regurgitation or stenosis present.  ·  The mitral valve is structurally normal with no regurgitation or significant stenosis present.  ·  There is no evidence of pericardial effusion. .                     Anesthesia Plan    ASA 4     general     intravenous induction     Anesthetic plan, risks, benefits, and alternatives have been provided, discussed and informed consent has been obtained with: patient.  Pre-procedure education provided  Use of blood products discussed with  Consented to blood products.    Plan discussed with CRNA.

## 2023-12-20 NOTE — ANESTHESIA PROCEDURE NOTES
Airway  Urgency: elective    Date/Time: 12/20/2023 10:16 AM  Airway not difficult    Indications and Patient Condition  Indications for airway management: airway protection    Preoxygenated: yes  Mask difficulty assessment: 2 - vent by mask + OA or adjuvant +/- NMBA    Final Airway Details  Final airway type: endotracheal airway      Successful airway: ETT  Cuffed: yes   Successful intubation technique: video laryngoscopy  Facilitating devices/methods: cricoid pressure  Endotracheal tube insertion site: oral  Blade: Mackay  Blade size: 3  ETT size (mm): 7.5  Placement verified by: chest auscultation, capnometry and palpation of cuff   Measured from: lips  ETT/EBT  to lips (cm): 21  Number of attempts at approach: 1  Assessment: lips, teeth, and gum same as pre-op and atraumatic intubation    Additional Comments  DL without good view  Mackay with good view

## 2023-12-21 VITALS
DIASTOLIC BLOOD PRESSURE: 78 MMHG | HEIGHT: 67 IN | HEART RATE: 64 BPM | RESPIRATION RATE: 16 BRPM | TEMPERATURE: 97.9 F | BODY MASS INDEX: 26.06 KG/M2 | WEIGHT: 166 LBS | OXYGEN SATURATION: 96 % | SYSTOLIC BLOOD PRESSURE: 143 MMHG

## 2023-12-21 PROBLEM — D17.9 LIPOMA: Status: ACTIVE | Noted: 2023-12-21

## 2023-12-21 PROBLEM — D17.9 LIPOMA: Status: RESOLVED | Noted: 2023-12-21 | Resolved: 2023-12-21

## 2023-12-21 LAB — REF LAB TEST METHOD: NORMAL

## 2023-12-21 PROCEDURE — 99024 POSTOP FOLLOW-UP VISIT: CPT | Performed by: SURGERY

## 2023-12-21 PROCEDURE — G0378 HOSPITAL OBSERVATION PER HR: HCPCS

## 2023-12-21 PROCEDURE — 12032 INTMD RPR S/A/T/EXT 2.6-7.5: CPT | Performed by: SURGERY

## 2023-12-21 PROCEDURE — 11423 EXC H-F-NK-SP B9+MARG 2.1-3: CPT | Performed by: SURGERY

## 2023-12-21 PROCEDURE — 25010000002 HEPARIN (PORCINE) PER 1000 UNITS: Performed by: SURGERY

## 2023-12-21 RX ORDER — OXYCODONE HYDROCHLORIDE 5 MG/1
5 TABLET ORAL EVERY 8 HOURS PRN
Qty: 15 TABLET | Refills: 0 | Status: SHIPPED | OUTPATIENT
Start: 2023-12-21 | End: 2024-12-20

## 2023-12-21 RX ADMIN — OXYCODONE HYDROCHLORIDE 5 MG: 5 TABLET ORAL at 11:06

## 2023-12-21 RX ADMIN — GABAPENTIN 800 MG: 400 CAPSULE ORAL at 08:39

## 2023-12-21 RX ADMIN — CARVEDILOL 12.5 MG: 6.25 TABLET, FILM COATED ORAL at 08:39

## 2023-12-21 RX ADMIN — OXYCODONE HYDROCHLORIDE 5 MG: 5 TABLET ORAL at 01:39

## 2023-12-21 RX ADMIN — ASPIRIN 81 MG: 81 TABLET, COATED ORAL at 08:39

## 2023-12-21 RX ADMIN — SACUBITRIL AND VALSARTAN 1 TABLET: 24; 26 TABLET, FILM COATED ORAL at 08:39

## 2023-12-21 RX ADMIN — HEPARIN SODIUM 5000 UNITS: 5000 INJECTION INTRAVENOUS; SUBCUTANEOUS at 05:59

## 2023-12-21 RX ADMIN — OXYCODONE HYDROCHLORIDE 5 MG: 5 TABLET ORAL at 05:59

## 2023-12-21 NOTE — OP NOTE
OPERATIVE NOTE    Patient Name:  Chong White Sr.  YOB: 1960  [id number]    Date of Surgery:  12/21/2023    PREOPERATIVE DIAGNOSIS: Symptomatic lipomas located on right posterior shoulder and right posterior scalp    POSTOPERATIVE DIAGNOSIS: Same      PROCEDURE PERFORMED: Excision of lipoma on the right posterior scalp and right posterior shoulder    SURGEON: Celeste Sheets MD     Circulator: Maddie Bautista RN  Scrub Person: Marion Fiore  Assistant: Sydni Mendieta     Assistant: Sydni Mendieta    SPECIMENS:     EBL: 10     ANESTHESIA: General.      FINDINGS:   1.  Lipoma present on the right posterior shoulder, lipoma and dense tissue present on right posterior scalp     INDICATIONS: The patient is a 63 y.o. male who presented to clinic with complaints of symptomatic lipomas on his right posterior scalp and right posterior shoulder.  Risks and benefits were discussed and he elected to proceed with surgery.    DESCRIPTION OF PROCEDURE:    After obtaining informed consent, the patient was taken to the operating room and placed in supine position.  General endotracheal anesthesia was initiated.  The patient was flipped into the left lateral position.  It was prepped and draped in sterile fashion and timeout was performed.  I began with his scalp lesion.  A 3 cm elliptical incision was made overlying the palpable lipoma on his right posterior scalp, just posterior to his right ear.  I then dissected down using electrocautery to excise the scalp lesion.  It was mainly lipomatous in nature.  There was some dense tissue present laterally on the ear that I excised and sent as a separate specimen.  This was then closed with interrupted 3-0 Vicryl sutures in the deep dermal layer and interrupted 2-0 nylon sutures on the skin.  I then proceeded to the right posterior shoulder.  Elliptical incision was made over this area measuring 4 cm.  I then use electrocautery to dissect down and  remove the lipomatous lesion.  The lipoma was lying directly on top of the muscle, there is no muscular invasion.  I then irrigated the area and ensured hemostasis.  The deep layers were closed with interrupted 3-0 Vicryl sutures and the skin was closed with interrupted 2-0 nylon sutures.     This concluded the operation. At the end of the case, the instrument count was correct. The patient was awoken from anesthesia and transported to PACU for further monitoring.       This procedure was not performed to treat primary cutaneous melanoma through wide local excision      COMPLICATIONS: None       Celeste Sheets MD  12/21/2023  09:16 EST

## 2023-12-21 NOTE — PLAN OF CARE
Goal Outcome Evaluation:  Plan of Care Reviewed With: patient        Progress: no change          Pt has ambulated an is resting will continue to monitor an follow current plan of care.

## 2023-12-21 NOTE — DISCHARGE SUMMARY
Discharge Summary    Patient Name:  Chong White Sr.  YOB: 1960  [id number]      DATE OF ADMISSION: 12/20/2023    DATE OF DISCHARGE: 12/21/2023       FINAL DIAGNOSES:   Patient Active Problem List   Diagnosis    Atrial fibrillation    Neuropathy    ASCVD (arteriosclerotic cardiovascular disease)    Tobacco abuse    Ischemic cardiomyopathy    Chronic combined systolic and diastolic congestive heart failure    Chronic neck pain    Chronic low back pain    Paralysis    Hypertension    Chronic anticoagulation    Long term current use of antiarrhythmic drug    Abscessed tooth    Multiple lipomas    Lipoma of scalp       CONSULTING SERVICES: None      PROCEDURES PERFORMED:   1.Excision of right head lipoma and right shoulder lipoma    HISTORY: The patient is a very pleasant 63 y.o. male with a history of symptomatic lipomas on his head and right shoulder      HOSPITAL COURSE: POD 1 from excision of lipoma   he is doing well today. They are tolerating a regular diet, mobilizing, voiding.     CONDITION: At the time of discharge, the patient is stable.      DISCHARGE MEDICATIONS:   1. All previous home medications as reconciled.   2. Alternate over the counter tylenol and ibuprofen for pain control.   2. Take prescribed oxycodone 5mg every 6 hours as needed for additional pain control. [DO NOT DRIVE WHILE TAKING NARCOTIC PAIN MEDICATION]     DISCHARGE INSTRUCTIONS:  You may shower in 24 hours. It is important that you let soap and water run over your wound to keep it clean. Pat dry with clean towel. Wound does not need to be covered. You will return to clinic in 2 weeks for suture removal.   Do not soak in a tub or go in a pool/swim in water for 2 weeks.  Please notify the general surgery clinic should you develop redness, worsening drainage, fever, or increasing pain at your incision site.     Clinic contact information:  [facility]  [facility] Location:  [phone]    Celeste Sheets MD  12/21/2023  08:59 EST

## 2023-12-21 NOTE — DISCHARGE PLACEMENT REQUEST
"Chong White . (63 y.o. Male)       Date of Birth   1960    Social Security Number       Address   [address] [place] [id number]    Home Phone   [phone]    MRN   [id number]       Jehovah's witness   Ep[employer]    Marital Status   Single                            Admission Date   12/20/23    Admission Type   Elective    Admitting Provider   Celeste Sheets MD    Attending Provider   Celeste Sheets MD    Department, Room/Bed   [address], 3349/2S       Discharge Date       Discharge Disposition   Home or Self Care    Discharge Destination                                 Attending Provider: Celeste Sheets MD    Allergies: Codeine, Penicillins    Isolation: None   Infection: None   Code Status: CPR    Ht: 170.2 cm (67\")   Wt: 75.3 kg (166 lb)    Admission Cmt: None   Principal Problem: Lipoma of right shoulder [D17.21]                   Active Insurance as of 12/20/2023       Primary Coverage       Payor Plan Insurance Group Employer/Plan Group    [place] [id number]       Payor Plan Address Payor Plan Phone Number Payor Plan Fax Number Effective Dates    [employer] [address] [address] [phone]  4/1/2020 - None Entered    [facility] [id number]         Subscriber Name Subscriber Birth Date Member ID       CHONG WHITE SREve 1960 [id number]                     Emergency Contacts        (Rel.) Home Phone Work Phone Mobile Phone    KENDRICK WHITE (Sister) -- -- [phone]    RICKOTTO (Daughter) [phone] -- [phone]              Discharge Order (From admission, onward)       Start     Ordered    12/21/23 0928  Discharge patient  Once        Expected Discharge Date: 12/21/23   Expected Discharge Time: Morning   Discharge Disposition: Home or Self Care   Review needed by CMO to determine Physician of Record: No      Question:  Review needed by CMO to determine Physician of Record  Answer:  No    " 12/21/23 0927    12/21/23 0859  Discharge patient  Once,   Status:  Canceled        Expected Discharge Date: 12/21/23   Discharge Disposition: Home or Self Care   Physician of Record for Attribution - Please select from Treatment Team: GEREMIAS NASH [[id number]]   Review needed by CMO to determine Physician of Record: No      Question Answer Comment   Physician of Record for Attribution - Please select from Treatment Team GEREMIAS NASH    Review needed by CMO to determine Physician of Record No        12/21/23 0859

## 2023-12-21 NOTE — CASE MANAGEMENT/SOCIAL WORK
Discharge Planning Assessment   [facility]     Patient Name: Chong White Sr.  MRN: [id number]  Today's Date: 12/21/2023    Admit Date: 12/20/2023     Discharge Plan       Row Name 12/21/23 1135       Plan    Final Note SS spoke to pt and verified that [facility] transportation is still needed. SS contacted [facility] [phone] per [place] to arrange transport. SS faxed face sheet and discharge order to [facility] [phone]. No other needs identified.      Row Name 12/21/23 1038       Plan    Final Discharge Disposition Code 01 - home or self-care                   Expected Discharge Date and Time       Expected Discharge Date Expected Discharge Time    Dec 21, 2023         ELLE Faye

## 2024-01-04 ENCOUNTER — OFFICE VISIT (OUTPATIENT)
Age: 64
End: 2024-01-04
Payer: MEDICAID

## 2024-01-04 VITALS — HEIGHT: 67 IN | BODY MASS INDEX: 26.06 KG/M2 | WEIGHT: 166 LBS

## 2024-01-04 DIAGNOSIS — D17.9 MULTIPLE LIPOMAS: Primary | ICD-10-CM

## 2024-01-04 PROCEDURE — 99024 POSTOP FOLLOW-UP VISIT: CPT | Performed by: SURGERY

## 2024-01-04 PROCEDURE — 1160F RVW MEDS BY RX/DR IN RCRD: CPT | Performed by: SURGERY

## 2024-01-04 PROCEDURE — 1159F MED LIST DOCD IN RCRD: CPT | Performed by: SURGERY

## 2024-01-04 NOTE — PROGRESS NOTES
"Patient Name:  Chong White .  YOB: 1960  [id number]    Follow Up Note    Date of visit: 1/4/2024    Subjective   Subjective: Presents for follow up after scalp and right shoulder lipoma removal. Doing well.         Objective     Objective:     Ht 170.2 cm (67.01\")   Wt 75.3 kg (166 lb)   BMI 25.99 kg/m²       CV:  Regular rate and rhythm  L:  Bilateral lung rise and fall, no use of accessory muscles   Abd:  Soft, nontender, nondistended  Ext:  No cyanosis, clubbing, edema  Right scalp with slight fullness, right shoulder well healed       Assessment/ Plan:  Assessment   Diagnoses and all orders for this visit:    1. Multiple lipomas (Primary)      Mr White presents for follow up after lipoma removal. He is doing well today. Sutures removed. Will plan to call us back in 3 months if still unhappy with fullness on scalp, I anticipate it will continue to decrease in swelling with time.     Celeste Ochoa MD       General Surgeon  [facility]       "

## 2024-01-09 ENCOUNTER — HOSPITAL ENCOUNTER (OUTPATIENT)
Dept: CARDIOLOGY | Facility: HOSPITAL | Age: 64
End: 2024-01-09
Payer: MEDICAID

## 2024-01-09 ENCOUNTER — HOSPITAL ENCOUNTER (OUTPATIENT)
Dept: NUCLEAR MEDICINE | Facility: HOSPITAL | Age: 64
End: 2024-01-09
Payer: MEDICAID

## 2024-01-09 ENCOUNTER — HOSPITAL ENCOUNTER (OUTPATIENT)
Dept: NUCLEAR MEDICINE | Facility: HOSPITAL | Age: 64
Discharge: HOME | End: 2024-01-09
Payer: MEDICAID

## 2024-01-12 ENCOUNTER — HOSPITAL ENCOUNTER (OUTPATIENT)
Dept: NUCLEAR MEDICINE | Facility: HOSPITAL | Age: 64
Discharge: HOME | End: 2024-01-12
Payer: MEDICAID

## 2024-01-12 ENCOUNTER — HOSPITAL ENCOUNTER (OUTPATIENT)
Dept: CARDIOLOGY | Facility: HOSPITAL | Age: 64
Discharge: HOME | End: 2024-01-12
Payer: MEDICAID

## 2024-01-12 PROCEDURE — A9500 TC99M SESTAMIBI: HCPCS | Performed by: NURSE PRACTITIONER

## 2024-01-12 PROCEDURE — 0 TECHNETIUM SESTAMIBI: Performed by: NURSE PRACTITIONER

## 2024-01-12 PROCEDURE — 78452 HT MUSCLE IMAGE SPECT MULT: CPT

## 2024-01-12 PROCEDURE — 93017 CV STRESS TEST TRACING ONLY: CPT

## 2024-01-12 PROCEDURE — 25010000002 REGADENOSON 0.4 MG/5ML SOLUTION: Performed by: NURSE PRACTITIONER

## 2024-01-12 RX ORDER — REGADENOSON 0.08 MG/ML
0.4 INJECTION, SOLUTION INTRAVENOUS
Status: COMPLETED | OUTPATIENT
Start: 2024-01-12 | End: 2024-01-12

## 2024-01-12 RX ADMIN — TECHNETIUM TC 99M SESTAMIBI 1 DOSE: 1 INJECTION INTRAVENOUS at 09:34

## 2024-01-12 RX ADMIN — TECHNETIUM TC 99M SESTAMIBI 1 DOSE: 1 INJECTION INTRAVENOUS at 08:20

## 2024-01-12 RX ADMIN — REGADENOSON 0.4 MG: 0.08 INJECTION, SOLUTION INTRAVENOUS at 09:34

## 2024-01-14 LAB
BH CV NUCLEAR PRIOR STUDY: 3
BH CV REST NUCLEAR ISOTOPE DOSE: 10.2 MCI
BH CV STRESS BP STAGE 1: NORMAL
BH CV STRESS COMMENTS STAGE 1: NORMAL
BH CV STRESS DOSE REGADENOSON STAGE 1: 0.4
BH CV STRESS DURATION MIN STAGE 1: 0
BH CV STRESS DURATION SEC STAGE 1: 10
BH CV STRESS HR STAGE 1: 96
BH CV STRESS NUCLEAR ISOTOPE DOSE: 31.5 MCI
BH CV STRESS PROTOCOL 1: NORMAL
BH CV STRESS RECOVERY BP: NORMAL MMHG
BH CV STRESS RECOVERY HR: 97 BPM
BH CV STRESS STAGE 1: 1
LV EF NUC BP: 41 %
MAXIMAL PREDICTED HEART RATE: 157
PERCENT MAX PREDICTED HR: 61.15 %
STRESS BASELINE BP: NORMAL MMHG
STRESS BASELINE HR: 64 BPM
STRESS PERCENT HR: 72 %
STRESS POST PEAK BP: NORMAL MMHG
STRESS POST PEAK HR: 96 BPM
STRESS TARGET HR: 133

## 2024-01-23 ENCOUNTER — HOSPITAL ENCOUNTER (OUTPATIENT)
Dept: CARDIOLOGY | Facility: HOSPITAL | Age: 64
Discharge: HOME | End: 2024-01-23
Admitting: PHYSICIAN ASSISTANT
Payer: MEDICAID

## 2024-01-23 VITALS
BODY MASS INDEX: 26.13 KG/M2 | SYSTOLIC BLOOD PRESSURE: 141 MMHG | HEIGHT: 67 IN | WEIGHT: 166.5 LBS | OXYGEN SATURATION: 96 % | DIASTOLIC BLOOD PRESSURE: 86 MMHG | HEART RATE: 77 BPM

## 2024-01-23 DIAGNOSIS — I50.42 CHRONIC COMBINED SYSTOLIC AND DIASTOLIC CONGESTIVE HEART FAILURE: Primary | ICD-10-CM

## 2024-01-23 DIAGNOSIS — I25.10 ASCVD (ARTERIOSCLEROTIC CARDIOVASCULAR DISEASE): ICD-10-CM

## 2024-01-23 DIAGNOSIS — I50.32 CHRONIC DIASTOLIC HEART FAILURE: ICD-10-CM

## 2024-01-23 DIAGNOSIS — I42.0 CARDIOMYOPATHY, DILATED: ICD-10-CM

## 2024-01-23 DIAGNOSIS — R07.9 CHEST PAIN, UNSPECIFIED TYPE: ICD-10-CM

## 2024-01-23 LAB — ABSOLUTE LUNG FLUID CONTENT: 28 % (ref 20–35)

## 2024-01-23 PROCEDURE — 94726 PLETHYSMOGRAPHY LUNG VOLUMES: CPT | Performed by: PHYSICIAN ASSISTANT

## 2024-01-23 PROCEDURE — 99215 OFFICE O/P EST HI 40 MIN: CPT | Performed by: PHYSICIAN ASSISTANT

## 2024-01-23 RX ORDER — ISOSORBIDE MONONITRATE 60 MG/1
60 TABLET, EXTENDED RELEASE ORAL DAILY
Qty: 30 TABLET | Refills: 2 | Status: SHIPPED | OUTPATIENT
Start: 2024-01-23 | End: 2024-04-22

## 2024-01-23 RX ORDER — ATORVASTATIN CALCIUM 40 MG/1
40 TABLET, FILM COATED ORAL DAILY
Qty: 30 TABLET | Refills: 5 | Status: SHIPPED | OUTPATIENT
Start: 2024-01-23 | End: 2024-02-22

## 2024-01-23 RX ORDER — FUROSEMIDE 20 MG/1
20 TABLET ORAL DAILY
Qty: 30 TABLET | Refills: 2 | Status: SHIPPED | OUTPATIENT
Start: 2024-01-23

## 2024-01-23 NOTE — PROGRESS NOTES
Heart Failure Clinic  Pharmacist Note     Chong White Sr. is a 63 y.o. male seen in the Heart Failure Clinic for combined systolic and diastolic HF. Most recent EF was 56-60% on 11/16/22. Chong White Sr. has a poor understanding of his medication regimen. He brought all medications that he takes with him to his appointment today. Patient denies any shortness of breath, swelling, or weight gain. He says that he does not check is blood pressure regularly at home and is unsure what it has been running lately. BP in clinic today is 141/86 with HR of 77. Patient denies any issues with medication affordability.      Medication Use:   Hx of med intolerances:  None related to HF  Retail Rx Management: [facility]    Past Medical History:   Diagnosis Date    Arthritis     Atrial fibrillation     Balance problem     CHF (congestive heart failure)     COPD (chronic obstructive pulmonary disease)     Coronary artery disease     Dependence on cane     GERD (gastroesophageal reflux disease)     Heart attack     X 13 per patient, last one 2002 (9 heart attacks 2001- 1 cardiac stent placed)    History of hepatitis C 2001    had treatment now test neg    Hypertension     Lipoma     Myocardial infarction     Tinnitus     Wears reading eyeglasses      ALLERGIES: Codeine and Penicillins  Current Outpatient Medications   Medication Sig Dispense Refill    apixaban (ELIQUIS) 5 MG tablet tablet Take 1 tablet by mouth 2 (Two) Times a Day.      aspirin 81 MG EC tablet Take 1 tablet by mouth Daily.      carvedilol (Coreg) 12.5 MG tablet Take 1 tablet by mouth 2 (Two) Times a Day With Meals for 30 days. 60 tablet 2    empagliflozin (Jardiance) 10 MG tablet tablet Take 1 tablet by mouth Daily. 30 tablet 5    gabapentin (NEURONTIN) 800 MG tablet Take 1 tablet by mouth 2 (Two) Times a Day As Needed (nerve pain).      isosorbide mononitrate (IMDUR) 30 MG 24 hr tablet Take 1 tablet by mouth Daily for 30 days. 30 tablet 2     oxyCODONE (Roxicodone) 5 MG immediate release tablet Take 1 tablet by mouth Every 8 (Eight) Hours As Needed for Severe Pain. 15 tablet 0    ranolazine (RANEXA) 500 MG 12 hr tablet Take 1 tablet by mouth 2 (Two) Times a Day.      sacubitril-valsartan (Entresto) 24-26 MG tablet Take 1 tablet by mouth 2 (Two) Times a Day.       No current facility-administered medications for this encounter.       Vaccination History:   Pneumonia: PPSV23 9/21/20; PCV20 2/21/23  Annual Influenza: UTD 23/24 season  Shingles: Reports UTD    Objective  There were no vitals filed for this visit.    Wt Readings from Last 3 Encounters:   01/04/24 75.3 kg (166 lb)   12/20/23 75.3 kg (166 lb)   12/13/23 72.8 kg (160 lb 6.4 oz)     There were no vitals filed for this visit.    Lab Results   Component Value Date    GLUCOSE 89 12/13/2023    BUN 16 12/13/2023    CREATININE 0.83 12/13/2023    EGFRIFNONA 84 02/23/2022    EGFRIFAFRI 106 10/29/2020    BCR 19.3 12/13/2023    K 4.9 12/13/2023    CO2 24.7 12/13/2023    CALCIUM 9.4 12/13/2023    PROTENTOTREF 6.6 10/29/2020    ALBUMIN 4.0 12/13/2023    LABIL2 1.9 10/29/2020    AST 13 12/13/2023    ALT 10 12/13/2023     Lab Results   Component Value Date    WBC 6.93 06/14/2023    HGB 18.2 (H) 06/14/2023    HCT 51.4 (H) 06/14/2023    MCV 89.5 06/14/2023     06/14/2023     Lab Results   Component Value Date    TROPONINT 8 04/19/2023     Lab Results   Component Value Date    PROBNP 451.3 11/16/2023     Results for orders placed during the hospital encounter of 11/16/22    Adult Transthoracic Echo Complete W/ Cont if Necessary Per Protocol    Interpretation Summary    Normal left ventricular cavity size and wall thickness noted. All left ventricular wall segments contract normally    Left ventricular ejection fraction appears to be 56 - 60%.    Left ventricular diastolic function is consistent with (grade I) impaired relaxation.    The aortic valve is structurally normal with no regurgitation or stenosis  present.    The mitral valve is structurally normal with no regurgitation or significant stenosis present.    There is no evidence of pericardial effusion. .         GDMT    Drug Class   Drug   Dose Last Dose Adjustment Additional Titration   Notes   ACEi/ARB/ARNI Entresto 24/26 mg BID 7/12/22 Needed    Beta Blocker Carvedilol 12.5 mg BID 7/12/22 Needed    SGLT2i Jardiance 10mg QD 7/27/22 N/A                Drug Therapy Problems    GDMT  Requesting refills on all heart failure medications except Entresto    Recommendations:     No new recommendations at this time.  Veronica sent refills to [facility]    Patient was educated on heart failure medications and the importance of medication adherence. All questions were addressed and patient would benefit from additional education at each visit.     Thank you for allowing me to participate in the care of your patient,    Tyra Gomes, PharmD  1/23/2024  11:20 EST

## 2024-01-23 NOTE — ADDENDUM NOTE
Encounter addended by: Veronica Kim PA-C on: 1/23/2024 12:13 PM   Actions taken: Pend clinical note, SmartForm saved

## 2024-01-23 NOTE — ADDENDUM NOTE
Encounter addended by: Veronica Kim PA-C on: 1/23/2024 1:10 PM   Actions taken: Order list changed, Level of Service modified, Clinical Note Signed

## 2024-01-23 NOTE — PROGRESS NOTES
Heart Failure Clinic    Date: 01/23/24     Vitals:    01/23/24 1101   BP: 141/86   Pulse: 77   SpO2: 96%    Weight 166.5    Method of arrival: Ambulatory with cane    Weighing self daily: No    Monitoring Heart Failure Zones: Most days    Today's HF Zone: Green    Taking medications as prescribed: Yes    Edema No    Shortness of Air: Yes    Number of pillows used at night:<2    Educational Materials given:  avs                                                                         ReDS Value: 28  25-35 Optimal Value Status      Esteban Levine RN 01/23/24 11:04 EST

## 2024-02-08 RX ORDER — SACUBITRIL AND VALSARTAN 24; 26 MG/1; MG/1
1 TABLET, FILM COATED ORAL 2 TIMES DAILY
Qty: 60 TABLET | Refills: 5 | Status: SHIPPED | OUTPATIENT
Start: 2024-02-08

## 2024-02-08 RX ORDER — CARVEDILOL 12.5 MG/1
12.5 TABLET ORAL 2 TIMES DAILY WITH MEALS
Qty: 60 TABLET | Refills: 5 | Status: SHIPPED | OUTPATIENT
Start: 2024-02-08 | End: 2024-03-09

## 2024-02-08 RX ORDER — RANOLAZINE 500 MG/1
500 TABLET, EXTENDED RELEASE ORAL EVERY 12 HOURS
Qty: 60 TABLET | Refills: 5 | Status: SHIPPED | OUTPATIENT
Start: 2024-02-08

## 2024-02-08 RX ORDER — ASPIRIN 81 MG/1
81 TABLET, COATED ORAL DAILY
Qty: 30 TABLET | Refills: 5 | Status: SHIPPED | OUTPATIENT
Start: 2024-02-08

## 2024-03-22 ENCOUNTER — HOSPITAL ENCOUNTER (OUTPATIENT)
Dept: CARDIOLOGY | Facility: HOSPITAL | Age: 64
Discharge: HOME | End: 2024-03-22
Payer: MEDICAID

## 2024-03-22 ENCOUNTER — APPOINTMENT (OUTPATIENT)
Dept: CARDIOLOGY | Facility: HOSPITAL | Age: 64
End: 2024-03-22
Payer: MEDICAID

## 2024-03-22 VITALS
OXYGEN SATURATION: 93 % | SYSTOLIC BLOOD PRESSURE: 113 MMHG | BODY MASS INDEX: 25.31 KG/M2 | DIASTOLIC BLOOD PRESSURE: 69 MMHG | HEART RATE: 89 BPM | WEIGHT: 161.6 LBS

## 2024-03-22 DIAGNOSIS — I25.10 ASCVD (ARTERIOSCLEROTIC CARDIOVASCULAR DISEASE): ICD-10-CM

## 2024-03-22 DIAGNOSIS — I50.42 CHRONIC COMBINED SYSTOLIC AND DIASTOLIC CONGESTIVE HEART FAILURE: Primary | ICD-10-CM

## 2024-03-22 LAB
ABSOLUTE LUNG FLUID CONTENT: 29 % (ref 20–35)
ANION GAP SERPL CALCULATED.3IONS-SCNC: 8 MMOL/L (ref 5–15)
BUN SERPL-MCNC: 23 MG/DL (ref 8–23)
BUN/CREAT SERPL: 24 (ref 7–25)
CALCIUM SPEC-SCNC: 9.1 MG/DL (ref 8.6–10.5)
CHLORIDE SERPL-SCNC: 103 MMOL/L (ref 98–107)
CO2 SERPL-SCNC: 29 MMOL/L (ref 22–29)
CREAT SERPL-MCNC: 0.96 MG/DL (ref 0.76–1.27)
EGFRCR SERPLBLD CKD-EPI 2021: 88.3 ML/MIN/1.73
GLUCOSE SERPL-MCNC: 86 MG/DL (ref 65–99)
MAGNESIUM SERPL-MCNC: 2.3 MG/DL (ref 1.6–2.4)
NT-PROBNP SERPL-MCNC: 135.6 PG/ML (ref 0–900)
POTASSIUM SERPL-SCNC: 4.4 MMOL/L (ref 3.5–5.2)
SODIUM SERPL-SCNC: 140 MMOL/L (ref 136–145)

## 2024-03-22 PROCEDURE — 80048 BASIC METABOLIC PNL TOTAL CA: CPT | Performed by: PHYSICIAN ASSISTANT

## 2024-03-22 PROCEDURE — 83735 ASSAY OF MAGNESIUM: CPT | Performed by: PHYSICIAN ASSISTANT

## 2024-03-22 PROCEDURE — 94726 PLETHYSMOGRAPHY LUNG VOLUMES: CPT | Performed by: PHYSICIAN ASSISTANT

## 2024-03-22 PROCEDURE — 83880 ASSAY OF NATRIURETIC PEPTIDE: CPT | Performed by: PHYSICIAN ASSISTANT

## 2024-03-22 RX ORDER — ATORVASTATIN CALCIUM 40 MG/1
40 TABLET, FILM COATED ORAL DAILY
COMMUNITY
Start: 2024-03-20

## 2024-03-22 RX ORDER — DULOXETIN HYDROCHLORIDE 60 MG/1
60 CAPSULE, DELAYED RELEASE ORAL DAILY
COMMUNITY
Start: 2024-03-19

## 2024-03-22 RX ORDER — RANOLAZINE 1000 MG/1
1000 TABLET, EXTENDED RELEASE ORAL 2 TIMES DAILY
Qty: 60 TABLET | Refills: 2 | Status: SHIPPED | OUTPATIENT
Start: 2024-03-22 | End: 2024-06-20

## 2024-03-22 NOTE — PROGRESS NOTES
Heart Failure Clinic  Pharmacist Note     Chong White Sr. is a 64 y.o. male seen in the Heart Failure Clinic for combined systolic and diastolic HF. Most recent EF was 56-60% on 11/16/22. Chong White Sr. has a poor understanding of his medication regimen. Patient reports some shortness of breath and swelling in his feet. He is currently taking Lasix 20 mg daily. He says that he does not check his blood pressure regularly at home and is unsure what it has been running lately. BP in clinic today is 113/69 with HR of 89. Patient denies any issues with medication affordability. Patient does report that he has chest pain throughout the day but that it's mostly at night when he's trying to sleep.      Medication Use:   Hx of med intolerances:  None related to HF  Retail Rx Management: [facility]    Past Medical History:   Diagnosis Date    Arthritis     Atrial fibrillation     Balance problem     CHF (congestive heart failure)     COPD (chronic obstructive pulmonary disease)     Coronary artery disease     Dependence on cane     GERD (gastroesophageal reflux disease)     Heart attack     X 13 per patient, last one 2002 (9 heart attacks 2001- 1 cardiac stent placed)    History of hepatitis C 2001    had treatment now test neg    Hypertension     Lipoma     Myocardial infarction     Tinnitus     Wears reading eyeglasses      ALLERGIES: Codeine and Penicillins  Current Outpatient Medications   Medication Sig Dispense Refill    apixaban (ELIQUIS) 5 MG tablet tablet Take 1 tablet by mouth 2 (Two) Times a Day for 90 days. 180 tablet 0    Aspirin Low Dose 81 MG EC tablet TAKE 1 TABLET BY MOUTH DAILY. 30 tablet 5    carvedilol (Coreg) 12.5 MG tablet Take 1 tablet by mouth 2 (Two) Times a Day With Meals for 30 days. 60 tablet 5    empagliflozin (Jardiance) 10 MG tablet tablet Take 1 tablet by mouth Daily. 30 tablet 5    furosemide (Lasix) 20 MG tablet Take 1 tablet by mouth Daily. 30 tablet 2    gabapentin  (NEURONTIN) 800 MG tablet Take 1 tablet by mouth 2 (Two) Times a Day As Needed (nerve pain).      isosorbide mononitrate (IMDUR) 60 MG 24 hr tablet Take 1 tablet by mouth Daily for 90 days. 30 tablet 2    oxyCODONE (Roxicodone) 5 MG immediate release tablet Take 1 tablet by mouth Every 8 (Eight) Hours As Needed for Severe Pain. 15 tablet 0    ranolazine (RANEXA) 500 MG 12 hr tablet TAKE 1 TABLET BY MOUTH EVERY 12 (TWELVE) HOURS. 60 tablet 5    sacubitril-valsartan (Entresto) 24-26 MG tablet Take 1 tablet by mouth 2 (Two) Times a Day. 60 tablet 5     No current facility-administered medications for this encounter.       Vaccination History:   Pneumonia: PPSV23 9/21/20; PCV20 2/21/23  Annual Influenza: UTD 23/24 season  Shingles: Reports UTD    Objective  There were no vitals filed for this visit.    Wt Readings from Last 3 Encounters:   01/23/24 75.5 kg (166 lb 8 oz)   01/04/24 75.3 kg (166 lb)   12/20/23 75.3 kg (166 lb)     There were no vitals filed for this visit.    Lab Results   Component Value Date    GLUCOSE 89 12/13/2023    BUN 16 12/13/2023    CREATININE 0.83 12/13/2023    EGFRIFNONA 84 02/23/2022    EGFRIFAFRI 106 10/29/2020    BCR 19.3 12/13/2023    K 4.9 12/13/2023    CO2 24.7 12/13/2023    CALCIUM 9.4 12/13/2023    PROTENTOTREF 6.6 10/29/2020    ALBUMIN 4.0 12/13/2023    LABIL2 1.9 10/29/2020    AST 13 12/13/2023    ALT 10 12/13/2023     Lab Results   Component Value Date    WBC 6.93 06/14/2023    HGB 18.2 (H) 06/14/2023    HCT 51.4 (H) 06/14/2023    MCV 89.5 06/14/2023     06/14/2023     Lab Results   Component Value Date    TROPONINT 8 04/19/2023     Lab Results   Component Value Date    PROBNP 451.3 11/16/2023     Results for orders placed during the hospital encounter of 11/16/22    Adult Transthoracic Echo Complete W/ Cont if Necessary Per Protocol    Interpretation Summary    Normal left ventricular cavity size and wall thickness noted. All left ventricular wall segments contract normally     Left ventricular ejection fraction appears to be 56 - 60%.    Left ventricular diastolic function is consistent with (grade I) impaired relaxation.    The aortic valve is structurally normal with no regurgitation or stenosis present.    The mitral valve is structurally normal with no regurgitation or significant stenosis present.    There is no evidence of pericardial effusion. .         GDMT    Drug Class   Drug   Dose Last Dose Adjustment Additional Titration   Notes   ACEi/ARB/ARNI Entresto 24/26 mg BID 7/12/22 Needed    Beta Blocker Carvedilol 12.5 mg BID 7/12/22 Needed    SGLT2i Jardiance 10mg QD 7/27/22 N/A                Drug Therapy Problems    Chest pain  GDMT    Recommendations:     Veronica to increase Ranexa to 1000 mg BID  No new recommendations at this time.    Patient was educated on heart failure medications and the importance of medication adherence. All questions were addressed and patient would benefit from additional education at each visit.     Thank you for allowing me to participate in the care of your patient,    Tyra Gomes PharmD  3/22/2024  11:13 EDT

## 2024-03-22 NOTE — PROGRESS NOTES
Heart Failure Clinic    Date: 03/22/24     Vitals:    03/22/24 1113   BP: 113/69   Pulse: 89   SpO2: 93%      Weight 161.6  Method of arrival: Ambulatory with cane    Weighing self daily: No    Monitoring Heart Failure Zones: Most days    Today's HF Zone: Green    Taking medications as prescribed: Yes    Edema Yes in feet comes and goes    Shortness of Air: Yes with activity    Number of pillows used at night:<2/ recliner    Educational Materials given:  avs                                                                         ReDS Value: 29  25-35 Optimal Value Status      Esteban Levine RN 03/22/24 11:14 EDT
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
## 2024-04-16 ENCOUNTER — HOSPITAL ENCOUNTER (EMERGENCY)
Facility: HOSPITAL | Age: 64
Discharge: HOME | End: 2024-04-16
Attending: STUDENT IN AN ORGANIZED HEALTH CARE EDUCATION/TRAINING PROGRAM
Payer: MEDICAID

## 2024-04-16 ENCOUNTER — APPOINTMENT (OUTPATIENT)
Dept: CT IMAGING | Facility: HOSPITAL | Age: 64
End: 2024-04-16
Payer: MEDICAID

## 2024-04-16 ENCOUNTER — APPOINTMENT (OUTPATIENT)
Dept: GENERAL RADIOLOGY | Facility: HOSPITAL | Age: 64
End: 2024-04-16
Payer: MEDICAID

## 2024-04-16 VITALS
BODY MASS INDEX: 25.18 KG/M2 | SYSTOLIC BLOOD PRESSURE: 135 MMHG | HEIGHT: 69 IN | WEIGHT: 170 LBS | OXYGEN SATURATION: 95 % | RESPIRATION RATE: 18 BRPM | HEART RATE: 67 BPM | TEMPERATURE: 98.4 F | DIASTOLIC BLOOD PRESSURE: 87 MMHG

## 2024-04-16 DIAGNOSIS — R10.84 GENERALIZED ABDOMINAL PAIN: Primary | ICD-10-CM

## 2024-04-16 DIAGNOSIS — K59.00 CONSTIPATION, UNSPECIFIED CONSTIPATION TYPE: ICD-10-CM

## 2024-04-16 DIAGNOSIS — R74.8 ELEVATED LIPASE: ICD-10-CM

## 2024-04-16 LAB
ALBUMIN SERPL-MCNC: 4.1 G/DL (ref 3.5–5.2)
ALBUMIN/GLOB SERPL: 1.4 G/DL
ALP SERPL-CCNC: 85 U/L (ref 39–117)
ALT SERPL W P-5'-P-CCNC: 12 U/L (ref 1–41)
ANION GAP SERPL CALCULATED.3IONS-SCNC: 9.7 MMOL/L (ref 5–15)
AST SERPL-CCNC: 11 U/L (ref 1–40)
BASOPHILS # BLD AUTO: 0.04 10*3/MM3 (ref 0–0.2)
BASOPHILS NFR BLD AUTO: 0.4 % (ref 0–1.5)
BILIRUB SERPL-MCNC: 0.7 MG/DL (ref 0–1.2)
BUN SERPL-MCNC: 19 MG/DL (ref 8–23)
BUN/CREAT SERPL: 17 (ref 7–25)
CALCIUM SPEC-SCNC: 9.8 MG/DL (ref 8.6–10.5)
CHLORIDE SERPL-SCNC: 98 MMOL/L (ref 98–107)
CO2 SERPL-SCNC: 30.3 MMOL/L (ref 22–29)
CREAT SERPL-MCNC: 1.12 MG/DL (ref 0.76–1.27)
DEPRECATED RDW RBC AUTO: 42.5 FL (ref 37–54)
EGFRCR SERPLBLD CKD-EPI 2021: 73.4 ML/MIN/1.73
EOSINOPHIL # BLD AUTO: 0.05 10*3/MM3 (ref 0–0.4)
EOSINOPHIL NFR BLD AUTO: 0.5 % (ref 0.3–6.2)
ERYTHROCYTE [DISTWIDTH] IN BLOOD BY AUTOMATED COUNT: 12.6 % (ref 12.3–15.4)
GEN 5 2HR TROPONIN T REFLEX: 14 NG/L
GLOBULIN UR ELPH-MCNC: 2.9 GM/DL
GLUCOSE SERPL-MCNC: 105 MG/DL (ref 65–99)
HCT VFR BLD AUTO: 49 % (ref 37.5–51)
HGB BLD-MCNC: 16.7 G/DL (ref 13–17.7)
HOLD SPECIMEN: NORMAL
HOLD SPECIMEN: NORMAL
IMM GRANULOCYTES # BLD AUTO: 0.04 10*3/MM3 (ref 0–0.05)
IMM GRANULOCYTES NFR BLD AUTO: 0.4 % (ref 0–0.5)
LIPASE SERPL-CCNC: 527 U/L (ref 13–60)
LYMPHOCYTES # BLD AUTO: 1.31 10*3/MM3 (ref 0.7–3.1)
LYMPHOCYTES NFR BLD AUTO: 13.8 % (ref 19.6–45.3)
MCH RBC QN AUTO: 31.2 PG (ref 26.6–33)
MCHC RBC AUTO-ENTMCNC: 34.1 G/DL (ref 31.5–35.7)
MCV RBC AUTO: 91.4 FL (ref 79–97)
MONOCYTES # BLD AUTO: 0.97 10*3/MM3 (ref 0.1–0.9)
MONOCYTES NFR BLD AUTO: 10.2 % (ref 5–12)
NEUTROPHILS NFR BLD AUTO: 7.07 10*3/MM3 (ref 1.7–7)
NEUTROPHILS NFR BLD AUTO: 74.7 % (ref 42.7–76)
NRBC BLD AUTO-RTO: 0 /100 WBC (ref 0–0.2)
PLATELET # BLD AUTO: 217 10*3/MM3 (ref 140–450)
PMV BLD AUTO: 9.6 FL (ref 6–12)
POTASSIUM SERPL-SCNC: 4.6 MMOL/L (ref 3.5–5.2)
PROT SERPL-MCNC: 7 G/DL (ref 6–8.5)
QT INTERVAL: 368 MS
QTC INTERVAL: 419 MS
RBC # BLD AUTO: 5.36 10*6/MM3 (ref 4.14–5.8)
SODIUM SERPL-SCNC: 138 MMOL/L (ref 136–145)
TROPONIN T DELTA: -4 NG/L
TROPONIN T SERPL HS-MCNC: 18 NG/L
WBC NRBC COR # BLD AUTO: 9.48 10*3/MM3 (ref 3.4–10.8)
WHOLE BLOOD HOLD COAG: NORMAL
WHOLE BLOOD HOLD SPECIMEN: NORMAL

## 2024-04-16 PROCEDURE — 74177 CT ABD & PELVIS W/CONTRAST: CPT | Performed by: RADIOLOGY

## 2024-04-16 PROCEDURE — 74176 CT ABD & PELVIS W/O CONTRAST: CPT | Performed by: RADIOLOGY

## 2024-04-16 PROCEDURE — 93010 ELECTROCARDIOGRAM REPORT: CPT | Performed by: INTERNAL MEDICINE

## 2024-04-16 PROCEDURE — 84484 ASSAY OF TROPONIN QUANT: CPT | Performed by: STUDENT IN AN ORGANIZED HEALTH CARE EDUCATION/TRAINING PROGRAM

## 2024-04-16 PROCEDURE — 93005 ELECTROCARDIOGRAM TRACING: CPT | Performed by: STUDENT IN AN ORGANIZED HEALTH CARE EDUCATION/TRAINING PROGRAM

## 2024-04-16 PROCEDURE — 25510000001 IOPAMIDOL 61 % SOLUTION: Performed by: STUDENT IN AN ORGANIZED HEALTH CARE EDUCATION/TRAINING PROGRAM

## 2024-04-16 PROCEDURE — 99285 EMERGENCY DEPT VISIT HI MDM: CPT

## 2024-04-16 PROCEDURE — 71045 X-RAY EXAM CHEST 1 VIEW: CPT

## 2024-04-16 PROCEDURE — 96374 THER/PROPH/DIAG INJ IV PUSH: CPT

## 2024-04-16 PROCEDURE — 80053 COMPREHEN METABOLIC PANEL: CPT | Performed by: STUDENT IN AN ORGANIZED HEALTH CARE EDUCATION/TRAINING PROGRAM

## 2024-04-16 PROCEDURE — 25010000002 MORPHINE PER 10 MG: Performed by: STUDENT IN AN ORGANIZED HEALTH CARE EDUCATION/TRAINING PROGRAM

## 2024-04-16 PROCEDURE — 83690 ASSAY OF LIPASE: CPT | Performed by: EMERGENCY MEDICINE

## 2024-04-16 PROCEDURE — 36415 COLL VENOUS BLD VENIPUNCTURE: CPT

## 2024-04-16 PROCEDURE — 74176 CT ABD & PELVIS W/O CONTRAST: CPT

## 2024-04-16 PROCEDURE — 85025 COMPLETE CBC W/AUTO DIFF WBC: CPT | Performed by: STUDENT IN AN ORGANIZED HEALTH CARE EDUCATION/TRAINING PROGRAM

## 2024-04-16 PROCEDURE — 74177 CT ABD & PELVIS W/CONTRAST: CPT

## 2024-04-16 PROCEDURE — 71045 X-RAY EXAM CHEST 1 VIEW: CPT | Performed by: RADIOLOGY

## 2024-04-16 RX ORDER — SODIUM CHLORIDE 0.9 % (FLUSH) 0.9 %
10 SYRINGE (ML) INJECTION AS NEEDED
Status: DISCONTINUED | OUTPATIENT
Start: 2024-04-16 | End: 2024-04-17 | Stop reason: HOSPADM

## 2024-04-16 RX ORDER — LIDOCAINE HYDROCHLORIDE 20 MG/ML
15 SOLUTION OROPHARYNGEAL ONCE
Status: COMPLETED | OUTPATIENT
Start: 2024-04-16 | End: 2024-04-16

## 2024-04-16 RX ORDER — ALUMINA, MAGNESIA, AND SIMETHICONE 2400; 2400; 240 MG/30ML; MG/30ML; MG/30ML
15 SUSPENSION ORAL ONCE
Status: COMPLETED | OUTPATIENT
Start: 2024-04-16 | End: 2024-04-16

## 2024-04-16 RX ORDER — PHENOBARBITAL, HYOSCYAMINE SULFATE, ATROPINE SULFATE AND SCOPOLAMINE HYDROBROMIDE .0194; .1037; 16.2; .0065 MG/1; MG/1; MG/1; MG/1
2 TABLET ORAL ONCE
Status: COMPLETED | OUTPATIENT
Start: 2024-04-16 | End: 2024-04-16

## 2024-04-16 RX ORDER — ASPIRIN 81 MG/1
324 TABLET, CHEWABLE ORAL ONCE
Status: DISCONTINUED | OUTPATIENT
Start: 2024-04-16 | End: 2024-04-17 | Stop reason: HOSPADM

## 2024-04-16 RX ADMIN — ALUMINUM HYDROXIDE, MAGNESIUM HYDROXIDE, AND DIMETHICONE 15 ML: 400; 400; 40 SUSPENSION ORAL at 14:56

## 2024-04-16 RX ADMIN — MORPHINE SULFATE 4 MG: 4 INJECTION, SOLUTION INTRAMUSCULAR; INTRAVENOUS at 17:53

## 2024-04-16 RX ADMIN — IOPAMIDOL 85 ML: 612 INJECTION, SOLUTION INTRAVENOUS at 19:01

## 2024-04-16 RX ADMIN — LIDOCAINE HYDROCHLORIDE 15 ML: 20 SOLUTION ORAL at 14:57

## 2024-04-16 RX ADMIN — PHENOBARBITAL, HYOSCYAMINE SULFATE, ATROPINE SULFATE, SCOPOLAMINE HYDROBROMIDE 32.4 MG: 16.2; .1037; .0194; .0065 TABLET ORAL at 14:56

## 2024-04-16 RX ADMIN — POLYETHYLENE GLYCOL-3350 AND ELECTROLYTES 4000 ML: 236; 6.74; 5.86; 2.97; 22.74 POWDER, FOR SOLUTION ORAL at 22:29

## 2024-04-16 NOTE — ED PROVIDER NOTES
Subjective   History of Present Illness  64-year-old male past medical history of atrial fibrillation, COPD, coronary artery disease, hypertension presents to the ER with primary complaint of abdominal pain with radiation to the chest.  She noted abdominal pain has been present on and off for several months.  Patient notes an extensive workup with colonoscopies and EGDs.  Patient denies nausea.  Denies diaphoresis.  No significant shortness of breath.  Vital stable.  Afebrile      Review of Systems   Cardiovascular:  Positive for chest pain.   Gastrointestinal:  Positive for abdominal pain.   All other systems reviewed and are negative.      Past Medical History:   Diagnosis Date    Arthritis     Atrial fibrillation     Balance problem     CHF (congestive heart failure)     COPD (chronic obstructive pulmonary disease)     Coronary artery disease     Dependence on cane     GERD (gastroesophageal reflux disease)     Heart attack     X 13 per patient, last one 2002 (9 heart attacks 2001- 1 cardiac stent placed)    History of hepatitis C 2001    had treatment now test neg    Hypertension     Lipoma     Myocardial infarction     Tinnitus     Wears reading eyeglasses        Allergies   Allergen Reactions    Codeine GI Intolerance    Penicillins Hives       Past Surgical History:   Procedure Laterality Date    CARDIAC CATHETERIZATION N/A 09/04/2020    Procedure: Left Heart Cath;  Surgeon: Herman Sen MD;  Location: [facility] INVASIVE LOCATION;  Service: Cardiology;  Laterality: N/A;    COLONOSCOPY      CORONARY STENT PLACEMENT      FACIAL RECONSTRUCTION SURGERY Right 1995    HEAD/NECK LESION/CYST EXCISION Right 12/20/2023    Procedure: LIPOMA EXCISION: right scalp and right shoulder;  Surgeon: Celeste Sheets MD;  Location: [facility] OR;  Service: General;  Laterality: Right;       Family History   Problem Relation Age of Onset    Heart disease Mother     Heart disease Maternal Grandmother        Social History      Socioeconomic History    Marital status: Single   Tobacco Use    Smoking status: Every Day     Current packs/day: 1.00     Average packs/day: 1 pack/day for 55.1 years (55.1 ttl pk-yrs)     Types: Cigarettes     Start date: 3/1/1969    Smokeless tobacco: Never   Vaping Use    Vaping status: Never Used   Substance and Sexual Activity    Alcohol use: Never    Drug use: Not Currently     Types: Marijuana     Comment: DENIES    Sexual activity: Not Currently     Partners: Female           Objective   Physical Exam  Constitutional:       General: He is not in acute distress.     Appearance: He is well-developed. He is not ill-appearing.   HENT:      Head: Normocephalic and atraumatic.   Eyes:      Extraocular Movements: Extraocular movements intact.      Pupils: Pupils are equal, round, and reactive to light.   Neck:      Vascular: No JVD.   Cardiovascular:      Rate and Rhythm: Normal rate and regular rhythm.      Heart sounds: Normal heart sounds. No murmur heard.  Pulmonary:      Effort: No tachypnea, accessory muscle usage or respiratory distress.      Breath sounds: Normal breath sounds. No stridor. No decreased breath sounds, wheezing, rhonchi or rales.   Chest:      Chest wall: No deformity, tenderness or crepitus.   Abdominal:      General: Bowel sounds are normal.      Palpations: Abdomen is soft.      Tenderness: There is generalized abdominal tenderness. There is no guarding or rebound.   Musculoskeletal:         General: Normal range of motion.      Cervical back: Normal range of motion and neck supple.      Right lower leg: No tenderness. No edema.      Left lower leg: No tenderness. No edema.   Lymphadenopathy:      Cervical: No cervical adenopathy.   Skin:     General: Skin is warm and dry.      Coloration: Skin is not cyanotic.      Findings: No ecchymosis or erythema.   Neurological:      General: No focal deficit present.      Mental Status: He is alert and oriented to person, place, and time.       Cranial Nerves: No cranial nerve deficit.      Motor: No weakness.   Psychiatric:         Mood and Affect: Mood normal. Mood is not anxious.         Behavior: Behavior normal. Behavior is not agitated.         Procedures  CT Abdomen Pelvis With Contrast   Final Result       1.  Multiple mass lesions in the liver consistent with cysts and   hemangiomas. The 2.8 cm mass at the dome of the liver is nonspecific and   could represent focal nodular hyperplasia.   2.  Ectatic pancreatic duct.   3.  Constipation involving the right colon and proximal transverse colon   segment.   4.  Enlarged prostate gland.   5.  Nonspecific lobulated mass or confluent lymph nodes in the central   mesentery seen best on image 49, series 2 and this measures 21 mm in   diameter.   6.  No bowel or renal obstruction.   7.  No free fluid or free air.   8.  No abscess or hematoma.       This report was finalized on 4/16/2024 8:17 PM by Ernesto Figueroa MD.          CT Abdomen Pelvis Without Contrast   Final Result   2.8 cm soft tissue density mass in the mid mesentery which   is nonspecific. A follow-up CT of the abdomen and pelvis with   intravenous and oral contrast is recommended for further   characterization.                   This report was finalized on 4/16/2024 2:38 PM by Dayna Sher M.D..          XR Chest 1 View   Final Result   No acute cardiopulmonary process.           This report was finalized on 4/16/2024 2:19 PM by Dayna Sher M.D..            Results for orders placed or performed during the hospital encounter of 04/16/24   High Sensitivity Troponin T    Specimen: Blood   Result Value Ref Range    HS Troponin T 18 <22 ng/L   Comprehensive Metabolic Panel    Specimen: Blood   Result Value Ref Range    Glucose 105 (H) 65 - 99 mg/dL    BUN 19 8 - 23 mg/dL    Creatinine 1.12 0.76 - 1.27 mg/dL    Sodium 138 136 - 145 mmol/L    Potassium 4.6 3.5 - 5.2 mmol/L    Chloride 98 98 - 107 mmol/L    CO2 30.3 (H) 22.0 - 29.0  mmol/L    Calcium 9.8 8.6 - 10.5 mg/dL    Total Protein 7.0 6.0 - 8.5 g/dL    Albumin 4.1 3.5 - 5.2 g/dL    ALT (SGPT) 12 1 - 41 U/L    AST (SGOT) 11 1 - 40 U/L    Alkaline Phosphatase 85 39 - 117 U/L    Total Bilirubin 0.7 0.0 - 1.2 mg/dL    Globulin 2.9 gm/dL    A/G Ratio 1.4 g/dL    BUN/Creatinine Ratio 17.0 7.0 - 25.0    Anion Gap 9.7 5.0 - 15.0 mmol/L    eGFR 73.4 >60.0 mL/min/1.73   CBC Auto Differential    Specimen: Blood   Result Value Ref Range    WBC 9.48 3.40 - 10.80 10*3/mm3    RBC 5.36 4.14 - 5.80 10*6/mm3    Hemoglobin 16.7 13.0 - 17.7 g/dL    Hematocrit 49.0 37.5 - 51.0 %    MCV 91.4 79.0 - 97.0 fL    MCH 31.2 26.6 - 33.0 pg    MCHC 34.1 31.5 - 35.7 g/dL    RDW 12.6 12.3 - 15.4 %    RDW-SD 42.5 37.0 - 54.0 fl    MPV 9.6 6.0 - 12.0 fL    Platelets 217 140 - 450 10*3/mm3    Neutrophil % 74.7 42.7 - 76.0 %    Lymphocyte % 13.8 (L) 19.6 - 45.3 %    Monocyte % 10.2 5.0 - 12.0 %    Eosinophil % 0.5 0.3 - 6.2 %    Basophil % 0.4 0.0 - 1.5 %    Immature Grans % 0.4 0.0 - 0.5 %    Neutrophils, Absolute 7.07 (H) 1.70 - 7.00 10*3/mm3    Lymphocytes, Absolute 1.31 0.70 - 3.10 10*3/mm3    Monocytes, Absolute 0.97 (H) 0.10 - 0.90 10*3/mm3    Eosinophils, Absolute 0.05 0.00 - 0.40 10*3/mm3    Basophils, Absolute 0.04 0.00 - 0.20 10*3/mm3    Immature Grans, Absolute 0.04 0.00 - 0.05 10*3/mm3    nRBC 0.0 0.0 - 0.2 /100 WBC   High Sensitivity Troponin T 2Hr    Specimen: Blood   Result Value Ref Range    HS Troponin T 14 <22 ng/L    Troponin T Delta -4 (L) >=-4 - <+4 ng/L   Lipase    Specimen: Blood   Result Value Ref Range    Lipase 527 (H) 13 - 60 U/L   ECG 12 Lead Chest Pain   Result Value Ref Range    QT Interval 368 ms    QTC Interval 419 ms   Green Top (Gel)   Result Value Ref Range    Extra Tube Hold for add-ons.    Lavender Top   Result Value Ref Range    Extra Tube hold for add-on    Gold Top - SST   Result Value Ref Range    Extra Tube Hold for add-ons.    Light Blue Top   Result Value Ref Range    Extra Tube  Hold for add-ons.                 ED Course  ED Course as of 04/16/24 2245   Tue Apr 16, 2024   1355 EKG notes sinus rhythm.  78 bpm.  No acute ST elevation.  QTc 419. Electronically signed by Kenyon Campbell DO, 04/16/24, 1:56 PM EDT.   [SF]   1834 CT imaging of the abdomen pelvis without contrast noted concerns for a possible mass and recommended IV and oral contrast.  Results pending.  Discussed with patient. [SF]   1835 Care of this patient was transferred to the next attending physician at shift change.  Complete discussion of presentation, labs, imaging, care, and expected course occurred during transition of providers.  Vitals stable at transfer of care.    Electronically signed by Kenyon Campbell DO, 04/16/24, 6:35 PM EDT.   [SF]   2212 Patient doing well, in no distress.  There is mild to moderate generalized abdominal tenderness with no focal findings, no acute peritoneal signs.  Bowel sounds are normal.  Lipase is 527, but contrasted CT was read as no findings of pancreatitis.  I discussed the case with general surgeon Dr. Sheets, she will see the patient in the office, he is to call early tomorrow morning for the first available appointment.  He has pain medication to take at home.  We discussed his test results and his plan of care.  He voices understanding and agreement. [CM]      ED Course User Index  [CM] Ezra Barnes MD  [SF] Kenyon Campbell DO                                             Medical Decision Making  Amount and/or Complexity of Data Reviewed  Labs: ordered. Decision-making details documented in ED Course.  Radiology: ordered. Decision-making details documented in ED Course.  ECG/medicine tests: ordered. Decision-making details documented in ED Course.    Risk  OTC drugs.  Prescription drug management.        Final diagnoses:   Generalized abdominal pain   Constipation, unspecified constipation type   Elevated lipase       ED Disposition  ED Disposition       ED Disposition    Discharge    Condition   Stable    Comment   --               Celeste Sheets MD  [address] [address]  [phone]    Go to   Call early tomorrow morning for first available appointment    [facility] EMERGENCY DEPARTMENT  [address] [facility] [address]  [phone]  Go to   If symptoms worsen         Medication List      No changes were made to your prescriptions during this visit.       Please note that portions of this note were completed with a voice recognition program.        Ezra Barnes MD  04/16/24 [id number]

## 2024-04-17 NOTE — DISCHARGE INSTRUCTIONS
Home to rest.  I recommend a clear liquid diet for 24 to 48 hours, then advance slowly back to the normal diet.  Drink the GoLytely until you feel that your bowels are clean of stool, you do not necessarily have to drink the whole jug.  Continue your hydrocodone tablets as prescribed as needed for pain.  Call Dr. Sheets's office early in the morning to schedule her first available appointment.  Return to the emergency department right away if symptoms worsen/any problems.

## 2024-04-18 RX ORDER — RANOLAZINE 500 MG/1
500 TABLET, EXTENDED RELEASE ORAL 2 TIMES DAILY
Qty: 60 TABLET | Refills: 2 | Status: SHIPPED | OUTPATIENT
Start: 2024-04-18 | End: 2024-07-17

## 2024-04-25 RX ORDER — ISOSORBIDE MONONITRATE 60 MG/1
60 TABLET, EXTENDED RELEASE ORAL DAILY
Qty: 30 TABLET | Refills: 2 | Status: SHIPPED | OUTPATIENT
Start: 2024-04-25

## 2024-04-25 RX ORDER — FUROSEMIDE 20 MG/1
20 TABLET ORAL DAILY
Qty: 30 TABLET | Refills: 2 | Status: SHIPPED | OUTPATIENT
Start: 2024-04-25

## 2024-04-25 RX ORDER — APIXABAN 5 MG/1
5 TABLET, FILM COATED ORAL 2 TIMES DAILY
Qty: 60 TABLET | Refills: 0 | Status: SHIPPED | OUTPATIENT
Start: 2024-04-25

## 2024-06-12 ENCOUNTER — HOSPITAL ENCOUNTER (OUTPATIENT)
Dept: CARDIOLOGY | Facility: HOSPITAL | Age: 64
Discharge: HOME | End: 2024-06-12
Payer: MEDICAID

## 2024-06-12 VITALS
HEIGHT: 69 IN | DIASTOLIC BLOOD PRESSURE: 51 MMHG | SYSTOLIC BLOOD PRESSURE: 88 MMHG | BODY MASS INDEX: 24.29 KG/M2 | OXYGEN SATURATION: 95 % | HEART RATE: 80 BPM | WEIGHT: 164 LBS

## 2024-06-12 DIAGNOSIS — I25.10 ASCVD (ARTERIOSCLEROTIC CARDIOVASCULAR DISEASE): ICD-10-CM

## 2024-06-12 DIAGNOSIS — I50.42 CHRONIC COMBINED SYSTOLIC AND DIASTOLIC CONGESTIVE HEART FAILURE: Primary | ICD-10-CM

## 2024-06-12 DIAGNOSIS — I48.0 PAROXYSMAL ATRIAL FIBRILLATION: ICD-10-CM

## 2024-06-12 DIAGNOSIS — R07.9 CHEST PAIN, UNSPECIFIED TYPE: ICD-10-CM

## 2024-06-12 LAB
ABSOLUTE LUNG FLUID CONTENT: 27 % (ref 20–35)
ANION GAP SERPL CALCULATED.3IONS-SCNC: 9.5 MMOL/L (ref 5–15)
BUN SERPL-MCNC: 20 MG/DL (ref 8–23)
BUN/CREAT SERPL: 22 (ref 7–25)
CALCIUM SPEC-SCNC: 8.9 MG/DL (ref 8.6–10.5)
CHLORIDE SERPL-SCNC: 101 MMOL/L (ref 98–107)
CO2 SERPL-SCNC: 25.5 MMOL/L (ref 22–29)
CREAT SERPL-MCNC: 0.91 MG/DL (ref 0.76–1.27)
EGFRCR SERPLBLD CKD-EPI 2021: 94.1 ML/MIN/1.73
GLUCOSE SERPL-MCNC: 136 MG/DL (ref 65–99)
MAGNESIUM SERPL-MCNC: 2.3 MG/DL (ref 1.6–2.4)
NT-PROBNP SERPL-MCNC: 60 PG/ML (ref 0–900)
POTASSIUM SERPL-SCNC: 4.2 MMOL/L (ref 3.5–5.2)
SODIUM SERPL-SCNC: 136 MMOL/L (ref 136–145)

## 2024-06-12 PROCEDURE — 83735 ASSAY OF MAGNESIUM: CPT | Performed by: PHYSICIAN ASSISTANT

## 2024-06-12 PROCEDURE — 80048 BASIC METABOLIC PNL TOTAL CA: CPT | Performed by: PHYSICIAN ASSISTANT

## 2024-06-12 PROCEDURE — 94726 PLETHYSMOGRAPHY LUNG VOLUMES: CPT | Performed by: PHYSICIAN ASSISTANT

## 2024-06-12 PROCEDURE — 36415 COLL VENOUS BLD VENIPUNCTURE: CPT | Performed by: PHYSICIAN ASSISTANT

## 2024-06-12 PROCEDURE — 99214 OFFICE O/P EST MOD 30 MIN: CPT | Performed by: PHYSICIAN ASSISTANT

## 2024-06-12 PROCEDURE — 83880 ASSAY OF NATRIURETIC PEPTIDE: CPT

## 2024-06-12 NOTE — PROGRESS NOTES
[facility]       Veronica Kim PA-C  [address] [facility] [address]  [place] [address]    Thank you for asking me to see Chong White Sr. for {Symptoms; cardiac:45355}.    HPI:     This is a 64 y.o. male with known past medical history of ***:    Chong DANILO White Sr. presents for today for ***.  The patient is typically seen by Amy Yanez APRN.  Patient's primary cardiologist is ***.     Last known EF ***.   Last known hospitalization and/or ED visit:   Accompanied by: ***          06/12/24 visit data/details regarding:   Dyspnea: ***  Lower extremity swelling: ***  Abdominal swelling: ***  Home weight: Weight monitoring booklet provided during initial visit; ***  Home BP: BP monitoring booklet provided during initial visit; ***  Home heart rate: HR monitoring booklet provided during initial visit; ***  Daily activities of living:   Pillows/lying flat: ***   HF zone:       Specialists:   Cardiology:   ***        Review of Systems - ROS ***     All other systems were reviewed and were negative.    Patient Active Problem List   Diagnosis    Atrial fibrillation    Neuropathy    ASCVD (arteriosclerotic cardiovascular disease)    Tobacco abuse    Ischemic cardiomyopathy    Chronic combined systolic and diastolic congestive heart failure    Chronic neck pain    Chronic low back pain    Paralysis    Hypertension    Chronic anticoagulation    Long term current use of antiarrhythmic drug    Abscessed tooth    Multiple lipomas    Lipoma of scalp       family history includes Heart disease in his maternal grandmother and mother.     reports that he has been smoking cigarettes. He started smoking about 55 years ago. He has a 55.3 pack-year smoking history. He has never used smokeless tobacco. He reports that he does not currently use drugs after having used the following drugs: Marijuana. He reports that he does not drink alcohol.    Allergies   Allergen Reactions    Codeine GI  Intolerance    Penicillins Hives         Current Outpatient Medications:     Aspirin Low Dose 81 MG EC tablet, TAKE 1 TABLET BY MOUTH DAILY., Disp: 30 tablet, Rfl: 5    atorvastatin (LIPITOR) 40 MG tablet, Take 1 tablet by mouth Daily., Disp: , Rfl:     carvedilol (Coreg) 12.5 MG tablet, Take 1 tablet by mouth 2 (Two) Times a Day With Meals for 30 days., Disp: 60 tablet, Rfl: 5    empagliflozin (Jardiance) 10 MG tablet tablet, Take 1 tablet by mouth Daily., Disp: 30 tablet, Rfl: 5    furosemide (LASIX) 20 MG tablet, TAKE 1 TABLET BY MOUTH DAILY., Disp: 30 tablet, Rfl: 2    isosorbide mononitrate (IMDUR) 60 MG 24 hr tablet, TAKE 1 TABLET BY MOUTH DAILY, Disp: 30 tablet, Rfl: 2    oxyCODONE (Roxicodone) 5 MG immediate release tablet, Take 1 tablet by mouth Every 8 (Eight) Hours As Needed for Severe Pain., Disp: 15 tablet, Rfl: 0    ranolazine (Ranexa) 500 MG 12 hr tablet, Take 1 tablet by mouth 2 (Two) Times a Day for 90 days., Disp: 60 tablet, Rfl: 2    sacubitril-valsartan (Entresto) 24-26 MG tablet, Take 1 tablet by mouth 2 (Two) Times a Day., Disp: 60 tablet, Rfl: 5    DULoxetine (CYMBALTA) 60 MG capsule, Take 1 capsule by mouth Daily. (Patient not taking: Reported on 6/12/2024), Disp: , Rfl:     Eliquis 5 MG tablet tablet, TAKE 1 TABLET BY MOUTH 2 (TWO) TIMES A DAY (Patient not taking: Reported on 6/12/2024), Disp: 60 tablet, Rfl: 0    gabapentin (NEURONTIN) 800 MG tablet, Take 1 tablet by mouth 2 (Two) Times a Day As Needed (nerve pain). (Patient not taking: Reported on 6/12/2024), Disp: , Rfl:       Physical Exam:  I have reviewed the patient's current vital signs as documented in the patient's EMR.   Vitals:    06/12/24 1507   BP: (!) 88/51   Pulse: 80   SpO2: 95%     Body mass index is 24.22 kg/m².       06/12/24  1507   Weight: 74.4 kg (164 lb)          Physical Exam ***        DATA REVIEWED:     EKG. I personally reviewed and interpreted the EKG.      EKG: {ekg findings:506632}.    ---------------------------------------------------  TTE/LUCERO:  Results for orders placed during the hospital encounter of 11/16/22    Adult Transthoracic Echo Complete W/ Cont if Necessary Per Protocol    Interpretation Summary    Normal left ventricular cavity size and wall thickness noted. All left ventricular wall segments contract normally    Left ventricular ejection fraction appears to be 56 - 60%.    Left ventricular diastolic function is consistent with (grade I) impaired relaxation.    The aortic valve is structurally normal with no regurgitation or stenosis present.    The mitral valve is structurally normal with no regurgitation or significant stenosis present.    There is no evidence of pericardial effusion. .        LAST HEART CATH/IF AVAILABLE:     Results for orders placed during the hospital encounter of 09/01/20    Cardiac Catheterization/Vascular Study    Narrative  Images from the original result were not included.  Patient Name: Chong White                  MRN: [id number]    Procedure Date: 09/04/20    HPI: Patient is a pleasant 60 y.o. male with history of dyspnea and cardiomyopathy. A stress test showed severe LV dysfunction and cardiac cath requested to definitive diagnosis since there is history of angioplasty in 2001. Patient presented with dyspnea. Patient was evaluated and recommended to proceed with diagnostic cardiac catheterization with possible need for intervention. All risks, benefits and alternatives were discussed with patient in detail. All his questions and his family's questions were answered to their satisfaction. They all understood and wished to proceed, and therefore the procedure was performed.    Pre-operative Diagnosis: Abnormal stress test. Cardiomyopathy      Procedure Performed:  Selective coronary angiography.    Technique: The patient was brought to the cardiac catheterization laboratory after informed consent was obtained. right radial artery area was prepped,  draped, anesthestized in the usual manner. The radial artery was entered wiliam a Seldinger technique. A 6-Andorran sheath over the wire was introduced into the radial artery. This was followed by the use of a 6 -Andorran  diagnostic catheters JL 3.5 and JR4 to perform the diagnostic cardiac catheterization. Patient tolerated the procedure well, was hemodynamically stable throughout the procedure. Radial cocktail was administered via the sheath side arm at the time of access.    At the end of the procedure sheath was removed, wrist band was placed. Excellent hemostasis was achieved. Patient transferred to post cath holding area in stable condition.    Findings:    Coronary anatomy:    The left main coronary artery bifurcated into the LAD and left circumflex coronary.  The LAD coursed in the anterior interventricular groove, gave rise to diagonal branches and reached the apex.    Left circumflex coursed in the left atrial ventricular groove and gives rise to several marginal branches.    The right coronary artery course in the right atrial ventricular groove I gave rise to several acute marginal branches.    Dominant vessel: Right    LM: Separate ostia assumed.  LAD: Flush occlusion of the LAD at the ostium however it fills from RCA injection showing no evidence of disease in a large caliber vessel  Diagonal Branch: NL    LCX: Prox segment and OM are normal. Distal segment has 50% disease  Obtuse marginal branch:   NL    RCA: Very large vessel with mild luminal irregularities    Contrary to patient claim no stents were visible during imaging.      No specimens were removed  EBL: 20 ML    Impression:  Flush occlusion of the ostial LAD with remarkably good collateral connection from rCA filling the LAD with brisk flow to the point of occlusion in the prox LAD.  RCA and CX are free of any significant disease.      Plan:  Patient to be placed on beta blockers, lipid therapy, aspirin, and ACE inhibitor.  No intervention needed  "as the collateral connection is large and excellent and serving as bypass connection with brisk flow all the way to the prox LAD.  EP evaluation per Gen cardiology.      Herman Sen MD  09/04/20      Director, Cardiac Cath Lab      -----------------------------------------------------  CXR/Imaging:   Imaging Results (Most Recent)       None            I personally reviewed and interpreted the CXR.      -----------------------------------------------------  CT:   No radiology results for the last 30 days.  I personally reviewed the images of the CT scan.  My personal interpretation is below.      ----------------------------------------------------    PFTs:    I interpreted the PFTs. {PFT Flow Volume Loops:21303::\"Flow-Volume loops are normal.\"}. {PFT Lung Volumes:21304::\"Plethysmographic lung volumes are within normal limits.\"}. {PFT Spirometry Interpretation:21301::\"Spirometry is within normal limits.\"}.   --------------------------------------------------------------------------------------------------    Laboratory evaluations:    Lab Results   Component Value Date    GLUCOSE 105 (H) 04/16/2024    BUN 19 04/16/2024    CREATININE 1.12 04/16/2024    EGFRIFNONA 84 02/23/2022    EGFRIFAFRI 106 10/29/2020    BCR 17.0 04/16/2024    K 4.6 04/16/2024    CO2 30.3 (H) 04/16/2024    CALCIUM 9.8 04/16/2024    PROTENTOTREF 6.6 10/29/2020    ALBUMIN 4.1 04/16/2024    LABIL2 1.9 10/29/2020    AST 11 04/16/2024    ALT 12 04/16/2024     Lab Results   Component Value Date    WBC 9.48 04/16/2024    HGB 16.7 04/16/2024    HCT 49.0 04/16/2024    MCV 91.4 04/16/2024     04/16/2024     Lab Results   Component Value Date    CHOL 182 04/20/2023    TRIG 110 04/20/2023    HDL 47 04/20/2023     (H) 04/20/2023     Lab Results   Component Value Date    TSH 6.500 (H) 04/19/2023     Lab Results   Component Value Date    HGBA1C 4.90 04/19/2023     Lab Results   Component Value Date    ALT 12 04/16/2024     Lab Results " "  Component Value Date    HGBA1C 4.90 04/19/2023     Lab Results   Component Value Date    CREATININE 1.12 04/16/2024     No results found for: \"IRON\", \"TIBC\", \"FERRITIN\"  Lab Results   Component Value Date    INR 0.92 06/14/2023    INR 1.44 (H) 06/23/2022    INR 1.41 (H) 06/16/2022    PROTIME 12.8 06/14/2023    PROTIME 17.9 (H) 06/23/2022    PROTIME 17.6 (H) 06/16/2022        Lab Results   Component Value Date    ABSOLUTELUNG 27 06/12/2024    ABSOLUTELUNG 29 03/22/2024    ABSOLUTELUNG 28 01/23/2024       PAH RISK ASSESSMENT:      1. Chronic combined systolic and diastolic congestive heart failure          ORDERS PLACED TODAY:  Orders Placed This Encounter   Procedures    ReDs Vest        Diagnoses and all orders for this visit:    1. Chronic combined systolic and diastolic congestive heart failure (Primary)  -     ReDs Vest             MEDS ORDERED TODAY:    No orders of the defined types were placed in this encounter.       ---------------------------------------------------------------------------------------------------------------------------          ASSESSMENT/PLAN:      Diagnosis Plan   1. Chronic combined systolic and diastolic congestive heart failure  ReDs Vest          {CHF acuity:36768} {car chf dysfunction:49763}. {Diagnoses; chf:03462}. Etiology: {[facility]_Heart_Failure_Etiology:96345}. LVEF ***.  RV EF is {Enlabnl:74959}.     NYHA stage {NYHA CLASS:06493}FC-{NYHA:01854}. NYHA FC change: {Nyha change:80859}. Last 6MWT: {6MWT speed:17301}.  ***    Today, {CHF volume status:52610::\"Patient is currently volume overloaded.\"}and with  {EXCELLENT/MODERATE/MINIMAL:84889} perfusion. The patient's hemodynamics are currently {Desc; acceptable/unacceptable:40748}. HR is: {Rate:69540} and {is/is not:74929} at goal. BP/MAP was reviewed and there {IS/IS NOT:50879}room for medication up-titration.  Clinical trajectory was assessed and has{worsened, improve, remained stable:28432}.     CHF GOAL DIRECTED MEDICAL THERAPY " FOR PATIENT ADDRESSED/ADJUSTED:     GDMT: ***    Drug Class   Drug   Dose Last Dose Adjustment Notes   ACEi/ARB/ARNI       Beta Blocker       MRA       SGLT2i    N/A   Secondaries if applicable:          -CHF Specific BB:    {B Blockers:37618}  at dose of ***.   We discussed processes/benefits of HF clinic including nursing, pharmacist, and provider evaluation during each visit with ability for in office ReDS vest, labs, and ability to provide IV diuresis in the clinic with close outpatient monitoring.  Additionally, patient was educated about the availability of delivery of medications to patient's clinic room prior to leaving the building which assists with medication compliance and insures medications are in hands when changes are made (if patient opts for apothecary usage) with thorough guidance regarding changes and medication schedule provided.          -ACE/ARB/ARNi:   Current dose: ***  /Target dose: ***  Baseline creatinine previously known to be ***.   {Meds; ace inhibitors:32381}   {Meds; angiotensin II receptor blockers:51929}.  Starting an ARNI/ACE/ARB can lead to a small and non- progressive increase in serum creatinine.  This should be less than 30% of the baseline.  Small changes in creatinine or not an indication to discontinue or dose reduce the medication.    -MRA:   The patient is FC-{CHF Class:77187} and MRA {is/is not:84181} indicated.   Spironolactone ***mg  The patient will be started on Aldactone.  They have been asked to obtain a BMP within 5 days of starting for monitoring the kidney function and potassium.  Aldactone was explained to the patient.  However, not all possible side effects and adverse reactions are able to be fully discussed.  Handout was offered to the patient detailing the prescription. ***  Patient was advised to not use potassium products.  Quarterly monitoring of potassium and renal function is currently recommended    -SGLT2 inhibitor therapy:   A BMP at initiation to  "verify GFR >30 was recommended, as was interval GFR surveillance.  The patient was advised to hold SGLT2I when PO intake is restricted due to a planned surgery, or due to an underlying illness.    Recommend starting  Jardiance (empagliflozin) 10mg with quarterly assessment of GFR. ***  Recommend starting Farxiga (dapagliflozin) 10mg daily with quarterly assessment of GFR. ***  RTC one week with BMP after initiation ***.   Pt was advised SEs, some severe, including hypersensitivity and Boogie's; coupled with discussion regarding common side effects of UTIs and female genital mycotic infections were discussed. If you will be NPO, or are sick (poor PO intake, N&V) please hold the medication until you are back to a normal diet.     -Diuretic regimen:   ReDS Vest reading for. 06/12/24 is  ***; ReDs Vest reading reviewed with patient.    {not indicated/requested/declined:90374}  {Treatments; meds diuretics:06517}  BMP, Mag, & ProBNP reviewed with patient.      -Fluid restriction/Sodium restriction:   Requested 2000 ml restriction  Patient has been asked to weigh daily and was provided with a printed diuretic strategy.  1,500 mg Na restriction was discussed.    -Devices if applicable:       -Acute vs. Chronic underlying conditions other than HF addressed during visit:   ***    Identifiable barriers to Heart Failure Self-care:   Medical Barriers: {Medical Barriers:19197::\"Cognitive Impairment\",\"Depression\",\"Substance Use Disorders\",\"Frailty\"}  Social Barriers: {Social Barriers:19197::\"Financial [place] of HF treatment\",\"Food insecurity\",\"Homelessness or housing insecurity\",\"Intimate partner violence or elder abuse\",\"Language barriers\", 'Low health literacy\",\"Social isolation or low social support\",\"Transport limitations\"}        CONSIDERATIONS: ***  ------------------------------------------------------------------  -Iron/Anemia in " CHF:    ----------------------------------------------------------------------    -Sleep/Apnea:   ***    --------------------------------------------------------------------------------    Ankles had been swelling more but slightly down now (+ swelling)              This document has been electronically signed by Lisa Reinoso PA-C  June 12, 2024 16:17 EDT      Dictated Utilizing Dragon Dictation: Part of this note may be an electronic transcription/translation of spoken language to printed text using the Dragon Dictation System.    Follow-up appointment and medication changes provided in hand delivered After Visit Summary as well as reviewed in the room.         Recommend 1,500 mg Na restriction          -Acute vs. Chronic underlying conditions other than HF addressed during visit:   Chest pain  ASCVD  Urged ED for acute chest pain - occurring more recently  Has missed appointments with cardiology electrophysiology & interventional cardiology  On ranolazine    Atrial fibrillation  Refilled eliquis today--patient had been out of this medication for an extended period  On BB,       Identifiable barriers to Heart Failure Self-care:   Medical compliance      --------------------------------------------------------------------------------      This document has been electronically signed by Lisa Reinoso PA-C      Dictated Utilizing Dragon Dictation: Part of this note may be an electronic transcription/translation of spoken language to printed text using the Dragon Dictation System.    Follow-up appointment and medication changes provided in hand delivered After Visit Summary as well as reviewed in the room.

## 2024-06-12 NOTE — PROGRESS NOTES
[facility]    Date: 06/12/24     Vitals:    06/12/24 1507   BP: (!) 88/51   Pulse: 80   SpO2: 95%      Weight 164  Method of arrival: Ambulatory with cane    Weighing self daily: No    Monitoring Heart Failure Zones: No    Today's HF Zone: Yellow     Taking medications as prescribed: Yes    Edema Yes-legs    Shortness of Air: No    Number of pillows used at night:Recliner    Educational Materials given:  avs                                                                         ReDS Value: 27  25-35 Optimal Value Status      Esteban Levine RN 06/12/24 15:10 EDT

## 2024-06-12 NOTE — PROGRESS NOTES
Heart Failure Clinic  Pharmacist Note     Chong White Sr. is a 64 y.o. male seen in the Heart Failure Clinic for combined systolic and diastolic HF. Most recent EF was 56-60% on 11/16/22. Chong White Sr. has a poor understanding of his medication regimen.  Patient was most recently seen in the ED on 4/16/24 for abdominal pain and was referred to follow up with Dr. Sheets which he has not yet. He reports today he is supposed to this month.     Patient reports SOB when laying down, tingling/numbing in hands and feet, and some swelling in his legs.  Patient reports he has been trying to sleep in a recliner that stays a little elevated while sleeping. He is currently taking Lasix 20 mg daily. He says that he does not check his blood pressure regularly at home and is unsure what it has been running lately. BP in clinic today is 88/51. Patient denies any issues with medication affordability but does report he has been out of Eliquis refills and has not had but cannot say for sure how long it has been. Patient also endorses that he does have CP frequently.       Medication Use:   Hx of med intolerances:  None related to HF  Retail Rx Management: [facility]    Past Medical History:   Diagnosis Date    Arthritis     Atrial fibrillation     Balance problem     CHF (congestive heart failure)     COPD (chronic obstructive pulmonary disease)     Coronary artery disease     Dependence on cane     GERD (gastroesophageal reflux disease)     Heart attack     X 13 per patient, last one 2002 (9 heart attacks 2001- 1 cardiac stent placed)    History of hepatitis C 2001    had treatment now test neg    Hypertension     Lipoma     Myocardial infarction     Tinnitus     Wears reading eyeglasses      ALLERGIES: Codeine and Penicillins  Current Outpatient Medications   Medication Sig Dispense Refill    Aspirin Low Dose 81 MG EC tablet TAKE 1 TABLET BY MOUTH DAILY. 30 tablet 5    atorvastatin (LIPITOR) 40 MG tablet Take  "1 tablet by mouth Daily.      carvedilol (Coreg) 12.5 MG tablet Take 1 tablet by mouth 2 (Two) Times a Day With Meals for 30 days. 60 tablet 5    empagliflozin (Jardiance) 10 MG tablet tablet Take 1 tablet by mouth Daily. 30 tablet 5    furosemide (LASIX) 20 MG tablet TAKE 1 TABLET BY MOUTH DAILY. 30 tablet 2    isosorbide mononitrate (IMDUR) 60 MG 24 hr tablet TAKE 1 TABLET BY MOUTH DAILY 30 tablet 2    oxyCODONE (Roxicodone) 5 MG immediate release tablet Take 1 tablet by mouth Every 8 (Eight) Hours As Needed for Severe Pain. 15 tablet 0    ranolazine (Ranexa) 500 MG 12 hr tablet Take 1 tablet by mouth 2 (Two) Times a Day for 90 days. 60 tablet 2    sacubitril-valsartan (Entresto) 24-26 MG tablet Take 1 tablet by mouth 2 (Two) Times a Day. 60 tablet 5    DULoxetine (CYMBALTA) 60 MG capsule Take 1 capsule by mouth Daily. (Patient not taking: Reported on 6/12/2024)      Eliquis 5 MG tablet tablet TAKE 1 TABLET BY MOUTH 2 (TWO) TIMES A DAY (Patient not taking: Reported on 6/12/2024) 60 tablet 0    gabapentin (NEURONTIN) 800 MG tablet Take 1 tablet by mouth 2 (Two) Times a Day As Needed (nerve pain). (Patient not taking: Reported on 6/12/2024)       No current facility-administered medications for this encounter.       Vaccination History:   Pneumonia: PPSV23 9/21/20; PCV20 2/21/23  Annual Influenza: UTD 23/24 season  Shingles: Reports UTD    Objective  Vitals:    06/12/24 1507   BP: (!) 88/51   BP Location: Left arm   Patient Position: Sitting   Cuff Size: Adult   Pulse: 80   SpO2: 95%   Weight: 74.4 kg (164 lb)   Height: 175.3 cm (69\")       Wt Readings from Last 3 Encounters:   06/12/24 74.4 kg (164 lb)   04/16/24 77.1 kg (170 lb)   03/22/24 73.3 kg (161 lb 9.6 oz)         06/12/24  1507   Weight: 74.4 kg (164 lb)       Lab Results   Component Value Date    GLUCOSE 105 (H) 04/16/2024    BUN 19 04/16/2024    CREATININE 1.12 04/16/2024    EGFRIFNONA 84 02/23/2022    EGFRIFAFRI 106 10/29/2020    BCR 17.0 04/16/2024    " K 4.6 04/16/2024    CO2 30.3 (H) 04/16/2024    CALCIUM 9.8 04/16/2024    PROTENTOTREF 6.6 10/29/2020    ALBUMIN 4.1 04/16/2024    LABIL2 1.9 10/29/2020    AST 11 04/16/2024    ALT 12 04/16/2024     Lab Results   Component Value Date    WBC 9.48 04/16/2024    HGB 16.7 04/16/2024    HCT 49.0 04/16/2024    MCV 91.4 04/16/2024     04/16/2024     Lab Results   Component Value Date    TROPONINT 14 04/16/2024     Lab Results   Component Value Date    PROBNP 135.6 03/22/2024     Results for orders placed during the hospital encounter of 11/16/22    Adult Transthoracic Echo Complete W/ Cont if Necessary Per Protocol    Interpretation Summary    Normal left ventricular cavity size and wall thickness noted. All left ventricular wall segments contract normally    Left ventricular ejection fraction appears to be 56 - 60%.    Left ventricular diastolic function is consistent with (grade I) impaired relaxation.    The aortic valve is structurally normal with no regurgitation or stenosis present.    The mitral valve is structurally normal with no regurgitation or significant stenosis present.    There is no evidence of pericardial effusion. .         GDMT    Drug Class   Drug   Dose Last Dose Adjustment Additional Titration   Notes   ACEi/ARB/ARNI Entresto 24/26 mg BID 7/12/22 Needed    Beta Blocker Carvedilol 12.5 mg BID 7/12/22 Needed    SGLT2i Jardiance 10mg QD 7/27/22 N/A                Drug Therapy Problems    Chest pain  GDMT  Eliquis refill     Recommendations:     On last [facility] visit, Veronica sent in Ranexa 1000 mg BID however appears patient has went back to an old script and filling the 500 mg BID. Noted to Lisa.  I also advised patient today to seek emergency evaluation anytime he has CP.   No new recommendations at this time. Patients BP is hypotensive today in clinic and does not regularly check at home, therefore, unsure that patient can tolerate any titration or if BP is running more normal at home. If  consistently hypotensive, may need to change dosing of current medications.   Veronica last sent in Eliquis refil on 4/25/24. Patient most likely has not had since this ran out. Noted to Lisa to send in refill.     Patient was educated on heart failure medications and the importance of medication adherence. All questions were addressed and patient would benefit from additional education at each visit.     Thank you for allowing me to participate in the care of your patient,    Gwen Marrero. Lazaro, PharmD  6/12/2024  16:14 EDT

## 2024-06-26 ENCOUNTER — SPECIALTY PHARMACY (OUTPATIENT)
Dept: PHARMACY | Facility: HOSPITAL | Age: 64
End: 2024-06-26
Payer: MEDICAID

## 2024-06-26 ENCOUNTER — LAB (OUTPATIENT)
Dept: LAB | Facility: HOSPITAL | Age: 64
End: 2024-06-26
Payer: MEDICAID

## 2024-06-26 VITALS
OXYGEN SATURATION: 94 % | BODY MASS INDEX: 24.13 KG/M2 | HEIGHT: 68 IN | WEIGHT: 159.2 LBS | HEART RATE: 76 BPM | SYSTOLIC BLOOD PRESSURE: 87 MMHG | DIASTOLIC BLOOD PRESSURE: 56 MMHG

## 2024-06-26 DIAGNOSIS — R76.8 HEPATITIS B CORE ANTIBODY POSITIVE: Primary | ICD-10-CM

## 2024-06-26 DIAGNOSIS — R76.8 HEPATITIS B CORE ANTIBODY POSITIVE: ICD-10-CM

## 2024-06-26 PROBLEM — R07.89 OTHER CHEST PAIN: Status: ACTIVE | Noted: 2024-06-26

## 2024-06-26 LAB
ALBUMIN SERPL-MCNC: 4.2 G/DL (ref 3.5–5.2)
ALBUMIN/GLOB SERPL: 1.4 G/DL
ALP SERPL-CCNC: 90 U/L (ref 39–117)
ALPHA-FETOPROTEIN: 3.01 NG/ML (ref 0–8.3)
ALT SERPL W P-5'-P-CCNC: 15 U/L (ref 1–41)
ANION GAP SERPL CALCULATED.3IONS-SCNC: 10.4 MMOL/L (ref 5–15)
AST SERPL-CCNC: 16 U/L (ref 1–40)
BASOPHILS # BLD AUTO: 0.04 10*3/MM3 (ref 0–0.2)
BASOPHILS NFR BLD AUTO: 0.6 % (ref 0–1.5)
BILIRUB SERPL-MCNC: 0.5 MG/DL (ref 0–1.2)
BUN SERPL-MCNC: 13 MG/DL (ref 8–23)
BUN/CREAT SERPL: 13.8 (ref 7–25)
CALCIUM SPEC-SCNC: 9.4 MG/DL (ref 8.6–10.5)
CHLORIDE SERPL-SCNC: 102 MMOL/L (ref 98–107)
CO2 SERPL-SCNC: 27.6 MMOL/L (ref 22–29)
CREAT SERPL-MCNC: 0.94 MG/DL (ref 0.76–1.27)
DEPRECATED RDW RBC AUTO: 42.8 FL (ref 37–54)
EGFRCR SERPLBLD CKD-EPI 2021: 90.5 ML/MIN/1.73
EOSINOPHIL # BLD AUTO: 0.05 10*3/MM3 (ref 0–0.4)
EOSINOPHIL NFR BLD AUTO: 0.7 % (ref 0.3–6.2)
ERYTHROCYTE [DISTWIDTH] IN BLOOD BY AUTOMATED COUNT: 12.7 % (ref 12.3–15.4)
GLOBULIN UR ELPH-MCNC: 2.9 GM/DL
GLUCOSE SERPL-MCNC: 83 MG/DL (ref 65–99)
HBV SURFACE AG SERPL QL IA: NORMAL
HCT VFR BLD AUTO: 50.9 % (ref 37.5–51)
HGB BLD-MCNC: 17.2 G/DL (ref 13–17.7)
IMM GRANULOCYTES # BLD AUTO: 0.05 10*3/MM3 (ref 0–0.05)
IMM GRANULOCYTES NFR BLD AUTO: 0.7 % (ref 0–0.5)
INR PPP: 1.01 (ref 0.9–1.1)
LYMPHOCYTES # BLD AUTO: 1.31 10*3/MM3 (ref 0.7–3.1)
LYMPHOCYTES NFR BLD AUTO: 19.6 % (ref 19.6–45.3)
MCH RBC QN AUTO: 31.3 PG (ref 26.6–33)
MCHC RBC AUTO-ENTMCNC: 33.8 G/DL (ref 31.5–35.7)
MCV RBC AUTO: 92.7 FL (ref 79–97)
MONOCYTES # BLD AUTO: 0.46 10*3/MM3 (ref 0.1–0.9)
MONOCYTES NFR BLD AUTO: 6.9 % (ref 5–12)
NEUTROPHILS NFR BLD AUTO: 4.76 10*3/MM3 (ref 1.7–7)
NEUTROPHILS NFR BLD AUTO: 71.5 % (ref 42.7–76)
NRBC BLD AUTO-RTO: 0 /100 WBC (ref 0–0.2)
PLATELET # BLD AUTO: 222 10*3/MM3 (ref 140–450)
PMV BLD AUTO: 10.3 FL (ref 6–12)
POTASSIUM SERPL-SCNC: 4.6 MMOL/L (ref 3.5–5.2)
PROT SERPL-MCNC: 7.1 G/DL (ref 6–8.5)
PROTHROMBIN TIME: 13.4 SECONDS (ref 12.1–14.7)
RBC # BLD AUTO: 5.49 10*6/MM3 (ref 4.14–5.8)
SODIUM SERPL-SCNC: 140 MMOL/L (ref 136–145)
WBC NRBC COR # BLD AUTO: 6.67 10*3/MM3 (ref 3.4–10.8)

## 2024-06-26 PROCEDURE — 87340 HEPATITIS B SURFACE AG IA: CPT

## 2024-06-26 PROCEDURE — 87517 HEPATITIS B DNA QUANT: CPT

## 2024-06-26 PROCEDURE — 82105 ALPHA-FETOPROTEIN SERUM: CPT

## 2024-06-26 PROCEDURE — 36415 COLL VENOUS BLD VENIPUNCTURE: CPT

## 2024-06-26 PROCEDURE — 85610 PROTHROMBIN TIME: CPT

## 2024-06-26 PROCEDURE — 85025 COMPLETE CBC W/AUTO DIFF WBC: CPT

## 2024-06-26 PROCEDURE — 80053 COMPREHEN METABOLIC PANEL: CPT

## 2024-06-26 NOTE — PROGRESS NOTES
Chief Complaint   Patient presents with    Follow-up       Chong White Sr. is a 64 y.o. male who presents to the office today for follow up appointment for Follow-up  .    HPI    The patient was seen for a follow up of past Hep B infection.  He reports that he does not feel well overall.  He was not able to identify specific symptoms.       Review of Systems   Constitutional:  Negative for activity change, appetite change and fatigue.        Generally not feeling well   HENT:  Negative for trouble swallowing and voice change.    Respiratory:  Negative for cough and choking.    Cardiovascular:  Negative for leg swelling.   Gastrointestinal:  Negative for abdominal distention, abdominal pain, anal bleeding, blood in stool, constipation, diarrhea, nausea, rectal pain and vomiting.   Endocrine: Negative for polyphagia.   Genitourinary:  Negative for flank pain.   Musculoskeletal:  Negative for back pain.   Skin:  Negative for color change and pallor.   Allergic/Immunologic: Negative for food allergies.   Neurological:  Negative for weakness.   Psychiatric/Behavioral:  Negative for confusion.        ACTIVE PROBLEMS:   Specialty Problems          Cardiology Problems    Atrial fibrillation        Chronic combined systolic and diastolic congestive heart failure        Ischemic cardiomyopathy           PAST MEDICAL HISTORY:  Past Medical History:   Diagnosis Date    Arthritis     Atrial fibrillation     Balance problem     CHF (congestive heart failure)     COPD (chronic obstructive pulmonary disease)     Coronary artery disease     Dependence on cane     GERD (gastroesophageal reflux disease)     Heart attack     X 13 per patient, last one 2002 (9 heart attacks 2001- 1 cardiac stent placed)    History of hepatitis C 2001    had treatment now test neg    Hypertension     Lipoma     Myocardial infarction     Tinnitus     Wears reading eyeglasses        SURGICAL HISTORY:  Past Surgical History:   Procedure Laterality Date     CARDIAC CATHETERIZATION N/A 09/04/2020    Procedure: Left Heart Cath;  Surgeon: Herman Sen MD;  Location:  [facility];  Service: Cardiology;  Laterality: N/A;    COLONOSCOPY      CORONARY STENT PLACEMENT      FACIAL RECONSTRUCTION SURGERY Right 1995    HEAD/NECK LESION/CYST EXCISION Right 12/20/2023    Procedure: LIPOMA EXCISION: right scalp and right shoulder;  Surgeon: Celeste Sheets MD;  Location:  [facility] OR;  Service: General;  Laterality: Right;       FAMILY HISTORY:  Family History   Problem Relation Age of Onset    Heart disease Mother     Heart disease Maternal Grandmother        SOCIAL HISTORY:  Social History     Tobacco Use    Smoking status: Every Day     Current packs/day: 1.00     Average packs/day: 1 pack/day for 55.3 years (55.3 total pack years)     Types: Cigarettes     Start date: 3/1/1969    Smokeless tobacco: Never   Substance Use Topics    Alcohol use: Never       CURRENT MEDICATION:    Current Outpatient Medications:     Aspirin Low Dose 81 MG EC tablet, TAKE 1 TABLET BY MOUTH DAILY., Disp: 30 tablet, Rfl: 5    atorvastatin (LIPITOR) 40 MG tablet, Take 1 tablet by mouth Daily., Disp: , Rfl:     carvedilol (Coreg) 12.5 MG tablet, Take 1 tablet by mouth 2 (Two) Times a Day With Meals for 30 days., Disp: 60 tablet, Rfl: 5    DULoxetine (CYMBALTA) 60 MG capsule, Take 1 capsule by mouth Daily. (Patient not taking: Reported on 6/12/2024), Disp: , Rfl:     Eliquis 5 MG tablet tablet, TAKE 1 TABLET BY MOUTH 2 (TWO) TIMES A DAY (Patient not taking: Reported on 6/12/2024), Disp: 60 tablet, Rfl: 0    empagliflozin (Jardiance) 10 MG tablet tablet, Take 1 tablet by mouth Daily., Disp: 30 tablet, Rfl: 5    furosemide (LASIX) 20 MG tablet, TAKE 1 TABLET BY MOUTH DAILY., Disp: 30 tablet, Rfl: 2    gabapentin (NEURONTIN) 800 MG tablet, Take 1 tablet by mouth 2 (Two) Times a Day As Needed (nerve pain). (Patient not taking: Reported on 6/12/2024), Disp: , Rfl:     isosorbide  "mononitrate (IMDUR) 60 MG 24 hr tablet, TAKE 1 TABLET BY MOUTH DAILY, Disp: 30 tablet, Rfl: 2    oxyCODONE (Roxicodone) 5 MG immediate release tablet, Take 1 tablet by mouth Every 8 (Eight) Hours As Needed for Severe Pain., Disp: 15 tablet, Rfl: 0    ranolazine (Ranexa) 500 MG 12 hr tablet, Take 1 tablet by mouth 2 (Two) Times a Day for 90 days., Disp: 60 tablet, Rfl: 2    sacubitril-valsartan (Entresto) 24-26 MG tablet, Take 1 tablet by mouth 2 (Two) Times a Day., Disp: 60 tablet, Rfl: 5    ALLERGIES:  Codeine and Penicillins    VISIT VITALS:  Blood Pressure (Abnormal) 87/56 (BP Location: Right arm, Patient Position: Sitting, Cuff Size: Adult)   Pulse 76   Height 172.7 cm (68\")   Weight 72.2 kg (159 lb 3.2 oz)   Oxygen Saturation 94%   Body Mass Index 24.21 kg/m²     Physical Exam  Constitutional:       General: He is not in acute distress.     Appearance: He is well-developed. He is not diaphoretic.   HENT:      Head: Normocephalic and atraumatic.      Right Ear: External ear normal.      Left Ear: External ear normal.      Nose: Nose normal.      Mouth/Throat:      Pharynx: No oropharyngeal exudate.   Eyes:      General: No scleral icterus.        Right eye: No discharge.         Left eye: No discharge.      Conjunctiva/sclera: Conjunctivae normal.      Pupils: Pupils are equal, round, and reactive to light.   Neck:      Thyroid: No thyromegaly.      Vascular: No JVD.      Trachea: No tracheal deviation.   Cardiovascular:      Rate and Rhythm: Normal rate and regular rhythm.      Heart sounds: Normal heart sounds. No murmur heard.     No friction rub. No gallop.   Pulmonary:      Effort: Pulmonary effort is normal. No respiratory distress.      Breath sounds: Normal breath sounds. No stridor. No wheezing or rales.   Chest:      Chest wall: No tenderness.   Abdominal:      General: Bowel sounds are normal. There is no distension.      Palpations: Abdomen is soft. There is no mass.      Tenderness: There is no " abdominal tenderness. There is no guarding or rebound.      Hernia: No hernia is present.   Genitourinary:     Rectum: Guaiac result negative.   Musculoskeletal:      Cervical back: Normal range of motion and neck supple.   Lymphadenopathy:      Cervical: No cervical adenopathy.   Skin:     General: Skin is warm and dry.      Coloration: Skin is not pale.      Findings: No erythema or rash.   Neurological:      Mental Status: He is alert and oriented to person, place, and time.      Cranial Nerves: No cranial nerve deficit.      Motor: No abnormal muscle tone.      Coordination: Coordination normal.      Gait: Gait abnormal.      Deep Tendon Reflexes: Reflexes are normal and symmetric. Reflexes normal.   Psychiatric:         Behavior: Behavior normal.         Thought Content: Thought content normal.         Judgment: Judgment normal.         Assessment & Plan      Diagnosis Plan   1. Hepatitis B core antibody positive  Hepatitis B Surface Antigen    Comprehensive Metabolic Panel    Hepatitis B DNA, Quantitative, PCR    CBC & Differential    Protime-INR    AFP Tumor Marker        Patient will have labs to check status of past Hep B infection.    Return in about 2 weeks (around 7/10/2024) for Recheck.         Raffy Shetty PA-C

## 2024-06-27 LAB
HBV DNA SERPL NAA+PROBE-ACNC: NORMAL IU/ML
HBV DNA SERPL NAA+PROBE-LOG IU: NORMAL LOG10 IU/ML
REF LAB TEST REF RANGE: NORMAL

## 2024-07-03 ENCOUNTER — HOSPITAL ENCOUNTER (OUTPATIENT)
Dept: CARDIOLOGY | Facility: HOSPITAL | Age: 64
Discharge: HOME | End: 2024-07-03
Admitting: PHYSICIAN ASSISTANT
Payer: MEDICAID

## 2024-07-03 VITALS
HEIGHT: 68 IN | BODY MASS INDEX: 24.64 KG/M2 | SYSTOLIC BLOOD PRESSURE: 81 MMHG | WEIGHT: 162.6 LBS | HEART RATE: 74 BPM | OXYGEN SATURATION: 95 % | DIASTOLIC BLOOD PRESSURE: 50 MMHG

## 2024-07-03 DIAGNOSIS — K21.9 GASTROESOPHAGEAL REFLUX DISEASE WITHOUT ESOPHAGITIS: ICD-10-CM

## 2024-07-03 DIAGNOSIS — R07.89 OTHER CHEST PAIN: ICD-10-CM

## 2024-07-03 DIAGNOSIS — I50.42 CHRONIC COMBINED SYSTOLIC AND DIASTOLIC CONGESTIVE HEART FAILURE: Primary | ICD-10-CM

## 2024-07-03 DIAGNOSIS — I25.10 ASCVD (ARTERIOSCLEROTIC CARDIOVASCULAR DISEASE): ICD-10-CM

## 2024-07-03 DIAGNOSIS — I48.0 PAROXYSMAL ATRIAL FIBRILLATION: ICD-10-CM

## 2024-07-03 LAB
QT INTERVAL: 394 MS
QTC INTERVAL: 416 MS

## 2024-07-03 PROCEDURE — 93010 ELECTROCARDIOGRAM REPORT: CPT | Performed by: INTERNAL MEDICINE

## 2024-07-03 PROCEDURE — 93005 ELECTROCARDIOGRAM TRACING: CPT | Performed by: PHYSICIAN ASSISTANT

## 2024-07-03 PROCEDURE — 94726 PLETHYSMOGRAPHY LUNG VOLUMES: CPT | Performed by: PHYSICIAN ASSISTANT

## 2024-07-03 PROCEDURE — 99214 OFFICE O/P EST MOD 30 MIN: CPT | Performed by: PHYSICIAN ASSISTANT

## 2024-07-03 RX ORDER — HYDROCODONE BITARTRATE AND ACETAMINOPHEN 7.5; 325 MG/1; MG/1
1 TABLET ORAL EVERY 8 HOURS PRN
COMMUNITY

## 2024-07-03 RX ORDER — RANOLAZINE 500 MG/1
500 TABLET, EXTENDED RELEASE ORAL 2 TIMES DAILY
Qty: 60 TABLET | Refills: 1 | Status: SHIPPED | OUTPATIENT
Start: 2024-07-03

## 2024-07-03 RX ORDER — SACUBITRIL AND VALSARTAN 24; 26 MG/1; MG/1
1 TABLET, FILM COATED ORAL 2 TIMES DAILY
Qty: 60 TABLET | Refills: 1 | Status: SHIPPED | OUTPATIENT
Start: 2024-07-03

## 2024-07-03 RX ORDER — PANTOPRAZOLE SODIUM 40 MG/1
40 TABLET, DELAYED RELEASE ORAL DAILY
Qty: 30 TABLET | Refills: 6 | Status: SHIPPED | OUTPATIENT
Start: 2024-07-03

## 2024-07-03 RX ORDER — CARVEDILOL 12.5 MG/1
12.5 TABLET ORAL 2 TIMES DAILY WITH MEALS
Qty: 60 TABLET | Refills: 1 | Status: SHIPPED | OUTPATIENT
Start: 2024-07-03

## 2024-07-03 RX ORDER — ISOSORBIDE MONONITRATE 60 MG/1
60 TABLET, EXTENDED RELEASE ORAL DAILY
Qty: 30 TABLET | Refills: 1 | Status: SHIPPED | OUTPATIENT
Start: 2024-07-03

## 2024-07-03 RX ORDER — FUROSEMIDE 20 MG/1
20 TABLET ORAL DAILY
Qty: 30 TABLET | Refills: 1 | Status: SHIPPED | OUTPATIENT
Start: 2024-07-03

## 2024-07-03 NOTE — PROGRESS NOTES
Heart Failure Clinic    Date: 07/03/24     Vitals:    07/03/24 1254   BP: (!) 81/50   Pulse: 74   SpO2: 95%    Weight 162    Method of arrival: Ambulatory with cane    Weighing self daily: No    Monitoring Heart Failure Zones: No    Today's HF Zone: Yellow     Taking medications as prescribed: Yes    Edema No    Shortness of Air: Yes    Number of pillows used at night:Recliner    Educational Materials given:  AVS                                                                         ReDS Value: 28  25-35 Optimal Value Status      Jose Elias Mccoy MA 07/03/24 12:57 EDT

## 2024-07-03 NOTE — PROGRESS NOTES
[facility]  Veronica Kim PA-C       Referring, [place] [address]    Thank you for asking me to see Chong White Sr. for {Symptoms; cardiac:02099}.    HPI:     This is a 64 y.o. male with known past medical history of ***:    Chong White Sr. presents for today for ***.  The patient is typically seen by Amy Yanez APRN.  Patient's primary cardiologist is ***.     Last known EF ***.   Last known hospitalization and/or ED visit:   Accompanied by: ***          07/03/24 visit data/details regarding:   Dyspnea: ***  Lower extremity swelling: ***  Abdominal swelling: ***  Home weight: Weight monitoring booklet provided during initial visit; ***  Home BP: BP monitoring booklet provided during initial visit; ***  Home heart rate: HR monitoring booklet provided during initial visit; ***  Daily activities of living:   Pillows/lying flat: ***   HF zone:       Specialists:   Cardiology:   ***        Review of Systems - ROS ***     All other systems were reviewed and were negative.    Patient Active Problem List   Diagnosis    Atrial fibrillation    Neuropathy    ASCVD (arteriosclerotic cardiovascular disease)    Tobacco abuse    Ischemic cardiomyopathy    Chronic combined systolic and diastolic congestive heart failure    Chronic neck pain    Chronic low back pain    Paralysis    Hypertension    Chronic anticoagulation    Long term current use of antiarrhythmic drug    Abscessed tooth    Multiple lipomas    Lipoma of scalp    Other chest pain       family history includes Heart disease in his maternal grandmother and mother.     reports that he has been smoking cigarettes. He started smoking about 55 years ago. He has a 55.3 pack-year smoking history. He has never used smokeless tobacco. He reports that he does not currently use drugs after having used the following drugs: Marijuana. He reports that he does not drink alcohol.    Allergies   Allergen  Reactions    Codeine GI Intolerance    Penicillins Hives         Current Outpatient Medications:     apixaban (Eliquis) 5 MG tablet tablet, Take 1 tablet by mouth 2 (Two) Times a Day., Disp: 60 tablet, Rfl: 2    Aspirin Low Dose 81 MG EC tablet, TAKE 1 TABLET BY MOUTH DAILY., Disp: 30 tablet, Rfl: 5    atorvastatin (LIPITOR) 40 MG tablet, Take 1 tablet by mouth Daily., Disp: , Rfl:     carvedilol (Coreg) 12.5 MG tablet, Take 1 tablet by mouth 2 (Two) Times a Day With Meals., Disp: 60 tablet, Rfl: 1    furosemide (LASIX) 20 MG tablet, Take 1 tablet by mouth Daily. Do not take if BP is less than 100/60, Disp: 30 tablet, Rfl: 1    HYDROcodone-acetaminophen (NORCO) 7.5-325 MG per tablet, Take 1 tablet by mouth Every 8 (Eight) Hours As Needed for Moderate Pain., Disp: , Rfl:     isosorbide mononitrate (IMDUR) 60 MG 24 hr tablet, Take 1 tablet by mouth Daily., Disp: 30 tablet, Rfl: 1    ranolazine (Ranexa) 500 MG 12 hr tablet, Take 1 tablet by mouth 2 (Two) Times a Day., Disp: 60 tablet, Rfl: 1    sacubitril-valsartan (Entresto) 24-26 MG tablet, Take 1 tablet by mouth 2 (Two) Times a Day., Disp: 60 tablet, Rfl: 1    gabapentin (NEURONTIN) 800 MG tablet, Take 1 tablet by mouth 2 (Two) Times a Day As Needed (nerve pain). (Patient not taking: Reported on 6/12/2024), Disp: , Rfl:     pantoprazole (PROTONIX) 40 MG EC tablet, Take 1 tablet by mouth Daily., Disp: 30 tablet, Rfl: 6      Physical Exam:  I have reviewed the patient's current vital signs as documented in the patient's EMR.   Vitals:    07/03/24 1254   BP: (!) 81/50   Pulse: 74   SpO2: 95%     Body mass index is 24.72 kg/m².       07/03/24  1254   Weight: 73.8 kg (162 lb 9.6 oz)      Physical Exam ***    JVP: Volume/Pulsation: {Exam; neck veins:56581}.        DATA REVIEWED:     EKG. I personally reviewed and interpreted the EKG.      EKG: {ekg findings:868706}.   ---------------------------------------------------  TTE/LUCEOR:  Results for orders placed during the  hospital encounter of 11/16/22    Adult Transthoracic Echo Complete W/ Cont if Necessary Per Protocol    Interpretation Summary    Normal left ventricular cavity size and wall thickness noted. All left ventricular wall segments contract normally    Left ventricular ejection fraction appears to be 56 - 60%.    Left ventricular diastolic function is consistent with (grade I) impaired relaxation.    The aortic valve is structurally normal with no regurgitation or stenosis present.    The mitral valve is structurally normal with no regurgitation or significant stenosis present.    There is no evidence of pericardial effusion. .        LAST HEART CATH/IF AVAILABLE:     Results for orders placed during the hospital encounter of 09/01/20    Cardiac Catheterization/Vascular Study    Narrative  Images from the original result were not included.  Patient Name: Chong White                  MRN: [id number]    Procedure Date: 09/04/20    HPI: Patient is a pleasant 60 y.o. male with history of dyspnea and cardiomyopathy. A stress test showed severe LV dysfunction and cardiac cath requested to definitive diagnosis since there is history of angioplasty in 2001. Patient presented with dyspnea. Patient was evaluated and recommended to proceed with diagnostic cardiac catheterization with possible need for intervention. All risks, benefits and alternatives were discussed with patient in detail. All his questions and his family's questions were answered to their satisfaction. They all understood and wished to proceed, and therefore the procedure was performed.    Pre-operative Diagnosis: Abnormal stress test. Cardiomyopathy      Procedure Performed:  Selective coronary angiography.    Technique: The patient was brought to the cardiac catheterization laboratory after informed consent was obtained. right radial artery area was prepped, draped, anesthestized in the usual manner. The radial artery was entered wiliam a Seldinger technique. A  6-Nigerien sheath over the wire was introduced into the radial artery. This was followed by the use of a 6 -Nigerien  diagnostic catheters JL 3.5 and JR4 to perform the diagnostic cardiac catheterization. Patient tolerated the procedure well, was hemodynamically stable throughout the procedure. Radial cocktail was administered via the sheath side arm at the time of access.    At the end of the procedure sheath was removed, wrist band was placed. Excellent hemostasis was achieved. Patient transferred to post cath holding area in stable condition.    Findings:    Coronary anatomy:    The left main coronary artery bifurcated into the LAD and left circumflex coronary.  The LAD coursed in the anterior interventricular groove, gave rise to diagonal branches and reached the apex.    Left circumflex coursed in the left atrial ventricular groove and gives rise to several marginal branches.    The right coronary artery course in the right atrial ventricular groove I gave rise to several acute marginal branches.    Dominant vessel: Right    LM: Separate ostia assumed.  LAD: Flush occlusion of the LAD at the ostium however it fills from RCA injection showing no evidence of disease in a large caliber vessel  Diagonal Branch: NL    LCX: Prox segment and OM are normal. Distal segment has 50% disease  Obtuse marginal branch:   NL    RCA: Very large vessel with mild luminal irregularities    Contrary to patient claim no stents were visible during imaging.      No specimens were removed  EBL: 20 ML    Impression:  Flush occlusion of the ostial LAD with remarkably good collateral connection from rCA filling the LAD with brisk flow to the point of occlusion in the prox LAD.  RCA and CX are free of any significant disease.      Plan:  Patient to be placed on beta blockers, lipid therapy, aspirin, and ACE inhibitor.  No intervention needed as the collateral connection is large and excellent and serving as bypass connection with brisk flow  "all the way to the prox LAD.  EP evaluation per Gen cardiology.      Herman Sen MD  09/04/20      Director, Cardiac Cath Lab      -----------------------------------------------------  CXR/Imaging:   Imaging Results (Most Recent)       None            I personally reviewed and interpreted the CXR.      -----------------------------------------------------  CT:   No radiology results for the last 30 days.  I personally reviewed the images of the CT scan.  My personal interpretation is below.      ----------------------------------------------------    PFTs:    I interpreted the PFTs. {PFT Flow Volume Loops:21303::\"Flow-Volume loops are normal.\"}. {PFT Lung Volumes:21304::\"Plethysmographic lung volumes are within normal limits.\"}. {PFT Spirometry Interpretation:21301::\"Spirometry is within normal limits.\"}.   --------------------------------------------------------------------------------------------------    Laboratory evaluations:    Lab Results   Component Value Date    GLUCOSE 83 06/26/2024    BUN 13 06/26/2024    CREATININE 0.94 06/26/2024    EGFRIFNONA 84 02/23/2022    EGFRIFAFRI 106 10/29/2020    BCR 13.8 06/26/2024    K 4.6 06/26/2024    CO2 27.6 06/26/2024    CALCIUM 9.4 06/26/2024    PROTENTOTREF 6.6 10/29/2020    ALBUMIN 4.2 06/26/2024    LABIL2 1.9 10/29/2020    AST 16 06/26/2024    ALT 15 06/26/2024     Lab Results   Component Value Date    WBC 6.67 06/26/2024    HGB 17.2 06/26/2024    HCT 50.9 06/26/2024    MCV 92.7 06/26/2024     06/26/2024     Lab Results   Component Value Date    CHOL 182 04/20/2023    TRIG 110 04/20/2023    HDL 47 04/20/2023     (H) 04/20/2023     Lab Results   Component Value Date    TSH 6.500 (H) 04/19/2023     Lab Results   Component Value Date    HGBA1C 4.90 04/19/2023     Lab Results   Component Value Date    ALT 15 06/26/2024     Lab Results   Component Value Date    HGBA1C 4.90 04/19/2023     Lab Results   Component Value Date    CREATININE 0.94 06/26/2024 " "    No results found for: \"IRON\", \"TIBC\", \"FERRITIN\"  Lab Results   Component Value Date    INR 1.01 06/26/2024    INR 0.92 06/14/2023    INR 1.44 (H) 06/23/2022    PROTIME 13.4 06/26/2024    PROTIME 12.8 06/14/2023    PROTIME 17.9 (H) 06/23/2022        Lab Results   Component Value Date    ABSOLUTELUNG 27 06/12/2024    ABSOLUTELUNG 29 03/22/2024    ABSOLUTELUNG 28 01/23/2024       PAH RISK ASSESSMENT:      1. Chronic combined systolic and diastolic congestive heart failure          ORDERS PLACED TODAY:  Orders Placed This Encounter   Procedures    ReDs Vest    ECG 12 Lead Chest Pain        Diagnoses and all orders for this visit:    1. Chronic combined systolic and diastolic congestive heart failure (Primary)  -     ReDs Vest    Other orders  -     pantoprazole (PROTONIX) 40 MG EC tablet; Take 1 tablet by mouth Daily.  Dispense: 30 tablet; Refill: 6  -     apixaban (Eliquis) 5 MG tablet tablet; Take 1 tablet by mouth 2 (Two) Times a Day.  Dispense: 60 tablet; Refill: 2  -     ECG 12 Lead Chest Pain; Standing  -     ECG 12 Lead Chest Pain  -     carvedilol (Coreg) 12.5 MG tablet; Take 1 tablet by mouth 2 (Two) Times a Day With Meals.  Dispense: 60 tablet; Refill: 1  -     furosemide (LASIX) 20 MG tablet; Take 1 tablet by mouth Daily. Do not take if BP is less than 100/60  Dispense: 30 tablet; Refill: 1  -     isosorbide mononitrate (IMDUR) 60 MG 24 hr tablet; Take 1 tablet by mouth Daily.  Dispense: 30 tablet; Refill: 1  -     ranolazine (Ranexa) 500 MG 12 hr tablet; Take 1 tablet by mouth 2 (Two) Times a Day.  Dispense: 60 tablet; Refill: 1  -     sacubitril-valsartan (Entresto) 24-26 MG tablet; Take 1 tablet by mouth 2 (Two) Times a Day.  Dispense: 60 tablet; Refill: 1             MEDS ORDERED TODAY:    New Medications Ordered This Visit   Medications    pantoprazole (PROTONIX) 40 MG EC tablet     Sig: Take 1 tablet by mouth Daily.     Dispense:  30 tablet     Refill:  6    apixaban (Eliquis) 5 MG tablet tablet    " " Sig: Take 1 tablet by mouth 2 (Two) Times a Day.     Dispense:  60 tablet     Refill:  2    carvedilol (Coreg) 12.5 MG tablet     Sig: Take 1 tablet by mouth 2 (Two) Times a Day With Meals.     Dispense:  60 tablet     Refill:  1    furosemide (LASIX) 20 MG tablet     Sig: Take 1 tablet by mouth Daily. Do not take if BP is less than 100/60     Dispense:  30 tablet     Refill:  1    isosorbide mononitrate (IMDUR) 60 MG 24 hr tablet     Sig: Take 1 tablet by mouth Daily.     Dispense:  30 tablet     Refill:  1    ranolazine (Ranexa) 500 MG 12 hr tablet     Sig: Take 1 tablet by mouth 2 (Two) Times a Day.     Dispense:  60 tablet     Refill:  1    sacubitril-valsartan (Entresto) 24-26 MG tablet     Sig: Take 1 tablet by mouth 2 (Two) Times a Day.     Dispense:  60 tablet     Refill:  1        ---------------------------------------------------------------------------------------------------------------------------          ASSESSMENT/PLAN:      Diagnosis Plan   1. Chronic combined systolic and diastolic congestive heart failure  ReDs Vest          {CHF acuity:06671} {car chf dysfunction:89107}. {Diagnoses; chf:93360}. Etiology: {[facility]_Heart_Failure_Etiology:56657}. LVEF ***.  RV EF is {Enlabnl:64285}.     NYHA stage {NYHA CLASS:94010}FC-{NYHA:90633}. NYHA FC change: {Nyha change:74185}. Last 6MWT: {6MWT speed:24980}.  ***    Today, {CHF volume status:58547::\"Patient is currently volume overloaded.\"}and with  {EXCELLENT/MODERATE/MINIMAL:06252} perfusion. The patient's hemodynamics are currently {Desc; acceptable/unacceptable:17225}. HR is: {Rate:67159} and {is/is not:93682} at goal. BP/MAP was reviewed and there {IS/IS NOT:97949}room for medication up-titration.  Clinical trajectory was assessed and has{worsened, improve, remained stable:79399}.     CHF GOAL DIRECTED MEDICAL THERAPY FOR PATIENT ADDRESSED/ADJUSTED:     GDMT: ***    Drug Class   Drug   Dose Last Dose Adjustment Notes   ACEi/ARB/ARNI       Beta Blocker    "    MRA       SGLT2i    N/A   Secondaries if applicable:          -CHF Specific BB:    {B Blockers:10006}  at dose of ***.   We discussed processes/benefits of HF clinic including nursing, pharmacist, and provider evaluation during each visit with ability for in office ReDS vest, labs, and ability to provide IV diuresis in the clinic with close outpatient monitoring.  Additionally, patient was educated about the availability of delivery of medications to patient's clinic room prior to leaving the building which assists with medication compliance and insures medications are in hands when changes are made (if patient opts for apothecary usage) with thorough guidance regarding changes and medication schedule provided.          -ACE/ARB/ARNi:   Current dose: ***  /Target dose: ***  Baseline creatinine previously known to be ***.   {Meds; ace inhibitors:38972}   {Meds; angiotensin II receptor blockers:82592}.  Starting an ARNI/ACE/ARB can lead to a small and non- progressive increase in serum creatinine.  This should be less than 30% of the baseline.  Small changes in creatinine or not an indication to discontinue or dose reduce the medication.    -MRA:   The patient is FC-{CHF Class:05464} and MRA {is/is not:46112} indicated.   Spironolactone ***mg  The patient will be started on Aldactone.  They have been asked to obtain a BMP within 5 days of starting for monitoring the kidney function and potassium.  Aldactone was explained to the patient.  However, not all possible side effects and adverse reactions are able to be fully discussed.  Handout was offered to the patient detailing the prescription. ***  Patient was advised to not use potassium products.  Quarterly monitoring of potassium and renal function is currently recommended    -SGLT2 inhibitor therapy:   A BMP at initiation to verify GFR >30 was recommended, as was interval GFR surveillance.  The patient was advised to hold SGLT2I when PO intake is restricted due  "to a planned surgery, or due to an underlying illness.    Recommend starting  Jardiance (empagliflozin) 10mg with quarterly assessment of GFR. ***  Recommend starting Farxiga (dapagliflozin) 10mg daily with quarterly assessment of GFR. ***  RTC one week with BMP after initiation ***.   Pt was advised SEs, some severe, including hypersensitivity and Boogie's; coupled with discussion regarding common side effects of UTIs and female genital mycotic infections were discussed. If you will be NPO, or are sick (poor PO intake, N&V) please hold the medication until you are back to a normal diet.     -Diuretic regimen:   ReDS Vest reading for. 07/03/24 is  ***; ReDs Vest reading reviewed with patient.    {not indicated/requested/declined:77262}  {Treatments; meds diuretics:95259}  BMP, Mag, & ProBNP reviewed with patient.      -Fluid restriction/Sodium restriction:   Requested 2000 ml restriction  Patient has been asked to weigh daily and was provided with a printed diuretic strategy.  1,500 mg Na restriction was discussed.    -Devices if applicable:       -Acute vs. Chronic underlying conditions other than HF addressed during visit:   ***    Identifiable barriers to Heart Failure Self-care:   Medical Barriers: {Medical Barriers:19197::\"Cognitive Impairment\",\"Depression\",\"Substance Use Disorders\",\"Frailty\"}  Social Barriers: {Social Barriers:19197::\"Financial [place] of HF treatment\",\"Food insecurity\",\"Homelessness or housing insecurity\",\"Intimate partner violence or elder abuse\",\"Language barriers\", 'Low health literacy\",\"Social isolation or low social support\",\"Transport limitations\"}        CONSIDERATIONS: ***  ------------------------------------------------------------------  -Iron/Anemia in CHF:    ----------------------------------------------------------------------    -Sleep/Apnea:   ***    --------------------------------------------------------------------------------    Summary   Compliance with meds " questionable.   Takes what he has but still unable to list individually.   Had recent swelling but this resolved.   Frequent chest pains noted (having acutely in office)  EKG was without obvious Stelevation or depression.   Recommend completing follow up with IC (missed last appointment)  May need cath  BP remaining low  Advised to stop jardiance.   May need to stop other medications but will try in stepwise order.   Advised to go to the ED as needed for chest pains  Reminded that he should be taking a higher dose of Ranexa (100 mg instead of the 50). May have been taking lower dose still but can't confirm.   Added holding parameters to Lasix (don't take if BP < 100/60)        This document has been electronically signed by Lisa Reinoso PA-C  July 3, 2024 16:34 EDT      Dictated Utilizing Dragon Dictation: Part of this note may be an electronic transcription/translation of spoken language to printed text using the Dragon Dictation System.    Follow-up appointment and medication changes provided in hand delivered After Visit Summary as well as reviewed in the room.       low  Advised to stop jardiance.   May need to stop other medications but will try in stepwise order.   Advised to go to the ED as needed for chest pains  Reminded that he should be taking a higher dose of Ranexa (100 mg instead of the 50). May have been taking lower dose still but can't confirm.   Added holding parameters to Lasix (don't take if BP < 100/60)      This document has been electronically signed by Lisa Reinoso PA-C      Dictated Utilizing Dragon Dictation: Part of this note may be an electronic transcription/translation of spoken language to printed text using the Dragon Dictation System.    Follow-up appointment and medication changes provided in hand delivered After Visit Summary as well as reviewed in the room.

## 2024-07-03 NOTE — PROGRESS NOTES
Heart Failure Clinic  Pharmacist Note     Chong White Sr. is a 64 y.o. male seen in the Heart Failure Clinic for combined systolic and diastolic HF. Most recent EF was 56-60% on 11/16/22. Chong White Sr. has a poor understanding of his medication regimen.  Patient did not bring medication bag with him to appointment today. He reports some shortness of breath (mostly at night) but denies any issues with swelling. He is currently taking Lasix 20 mg daily. He reports that he no longer checks his blood pressure at home because it's always low. BP in clinic today is 81/50 and HR 74. He reports several episodes of dizziness and chest pain that is constant. He was previously instructed to increase Ranexa dose but is unsure if he increased the dose. He reports that he has also been feeling sick to his stomach lately. Patient denies any issues with medication affordability. He reports that he has not been taking his Eliquis because he ran out of refills.      Medication Use:   Hx of med intolerances:  None related to HF  Retail Rx Management: [facility]    Past Medical History:   Diagnosis Date    Arthritis     Atrial fibrillation     Balance problem     CHF (congestive heart failure)     COPD (chronic obstructive pulmonary disease)     Coronary artery disease     Dependence on cane     GERD (gastroesophageal reflux disease)     Heart attack     X 13 per patient, last one 2002 (9 heart attacks 2001- 1 cardiac stent placed)    History of hepatitis C 2001    had treatment now test neg    Hypertension     Lipoma     Myocardial infarction     Tinnitus     Wears reading eyeglasses      ALLERGIES: Codeine and Penicillins  Current Outpatient Medications   Medication Sig Dispense Refill    Aspirin Low Dose 81 MG EC tablet TAKE 1 TABLET BY MOUTH DAILY. 30 tablet 5    atorvastatin (LIPITOR) 40 MG tablet Take 1 tablet by mouth Daily.      carvedilol (Coreg) 12.5 MG tablet Take 1 tablet by mouth 2 (Two) Times a Day  "With Meals for 30 days. 60 tablet 5    DULoxetine (CYMBALTA) 60 MG capsule Take 1 capsule by mouth Daily. (Patient not taking: Reported on 6/12/2024)      Eliquis 5 MG tablet tablet TAKE 1 TABLET BY MOUTH 2 (TWO) TIMES A DAY (Patient not taking: Reported on 6/12/2024) 60 tablet 0    empagliflozin (Jardiance) 10 MG tablet tablet Take 1 tablet by mouth Daily. 30 tablet 5    furosemide (LASIX) 20 MG tablet TAKE 1 TABLET BY MOUTH DAILY. 30 tablet 2    gabapentin (NEURONTIN) 800 MG tablet Take 1 tablet by mouth 2 (Two) Times a Day As Needed (nerve pain). (Patient not taking: Reported on 6/12/2024)      isosorbide mononitrate (IMDUR) 60 MG 24 hr tablet TAKE 1 TABLET BY MOUTH DAILY 30 tablet 2    oxyCODONE (Roxicodone) 5 MG immediate release tablet Take 1 tablet by mouth Every 8 (Eight) Hours As Needed for Severe Pain. 15 tablet 0    ranolazine (Ranexa) 500 MG 12 hr tablet Take 1 tablet by mouth 2 (Two) Times a Day for 90 days. 60 tablet 2    sacubitril-valsartan (Entresto) 24-26 MG tablet Take 1 tablet by mouth 2 (Two) Times a Day. 60 tablet 5     No current facility-administered medications for this encounter.       Vaccination History:   Pneumonia: PPSV23 9/21/20; PCV20 2/21/23  Annual Influenza: UTD 23/24 season  Shingles: Reports UTD    Objective  Vitals:    07/03/24 1254   BP: (!) 81/50   BP Location: Left arm   Patient Position: Sitting   Cuff Size: Adult   Pulse: 74   SpO2: 95%   Weight: 73.8 kg (162 lb 9.6 oz)   Height: 172.7 cm (68\")       Wt Readings from Last 3 Encounters:   07/03/24 73.8 kg (162 lb 9.6 oz)   06/26/24 72.2 kg (159 lb 3.2 oz)   06/12/24 74.4 kg (164 lb)         07/03/24  1254   Weight: 73.8 kg (162 lb 9.6 oz)       Lab Results   Component Value Date    GLUCOSE 83 06/26/2024    BUN 13 06/26/2024    CREATININE 0.94 06/26/2024    EGFRIFNONA 84 02/23/2022    EGFRIFAFRI 106 10/29/2020    BCR 13.8 06/26/2024    K 4.6 06/26/2024    CO2 27.6 06/26/2024    CALCIUM 9.4 06/26/2024    PROTENTOTREF 6.6 " 10/29/2020    ALBUMIN 4.2 06/26/2024    LABIL2 1.9 10/29/2020    AST 16 06/26/2024    ALT 15 06/26/2024     Lab Results   Component Value Date    WBC 6.67 06/26/2024    HGB 17.2 06/26/2024    HCT 50.9 06/26/2024    MCV 92.7 06/26/2024     06/26/2024     Lab Results   Component Value Date    TROPONINT 14 04/16/2024     Lab Results   Component Value Date    PROBNP 60.0 06/12/2024     Results for orders placed during the hospital encounter of 11/16/22    Adult Transthoracic Echo Complete W/ Cont if Necessary Per Protocol    Interpretation Summary    Normal left ventricular cavity size and wall thickness noted. All left ventricular wall segments contract normally    Left ventricular ejection fraction appears to be 56 - 60%.    Left ventricular diastolic function is consistent with (grade I) impaired relaxation.    The aortic valve is structurally normal with no regurgitation or stenosis present.    The mitral valve is structurally normal with no regurgitation or significant stenosis present.    There is no evidence of pericardial effusion. .         GDMT    Drug Class   Drug   Dose Last Dose Adjustment Additional Titration   Notes   ACEi/ARB/ARNI Entresto 24/26 mg BID 7/12/22 Needed    Beta Blocker Carvedilol 12.5 mg BID 7/12/22 Needed    SGLT2i Jardiance 10mg QD 7/27/22 N/A                Drug Therapy Problems    Chest pain  GDMT: BP 81/50  Requesting refills on all heart failure medications and Eliquis    Recommendations:     Lisa Reinoso ordered EKG.  Patient was instructed to discontinue Jardiance and monitor blood pressure.  Lisa sent refills to [facility].     Patient was educated on heart failure medications and the importance of medication adherence. All questions were addressed and patient would benefit from additional education at each visit.     Thank you for allowing me to participate in the care of your patient,    Tyra Gomes, Luis  7/3/2024  13:18 EDT

## 2024-07-18 PROBLEM — K21.9 GASTROESOPHAGEAL REFLUX DISEASE WITHOUT ESOPHAGITIS: Status: ACTIVE | Noted: 2024-07-18

## 2024-07-25 ENCOUNTER — HOSPITAL ENCOUNTER (OUTPATIENT)
Dept: CARDIOLOGY | Facility: HOSPITAL | Age: 64
Discharge: HOME | End: 2024-07-25
Admitting: PHYSICIAN ASSISTANT
Payer: MEDICAID

## 2024-07-25 ENCOUNTER — APPOINTMENT (OUTPATIENT)
Dept: GENERAL RADIOLOGY | Facility: HOSPITAL | Age: 64
DRG: 309 | End: 2024-07-25
Payer: MEDICAID

## 2024-07-25 ENCOUNTER — TELEPHONE (OUTPATIENT)
Dept: CARDIOLOGY | Facility: CLINIC | Age: 64
End: 2024-07-25

## 2024-07-25 ENCOUNTER — TELEPHONE (OUTPATIENT)
Dept: CARDIOLOGY | Facility: CLINIC | Age: 64
End: 2024-07-25
Payer: MEDICAID

## 2024-07-25 ENCOUNTER — HOSPITAL ENCOUNTER (INPATIENT)
Facility: HOSPITAL | Age: 64
LOS: 3 days | Discharge: HOME | DRG: 309 | End: 2024-07-28
Attending: STUDENT IN AN ORGANIZED HEALTH CARE EDUCATION/TRAINING PROGRAM | Admitting: INTERNAL MEDICINE
Payer: MEDICAID

## 2024-07-25 VITALS
SYSTOLIC BLOOD PRESSURE: 88 MMHG | BODY MASS INDEX: 23.76 KG/M2 | OXYGEN SATURATION: 95 % | WEIGHT: 156.8 LBS | HEIGHT: 68 IN | DIASTOLIC BLOOD PRESSURE: 52 MMHG | HEART RATE: 68 BPM

## 2024-07-25 DIAGNOSIS — I50.42 CHRONIC COMBINED SYSTOLIC AND DIASTOLIC CONGESTIVE HEART FAILURE: Primary | ICD-10-CM

## 2024-07-25 DIAGNOSIS — R07.9 CHEST PAIN, UNSPECIFIED TYPE: ICD-10-CM

## 2024-07-25 DIAGNOSIS — I48.91 ATRIAL FIBRILLATION WITH RVR: ICD-10-CM

## 2024-07-25 DIAGNOSIS — R16.0 LIVER MASSES: ICD-10-CM

## 2024-07-25 DIAGNOSIS — R10.9 ABDOMINAL PAIN, UNSPECIFIED ABDOMINAL LOCATION: ICD-10-CM

## 2024-07-25 DIAGNOSIS — I95.9 HYPOTENSION, UNSPECIFIED HYPOTENSION TYPE: ICD-10-CM

## 2024-07-25 DIAGNOSIS — I48.91 ATRIAL FIBRILLATION WITH RAPID VENTRICULAR RESPONSE: Primary | ICD-10-CM

## 2024-07-25 LAB
ABSOLUTE LUNG FLUID CONTENT: 27 % (ref 20–35)
ALBUMIN SERPL-MCNC: 3.9 G/DL (ref 3.5–5.2)
ALBUMIN/GLOB SERPL: 1.6 G/DL
ALP SERPL-CCNC: 87 U/L (ref 39–117)
ALT SERPL W P-5'-P-CCNC: 16 U/L (ref 1–41)
ANION GAP SERPL CALCULATED.3IONS-SCNC: 7.5 MMOL/L (ref 5–15)
ANION GAP SERPL CALCULATED.3IONS-SCNC: 8.3 MMOL/L (ref 5–15)
APTT PPP: 28.8 SECONDS (ref 26.5–34.5)
AST SERPL-CCNC: 12 U/L (ref 1–40)
BACTERIA UR QL AUTO: ABNORMAL /HPF
BASOPHILS # BLD AUTO: 0.04 10*3/MM3 (ref 0–0.2)
BASOPHILS NFR BLD AUTO: 0.4 % (ref 0–1.5)
BILIRUB SERPL-MCNC: 0.4 MG/DL (ref 0–1.2)
BILIRUB UR QL STRIP: NEGATIVE
BUN SERPL-MCNC: 17 MG/DL (ref 8–23)
BUN SERPL-MCNC: 18 MG/DL (ref 8–23)
BUN/CREAT SERPL: 14.9 (ref 7–25)
BUN/CREAT SERPL: 15.8 (ref 7–25)
CALCIUM SPEC-SCNC: 9.1 MG/DL (ref 8.6–10.5)
CALCIUM SPEC-SCNC: 9.2 MG/DL (ref 8.6–10.5)
CHLORIDE SERPL-SCNC: 105 MMOL/L (ref 98–107)
CHLORIDE SERPL-SCNC: 105 MMOL/L (ref 98–107)
CLARITY UR: CLEAR
CO2 SERPL-SCNC: 28.7 MMOL/L (ref 22–29)
CO2 SERPL-SCNC: 29.5 MMOL/L (ref 22–29)
COLOR UR: YELLOW
CREAT SERPL-MCNC: 1.14 MG/DL (ref 0.76–1.27)
CREAT SERPL-MCNC: 1.14 MG/DL (ref 0.76–1.27)
CRP SERPL-MCNC: <0.3 MG/DL (ref 0–0.5)
DEPRECATED RDW RBC AUTO: 43.6 FL (ref 37–54)
EGFRCR SERPLBLD CKD-EPI 2021: 71.8 ML/MIN/1.73
EGFRCR SERPLBLD CKD-EPI 2021: 71.8 ML/MIN/1.73
EOSINOPHIL # BLD AUTO: 0.04 10*3/MM3 (ref 0–0.4)
EOSINOPHIL NFR BLD AUTO: 0.4 % (ref 0.3–6.2)
ERYTHROCYTE [DISTWIDTH] IN BLOOD BY AUTOMATED COUNT: 12.8 % (ref 12.3–15.4)
FLUAV RNA RESP QL NAA+PROBE: NOT DETECTED
FLUBV RNA RESP QL NAA+PROBE: NOT DETECTED
GEN 5 2HR TROPONIN T REFLEX: 19 NG/L
GLOBULIN UR ELPH-MCNC: 2.5 GM/DL
GLUCOSE SERPL-MCNC: 119 MG/DL (ref 65–99)
GLUCOSE SERPL-MCNC: 122 MG/DL (ref 65–99)
GLUCOSE UR STRIP-MCNC: ABNORMAL MG/DL
HBA1C MFR BLD: 5.1 % (ref 4.8–5.6)
HCT VFR BLD AUTO: 51.1 % (ref 37.5–51)
HGB BLD-MCNC: 17.1 G/DL (ref 13–17.7)
HGB UR QL STRIP.AUTO: ABNORMAL
HYALINE CASTS UR QL AUTO: ABNORMAL /LPF
IMM GRANULOCYTES # BLD AUTO: 0.08 10*3/MM3 (ref 0–0.05)
IMM GRANULOCYTES NFR BLD AUTO: 0.8 % (ref 0–0.5)
INR PPP: 1.02 (ref 0.9–1.1)
KETONES UR QL STRIP: NEGATIVE
LEUKOCYTE ESTERASE UR QL STRIP.AUTO: NEGATIVE
LIPASE SERPL-CCNC: 23 U/L (ref 13–60)
LYMPHOCYTES # BLD AUTO: 1.48 10*3/MM3 (ref 0.7–3.1)
LYMPHOCYTES NFR BLD AUTO: 14.2 % (ref 19.6–45.3)
MAGNESIUM SERPL-MCNC: 2.5 MG/DL (ref 1.6–2.4)
MCH RBC QN AUTO: 31.4 PG (ref 26.6–33)
MCHC RBC AUTO-ENTMCNC: 33.5 G/DL (ref 31.5–35.7)
MCV RBC AUTO: 93.8 FL (ref 79–97)
MONOCYTES # BLD AUTO: 0.67 10*3/MM3 (ref 0.1–0.9)
MONOCYTES NFR BLD AUTO: 6.4 % (ref 5–12)
NEUTROPHILS NFR BLD AUTO: 77.8 % (ref 42.7–76)
NEUTROPHILS NFR BLD AUTO: 8.12 10*3/MM3 (ref 1.7–7)
NITRITE UR QL STRIP: NEGATIVE
NRBC BLD AUTO-RTO: 0 /100 WBC (ref 0–0.2)
NT-PROBNP SERPL-MCNC: 641.9 PG/ML (ref 0–900)
NT-PROBNP SERPL-MCNC: 712.6 PG/ML (ref 0–900)
PH UR STRIP.AUTO: 6 [PH] (ref 5–8)
PLATELET # BLD AUTO: 235 10*3/MM3 (ref 140–450)
PMV BLD AUTO: 9.6 FL (ref 6–12)
POTASSIUM SERPL-SCNC: 4.1 MMOL/L (ref 3.5–5.2)
POTASSIUM SERPL-SCNC: 4.1 MMOL/L (ref 3.5–5.2)
PROT SERPL-MCNC: 6.4 G/DL (ref 6–8.5)
PROT UR QL STRIP: NEGATIVE
PROTHROMBIN TIME: 13.5 SECONDS (ref 12.1–14.7)
QT INTERVAL: 312 MS
QTC INTERVAL: 460 MS
RBC # BLD AUTO: 5.45 10*6/MM3 (ref 4.14–5.8)
RBC # UR STRIP: ABNORMAL /HPF
REF LAB TEST METHOD: ABNORMAL
SARS-COV-2 RNA RESP QL NAA+PROBE: NOT DETECTED
SODIUM SERPL-SCNC: 142 MMOL/L (ref 136–145)
SODIUM SERPL-SCNC: 142 MMOL/L (ref 136–145)
SP GR UR STRIP: >1.03 (ref 1–1.03)
SQUAMOUS #/AREA URNS HPF: ABNORMAL /HPF
TROPONIN T DELTA: 0 NG/L
TROPONIN T SERPL HS-MCNC: 19 NG/L
TSH SERPL DL<=0.05 MIU/L-ACNC: 1.73 UIU/ML (ref 0.27–4.2)
UROBILINOGEN UR QL STRIP: ABNORMAL
WBC # UR STRIP: ABNORMAL /HPF
WBC NRBC COR # BLD AUTO: 10.43 10*3/MM3 (ref 3.4–10.8)

## 2024-07-25 PROCEDURE — 86140 C-REACTIVE PROTEIN: CPT | Performed by: PHYSICIAN ASSISTANT

## 2024-07-25 PROCEDURE — 83690 ASSAY OF LIPASE: CPT | Performed by: PHYSICIAN ASSISTANT

## 2024-07-25 PROCEDURE — 85025 COMPLETE CBC W/AUTO DIFF WBC: CPT | Performed by: PHYSICIAN ASSISTANT

## 2024-07-25 PROCEDURE — 36415 COLL VENOUS BLD VENIPUNCTURE: CPT | Performed by: PHYSICIAN ASSISTANT

## 2024-07-25 PROCEDURE — 80053 COMPREHEN METABOLIC PANEL: CPT | Performed by: PHYSICIAN ASSISTANT

## 2024-07-25 PROCEDURE — 93005 ELECTROCARDIOGRAM TRACING: CPT | Performed by: PHYSICIAN ASSISTANT

## 2024-07-25 PROCEDURE — 94726 PLETHYSMOGRAPHY LUNG VOLUMES: CPT | Performed by: PHYSICIAN ASSISTANT

## 2024-07-25 PROCEDURE — 83735 ASSAY OF MAGNESIUM: CPT | Performed by: PHYSICIAN ASSISTANT

## 2024-07-25 PROCEDURE — 71045 X-RAY EXAM CHEST 1 VIEW: CPT | Performed by: RADIOLOGY

## 2024-07-25 PROCEDURE — 71045 X-RAY EXAM CHEST 1 VIEW: CPT

## 2024-07-25 PROCEDURE — 85610 PROTHROMBIN TIME: CPT | Performed by: PHYSICIAN ASSISTANT

## 2024-07-25 PROCEDURE — 84443 ASSAY THYROID STIM HORMONE: CPT | Performed by: INTERNAL MEDICINE

## 2024-07-25 PROCEDURE — 83880 ASSAY OF NATRIURETIC PEPTIDE: CPT | Performed by: PHYSICIAN ASSISTANT

## 2024-07-25 PROCEDURE — 84484 ASSAY OF TROPONIN QUANT: CPT | Performed by: PHYSICIAN ASSISTANT

## 2024-07-25 PROCEDURE — 85730 THROMBOPLASTIN TIME PARTIAL: CPT | Performed by: PHYSICIAN ASSISTANT

## 2024-07-25 PROCEDURE — 25010000002 DIGOXIN PER 500 MCG: Performed by: INTERNAL MEDICINE

## 2024-07-25 PROCEDURE — 99285 EMERGENCY DEPT VISIT HI MDM: CPT

## 2024-07-25 PROCEDURE — 81001 URINALYSIS AUTO W/SCOPE: CPT | Performed by: PHYSICIAN ASSISTANT

## 2024-07-25 PROCEDURE — 25810000003 SODIUM CHLORIDE 0.9 % SOLUTION: Performed by: PHYSICIAN ASSISTANT

## 2024-07-25 PROCEDURE — 87636 SARSCOV2 & INF A&B AMP PRB: CPT | Performed by: PHYSICIAN ASSISTANT

## 2024-07-25 PROCEDURE — 36415 COLL VENOUS BLD VENIPUNCTURE: CPT

## 2024-07-25 PROCEDURE — 93010 ELECTROCARDIOGRAM REPORT: CPT | Performed by: INTERNAL MEDICINE

## 2024-07-25 PROCEDURE — 83036 HEMOGLOBIN GLYCOSYLATED A1C: CPT | Performed by: INTERNAL MEDICINE

## 2024-07-25 PROCEDURE — 99223 1ST HOSP IP/OBS HIGH 75: CPT

## 2024-07-25 PROCEDURE — 93005 ELECTROCARDIOGRAM TRACING: CPT | Performed by: STUDENT IN AN ORGANIZED HEALTH CARE EDUCATION/TRAINING PROGRAM

## 2024-07-25 RX ORDER — SODIUM CHLORIDE 0.9 % (FLUSH) 0.9 %
10 SYRINGE (ML) INJECTION EVERY 12 HOURS SCHEDULED
Status: DISCONTINUED | OUTPATIENT
Start: 2024-07-25 | End: 2024-07-28 | Stop reason: HOSPADM

## 2024-07-25 RX ORDER — DILTIAZEM HYDROCHLORIDE 5 MG/ML
5 INJECTION INTRAVENOUS ONCE
Status: DISCONTINUED | OUTPATIENT
Start: 2024-07-25 | End: 2024-07-25

## 2024-07-25 RX ORDER — CARVEDILOL 6.25 MG/1
12.5 TABLET ORAL 2 TIMES DAILY WITH MEALS
Status: CANCELLED | OUTPATIENT
Start: 2024-07-25

## 2024-07-25 RX ORDER — RANOLAZINE 500 MG/1
500 TABLET, EXTENDED RELEASE ORAL 2 TIMES DAILY
Status: DISCONTINUED | OUTPATIENT
Start: 2024-07-25 | End: 2024-07-28 | Stop reason: HOSPADM

## 2024-07-25 RX ORDER — ISOSORBIDE MONONITRATE 60 MG/1
60 TABLET, EXTENDED RELEASE ORAL DAILY
Status: CANCELLED | OUTPATIENT
Start: 2024-07-25

## 2024-07-25 RX ORDER — DILTIAZEM HYDROCHLORIDE 5 MG/ML
2.5 INJECTION INTRAVENOUS ONCE
Status: COMPLETED | OUTPATIENT
Start: 2024-07-25 | End: 2024-07-25

## 2024-07-25 RX ORDER — SODIUM CHLORIDE 0.9 % (FLUSH) 0.9 %
10 SYRINGE (ML) INJECTION AS NEEDED
Status: DISCONTINUED | OUTPATIENT
Start: 2024-07-25 | End: 2024-07-28 | Stop reason: HOSPADM

## 2024-07-25 RX ORDER — POLYETHYLENE GLYCOL 3350 17 G/17G
17 POWDER, FOR SOLUTION ORAL DAILY PRN
Status: DISCONTINUED | OUTPATIENT
Start: 2024-07-25 | End: 2024-07-28 | Stop reason: HOSPADM

## 2024-07-25 RX ORDER — DEXTROSE MONOHYDRATE 25 G/50ML
25 INJECTION, SOLUTION INTRAVENOUS
Status: DISCONTINUED | OUTPATIENT
Start: 2024-07-25 | End: 2024-07-28 | Stop reason: HOSPADM

## 2024-07-25 RX ORDER — NITROGLYCERIN 0.4 MG/1
0.4 TABLET SUBLINGUAL
Status: DISCONTINUED | OUTPATIENT
Start: 2024-07-25 | End: 2024-07-28 | Stop reason: HOSPADM

## 2024-07-25 RX ORDER — SODIUM CHLORIDE 9 MG/ML
40 INJECTION, SOLUTION INTRAVENOUS AS NEEDED
Status: DISCONTINUED | OUTPATIENT
Start: 2024-07-25 | End: 2024-07-28 | Stop reason: HOSPADM

## 2024-07-25 RX ORDER — ATORVASTATIN CALCIUM 40 MG/1
40 TABLET, FILM COATED ORAL NIGHTLY
Status: DISCONTINUED | OUTPATIENT
Start: 2024-07-25 | End: 2024-07-28 | Stop reason: HOSPADM

## 2024-07-25 RX ORDER — FUROSEMIDE 20 MG/1
20 TABLET ORAL DAILY
Status: CANCELLED | OUTPATIENT
Start: 2024-07-25

## 2024-07-25 RX ORDER — NICOTINE POLACRILEX 4 MG
15 LOZENGE BUCCAL
Status: DISCONTINUED | OUTPATIENT
Start: 2024-07-25 | End: 2024-07-28 | Stop reason: HOSPADM

## 2024-07-25 RX ORDER — ASPIRIN 81 MG/1
81 TABLET ORAL DAILY
Status: DISCONTINUED | OUTPATIENT
Start: 2024-07-25 | End: 2024-07-28 | Stop reason: HOSPADM

## 2024-07-25 RX ORDER — HYDROCODONE BITARTRATE AND ACETAMINOPHEN 7.5; 325 MG/1; MG/1
1 TABLET ORAL EVERY 12 HOURS PRN
Status: DISCONTINUED | OUTPATIENT
Start: 2024-07-25 | End: 2024-07-28 | Stop reason: HOSPADM

## 2024-07-25 RX ORDER — GLUCAGON 1 MG/ML
1 KIT INJECTION
Status: DISCONTINUED | OUTPATIENT
Start: 2024-07-25 | End: 2024-07-28 | Stop reason: HOSPADM

## 2024-07-25 RX ORDER — CARVEDILOL 12.5 MG/1
12.5 TABLET ORAL 2 TIMES DAILY WITH MEALS
COMMUNITY

## 2024-07-25 RX ORDER — METOPROLOL TARTRATE 1 MG/ML
5 INJECTION, SOLUTION INTRAVENOUS ONCE
Status: COMPLETED | OUTPATIENT
Start: 2024-07-25 | End: 2024-07-25

## 2024-07-25 RX ORDER — PANTOPRAZOLE SODIUM 40 MG/1
40 TABLET, DELAYED RELEASE ORAL
Status: DISCONTINUED | OUTPATIENT
Start: 2024-07-26 | End: 2024-07-28 | Stop reason: HOSPADM

## 2024-07-25 RX ORDER — BISACODYL 10 MG
10 SUPPOSITORY, RECTAL RECTAL DAILY PRN
Status: DISCONTINUED | OUTPATIENT
Start: 2024-07-25 | End: 2024-07-28 | Stop reason: HOSPADM

## 2024-07-25 RX ORDER — BISACODYL 5 MG/1
5 TABLET, DELAYED RELEASE ORAL DAILY PRN
Status: DISCONTINUED | OUTPATIENT
Start: 2024-07-25 | End: 2024-07-28 | Stop reason: HOSPADM

## 2024-07-25 RX ORDER — RANOLAZINE 500 MG/1
500 TABLET, EXTENDED RELEASE ORAL 2 TIMES DAILY
Status: CANCELLED | OUTPATIENT
Start: 2024-07-25

## 2024-07-25 RX ORDER — OMEPRAZOLE 40 MG/1
40 CAPSULE, DELAYED RELEASE ORAL DAILY
COMMUNITY

## 2024-07-25 RX ORDER — DIGOXIN 0.25 MG/ML
250 INJECTION INTRAMUSCULAR; INTRAVENOUS ONCE
Status: COMPLETED | OUTPATIENT
Start: 2024-07-25 | End: 2024-07-25

## 2024-07-25 RX ORDER — PANTOPRAZOLE SODIUM 40 MG/1
40 TABLET, DELAYED RELEASE ORAL DAILY
Status: CANCELLED | OUTPATIENT
Start: 2024-07-25

## 2024-07-25 RX ORDER — AMOXICILLIN 250 MG
2 CAPSULE ORAL 2 TIMES DAILY PRN
Status: DISCONTINUED | OUTPATIENT
Start: 2024-07-25 | End: 2024-07-28 | Stop reason: HOSPADM

## 2024-07-25 RX ORDER — NICOTINE 21 MG/24HR
1 PATCH, TRANSDERMAL 24 HOURS TRANSDERMAL EVERY 24 HOURS
Status: DISCONTINUED | OUTPATIENT
Start: 2024-07-25 | End: 2024-07-28 | Stop reason: HOSPADM

## 2024-07-25 RX ADMIN — HYDROCODONE BITARTRATE AND ACETAMINOPHEN 1 TABLET: 7.5; 325 TABLET ORAL at 20:11

## 2024-07-25 RX ADMIN — SODIUM CHLORIDE 1000 ML: 9 INJECTION, SOLUTION INTRAVENOUS at 11:37

## 2024-07-25 RX ADMIN — DIGOXIN 250 MCG: 0.25 INJECTION INTRAMUSCULAR; INTRAVENOUS at 23:07

## 2024-07-25 RX ADMIN — ATORVASTATIN CALCIUM 40 MG: 40 TABLET, FILM COATED ORAL at 20:11

## 2024-07-25 RX ADMIN — METOPROLOL TARTRATE 5 MG: 1 INJECTION, SOLUTION INTRAVENOUS at 11:59

## 2024-07-25 RX ADMIN — NICOTINE 1 PATCH: 21 PATCH TRANSDERMAL at 19:02

## 2024-07-25 RX ADMIN — Medication 10 ML: at 20:14

## 2024-07-25 RX ADMIN — ASPIRIN 81 MG: 81 TABLET, COATED ORAL at 20:11

## 2024-07-25 RX ADMIN — SODIUM CHLORIDE 2.5 MG/HR: 900 INJECTION, SOLUTION INTRAVENOUS at 14:08

## 2024-07-25 RX ADMIN — EMPAGLIFLOZIN 10 MG: 10 TABLET, FILM COATED ORAL at 20:12

## 2024-07-25 RX ADMIN — Medication 10 ML: at 20:13

## 2024-07-25 RX ADMIN — RANOLAZINE 500 MG: 500 TABLET, FILM COATED, EXTENDED RELEASE ORAL at 20:10

## 2024-07-25 RX ADMIN — APIXABAN 5 MG: 5 TABLET, FILM COATED ORAL at 20:10

## 2024-07-25 RX ADMIN — DILTIAZEM HYDROCHLORIDE 2.5 MG: 5 INJECTION, SOLUTION INTRAVENOUS at 13:05

## 2024-07-25 RX ADMIN — APIXABAN 5 MG: 5 TABLET, FILM COATED ORAL at 12:09

## 2024-07-25 NOTE — TELEPHONE ENCOUNTER
Patient called in and states he had a missed call from the hospital but is not sure who tried to reach him. It was not anyone from this office. Patient is currently in the ER.

## 2024-07-25 NOTE — TELEPHONE ENCOUNTER
Caller: Chong White Sr.    Relationship: Self    Best call back number: [phone]    What is the best time to reach you: ANYTIME    Who are you requesting to speak with (clinical staff, provider,  specific staff member): CLINICAL    Do you know the name of the person who called: NOT SURE    What was the call regarding: PATIENT IS CALLING FROM A MISSED CALL. IF DR. WEINER'S OFFICE IS TRYING TO REACH PATIENT CALL BACK. PATIENT ALSO STATES HE IS IN THE [facility] ER WITH HIS HEART OUT OF RHYTHM     Is it okay if the provider responds through MyChart:
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
## 2024-07-25 NOTE — ED PROVIDER NOTES
Subjective   History of Present Illness  This is a 64 year old male patient who presents to the ER with chief complaint of hypotension. PMH significant for A fibb anticoagulated on eliqiuis, CHF, GERD. Patient was sent here by his cardiologist today because when he was seen in their office today he was in A fibb. He says he has been in A fibb before. He says he has had chest pain and SOB and felt very fatigued.       Review of Systems   Constitutional: Negative.  Negative for fever.   HENT: Negative.     Respiratory:  Positive for shortness of breath. Negative for apnea, cough, choking, chest tightness, wheezing and stridor.    Cardiovascular:  Positive for chest pain. Negative for palpitations and leg swelling.   Gastrointestinal: Negative.  Negative for abdominal pain.   Endocrine: Negative.    Genitourinary: Negative.  Negative for dysuria.   Skin: Negative.    Neurological: Negative.    Psychiatric/Behavioral: Negative.     All other systems reviewed and are negative.      Past Medical History:   Diagnosis Date    Arthritis     Atrial fibrillation     Balance problem     CHF (congestive heart failure)     COPD (chronic obstructive pulmonary disease)     Coronary artery disease     Dependence on cane     GERD (gastroesophageal reflux disease)     Heart attack     X 13 per patient, last one 2002 (9 heart attacks 2001- 1 cardiac stent placed)    History of hepatitis C 2001    had treatment now test neg    Hypertension     Lipoma     Myocardial infarction     Tinnitus     Wears reading eyeglasses        Allergies   Allergen Reactions    Codeine GI Intolerance    Penicillins Hives       Past Surgical History:   Procedure Laterality Date    CARDIAC CATHETERIZATION N/A 09/04/2020    Procedure: Left Heart Cath;  Surgeon: Herman Sen MD;  Location: [facility] CATH INVASIVE LOCATION;  Service: Cardiology;  Laterality: N/A;    COLONOSCOPY      CORONARY STENT PLACEMENT      FACIAL RECONSTRUCTION SURGERY Right 1995     HEAD/NECK LESION/CYST EXCISION Right 12/20/2023    Procedure: LIPOMA EXCISION: right scalp and right shoulder;  Surgeon: Celeste Sheets MD;  Location: [facility];  Service: General;  Laterality: Right;       Family History   Problem Relation Age of Onset    Heart disease Mother     Heart disease Maternal Grandmother        Social History     Socioeconomic History    Marital status: Single   Tobacco Use    Smoking status: Every Day     Current packs/day: 1.00     Average packs/day: 1 pack/day for 55.4 years (55.4 ttl pk-yrs)     Types: Cigarettes     Start date: 3/1/1969    Smokeless tobacco: Never   Vaping Use    Vaping status: Never Used   Substance and Sexual Activity    Alcohol use: Never    Drug use: Not Currently     Types: Marijuana     Comment: DENIES    Sexual activity: Not Currently     Partners: Female           Objective   Physical Exam  Vitals and nursing note reviewed.   Constitutional:       General: He is not in acute distress.     Appearance: He is well-developed. He is not diaphoretic.   HENT:      Head: Normocephalic and atraumatic.      Right Ear: External ear normal.      Left Ear: External ear normal.      Nose: Nose normal.   Eyes:      Conjunctiva/sclera: Conjunctivae normal.      Pupils: Pupils are equal, round, and reactive to light.   Neck:      Vascular: No JVD.      Trachea: No tracheal deviation.   Cardiovascular:      Rate and Rhythm: Normal rate and regular rhythm.      Heart sounds: Normal heart sounds. No murmur heard.  Pulmonary:      Effort: Pulmonary effort is normal. No respiratory distress.      Breath sounds: Normal breath sounds. No wheezing.   Abdominal:      General: Bowel sounds are normal.      Palpations: Abdomen is soft.      Tenderness: There is no abdominal tenderness.   Musculoskeletal:         General: No deformity. Normal range of motion.      Cervical back: Normal range of motion and neck supple.   Skin:     General: Skin is warm and dry.      Coloration: Skin  is not pale.      Findings: No erythema or rash.   Neurological:      Mental Status: He is alert and oriented to person, place, and time.      Cranial Nerves: No cranial nerve deficit.   Psychiatric:         Behavior: Behavior normal.         Thought Content: Thought content normal.         Procedures       Results for orders placed or performed during the hospital encounter of 07/25/24   COVID-19 and FLU A/B PCR, 1 HR TAT - Swab, Nasopharynx    Specimen: Nasopharynx; Swab   Result Value Ref Range    COVID19 Not Detected Not Detected - Ref. Range    Influenza A PCR Not Detected Not Detected    Influenza B PCR Not Detected Not Detected   Comprehensive Metabolic Panel    Specimen: Arm, Left; Blood   Result Value Ref Range    Glucose 119 (H) 65 - 99 mg/dL    BUN 18 8 - 23 mg/dL    Creatinine 1.14 0.76 - 1.27 mg/dL    Sodium 142 136 - 145 mmol/L    Potassium 4.1 3.5 - 5.2 mmol/L    Chloride 105 98 - 107 mmol/L    CO2 28.7 22.0 - 29.0 mmol/L    Calcium 9.1 8.6 - 10.5 mg/dL    Total Protein 6.4 6.0 - 8.5 g/dL    Albumin 3.9 3.5 - 5.2 g/dL    ALT (SGPT) 16 1 - 41 U/L    AST (SGOT) 12 1 - 40 U/L    Alkaline Phosphatase 87 39 - 117 U/L    Total Bilirubin 0.4 0.0 - 1.2 mg/dL    Globulin 2.5 gm/dL    A/G Ratio 1.6 g/dL    BUN/Creatinine Ratio 15.8 7.0 - 25.0    Anion Gap 8.3 5.0 - 15.0 mmol/L    eGFR 71.8 >60.0 mL/min/1.73   Urinalysis With Microscopic If Indicated (No Culture) - Urine, Clean Catch    Specimen: Urine, Clean Catch   Result Value Ref Range    Color, UA Yellow Yellow, Straw    Appearance, UA Clear Clear    pH, UA 6.0 5.0 - 8.0    Specific Gravity, UA >1.030 (H) 1.005 - 1.030    Glucose, UA >=1000 mg/dL (3+) (A) Negative    Ketones, UA Negative Negative    Bilirubin, UA Negative Negative    Blood, UA Trace (A) Negative    Protein, UA Negative Negative    Leuk Esterase, UA Negative Negative    Nitrite, UA Negative Negative    Urobilinogen, UA 1.0 E.U./dL 0.2 - 1.0 E.U./dL   aPTT    Specimen: Arm, Right; Blood    Result Value Ref Range    PTT 28.8 26.5 - 34.5 seconds   Protime-INR    Specimen: Arm, Right; Blood   Result Value Ref Range    Protime 13.5 12.1 - 14.7 Seconds    INR 1.02 0.90 - 1.10   High Sensitivity Troponin T    Specimen: Arm, Left; Blood   Result Value Ref Range    HS Troponin T 19 <22 ng/L   BNP    Specimen: Arm, Right; Blood   Result Value Ref Range    proBNP 712.6 0.0 - 900.0 pg/mL   C-reactive Protein    Specimen: Arm, Left; Blood   Result Value Ref Range    C-Reactive Protein <0.30 0.00 - 0.50 mg/dL   CBC Auto Differential    Specimen: Arm, Right; Blood   Result Value Ref Range    WBC 10.43 3.40 - 10.80 10*3/mm3    RBC 5.45 4.14 - 5.80 10*6/mm3    Hemoglobin 17.1 13.0 - 17.7 g/dL    Hematocrit 51.1 (H) 37.5 - 51.0 %    MCV 93.8 79.0 - 97.0 fL    MCH 31.4 26.6 - 33.0 pg    MCHC 33.5 31.5 - 35.7 g/dL    RDW 12.8 12.3 - 15.4 %    RDW-SD 43.6 37.0 - 54.0 fl    MPV 9.6 6.0 - 12.0 fL    Platelets 235 140 - 450 10*3/mm3    Neutrophil % 77.8 (H) 42.7 - 76.0 %    Lymphocyte % 14.2 (L) 19.6 - 45.3 %    Monocyte % 6.4 5.0 - 12.0 %    Eosinophil % 0.4 0.3 - 6.2 %    Basophil % 0.4 0.0 - 1.5 %    Immature Grans % 0.8 (H) 0.0 - 0.5 %    Neutrophils, Absolute 8.12 (H) 1.70 - 7.00 10*3/mm3    Lymphocytes, Absolute 1.48 0.70 - 3.10 10*3/mm3    Monocytes, Absolute 0.67 0.10 - 0.90 10*3/mm3    Eosinophils, Absolute 0.04 0.00 - 0.40 10*3/mm3    Basophils, Absolute 0.04 0.00 - 0.20 10*3/mm3    Immature Grans, Absolute 0.08 (H) 0.00 - 0.05 10*3/mm3    nRBC 0.0 0.0 - 0.2 /100 WBC   Urinalysis, Microscopic Only - Urine, Clean Catch    Specimen: Urine, Clean Catch   Result Value Ref Range    RBC, UA 6-10 (A) None Seen, 0-2 /HPF    WBC, UA 0-2 None Seen, 0-2 /HPF    Bacteria, UA None Seen None Seen /HPF    Squamous Epithelial Cells, UA None Seen None Seen, 0-2 /HPF    Hyaline Casts, UA None Seen None Seen /LPF    Methodology Automated Microscopy    High Sensitivity Troponin T 2Hr    Specimen: Arm, Left; Blood   Result Value Ref  Range    HS Troponin T 19 <22 ng/L    Troponin T Delta 0 >=-4 - <+4 ng/L          ED Course  ED Course as of 07/25/24 1549   Thu Jul 25, 2024   1109 EKG notes atrial fibrillation.  Rapid ventricular response noted.  130 bpm.  No acute ST elevation.  QTc 447.  Electronically signed by Kenyon Campbell DO, 07/25/24, 11:10 AM EDT.   [SF]   1135 XR Chest 1 View  IMPRESSION:    Unremarkable exam. No acute cardiopulmonary findings identified.        This report was finalized on 7/25/2024 11:13 AM by Dr. Colt Elder MD   [MM]   7578 Spoke with Dr. Lyle who has accepted him in admission.  [MM]      ED Course User Index  [MM] Sherri Agustin PA  [SF] Kenyon Campbell DO                                             Medical Decision Making    This is a 64 year old male patient who presents to the ER with chief complaint of hypotension. PMH significant for A fibb anticoagulated on eliqiuis, CHF, GERD. Patient was sent here by his cardiologist today because when he was seen in their office today he was in A fibb. He says he has been in A fibb before. He says he has had chest pain and SOB and felt very fatigued.       Problems Addressed:  Atrial fibrillation with rapid ventricular response: complicated acute illness or injury  Hypotension, unspecified hypotension type: complicated acute illness or injury    Amount and/or Complexity of Data Reviewed  Labs: ordered. Decision-making details documented in ED Course.  Radiology: ordered. Decision-making details documented in ED Course.  ECG/medicine tests: ordered. Decision-making details documented in ED Course.    Risk  Prescription drug management.  Decision regarding hospitalization.        Final diagnoses:   Atrial fibrillation with rapid ventricular response   Hypotension, unspecified hypotension type       ED Disposition  ED Disposition       ED Disposition   Decision to Admit    Condition   --    Comment   --               No follow-up provider specified.        Medication List      No changes were made to your prescriptions during this visit.            Sherri Agustin, PA  07/25/24 1549       Sherri Agustin PA  07/25/24 1545

## 2024-07-25 NOTE — PROGRESS NOTES
[facility] Failure [facility]    Date: 07/25/24     Vitals:    07/25/24 1002   BP: (!) 88/52   Pulse: 68   SpO2: 95%    Weight 156.8    Method of arrival: Ambulatory with cane    Weighing self daily: No    Monitoring Heart Failure Zones: No    Today's HF Zone: Red    Taking medications as prescribed: Yes    Edema No    Shortness of Air: Yes    Number of pillows used at night:Recliner    Educational Materials given:  avs                                                                         ReDS Value:   25-35 Optimal Value Status      Esteban Levine RN 07/25/24 10:05 EDT

## 2024-07-25 NOTE — PLAN OF CARE
Problem: Adult Inpatient Plan of Care  Goal: Plan of Care Review  Outcome: Ongoing, Progressing  Flowsheets (Taken 7/25/2024 1658)  Outcome Evaluation: Pt is resting in bed, call light in reach, and bed alarm set. No new needs noted at this time.  Goal: Patient-Specific Goal (Individualized)  Outcome: Ongoing, Progressing  Goal: Absence of Hospital-Acquired Illness or Injury  Outcome: Ongoing, Progressing  Intervention: Identify and Manage Fall Risk  Recent Flowsheet Documentation  Taken 7/25/2024 1649 by Kristal Salgado RN  Safety Promotion/Fall Prevention:   activity supervised   assistive device/personal items within reach   clutter free environment maintained   fall prevention program maintained   safety round/check completed  Intervention: Prevent Skin Injury  Recent Flowsheet Documentation  Taken 7/25/2024 1649 by Kristal Salgado RN  Skin Protection:   adhesive use limited   incontinence pads utilized   protective footwear used   pulse oximeter probe site changed   tubing/devices free from skin contact   transparent dressing maintained  Intervention: Prevent and Manage VTE (Venous Thromboembolism) Risk  Recent Flowsheet Documentation  Taken 7/25/2024 1649 by Kristal Salgado RN  Activity Management: activity encouraged  Range of Motion: active ROM (range of motion) encouraged  Intervention: Prevent Infection  Recent Flowsheet Documentation  Taken 7/25/2024 1649 by Kristal Salgado RN  Infection Prevention:   single patient room provided   rest/sleep promoted   hand hygiene promoted  Goal: Optimal Comfort and Wellbeing  Outcome: Ongoing, Progressing  Intervention: Provide Person-Centered Care  Recent Flowsheet Documentation  Taken 7/25/2024 1649 by Kristal Salgado RN  Trust Relationship/Rapport:   care explained   choices provided   questions answered   questions encouraged   thoughts/feelings acknowledged   reassurance provided  Goal: Readiness for Transition of Care  Outcome: Ongoing,  Progressing  Intervention: Mutually Develop Transition Plan  Recent Flowsheet Documentation  Taken 7/25/2024 1638 by Kristal Salgado RN  Transportation Anticipated: agency  Patient/Family Anticipated Services at Transition:   Patient/Family Anticipates Transition to: home with help/services  Taken 7/25/2024 1637 by Kristal Salgado RN  Equipment Currently Used at Home: cane, straight     Problem: Skin Injury Risk Increased  Goal: Skin Health and Integrity  Outcome: Ongoing, Progressing  Intervention: Optimize Skin Protection  Recent Flowsheet Documentation  Taken 7/25/2024 1649 by Kristal Salgado RN  Pressure Reduction Techniques:   frequent weight shift encouraged   heels elevated off bed   pressure points protected  Pressure Reduction Devices:   specialty bed utilized   pressure-redistributing mattress utilized   positioning supports utilized   heel offloading device utilized  Skin Protection:   adhesive use limited   incontinence pads utilized   protective footwear used   pulse oximeter probe site changed   tubing/devices free from skin contact   transparent dressing maintained   Goal Outcome Evaluation:              Outcome Evaluation: Pt is resting in bed, call light in reach, and bed alarm set. No new needs noted at this time.

## 2024-07-25 NOTE — H&P
[facility] Medicine Services  History & Physical    Patient Identification:  Name:  Chong White Sr.  Age:  64 y.o.  Sex:  male  :  1960  MRN:  [id number]   Visit Number:  [id number]  Admit Date: 2024   Primary Care Physician:  Amy Yanez APRN    Subjective     Chief complaint: Hypotension, rapid heart rate    History of presenting illness:      Chong White Sr. is a 64 y.o. male who presented for further evaluation of hypotension and rapid heart rate at the [facility] of heart failure clinic.  Patient was seen and examined in the ED with no family present at the bedside.  He reports 2 to 3-day history of mild shortness of breath and more frequent palpitations than baseline.  He reports with rapid heart rate he does have some retrosternal chest pressure.  He had some increased lower extremity edema 1 to 2 weeks ago that is now improved.  He is also reporting dizziness/lightheadedness upon standing.  He has been taking his medications as directed with no missed doses.  He does report recent mild GI upset-nausea without vomiting, no diarrhea.  He states despite this he has been eating and drinking appropriately.  No recent fever or chills.  He reports he does have a chronic smoker's cough which is no worse than baseline.  On further review of systems he did complain of heartburn noting he has run out of his medication.  He does continue to smoke greater than 1 pack a day and requested nicotine replacement therapy.    Past medical history is significant for paroxysmal atrial fibrillation, ASCVD, ischemic cardiomyopathy, HFimpEF, HTN, GERD, COPD, Hx hepatitis C s/p treatment    Upon arrival to the ED, vital signs were temp 98.4, heart rate 58, respirations 20, BP 81/50, SpO2 95% on RA.  Heart rate did trend up in the ED as high as 134.  EKG showed A-fib with RVR with rate 131.  Laboratory workup noted glucose 122, magnesium elevated slightly at 2.5, no leukocytosis.  CXR  was negative.    Known Emergency Department medications received prior to my evaluation included Eliquis, IV diltiazem injection, IV Lopressor, 1 L normal saline, currently on diltiazem infusion.   Emergency Department Room location at the time of my evaluation was 406.     ---------------------------------------------------------------------------------------------------------------------   Review of Systems   Constitutional:  Negative for chills and fever.   HENT:  Negative for congestion and rhinorrhea.    Respiratory:  Positive for cough (Baseline) and shortness of breath.    Cardiovascular:  Positive for chest pain and palpitations. Negative for leg swelling.   Gastrointestinal:  Positive for nausea. Negative for abdominal pain, diarrhea and vomiting.   Genitourinary:  Negative for difficulty urinating and dysuria.   Musculoskeletal:  Negative for arthralgias and myalgias.   Skin:  Negative for rash and wound.   Neurological:  Positive for dizziness and light-headedness.        ---------------------------------------------------------------------------------------------------------------------   Past Medical History:   Diagnosis Date    Arthritis     Atrial fibrillation     Balance problem     CHF (congestive heart failure)     COPD (chronic obstructive pulmonary disease)     Coronary artery disease     Dependence on cane     GERD (gastroesophageal reflux disease)     Heart attack     X 13 per patient, last one 2002 (9 heart attacks 2001- 1 cardiac stent placed)    History of hepatitis C 2001    had treatment now test neg    Hypertension     Lipoma     Myocardial infarction     Tinnitus     Wears reading eyeglasses      Past Surgical History:   Procedure Laterality Date    CARDIAC CATHETERIZATION N/A 09/04/2020    Procedure: Left Heart Cath;  Surgeon: Herman Sen MD;  Location:  [facility];  Service: Cardiology;  Laterality: N/A;    COLONOSCOPY      CORONARY STENT PLACEMENT      FACIAL  RECONSTRUCTION SURGERY Right 1995    HEAD/NECK LESION/CYST EXCISION Right 12/20/2023    Procedure: LIPOMA EXCISION: right scalp and right shoulder;  Surgeon: Celeste Sheets MD;  Location: [facility];  Service: General;  Laterality: Right;     Family History   Problem Relation Age of Onset    Heart disease Mother     Heart disease Maternal Grandmother      Social History     Socioeconomic History    Marital status: Single   Tobacco Use    Smoking status: Every Day     Current packs/day: 1.00     Average packs/day: 1 pack/day for 55.4 years (55.4 ttl pk-yrs)     Types: Cigarettes     Start date: 3/1/1969    Smokeless tobacco: Never   Vaping Use    Vaping status: Never Used   Substance and Sexual Activity    Alcohol use: Never    Drug use: Not Currently     Types: Marijuana     Comment: DENIES    Sexual activity: Not Currently     Partners: Female     ---------------------------------------------------------------------------------------------------------------------   Allergies:  Codeine and Penicillins  ---------------------------------------------------------------------------------------------------------------------   Home medications:    Medications below are reported home medications pulling from within the system; at this time, these medications have not been reconciled unless otherwise specified and are in the verification process for further verifcation as current home medications.  (Not in a hospital admission)      Hospital Scheduled Meds:     dilTIAZem, 5-15 mg/hr, Last Rate: 7.5 mg/hr (07/25/24 4594)        Current listed hospital scheduled medications may not yet reflect those currently placed in orders that are signed and held awaiting patient's arrival to floor.   ---------------------------------------------------------------------------------------------------------------------     Objective     Vital Signs:  Temp:  [98.4 °F (36.9 °C)] 98.4 °F (36.9 °C)  Heart Rate:  [] 95  Resp:  [20]  20  BP: ()/(45-96) 110/88      07/25/24  1026   Weight: 72.6 kg (160 lb)     Body mass index is 24.33 kg/m².  ---------------------------------------------------------------------------------------------------------------------       Physical Exam  Vitals and nursing note reviewed.   Constitutional:       General: He is not in acute distress.     Comments: Thin appearing   HENT:      Head: Normocephalic and atraumatic.   Eyes:      Extraocular Movements: Extraocular movements intact.   Cardiovascular:      Rate and Rhythm: Tachycardia present. Rhythm irregular.   Pulmonary:      Effort: Pulmonary effort is normal.      Comments: Diminished bilaterally  Abdominal:      Palpations: Abdomen is soft.   Musculoskeletal:      Right lower leg: No edema.      Left lower leg: No edema.   Skin:     General: Skin is warm and dry.   Neurological:      Mental Status: He is alert. Mental status is at baseline.   Psychiatric:         Mood and Affect: Mood normal.         Behavior: Behavior normal.             ---------------------------------------------------------------------------------------------------------------------  EKG:        I have personally looked at the EKG.  ---------------------------------------------------------------------------------------------------------------------   Results from last 7 days   Lab Units 07/25/24  1052 07/25/24  1007   CRP mg/dL  --  <0.30   WBC 10*3/mm3 10.43  --    HEMOGLOBIN g/dL 17.1  --    HEMATOCRIT % 51.1*  --    MCV fL 93.8  --    MCHC g/dL 33.5  --    PLATELETS 10*3/mm3 235  --    INR  1.02  --          Results from last 7 days   Lab Units 07/25/24  1007   SODIUM mmol/L 142  142   POTASSIUM mmol/L 4.1  4.1   MAGNESIUM mg/dL 2.5*   CHLORIDE mmol/L 105  105   CO2 mmol/L 28.7  29.5*   BUN mg/dL 18  17   CREATININE mg/dL 1.14  1.14   CALCIUM mg/dL 9.1  9.2   GLUCOSE mg/dL 119*  122*   ALBUMIN g/dL 3.9   BILIRUBIN mg/dL 0.4   ALK PHOS U/L 87   AST (SGOT) U/L 12   ALT  "(SGPT) U/L 16   Estimated Creatinine Clearance: 67.2 mL/min (by C-G formula based on SCr of 1.14 mg/dL).  No results found for: \"AMMONIA\"  Results from last 7 days   Lab Units 07/25/24  1236 07/25/24  1007   HSTROP T ng/L 19 19     Results from last 7 days   Lab Units 07/25/24  1052 07/25/24  1007   PROBNP pg/mL 712.6 641.9     Lab Results   Component Value Date    HGBA1C 4.90 04/19/2023     Lab Results   Component Value Date    TSH 6.500 (H) 04/19/2023    FREET4 1.67 04/19/2023     No results found for: \"PREGTESTUR\", \"PREGSERUM\", \"HCG\", \"HCGQUANT\"  Pain Management Panel  More data may exist         Latest Ref Rng & Units 4/20/2023 9/1/2020   Pain Management Panel   Amphetamine, Urine Qual Negative Negative  Negative    Barbiturates Screen, Urine Negative Negative  Negative    Benzodiazepine Screen, Urine Negative Negative  Negative    Buprenorphine, Screen, Urine Negative Negative  Positive    Cocaine Screen, Urine Negative Negative  Negative    Methadone Screen , Urine Negative Negative  Negative    Methamphetamine, Ur Negative Negative  -     No results found for: \"BLOODCX\"  No results found for: \"URINECX\"  No results found for: \"WOUNDCX\"  No results found for: \"STOOLCX\"      ---------------------------------------------------------------------------------------------------------------------  Imaging Results (Last 7 Days)       Procedure Component Value Units Date/Time    XR Chest 1 View [[id number]] Collected: 07/25/24 1112     Updated: 07/25/24 1115    Narrative:      EXAM:    XR Chest, 1 View     EXAM DATE:    7/25/2024 10:44 AM     CLINICAL HISTORY:    chest pain     TECHNIQUE:    Frontal view of the chest.     COMPARISON:    No relevant prior studies available.     FINDINGS:    LUNGS AND PLEURAL SPACES:  Unremarkable as visualized.  No  consolidation.  No pneumothorax.    HEART:  Unremarkable as visualized.  No cardiomegaly.    MEDIASTINUM:  Unremarkable as visualized.  Normal mediastinal contour.    " "BONES/JOINTS:  Unremarkable as visualized.  No acute fracture.       Impression:        Unremarkable exam. No acute cardiopulmonary findings identified.        This report was finalized on 7/25/2024 11:13 AM by Dr. Colt Elder MD.               Cultures:  No results found for: \"BLOODCX\", \"URINECX\", \"WOUNDCX\", \"MRSACX\", \"RESPCX\", \"STOOLCX\"    Last echocardiogram:  Results for orders placed during the hospital encounter of 11/16/22    Adult Transthoracic Echo Complete W/ Cont if Necessary Per Protocol    Interpretation Summary    Normal left ventricular cavity size and wall thickness noted. All left ventricular wall segments contract normally    Left ventricular ejection fraction appears to be 56 - 60%.    Left ventricular diastolic function is consistent with (grade I) impaired relaxation.    The aortic valve is structurally normal with no regurgitation or stenosis present.    The mitral valve is structurally normal with no regurgitation or significant stenosis present.    There is no evidence of pericardial effusion. .          I have personally reviewed the above radiology images and read the final radiology report on 07/25/24  ---------------------------------------------------------------------------------------------------------------------  Assessment / Plan     Active Hospital Problems    Diagnosis  POA    Atrial fibrillation with RVR [I48.91]  Yes       ASSESSMENT/PLAN:    Atrial fibrillation presenting with RVR  Patient with known history of atrial fibrillation presented to heart failure clinic this a.m. complaining of dizziness, shortness of breath, chest pain and palpitations.  He was found to be in A-fib with RVR with BP as low as 75/45 which prompted referral to the ED.  Patient reports symptoms ongoing for the past 2 to 3 days.  No increased lower extremity edema.  No increased cough or sputum production but does continue to smoke greater than 1 pack/day.  EKG in the ED showed A-fib with RVR with rate " 131.  BP did improve with fluids.  He has been started on a diltiazem infusion with last heart rate documented at 95.  Will admit to the PCU for further management  Continue diltiazem infusion for now.  Titrate as needed  Consult cardiology for further assistance and recommendations. Input appreciated.  Continuous cardiac monitoring  Monitor clinical volume status closely  Standing orders in place for chest pain  Trend labs  Continue supportive care measures    Chronic:  ASCVD  Ischemic cardiomyopathy  HFimpEF, clinically compensated  HTN  COPD, not in acute exacerbation  Ongoing tobacco abuse  GERD  Chronic back pain  Continue home medication regimen as indicated once med rec is complete  Monitor vital signs closely-can continue fluid replacement as indicated  NRT is available    ----------  -DVT prophylaxis: Chronically anticoagulated with Eliquis  -Activity: Bedrest  -Expected length of stay: INPATIENT status due to the need for care which can only be reasonably provided in an hospital setting such as aggressive/expedited ancillary services and/or consultation services, the necessity for IV medications, close physician monitoring and/or the possible need for procedures.  In such, I feel patient’s risk for adverse outcomes and need for care warrant INPATIENT evaluation and predict the patient’s care encounter to likely last beyond 2 midnights.   -Disposition pending course and further cardiology recommendations    High risk secondary to A-fib with RVR    Code Status and Medical Interventions: CPR (Attempt to Resuscitate); Full Support   Ordered at: 07/25/24 1550     Level Of Support Discussed With:    Patient     Code Status (Patient has no pulse and is not breathing):    CPR (Attempt to Resuscitate)     Medical Interventions (Patient has pulse or is breathing):    Full Support       Foster Johns PA-C   07/25/24  16:04 EDT

## 2024-07-26 ENCOUNTER — APPOINTMENT (OUTPATIENT)
Dept: CT IMAGING | Facility: HOSPITAL | Age: 64
DRG: 309 | End: 2024-07-26
Payer: MEDICAID

## 2024-07-26 ENCOUNTER — APPOINTMENT (OUTPATIENT)
Dept: CARDIOLOGY | Facility: HOSPITAL | Age: 64
DRG: 309 | End: 2024-07-26
Payer: MEDICAID

## 2024-07-26 PROBLEM — J44.1 COPD EXACERBATION: Status: ACTIVE | Noted: 2024-07-26

## 2024-07-26 LAB
ANION GAP SERPL CALCULATED.3IONS-SCNC: 8.6 MMOL/L (ref 5–15)
ANION GAP SERPL CALCULATED.3IONS-SCNC: 9.7 MMOL/L (ref 5–15)
ATMOSPHERIC PRESS: 729 MMHG
B PARAPERT DNA SPEC QL NAA+PROBE: NOT DETECTED
B PERT DNA SPEC QL NAA+PROBE: NOT DETECTED
BASE EXCESS BLDV CALC-SCNC: 3.1 MMOL/L (ref 0–2)
BDY SITE: ABNORMAL
BH CV ECHO MEAS - ACS: 2 CM
BH CV ECHO MEAS - AO MAX PG: 5.7 MMHG
BH CV ECHO MEAS - AO MEAN PG: 3 MMHG
BH CV ECHO MEAS - AO ROOT DIAM: 3.6 CM
BH CV ECHO MEAS - AO V2 MAX: 119 CM/SEC
BH CV ECHO MEAS - AO V2 VTI: 20 CM
BH CV ECHO MEAS - EDV(CUBED): 155.3 ML
BH CV ECHO MEAS - EDV(MOD-SP4): 110 ML
BH CV ECHO MEAS - EF(MOD-SP4): 48.6 %
BH CV ECHO MEAS - ESV(CUBED): 76.2 ML
BH CV ECHO MEAS - ESV(MOD-SP4): 56.5 ML
BH CV ECHO MEAS - FS: 21.1 %
BH CV ECHO MEAS - IVS/LVPW: 0.99 CM
BH CV ECHO MEAS - IVSD: 1.08 CM
BH CV ECHO MEAS - LA DIMENSION: 2.3 CM
BH CV ECHO MEAS - LAT PEAK E' VEL: 7.5 CM/SEC
BH CV ECHO MEAS - LV DIASTOLIC VOL/BSA (35-75): 60.5 CM2
BH CV ECHO MEAS - LV MASS(C)D: 228 GRAMS
BH CV ECHO MEAS - LV SYSTOLIC VOL/BSA (12-30): 31.1 CM2
BH CV ECHO MEAS - LVIDD: 5.4 CM
BH CV ECHO MEAS - LVIDS: 4.2 CM
BH CV ECHO MEAS - LVOT AREA: 4.9 CM2
BH CV ECHO MEAS - LVOT DIAM: 2.5 CM
BH CV ECHO MEAS - LVPWD: 1.09 CM
BH CV ECHO MEAS - MED PEAK E' VEL: 6.1 CM/SEC
BH CV ECHO MEAS - MV A MAX VEL: 63.4 CM/SEC
BH CV ECHO MEAS - MV E MAX VEL: 45.4 CM/SEC
BH CV ECHO MEAS - MV E/A: 0.72
BH CV ECHO MEAS - PA ACC TIME: 0.07 SEC
BH CV ECHO MEAS - RAP SYSTOLE: 10 MMHG
BH CV ECHO MEAS - RVSP: 29.5 MMHG
BH CV ECHO MEAS - SV(MOD-SP4): 53.5 ML
BH CV ECHO MEAS - SVI(MOD-SP4): 29.4 ML/M2
BH CV ECHO MEAS - TAPSE (>1.6): 2.6 CM
BH CV ECHO MEAS - TR MAX PG: 19.5 MMHG
BH CV ECHO MEAS - TR MAX VEL: 221 CM/SEC
BH CV ECHO MEASUREMENTS AVERAGE E/E' RATIO: 6.68
BUN SERPL-MCNC: 23 MG/DL (ref 8–23)
BUN SERPL-MCNC: 24 MG/DL (ref 8–23)
BUN/CREAT SERPL: 22 (ref 7–25)
BUN/CREAT SERPL: 24.7 (ref 7–25)
C PNEUM DNA NPH QL NAA+NON-PROBE: NOT DETECTED
CALCIUM SPEC-SCNC: 8.6 MG/DL (ref 8.6–10.5)
CALCIUM SPEC-SCNC: 9 MG/DL (ref 8.6–10.5)
CHLORIDE SERPL-SCNC: 103 MMOL/L (ref 98–107)
CHLORIDE SERPL-SCNC: 108 MMOL/L (ref 98–107)
CO2 BLDA-SCNC: 29.2 MMOL/L (ref 22–33)
CO2 SERPL-SCNC: 24.4 MMOL/L (ref 22–29)
CO2 SERPL-SCNC: 26.3 MMOL/L (ref 22–29)
COHGB MFR BLD: 2.1 % (ref 0–5)
CREAT SERPL-MCNC: 0.93 MG/DL (ref 0.76–1.27)
CREAT SERPL-MCNC: 1.09 MG/DL (ref 0.76–1.27)
DEPRECATED RDW RBC AUTO: 44.8 FL (ref 37–54)
EGFRCR SERPLBLD CKD-EPI 2021: 75.8 ML/MIN/1.73
EGFRCR SERPLBLD CKD-EPI 2021: 91.7 ML/MIN/1.73
ERYTHROCYTE [DISTWIDTH] IN BLOOD BY AUTOMATED COUNT: 12.7 % (ref 12.3–15.4)
FLUAV SUBTYP SPEC NAA+PROBE: NOT DETECTED
FLUBV RNA ISLT QL NAA+PROBE: NOT DETECTED
GLUCOSE SERPL-MCNC: 129 MG/DL (ref 65–99)
GLUCOSE SERPL-MCNC: 90 MG/DL (ref 65–99)
HADV DNA SPEC NAA+PROBE: NOT DETECTED
HCO3 BLDV-SCNC: 27.9 MMOL/L (ref 22–28)
HCOV 229E RNA SPEC QL NAA+PROBE: NOT DETECTED
HCOV HKU1 RNA SPEC QL NAA+PROBE: NOT DETECTED
HCOV NL63 RNA SPEC QL NAA+PROBE: NOT DETECTED
HCOV OC43 RNA SPEC QL NAA+PROBE: NOT DETECTED
HCT VFR BLD AUTO: 48.5 % (ref 37.5–51)
HGB BLD-MCNC: 16.1 G/DL (ref 13–17.7)
HGB BLDA-MCNC: 16.2 G/DL (ref 14–18)
HMPV RNA NPH QL NAA+NON-PROBE: NOT DETECTED
HPIV1 RNA ISLT QL NAA+PROBE: NOT DETECTED
HPIV2 RNA SPEC QL NAA+PROBE: NOT DETECTED
HPIV3 RNA NPH QL NAA+PROBE: NOT DETECTED
HPIV4 P GENE NPH QL NAA+PROBE: NOT DETECTED
INHALED O2 CONCENTRATION: 21 %
Lab: ABNORMAL
M PNEUMO IGG SER IA-ACNC: NOT DETECTED
MAGNESIUM SERPL-MCNC: 2.3 MG/DL (ref 1.6–2.4)
MAGNESIUM SERPL-MCNC: 2.4 MG/DL (ref 1.6–2.4)
MCH RBC QN AUTO: 31.8 PG (ref 26.6–33)
MCHC RBC AUTO-ENTMCNC: 33.2 G/DL (ref 31.5–35.7)
MCV RBC AUTO: 95.8 FL (ref 79–97)
METHGB BLD QL: 0.3 % (ref 0–3)
MODALITY: ABNORMAL
OXYHGB MFR BLDV: 91.4 % (ref 45–75)
PCO2 BLDV: 42.3 MM HG (ref 41–51)
PH BLDV: 7.43 PH UNITS (ref 7.32–7.42)
PHOSPHATE SERPL-MCNC: 3 MG/DL (ref 2.5–4.5)
PHOSPHATE SERPL-MCNC: 4.3 MG/DL (ref 2.5–4.5)
PLATELET # BLD AUTO: 217 10*3/MM3 (ref 140–450)
PMV BLD AUTO: 9.8 FL (ref 6–12)
PO2 BLDV: 62.2 MM HG (ref 27–53)
POTASSIUM SERPL-SCNC: 4.2 MMOL/L (ref 3.5–5.2)
POTASSIUM SERPL-SCNC: 4.8 MMOL/L (ref 3.5–5.2)
QT INTERVAL: 368 MS
QT INTERVAL: 372 MS
QTC INTERVAL: 401 MS
QTC INTERVAL: 410 MS
RBC # BLD AUTO: 5.06 10*6/MM3 (ref 4.14–5.8)
RHINOVIRUS RNA SPEC NAA+PROBE: NOT DETECTED
RSV RNA NPH QL NAA+NON-PROBE: NOT DETECTED
SAO2 % BLDCOV: 93.6 % (ref 45–75)
SARS-COV-2 RNA NPH QL NAA+NON-PROBE: NOT DETECTED
SODIUM SERPL-SCNC: 139 MMOL/L (ref 136–145)
SODIUM SERPL-SCNC: 141 MMOL/L (ref 136–145)
TROPONIN T SERPL HS-MCNC: 11 NG/L
TROPONIN T SERPL HS-MCNC: 14 NG/L
VENTILATOR MODE: ABNORMAL
WBC NRBC COR # BLD AUTO: 9.48 10*3/MM3 (ref 3.4–10.8)

## 2024-07-26 PROCEDURE — 83735 ASSAY OF MAGNESIUM: CPT | Performed by: INTERNAL MEDICINE

## 2024-07-26 PROCEDURE — 80048 BASIC METABOLIC PNL TOTAL CA: CPT | Performed by: INTERNAL MEDICINE

## 2024-07-26 PROCEDURE — 93005 ELECTROCARDIOGRAM TRACING: CPT

## 2024-07-26 PROCEDURE — 85027 COMPLETE CBC AUTOMATED: CPT | Performed by: INTERNAL MEDICINE

## 2024-07-26 PROCEDURE — 92610 EVALUATE SWALLOWING FUNCTION: CPT

## 2024-07-26 PROCEDURE — 82805 BLOOD GASES W/O2 SATURATION: CPT

## 2024-07-26 PROCEDURE — 84100 ASSAY OF PHOSPHORUS: CPT | Performed by: INTERNAL MEDICINE

## 2024-07-26 PROCEDURE — 63710000001 PREDNISONE PER 1 MG: Performed by: INTERNAL MEDICINE

## 2024-07-26 PROCEDURE — 93010 ELECTROCARDIOGRAM REPORT: CPT | Performed by: INTERNAL MEDICINE

## 2024-07-26 PROCEDURE — 84484 ASSAY OF TROPONIN QUANT: CPT | Performed by: INTERNAL MEDICINE

## 2024-07-26 PROCEDURE — 93306 TTE W/DOPPLER COMPLETE: CPT | Performed by: INTERNAL MEDICINE

## 2024-07-26 PROCEDURE — 70450 CT HEAD/BRAIN W/O DYE: CPT | Performed by: RADIOLOGY

## 2024-07-26 PROCEDURE — 99221 1ST HOSP IP/OBS SF/LOW 40: CPT | Performed by: NURSE PRACTITIONER

## 2024-07-26 PROCEDURE — 93306 TTE W/DOPPLER COMPLETE: CPT

## 2024-07-26 PROCEDURE — 70450 CT HEAD/BRAIN W/O DYE: CPT

## 2024-07-26 PROCEDURE — 99232 SBSQ HOSP IP/OBS MODERATE 35: CPT | Performed by: INTERNAL MEDICINE

## 2024-07-26 PROCEDURE — 82820 HEMOGLOBIN-OXYGEN AFFINITY: CPT

## 2024-07-26 PROCEDURE — 93005 ELECTROCARDIOGRAM TRACING: CPT | Performed by: INTERNAL MEDICINE

## 2024-07-26 PROCEDURE — 25010000002 MORPHINE PER 10 MG

## 2024-07-26 PROCEDURE — 0202U NFCT DS 22 TRGT SARS-COV-2: CPT | Performed by: INTERNAL MEDICINE

## 2024-07-26 PROCEDURE — 84484 ASSAY OF TROPONIN QUANT: CPT

## 2024-07-26 PROCEDURE — 25010000002 AMIODARONE IN DEXTROSE 5% 360-4.14 MG/200ML-% SOLUTION: Performed by: NURSE PRACTITIONER

## 2024-07-26 PROCEDURE — 25010000002 AMIODARONE IN DEXTROSE 5% 150-4.21 MG/100ML-% SOLUTION: Performed by: NURSE PRACTITIONER

## 2024-07-26 RX ORDER — METOPROLOL TARTRATE 1 MG/ML
5 INJECTION, SOLUTION INTRAVENOUS ONCE
Status: COMPLETED | OUTPATIENT
Start: 2024-07-26 | End: 2024-07-26

## 2024-07-26 RX ORDER — PHENOBARBITAL, HYOSCYAMINE SULFATE, ATROPINE SULFATE AND SCOPOLAMINE HYDROBROMIDE .0194; .1037; 16.2; .0065 MG/1; MG/1; MG/1; MG/1
2 TABLET ORAL ONCE
Status: COMPLETED | OUTPATIENT
Start: 2024-07-26 | End: 2024-07-26

## 2024-07-26 RX ORDER — ALUMINA, MAGNESIA, AND SIMETHICONE 2400; 2400; 240 MG/30ML; MG/30ML; MG/30ML
15 SUSPENSION ORAL ONCE
Status: COMPLETED | OUTPATIENT
Start: 2024-07-26 | End: 2024-07-26

## 2024-07-26 RX ORDER — CALCIUM CARBONATE 500 MG/1
2 TABLET, CHEWABLE ORAL 3 TIMES DAILY PRN
Status: DISCONTINUED | OUTPATIENT
Start: 2024-07-26 | End: 2024-07-28 | Stop reason: HOSPADM

## 2024-07-26 RX ORDER — CARVEDILOL 6.25 MG/1
12.5 TABLET ORAL 2 TIMES DAILY WITH MEALS
Status: DISCONTINUED | OUTPATIENT
Start: 2024-07-26 | End: 2024-07-28 | Stop reason: HOSPADM

## 2024-07-26 RX ORDER — CALCIUM CARBONATE 500 MG/1
TABLET, CHEWABLE ORAL
Status: COMPLETED
Start: 2024-07-26 | End: 2024-07-26

## 2024-07-26 RX ORDER — LIDOCAINE HYDROCHLORIDE 20 MG/ML
15 SOLUTION OROPHARYNGEAL ONCE
Status: COMPLETED | OUTPATIENT
Start: 2024-07-26 | End: 2024-07-26

## 2024-07-26 RX ORDER — MORPHINE SULFATE 2 MG/ML
2 INJECTION, SOLUTION INTRAMUSCULAR; INTRAVENOUS ONCE
Status: COMPLETED | OUTPATIENT
Start: 2024-07-26 | End: 2024-07-26

## 2024-07-26 RX ORDER — METOPROLOL TARTRATE 1 MG/ML
5 INJECTION, SOLUTION INTRAVENOUS ONCE
Status: DISCONTINUED | OUTPATIENT
Start: 2024-07-26 | End: 2024-07-26

## 2024-07-26 RX ORDER — PREDNISONE 20 MG/1
40 TABLET ORAL
Status: DISCONTINUED | OUTPATIENT
Start: 2024-07-26 | End: 2024-07-28 | Stop reason: HOSPADM

## 2024-07-26 RX ADMIN — ASPIRIN 81 MG: 81 TABLET, COATED ORAL at 09:05

## 2024-07-26 RX ADMIN — APIXABAN 5 MG: 5 TABLET, FILM COATED ORAL at 20:06

## 2024-07-26 RX ADMIN — MORPHINE SULFATE 2 MG: 2 INJECTION, SOLUTION INTRAMUSCULAR; INTRAVENOUS at 22:25

## 2024-07-26 RX ADMIN — ATORVASTATIN CALCIUM 40 MG: 40 TABLET, FILM COATED ORAL at 20:06

## 2024-07-26 RX ADMIN — CARVEDILOL 12.5 MG: 6.25 TABLET, FILM COATED ORAL at 09:22

## 2024-07-26 RX ADMIN — RANOLAZINE 500 MG: 500 TABLET, FILM COATED, EXTENDED RELEASE ORAL at 20:06

## 2024-07-26 RX ADMIN — APIXABAN 5 MG: 5 TABLET, FILM COATED ORAL at 09:05

## 2024-07-26 RX ADMIN — ALUMINUM HYDROXIDE, MAGNESIUM HYDROXIDE, DIMETHICONE 15 ML: 400; 400; 40 SUSPENSION ORAL at 17:35

## 2024-07-26 RX ADMIN — PHENOBARBITAL, HYOSCYAMINE SULFATE, ATROPINE SULFATE, SCOPOLAMINE HYDROBROMIDE 32.4 MG: 16.2; .1037; .0194; .0065 TABLET ORAL at 17:35

## 2024-07-26 RX ADMIN — AMIODARONE HYDROCHLORIDE 1 MG/MIN: 1.8 INJECTION, SOLUTION INTRAVENOUS at 09:37

## 2024-07-26 RX ADMIN — ANTACID TABLETS 2 TABLET: 500 TABLET, CHEWABLE ORAL at 20:13

## 2024-07-26 RX ADMIN — Medication 10 ML: at 20:06

## 2024-07-26 RX ADMIN — AMIODARONE HYDROCHLORIDE 150 MG: 1.5 INJECTION, SOLUTION INTRAVENOUS at 09:19

## 2024-07-26 RX ADMIN — NICOTINE 1 PATCH: 21 PATCH TRANSDERMAL at 17:39

## 2024-07-26 RX ADMIN — RANOLAZINE 500 MG: 500 TABLET, FILM COATED, EXTENDED RELEASE ORAL at 09:05

## 2024-07-26 RX ADMIN — PANTOPRAZOLE SODIUM 40 MG: 40 TABLET, DELAYED RELEASE ORAL at 05:57

## 2024-07-26 RX ADMIN — PREDNISONE 40 MG: 20 TABLET ORAL at 18:38

## 2024-07-26 RX ADMIN — Medication 10 ML: at 09:07

## 2024-07-26 RX ADMIN — HYDROCODONE BITARTRATE AND ACETAMINOPHEN 1 TABLET: 7.5; 325 TABLET ORAL at 18:42

## 2024-07-26 RX ADMIN — CARVEDILOL 12.5 MG: 6.25 TABLET, FILM COATED ORAL at 17:36

## 2024-07-26 RX ADMIN — Medication: at 04:57

## 2024-07-26 RX ADMIN — NITROGLYCERIN 0.4 MG: 0.4 TABLET, ORALLY DISINTEGRATING SUBLINGUAL at 22:16

## 2024-07-26 RX ADMIN — LIDOCAINE HYDROCHLORIDE 15 ML: 20 SOLUTION ORAL at 17:35

## 2024-07-26 RX ADMIN — EMPAGLIFLOZIN 10 MG: 10 TABLET, FILM COATED ORAL at 09:05

## 2024-07-26 RX ADMIN — AMIODARONE HYDROCHLORIDE 0.5 MG/MIN: 1.8 INJECTION, SOLUTION INTRAVENOUS at 15:40

## 2024-07-26 RX ADMIN — METOPROLOL TARTRATE 5 MG: 1 INJECTION, SOLUTION INTRAVENOUS at 02:01

## 2024-07-26 NOTE — PLAN OF CARE
Goal Outcome Evaluation:           Progress: no change  Outcome Evaluation: Pt change. Pt has all items in reach, will continue to monitor.

## 2024-07-26 NOTE — PLAN OF CARE
Goal Outcome Evaluation:                      Goal Outcome Evaluation, vss, pt on RA tolerating well, see MAR for PRN pain medications administered. PT has been Controlled Afib on monitor. Continuing plan of care.

## 2024-07-26 NOTE — CONSULTS
Consults  Date of Admit: 7/25/2024  Date of Consult: 07/26/24  No ref. provider found  Chong White Sr.  1960    Consulting Physician: Nubia Arteaga MD    Cardiology consultation    Reason for consultation: Atrial fibrillation with RVR      Hypotension  Associated symptoms include chest pain, fatigue and weakness.       Subjective     Chief Complaint   Patient presents with    Hypotension    Rapid Heart Rate       Chong White Sr. is a 64 y.o. male with ASCVD status post flush occlusion of the LAD 09/20/2020 with collateral from the RCA, chronic CHF with recovered EF, paroxysmal atrial fibrillation, hypertension, GERD, history of hepatitis C and substance abuse, and smoking.    Mr. White went to a routine visit at the heart failure clinic 07/25/2024 with complaints of dizziness, shortness of breath, chest pain, and palpitations.  He also noted increasing confusion.  His initial blood pressure was 75/45 and EKG revealed A-fib with RVR.  He was directed to report to the emergency room for further evaluation and stabilization.    Upon arrival to the emergency room his heart rate was 58 and his blood pressure 81/50.  EKG revealed A-fib with RVR with a rate of 132.    He was treated with IV diltiazem and Lopressor and started on a diltiazem infusion.    It should be noted that he has consulted with Dr. Arechiga, electrophysiologist in the past.  He was actually scheduled for a catheter ablation, but that did not take place due to an apparent failure of understanding about the indication for the procedure and what postprocedural care would require.  See chart note 06/08/2023.      Cardiac risk factors:cerebral vascular disease, hypercholesterolemia, hypertension, and smoking    Last Echo:  Results for orders placed during the hospital encounter of 11/16/22    Adult Transthoracic Echo Complete W/ Cont if Necessary Per Protocol    Interpretation Summary    Normal left ventricular cavity size and wall thickness  noted. All left ventricular wall segments contract normally    Left ventricular ejection fraction appears to be 56 - 60%.    Left ventricular diastolic function is consistent with (grade I) impaired relaxation.    The aortic valve is structurally normal with no regurgitation or stenosis present.    The mitral valve is structurally normal with no regurgitation or significant stenosis present.    There is no evidence of pericardial effusion. .      Last Stress:   Results for orders placed during the hospital encounter of 09/05/23    Stress Test With Myocardial Perfusion One Day    Interpretation Summary  Images from the original result were not included.      A pharmacological stress test was performed using regadenoson without low-level exercise.    Findings consistent with a normal ECG stress test.    Myocardial perfusion imaging indicates a normal myocardial perfusion study with no evidence of ischemia.    Abnormal LV wall motion consistent with moderate hypokinesis.    Left ventricular ejection fraction is moderately reduced (Calculated EF = 41%).    Impressions are consistent with a low risk to intermediate study.      Last Cath:  Results for orders placed during the hospital encounter of 09/01/20    Cardiac Catheterization/Vascular Study    Narrative  Images from the original result were not included.  Patient Name: Chong White                  MRN: [id number]    Procedure Date: 09/04/20    HPI: Patient is a pleasant 60 y.o. male with history of dyspnea and cardiomyopathy. A stress test showed severe LV dysfunction and cardiac cath requested to definitive diagnosis since there is history of angioplasty in 2001. Patient presented with dyspnea. Patient was evaluated and recommended to proceed with diagnostic cardiac catheterization with possible need for intervention. All risks, benefits and alternatives were discussed with patient in detail. All his questions and his family's questions were answered to their  satisfaction. They all understood and wished to proceed, and therefore the procedure was performed.    Pre-operative Diagnosis: Abnormal stress test. Cardiomyopathy      Procedure Performed:  Selective coronary angiography.    Technique: The patient was brought to the cardiac catheterization laboratory after informed consent was obtained. right radial artery area was prepped, draped, anesthestized in the usual manner. The radial artery was entered wiliam a Seldinger technique. A 6-Estonian sheath over the wire was introduced into the radial artery. This was followed by the use of a 6 -Estonian  diagnostic catheters JL 3.5 and JR4 to perform the diagnostic cardiac catheterization. Patient tolerated the procedure well, was hemodynamically stable throughout the procedure. Radial cocktail was administered via the sheath side arm at the time of access.    At the end of the procedure sheath was removed, wrist band was placed. Excellent hemostasis was achieved. Patient transferred to post cath holding area in stable condition.    Findings:    Coronary anatomy:    The left main coronary artery bifurcated into the LAD and left circumflex coronary.  The LAD coursed in the anterior interventricular groove, gave rise to diagonal branches and reached the apex.    Left circumflex coursed in the left atrial ventricular groove and gives rise to several marginal branches.    The right coronary artery course in the right atrial ventricular groove I gave rise to several acute marginal branches.    Dominant vessel: Right    LM: Separate ostia assumed.  LAD: Flush occlusion of the LAD at the ostium however it fills from RCA injection showing no evidence of disease in a large caliber vessel  Diagonal Branch: NL    LCX: Prox segment and OM are normal. Distal segment has 50% disease  Obtuse marginal branch:   NL    RCA: Very large vessel with mild luminal irregularities    Contrary to patient claim no stents were visible during imaging.      No  specimens were removed  EBL: 20 ML    Impression:  Flush occlusion of the ostial LAD with remarkably good collateral connection from rCA filling the LAD with brisk flow to the point of occlusion in the prox LAD.  RCA and CX are free of any significant disease.      Plan:  Patient to be placed on beta blockers, lipid therapy, aspirin, and ACE inhibitor.  No intervention needed as the collateral connection is large and excellent and serving as bypass connection with brisk flow all the way to the prox LAD.  EP evaluation per Gen cardiology.      Herman Sen MD  09/04/20      Director, Cardiac Cath Lab      Past Medical History:   Diagnosis Date    Arthritis     Atrial fibrillation     Balance problem     CHF (congestive heart failure)     COPD (chronic obstructive pulmonary disease)     Coronary artery disease     Dependence on cane     GERD (gastroesophageal reflux disease)     Heart attack     X 13 per patient, last one 2002 (9 heart attacks 2001- 1 cardiac stent placed)    History of hepatitis C 2001    had treatment now test neg    Hypertension     Lipoma     Myocardial infarction     Tinnitus     Wears reading eyeglasses      Past Surgical History:   Procedure Laterality Date    CARDIAC CATHETERIZATION N/A 09/04/2020    Procedure: Left Heart Cath;  Surgeon: Herman Sen MD;  Location: [facility] INVASIVE LOCATION;  Service: Cardiology;  Laterality: N/A;    COLONOSCOPY      CORONARY STENT PLACEMENT      FACIAL RECONSTRUCTION SURGERY Right 1995    HEAD/NECK LESION/CYST EXCISION Right 12/20/2023    Procedure: LIPOMA EXCISION: right scalp and right shoulder;  Surgeon: Celeste Sheets MD;  Location: [facility] OR;  Service: General;  Laterality: Right;     Family History   Problem Relation Age of Onset    Heart disease Mother     Heart disease Maternal Grandmother      Social History     Tobacco Use    Smoking status: Every Day     Current packs/day: 1.00     Average packs/day: 1 pack/day for 55.4 years (55.4  ttl pk-yrs)     Types: Cigarettes     Start date: 3/1/1969    Smokeless tobacco: Never   Vaping Use    Vaping status: Never Used   Substance Use Topics    Alcohol use: Never    Drug use: Not Currently     Types: Marijuana     Comment: DENIES     Medications Prior to Admission   Medication Sig Dispense Refill Last Dose    apixaban (Eliquis) 5 MG tablet tablet Take 1 tablet by mouth 2 (Two) Times a Day. 60 tablet 2 7/25/2024    Aspirin Low Dose 81 MG EC tablet TAKE 1 TABLET BY MOUTH DAILY. 30 tablet 5 7/24/2024    atorvastatin (LIPITOR) 40 MG tablet Take 1 tablet by mouth Daily.   7/24/2024    carvedilol (COREG) 12.5 MG tablet Take 1 tablet by mouth 2 (Two) Times a Day With Meals.   7/24/2024    empagliflozin (JARDIANCE) 10 MG tablet tablet Take 1 tablet by mouth Daily.   7/24/2024    furosemide (LASIX) 20 MG tablet Take 1 tablet by mouth Daily. Do not take if BP is less than 100/60 30 tablet 1 7/24/2024    isosorbide mononitrate (IMDUR) 60 MG 24 hr tablet Take 1 tablet by mouth Daily. 30 tablet 1 7/24/2024    pantoprazole (PROTONIX) 40 MG EC tablet Take 1 tablet by mouth Daily. 30 tablet 6 7/24/2024    ranolazine (Ranexa) 500 MG 12 hr tablet Take 1 tablet by mouth 2 (Two) Times a Day. 60 tablet 1 7/24/2024    sacubitril-valsartan (ENTRESTO) 24-26 MG tablet Take 1 tablet by mouth 2 (Two) Times a Day.   7/24/2024    HYDROcodone-acetaminophen (NORCO) 7.5-325 MG per tablet Take 1 tablet by mouth Every 12 (Twelve) Hours As Needed for Moderate Pain.   Unknown    omeprazole (priLOSEC) 40 MG capsule Take 1 capsule by mouth Daily.        Allergies:  Codeine and Penicillins    Review of Systems   Constitutional:  Positive for fatigue.   Respiratory:  Positive for shortness of breath.    Cardiovascular:  Positive for chest pain and palpitations.   Neurological:  Positive for weakness.          Objective      Vital Signs  Temp:  [97.2 °F (36.2 °C)-99.2 °F (37.3 °C)] 97.8 °F (36.6 °C)  Heart Rate:  [] 102  Resp:  [10-25]  18  BP: ()/() 113/74  Body mass index is 23.13 kg/m².    Intake/Output Summary (Last 24 hours) at 7/26/2024 1412  Last data filed at 7/26/2024 1300  Gross per 24 hour   Intake 840 ml   Output 250 ml   Net 590 ml       Physical Exam  Vitals and nursing note reviewed.   Constitutional:       General: He is not in acute distress.  HENT:      Head: Normocephalic and atraumatic.   Eyes:      Conjunctiva/sclera: Conjunctivae normal.   Cardiovascular:      Rate and Rhythm: Rhythm irregular.      Comments: Rate fluctuating 90s to 110s  Pulmonary:      Effort: Pulmonary effort is normal.      Breath sounds: Normal breath sounds.   Skin:     General: Skin is warm and dry.   Neurological:      Mental Status: He is alert. Mental status is at baseline.   Psychiatric:         Mood and Affect: Mood normal.         Telemetry: Atrial fibrillation 110s      Telemetry at the time of conversion      Results Review:   I reviewed the patient's new clinical results.  Results from last 7 days   Lab Units 07/26/24  0448 07/25/24  1236 07/25/24  1007   HSTROP T ng/L 14 19 19     Results from last 7 days   Lab Units 07/25/24  1052   WBC 10*3/mm3 10.43   HEMOGLOBIN g/dL 17.1   PLATELETS 10*3/mm3 235     Results from last 7 days   Lab Units 07/26/24  0448 07/25/24  1007   SODIUM mmol/L 141 142  142   POTASSIUM mmol/L 4.2 4.1  4.1   CHLORIDE mmol/L 108* 105  105   CO2 mmol/L 24.4 28.7  29.5*   BUN mg/dL 23 18  17   CREATININE mg/dL 0.93 1.14  1.14   CALCIUM mg/dL 8.6 9.1  9.2   GLUCOSE mg/dL 90 119*  122*   ALT (SGPT) U/L  --  16   AST (SGOT) U/L  --  12     Lab Results   Component Value Date    INR 1.02 07/25/2024    INR 1.01 06/26/2024    INR 0.92 06/14/2023    INR 1.44 (H) 06/23/2022    INR 1.41 (H) 06/16/2022    INR 1.07 08/18/2021    INR 1.05 09/02/2020     Lab Results   Component Value Date    MG 2.3 07/26/2024    MG 2.5 (H) 07/25/2024    MG 2.3 06/12/2024     Lab Results   Component Value Date    TSH 1.730 07/25/2024     TRIG 110 04/20/2023    HDL 47 04/20/2023     (H) 04/20/2023      Lab Results   Component Value Date    PROBNP 712.6 07/25/2024    PROBNP 641.9 07/25/2024    PROBNP 60.0 06/12/2024        EKG:         Imaging Results (Last 72 Hours)       Procedure Component Value Units Date/Time    XR Chest 1 View [[id number]] Collected: 07/25/24 1112     Updated: 07/25/24 1115    Narrative:      EXAM:    XR Chest, 1 View     EXAM DATE:    7/25/2024 10:44 AM     CLINICAL HISTORY:    chest pain     TECHNIQUE:    Frontal view of the chest.     COMPARISON:    No relevant prior studies available.     FINDINGS:    LUNGS AND PLEURAL SPACES:  Unremarkable as visualized.  No  consolidation.  No pneumothorax.    HEART:  Unremarkable as visualized.  No cardiomegaly.    MEDIASTINUM:  Unremarkable as visualized.  Normal mediastinal contour.    BONES/JOINTS:  Unremarkable as visualized.  No acute fracture.       Impression:        Unremarkable exam. No acute cardiopulmonary findings identified.        This report was finalized on 7/25/2024 11:13 AM by Dr. Colt Elder MD.                Assessment:  1.  Paroxysmal atrial fibrillation  2.  HFrecEF  3.  Coronary artery disease  4. Hypertension  5.  Hyperlipidemia        Plan:  1.  [id number] of at least 3 related to CHF, hypertension, and vascular disease history.  Amiodarone bolus and drip ordered.  He appears to have converted to sinus rhythm at approximately 1217 today.  He is appropriately anticoagulated with Eliquis and on beta-blocker therapy.  Recommend EP follow-up in the outpatient setting  2.  Repeat echocardiogram has been ordered.  Beta-blocker and SGLT2 inhibitor have been ordered.  He was on Entresto in the outpatient setting, however we will wait to resume that until his blood pressure has been more stable.  3.  On 81 mg aspirin in addition to his Entresto.  Also on high intensity statin, beta-blocker, and ARB (via ARNI)  4. recently has been hypotensive.  5.  Lipid  panel ordered to evaluate efficacy of statin    Thank you very much for asking us to be involved in this patient's care.  We will follow along with you.    Patient seen and evaluated with Dr. Arteaga.  Plan of care reflects his recommendations.      Electronically signed by SAMARA Vidal, 07/26/24, 2:35 PM EDT.

## 2024-07-26 NOTE — PROGRESS NOTES
[facility] HOSPITALIST PROGRESS NOTE     Patient Identification:  Name:  Chong White Sr.  Age:  64 y.o.  Sex:  male  :  1960  MRN:  [id number]  Visit Number:  [id number]  ROOM: [place] [address]     Primary Care Provider:  Amy Yanez APRN     Date of Admission: 2024    Length of stay in inpatient status:  1    Subjective     Chief Compliant:    Chief Complaint   Patient presents with    Hypotension    Rapid Heart Rate     History of Presenting Illness:  The patient states that he is feeling better but he still has left sided chest pain that is reproducible with palpation and with deep inspiration.  He does feel like he is wheezing.  The scant cough that he has is nonproductive.  He denies nausea, denies leg swelling.      Objective     Current Hospital Meds:  apixaban, 5 mg, Oral, BID  aspirin, 81 mg, Oral, Daily  atorvastatin, 40 mg, Oral, Nightly  empagliflozin, 10 mg, Oral, Daily  metoprolol tartrate, 5 mg, Intravenous, Once  nicotine, 1 patch, Transdermal, Q24H  pantoprazole, 40 mg, Oral, Q AM  ranolazine, 500 mg, Oral, BID  sodium chloride, 10 mL, Intravenous, Q12H  sodium chloride, 10 mL, Intravenous, Q12H    dilTIAZem, 5-15 mg/hr, Last Rate: Stopped (24)      Current Antimicrobial Therapy:  Anti-Infectives (From admission, onward)      None          Current Diuretic Therapy:  Diuretics (From admission, onward)      None          ----------------------------------------------------------------------------------------------------------------------  Vital Signs:  Temp:  [97.2 °F (36.2 °C)-99.2 °F (37.3 °C)] 97.2 °F (36.2 °C)  Heart Rate:  [] 112  Resp:  [10-25] 20  BP: ()/() 117/85  SpO2:  [85 %-100 %] 95 %  on   ;   Device (Oxygen Therapy): room air  Body mass index is 23.13 kg/m².    Wt Readings from Last 3 Encounters:   24 69 kg (152 lb 1.9 oz)   24 71.1 kg (156 lb 12.8 oz)   24 73.8 kg (162 lb 9.6 oz)     Intake & Output (last 3 days)          07/23 0701 07/24 0700 07/24 0701 07/25 0700 07/25 0701 07/26 0700 07/26 0701 07/27 0700    P.O.   360     I.V. (mL/kg)   0 (0)     IV Piggyback   1000     Total Intake(mL/kg)   1360 (19.7)     Net   +1360             Stool Unmeasured Occurrence   0 x           Diet: Cardiac; Healthy Heart (2-3 Na+); Fluid Consistency: Thin (IDDSI 0)  ----------------------------------------------------------------------------------------------------------------------  Physical Exam  Vitals reviewed.   Constitutional:       General: He is not in acute distress.     Appearance: He is not ill-appearing, toxic-appearing or diaphoretic.   HENT:      Head: Normocephalic and atraumatic.      Right Ear: External ear normal.      Left Ear: External ear normal.      Mouth/Throat:      Mouth: Mucous membranes are moist.   Eyes:      General: No scleral icterus.        Right eye: No discharge.         Left eye: No discharge.      Pupils: Pupils are equal, round, and reactive to light.   Cardiovascular:      Rate and Rhythm: Normal rate and regular rhythm.      Pulses: Normal pulses.      Heart sounds: No murmur heard.  Pulmonary:      Effort: Pulmonary effort is normal. No respiratory distress.      Breath sounds: No wheezing or rales.   Abdominal:      General: Bowel sounds are normal. There is no distension.      Palpations: Abdomen is soft.   Musculoskeletal:         General: No swelling or deformity.   Skin:     General: Skin is warm.      Capillary Refill: Capillary refill takes less than 2 seconds.      Coloration: Skin is not jaundiced or pale.   Neurological:      Mental Status: He is alert and oriented to person, place, and time. Mental status is at baseline.      Cranial Nerves: No cranial nerve deficit.   Psychiatric:         Attention and Perception: Attention normal.         Mood and Affect: Affect is flat.         Speech: Speech normal.         Behavior: Behavior is slowed.         Cognition and Memory: Cognition  normal.     ----------------------------------------------------------------------------------------------------------------------  Tele:  Afib with heart rates 100-110; then he spontaneously cardioverted to NSR at 12:17, which was about 3 hours after the amiodarone drip started.  The heart rates since conversion have been in the 70s.  I have personally reviewed/looked at the telemetry strips.       Last echocardiogram:  Results for orders placed during the hospital encounter of 11/16/22    Adult Transthoracic Echo Complete W/ Cont if Necessary Per Protocol    Interpretation Summary    Normal left ventricular cavity size and wall thickness noted. All left ventricular wall segments contract normally    Left ventricular ejection fraction appears to be 56 - 60%.    Left ventricular diastolic function is consistent with (grade I) impaired relaxation.    The aortic valve is structurally normal with no regurgitation or stenosis present.    The mitral valve is structurally normal with no regurgitation or significant stenosis present.    There is no evidence of pericardial effusion. .    I have personally read the ECHO final report.   ----------------------------------------------------------------------------------------------------------------------  LABS:    Pending test results:  The ECHO for today    CBC and coagulation:  Results from last 7 days   Lab Units 07/25/24  1052 07/25/24  1007   CRP mg/dL  --  <0.30   WBC 10*3/mm3 10.43  --    HEMOGLOBIN g/dL 17.1  --    HEMATOCRIT % 51.1*  --    MCV fL 93.8  --    MCHC g/dL 33.5  --    PLATELETS 10*3/mm3 235  --    INR  1.02  --      Renal and electrolytes:  Results from last 7 days   Lab Units 07/26/24  0448 07/25/24  1007   SODIUM mmol/L 141 142  142   POTASSIUM mmol/L 4.2 4.1  4.1   MAGNESIUM mg/dL 2.3 2.5*   CHLORIDE mmol/L 108* 105  105   CO2 mmol/L 24.4 28.7  29.5*   BUN mg/dL 23 18  17   CREATININE mg/dL 0.93 1.14  1.14   CALCIUM mg/dL 8.6 9.1  9.2    PHOSPHORUS mg/dL 4.3  --    GLUCOSE mg/dL 90 119*  122*   ANION GAP mmol/L 8.6 8.3  7.5     Estimated Creatinine Clearance: 78.3 mL/min (by C-G formula based on SCr of 0.93 mg/dL).    Liver and pancreatic function:  Results from last 7 days   Lab Units 07/25/24  1007   ALBUMIN g/dL 3.9   BILIRUBIN mg/dL 0.4   ALK PHOS U/L 87   AST (SGOT) U/L 12   ALT (SGPT) U/L 16   LIPASE U/L 23     Endocrine function:  Lab Results   Component Value Date    HGBA1C 5.10 07/25/2024     Lab Results   Component Value Date    TSH 1.730 07/25/2024    FREET4 1.67 04/19/2023     Cardiac:  Results from last 7 days   Lab Units 07/26/24  0448 07/25/24  1236 07/25/24  1052 07/25/24  1007   HSTROP T ng/L 14 19  --  19   PROBNP pg/mL  --   --  712.6 641.9         I have personally looked at the labs and they are summarized above.  ----------------------------------------------------------------------------------------------------------------------  Detailed radiology reports for the last 24 hours:    Imaging Results (Last 24 Hours)       Procedure Component Value Units Date/Time    XR Chest 1 View [[id number]] Collected: 07/25/24 1112     Updated: 07/25/24 1115    Narrative:      EXAM:    XR Chest, 1 View     EXAM DATE:    7/25/2024 10:44 AM     CLINICAL HISTORY:    chest pain     TECHNIQUE:    Frontal view of the chest.     COMPARISON:    No relevant prior studies available.     FINDINGS:    LUNGS AND PLEURAL SPACES:  Unremarkable as visualized.  No  consolidation.  No pneumothorax.    HEART:  Unremarkable as visualized.  No cardiomegaly.    MEDIASTINUM:  Unremarkable as visualized.  Normal mediastinal contour.    BONES/JOINTS:  Unremarkable as visualized.  No acute fracture.       Impression:        Unremarkable exam. No acute cardiopulmonary findings identified.        This report was finalized on 7/25/2024 11:13 AM by Dr. Colt Elder MD.             I have personally looked at the radiology images and I have read the available final  report.    Assessment & Plan      -Afib with RVR, present on admission, in a patient with known paroxysmal atrial fibrillation  -[id number] of at least 3 related to CHF, hypertension, and vascular disease history   -History of COPD, with a mild acute exacerbation that was present on admission  -History of ASCVD status post flush occlusion of the LAD 09/20/2020 with collateral from the RCA  -History of Ischemic cardiomyopathy  -History of HFimpEF, clinically compensated and without an acute exacerbation  -History of essential HTN  -Ongoing tobacco abuse  -History of GERD  -History of chronic back pain  -History of hepatitis C, treated successfully in 2001  -Past history of IV drug abuse    Since the patient has spontaneously converted and does not need any vasopressors, I will move him to the telemetry floor.  Cardiology is loading the patient with IV amiodarone, with plans to switch to oral amiodarone at a later date.  We await the echocardiogram results.  For the acute COPD exacerbation, I started the patient on scheduled Atrovent and oral prednisone.  Since he is not producing sputum, I would not give him any antibiotics.  Will monitor his oxygen saturations closely; so far, patient has not been hypoxic.  Please note that the hypotension that was present on admission resolved after the heart rates became controlled.  We will repeat blood work in the morning.    Please note that the patient has access to nicotine patches and gum while hospitalized.    The patient has seen Dr. Arechiga, electrophysiologist, in the past and the patient was scheduled for a catheter ablation but due to failure of understanding about the indication of the procedure, it did not take place.  Thus, patient will need to be seen by electrophysiology after discharge.    VTE Prophylaxis:  Eliquis has been started    Disposition: Anticipate home in the next few days.    Saida Lyle MD  [place] Hospitalist  07/26/24  07:38  EDT

## 2024-07-26 NOTE — PAYOR COMM NOTE
"CONTACT:  CAITLIN ALBRECHT RN  UTILIZATION MANAGEMENT DEPT.   [facility]   [address], [address]   PHONE:  [phone]   FAX: [phone]     INPATIENT AUTH REQUEST    REF # [id number] (via Availity)        Chong White Sr. (64 y.o. Male)       Date of Birth   1960    Social Security Number       Address   [address] [place] [address]    Home Phone   [phone]    MRN   [id number]       Sabianist   Sikhism    Marital Status   Single                            Admission Date   7/25/24    Admission Type   Emergency    Admitting Provider   Saida Lyle MD    Attending Provider   Saida Lyle MD    Department, Room/Bed   [facility] PROGRESS CARE, P209/S2       Discharge Date       Discharge Disposition       Discharge Destination                                 Attending Provider: Saida Lyle MD    Allergies: Codeine, Penicillins    Isolation: None   Infection: None   Code Status: CPR    Ht: 172.7 cm (68\")   Wt: 69 kg (152 lb 1.9 oz)    Admission Cmt: None   Principal Problem: None                  Active Insurance as of 7/25/2024       Primary Coverage       Payor Plan Insurance Group Employer/Plan Group    [place] [id number]       Payor Plan Address Payor Plan Phone Number Payor Plan Fax Number Effective Dates    [employer] [address] [address] [phone]  4/1/2020 - None Entered    [facility] [address]         Subscriber Name Subscriber Birth Date Member ID       COLBYCHONG NORRIS SR. 1960 [id number]                     Emergency Contacts        (Rel.) Home Phone Work Phone Mobile Phone    ERNESTINAKENDRICK (Sister) [phone] -- [phone]                 History & Physical        Foster Johns PA-C at 07/25/24 1604              [facility] Medicine Services  History & Physical    Patient Identification:  Name:  Chong White Sr.  Age:  64 " y.o.  Sex:  male  :  1960  MRN:  [id number]   Visit Number:  [id number]  Admit Date: 2024   Primary Care Physician:  Amy Yanez APRN    Subjective     Chief complaint: Hypotension, rapid heart rate    History of presenting illness:      Chong White Sr. is a 64 y.o. male who presented for further evaluation of hypotension and rapid heart rate at the [facility] heart failure clinic.  Patient was seen and examined in the ED with no family present at the bedside.  He reports 2 to 3-day history of mild shortness of breath and more frequent palpitations than baseline.  He reports with rapid heart rate he does have some retrosternal chest pressure.  He had some increased lower extremity edema 1 to 2 weeks ago that is now improved.  He is also reporting dizziness/lightheadedness upon standing.  He has been taking his medications as directed with no missed doses.  He does report recent mild GI upset-nausea without vomiting, no diarrhea.  He states despite this he has been eating and drinking appropriately.  No recent fever or chills.  He reports he does have a chronic smoker's cough which is no worse than baseline.  On further review of systems he did complain of heartburn noting he has run out of his medication.  He does continue to smoke greater than 1 pack a day and requested nicotine replacement therapy.    Past medical history is significant for paroxysmal atrial fibrillation, ASCVD, ischemic cardiomyopathy, HFimpEF, HTN, GERD, COPD, Hx hepatitis C s/p treatment    Upon arrival to the ED, vital signs were temp 98.4, heart rate 58, respirations 20, BP 81/50, SpO2 95% on RA.  Heart rate did trend up in the ED as high as 134.  EKG showed A-fib with RVR with rate 131.  Laboratory workup noted glucose 122, magnesium elevated slightly at 2.5, no leukocytosis.  CXR was negative.    Known Emergency Department medications received prior to my evaluation included Eliquis, IV diltiazem injection, IV  Lopressor, 1 L normal saline, currently on diltiazem infusion.   Emergency Department Room location at the time of my evaluation was 406.     ---------------------------------------------------------------------------------------------------------------------   Review of Systems   Constitutional:  Negative for chills and fever.   HENT:  Negative for congestion and rhinorrhea.    Respiratory:  Positive for cough (Baseline) and shortness of breath.    Cardiovascular:  Positive for chest pain and palpitations. Negative for leg swelling.   Gastrointestinal:  Positive for nausea. Negative for abdominal pain, diarrhea and vomiting.   Genitourinary:  Negative for difficulty urinating and dysuria.   Musculoskeletal:  Negative for arthralgias and myalgias.   Skin:  Negative for rash and wound.   Neurological:  Positive for dizziness and light-headedness.        ---------------------------------------------------------------------------------------------------------------------   Past Medical History:   Diagnosis Date    Arthritis     Atrial fibrillation     Balance problem     CHF (congestive heart failure)     COPD (chronic obstructive pulmonary disease)     Coronary artery disease     Dependence on cane     GERD (gastroesophageal reflux disease)     Heart attack     X 13 per patient, last one 2002 (9 heart attacks 2001- 1 cardiac stent placed)    History of hepatitis C 2001    had treatment now test neg    Hypertension     Lipoma     Myocardial infarction     Tinnitus     Wears reading eyeglasses      Past Surgical History:   Procedure Laterality Date    CARDIAC CATHETERIZATION N/A 09/04/2020    Procedure: Left Heart Cath;  Surgeon: Herman Sen MD;  Location: [facility] CATH INVASIVE LOCATION;  Service: Cardiology;  Laterality: N/A;    COLONOSCOPY      CORONARY STENT PLACEMENT      FACIAL RECONSTRUCTION SURGERY Right 1995    HEAD/NECK LESION/CYST EXCISION Right 12/20/2023    Procedure: LIPOMA EXCISION: right scalp and  right shoulder;  Surgeon: Celeste Sheets MD;  Location: [facility];  Service: General;  Laterality: Right;     Family History   Problem Relation Age of Onset    Heart disease Mother     Heart disease Maternal Grandmother      Social History     Socioeconomic History    Marital status: Single   Tobacco Use    Smoking status: Every Day     Current packs/day: 1.00     Average packs/day: 1 pack/day for 55.4 years (55.4 ttl pk-yrs)     Types: Cigarettes     Start date: 3/1/1969    Smokeless tobacco: Never   Vaping Use    Vaping status: Never Used   Substance and Sexual Activity    Alcohol use: Never    Drug use: Not Currently     Types: Marijuana     Comment: DENIES    Sexual activity: Not Currently     Partners: Female     ---------------------------------------------------------------------------------------------------------------------   Allergies:  Codeine and Penicillins  ---------------------------------------------------------------------------------------------------------------------   Home medications:    Medications below are reported home medications pulling from within the system; at this time, these medications have not been reconciled unless otherwise specified and are in the verification process for further verifcation as current home medications.  (Not in a hospital admission)      Hospital Scheduled Meds:     dilTIAZem, 5-15 mg/hr, Last Rate: 7.5 mg/hr (07/25/24 7407)        Current listed hospital scheduled medications may not yet reflect those currently placed in orders that are signed and held awaiting patient's arrival to floor.   ---------------------------------------------------------------------------------------------------------------------     Objective     Vital Signs:  Temp:  [98.4 °F (36.9 °C)] 98.4 °F (36.9 °C)  Heart Rate:  [] 95  Resp:  [20] 20  BP: ()/(45-96) 110/88      07/25/24  1026   Weight: 72.6 kg (160 lb)     Body mass index is 24.33  kg/m².  ---------------------------------------------------------------------------------------------------------------------       Physical Exam  Vitals and nursing note reviewed.   Constitutional:       General: He is not in acute distress.     Comments: Thin appearing   HENT:      Head: Normocephalic and atraumatic.   Eyes:      Extraocular Movements: Extraocular movements intact.   Cardiovascular:      Rate and Rhythm: Tachycardia present. Rhythm irregular.   Pulmonary:      Effort: Pulmonary effort is normal.      Comments: Diminished bilaterally  Abdominal:      Palpations: Abdomen is soft.   Musculoskeletal:      Right lower leg: No edema.      Left lower leg: No edema.   Skin:     General: Skin is warm and dry.   Neurological:      Mental Status: He is alert. Mental status is at baseline.   Psychiatric:         Mood and Affect: Mood normal.         Behavior: Behavior normal.             ---------------------------------------------------------------------------------------------------------------------  EKG:        I have personally looked at the EKG.  ---------------------------------------------------------------------------------------------------------------------   Results from last 7 days   Lab Units 07/25/24  1052 07/25/24  1007   CRP mg/dL  --  <0.30   WBC 10*3/mm3 10.43  --    HEMOGLOBIN g/dL 17.1  --    HEMATOCRIT % 51.1*  --    MCV fL 93.8  --    MCHC g/dL 33.5  --    PLATELETS 10*3/mm3 235  --    INR  1.02  --          Results from last 7 days   Lab Units 07/25/24  1007   SODIUM mmol/L 142  142   POTASSIUM mmol/L 4.1  4.1   MAGNESIUM mg/dL 2.5*   CHLORIDE mmol/L 105  105   CO2 mmol/L 28.7  29.5*   BUN mg/dL 18  17   CREATININE mg/dL 1.14  1.14   CALCIUM mg/dL 9.1  9.2   GLUCOSE mg/dL 119*  122*   ALBUMIN g/dL 3.9   BILIRUBIN mg/dL 0.4   ALK PHOS U/L 87   AST (SGOT) U/L 12   ALT (SGPT) U/L 16   Estimated Creatinine Clearance: 67.2 mL/min (by C-G formula based on SCr of 1.14 mg/dL).  No  "results found for: \"AMMONIA\"  Results from last 7 days   Lab Units 07/25/24  1236 07/25/24  1007   HSTROP T ng/L 19 19     Results from last 7 days   Lab Units 07/25/24  1052 07/25/24  1007   PROBNP pg/mL 712.6 641.9     Lab Results   Component Value Date    HGBA1C 4.90 04/19/2023     Lab Results   Component Value Date    TSH 6.500 (H) 04/19/2023    FREET4 1.67 04/19/2023     No results found for: \"PREGTESTUR\", \"PREGSERUM\", \"HCG\", \"HCGQUANT\"  Pain Management Panel  More data may exist         Latest Ref Rng & Units 4/20/2023 9/1/2020   Pain Management Panel   Amphetamine, Urine Qual Negative Negative  Negative    Barbiturates Screen, Urine Negative Negative  Negative    Benzodiazepine Screen, Urine Negative Negative  Negative    Buprenorphine, Screen, Urine Negative Negative  Positive    Cocaine Screen, Urine Negative Negative  Negative    Methadone Screen , Urine Negative Negative  Negative    Methamphetamine, Ur Negative Negative  -     No results found for: \"BLOODCX\"  No results found for: \"URINECX\"  No results found for: \"WOUNDCX\"  No results found for: \"STOOLCX\"      ---------------------------------------------------------------------------------------------------------------------  Imaging Results (Last 7 Days)       Procedure Component Value Units Date/Time    XR Chest 1 View [[id number]] Collected: 07/25/24 1112     Updated: 07/25/24 1115    Narrative:      EXAM:    XR Chest, 1 View     EXAM DATE:    7/25/2024 10:44 AM     CLINICAL HISTORY:    chest pain     TECHNIQUE:    Frontal view of the chest.     COMPARISON:    No relevant prior studies available.     FINDINGS:    LUNGS AND PLEURAL SPACES:  Unremarkable as visualized.  No  consolidation.  No pneumothorax.    HEART:  Unremarkable as visualized.  No cardiomegaly.    MEDIASTINUM:  Unremarkable as visualized.  Normal mediastinal contour.    BONES/JOINTS:  Unremarkable as visualized.  No acute fracture.       Impression:        Unremarkable exam. No acute " "cardiopulmonary findings identified.        This report was finalized on 7/25/2024 11:13 AM by Dr. Colt Elder MD.               Cultures:  No results found for: \"BLOODCX\", \"URINECX\", \"WOUNDCX\", \"MRSACX\", \"RESPCX\", \"STOOLCX\"    Last echocardiogram:  Results for orders placed during the hospital encounter of 11/16/22    Adult Transthoracic Echo Complete W/ Cont if Necessary Per Protocol    Interpretation Summary    Normal left ventricular cavity size and wall thickness noted. All left ventricular wall segments contract normally    Left ventricular ejection fraction appears to be 56 - 60%.    Left ventricular diastolic function is consistent with (grade I) impaired relaxation.    The aortic valve is structurally normal with no regurgitation or stenosis present.    The mitral valve is structurally normal with no regurgitation or significant stenosis present.    There is no evidence of pericardial effusion. .          I have personally reviewed the above radiology images and read the final radiology report on 07/25/24  ---------------------------------------------------------------------------------------------------------------------  Assessment / Plan     Active Hospital Problems    Diagnosis  POA    Atrial fibrillation with RVR [I48.91]  Yes       ASSESSMENT/PLAN:    Atrial fibrillation presenting with RVR  Patient with known history of atrial fibrillation presented to heart failure clinic this a.m. complaining of dizziness, shortness of breath, chest pain and palpitations.  He was found to be in A-fib with RVR with BP as low as 75/45 which prompted referral to the ED.  Patient reports symptoms ongoing for the past 2 to 3 days.  No increased lower extremity edema.  No increased cough or sputum production but does continue to smoke greater than 1 pack/day.  EKG in the ED showed A-fib with RVR with rate 131.  BP did improve with fluids.  He has been started on a diltiazem infusion with last heart rate documented at " 95.  Will admit to the PCU for further management  Continue diltiazem infusion for now.  Titrate as needed  Consult cardiology for further assistance and recommendations. Input appreciated.  Continuous cardiac monitoring  Monitor clinical volume status closely  Standing orders in place for chest pain  Trend labs  Continue supportive care measures    Chronic:  ASCVD  Ischemic cardiomyopathy  HFimpEF, clinically compensated  HTN  COPD, not in acute exacerbation  Ongoing tobacco abuse  GERD  Chronic back pain  Continue home medication regimen as indicated once med rec is complete  Monitor vital signs closely-can continue fluid replacement as indicated  NRT is available    ----------  -DVT prophylaxis: Chronically anticoagulated with Eliquis  -Activity: Bedrest  -Expected length of stay: INPATIENT status due to the need for care which can only be reasonably provided in an hospital setting such as aggressive/expedited ancillary services and/or consultation services, the necessity for IV medications, close physician monitoring and/or the possible need for procedures.  In such, I feel patient’s risk for adverse outcomes and need for care warrant INPATIENT evaluation and predict the patient’s care encounter to likely last beyond 2 midnights.   -Disposition pending course and further cardiology recommendations    High risk secondary to A-fib with RVR    Code Status and Medical Interventions: CPR (Attempt to Resuscitate); Full Support   Ordered at: 07/25/24 1550     Level Of Support Discussed With:    Patient     Code Status (Patient has no pulse and is not breathing):    CPR (Attempt to Resuscitate)     Medical Interventions (Patient has pulse or is breathing):    Full Support       Foster Johns PA-C   07/25/24  16:04 EDT    Electronically signed by Foster Johns PA-C at 07/25/24 1634          Emergency Department Notes        Sherri Agustin PA at 07/25/24 1124          Subjective   History of Present  Illness  This is a 64 year old male patient who presents to the ER with chief complaint of hypotension. PMH significant for A fibb anticoagulated on eliqiuis, CHF, GERD. Patient was sent here by his cardiologist today because when he was seen in their office today he was in A fibb. He says he has been in A fibb before. He says he has had chest pain and SOB and felt very fatigued.       Review of Systems   Constitutional: Negative.  Negative for fever.   HENT: Negative.     Respiratory:  Positive for shortness of breath. Negative for apnea, cough, choking, chest tightness, wheezing and stridor.    Cardiovascular:  Positive for chest pain. Negative for palpitations and leg swelling.   Gastrointestinal: Negative.  Negative for abdominal pain.   Endocrine: Negative.    Genitourinary: Negative.  Negative for dysuria.   Skin: Negative.    Neurological: Negative.    Psychiatric/Behavioral: Negative.     All other systems reviewed and are negative.      Past Medical History:   Diagnosis Date    Arthritis     Atrial fibrillation     Balance problem     CHF (congestive heart failure)     COPD (chronic obstructive pulmonary disease)     Coronary artery disease     Dependence on cane     GERD (gastroesophageal reflux disease)     Heart attack     X 13 per patient, last one 2002 (9 heart attacks 2001- 1 cardiac stent placed)    History of hepatitis C 2001    had treatment now test neg    Hypertension     Lipoma     Myocardial infarction     Tinnitus     Wears reading eyeglasses        Allergies   Allergen Reactions    Codeine GI Intolerance    Penicillins Hives       Past Surgical History:   Procedure Laterality Date    CARDIAC CATHETERIZATION N/A 09/04/2020    Procedure: Left Heart Cath;  Surgeon: Herman Sen MD;  Location: [facility] CATH INVASIVE LOCATION;  Service: Cardiology;  Laterality: N/A;    COLONOSCOPY      CORONARY STENT PLACEMENT      FACIAL RECONSTRUCTION SURGERY Right 1995    HEAD/NECK LESION/CYST EXCISION Right  12/20/2023    Procedure: LIPOMA EXCISION: right scalp and right shoulder;  Surgeon: Celeste Sheets MD;  Location: [facility];  Service: General;  Laterality: Right;       Family History   Problem Relation Age of Onset    Heart disease Mother     Heart disease Maternal Grandmother        Social History     Socioeconomic History    Marital status: Single   Tobacco Use    Smoking status: Every Day     Current packs/day: 1.00     Average packs/day: 1 pack/day for 55.4 years (55.4 ttl pk-yrs)     Types: Cigarettes     Start date: 3/1/1969    Smokeless tobacco: Never   Vaping Use    Vaping status: Never Used   Substance and Sexual Activity    Alcohol use: Never    Drug use: Not Currently     Types: Marijuana     Comment: DENIES    Sexual activity: Not Currently     Partners: Female           Objective   Physical Exam  Vitals and nursing note reviewed.   Constitutional:       General: He is not in acute distress.     Appearance: He is well-developed. He is not diaphoretic.   HENT:      Head: Normocephalic and atraumatic.      Right Ear: External ear normal.      Left Ear: External ear normal.      Nose: Nose normal.   Eyes:      Conjunctiva/sclera: Conjunctivae normal.      Pupils: Pupils are equal, round, and reactive to light.   Neck:      Vascular: No JVD.      Trachea: No tracheal deviation.   Cardiovascular:      Rate and Rhythm: Normal rate and regular rhythm.      Heart sounds: Normal heart sounds. No murmur heard.  Pulmonary:      Effort: Pulmonary effort is normal. No respiratory distress.      Breath sounds: Normal breath sounds. No wheezing.   Abdominal:      General: Bowel sounds are normal.      Palpations: Abdomen is soft.      Tenderness: There is no abdominal tenderness.   Musculoskeletal:         General: No deformity. Normal range of motion.      Cervical back: Normal range of motion and neck supple.   Skin:     General: Skin is warm and dry.      Coloration: Skin is not pale.      Findings: No  erythema or rash.   Neurological:      Mental Status: He is alert and oriented to person, place, and time.      Cranial Nerves: No cranial nerve deficit.   Psychiatric:         Behavior: Behavior normal.         Thought Content: Thought content normal.         Procedures      Results for orders placed or performed during the hospital encounter of 07/25/24   COVID-19 and FLU A/B PCR, 1 HR TAT - Swab, Nasopharynx    Specimen: Nasopharynx; Swab   Result Value Ref Range    COVID19 Not Detected Not Detected - Ref. Range    Influenza A PCR Not Detected Not Detected    Influenza B PCR Not Detected Not Detected   Comprehensive Metabolic Panel    Specimen: Arm, Left; Blood   Result Value Ref Range    Glucose 119 (H) 65 - 99 mg/dL    BUN 18 8 - 23 mg/dL    Creatinine 1.14 0.76 - 1.27 mg/dL    Sodium 142 136 - 145 mmol/L    Potassium 4.1 3.5 - 5.2 mmol/L    Chloride 105 98 - 107 mmol/L    CO2 28.7 22.0 - 29.0 mmol/L    Calcium 9.1 8.6 - 10.5 mg/dL    Total Protein 6.4 6.0 - 8.5 g/dL    Albumin 3.9 3.5 - 5.2 g/dL    ALT (SGPT) 16 1 - 41 U/L    AST (SGOT) 12 1 - 40 U/L    Alkaline Phosphatase 87 39 - 117 U/L    Total Bilirubin 0.4 0.0 - 1.2 mg/dL    Globulin 2.5 gm/dL    A/G Ratio 1.6 g/dL    BUN/Creatinine Ratio 15.8 7.0 - 25.0    Anion Gap 8.3 5.0 - 15.0 mmol/L    eGFR 71.8 >60.0 mL/min/1.73   Urinalysis With Microscopic If Indicated (No Culture) - Urine, Clean Catch    Specimen: Urine, Clean Catch   Result Value Ref Range    Color, UA Yellow Yellow, Straw    Appearance, UA Clear Clear    pH, UA 6.0 5.0 - 8.0    Specific Gravity, UA >1.030 (H) 1.005 - 1.030    Glucose, UA >=1000 mg/dL (3+) (A) Negative    Ketones, UA Negative Negative    Bilirubin, UA Negative Negative    Blood, UA Trace (A) Negative    Protein, UA Negative Negative    Leuk Esterase, UA Negative Negative    Nitrite, UA Negative Negative    Urobilinogen, UA 1.0 E.U./dL 0.2 - 1.0 E.U./dL   aPTT    Specimen: Arm, Right; Blood   Result Value Ref Range    PTT 28.8  26.5 - 34.5 seconds   Protime-INR    Specimen: Arm, Right; Blood   Result Value Ref Range    Protime 13.5 12.1 - 14.7 Seconds    INR 1.02 0.90 - 1.10   High Sensitivity Troponin T    Specimen: Arm, Left; Blood   Result Value Ref Range    HS Troponin T 19 <22 ng/L   BNP    Specimen: Arm, Right; Blood   Result Value Ref Range    proBNP 712.6 0.0 - 900.0 pg/mL   C-reactive Protein    Specimen: Arm, Left; Blood   Result Value Ref Range    C-Reactive Protein <0.30 0.00 - 0.50 mg/dL   CBC Auto Differential    Specimen: Arm, Right; Blood   Result Value Ref Range    WBC 10.43 3.40 - 10.80 10*3/mm3    RBC 5.45 4.14 - 5.80 10*6/mm3    Hemoglobin 17.1 13.0 - 17.7 g/dL    Hematocrit 51.1 (H) 37.5 - 51.0 %    MCV 93.8 79.0 - 97.0 fL    MCH 31.4 26.6 - 33.0 pg    MCHC 33.5 31.5 - 35.7 g/dL    RDW 12.8 12.3 - 15.4 %    RDW-SD 43.6 37.0 - 54.0 fl    MPV 9.6 6.0 - 12.0 fL    Platelets 235 140 - 450 10*3/mm3    Neutrophil % 77.8 (H) 42.7 - 76.0 %    Lymphocyte % 14.2 (L) 19.6 - 45.3 %    Monocyte % 6.4 5.0 - 12.0 %    Eosinophil % 0.4 0.3 - 6.2 %    Basophil % 0.4 0.0 - 1.5 %    Immature Grans % 0.8 (H) 0.0 - 0.5 %    Neutrophils, Absolute 8.12 (H) 1.70 - 7.00 10*3/mm3    Lymphocytes, Absolute 1.48 0.70 - 3.10 10*3/mm3    Monocytes, Absolute 0.67 0.10 - 0.90 10*3/mm3    Eosinophils, Absolute 0.04 0.00 - 0.40 10*3/mm3    Basophils, Absolute 0.04 0.00 - 0.20 10*3/mm3    Immature Grans, Absolute 0.08 (H) 0.00 - 0.05 10*3/mm3    nRBC 0.0 0.0 - 0.2 /100 WBC   Urinalysis, Microscopic Only - Urine, Clean Catch    Specimen: Urine, Clean Catch   Result Value Ref Range    RBC, UA 6-10 (A) None Seen, 0-2 /HPF    WBC, UA 0-2 None Seen, 0-2 /HPF    Bacteria, UA None Seen None Seen /HPF    Squamous Epithelial Cells, UA None Seen None Seen, 0-2 /HPF    Hyaline Casts, UA None Seen None Seen /LPF    Methodology Automated Microscopy    High Sensitivity Troponin T 2Hr    Specimen: Arm, Left; Blood   Result Value Ref Range    HS Troponin T 19 <22 ng/L     Troponin T Delta 0 >=-4 - <+4 ng/L          ED Course  ED Course as of 07/25/24 1549   Thu Jul 25, 2024   1109 EKG notes atrial fibrillation.  Rapid ventricular response noted.  130 bpm.  No acute ST elevation.  QTc 447.  Electronically signed by Kenyon Campbell DO, 07/25/24, 11:10 AM EDT.   [SF]   1135 XR Chest 1 View  IMPRESSION:    Unremarkable exam. No acute cardiopulmonary findings identified.        This report was finalized on 7/25/2024 11:13 AM by Dr. Colt Elder MD   [MM]   3611 Spoke with Dr. Lyle who has accepted him in admission.  [MM]      ED Course User Index  [MM] Sherri Agustin PA  [SF] Kenyon Campbell DO                                             Medical Decision Making    This is a 64 year old male patient who presents to the ER with chief complaint of hypotension. PMH significant for A fibb anticoagulated on eliqiuis, CHF, GERD. Patient was sent here by his cardiologist today because when he was seen in their office today he was in A fibb. He says he has been in A fibb before. He says he has had chest pain and SOB and felt very fatigued.       Problems Addressed:  Atrial fibrillation with rapid ventricular response: complicated acute illness or injury  Hypotension, unspecified hypotension type: complicated acute illness or injury    Amount and/or Complexity of Data Reviewed  Labs: ordered. Decision-making details documented in ED Course.  Radiology: ordered. Decision-making details documented in ED Course.  ECG/medicine tests: ordered. Decision-making details documented in ED Course.    Risk  Prescription drug management.  Decision regarding hospitalization.        Final diagnoses:   Atrial fibrillation with rapid ventricular response   Hypotension, unspecified hypotension type       ED Disposition  ED Disposition       ED Disposition   Decision to Admit    Condition   --    Comment   --               No follow-up provider specified.       Medication List      No changes were made to  your prescriptions during this visit.            Sherri Agustin PA  07/25/24 1549       Sherri Agustin PA  07/25/24 1549      Electronically signed by Sherri Agustin PA at 07/25/24 1549       Facility-Administered Medications as of 7/26/2024   Medication Dose Route Frequency Provider Last Rate Last Admin    [COMPLETED] apixaban (ELIQUIS) tablet 5 mg  5 mg Oral Once Sherri Agustin PA   5 mg at 07/25/24 1209    apixaban (ELIQUIS) tablet 5 mg  5 mg Oral BID Saida Lyle MD   5 mg at 07/25/24 2010    aspirin EC tablet 81 mg  81 mg Oral Daily Siada Lyle MD   81 mg at 07/25/24 2011    atorvastatin (LIPITOR) tablet 40 mg  40 mg Oral Nightly Saida Lyle MD   40 mg at 07/25/24 2011    sennosides-docusate (PERICOLACE) 8.6-50 MG per tablet 2 tablet  2 tablet Oral BID PRN Saida Lyle MD        And    polyethylene glycol (MIRALAX) packet 17 g  17 g Oral Daily PRN Saida Lyle MD        And    bisacodyl (DULCOLAX) EC tablet 5 mg  5 mg Oral Daily PRN Saida Lyle MD        And    bisacodyl (DULCOLAX) suppository 10 mg  10 mg Rectal Daily PRN Saida Lyle MD        [COMPLETED] calcium carbonate (TUMS) 500 MG chewable tablet  - ADS Override Pull        Given During Downtime at 07/26/24 0457    calcium carbonate (TUMS) chewable tablet 500 mg (200 mg elemental)  2 tablet Oral TID PRN Ann Banegas DO        dextrose (D50W) (25 g/50 mL) IV injection 25 g  25 g Intravenous Q15 Min PRN Saida Lyle MD        dextrose (GLUTOSE) oral gel 15 g  15 g Oral Q15 Min PRN Saida Lyle MD        [COMPLETED] digoxin (LANOXIN) injection 250 mcg  250 mcg Intravenous Once Saida Lyle MD   250 mcg at 07/25/24 2307    dilTIAZem (CARDIZEM) 100 mg in 100 mL NS infusion (ADV)  5-15 mg/hr Intravenous Titrated Saida Lyle MD   Held at 07/25/24 1831    [COMPLETED] dilTIAZem (CARDIZEM) injection 2.5 mg  2.5 mg Intravenous Once Sherri Agustin PA   2.5 mg at  07/25/24 1305    empagliflozin (JARDIANCE) tablet 10 mg  10 mg Oral Daily Saida Lyle MD   10 mg at 07/25/24 2012    glucagon HCl (Diagnostic) injection 1 mg  1 mg Subcutaneous Q15 Min PRN Saida Lyle MD        HYDROcodone-acetaminophen (NORCO) 7.5-325 MG per tablet 1 tablet  1 tablet Oral Q12H PRN Saida Lyle MD   1 tablet at 07/25/24 2011    [COMPLETED] metoprolol tartrate (LOPRESSOR) injection 5 mg  5 mg Intravenous Once Sherri gAustin PA   5 mg at 07/25/24 1159    [COMPLETED] metoprolol tartrate (LOPRESSOR) injection 5 mg  5 mg Intravenous Once Saida Lyle MD   5 mg at 07/26/24 0201    nicotine (NICODERM CQ) 21 MG/24HR patch 1 patch  1 patch Transdermal Q24H Foster Johns PA-C   1 patch at 07/25/24 1902    nicotine polacrilex (NICORETTE) gum 4 mg  4 mg Mouth/Throat Q1H PRN Foster Johns PA-C        nitroglycerin (NITROSTAT) SL tablet 0.4 mg  0.4 mg Sublingual Q5 Min PRN Saida Lyle MD        pantoprazole (PROTONIX) EC tablet 40 mg  40 mg Oral Q AM Foster Johns PA-C   40 mg at 07/26/24 0557    ranolazine (RANEXA) 12 hr tablet 500 mg  500 mg Oral BID Saida Lyle MD   500 mg at 07/25/24 2010    [COMPLETED] sodium chloride 0.9 % bolus 1,000 mL  1,000 mL Intravenous Once Sherri Agustin PA   Stopped at 07/25/24 1207    sodium chloride 0.9 % flush 10 mL  10 mL Intravenous PRN Saida Lyle MD        sodium chloride 0.9 % flush 10 mL  10 mL Intravenous Q12H Saida Lyle MD   10 mL at 07/25/24 2014    sodium chloride 0.9 % flush 10 mL  10 mL Intravenous PRN Saida yLle MD        sodium chloride 0.9 % flush 10 mL  10 mL Intravenous Q12H Saida Lyle MD   10 mL at 07/25/24 2013    sodium chloride 0.9 % flush 10 mL  10 mL Intravenous PRN Saida Lyle MD        sodium chloride 0.9 % infusion 40 mL  40 mL Intravenous PRN Saida Lyle MD        sodium chloride 0.9 % infusion 40 mL  40 mL Intravenous PRN  Saida Lyle MD         Orders (all)        Start     Ordered    07/26/24 0600  pantoprazole (PROTONIX) EC tablet 40 mg  Every Early Morning         07/25/24 1631    07/26/24 0600  Phosphorus  Morning Draw         07/25/24 1939 07/26/24 0600  Magnesium  Morning Draw         07/25/24 1939 07/26/24 0600  Basic Metabolic Panel  Morning Draw         07/25/24 1939 07/26/24 0600  High Sensitivity Troponin T  Morning Draw         07/25/24 1941 07/26/24 0600  Blood Gas, Venous -With Co-Ox Panel: Yes  Morning Draw         07/25/24 1941 07/26/24 0450  Blood Gas, Venous With Co-Ox  PROCEDURE ONCE         07/26/24 0446    07/26/24 0446  calcium carbonate (TUMS) chewable tablet 500 mg (200 mg elemental)  3 Times Daily PRN         07/26/24 0446    07/26/24 0323  calcium carbonate (TUMS) 500 MG chewable tablet  - ADS Override Pull        Note to Pharmacy: Created by cabinet override    07/26/24 0323    07/26/24 0230  metoprolol tartrate (LOPRESSOR) injection 5 mg  Once         07/26/24 0144    07/26/24 0000  Intake & Output  Every 8 Hours         07/25/24 1631    07/25/24 2230  digoxin (LANOXIN) injection 250 mcg  Once         07/25/24 2131 07/25/24 2133  If the heart rates are still above 110 bpm thirty minutes after the digoxin is given, then please give a one time dose of Metoprolol IV 5 mg stat.  Nursing Communication  Continuous        Comments: If the heart rates are still above 110 bpm thirty minutes after the digoxin is given, then please give a one time dose of Metoprolol IV 5 mg stat.    07/25/24 2133    07/25/24 2100  sodium chloride 0.9 % flush 10 mL  Every 12 Hours Scheduled         07/25/24 1631 07/25/24 2100  apixaban (ELIQUIS) tablet 5 mg  2 Times Daily         07/25/24 1936 07/25/24 2100  atorvastatin (LIPITOR) tablet 40 mg  Nightly         07/25/24 1936 07/25/24 2100  sodium chloride 0.9 % flush 10 mL  Every 12 Hours Scheduled         07/25/24 1746    07/25/24 2100  ranolazine  (RANEXA) 12 hr tablet 500 mg  2 Times Daily         07/25/24 1936 07/25/24 2030  aspirin EC tablet 81 mg  Daily         07/25/24 1936 07/25/24 2030  empagliflozin (JARDIANCE) tablet 10 mg  Daily         07/25/24 1936 07/25/24 2000  Vital Signs  Every 4 Hours       07/25/24 1631 07/25/24 1942  Adult Transthoracic Echo Complete W/ Cont if Necessary Per Protocol  Once         07/25/24 1941 07/25/24 1939  TSH  Once         07/25/24 1939 07/25/24 1939  Hemoglobin A1c  Once         07/25/24 1939 07/25/24 1939  dextrose (GLUTOSE) oral gel 15 g  Every 15 Minutes PRN         07/25/24 1939 07/25/24 1939  dextrose (D50W) (25 g/50 mL) IV injection 25 g  Every 15 Minutes PRN         07/25/24 1939 07/25/24 1939  glucagon HCl (Diagnostic) injection 1 mg  Every 15 Minutes PRN         07/25/24 1939 07/25/24 1935  HYDROcodone-acetaminophen (NORCO) 7.5-325 MG per tablet 1 tablet  Every 12 Hours PRN         07/25/24 1936 07/25/24 1815  dilTIAZem (CARDIZEM) 100 mg in 100 mL NS infusion (ADV)  Titrated         07/25/24 1720    07/25/24 1800  Oral Care  2 Times Daily       07/25/24 1631 07/25/24 1746  Connectors / Hubs Must Be Scrubbed 15 Seconds Using 70% Alcohol Before Access - Allow to Dry Before Accessing Line  Continuous         07/25/24 1746    07/25/24 1745  sodium chloride 0.9 % flush 10 mL  As Needed         07/25/24 1746    07/25/24 1745  sodium chloride 0.9 % infusion 40 mL  As Needed         07/25/24 1746    07/25/24 1730  nicotine (NICODERM CQ) 21 MG/24HR patch 1 patch  Every 24 Hours         07/25/24 1631 07/25/24 1719  SLP Consult: Eval & Treat Swallow Disorder; Regular/House Diet, Cardiac Diets; Healthy Heart (2-3 Na+)  Once        Comments: Patient states he sometimes has trouble swallowing and gets choked while eating if he takes big bites    07/25/24 1720    07/25/24 171  Inpatient Case Management  Consult  Once        Provider:  (Not yet assigned)    07/25/24 9060  "   07/25/24 1632  Weigh Patient  Once         07/25/24 1631    07/25/24 1632  Notify PCP of Patient Admission  Once         07/25/24 1631    07/25/24 1632  Insert Peripheral IV  Once         07/25/24 1631    07/25/24 1632  Saline Lock & Maintain IV Access  Continuous,   Status:  Canceled         07/25/24 1631    07/25/24 1632  VTE Prophylaxis Not Indicated: Contraindicated; Other; On home Eliquis  Once         07/25/24 1631    07/25/24 1632  Continuous Cardiac Monitoring  Continuous        Comments: Follow Standing Orders As Outlined in Process Instructions (Open Order Report to View Full Instructions)    07/25/24 1631    07/25/24 1632  Maintain IV Access  Continuous         07/25/24 1631    07/25/24 1632  Telemetry - Place Orders & Notify Provider of Results When Patient Experiences Acute Chest Pain, Dysrhythmia or Respiratory Distress  Continuous        Comments: Open Order Report to View Parameters Requiring Provider Notification    07/25/24 1631    07/25/24 1632  May Be Off Telemetry for Tests  Continuous         07/25/24 1631    07/25/24 1632  Continuous Pulse Oximetry  Continuous         07/25/24 1631    07/25/24 1632  Activity - Strict Bed Rest  Until Discontinued         07/25/24 1631    07/25/24 1632  Daily Weights  Daily       07/25/24 1631    07/25/24 1632  Diet: Cardiac; Healthy Heart (2-3 Na+); Fluid Consistency: Thin (IDDSI 0)  Diet Effective Now         07/25/24 1631    07/25/24 1632  Inpatient Cardiology Consult  Once        Specialty:  Cardiology  Provider:  (Not yet assigned)    07/25/24 1631    07/25/24 1631  sodium chloride 0.9 % flush 10 mL  As Needed         07/25/24 1631    07/25/24 1631  sodium chloride 0.9 % infusion 40 mL  As Needed         07/25/24 1631    07/25/24 1631  nitroglycerin (NITROSTAT) SL tablet 0.4 mg  Every 5 Minutes PRN         07/25/24 1631    07/25/24 1631  sennosides-docusate (PERICOLACE) 8.6-50 MG per tablet 2 tablet  2 Times Daily PRN        Placed in \"And\" Linked Group " "   07/25/24 1631    07/25/24 1631  polyethylene glycol (MIRALAX) packet 17 g  Daily PRN        Placed in \"And\" Linked Group    07/25/24 1631    07/25/24 1631  bisacodyl (DULCOLAX) EC tablet 5 mg  Daily PRN        Placed in \"And\" Linked Group    07/25/24 1631    07/25/24 1631  bisacodyl (DULCOLAX) suppository 10 mg  Daily PRN        Placed in \"And\" Linked Group    07/25/24 1631    07/25/24 1631  nicotine polacrilex (NICORETTE) gum 4 mg  Every 1 Hour PRN         07/25/24 1631    07/25/24 1549  Hospitalist (on-call MD unless specified)  Once        Specialty:  Hospitalist  Provider:  Saida Lyle MD    07/25/24 1549    07/25/24 1549  Code Status and Medical Interventions: CPR (Attempt to Resuscitate); Full Support  Continuous         07/25/24 1550    07/25/24 1548  Inpatient Admission  Once         07/25/24 1550    07/25/24 1417  dilTIAZem (CARDIZEM) 100 mg in 100 mL NS infusion (ADV)  Titrated,   Status:  Discontinued         07/25/24 1402    07/25/24 1313  dilTIAZem (CARDIZEM) injection 2.5 mg  Once         07/25/24 1257    07/25/24 1311  dilTIAZem (CARDIZEM) injection 5 mg  Once,   Status:  Discontinued         07/25/24 1255    07/25/24 1232  High Sensitivity Troponin T 2Hr  PROCEDURE ONCE         07/25/24 1132    07/25/24 1203  apixaban (ELIQUIS) tablet 5 mg  Once         07/25/24 1147    07/25/24 1200  metoprolol tartrate (LOPRESSOR) injection 5 mg  Once         07/25/24 1144    07/25/24 1151  sodium chloride 0.9 % bolus 1,000 mL  Once         07/25/24 1135    07/25/24 1132  Urinalysis, Microscopic Only - Urine, Clean Catch  Once         07/25/24 1131    07/25/24 1041  CBC & Differential  Once         07/25/24 1040    07/25/24 1041  Comprehensive Metabolic Panel  Once         07/25/24 1040    07/25/24 1041  COVID-19 and FLU A/B PCR, 1 HR TAT - Swab, Nasopharynx  Once         07/25/24 1040    07/25/24 1041  Insert Peripheral IV  Once        Placed in \"And\" Linked Group    07/25/24 1040    07/25/24 1041  " "Monitor Blood Pressure  Per Hospital Policy         07/25/24 1040    07/25/24 1041  Cardiac Monitoring  Continuous,   Status:  Canceled        Comments: Follow Standing Orders As Outlined in Process Instructions (Open Order Report to View Full Instructions)    07/25/24 1040    07/25/24 1041  Continuous Pulse Oximetry  Continuous,   Status:  Canceled         07/25/24 1040    07/25/24 1041  ECG 12 Lead Tachycardia  Once         07/25/24 1040    07/25/24 1041  XR Chest 1 View  1 Time Imaging         07/25/24 1040    07/25/24 1041  Urinalysis With Microscopic If Indicated (No Culture) - Urine, Clean Catch  Once         07/25/24 1040    07/25/24 1041  aPTT  Once         07/25/24 1040    07/25/24 1041  Protime-INR  Once         07/25/24 1040    07/25/24 1041  High Sensitivity Troponin T  Once         07/25/24 1040    07/25/24 1041  BNP  Once         07/25/24 1040    07/25/24 1041  C-reactive Protein  Once         07/25/24 1040    07/25/24 1041  CBC Auto Differential  PROCEDURE ONCE         07/25/24 1040    07/25/24 1040  sodium chloride 0.9 % flush 10 mL  As Needed        Placed in \"And\" Linked Group    07/25/24 1040    07/25/24 0000  Telemetry Scan  Once         07/25/24 0000    07/25/24 0000  Telemetry Scan  Once         07/25/24 0000    07/25/24 0000  Telemetry Scan  Once         07/25/24 0000    07/25/24 0000  Telemetry Scan  Once         07/25/24 0000    Unscheduled  Change Dressing to IV Site As Needed When Damp, Loose or Soiled  As Needed       07/25/24 1746    Unscheduled  Change Needleless Connectors  As Needed      Comments: Change Needleless Connectors When:  - Administration Set Changed  - Dressing Changed  - Removed For Any Reason  - Residual Blood or Debris Within Connector  - Prior to Drawing Blood Cultures  - Contamination of Connector  - After Administration of Blood or Blood Components    07/25/24 1746    Unscheduled  Insert New Peripheral IV  As Needed      Comments: Frequency Per Facility Policy    " 07/25/24 1746    Unscheduled  Follow Hypoglycemia Standing Orders For Blood Glucose <70 & Notify Provider of Treatment  As Needed      Comments: Follow Hypoglycemia Orders As Outlined in Process Instructions (Open Order Report to View Full Instructions)  Notify Provider Any Time Hypoglycemia Treatment is Administered    07/25/24 1939    --  carvedilol (COREG) 12.5 MG tablet  2 Times Daily With Meals         07/25/24 1649    --  empagliflozin (JARDIANCE) 10 MG tablet tablet  Daily         07/25/24 1649    --  sacubitril-valsartan (ENTRESTO) 24-26 MG tablet  2 Times Daily         07/25/24 1649    --  omeprazole (priLOSEC) 40 MG capsule  Daily         07/25/24 1652                  Physician Progress Notes (all)    No notes of this type exist for this encounter.       Consult Notes (all)    No notes of this type exist for this encounter.

## 2024-07-26 NOTE — THERAPY EVALUATION
Acute Care - Speech Language Pathology   Swallow Initial Evaluation  [place]     Patient Name: Chong White Sr.  : 1960  MRN: [id number]  Today's Date: 2024     Admit Date: 2024  Chong White Sr.  was seen at bedside this am on PCU-209 to assess safety/efficacy of swallowing fnx, determine safest/least restrictive diet tolerance. He has a PMH significant for paroxysmal atrial fibrillation, ASCVD, ischemic cardiomyopathy, current smoker, HFimpEF, HTN, GERD, COPD, and Hx hepatitis C s/p treatment.     Mr White presented to [facility] [place] ED at the direction of heart failure clinic for further evaluation of hypotension and rapid heart rate. Upon arrival to the ED, vital signs were temp 98.4, heart rate 58, respirations 20, BP 81/50, SpO2 95% on RA.  Heart rate did trend up in the ED as high as 134.  EKG showed A-fib with RVR with rate 131.  Laboratory workup noted glucose 122, magnesium elevated slightly at 2.5, no leukocytosis.  CXR was negative.     He reported difficulty w/ swallowing and stated that he sometimes gets choking while eating if he takes big bites. He is currently tolerating a least restrictive po diet of regular textures and thin liquids.    Social History     Socioeconomic History    Marital status: Single   Tobacco Use    Smoking status: Every Day     Current packs/day: 1.00     Average packs/day: 1 pack/day for 55.4 years (55.4 ttl pk-yrs)     Types: Cigarettes     Start date: 3/1/1969    Smokeless tobacco: Never   Vaping Use    Vaping status: Never Used   Substance and Sexual Activity    Alcohol use: Never    Drug use: Not Currently     Types: Marijuana     Comment: DENIES    Sexual activity: Not Currently     Partners: Female      Imaging:  EXAM:    XR Chest, 1 View     EXAM DATE:    2024 10:44 AM     CLINICAL HISTORY:    chest pain     TECHNIQUE:    Frontal view of the chest.     COMPARISON:    No relevant prior studies available.     FINDINGS:    LUNGS AND PLEURAL SPACES:   "Unremarkable as visualized.  No  consolidation.  No pneumothorax.    HEART:  Unremarkable as visualized.  No cardiomegaly.    MEDIASTINUM:  Unremarkable as visualized.  Normal mediastinal contour.    BONES/JOINTS:  Unremarkable as visualized.  No acute fracture.     IMPRESSION:    Unremarkable exam. No acute cardiopulmonary findings identified.     This report was finalized on 7/25/2024 11:13 AM by Dr. Colt Elder MD.    Labs:   Latest Reference Range & Units 07/25/24 10:52   WBC 3.40 - 10.80 10*3/mm3 10.43   RBC 4.14 - 5.80 10*6/mm3 5.45   Hemoglobin 13.0 - 17.7 g/dL 17.1   Hematocrit 37.5 - 51.0 % 51.1 (H)   Platelets 140 - 450 10*3/mm3 235   RDW 12.3 - 15.4 % 12.8   MCV 79.0 - 97.0 fL 93.8   MCH 26.6 - 33.0 pg 31.4   MCHC 31.5 - 35.7 g/dL 33.5   MPV 6.0 - 12.0 fL 9.6   RDW-SD 37.0 - 54.0 fl 43.6   (H): Data is abnormally high  Diet Orders (active) (From admission, onward)       Start     Ordered    07/25/24 1632  Diet: Cardiac; Healthy Heart (2-3 Na+); Fluid Consistency: Thin (IDDSI 0)  Diet Effective Now         07/25/24 1631                    He is observed on room air w/o complications across this evaluation.     Upon SLP entry, Mr White is seated in bed edge w/ lunch tray present on bedside table and pt beginning acceptance. He endorses substernal discomfort intermittently w/ po intake and states this occurs w/ both solid and liquids but most frequently w/ liquids. He endorses a hx of esophageal dilation in \"the late 80's\" but is concerned for recurrence of strictures.     He was positioned upright and centered on bed edge to accept multiple po presentations of selections from lunch tray.  Patient was able to self provide po trials.     Facial/oral structures were symmetrical upon observation. Lingual protrusion revealed no deviation. Oral mucosa were moist, pink, and clean. Secretions were clear, thin, and well controlled. OROM/DUKE was wfl to imitate oral postures. Gag is not assessed. Volitional cough " was intact w/ adequate  intensity, clear in quality, non-productive. Voice was adequate in intensity, clear in quality w/ intelligible speech.    Upon po presentations, adequate bolus anticipation and acceptance w/ good labial seal for bolus clearance via spoon bowl, cup rim stability and suction via straw. Bolus formation, manipulation and control were wfl w/ rotary mastication pattern. A-p transit was timely w/o significant oral residue appreciated. No overt s/s aspiration before the swallow.      Pharyngeal swallow was timely w/ adequate hyolaryngeal elevation per palpation. No overt s/s aspiration evidenced across this evaluation. No silent aspiration suspected. Patient denied odynophagia.    Visit Dx:     ICD-10-CM ICD-9-CM   1. Atrial fibrillation with rapid ventricular response  I48.91 427.31   2. Hypotension, unspecified hypotension type  I95.9 458.9     Patient Active Problem List   Diagnosis    Atrial fibrillation    Neuropathy    ASCVD (arteriosclerotic cardiovascular disease)    Tobacco abuse    Ischemic cardiomyopathy    Chronic combined systolic and diastolic congestive heart failure    Chronic neck pain    Chronic low back pain    Paralysis    Hypertension    Chronic anticoagulation    Long term current use of antiarrhythmic drug    Abscessed tooth    Multiple lipomas    Lipoma of scalp    Other chest pain    Gastroesophageal reflux disease without esophagitis    Atrial fibrillation with RVR     Past Medical History:   Diagnosis Date    Arthritis     Atrial fibrillation     Balance problem     CHF (congestive heart failure)     COPD (chronic obstructive pulmonary disease)     Coronary artery disease     Dependence on cane     GERD (gastroesophageal reflux disease)     Heart attack     X 13 per patient, last one 2002 (9 heart attacks 2001- 1 cardiac stent placed)    History of hepatitis C 2001    had treatment now test neg    Hypertension     Lipoma     Myocardial infarction     Tinnitus     Wears  reading eyeglasses      Past Surgical History:   Procedure Laterality Date    CARDIAC CATHETERIZATION N/A 09/04/2020    Procedure: Left Heart Cath;  Surgeon: Herman Sen MD;  Location:  [facility];  Service: Cardiology;  Laterality: N/A;    COLONOSCOPY      CORONARY STENT PLACEMENT      FACIAL RECONSTRUCTION SURGERY Right 1995    HEAD/NECK LESION/CYST EXCISION Right 12/20/2023    Procedure: LIPOMA EXCISION: right scalp and right shoulder;  Surgeon: Celeste Sheets MD;  Location: [facility] OR;  Service: General;  Laterality: Right;     Impression:     Mr White presented w/ wfl oropharyngeal swallow w/o s/s aspiration. No s/s indicative of silent aspiration. No odynophagia reported. He is felt to most benefit from continuation of current least restrictive po diet of regular textures and thin liquids w/ medications whole in puree/thins or crushed PRN.     Per hx of esophageal dysphagia, esophageal dilation, and intermittent substernal discomfort associated w/ po intake he is felt to most benefit from consideration of candidacy for esophagram to evaluate esophageal structure and function.     SLP Recommendation and Plan     1. Regular textures, thin liquids.    2. Medicatoins whole in puree/thins.   3. Upright and centered for all po intake  4. FERN precautions.  5. Oral care protocol.  6. Consider candidacy for esophagram given hx of esophageal strictures/dilation    No further formal SLP f/u warranted/recommended at this time.    D/w patient results and recommendations w/ verbal agreement.    D/w RN results and recommendations w/ verbal agreement.    Thank you for allowing me to participate in the care of your patient-  Fadia Burgos M.S., CCC-SLP        EDUCATION  The patient has been educated in the following areas:   Dysphagia (Swallowing Impairment).        Time Calculation:       Therapy Charges for Today       Code Description Service Date Service Provider Modifiers Qty    [id number]   [facility] ORAL PHARYNG SWALLOW 4 7/26/2024 Fadia Burgos, MS CCC-SLP GN 1                 Fadia Burgos MS CCC-SLP  7/26/2024

## 2024-07-26 NOTE — CASE MANAGEMENT/SOCIAL WORK
Discharge Planning Assessment   [place]     Patient Name: Chong White .  MRN: [id number]  Today's Date: 7/26/2024    Admit Date: 7/25/2024    Plan: SS received nursing consult for Patient is wanting to find a new place to live at discharge. SS spoke with pt at bedside on this date. Pt lives alone and plans to return back home at discharge. Pt does not utilize HH services. Pt states he has 2 (s) canes via unknown provider. Pt has no POA or living will. Pt states he receives Meals on Wheels. Pt states he will need [facility] arranged at discharge. SS provided pt with housing , shelters nad food pantry list. Pt voiced understanding. SS to follow.   Discharge Needs Assessment       Row Name 07/26/24 1606       Living Environment    People in Home alone    Current Living Arrangements home    Primary Care Provided by self    Provides Primary Care For no one    Family Caregiver if Needed sibling(s)    Family Caregiver Names Sister Bibi    Quality of Family Relationships helpful;supportive    Able to Return to Prior Arrangements yes       Transition Planning    Patient/Family Anticipates Transition to home       Discharge Needs Assessment    Equipment Currently Used at Home cane, straight    Concerns to be Addressed --  Patient is wanting to find a new plce to live at discharge.                   Discharge Plan       Row Name 07/26/24 1618       Plan    Plan SS received nursing consult for Patient is wanting to find a new place to live at discharge. SS spoke with pt at bedside on this date. Pt lives alone and plans to return back home at discharge. Pt does not utilize HH services. Pt states he has 2 (s) canes via unknown provider. Pt has no POA or living will. Pt states he receives Meals on Wheels. Pt states he will need [facility] arranged at discharge. SS provided pt with housing , shelters nad food pantry list. Pt voiced understanding. SS to follow.            Expected Discharge Date and Time       Expected Discharge Date  Expected Discharge Time    Jul 28, 2024        TUCKER QuintanaW

## 2024-07-27 ENCOUNTER — APPOINTMENT (OUTPATIENT)
Dept: GENERAL RADIOLOGY | Facility: HOSPITAL | Age: 64
DRG: 309 | End: 2024-07-27
Payer: MEDICAID

## 2024-07-27 ENCOUNTER — APPOINTMENT (OUTPATIENT)
Dept: CT IMAGING | Facility: HOSPITAL | Age: 64
DRG: 309 | End: 2024-07-27
Payer: MEDICAID

## 2024-07-27 LAB
GEN 5 2HR TROPONIN T REFLEX: 12 NG/L
TROPONIN T DELTA: 1 NG/L

## 2024-07-27 PROCEDURE — 25010000002 KETOROLAC TROMETHAMINE PER 15 MG

## 2024-07-27 PROCEDURE — 99232 SBSQ HOSP IP/OBS MODERATE 35: CPT | Performed by: INTERNAL MEDICINE

## 2024-07-27 PROCEDURE — 84484 ASSAY OF TROPONIN QUANT: CPT

## 2024-07-27 PROCEDURE — 25010000002 AMIODARONE IN DEXTROSE 5% 360-4.14 MG/200ML-% SOLUTION: Performed by: NURSE PRACTITIONER

## 2024-07-27 PROCEDURE — 94761 N-INVAS EAR/PLS OXIMETRY MLT: CPT

## 2024-07-27 PROCEDURE — 94799 UNLISTED PULMONARY SVC/PX: CPT

## 2024-07-27 PROCEDURE — 94762 N-INVAS EAR/PLS OXIMTRY CONT: CPT

## 2024-07-27 PROCEDURE — 71045 X-RAY EXAM CHEST 1 VIEW: CPT

## 2024-07-27 PROCEDURE — 74176 CT ABD & PELVIS W/O CONTRAST: CPT | Performed by: RADIOLOGY

## 2024-07-27 PROCEDURE — 63710000001 PREDNISONE PER 1 MG: Performed by: INTERNAL MEDICINE

## 2024-07-27 PROCEDURE — 74176 CT ABD & PELVIS W/O CONTRAST: CPT

## 2024-07-27 PROCEDURE — 71045 X-RAY EXAM CHEST 1 VIEW: CPT | Performed by: RADIOLOGY

## 2024-07-27 PROCEDURE — 94664 DEMO&/EVAL PT USE INHALER: CPT

## 2024-07-27 RX ORDER — KETOROLAC TROMETHAMINE 30 MG/ML
30 INJECTION, SOLUTION INTRAMUSCULAR; INTRAVENOUS ONCE
Status: COMPLETED | OUTPATIENT
Start: 2024-07-27 | End: 2024-07-27

## 2024-07-27 RX ORDER — FAMOTIDINE 20 MG/1
20 TABLET, FILM COATED ORAL ONCE
Status: COMPLETED | OUTPATIENT
Start: 2024-07-27 | End: 2024-07-27

## 2024-07-27 RX ORDER — LIDOCAINE 4 G/G
2 PATCH TOPICAL
Status: DISCONTINUED | OUTPATIENT
Start: 2024-07-27 | End: 2024-07-28 | Stop reason: HOSPADM

## 2024-07-27 RX ORDER — FAMOTIDINE 10 MG/ML
20 INJECTION, SOLUTION INTRAVENOUS ONCE
Status: COMPLETED | OUTPATIENT
Start: 2024-07-27 | End: 2024-07-27

## 2024-07-27 RX ORDER — BISMUTH SUBSALICYLATE 262 MG/1
524 TABLET, CHEWABLE ORAL
Status: DISCONTINUED | OUTPATIENT
Start: 2024-07-27 | End: 2024-07-28 | Stop reason: HOSPADM

## 2024-07-27 RX ADMIN — LIDOCAINE 2 PATCH: 4 PATCH TOPICAL at 15:13

## 2024-07-27 RX ADMIN — ANTACID TABLETS 2 TABLET: 500 TABLET, CHEWABLE ORAL at 05:14

## 2024-07-27 RX ADMIN — IPRATROPIUM BROMIDE 0.5 MG: 0.5 SOLUTION RESPIRATORY (INHALATION) at 00:28

## 2024-07-27 RX ADMIN — AMIODARONE HYDROCHLORIDE 0.5 MG/MIN: 1.8 INJECTION, SOLUTION INTRAVENOUS at 05:37

## 2024-07-27 RX ADMIN — FAMOTIDINE 20 MG: 20 TABLET ORAL at 02:42

## 2024-07-27 RX ADMIN — KETOROLAC TROMETHAMINE 30 MG: 30 INJECTION, SOLUTION INTRAMUSCULAR; INTRAVENOUS at 01:27

## 2024-07-27 RX ADMIN — Medication 10 ML: at 08:27

## 2024-07-27 RX ADMIN — ASPIRIN 81 MG: 81 TABLET, COATED ORAL at 08:26

## 2024-07-27 RX ADMIN — Medication 10 ML: at 20:07

## 2024-07-27 RX ADMIN — PREDNISONE 40 MG: 20 TABLET ORAL at 08:26

## 2024-07-27 RX ADMIN — CARVEDILOL 12.5 MG: 6.25 TABLET, FILM COATED ORAL at 08:26

## 2024-07-27 RX ADMIN — RANOLAZINE 500 MG: 500 TABLET, FILM COATED, EXTENDED RELEASE ORAL at 08:26

## 2024-07-27 RX ADMIN — NICOTINE 1 PATCH: 21 PATCH TRANSDERMAL at 16:59

## 2024-07-27 RX ADMIN — HYDROCODONE BITARTRATE AND ACETAMINOPHEN 1 TABLET: 7.5; 325 TABLET ORAL at 19:04

## 2024-07-27 RX ADMIN — RANOLAZINE 500 MG: 500 TABLET, FILM COATED, EXTENDED RELEASE ORAL at 20:07

## 2024-07-27 RX ADMIN — EMPAGLIFLOZIN 10 MG: 10 TABLET, FILM COATED ORAL at 08:26

## 2024-07-27 RX ADMIN — IPRATROPIUM BROMIDE 0.5 MG: 0.5 SOLUTION RESPIRATORY (INHALATION) at 07:03

## 2024-07-27 RX ADMIN — PANTOPRAZOLE SODIUM 40 MG: 40 TABLET, DELAYED RELEASE ORAL at 05:14

## 2024-07-27 RX ADMIN — BISMUTH SUBSALICYLATE 524 MG: 262 TABLET, CHEWABLE ORAL at 20:07

## 2024-07-27 RX ADMIN — BISMUTH SUBSALICYLATE 524 MG: 262 TABLET, CHEWABLE ORAL at 08:27

## 2024-07-27 RX ADMIN — ATORVASTATIN CALCIUM 40 MG: 40 TABLET, FILM COATED ORAL at 20:06

## 2024-07-27 RX ADMIN — BISMUTH SUBSALICYLATE 524 MG: 262 TABLET, CHEWABLE ORAL at 05:51

## 2024-07-27 RX ADMIN — CARVEDILOL 12.5 MG: 6.25 TABLET, FILM COATED ORAL at 17:00

## 2024-07-27 RX ADMIN — APIXABAN 5 MG: 5 TABLET, FILM COATED ORAL at 20:07

## 2024-07-27 RX ADMIN — HYDROCODONE BITARTRATE AND ACETAMINOPHEN 1 TABLET: 7.5; 325 TABLET ORAL at 06:27

## 2024-07-27 RX ADMIN — APIXABAN 5 MG: 5 TABLET, FILM COATED ORAL at 08:26

## 2024-07-27 RX ADMIN — BISMUTH SUBSALICYLATE 524 MG: 262 TABLET, CHEWABLE ORAL at 02:42

## 2024-07-27 NOTE — PLAN OF CARE
Goal Outcome Evaluation:      Pt has been resting this shift. No signs and symptoms of acute distress noted. No complaints of SOB reported.

## 2024-07-27 NOTE — PROGRESS NOTES
Patient Identification:  Name:  Chong White Sr.  Age:  64 y.o.  Sex:  male  :  1960  MRN:  [id number]  Visit Number:  [id number]    Chief Complaint:   No complaint of shortness of breath or chest pain, no palpitation    Subjective: Not in any distress  ----------------------------------------------------------------------------------------------------------------------  Current Hospital Meds:  apixaban, 5 mg, Oral, BID  aspirin, 81 mg, Oral, Daily  atorvastatin, 40 mg, Oral, Nightly  carvedilol, 12.5 mg, Oral, BID With Meals  empagliflozin, 10 mg, Oral, Daily  ipratropium, 0.5 mg, Nebulization, 4x Daily - RT  nicotine, 1 patch, Transdermal, Q24H  pantoprazole, 40 mg, Oral, Q AM  predniSONE, 40 mg, Oral, Daily With Breakfast  ranolazine, 500 mg, Oral, BID  sodium chloride, 10 mL, Intravenous, Q12H  sodium chloride, 10 mL, Intravenous, Q12H      dilTIAZem, 5-15 mg/hr, Last Rate: Stopped (24 1831)      ----------------------------------------------------------------------------------------------------------------------  Vital Signs:  Temp:  [97.7 °F (36.5 °C)-99 °F (37.2 °C)] 97.7 °F (36.5 °C)  Heart Rate:  [] 67  Resp:  [17-22] 20  BP: (108-147)/(63-85) 146/79      24  0400 24  1834 24  0500   Weight: 69 kg (152 lb 1.9 oz) 75.2 kg (165 lb 12.6 oz) 75.2 kg (165 lb 12.6 oz) (new admit less than 24 hours)     Body mass index is 25.21 kg/m².    Intake/Output Summary (Last 24 hours) at 2024 1131  Last data filed at 2024 0342  Gross per 24 hour   Intake 240 ml   Output 1150 ml   Net -910 ml     Diet: Cardiac; Healthy Heart (2-3 Na+); Fluid Consistency: Thin (IDDSI 0)  ----------------------------------------------------------------------------------------------------------------------  Physical exam:  Constitutional:  Well-developed and well-nourished.  No respiratory distress.      HENT:  Head:  Normocephalic and atraumatic.  Mouth:  Moist mucous membranes.    Eyes:   Conjunctivae and EOM are normal.  Pupils are equal, round, and reactive to light.  No scleral icterus.    Neck:  Neck supple.  No JVD present.    Cardiovascular:  Normal rate, regular rhythm and normal heart sounds with no murmur.  Pulmonary/Chest:  No respiratory distress, no wheezes, no crackles, with normal breath sounds and good air movement.  Abdominal:  Soft.  Bowel sounds are normal.  No distension and no tenderness.   Musculoskeletal:  No edema, no tenderness, and no deformity.  No red or swollen joints anywhere.    Neurological:  Alert and oriented to person, place, and time.  No cranial nerve deficit.  No tongue deviation.  No facial droop.  No slurred speech.   Skin:  Skin is warm and dry. No rash noted. No pallor.   Peripheral vascular:  Strong pulses in all 4 extremities with no clubbing, no cyanosis, no edema.  ----------------------------------------------------------------------------------------------------------------------  Tele: Normal sinus rhythm  ----------------------------------------------------------------------------------------------------------------------  Results from last 7 days   Lab Units 07/27/24  0000 07/26/24 2216 07/26/24  0448   HSTROP T ng/L 12 11 14     Results from last 7 days   Lab Units 07/26/24  2216 07/25/24  1052 07/25/24  1007   CRP mg/dL  --   --  <0.30   WBC 10*3/mm3 9.48 10.43  --    HEMOGLOBIN g/dL 16.1 17.1  --    HEMATOCRIT % 48.5 51.1*  --    MCV fL 95.8 93.8  --    MCHC g/dL 33.2 33.5  --    PLATELETS 10*3/mm3 217 235  --    INR   --  1.02  --          Results from last 7 days   Lab Units 07/26/24  2216 07/26/24  0448 07/25/24  1007   SODIUM mmol/L 139 141 142  142   POTASSIUM mmol/L 4.8 4.2 4.1  4.1   MAGNESIUM mg/dL 2.4 2.3 2.5*   CHLORIDE mmol/L 103 108* 105  105   CO2 mmol/L 26.3 24.4 28.7  29.5*   BUN mg/dL 24* 23 18  17   CREATININE mg/dL 1.09 0.93 1.14  1.14   CALCIUM mg/dL 9.0 8.6 9.1  9.2   GLUCOSE mg/dL 129* 90 119*  122*   ALBUMIN g/dL  --   " --  3.9   BILIRUBIN mg/dL  --   --  0.4   ALK PHOS U/L  --   --  87   AST (SGOT) U/L  --   --  12   ALT (SGPT) U/L  --   --  16   Estimated Creatinine Clearance: 72.8 mL/min (by C-G formula based on SCr of 1.09 mg/dL).    No results found for: \"AMMONIA\"      No results found for: \"BLOODCX\"  No results found for: \"URINECX\"  No results found for: \"WOUNDCX\"  No results found for: \"STOOLCX\"    I have personally looked at the labs and they are summarized above.  ----------------------------------------------------------------------------------------------------------------------  Imaging Results (Last 24 Hours)       Procedure Component Value Units Date/Time    CT Abdomen Pelvis Without Contrast [[id number]] Collected: 07/27/24 0749     Updated: 07/27/24 0755    Narrative:      VERIFICATION OBSERVER NAME: Nini Thrasher MD.     Technique: Axial images were obtained along with coronal and sagittal  reconstruction. DLP in mGycm reported in the EMR records. Dose lowering  technique: Automated exposure control with adjustment of the MA and/or  KV, use of iterative reconstruction. Data included in the medical  records.     HISTORY/COMPARISON/FINDINGS:     Comparison: None.     HISTORY: Abdominal pain.  Heartburn.     Findings:      Imaged lower lung fields are clear.  Multiple hypodense lesions in the liver, the largest at the dome  measuring 2.3 cm in diameter.  These lesions having the appearance of  hepatic cysts.  Spleen is normal.  Pancreas is unremarkable.  No gallstones identified.  Kidneys are normal.  No free air, free fluid or evidence of bowel obstruction.  Appendix is  not visualized.  No inflammatory process noted in the area.  Diverticulosis.  No diverticulitis.  Urinary bladder appeared unremarkable.  Moderate narrowing disc space of L4-5.       Impression:      No acute process noted in the abdomen or pelvis.              [id number]  Zip code [id number]                 This report was finalized on 7/27/2024 7:53 AM by " Dr. Nini Thrasher MD.       XR Chest 1 View [[id number]] Collected: 07/27/24 0325     Updated: 07/27/24 0329    Narrative:      PROCEDURE: Chest x-ray examination performed on July 27, 2024. Upright  position. Portable technique. Single view.     HISTORY: Heartburn.     COMPARISON: None.     FINDINGS:     Normal heart size.  Coarsened bronchovascular pattern to the lungs.  No lobar consolidation or edema.   No pleural effusion or pneumothorax.  No fracture or foreign body.  Osteoarthritis at the glenohumeral joints.       Impression:      Impression:     1.  Normal heart size  2.  Mild coarsened bronchovascular pattern to the lungs  3.  No lobar consolidation or edema.  4.  No pleural effusion or pneumothorax.     This report was finalized on 7/27/2024 3:26 AM by Ernesto Figueroa MD.       CT Head Without Contrast [[id number]] Collected: 07/26/24 1833     Updated: 07/26/24 1837    Narrative:      INDICATION: Altered mental status.     COMPARISON: CT from 7/6/2023.     TECHNIQUE: Axial CT images of the head were obtained without IV  contrast. Coronal and sagittal reformations were reviewed.     FINDINGS:  Gray-white differentiation is relatively preserved. No mass, mass effect  or midline shift. Mild chronic ischemic white matter changes. No  evidence of acute large territorial infarction or acute intracranial  hemorrhage. Ventricles appear normal in size. Basal cisterns are patent.  No depressed calvarial fracture.       Impression:      No acute intracranial process.        This report was finalized on 7/26/2024 6:35 PM by Alex Pallas, DO.             ----------------------------------------------------------------------------------------------------------------------    Assessment:  Paroxysmal atrial fibrillation  Mild LV dysfunction    Plan:  Continue anticoagulation  Start p.o. amiodarone after IV loading  Continue beta-blocker  Continue guideline related medical management  If patient stays in sinus rhythm then  patient can be discharged tomorrow with loading dose of amiodarone 400 mg twice a day for 5 days then 200 mg p.o. daily and is scheduled to follow-up as an outpatient in cardiology clinic      Nubia Arteaga MD  07/27/24  11:31 EDT

## 2024-07-27 NOTE — PROGRESS NOTES
[facility] HOSPITALIST PROGRESS NOTE     Patient Identification:  Name:  Chong White Sr.  Age:  64 y.o.  Sex:  male  :  1960  MRN:  [id number]  Visit Number:  [id number]  ROOM: [address] [place] [address]     Primary Care Provider:  Amy Yanez APRN     Date of Admission: 2024    Length of stay in inpatient status:  2    Subjective     Chief Compliant:    Chief Complaint   Patient presents with    Hypotension    Rapid Heart Rate     History of Presenting Illness:  The patient requested pain meds for his back pain.  He denies chest pain, trouble breathing, coughing, nausea, vomiting, diarrhea.  He also denies any leg edema.    Objective     Current Hospital Meds:  apixaban, 5 mg, Oral, BID  aspirin, 81 mg, Oral, Daily  atorvastatin, 40 mg, Oral, Nightly  carvedilol, 12.5 mg, Oral, BID With Meals  empagliflozin, 10 mg, Oral, Daily  ipratropium, 0.5 mg, Nebulization, 4x Daily - RT  nicotine, 1 patch, Transdermal, Q24H  pantoprazole, 40 mg, Oral, Q AM  predniSONE, 40 mg, Oral, Daily With Breakfast  ranolazine, 500 mg, Oral, BID  sodium chloride, 10 mL, Intravenous, Q12H  sodium chloride, 10 mL, Intravenous, Q12H    dilTIAZem, 5-15 mg/hr, Last Rate: Stopped (24)      Current Antimicrobial Therapy:  Anti-Infectives (From admission, onward)      None          Current Diuretic Therapy:  Diuretics (From admission, onward)      None          ----------------------------------------------------------------------------------------------------------------------  Vital Signs:  Temp:  [97.7 °F (36.5 °C)-99 °F (37.2 °C)] 97.9 °F (36.6 °C)  Heart Rate:  [65-88] 70  Resp:  [17-22] 20  BP: (108-147)/(63-85) 124/69  SpO2:  [95 %-98 %] 98 %  on   ;   Device (Oxygen Therapy): room air  Body mass index is 25.21 kg/m².    Wt Readings from Last 3 Encounters:   24 75.2 kg (165 lb 12.6 oz)   24 71.1 kg (156 lb 12.8 oz)   24 73.8 kg (162 lb 9.6 oz)     Intake & Output (last 3 days)           0701 07/25 0700 07/25 0701 07/26 0700 07/26 0701 07/27 0700 07/27 0701 07/28 0700    P.O.  360 480     I.V. (mL/kg)  0 (0)      IV Piggyback  1000      Total Intake(mL/kg)  1360 (19.7) 480 (6.4)     Urine (mL/kg/hr)   1150 (0.6)     Total Output   1150     Net  +1360 -670             Stool Unmeasured Occurrence  0 x            Diet: Cardiac; Healthy Heart (2-3 Na+); Fluid Consistency: Thin (IDDSI 0)  ----------------------------------------------------------------------------------------------------------------------  Physical Exam   Is alert and oriented x 4.  He is in normal sinus rhythm and no murmur.  His lungs are clear to oscillation bilaterally no crackles, no wheezes, no rhonchi.  He has no pedal edema.  Patient does have a flat affect.  ----------------------------------------------------------------------------------------------------------------------  Tele:  NS with heart rates 60-70s.  I have personally reviewed/looked at the telemetry strips.       Last echocardiogram:  Results for orders placed during the hospital encounter of 07/25/24    Adult Transthoracic Echo Complete W/ Cont if Necessary Per Protocol    Interpretation Summary    Left ventricular ejection fraction appears to be 41 - 45%.    Left ventricular diastolic function is consistent with (grade I) impaired relaxation.    Estimated right ventricular systolic pressure from tricuspid regurgitation is normal (<35 mmHg).    I have personally read the ECHO final report.   ----------------------------------------------------------------------------------------------------------------------  LABS:    Pending test results:  NS    CBC and coagulation:  Results from last 7 days   Lab Units 07/26/24  2216 07/25/24  1052 07/25/24  1007   CRP mg/dL  --   --  <0.30   WBC 10*3/mm3 9.48 10.43  --    HEMOGLOBIN g/dL 16.1 17.1  --    HEMATOCRIT % 48.5 51.1*  --    MCV fL 95.8 93.8  --    MCHC g/dL 33.2 33.5  --    PLATELETS 10*3/mm3 217 235  --    INR    --  1.02  --      Renal and electrolytes:  Results from last 7 days   Lab Units 07/26/24  2216 07/26/24  0448 07/25/24  1007   SODIUM mmol/L 139 141 142  142   POTASSIUM mmol/L 4.8 4.2 4.1  4.1   MAGNESIUM mg/dL 2.4 2.3 2.5*   CHLORIDE mmol/L 103 108* 105  105   CO2 mmol/L 26.3 24.4 28.7  29.5*   BUN mg/dL 24* 23 18  17   CREATININE mg/dL 1.09 0.93 1.14  1.14   CALCIUM mg/dL 9.0 8.6 9.1  9.2   PHOSPHORUS mg/dL 3.0 4.3  --    GLUCOSE mg/dL 129* 90 119*  122*   ANION GAP mmol/L 9.7 8.6 8.3  7.5     Estimated Creatinine Clearance: 72.8 mL/min (by C-G formula based on SCr of 1.09 mg/dL).    Liver and pancreatic function:  Results from last 7 days   Lab Units 07/25/24  1007   ALBUMIN g/dL 3.9   BILIRUBIN mg/dL 0.4   ALK PHOS U/L 87   AST (SGOT) U/L 12   ALT (SGPT) U/L 16   LIPASE U/L 23     Cardiac:  Results from last 7 days   Lab Units 07/27/24  0000 07/26/24  2216 07/26/24  0448 07/25/24  1236 07/25/24  1052 07/25/24  1007   HSTROP T ng/L 12 11 14 19  --  19   PROBNP pg/mL  --   --   --   --  712.6 641.9       Cultures:  Microbiology Results (last 10 days)       Procedure Component Value - Date/Time    Respiratory Panel PCR w/COVID-19(SARS-CoV-2) [facility] In-House, NP Swab in [facility], 2 HR TAT - Swab, Nasopharynx [[id number]]  (Normal) Collected: 07/26/24 1838    Lab Status: Final result Specimen: Swab from Nasopharynx Updated: 07/26/24 1934     ADENOVIRUS, PCR Not Detected     Coronavirus 229E Not Detected     Coronavirus HKU1 Not Detected     Coronavirus NL63 Not Detected     Coronavirus OC43 Not Detected     COVID19 Not Detected     Human Metapneumovirus Not Detected     Human Rhinovirus/Enterovirus Not Detected     Influenza A PCR Not Detected     Influenza B PCR Not Detected     Parainfluenza Virus 1 Not Detected     Parainfluenza Virus 2 Not Detected     Parainfluenza Virus 3 Not Detected     Parainfluenza Virus 4 Not Detected     RSV, PCR Not Detected     Bordetella pertussis pcr Not  Detected     Bordetella parapertussis PCR Not Detected     Chlamydophila pneumoniae PCR Not Detected     Mycoplasma pneumo by PCR Not Detected    COVID-19 and FLU A/B PCR, 1 HR TAT - Swab, Nasopharynx [[id number]]  (Normal) Collected: 07/25/24 1102    Lab Status: Final result Specimen: Swab from Nasopharynx Updated: 07/25/24 1130     COVID19 Not Detected     Influenza A PCR Not Detected     Influenza B PCR Not Detected         I have personally looked at the labs and they are summarized above.  ----------------------------------------------------------------------------------------------------------------------  Detailed radiology reports for the last 24 hours:    Imaging Results (Last 24 Hours)       Procedure Component Value Units Date/Time    CT Abdomen Pelvis Without Contrast [[id number]] Collected: 07/27/24 0749     Updated: 07/27/24 0755    Narrative:      VERIFICATION OBSERVER NAME: Nini Thrasher MD.     Technique: Axial images were obtained along with coronal and sagittal  reconstruction. DLP in mGycm reported in the EMR records. Dose lowering  technique: Automated exposure control with adjustment of the MA and/or  KV, use of iterative reconstruction. Data included in the medical  records.     HISTORY/COMPARISON/FINDINGS:     Comparison: None.     HISTORY: Abdominal pain.  Heartburn.     Findings:      Imaged lower lung fields are clear.  Multiple hypodense lesions in the liver, the largest at the dome  measuring 2.3 cm in diameter.  These lesions having the appearance of  hepatic cysts.  Spleen is normal.  Pancreas is unremarkable.  No gallstones identified.  Kidneys are normal.  No free air, free fluid or evidence of bowel obstruction.  Appendix is  not visualized.  No inflammatory process noted in the area.  Diverticulosis.  No diverticulitis.  Urinary bladder appeared unremarkable.  Moderate narrowing disc space of L4-5.       Impression:      No acute process noted in the abdomen or pelvis.      This  report was finalized on 7/27/2024 7:53 AM by Dr. Nini Thrasher MD.       XR Chest 1 View [[id number]] Collected: 07/27/24 0325     Updated: 07/27/24 0329    Narrative:      PROCEDURE: Chest x-ray examination performed on July 27, 2024. Upright  position. Portable technique. Single view.     HISTORY: Heartburn.     COMPARISON: None.     FINDINGS:     Normal heart size.  Coarsened bronchovascular pattern to the lungs.  No lobar consolidation or edema.   No pleural effusion or pneumothorax.  No fracture or foreign body.  Osteoarthritis at the glenohumeral joints.       Impression:      Impression:     1.  Normal heart size  2.  Mild coarsened bronchovascular pattern to the lungs  3.  No lobar consolidation or edema.  4.  No pleural effusion or pneumothorax.     This report was finalized on 7/27/2024 3:26 AM by Ernesto Figueroa MD.       CT Head Without Contrast [[id number]] Collected: 07/26/24 1833     Updated: 07/26/24 1837    Narrative:      INDICATION: Altered mental status.     COMPARISON: CT from 7/6/2023.     TECHNIQUE: Axial CT images of the head were obtained without IV  contrast. Coronal and sagittal reformations were reviewed.     FINDINGS:  Gray-white differentiation is relatively preserved. No mass, mass effect  or midline shift. Mild chronic ischemic white matter changes. No  evidence of acute large territorial infarction or acute intracranial  hemorrhage. Ventricles appear normal in size. Basal cisterns are patent.  No depressed calvarial fracture.       Impression:      No acute intracranial process.     This report was finalized on 7/26/2024 6:35 PM by Alex Pallas, DO.             I have personally looked at the radiology images and I have read the available final reports.    Assessment & Plan      -Afib with RVR, present on admission, in a patient with known paroxysmal atrial fibrillation  -[id number] of at least 3 related to CHF, hypertension, and vascular disease history   -History of COPD, with a mild  acute exacerbation that was present on admission  -History of ASCVD status post flush occlusion of the LAD 09/20/2020 with collateral from the RCA  -History of Ischemic cardiomyopathy  -History of HFimpEF and diastolic dysfunction, clinically compensated and without an acute exacerbation  -History of essential HTN  -Ongoing tobacco abuse  -History of GERD  -History of chronic back pain  -History of hepatitis C, treated successfully in 2001  -Past history of IV drug abuse    The patient remains in normal sinus rhythm.  Cardiology is continuing to load the patient with amiodarone; if the patient remains in normal rhythm, then he may be able to go home tomorrow.  Will repeat an EKG in the am.  Please note that the blood pressures and glucose levels are at goal and stable.  I will not obtain labs in the am unless he has a change in clinical status.  His lungs sound better and thus will continue the oral steroids and breathing treatment for now.    VTE Prophylaxis:  On Eliquis for Afib, started this admission    The patient is high risk due to the following diagnoses/reasons:  Afib with RVR    Disposition: If he stays in NSR, then he can go home tomorrow.    Saida Lyle MD  [facility] Hospitalist  07/27/24  14:54 EDT

## 2024-07-28 ENCOUNTER — READMISSION MANAGEMENT (OUTPATIENT)
Dept: CALL CENTER | Facility: HOSPITAL | Age: 64
End: 2024-07-28
Payer: MEDICAID

## 2024-07-28 VITALS
HEIGHT: 68 IN | DIASTOLIC BLOOD PRESSURE: 80 MMHG | BODY MASS INDEX: 25.63 KG/M2 | WEIGHT: 169.1 LBS | HEART RATE: 80 BPM | TEMPERATURE: 98.5 F | RESPIRATION RATE: 18 BRPM | OXYGEN SATURATION: 96 % | SYSTOLIC BLOOD PRESSURE: 132 MMHG

## 2024-07-28 LAB
QT INTERVAL: 388 MS
QT INTERVAL: 392 MS
QTC INTERVAL: 401 MS
QTC INTERVAL: 424 MS

## 2024-07-28 PROCEDURE — 94664 DEMO&/EVAL PT USE INHALER: CPT

## 2024-07-28 PROCEDURE — 93010 ELECTROCARDIOGRAM REPORT: CPT | Performed by: INTERNAL MEDICINE

## 2024-07-28 PROCEDURE — 94799 UNLISTED PULMONARY SVC/PX: CPT

## 2024-07-28 PROCEDURE — 94761 N-INVAS EAR/PLS OXIMETRY MLT: CPT

## 2024-07-28 PROCEDURE — 99239 HOSP IP/OBS DSCHRG MGMT >30: CPT | Performed by: INTERNAL MEDICINE

## 2024-07-28 PROCEDURE — 99232 SBSQ HOSP IP/OBS MODERATE 35: CPT | Performed by: NURSE PRACTITIONER

## 2024-07-28 PROCEDURE — 63710000001 PREDNISONE PER 1 MG: Performed by: INTERNAL MEDICINE

## 2024-07-28 PROCEDURE — 93005 ELECTROCARDIOGRAM TRACING: CPT | Performed by: INTERNAL MEDICINE

## 2024-07-28 RX ORDER — PREDNISONE 20 MG/1
40 TABLET ORAL
Qty: 4 TABLET | Refills: 0 | Status: SHIPPED | OUTPATIENT
Start: 2024-07-29 | End: 2024-07-31

## 2024-07-28 RX ORDER — AMIODARONE HYDROCHLORIDE 200 MG/1
200 TABLET ORAL
Status: DISCONTINUED | OUTPATIENT
Start: 2024-08-02 | End: 2024-07-28 | Stop reason: HOSPADM

## 2024-07-28 RX ORDER — AMIODARONE HYDROCHLORIDE 200 MG/1
400 TABLET ORAL EVERY 12 HOURS SCHEDULED
Status: DISCONTINUED | OUTPATIENT
Start: 2024-07-28 | End: 2024-07-28

## 2024-07-28 RX ORDER — FUROSEMIDE 20 MG/1
20 TABLET ORAL DAILY PRN
Start: 2024-07-28

## 2024-07-28 RX ORDER — NITROGLYCERIN 0.4 MG/1
0.4 TABLET SUBLINGUAL
Qty: 50 TABLET | Refills: 0 | Status: SHIPPED | OUTPATIENT
Start: 2024-07-28

## 2024-07-28 RX ORDER — AMIODARONE HYDROCHLORIDE 200 MG/1
TABLET ORAL
Qty: 50 TABLET | Refills: 0 | Status: SHIPPED | OUTPATIENT
Start: 2024-08-02 | End: 2024-09-06

## 2024-07-28 RX ORDER — LIDOCAINE 4 G/G
2 PATCH TOPICAL
Qty: 6 PATCH | Refills: 0 | Status: SHIPPED | OUTPATIENT
Start: 2024-07-29 | End: 2024-08-01

## 2024-07-28 RX ORDER — AMIODARONE HYDROCHLORIDE 200 MG/1
400 TABLET ORAL EVERY 12 HOURS SCHEDULED
Status: DISCONTINUED | OUTPATIENT
Start: 2024-07-28 | End: 2024-07-28 | Stop reason: HOSPADM

## 2024-07-28 RX ADMIN — APIXABAN 5 MG: 5 TABLET, FILM COATED ORAL at 08:00

## 2024-07-28 RX ADMIN — ASPIRIN 81 MG: 81 TABLET, COATED ORAL at 08:00

## 2024-07-28 RX ADMIN — PANTOPRAZOLE SODIUM 40 MG: 40 TABLET, DELAYED RELEASE ORAL at 05:16

## 2024-07-28 RX ADMIN — AMIODARONE HYDROCHLORIDE 400 MG: 200 TABLET ORAL at 13:29

## 2024-07-28 RX ADMIN — RANOLAZINE 500 MG: 500 TABLET, FILM COATED, EXTENDED RELEASE ORAL at 08:00

## 2024-07-28 RX ADMIN — EMPAGLIFLOZIN 10 MG: 10 TABLET, FILM COATED ORAL at 08:01

## 2024-07-28 RX ADMIN — HYDROCODONE BITARTRATE AND ACETAMINOPHEN 1 TABLET: 7.5; 325 TABLET ORAL at 08:05

## 2024-07-28 RX ADMIN — CARVEDILOL 12.5 MG: 6.25 TABLET, FILM COATED ORAL at 08:01

## 2024-07-28 RX ADMIN — LIDOCAINE 2 PATCH: 4 PATCH TOPICAL at 08:01

## 2024-07-28 RX ADMIN — Medication 10 ML: at 08:01

## 2024-07-28 RX ADMIN — ANTACID TABLETS 2 TABLET: 500 TABLET, CHEWABLE ORAL at 08:05

## 2024-07-28 RX ADMIN — PREDNISONE 40 MG: 20 TABLET ORAL at 08:00

## 2024-07-28 NOTE — PROGRESS NOTES
[facility]  PROGRESS NOTE    Patient information:  Name: Chong White Sr.  Age/Sex: 64 y.o. male  :  1960        PCP: Amy Yanez APRN  Attending: Saida Lyle MD  MRN:  [id number]  Visit Number:  [id number]    LOS:  LOS: 3 days     CODE STATUS:    Code Status and Medical Interventions: CPR (Attempt to Resuscitate); Full Support   Ordered at: 24 1550     Level Of Support Discussed With:    Patient     Code Status (Patient has no pulse and is not breathing):    CPR (Attempt to Resuscitate)     Medical Interventions (Patient has pulse or is breathing):    Full Support       PROBLEM LIST:Active Problems:    Atrial fibrillation with RVR    COPD exacerbation      Reason for Cardiology follow-up: Atrial fibrillation with RVR    Subjective   ADMISSION INFORMATION:  Chief Complaint   Patient presents with    Hypotension    Rapid Heart Rate     HPI:  Chong White Sr. is a 64 y.o. male with ASCVD status post flush occlusion of the LAD 2020 with collateral from the RCA, chronic CHF with recovered EF, paroxysmal atrial fibrillation, hypertension, GERD, history of hepatitis C and substance abuse, and smoking.     Mr. White went to a routine visit at the heart failure clinic 2024 with complaints of dizziness, shortness of breath, chest pain, and palpitations.  He also noted increasing confusion.  His initial blood pressure was 75/45 and EKG revealed A-fib with RVR.  He was directed to report to the emergency room for further evaluation and stabilization.    Cardiology was consulted for A-fib with RVR.     It should be noted that he has consulted with Dr. Arechiga, electrophysiologist in the past.  He was actually scheduled for a catheter ablation, but that did not take place due to an apparent failure of understanding about the indication for the procedure and what postprocedural care would require.  See chart note 2023      Interval History:   Patient is in room 310A and was examined.  Patient resting comfortably in bed.  He denies acute cardiovascular complaint.  He reports he is overall feeling better.  BP this a.m. 132/80, heart rate 75.    Objective     Vital Signs:  Temp:  [97.9 °F (36.6 °C)-98.5 °F (36.9 °C)] 98.5 °F (36.9 °C)  Heart Rate:  [65-82] 80  Resp:  [18-22] 18  BP: (125-150)/(71-83) 132/80  Vital Signs (last 72 hrs)         07/25 0700 07/26 0659 07/26 0700 07/27 0659 07/27 0700 07/28 0659 07/28 0700 07/28 1203   Most Recent      Temp (°F) 97.2 -  99.2    97.7 -  99    97.7 -  98.3      98.5     98.5 (36.9) 07/28 0701    Heart Rate 58 -  134    70 -  125    65 -  82    75 -  80     80 07/28 0735    Resp 10 -  25    17 -  22    18 -  22    18 -  20     18 07/28 0735    BP 81/50 -  136/84    108/63 -  147/74    124/69 -  150/83      132/80     132/80 07/28 0701    SpO2 (%) 85 -  100    95 -  99    94 -  98    95 -  96     96 07/28 0735          BMI:  Body mass index is 25.71 kg/m².    WEIGHT:      07/27/24  0500 07/28/24  0500   Weight: 75.2 kg (165 lb 12.6 oz) (new admit less than 24 hours) 76.7 kg (169 lb 1.6 oz)       DIET:  Diet: Cardiac; Healthy Heart (2-3 Na+); Fluid Consistency: Thin (IDDSI 0)    I&O:  Intake & Output (last 3 days)         07/25 0701 07/26 0700 07/26 0701 07/27 0700 07/27 0701 07/28 0700 07/28 0701 07/29 0700    P.O. 360 480 960     I.V. (mL/kg) 0 (0)       IV Piggyback 1000       Total Intake(mL/kg) 1360 (19.7) 480 (6.4) 960 (12.5)     Urine (mL/kg/hr)  1150 (0.6) 1100 (0.6)     Total Output  1150 1100     Net +1360 -670 -140             Urine Unmeasured Occurrence   4 x     Stool Unmeasured Occurrence 0 x               PHYSICAL EXAM:  Vitals reviewed.   Constitutional:       Appearance: Normal appearance. Well-developed.   HENT:      Head: Normocephalic.   Neck:      Thyroid: No thyromegaly.      Vascular: No carotid bruit or JVD.   Pulmonary:      Effort: Pulmonary effort is normal.       Breath sounds: Normal breath sounds.   Cardiovascular:      Normal rate. Regular rhythm.   Edema:     Peripheral edema absent.   Abdominal:      General: Bowel sounds are normal.      Palpations: Abdomen is soft.   Musculoskeletal: Normal range of motion.      Cervical back: Neck supple. Skin:     General: Skin is warm and dry.   Neurological:      Mental Status: Alert and oriented to person, place, and time.   Psychiatric:         Attention and Perception: Attention normal.         Mood and Affect: Mood normal.         Speech: Speech normal.         Behavior: Behavior normal. Behavior is cooperative.         Cognition and Memory: Cognition normal.                     Results review   Results Review:    I have reviewed the patient's new clinical results. 07/28/24 12:03 EDT    Results Review:   Results from last 7 days   Lab Units 07/26/24 2216 07/25/24  1052   WBC 10*3/mm3 9.48 10.43   HEMOGLOBIN g/dL 16.1 17.1   PLATELETS 10*3/mm3 217 235     Results from last 7 days   Lab Units 07/26/24 2216 07/26/24  0448 07/25/24  1007   SODIUM mmol/L 139 141 142  142   POTASSIUM mmol/L 4.8 4.2 4.1  4.1   CHLORIDE mmol/L 103 108* 105  105   CO2 mmol/L 26.3 24.4 28.7  29.5*   BUN mg/dL 24* 23 18  17   CREATININE mg/dL 1.09 0.93 1.14  1.14   CALCIUM mg/dL 9.0 8.6 9.1  9.2   GLUCOSE mg/dL 129* 90 119*  122*   ALT (SGPT) U/L  --   --  16   AST (SGOT) U/L  --   --  12     Results from last 7 days   Lab Units 07/27/24  0000 07/26/24 2216 07/26/24  0448 07/25/24  1236 07/25/24  1007   HSTROP T ng/L 12 11 14 19 19     Lab Results   Component Value Date    PROBNP 712.6 07/25/2024    PROBNP 641.9 07/25/2024    PROBNP 60.0 06/12/2024     Estimated Creatinine Clearance: 74.3 mL/min (by C-G formula based on SCr of 1.09 mg/dL).  Lab Results   Component Value Date    ABSOLUTELUNG 27 07/25/2024     Lab Results   Component Value Date    INR 1.02 07/25/2024    INR 1.01 06/26/2024    INR 0.92 06/14/2023    INR 1.44 (H) 06/23/2022  "   INR 1.41 (H) 06/16/2022    INR 1.07 08/18/2021    INR 1.05 09/02/2020     No results found for: \"LABHEPA\"  Lab Results   Component Value Date    MG 2.4 07/26/2024    MG 2.3 07/26/2024    MG 2.5 (H) 07/25/2024     Lab Results   Component Value Date    TSH 1.730 07/25/2024      No results found for: \"CHOL\", \"TRIG\", \"HDL\", \"LDL\"  Pain Management Panel  More data may exist         Latest Ref Rng & Units 4/20/2023 9/1/2020   Pain Management Panel   Amphetamine, Urine Qual Negative Negative  Negative    Barbiturates Screen, Urine Negative Negative  Negative    Benzodiazepine Screen, Urine Negative Negative  Negative    Buprenorphine, Screen, Urine Negative Negative  Positive    Cocaine Screen, Urine Negative Negative  Negative    Methadone Screen , Urine Negative Negative  Negative    Methamphetamine, Ur Negative Negative  -      Details                 Microbiology Results (last 10 days)       Procedure Component Value - Date/Time    Respiratory Panel PCR w/COVID-19(SARS-CoV-2) [facility] In-House, NP Swab in UTM/VTM, 2 HR TAT - Swab, Nasopharynx [[id number]]  (Normal) Collected: 07/26/24 1838    Lab Status: Final result Specimen: Swab from Nasopharynx Updated: 07/26/24 1934     ADENOVIRUS, PCR Not Detected     Coronavirus 229E Not Detected     Coronavirus HKU1 Not Detected     Coronavirus NL63 Not Detected     Coronavirus OC43 Not Detected     COVID19 Not Detected     Human Metapneumovirus Not Detected     Human Rhinovirus/Enterovirus Not Detected     Influenza A PCR Not Detected     Influenza B PCR Not Detected     Parainfluenza Virus 1 Not Detected     Parainfluenza Virus 2 Not Detected     Parainfluenza Virus 3 Not Detected     Parainfluenza Virus 4 Not Detected     RSV, PCR Not Detected     Bordetella pertussis pcr Not Detected     Bordetella parapertussis PCR Not Detected     Chlamydophila pneumoniae PCR Not Detected     Mycoplasma pneumo by PCR Not Detected    Narrative:      In the setting of a " positive respiratory panel with a viral infection PLUS a negative procalcitonin without other underlying concern for bacterial infection, consider observing off antibiotics or discontinuation of antibiotics and continue supportive care. If the respiratory panel is positive for atypical bacterial infection (Bordetella pertussis, Chlamydophila pneumoniae, or Mycoplasma pneumoniae), consider antibiotic de-escalation to target atypical bacterial infection.    COVID-19 and FLU A/B PCR, 1 HR TAT - Swab, Nasopharynx [[id number]]  (Normal) Collected: 07/25/24 1102    Lab Status: Final result Specimen: Swab from Nasopharynx Updated: 07/25/24 1130     COVID19 Not Detected     Influenza A PCR Not Detected     Influenza B PCR Not Detected    Narrative:      Fact sheet for providers: https://www.fda.gov/media/003917/download    Fact sheet for patients: https://www.fda.gov/media/442699/download    Test performed by PCR.           Imaging Results (Last 24 Hours)       ** No results found for the last 24 hours. **            ECHO:  Results for orders placed during the hospital encounter of 07/25/24    Adult Transthoracic Echo Complete W/ Cont if Necessary Per Protocol    Interpretation Summary    Left ventricular ejection fraction appears to be 41 - 45%.    Left ventricular diastolic function is consistent with (grade I) impaired relaxation.    Estimated right ventricular systolic pressure from tricuspid regurgitation is normal (<35 mmHg).      STRESS TEST:  Results for orders placed during the hospital encounter of 09/05/23    Stress Test With Myocardial Perfusion One Day    Interpretation Summary  Images from the original result were not included.      A pharmacological stress test was performed using regadenoson without low-level exercise.    Findings consistent with a normal ECG stress test.    Myocardial perfusion imaging indicates a normal myocardial perfusion study with no evidence of ischemia.    Abnormal LV wall motion  consistent with moderate hypokinesis.    Left ventricular ejection fraction is moderately reduced (Calculated EF = 41%).    Impressions are consistent with a low risk to intermediate study.       HEART CATH:  Results for orders placed during the hospital encounter of 09/01/20    Cardiac Catheterization/Vascular Study    Narrative  Images from the original result were not included.  Patient Name: Chong White                  MRN: [id number]    Procedure Date: 09/04/20    HPI: Patient is a pleasant 60 y.o. male with history of dyspnea and cardiomyopathy. A stress test showed severe LV dysfunction and cardiac cath requested to definitive diagnosis since there is history of angioplasty in 2001. Patient presented with dyspnea. Patient was evaluated and recommended to proceed with diagnostic cardiac catheterization with possible need for intervention. All risks, benefits and alternatives were discussed with patient in detail. All his questions and his family's questions were answered to their satisfaction. They all understood and wished to proceed, and therefore the procedure was performed.    Pre-operative Diagnosis: Abnormal stress test. Cardiomyopathy      Procedure Performed:  Selective coronary angiography.    Technique: The patient was brought to the cardiac catheterization laboratory after informed consent was obtained. right radial artery area was prepped, draped, anesthestized in the usual manner. The radial artery was entered wiliam a Seldinger technique. A 6-[place] sheath over the wire was introduced into the radial artery. This was followed by the use of a 6 -[place]  diagnostic catheters JL 3.5 and JR4 to perform the diagnostic cardiac catheterization. Patient tolerated the procedure well, was hemodynamically stable throughout the procedure. Radial cocktail was administered via the sheath side arm at the time of access.    At the end of the procedure sheath was removed, wrist band was placed. Excellent  hemostasis was achieved. Patient transferred to post cath holding area in stable condition.    Findings:    Coronary anatomy:    The left main coronary artery bifurcated into the LAD and left circumflex coronary.  The LAD coursed in the anterior interventricular groove, gave rise to diagonal branches and reached the apex.    Left circumflex coursed in the left atrial ventricular groove and gives rise to several marginal branches.    The right coronary artery course in the right atrial ventricular groove I gave rise to several acute marginal branches.    Dominant vessel: Right    LM: Separate ostia assumed.  LAD: Flush occlusion of the LAD at the ostium however it fills from RCA injection showing no evidence of disease in a large caliber vessel  Diagonal Branch: NL    LCX: Prox segment and OM are normal. Distal segment has 50% disease  Obtuse marginal branch:   NL    RCA: Very large vessel with mild luminal irregularities    Contrary to patient claim no stents were visible during imaging.      No specimens were removed  EBL: 20 ML    Impression:  Flush occlusion of the ostial LAD with remarkably good collateral connection from rCA filling the LAD with brisk flow to the point of occlusion in the prox LAD.  RCA and CX are free of any significant disease.      Plan:  Patient to be placed on beta blockers, lipid therapy, aspirin, and ACE inhibitor.  No intervention needed as the collateral connection is large and excellent and serving as bypass connection with brisk flow all the way to the prox LAD.  EP evaluation per Gen cardiology.      Herman Sen MD  09/04/20      Director, Cardiac Cath Lab      TELEMETRY:          I reviewed the patient's new clinical results.    ALLERGIES: Codeine and Penicillins    Medication Review:   Current list of medications may not reflect those currently placed in orders that are not signed or are being held.     [START ON 8/2/2024] amiodarone, 200 mg, Oral, Q24H  amiodarone, 400 mg,  Oral, Q12H  apixaban, 5 mg, Oral, BID  aspirin, 81 mg, Oral, Daily  atorvastatin, 40 mg, Oral, Nightly  carvedilol, 12.5 mg, Oral, BID With Meals  empagliflozin, 10 mg, Oral, Daily  ipratropium, 0.5 mg, Nebulization, 4x Daily - RT  Lidocaine, 2 patch, Transdermal, Q24H  nicotine, 1 patch, Transdermal, Q24H  pantoprazole, 40 mg, Oral, Q AM  predniSONE, 40 mg, Oral, Daily With Breakfast  ranolazine, 500 mg, Oral, BID  sodium chloride, 10 mL, Intravenous, Q12H  sodium chloride, 10 mL, Intravenous, Q12H           senna-docusate sodium **AND** polyethylene glycol **AND** bisacodyl **AND** bisacodyl    bismuth subsalicylate    calcium carbonate    dextrose    dextrose    glucagon (human recombinant)    HYDROcodone-acetaminophen    nicotine polacrilex    nitroglycerin    [COMPLETED] Insert Peripheral IV **AND** sodium chloride    sodium chloride    sodium chloride    sodium chloride    sodium chloride    Assessment    1.  Paroxysmal atrial fibrillation  2.  Chronic anticoagulation due to [id number] score of at least 3 for heart failure, hypertension and CAD.  3.  HFrEF  4.  CAD  5.  Hypertension  6.  Hyperlipidemia                Planning   1.  Heart rate well-controlled, continue Amiodarone and Coreg.  Loading dose of amiodarone with 400 mg twice daily for 5 days then 200 mg daily and outpatient follow-up in cardiology clinic  2.  Continue anticoagulation on Eliquis  3.  Echocardiogram with an LVEF of 41 to 45%.  His last echocardiogram in November 2022 with a recovered LVEF of 56 to 60%.   4.  Continue Jardiance and Coreg.  Patient was on Entresto at home which can be resumed if vital signs and kidney function are stable.   5.  CAD continue aspirin, statin, beta blocker and Ranexa.   6.  Troponins have been normal, patient is clinically asymptomatic. Ischemic evaluation recommended due to change in LVEF which can be arranged as an outpatient.           I have discussed this patient with Dr. Arteaga and together, we have  formed a plan of care for this patient as stated above in the recommendations.     I have discussed the patients findings and recommendations with the patient.    Thank you very much for asking us to be involved in this patient's care.  We will follow along with you.        Electronically signed by SAMARA Reyes, 07/28/24, 12:03 PM EDT.              Please note that portions of this note were completed with a voice recognition program.    Please note that portions of this note were copied and has been reviewed and is accurate as of 7/28/2024 .

## 2024-07-28 NOTE — PLAN OF CARE
Problem: Adult Inpatient Plan of Care  Goal: Plan of Care Review  Outcome: Ongoing, Progressing   Goal Outcome Evaluation:   Patient resting in bed. No acute events overnight. IV patent. Plan for home on discharge. Call light within reach. Will continue to follow with plan of care.

## 2024-07-28 NOTE — PLAN OF CARE
Goal Outcome Evaluation:         Patient to be discharged on this date. IV and tele removed.

## 2024-07-28 NOTE — DISCHARGE SUMMARY
[facility] HOSPITALISTS DISCHARGE SUMMARY    Patient Identification:  Name:  Chong White Sr.  Age:  64 y.o.  Sex:  male  :  1960  MRN:  [id number]  Visit Number:  [id number]    Date of Admission: 2024  Date of Discharge: 2024    PCP: Amy Yanez, SAMARA    DISCHARGE DIAGNOSES  -Afib with RVR, present on admission, in a patient with known paroxysmal atrial fibrillation  -[id number]-WEEe of at least 3 related to CHF, hypertension, and vascular disease history   -History of COPD, with a mild acute exacerbation that was present on admission  -History of ASCVD status post flush occlusion of the LAD 2020 with collateral from the RCA  -History of Ischemic cardiomyopathy  -History of HFimpEF and diastolic dysfunction, clinically compensated and without an acute exacerbation  -History of essential HTN  -Ongoing tobacco abuse  -History of GERD  -History of chronic back pain  -History of hepatitis C, treated successfully in   -Past history of IV drug abuse    CONSULTS   Dr. Arteaga with interventional cardiology    PROCEDURES PERFORMED  None    HOSPITAL COURSE  Patient is a 64 y.o. male presented to [facility] on 2024 with hypotension, shortness of air, and rapid heart rate in the heart failure clinic; NIKI Martin asked the patient to come to the ED.  He had leg swelling for 1-2 weeks prior to admission, shortness of air for 2-3 days prior, and he did not miss any of his cardiac medications.  He was found to be in Afib with RVR (heart rates in the 130s); BP was low at 75/45.  He was given a NS bolus and started on a Cardizem drip; with improvement in the heart rates, the BPs started trending upwards.  Thus, he was admitted to the progressive care unit for further evaluation and treatment.  Please see the admitting history and physical for further details.  Cardiology was consulted; by the time of their consult, the patient had converted to normal sinus rhythm.   Cardiology started him on the IV amiodarone protocol and then transitioned it to oral amiodarone.  The patient remained in normal sinus rhythm during the rest of the hospitalization.  Thus, he will take the oral loading dose of amiodarone for 5 days and then drop it down to a maintenance dose of 200 mg daily.    The patient has an outpatient appointments with Dr. Keen already; the quickest that an inpatient heart cath would be two days away.  The patient uses [facility] for transportation and he is concerned that he will not be able to arrive to his appointments.  However, I reviewed his chart and he receives controlled medication from two providers and has not missed those appointments.  Thus, the patient will be discharged home today and he is encourage to keep the appointment with Dr. Keen on 8/1/2024.      On the day of discharge, the patient was able to ambulate without any difficulty.  He was tolerating his regular diet and he was able to perform all of her activities of daily living.      VITAL SIGNS:  Temp:  [97.9 °F (36.6 °C)-98.5 °F (36.9 °C)] 98.5 °F (36.9 °C)  Heart Rate:  [65-82] 80  Resp:  [18-22] 18  BP: (125-150)/(71-83) 132/80  SpO2:  [94 %-98 %] 96 %  on   ;   Device (Oxygen Therapy): room air    Body mass index is 25.71 kg/m².  Wt Readings from Last 3 Encounters:   07/28/24 76.7 kg (169 lb 1.6 oz)   07/25/24 71.1 kg (156 lb 12.8 oz)   07/03/24 73.8 kg (162 lb 9.6 oz)       PHYSICAL EXAM:  Physical Exam  Vitals and nursing note reviewed.   Constitutional:       General: He is awake. He is not in acute distress.     Appearance: Normal appearance. He is well-developed and normal weight. He is not ill-appearing, toxic-appearing or diaphoretic.      Comments: On room air.   HENT:      Head: Normocephalic and atraumatic.      Right Ear: External ear normal.      Left Ear: External ear normal.      Nose: Nose normal.   Eyes:      General: No scleral icterus.        Right eye: No discharge.         Left eye:  No discharge.      Pupils: Pupils are equal, round, and reactive to light.   Cardiovascular:      Rate and Rhythm: Normal rate and regular rhythm.      Pulses: Normal pulses.      Heart sounds: No murmur heard.  Pulmonary:      Effort: Pulmonary effort is normal. No respiratory distress.      Breath sounds: No wheezing or rales.   Abdominal:      General: Bowel sounds are normal. There is no distension.      Palpations: Abdomen is soft.   Musculoskeletal:         General: No swelling, deformity or signs of injury.   Skin:     Capillary Refill: Capillary refill takes less than 2 seconds.      Coloration: Skin is not jaundiced or pale.      Findings: No bruising.   Neurological:      Mental Status: He is alert and oriented to person, place, and time. Mental status is at baseline.      Cranial Nerves: No cranial nerve deficit.   Psychiatric:         Mood and Affect: Mood normal.         Behavior: Behavior normal. Behavior is cooperative.         Thought Content: Thought content normal.         Judgment: Judgment normal.       DISCHARGE DISPOSITION   Stable       Discharge Medications        New Medications        Instructions Start Date   amiodarone 200 MG tablet  Commonly known as: PACERONE   Take 2 tablets by mouth 2 (Two) Times a Day for 5 days, THEN 1 tablet Daily for 30 days.   Start Date: August 2, 2024     Lidocaine 4 %   2 patches, Transdermal, Every 24 Hours Scheduled, Remove & Discard patch within 12 hours or as directed by MD   Start Date: July 29, 2024     nitroglycerin 0.4 MG SL tablet  Commonly known as: NITROSTAT   0.4 mg, Sublingual, Every 5 Minutes PRN, Take no more than 3 doses in 15 minutes.      predniSONE 20 MG tablet  Commonly known as: DELTASONE   40 mg, Oral, Daily With Breakfast   Start Date: July 29, 2024            Changes to Medications        Instructions Start Date   furosemide 20 MG tablet  Commonly known as: LASIX  What changed:   when to take this  reasons to take this   20 mg, Oral,  Daily PRN, Do not take if BP is less than 100/60             Continue These Medications        Instructions Start Date   apixaban 5 MG tablet tablet  Commonly known as: Eliquis   5 mg, Oral, 2 Times Daily      Aspirin Low Dose 81 MG EC tablet  Generic drug: aspirin   81 mg, Oral, Daily      atorvastatin 40 MG tablet  Commonly known as: LIPITOR   40 mg, Oral, Daily      carvedilol 12.5 MG tablet  Commonly known as: COREG   12.5 mg, Oral, 2 Times Daily With Meals      empagliflozin 10 MG tablet tablet  Commonly known as: JARDIANCE   10 mg, Oral, Daily      HYDROcodone-acetaminophen 7.5-325 MG per tablet  Commonly known as: NORCO   1 tablet, Oral, Every 12 Hours PRN      omeprazole 40 MG capsule  Commonly known as: priLOSEC   40 mg, Oral, Daily      pantoprazole 40 MG EC tablet  Commonly known as: PROTONIX   40 mg, Oral, Daily      ranolazine 500 MG 12 hr tablet  Commonly known as: Ranexa   500 mg, Oral, 2 Times Daily             Stop These Medications      isosorbide mononitrate 60 MG 24 hr tablet  Commonly known as: IMDUR     sacubitril-valsartan 24-26 MG tablet  Commonly known as: ENTRESTO              Diet Instructions       Diet: Regular/House Diet, Cardiac Diets; Healthy Heart (2-3 Na+); Regular (IDDSI 7); Thin (IDDSI 0)      Discharge Diet:  Regular/House Diet  Cardiac Diets       Cardiac Diet: Healthy Heart (2-3 Na+)    Texture: Regular (IDDSI 7)    Fluid Consistency: Thin (IDDSI 0)          Activity Instructions       Activity as Tolerated            Additional Instructions for the Follow-ups that You Need to Schedule       Discharge Follow-up with PCP   As directed       Currently Documented PCP:    Amy Yanez APRN    PCP Phone Number:    [phone]     Follow Up Details: 7 days             Follow-up Information       Amy Yanez APRN .    Specialty: Family Medicine  Why: 7 days  Contact information:  [address]  [address] [address] [phone]           TEST  RESULTS PENDING AT  DISCHARGE:  None       CODE STATUS  Code Status and Medical Interventions: CPR (Attempt to Resuscitate); Full Support   Ordered at: 07/25/24 1550     Level Of Support Discussed With:    Patient     Code Status (Patient has no pulse and is not breathing):    CPR (Attempt to Resuscitate)     Medical Interventions (Patient has pulse or is breathing):    Full Support         Saida Lyle MD  [facility]  07/28/24  10:41 EDT    Please note that this discharge summary required more than 30 minutes to complete.

## 2024-07-29 ENCOUNTER — TELEPHONE (OUTPATIENT)
Dept: TELEMETRY | Facility: HOSPITAL | Age: 64
End: 2024-07-29
Payer: MEDICAID

## 2024-07-29 RX ORDER — ATORVASTATIN CALCIUM 40 MG/1
40 TABLET, FILM COATED ORAL DAILY
Qty: 30 TABLET | Refills: 5 | Status: SHIPPED | OUTPATIENT
Start: 2024-07-29

## 2024-07-29 NOTE — OUTREACH NOTE
Prep Survey      Flowsheet Row Responses   [facility] facility patient discharged from? [facility]   Is LACE score < 7 ? No   Eligibility Readm Mgmt   Discharge diagnosis COPD exacerbation   Does the patient have one of the following disease processes/diagnoses(primary or secondary)? COPD   Does the patient have Home health ordered? No   Is there a DME ordered? No   Prep survey completed? Yes            Tiffanie DAVIS - Registered Nurse

## 2024-07-30 LAB
QT INTERVAL: 304 MS
QTC INTERVAL: 447 MS

## 2024-07-31 ENCOUNTER — READMISSION MANAGEMENT (OUTPATIENT)
Dept: CALL CENTER | Facility: HOSPITAL | Age: 64
End: 2024-07-31
Payer: MEDICAID

## 2024-07-31 NOTE — OUTREACH NOTE
COPD/PN Week 1 Survey      Flowsheet Row Responses   [facility] patient discharged from? [facility]   Does the patient have one of the following disease processes/diagnoses(primary or secondary)? COPD   Week 1 attempt successful? Yes   Call start time 1324   Call end time 1337   Discharge diagnosis COPD exacerbation   Meds reviewed with patient/caregiver? Yes   Is the patient having any side effects they believe may be caused by any medication additions or changes? No   Does the patient have all medications ordered at discharge? Yes   Is the patient taking all medications as directed (includes completed medication regime)? Yes   Does the patient have a primary care provider?  Yes   Does the patient have an appointment with their PCP or specialist within 7 days of discharge? Yes   Pulse Ox monitoring None   Comments Pt reports that his condition remains the same. Pt reports that his SOA continues, worse with activity improving with rest.   Did the patient receive a copy of their discharge instructions? Yes   Nursing interventions Reviewed instructions with patient   What is the patient's perception of their health status since discharge? Same   Nursing Interventions Nurse provided patient education, Advised patient to call provider   Nursing interventions Education provided on various zones   Patient reports what zone on this call? Yellow Zone   Yellow Zone I have less energy for my daily activities, More breathless than usual, Poor sleep and my symptoms woke me up   Yellow interventions Call provider immediately if symptoms don't improve: they may indicate that an adjustment in medication or oxygen therapy is needed, Continue taking daily medications   Week 1 call completed? Yes   Call end time 1337            Bonny BALDWIN - Registered Nurse

## 2024-08-09 ENCOUNTER — READMISSION MANAGEMENT (OUTPATIENT)
Dept: CALL CENTER | Facility: HOSPITAL | Age: 64
End: 2024-08-09
Payer: MEDICAID

## 2024-08-09 NOTE — OUTREACH NOTE
COPD/PN Week 2 Survey      Flowsheet Row Responses   [facility] patient discharged from? [facility]   Does the patient have one of the following disease processes/diagnoses(primary or secondary)? COPD   Week 2 attempt successful? No   Unsuccessful attempts Attempt 1            Kimberlee CHEW - Registered Nurse

## 2024-08-12 RX ORDER — FUROSEMIDE 20 MG/1
20 TABLET ORAL DAILY PRN
Start: 2024-08-12 | End: 2024-08-12 | Stop reason: SDUPTHER

## 2024-08-12 RX ORDER — CARVEDILOL 12.5 MG/1
12.5 TABLET ORAL 2 TIMES DAILY WITH MEALS
Qty: 60 TABLET | Refills: 2 | Status: SHIPPED | OUTPATIENT
Start: 2024-08-12 | End: 2024-08-12 | Stop reason: SDUPTHER

## 2024-08-12 RX ORDER — CARVEDILOL 12.5 MG/1
12.5 TABLET ORAL 2 TIMES DAILY WITH MEALS
Qty: 60 TABLET | Refills: 2 | Status: SHIPPED | OUTPATIENT
Start: 2024-08-12 | End: 2024-11-10

## 2024-08-12 RX ORDER — ATORVASTATIN CALCIUM 40 MG/1
40 TABLET, FILM COATED ORAL DAILY
Qty: 30 TABLET | Refills: 5 | Status: SHIPPED | OUTPATIENT
Start: 2024-08-12

## 2024-08-12 RX ORDER — RANOLAZINE 500 MG/1
500 TABLET, EXTENDED RELEASE ORAL 2 TIMES DAILY
Qty: 60 TABLET | Refills: 5 | Status: SHIPPED | OUTPATIENT
Start: 2024-08-12

## 2024-08-12 RX ORDER — ASPIRIN 81 MG/1
81 TABLET, COATED ORAL DAILY
Qty: 30 TABLET | Refills: 5 | OUTPATIENT
Start: 2024-08-12

## 2024-08-12 RX ORDER — FUROSEMIDE 20 MG/1
20 TABLET ORAL DAILY PRN
Qty: 30 TABLET | Refills: 2 | Status: SHIPPED | OUTPATIENT
Start: 2024-08-12 | End: 2024-11-10

## 2024-08-12 RX ORDER — ASPIRIN 81 MG/1
81 TABLET ORAL DAILY
Qty: 30 TABLET | Refills: 5 | Status: SHIPPED | OUTPATIENT
Start: 2024-08-12 | End: 2024-08-12 | Stop reason: SDUPTHER

## 2024-08-12 RX ORDER — ASPIRIN 81 MG/1
81 TABLET ORAL DAILY
Qty: 30 TABLET | Refills: 5 | Status: SHIPPED | OUTPATIENT
Start: 2024-08-12

## 2024-08-12 NOTE — TELEPHONE ENCOUNTER
Hub to relay.     Pt will need to be seen before receiving refills or he can contact his PCP. He was LS 6/20/23.

## 2024-08-13 ENCOUNTER — READMISSION MANAGEMENT (OUTPATIENT)
Dept: CALL CENTER | Facility: HOSPITAL | Age: 64
End: 2024-08-13
Payer: MEDICAID

## 2024-08-13 NOTE — OUTREACH NOTE
COPD/PN Week 2 Survey      Flowsheet Row Responses   [facility] patient discharged from? [facility]   Does the patient have one of the following disease processes/diagnoses(primary or secondary)? COPD   Week 2 attempt successful? Yes   Call start time 1257   Call end time 1307   List who call center can speak with pt   Meds reviewed with patient/caregiver? Yes   Is the patient taking all medications as directed (includes completed medication regime)? Yes   Comments regarding appointments Pt has had pcp f/u appointment.   Has the patient kept scheduled appointments due by today? Yes   What is the patient's perception of their health status since discharge? New symptoms unrelated to diagnosis   Patient reports what zone on this call? Green Zone   Green Zone Breathing without shortness of breath   Green Zone interventions: Take daily medications   Week 2 call completed? Yes   Graduated Yes   Is the patient interested in additional calls from an ambulatory ? No   Would this patient benefit from a Referral to Amb Social Work? No   Wrap up additional comments Pt has very little active s/s of COPD at this time. Pt has multiple heart issues. Pt most concerned about a possible pacemaker implantation. Pt has all those appointments scheduled.   Call end time 1307            Karen HUTCHINSON - Registered Nurse

## 2024-08-27 ENCOUNTER — OFFICE VISIT (OUTPATIENT)
Dept: CARDIOLOGY | Facility: CLINIC | Age: 64
End: 2024-08-27
Payer: MEDICAID

## 2024-08-27 ENCOUNTER — HOSPITAL ENCOUNTER (OUTPATIENT)
Dept: CARDIOLOGY | Facility: HOSPITAL | Age: 64
Discharge: HOME | End: 2024-08-27
Admitting: PHYSICIAN ASSISTANT
Payer: MEDICAID

## 2024-08-27 VITALS
BODY MASS INDEX: 24.71 KG/M2 | OXYGEN SATURATION: 94 % | HEIGHT: 68 IN | WEIGHT: 163 LBS | DIASTOLIC BLOOD PRESSURE: 54 MMHG | SYSTOLIC BLOOD PRESSURE: 93 MMHG | HEART RATE: 63 BPM

## 2024-08-27 VITALS
DIASTOLIC BLOOD PRESSURE: 65 MMHG | HEART RATE: 65 BPM | SYSTOLIC BLOOD PRESSURE: 122 MMHG | WEIGHT: 162 LBS | HEIGHT: 68 IN | OXYGEN SATURATION: 94 % | BODY MASS INDEX: 24.55 KG/M2

## 2024-08-27 DIAGNOSIS — I48.0 PAROXYSMAL ATRIAL FIBRILLATION: Primary | ICD-10-CM

## 2024-08-27 DIAGNOSIS — I48.0 PAROXYSMAL ATRIAL FIBRILLATION: ICD-10-CM

## 2024-08-27 DIAGNOSIS — I25.10 ASCVD (ARTERIOSCLEROTIC CARDIOVASCULAR DISEASE): ICD-10-CM

## 2024-08-27 DIAGNOSIS — I15.9 SECONDARY HYPERTENSION: ICD-10-CM

## 2024-08-27 DIAGNOSIS — I42.0 CARDIOMYOPATHY, DILATED: ICD-10-CM

## 2024-08-27 DIAGNOSIS — Z79.01 CHRONIC ANTICOAGULATION: ICD-10-CM

## 2024-08-27 DIAGNOSIS — I50.42 CHRONIC COMBINED SYSTOLIC AND DIASTOLIC CONGESTIVE HEART FAILURE: Primary | ICD-10-CM

## 2024-08-27 LAB — ABSOLUTE LUNG FLUID CONTENT: 26 % (ref 20–35)

## 2024-08-27 PROCEDURE — 3078F DIAST BP <80 MM HG: CPT | Performed by: INTERNAL MEDICINE

## 2024-08-27 PROCEDURE — 3074F SYST BP LT 130 MM HG: CPT | Performed by: INTERNAL MEDICINE

## 2024-08-27 PROCEDURE — 94726 PLETHYSMOGRAPHY LUNG VOLUMES: CPT | Performed by: PHYSICIAN ASSISTANT

## 2024-08-27 PROCEDURE — 99214 OFFICE O/P EST MOD 30 MIN: CPT | Performed by: INTERNAL MEDICINE

## 2024-08-27 RX ORDER — SACUBITRIL AND VALSARTAN 24; 26 MG/1; MG/1
1 TABLET, FILM COATED ORAL 2 TIMES DAILY
Qty: 60 TABLET | Refills: 2 | Status: SHIPPED | OUTPATIENT
Start: 2024-08-27 | End: 2024-08-29 | Stop reason: HOSPADM

## 2024-08-27 RX ORDER — MIDODRINE HYDROCHLORIDE 5 MG/1
5 TABLET ORAL
Qty: 30 TABLET | Refills: 12 | Status: SHIPPED | OUTPATIENT
Start: 2024-08-27

## 2024-08-27 RX ORDER — CARVEDILOL 12.5 MG/1
12.5 TABLET ORAL 2 TIMES DAILY WITH MEALS
Qty: 60 TABLET | Refills: 2 | Status: SHIPPED | OUTPATIENT
Start: 2024-08-27 | End: 2024-11-25

## 2024-08-27 RX ORDER — ISOSORBIDE MONONITRATE 60 MG/1
60 TABLET, EXTENDED RELEASE ORAL DAILY
COMMUNITY

## 2024-08-27 RX ORDER — SACUBITRIL AND VALSARTAN 24; 26 MG/1; MG/1
1 TABLET, FILM COATED ORAL 2 TIMES DAILY
COMMUNITY
End: 2024-08-27 | Stop reason: SDUPTHER

## 2024-08-27 NOTE — PROGRESS NOTES
Office Note    Subjective     Chong White Sr. is a 64 y.o. male who presents to day for follow-up after recent hospitalization      Active Problems:  Problem List Items Addressed This Visit          Cardiac and Vasculature    Atrial fibrillation - Primary    Overview     ECV, 9/4/2020  ECV, 6/28/2022         ASCVD (arteriosclerotic cardiovascular disease)    Overview     [facility], 9/4/2020: occlusion of the ostial LAD with remarkably good collateral connection from rCA filling the LAD with brisk flow to the point of occlusion in the prox LAD.  RCA and CX are free of any significant disease.          Hypertension       HPI  Chong White Sr. is a 64 y.o. male with ASCVD status post flush occlusion of the LAD 09/20/2020 with collateral from the RCA, chronic CHF with recovered EF, paroxysmal atrial fibrillation, hypertension, GERD, history of hepatitis C and substance abuse, and smoking.   Was admitted to the hospital about a month back with A-fib with fast ventricular spots.  Patient has seen Dr. Olive ANGEL in [place] also for potential ablation but has not happened as yet.  He has been having some discomfort in the chest frequent palpitations dyspnea on exertion paroxysmal nocturnal dyspnea.  Has some dizziness lightheadedness no blackouts    PRIOR MEDS  Current Outpatient Medications on File Prior to Visit   Medication Sig Dispense Refill    amiodarone (PACERONE) 200 MG tablet Take 2 tablets by mouth 2 (Two) Times a Day for 5 days, THEN 1 tablet Daily for 30 days. 50 tablet 0    apixaban (Eliquis) 5 MG tablet tablet Take 1 tablet by mouth 2 (Two) Times a Day. 60 tablet 2    aspirin (Aspirin Low Dose) 81 MG EC tablet Take 1 tablet by mouth Daily. 30 tablet 5    atorvastatin (LIPITOR) 40 MG tablet Take 1 tablet by mouth Daily. 30 tablet 5    empagliflozin (JARDIANCE) 10 MG tablet tablet Take 1 tablet by mouth Daily. 30 tablet 5    furosemide (LASIX) 20 MG tablet Take 1 tablet by mouth Daily As Needed (Weight  gain or leg swelling; call the heart failure clinic the day that you decide to take this) for up to 90 days. Do not take if BP is less than 100/60 30 tablet 2    HYDROcodone-acetaminophen (NORCO) 7.5-325 MG per tablet Take 1 tablet by mouth Every 12 (Twelve) Hours As Needed for Moderate Pain.      nitroglycerin (NITROSTAT) 0.4 MG SL tablet Place 1 tablet under the tongue Every 5 (Five) Minutes As Needed for Chest Pain (Only if SBP Greater Than 100). Take no more than 3 doses in 15 minutes. 50 tablet 0    ranolazine (Ranexa) 500 MG 12 hr tablet Take 1 tablet by mouth 2 (Two) Times a Day. 60 tablet 5    pantoprazole (PROTONIX) 40 MG EC tablet Take 1 tablet by mouth Daily. (Patient not taking: Reported on 8/27/2024) 30 tablet 6    [DISCONTINUED] carvedilol (COREG) 12.5 MG tablet Take 1 tablet by mouth 2 (Two) Times a Day With Meals for 90 days. 60 tablet 2    [DISCONTINUED] omeprazole (priLOSEC) 40 MG capsule Take 1 capsule by mouth Daily.       No current facility-administered medications on file prior to visit.       ALLERGIES  Codeine and Penicillins    HISTORY  Past Medical History:   Diagnosis Date    Arthritis     Atrial fibrillation     Atrial fibrillation with RVR 07/25/2024    Balance problem     CHF (congestive heart failure)     COPD (chronic obstructive pulmonary disease)     Coronary artery disease     Dependence on cane     GERD (gastroesophageal reflux disease)     Heart attack     X 13 per patient, last one 2002 (9 heart attacks 2001- 1 cardiac stent placed)    History of hepatitis C 2001    had treatment now test neg    Hypertension     Lipoma     Myocardial infarction     Tinnitus     Wears reading eyeglasses        Social History     Socioeconomic History    Marital status: Single   Tobacco Use    Smoking status: Every Day     Current packs/day: 1.00     Average packs/day: 1 pack/day for 55.5 years (55.5 ttl pk-yrs)     Types: Cigarettes     Start date: 3/1/1969    Smokeless tobacco: Never   Vaping  "Use    Vaping status: Never Used   Substance and Sexual Activity    Alcohol use: Never    Drug use: Not Currently     Types: Marijuana     Comment: DENIES    Sexual activity: Not Currently     Partners: Female       Family History   Problem Relation Age of Onset    Heart disease Mother     Heart disease Maternal Grandmother        Review of Systems   Respiratory:  Positive for shortness of breath and wheezing.    Cardiovascular:  Positive for chest pain and palpitations. Negative for leg swelling.   Neurological:  Positive for dizziness and light-headedness. Negative for tremors, syncope and weakness.       Objective     VITALS: BP 93/54 (BP Location: Left arm, Patient Position: Sitting, Cuff Size: Adult)   Pulse 63   Ht 172.7 cm (68\")   Wt 73.9 kg (163 lb)   SpO2 94%   BMI 24.78 kg/m²     LABS:   Hospital Outpatient Visit on 08/27/2024   Component Date Value Ref Range Status    Absolute Lung Fluid Content 08/27/2024 26  20 - 35 % Final   Admission on 07/25/2024, Discharged on 07/28/2024   Component Date Value Ref Range Status    Glucose 07/25/2024 119 (H)  65 - 99 mg/dL Final    BUN 07/25/2024 18  8 - 23 mg/dL Final    Creatinine 07/25/2024 1.14  0.76 - 1.27 mg/dL Final    Sodium 07/25/2024 142  136 - 145 mmol/L Final    Potassium 07/25/2024 4.1  3.5 - 5.2 mmol/L Final    Chloride 07/25/2024 105  98 - 107 mmol/L Final    CO2 07/25/2024 28.7  22.0 - 29.0 mmol/L Final    Calcium 07/25/2024 9.1  8.6 - 10.5 mg/dL Final    Total Protein 07/25/2024 6.4  6.0 - 8.5 g/dL Final    Albumin 07/25/2024 3.9  3.5 - 5.2 g/dL Final    ALT (SGPT) 07/25/2024 16  1 - 41 U/L Final    AST (SGOT) 07/25/2024 12  1 - 40 U/L Final    Alkaline Phosphatase 07/25/2024 87  39 - 117 U/L Final    Total Bilirubin 07/25/2024 0.4  0.0 - 1.2 mg/dL Final    Globulin 07/25/2024 2.5  gm/dL Final    A/G Ratio 07/25/2024 1.6  g/dL Final    BUN/Creatinine Ratio 07/25/2024 15.8  7.0 - 25.0 Final    Anion Gap 07/25/2024 8.3  5.0 - 15.0 mmol/L Final    " eGFR 07/25/2024 71.8  >60.0 mL/min/1.73 Final    COVID19 07/25/2024 Not Detected  Not Detected - Ref. Range Final    Influenza A PCR 07/25/2024 Not Detected  Not Detected Final    Influenza B PCR 07/25/2024 Not Detected  Not Detected Final    Color, UA 07/25/2024 Yellow  Yellow, Straw Final    Appearance, UA 07/25/2024 Clear  Clear Final    pH, UA 07/25/2024 6.0  5.0 - 8.0 Final    Specific Gravity, UA 07/25/2024 >1.030 (H)  1.005 - 1.030 Final    Glucose, UA 07/25/2024 >=1000 mg/dL (3+) (A)  Negative Final    Ketones, UA 07/25/2024 Negative  Negative Final    Bilirubin, UA 07/25/2024 Negative  Negative Final    Blood, UA 07/25/2024 Trace (A)  Negative Final    Protein, UA 07/25/2024 Negative  Negative Final    Leuk Esterase, UA 07/25/2024 Negative  Negative Final    Nitrite, UA 07/25/2024 Negative  Negative Final    Urobilinogen, UA 07/25/2024 1.0 E.U./dL  0.2 - 1.0 E.U./dL Final    PTT 07/25/2024 28.8  26.5 - 34.5 seconds Final    Protime 07/25/2024 13.5  12.1 - 14.7 Seconds Final    INR 07/25/2024 1.02  0.90 - 1.10 Final    HS Troponin T 07/25/2024 19  <22 ng/L Final    proBNP 07/25/2024 712.6  0.0 - 900.0 pg/mL Final    C-Reactive Protein 07/25/2024 <0.30  0.00 - 0.50 mg/dL Final    WBC 07/25/2024 10.43  3.40 - 10.80 10*3/mm3 Final    RBC 07/25/2024 5.45  4.14 - 5.80 10*6/mm3 Final    Hemoglobin 07/25/2024 17.1  13.0 - 17.7 g/dL Final    Hematocrit 07/25/2024 51.1 (H)  37.5 - 51.0 % Final    MCV 07/25/2024 93.8  79.0 - 97.0 fL Final    MCH 07/25/2024 31.4  26.6 - 33.0 pg Final    MCHC 07/25/2024 33.5  31.5 - 35.7 g/dL Final    RDW 07/25/2024 12.8  12.3 - 15.4 % Final    RDW-SD 07/25/2024 43.6  37.0 - 54.0 fl Final    MPV 07/25/2024 9.6  6.0 - 12.0 fL Final    Platelets 07/25/2024 235  140 - 450 10*3/mm3 Final    Neutrophil % 07/25/2024 77.8 (H)  42.7 - 76.0 % Final    Lymphocyte % 07/25/2024 14.2 (L)  19.6 - 45.3 % Final    Monocyte % 07/25/2024 6.4  5.0 - 12.0 % Final    Eosinophil % 07/25/2024 0.4  0.3 - 6.2 %  Final    Basophil % 07/25/2024 0.4  0.0 - 1.5 % Final    Immature Grans % 07/25/2024 0.8 (H)  0.0 - 0.5 % Final    Neutrophils, Absolute 07/25/2024 8.12 (H)  1.70 - 7.00 10*3/mm3 Final    Lymphocytes, Absolute 07/25/2024 1.48  0.70 - 3.10 10*3/mm3 Final    Monocytes, Absolute 07/25/2024 0.67  0.10 - 0.90 10*3/mm3 Final    Eosinophils, Absolute 07/25/2024 0.04  0.00 - 0.40 10*3/mm3 Final    Basophils, Absolute 07/25/2024 0.04  0.00 - 0.20 10*3/mm3 Final    Immature Grans, Absolute 07/25/2024 0.08 (H)  0.00 - 0.05 10*3/mm3 Final    nRBC 07/25/2024 0.0  0.0 - 0.2 /100 WBC Final    RBC, UA 07/25/2024 6-10 (A)  None Seen, 0-2 /HPF Final    WBC, UA 07/25/2024 0-2  None Seen, 0-2 /HPF Final    Bacteria, UA 07/25/2024 None Seen  None Seen /HPF Final    Squamous Epithelial Cells, UA 07/25/2024 None Seen  None Seen, 0-2 /HPF Final    Hyaline Casts, UA 07/25/2024 None Seen  None Seen /LPF Final    Methodology 07/25/2024 Automated Microscopy   Final    HS Troponin T 07/25/2024 19  <22 ng/L Final    Troponin T Delta 07/25/2024 0  >=-4 - <+4 ng/L Final    TSH 07/25/2024 1.730  0.270 - 4.200 uIU/mL Final    Hemoglobin A1C 07/25/2024 5.10  4.80 - 5.60 % Final    LVIDd 07/26/2024 5.4  cm Final    LVIDs 07/26/2024 4.2  cm Final    IVSd 07/26/2024 1.08  cm Final    LVPWd 07/26/2024 1.09  cm Final    FS 07/26/2024 21.1  % Final    IVS/LVPW 07/26/2024 0.99  cm Final    ESV(cubed) 07/26/2024 76.2  ml Final    LV Sys Vol (BSA corrected) 07/26/2024 31.1  cm2 Final    EDV(cubed) 07/26/2024 155.3  ml Final    LV Juares Vol (BSA corrected) 07/26/2024 60.5  cm2 Final    LV mass(C)d 07/26/2024 228.0  grams Final    LVOT area 07/26/2024 4.9  cm2 Final    LVOT diam 07/26/2024 2.5  cm Final    EDV(MOD-sp4) 07/26/2024 110.0  ml Final    ESV(MOD-sp4) 07/26/2024 56.5  ml Final    SV(MOD-sp4) 07/26/2024 53.5  ml Final    SVi(MOD-SP4) 07/26/2024 29.4  ml/m2 Final    EF(MOD-sp4) 07/26/2024 48.6  % Final    MV E max deirdre 07/26/2024 45.4  cm/sec Final    MV  A max tobi 07/26/2024 63.4  cm/sec Final    MV E/A 07/26/2024 0.72   Final    Med Peak E' Tobi 07/26/2024 6.1  cm/sec Final    Lat Peak E' Tobi 07/26/2024 7.5  cm/sec Final    TR max tobi 07/26/2024 221.0  cm/sec Final    Avg E/e' ratio 07/26/2024 6.68   Final    TAPSE (>1.6) 07/26/2024 2.6  cm Final    LA dimension (2D)  07/26/2024 2.30  cm Final    Ao pk tobi 07/26/2024 119.0  cm/sec Final    Ao max PG 07/26/2024 5.7  mmHg Final    Ao mean PG 07/26/2024 3.0  mmHg Final    Ao V2 VTI 07/26/2024 20.0  cm Final    TR max PG 07/26/2024 19.5  mmHg Final    RVSP(TR) 07/26/2024 29.5  mmHg Final    RAP systole 07/26/2024 10.0  mmHg Final    PA acc time 07/26/2024 0.07  sec Final    Ao root diam 07/26/2024 3.6  cm Final    ACS 07/26/2024 2.00  cm Final    Phosphorus 07/26/2024 4.3  2.5 - 4.5 mg/dL Final    Magnesium 07/26/2024 2.3  1.6 - 2.4 mg/dL Final    Glucose 07/26/2024 90  65 - 99 mg/dL Final    BUN 07/26/2024 23  8 - 23 mg/dL Final    Creatinine 07/26/2024 0.93  0.76 - 1.27 mg/dL Final    Sodium 07/26/2024 141  136 - 145 mmol/L Final    Potassium 07/26/2024 4.2  3.5 - 5.2 mmol/L Final    Chloride 07/26/2024 108 (H)  98 - 107 mmol/L Final    CO2 07/26/2024 24.4  22.0 - 29.0 mmol/L Final    Calcium 07/26/2024 8.6  8.6 - 10.5 mg/dL Final    BUN/Creatinine Ratio 07/26/2024 24.7  7.0 - 25.0 Final    Anion Gap 07/26/2024 8.6  5.0 - 15.0 mmol/L Final    eGFR 07/26/2024 91.7  >60.0 mL/min/1.73 Final    HS Troponin T 07/26/2024 14  <22 ng/L Final    Site 07/26/2024 Lab   Final    pH, Venous 07/26/2024 7.428 (H)  7.320 - 7.420 pH Units Final    pCO2, Venous 07/26/2024 42.3  41.0 - 51.0 mm Hg Final    pO2, Venous 07/26/2024 62.2 (H)  27.0 - 53.0 mm Hg Final    83 Value above reference range    HCO3, Venous 07/26/2024 27.9  22.0 - 28.0 mmol/L Final    Base Excess, Venous 07/26/2024 3.1 (H)  0.0 - 2.0 mmol/L Final    O2 Saturation, Venous 07/26/2024 93.6 (H)  45.0 - 75.0 % Final    83 Value above reference range    Hemoglobin, Blood  Gas 07/26/2024 16.2  14 - 18 g/dL Final    CO2 Content 07/26/2024 29.2  22 - 33 mmol/L Final    Barometric Pressure for Blood Gas 07/26/2024 729  mmHg Final    Modality 07/26/2024 Room Air   Final    FIO2 07/26/2024 21  % Final    Ventilator Mode 07/26/2024 NA   Final    Collected by 07/26/2024 [id number]   Final    Meter: [id number]     :  [id number]    Oxyhemoglobin Venous 07/26/2024 91.4 (H)  45.0 - 75.0 % Final    83 Value above reference range    Carboxyhemoglobin Venous 07/26/2024 2.1  0.0 - 5.0 % Final    Methemoglobin Venous 07/26/2024 0.3  0.0 - 3.0 % Final    QT Interval 07/26/2024 372  ms Final    QTC Interval 07/26/2024 401  ms Final    QT Interval 07/26/2024 368  ms Final    QTC Interval 07/26/2024 410  ms Final    ADENOVIRUS, PCR 07/26/2024 Not Detected  Not Detected Final    Coronavirus 229E 07/26/2024 Not Detected  Not Detected Final    Coronavirus HKU1 07/26/2024 Not Detected  Not Detected Final    Coronavirus NL63 07/26/2024 Not Detected  Not Detected Final    Coronavirus OC43 07/26/2024 Not Detected  Not Detected Final    COVID19 07/26/2024 Not Detected  Not Detected - Ref. Range Final    Human Metapneumovirus 07/26/2024 Not Detected  Not Detected Final    Human Rhinovirus/Enterovirus 07/26/2024 Not Detected  Not Detected Final    Influenza A PCR 07/26/2024 Not Detected  Not Detected Final    Influenza B PCR 07/26/2024 Not Detected  Not Detected Final    Parainfluenza Virus 1 07/26/2024 Not Detected  Not Detected Final    Parainfluenza Virus 2 07/26/2024 Not Detected  Not Detected Final    Parainfluenza Virus 3 07/26/2024 Not Detected  Not Detected Final    Parainfluenza Virus 4 07/26/2024 Not Detected  Not Detected Final    RSV, PCR 07/26/2024 Not Detected  Not Detected Final    Bordetella pertussis pcr 07/26/2024 Not Detected  Not Detected Final    Bordetella parapertussis PCR 07/26/2024 Not Detected  Not Detected Final    Chlamydophila pneumoniae PCR 07/26/2024 Not Detected  Not  Detected Final    Mycoplasma pneumo by PCR 07/26/2024 Not Detected  Not Detected Final    Phosphorus 07/26/2024 3.0  2.5 - 4.5 mg/dL Final    Magnesium 07/26/2024 2.4  1.6 - 2.4 mg/dL Final    Glucose 07/26/2024 129 (H)  65 - 99 mg/dL Final    BUN 07/26/2024 24 (H)  8 - 23 mg/dL Final    Creatinine 07/26/2024 1.09  0.76 - 1.27 mg/dL Final    Sodium 07/26/2024 139  136 - 145 mmol/L Final    Potassium 07/26/2024 4.8  3.5 - 5.2 mmol/L Final    Slight hemolysis detected by analyzer. Result may be falsely elevated.    Chloride 07/26/2024 103  98 - 107 mmol/L Final    CO2 07/26/2024 26.3  22.0 - 29.0 mmol/L Final    Calcium 07/26/2024 9.0  8.6 - 10.5 mg/dL Final    BUN/Creatinine Ratio 07/26/2024 22.0  7.0 - 25.0 Final    Anion Gap 07/26/2024 9.7  5.0 - 15.0 mmol/L Final    eGFR 07/26/2024 75.8  >60.0 mL/min/1.73 Final    WBC 07/26/2024 9.48  3.40 - 10.80 10*3/mm3 Final    RBC 07/26/2024 5.06  4.14 - 5.80 10*6/mm3 Final    Hemoglobin 07/26/2024 16.1  13.0 - 17.7 g/dL Final    Hematocrit 07/26/2024 48.5  37.5 - 51.0 % Final    MCV 07/26/2024 95.8  79.0 - 97.0 fL Final    MCH 07/26/2024 31.8  26.6 - 33.0 pg Final    MCHC 07/26/2024 33.2  31.5 - 35.7 g/dL Final    RDW 07/26/2024 12.7  12.3 - 15.4 % Final    RDW-SD 07/26/2024 44.8  37.0 - 54.0 fl Final    MPV 07/26/2024 9.8  6.0 - 12.0 fL Final    Platelets 07/26/2024 217  140 - 450 10*3/mm3 Final    QT Interval 07/26/2024 388  ms Final    QTC Interval 07/26/2024 424  ms Final    HS Troponin T 07/26/2024 11  <22 ng/L Final    HS Troponin T 07/27/2024 12  <22 ng/L Final    Troponin T Delta 07/27/2024 1  >=-4 - <+4 ng/L Final    QT Interval 07/28/2024 392  ms Final    QTC Interval 07/28/2024 401  ms Final    QT Interval 07/25/2024 304  ms Final    QTC Interval 07/25/2024 447  ms Final   Hospital Outpatient Visit on 07/25/2024   Component Date Value Ref Range Status    Absolute Lung Fluid Content 07/25/2024 27  20 - 35 % Final    QT Interval 07/25/2024 312  ms Final    QTC  Interval 07/25/2024 460  ms Final    Lipase 07/25/2024 23  13 - 60 U/L Final    Glucose 07/25/2024 122 (H)  65 - 99 mg/dL Final    BUN 07/25/2024 17  8 - 23 mg/dL Final    Creatinine 07/25/2024 1.14  0.76 - 1.27 mg/dL Final    Sodium 07/25/2024 142  136 - 145 mmol/L Final    Potassium 07/25/2024 4.1  3.5 - 5.2 mmol/L Final    Chloride 07/25/2024 105  98 - 107 mmol/L Final    CO2 07/25/2024 29.5 (H)  22.0 - 29.0 mmol/L Final    Calcium 07/25/2024 9.2  8.6 - 10.5 mg/dL Final    BUN/Creatinine Ratio 07/25/2024 14.9  7.0 - 25.0 Final    Anion Gap 07/25/2024 7.5  5.0 - 15.0 mmol/L Final    eGFR 07/25/2024 71.8  >60.0 mL/min/1.73 Final    Magnesium 07/25/2024 2.5 (H)  1.6 - 2.4 mg/dL Final    proBNP 07/25/2024 641.9  0.0 - 900.0 pg/mL Final   Hospital Outpatient Visit on 07/03/2024   Component Date Value Ref Range Status    QT Interval 07/03/2024 394  ms Final    QTC Interval 07/03/2024 416  ms Final   Lab on 06/26/2024   Component Date Value Ref Range Status    Glucose 06/26/2024 83  65 - 99 mg/dL Final    BUN 06/26/2024 13  8 - 23 mg/dL Final    Creatinine 06/26/2024 0.94  0.76 - 1.27 mg/dL Final    Sodium 06/26/2024 140  136 - 145 mmol/L Final    Potassium 06/26/2024 4.6  3.5 - 5.2 mmol/L Final    Chloride 06/26/2024 102  98 - 107 mmol/L Final    CO2 06/26/2024 27.6  22.0 - 29.0 mmol/L Final    Calcium 06/26/2024 9.4  8.6 - 10.5 mg/dL Final    Total Protein 06/26/2024 7.1  6.0 - 8.5 g/dL Final    Albumin 06/26/2024 4.2  3.5 - 5.2 g/dL Final    ALT (SGPT) 06/26/2024 15  1 - 41 U/L Final    AST (SGOT) 06/26/2024 16  1 - 40 U/L Final    Alkaline Phosphatase 06/26/2024 90  39 - 117 U/L Final    Total Bilirubin 06/26/2024 0.5  0.0 - 1.2 mg/dL Final    Globulin 06/26/2024 2.9  gm/dL Final    A/G Ratio 06/26/2024 1.4  g/dL Final    BUN/Creatinine Ratio 06/26/2024 13.8  7.0 - 25.0 Final    Anion Gap 06/26/2024 10.4  5.0 - 15.0 mmol/L Final    eGFR 06/26/2024 90.5  >60.0 mL/min/1.73 Final   Specialty Pharmacy on 06/26/2024    Component Date Value Ref Range Status    Hepatitis B Surface Ag 06/26/2024 Non-Reactive  Non-Reactive Final    HBV IU/mL 06/26/2024 HBV DNA not detected  IU/mL Final    log10 HBV IU/mL 06/26/2024 TNP  log10 IU/mL Final    Unable to calculate result since non-numeric result obtained for  component test.    Test Information 06/26/2024 Comment   Final    The reportable range for this assay is 10 IU/mL to 1 billion IU/mL.    Protime 06/26/2024 13.4  12.1 - 14.7 Seconds Final    INR 06/26/2024 1.01  0.90 - 1.10 Final    ALPHA-FETOPROTEIN 06/26/2024 3.01  0 - 8.3 ng/mL Final    WBC 06/26/2024 6.67  3.40 - 10.80 10*3/mm3 Final    RBC 06/26/2024 5.49  4.14 - 5.80 10*6/mm3 Final    Hemoglobin 06/26/2024 17.2  13.0 - 17.7 g/dL Final    Hematocrit 06/26/2024 50.9  37.5 - 51.0 % Final    MCV 06/26/2024 92.7  79.0 - 97.0 fL Final    MCH 06/26/2024 31.3  26.6 - 33.0 pg Final    MCHC 06/26/2024 33.8  31.5 - 35.7 g/dL Final    RDW 06/26/2024 12.7  12.3 - 15.4 % Final    RDW-SD 06/26/2024 42.8  37.0 - 54.0 fl Final    MPV 06/26/2024 10.3  6.0 - 12.0 fL Final    Platelets 06/26/2024 222  140 - 450 10*3/mm3 Final    Neutrophil % 06/26/2024 71.5  42.7 - 76.0 % Final    Lymphocyte % 06/26/2024 19.6  19.6 - 45.3 % Final    Monocyte % 06/26/2024 6.9  5.0 - 12.0 % Final    Eosinophil % 06/26/2024 0.7  0.3 - 6.2 % Final    Basophil % 06/26/2024 0.6  0.0 - 1.5 % Final    Immature Grans % 06/26/2024 0.7 (H)  0.0 - 0.5 % Final    Neutrophils, Absolute 06/26/2024 4.76  1.70 - 7.00 10*3/mm3 Final    Lymphocytes, Absolute 06/26/2024 1.31  0.70 - 3.10 10*3/mm3 Final    Monocytes, Absolute 06/26/2024 0.46  0.10 - 0.90 10*3/mm3 Final    Eosinophils, Absolute 06/26/2024 0.05  0.00 - 0.40 10*3/mm3 Final    Basophils, Absolute 06/26/2024 0.04  0.00 - 0.20 10*3/mm3 Final    Immature Grans, Absolute 06/26/2024 0.05  0.00 - 0.05 10*3/mm3 Final    nRBC 06/26/2024 0.0  0.0 - 0.2 /100 WBC Final   Hospital Outpatient Visit on 06/12/2024   Component Date  Value Ref Range Status    Absolute Lung Fluid Content 06/12/2024 27  20 - 35 % Final    proBNP 06/12/2024 60.0  0.0 - 900.0 pg/mL Final    Glucose 06/12/2024 136 (H)  65 - 99 mg/dL Final    BUN 06/12/2024 20  8 - 23 mg/dL Final    Creatinine 06/12/2024 0.91  0.76 - 1.27 mg/dL Final    Sodium 06/12/2024 136  136 - 145 mmol/L Final    Potassium 06/12/2024 4.2  3.5 - 5.2 mmol/L Final    Chloride 06/12/2024 101  98 - 107 mmol/L Final    CO2 06/12/2024 25.5  22.0 - 29.0 mmol/L Final    Calcium 06/12/2024 8.9  8.6 - 10.5 mg/dL Final    BUN/Creatinine Ratio 06/12/2024 22.0  7.0 - 25.0 Final    Anion Gap 06/12/2024 9.5  5.0 - 15.0 mmol/L Final    eGFR 06/12/2024 94.1  >60.0 mL/min/1.73 Final    Magnesium 06/12/2024 2.3  1.6 - 2.4 mg/dL Final         IMAGING:   CT Abdomen Pelvis Without Contrast    Result Date: 7/27/2024  No acute process noted in the abdomen or pelvis.     [id number] Zip code [id number]      This report was finalized on 7/27/2024 7:53 AM by Dr. Nini Thrasher MD.      XR Chest 1 View    Result Date: 7/27/2024  Impression:  1.  Normal heart size 2.  Mild coarsened bronchovascular pattern to the lungs 3.  No lobar consolidation or edema. 4.  No pleural effusion or pneumothorax.  This report was finalized on 7/27/2024 3:26 AM by Ernesto Figueroa MD.      CT Head Without Contrast    Result Date: 7/26/2024  No acute intracranial process.   This report was finalized on 7/26/2024 6:35 PM by Alex Pallas, DO.      XR Chest 1 View    Result Date: 7/25/2024    Unremarkable exam. No acute cardiopulmonary findings identified.   This report was finalized on 7/25/2024 11:13 AM by Dr. Colt Elder MD.      Results for orders placed during the hospital encounter of 09/05/23    Stress Test With Myocardial Perfusion One Day    Interpretation Summary  Images from the original result were not included.      A pharmacological stress test was performed using regadenoson without low-level exercise.    Findings consistent with a normal ECG  stress test.    Myocardial perfusion imaging indicates a normal myocardial perfusion study with no evidence of ischemia.    Abnormal LV wall motion consistent with moderate hypokinesis.    Left ventricular ejection fraction is moderately reduced (Calculated EF = 41%).    Impressions are consistent with a low risk to intermediate study.     No results found for this or any previous visit.          EXAM:  Constitutional:       General: Awake.      Appearance: Healthy appearance. Not in distress.   Eyes:      Conjunctiva/sclera: Conjunctivae normal.   HENT:      Head: Normocephalic and atraumatic.      Nose: Nose normal.    Mouth/Throat:      Pharynx: Oropharynx is clear.   Neck:      Thyroid: Thyroid normal.      Vascular: No carotid bruit or JVD.   Pulmonary:      Effort: Pulmonary effort is normal.      Breath sounds: Normal breath sounds.   Chest:      Chest wall: Not tender to palpatation.   Cardiovascular:      PMI at left midclavicular line. Normal rate. Regular rhythm. Normal S1 with normal intensity. Normal S2 with normal intensity.       Murmurs: There is no murmur.      No gallop.  No rub.   Pulses:     Intact distal pulses.   Edema:     Peripheral edema absent.   Abdominal:      General: Bowel sounds are normal. There is no distension.      Palpations: Abdomen is soft. There is no hepatomegaly.      Tenderness: There is no abdominal tenderness.   Musculoskeletal: Normal range of motion.      Cervical back: Normal range of motion. Skin:     General: Skin is warm and dry. There is no cyanosis.      Coloration: Skin is not jaundiced.   Neurological:      Mental Status: Alert and oriented to person, place and time.      Motor: Motor function is intact.      Gait: Gait is intact.   Psychiatric:         Attention and Perception: Attention and perception normal.         Speech: Speech normal.         Behavior: Behavior is cooperative.         Cognition and Memory: Cognition and memory normal.         Judgment:  Judgment normal.          Procedure   Procedures       Assessment & Plan     Diagnoses and all orders for this visit:    1. Paroxysmal atrial fibrillation (Primary)    2. ASCVD (arteriosclerotic cardiovascular disease)    3. Secondary hypertension          PLAN  #1 cardiac.  Patient with history of ischemic cardiomyopathy with decreased ejection fraction.  Patient had a cath in 2020 with LAD occlusion noted.  Patient has cardiomyopathy with decreased ejection fraction.  Last echo was in July 26, 2020 for EF 41 to 45%.  Patient has had some discomfort in the chest.  Negative stress test in January 2024.  Can consider getting a viability study to see if the  of the LAD needs to be opened up  Will continue antianginal medications for now.  #2 A-fib.  Patient has paroxysmal atrial fibrillation.  Patient has seen Dr. borrero in the past for evaluation for ablation.  Will need to follow back up with him  #3 hypotension.  Will start low-dose midodrine to help with hypotension.  #4 cardiomyopathy.  Patient follows with heart failure clinic.  He is on carvedilol and Jardiance.  Patient unable to take ARB or Arni due to low blood pressure.           MEDS ORDERED DURING VISIT:    There are no discontinued medications.     No orders of the defined types were placed in this encounter.        Follow Up:   Return in about 4 months (around 12/27/2024) for Recheck.    Patient was given instructions and counseling regarding his condition or for health maintenance advice. Please see specific information pulled into the AVS if appropriate.   As always, Amy Yanez, SAMARA  I appreciate very much the opportunity to participate in the cardiovascular care of your patients. Please do not hesitate to call me with any questions with regards to Chong White Sr. evaluation and management.         This document has been electronically signed by Cl Keen MD FAC, Interventional Cardiology  August 27, 2024 15:53 EDT    Dictated  Utilizing Dragon Dictation: Part of this note may be an electronic transcription/translation of spoken language to printed text using the Dragon Dictation System.

## 2024-08-27 NOTE — PROGRESS NOTES
Heart Failure Clinic    Date: 08/27/24     Vitals:    08/27/24 1502   BP: 122/65   Pulse: 65   SpO2: 94%    Weight 162    Method of arrival: Ambulatory with cane    Weighing self daily: No    Monitoring Heart Failure Zones: No    Today's HF Zone: Yellow     Taking medications as prescribed: Yes    Edema No    Shortness of Air: Yes    Number of pillows used at night:Recliner    Educational Materials given:  AVS                                                                         ReDS Value: 26  25-35 Optimal Value Status      Jose Elias Mccoy MA 08/27/24 15:03 EDT

## 2024-08-27 NOTE — PROGRESS NOTES
Heart Failure Clinic  Pharmacist Note     Chogn White Sr. is a 64 y.o. male seen in the Heart Failure Clinic for combined systolic and diastolic HF. Most recent EF was 41-45% on 7/25/24. Chong White Sr. has a poor understanding of his medication regimen.  Patient did not bring medication bag with him to appointment today. He tells me he has been taking Entresto and Imdur (and they have been filled per medication dispense history). He tells me he has been taking Lasix 20mg daily.   He reports that he does not weigh himself or check his BP at home.   Medication Use:   Hx of med intolerances:  None related to HF  Retail Rx Management: [facility]    Past Medical History:   Diagnosis Date    Arthritis     Atrial fibrillation     Atrial fibrillation with RVR 07/25/2024    Balance problem     CHF (congestive heart failure)     COPD (chronic obstructive pulmonary disease)     Coronary artery disease     Dependence on cane     GERD (gastroesophageal reflux disease)     Heart attack     X 13 per patient, last one 2002 (9 heart attacks 2001- 1 cardiac stent placed)    History of hepatitis C 2001    had treatment now test neg    Hypertension     Lipoma     Myocardial infarction     Tinnitus     Wears reading eyeglasses      ALLERGIES: Codeine and Penicillins  Current Outpatient Medications   Medication Sig Dispense Refill    amiodarone (PACERONE) 200 MG tablet Take 2 tablets by mouth 2 (Two) Times a Day for 5 days, THEN 1 tablet Daily for 30 days. 50 tablet 0    apixaban (Eliquis) 5 MG tablet tablet Take 1 tablet by mouth 2 (Two) Times a Day. 60 tablet 2    aspirin (Aspirin Low Dose) 81 MG EC tablet Take 1 tablet by mouth Daily. 30 tablet 5    atorvastatin (LIPITOR) 40 MG tablet Take 1 tablet by mouth Daily. 30 tablet 5    carvedilol (COREG) 12.5 MG tablet Take 1 tablet by mouth 2 (Two) Times a Day With Meals for 90 days. 60 tablet 2    empagliflozin (JARDIANCE) 10 MG tablet tablet Take 1 tablet by mouth  "Daily. 30 tablet 5    furosemide (LASIX) 20 MG tablet Take 1 tablet by mouth Daily As Needed (Weight gain or leg swelling; call the heart failure clinic the day that you decide to take this) for up to 90 days. Do not take if BP is less than 100/60 30 tablet 2    HYDROcodone-acetaminophen (NORCO) 7.5-325 MG per tablet Take 1 tablet by mouth Every 12 (Twelve) Hours As Needed for Moderate Pain.      isosorbide mononitrate (IMDUR) 60 MG 24 hr tablet Take 1 tablet by mouth Daily.      pantoprazole (PROTONIX) 40 MG EC tablet Take 1 tablet by mouth Daily. 30 tablet 6    sacubitril-valsartan (Entresto) 24-26 MG tablet Take 1 tablet by mouth 2 (Two) Times a Day. 60 tablet 2    nitroglycerin (NITROSTAT) 0.4 MG SL tablet Place 1 tablet under the tongue Every 5 (Five) Minutes As Needed for Chest Pain (Only if SBP Greater Than 100). Take no more than 3 doses in 15 minutes. (Patient not taking: Reported on 8/27/2024) 50 tablet 0    ranolazine (Ranexa) 500 MG 12 hr tablet Take 1 tablet by mouth 2 (Two) Times a Day. (Patient not taking: Reported on 8/27/2024) 60 tablet 5     No current facility-administered medications for this encounter.       Vaccination History:   Pneumonia: PPSV23 9/21/20; PCV20 2/21/23  Annual Influenza: UTD 23/24 season  Shingles: Reports UTD    Objective  Vitals:    08/27/24 1502   BP: 122/65   BP Location: Right arm   Patient Position: Sitting   Cuff Size: Adult   Pulse: 65   SpO2: 94%   Weight: 73.5 kg (162 lb)   Height: 172.7 cm (68\")       Wt Readings from Last 3 Encounters:   08/27/24 73.5 kg (162 lb)   07/28/24 76.7 kg (169 lb 1.6 oz)   07/25/24 71.1 kg (156 lb 12.8 oz)         08/27/24  1502   Weight: 73.5 kg (162 lb)       Lab Results   Component Value Date    GLUCOSE 129 (H) 07/26/2024    BUN 24 (H) 07/26/2024    CREATININE 1.09 07/26/2024    EGFRIFNONA 84 02/23/2022    EGFRIFAFRI 106 10/29/2020    BCR 22.0 07/26/2024    K 4.8 07/26/2024    CO2 26.3 07/26/2024    CALCIUM 9.0 07/26/2024    " PROTENTOTREF 6.6 10/29/2020    ALBUMIN 3.9 07/25/2024    LABIL2 1.9 10/29/2020    AST 12 07/25/2024    ALT 16 07/25/2024     Lab Results   Component Value Date    WBC 9.48 07/26/2024    HGB 16.1 07/26/2024    HCT 48.5 07/26/2024    MCV 95.8 07/26/2024     07/26/2024     Lab Results   Component Value Date    TROPONINT 12 07/27/2024     Lab Results   Component Value Date    PROBNP 712.6 07/25/2024     Results for orders placed during the hospital encounter of 07/25/24    Adult Transthoracic Echo Complete W/ Cont if Necessary Per Protocol    Interpretation Summary    Left ventricular ejection fraction appears to be 41 - 45%.    Left ventricular diastolic function is consistent with (grade I) impaired relaxation.    Estimated right ventricular systolic pressure from tricuspid regurgitation is normal (<35 mmHg).         GDMT    Drug Class   Drug   Dose Last Dose Adjustment Additional Titration   Notes   ACEi/ARB/ARNI Entresto 24/26 mg BID 7/12/22 Needed    Beta Blocker Carvedilol 12.5 mg BID 7/12/22 Needed    SGLT2i Jardiance 10mg QD 7/27/22 N/A                Drug Therapy Problems    None at this time    Recommendations:     Patient would like medications filled @ [facility]. Veronica will send there.     Patient was educated on heart failure medications and the importance of medication adherence. All questions were addressed and patient would benefit from additional education at each visit.     Thank you for allowing me to participate in the care of your patient,    Georgia Hartley, PharmD  8/27/2024  15:21 EDT
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
## 2024-08-28 ENCOUNTER — SPECIALTY PHARMACY (OUTPATIENT)
Dept: PHARMACY | Facility: HOSPITAL | Age: 64
End: 2024-08-28
Payer: MEDICAID

## 2024-08-28 VITALS
DIASTOLIC BLOOD PRESSURE: 55 MMHG | BODY MASS INDEX: 24.67 KG/M2 | WEIGHT: 162.8 LBS | HEIGHT: 68 IN | HEART RATE: 63 BPM | SYSTOLIC BLOOD PRESSURE: 86 MMHG | OXYGEN SATURATION: 94 %

## 2024-08-28 DIAGNOSIS — Z86.19 HISTORY OF HEPATITIS B: Primary | ICD-10-CM

## 2024-08-28 PROCEDURE — 99213 OFFICE O/P EST LOW 20 MIN: CPT | Performed by: PHYSICIAN ASSISTANT

## 2024-08-28 NOTE — PROGRESS NOTES
No chief complaint on file.      Chong White Sr. is a 64 y.o. male who presents to the office today for follow up appointment for No chief complaint on file.  .    HPI    Patient was seen for a follow up to check on status of past Hepatitis B infection.  Recent labs reported HBV to be undetected.  HCV fibrosure was normal.  Liver chemistries normal.  PT INR normal.  Platelets normal.        Review of Systems   Constitutional:  Positive for fatigue. Negative for activity change and appetite change.   HENT:  Negative for trouble swallowing and voice change.    Respiratory:  Negative for cough and choking.    Cardiovascular:  Negative for leg swelling.   Gastrointestinal:  Negative for abdominal distention, abdominal pain, anal bleeding, blood in stool, constipation, diarrhea, nausea, rectal pain and vomiting.   Endocrine: Negative for polyphagia.   Genitourinary:  Negative for flank pain.   Musculoskeletal:  Positive for gait problem. Negative for back pain.   Skin:  Negative for color change and pallor.   Allergic/Immunologic: Negative for food allergies.   Neurological:  Negative for weakness.   Psychiatric/Behavioral:  Negative for confusion.        ACTIVE PROBLEMS:   Specialty Problems          Cardiology Problems    Atrial fibrillation        Chronic combined systolic and diastolic congestive heart failure        Ischemic cardiomyopathy        Atrial fibrillation with RVR           PAST MEDICAL HISTORY:  Past Medical History:   Diagnosis Date    Arthritis     Atrial fibrillation     Atrial fibrillation with RVR 07/25/2024    Balance problem     CHF (congestive heart failure)     COPD (chronic obstructive pulmonary disease)     Coronary artery disease     Dependence on cane     GERD (gastroesophageal reflux disease)     Heart attack     X 13 per patient, last one 2002 (9 heart attacks 2001- 1 cardiac stent placed)    History of hepatitis C 2001    had treatment now test neg    Hypertension     Lipoma      Myocardial infarction     Tinnitus     Wears reading eyeglasses        SURGICAL HISTORY:  Past Surgical History:   Procedure Laterality Date    CARDIAC CATHETERIZATION N/A 09/04/2020    Procedure: Left Heart Cath;  Surgeon: Herman Sen MD;  Location: [facility] CATH INVASIVE LOCATION;  Service: Cardiology;  Laterality: N/A;    COLONOSCOPY      CORONARY STENT PLACEMENT      FACIAL RECONSTRUCTION SURGERY Right 1995    HEAD/NECK LESION/CYST EXCISION Right 12/20/2023    Procedure: LIPOMA EXCISION: right scalp and right shoulder;  Surgeon: Celeste Sheets MD;  Location: [facility] OR;  Service: General;  Laterality: Right;       FAMILY HISTORY:  Family History   Problem Relation Age of Onset    Heart disease Mother     Heart disease Maternal Grandmother        SOCIAL HISTORY:  Social History     Tobacco Use    Smoking status: Every Day     Current packs/day: 1.00     Average packs/day: 1 pack/day for 55.5 years (55.5 total pack years)     Types: Cigarettes     Start date: 3/1/1969    Smokeless tobacco: Never   Substance Use Topics    Alcohol use: Never       CURRENT MEDICATION:    Current Outpatient Medications:     amiodarone (PACERONE) 200 MG tablet, Take 2 tablets by mouth 2 (Two) Times a Day for 5 days, THEN 1 tablet Daily for 30 days., Disp: 50 tablet, Rfl: 0    apixaban (Eliquis) 5 MG tablet tablet, Take 1 tablet by mouth 2 (Two) Times a Day., Disp: 60 tablet, Rfl: 2    aspirin (Aspirin Low Dose) 81 MG EC tablet, Take 1 tablet by mouth Daily., Disp: 30 tablet, Rfl: 5    atorvastatin (LIPITOR) 40 MG tablet, Take 1 tablet by mouth Daily., Disp: 30 tablet, Rfl: 5    carvedilol (COREG) 12.5 MG tablet, Take 1 tablet by mouth 2 (Two) Times a Day With Meals for 90 days., Disp: 60 tablet, Rfl: 2    empagliflozin (JARDIANCE) 10 MG tablet tablet, Take 1 tablet by mouth Daily., Disp: 30 tablet, Rfl: 5    furosemide (LASIX) 20 MG tablet, Take 1 tablet by mouth Daily As Needed (Weight gain or leg swelling; call the heart  "failure clinic the day that you decide to take this) for up to 90 days. Do not take if BP is less than 100/60, Disp: 30 tablet, Rfl: 2    HYDROcodone-acetaminophen (NORCO) 7.5-325 MG per tablet, Take 1 tablet by mouth Every 12 (Twelve) Hours As Needed for Moderate Pain., Disp: , Rfl:     isosorbide mononitrate (IMDUR) 60 MG 24 hr tablet, Take 1 tablet by mouth Daily., Disp: , Rfl:     midodrine (PROAMATINE) 5 MG tablet, Take 1 tablet by mouth every night at bedtime., Disp: 30 tablet, Rfl: 12    nitroglycerin (NITROSTAT) 0.4 MG SL tablet, Place 1 tablet under the tongue Every 5 (Five) Minutes As Needed for Chest Pain (Only if SBP Greater Than 100). Take no more than 3 doses in 15 minutes., Disp: 50 tablet, Rfl: 0    pantoprazole (PROTONIX) 40 MG EC tablet, Take 1 tablet by mouth Daily. (Patient not taking: Reported on 8/27/2024), Disp: 30 tablet, Rfl: 6    ranolazine (Ranexa) 500 MG 12 hr tablet, Take 1 tablet by mouth 2 (Two) Times a Day., Disp: 60 tablet, Rfl: 5    sacubitril-valsartan (Entresto) 24-26 MG tablet, Take 1 tablet by mouth 2 (Two) Times a Day., Disp: 60 tablet, Rfl: 2    ALLERGIES:  Codeine and Penicillins    VISIT VITALS:  Blood Pressure (Abnormal) 86/55 (BP Location: Right arm, Patient Position: Sitting, Cuff Size: Adult)   Pulse 63   Height 172.7 cm (68\")   Weight 73.8 kg (162 lb 12.8 oz)   Oxygen Saturation 94%   Body Mass Index 24.75 kg/m²     Physical Exam  Constitutional:       General: He is not in acute distress.     Appearance: He is well-developed. He is not diaphoretic.   HENT:      Head: Normocephalic and atraumatic.      Right Ear: External ear normal.      Left Ear: External ear normal.      Nose: Nose normal.      Mouth/Throat:      Pharynx: No oropharyngeal exudate.   Eyes:      General: No scleral icterus.        Right eye: No discharge.         Left eye: No discharge.      Conjunctiva/sclera: Conjunctivae normal.      Pupils: Pupils are equal, round, and reactive to light. "   Neck:      Thyroid: No thyromegaly.      Vascular: No JVD.      Trachea: No tracheal deviation.   Cardiovascular:      Rate and Rhythm: Normal rate and regular rhythm.      Heart sounds: Normal heart sounds. No murmur heard.     No friction rub. No gallop.   Pulmonary:      Effort: Pulmonary effort is normal. No respiratory distress.      Breath sounds: Normal breath sounds. No stridor. No wheezing or rales.   Chest:      Chest wall: No tenderness.   Abdominal:      General: Bowel sounds are normal. There is no distension.      Palpations: Abdomen is soft. There is no mass.      Tenderness: There is no abdominal tenderness. There is no guarding or rebound.      Hernia: No hernia is present.   Genitourinary:     Rectum: Guaiac result negative.   Musculoskeletal:         General: Signs of injury present.      Cervical back: Normal range of motion and neck supple.   Lymphadenopathy:      Cervical: No cervical adenopathy.   Skin:     General: Skin is warm and dry.      Coloration: Skin is not pale.      Findings: No erythema or rash.   Neurological:      Mental Status: He is alert and oriented to person, place, and time.      Cranial Nerves: No cranial nerve deficit.      Motor: No abnormal muscle tone.      Coordination: Coordination normal.      Gait: Gait abnormal.      Deep Tendon Reflexes: Reflexes are normal and symmetric. Reflexes normal.   Psychiatric:         Behavior: Behavior normal.         Thought Content: Thought content normal.         Judgment: Judgment normal.         Assessment & Plan      Diagnosis Plan   1. History of hepatitis B          Patient's recent labs confirmed that past Hepatitis B infection has not returned.  He was educated on how to monitor for its return and behaviors to avoid to lessen chances that it will reactivate.  He has low BP today but was asymptomatic other than fatigue that he says is no worse than normal.  He was advised to go to ER if symptoms worsen.  He was also advised  to call his heart doctor to report his low BP.  He voiced understanding and agreement.           Raffy Shetty PA-C

## 2024-08-29 NOTE — ADDENDUM NOTE
Encounter addended by: Veronica Kim PA-C on: 8/29/2024 9:41 AM   Actions taken: Order list changed, Clinical Note Signed

## 2024-08-29 NOTE — ADDENDUM NOTE
Encounter addended by: Destini Toscano [facility] on: 8/29/2024 8:43 AM   Actions taken: Social Care [employer] section edited

## 2024-09-16 ENCOUNTER — TRANSCRIBE ORDERS (OUTPATIENT)
Dept: ADMINISTRATIVE | Facility: HOSPITAL | Age: 64
End: 2024-09-16
Payer: MEDICAID

## 2024-09-16 DIAGNOSIS — M54.50 LOW BACK PAIN, UNSPECIFIED BACK PAIN LATERALITY, UNSPECIFIED CHRONICITY, UNSPECIFIED WHETHER SCIATICA PRESENT: ICD-10-CM

## 2024-09-16 DIAGNOSIS — M54.12 RADICULOPATHY, CERVICAL REGION: Primary | ICD-10-CM

## 2024-09-26 ENCOUNTER — HOSPITAL ENCOUNTER (OUTPATIENT)
Dept: CARDIOLOGY | Facility: HOSPITAL | Age: 64
Discharge: HOME | End: 2024-09-26
Payer: MEDICAID

## 2024-09-26 VITALS
SYSTOLIC BLOOD PRESSURE: 100 MMHG | BODY MASS INDEX: 24.95 KG/M2 | WEIGHT: 164.6 LBS | HEIGHT: 68 IN | DIASTOLIC BLOOD PRESSURE: 62 MMHG | HEART RATE: 58 BPM | OXYGEN SATURATION: 95 %

## 2024-09-26 DIAGNOSIS — M79.672 BILATERAL FOOT PAIN: ICD-10-CM

## 2024-09-26 DIAGNOSIS — G47.9 DIFFICULTY SLEEPING: ICD-10-CM

## 2024-09-26 DIAGNOSIS — I50.42 CHRONIC COMBINED SYSTOLIC AND DIASTOLIC CONGESTIVE HEART FAILURE: Primary | ICD-10-CM

## 2024-09-26 DIAGNOSIS — I25.5 ISCHEMIC CARDIOMYOPATHY: ICD-10-CM

## 2024-09-26 DIAGNOSIS — M79.671 BILATERAL FOOT PAIN: ICD-10-CM

## 2024-09-26 DIAGNOSIS — R53.83 OTHER FATIGUE: ICD-10-CM

## 2024-09-26 LAB
ABSOLUTE LUNG FLUID CONTENT: 26 % (ref 20–35)
ANION GAP SERPL CALCULATED.3IONS-SCNC: 8.7 MMOL/L (ref 5–15)
BUN SERPL-MCNC: 13 MG/DL (ref 8–23)
BUN/CREAT SERPL: 13.4 (ref 7–25)
CALCIUM SPEC-SCNC: 8.9 MG/DL (ref 8.6–10.5)
CHLORIDE SERPL-SCNC: 103 MMOL/L (ref 98–107)
CO2 SERPL-SCNC: 27.3 MMOL/L (ref 22–29)
CREAT SERPL-MCNC: 0.97 MG/DL (ref 0.76–1.27)
EGFRCR SERPLBLD CKD-EPI 2021: 87.2 ML/MIN/1.73
GLUCOSE SERPL-MCNC: 107 MG/DL (ref 65–99)
MAGNESIUM SERPL-MCNC: 2.4 MG/DL (ref 1.6–2.4)
NT-PROBNP SERPL-MCNC: 582.3 PG/ML (ref 0–900)
POTASSIUM SERPL-SCNC: 4.1 MMOL/L (ref 3.5–5.2)
SODIUM SERPL-SCNC: 139 MMOL/L (ref 136–145)

## 2024-09-26 PROCEDURE — 83735 ASSAY OF MAGNESIUM: CPT | Performed by: PHYSICIAN ASSISTANT

## 2024-09-26 PROCEDURE — 94726 PLETHYSMOGRAPHY LUNG VOLUMES: CPT | Performed by: PHYSICIAN ASSISTANT

## 2024-09-26 PROCEDURE — 80048 BASIC METABOLIC PNL TOTAL CA: CPT | Performed by: PHYSICIAN ASSISTANT

## 2024-09-26 PROCEDURE — 36415 COLL VENOUS BLD VENIPUNCTURE: CPT | Performed by: PHYSICIAN ASSISTANT

## 2024-09-26 PROCEDURE — 83880 ASSAY OF NATRIURETIC PEPTIDE: CPT | Performed by: PHYSICIAN ASSISTANT

## 2024-09-26 RX ORDER — SACUBITRIL AND VALSARTAN 24; 26 MG/1; MG/1
1 TABLET, FILM COATED ORAL 2 TIMES DAILY
COMMUNITY
Start: 2024-09-17

## 2024-09-26 NOTE — PROGRESS NOTES
Heart Failure Clinic    Date: 09/26/24     Vitals:    09/26/24 1341   BP: 100/62   Pulse: 58   SpO2: 95%      Weight 164.6  Method of arrival: Ambulatory with cane    Weighing self daily: Most days    Monitoring Heart Failure Zones: Most days    Today's HF Zone: Yellow     Taking medications as prescribed: Yes    Edema No    Shortness of Air: Yes with activity    Number of pillows used at night:Recliner    Educational Materials given:  avs                                                                         ReDS Value: 26  25-35 Optimal Value Status      Esteban Levine RN 09/26/24 13:45 EDT

## 2024-09-26 NOTE — PROGRESS NOTES
Heart Failure Clinic  Pharmacist Note     Chong White Sr. is a 64 y.o. male seen in the Heart Failure Clinic for combined systolic and diastolic HF. Most recent EF was 41-45% on 7/25/24. Chong White Sr. has a poor understanding of his medication regimen.  Patient did not bring medication bag with him to appointment today. He reports good adherence to his medication which does mostly appear that he has had recent fills at his pharmacy. Eliquis is the only medication that appears is overdue for a refill but patient reports taking it.  All of this being said, patient is unsure of most medication names when going through his list.   Patient reports that he has been taking Lasix 20 mg daily instead of using PRN. He says he takes it a long with his other meds and does endorse swelling in his feet also which is why he takes every day. He reports good output. He denies any swelling beyond baseline today but does report some SOB with exertion ( said walking from parking lot to [facility] appt today had him SOB). He reports overall feeling poorly all of the time. He denies any NTG use but does report he has CP occasionally.     He reports that he does not weigh himself or check his BP at home.     Medication Use:   Hx of med intolerances:  None related to HF  Retail Rx Management: [facility] ( changed from [facility])    Past Medical History:   Diagnosis Date    Arthritis     Atrial fibrillation     Atrial fibrillation with RVR 07/25/2024    Balance problem     CHF (congestive heart failure)     COPD (chronic obstructive pulmonary disease)     Coronary artery disease     Dependence on cane     GERD (gastroesophageal reflux disease)     Heart attack     X 13 per patient, last one 2002 (9 heart attacks 2001- 1 cardiac stent placed)    History of hepatitis C 2001    had treatment now test neg    Hypertension     Lipoma     Myocardial infarction     Tinnitus     Wears reading eyeglasses      ALLERGIES:  Codeine and Penicillins  Current Outpatient Medications   Medication Sig Dispense Refill    apixaban (Eliquis) 5 MG tablet tablet Take 1 tablet by mouth 2 (Two) Times a Day. 60 tablet 2    aspirin (Aspirin Low Dose) 81 MG EC tablet Take 1 tablet by mouth Daily. 30 tablet 5    atorvastatin (LIPITOR) 40 MG tablet Take 1 tablet by mouth Daily. 30 tablet 5    carvedilol (COREG) 12.5 MG tablet Take 1 tablet by mouth 2 (Two) Times a Day With Meals for 90 days. 60 tablet 2    empagliflozin (JARDIANCE) 10 MG tablet tablet Take 1 tablet by mouth Daily. 30 tablet 5    Entresto 24-26 MG tablet Take 1 tablet by mouth 2 (Two) Times a Day.      furosemide (LASIX) 20 MG tablet Take 1 tablet by mouth Daily As Needed (Weight gain or leg swelling; call the heart failure clinic the day that you decide to take this) for up to 90 days. Do not take if BP is less than 100/60 30 tablet 2    HYDROcodone-acetaminophen (NORCO) 7.5-325 MG per tablet Take 1 tablet by mouth Every 12 (Twelve) Hours As Needed for Moderate Pain.      isosorbide mononitrate (IMDUR) 60 MG 24 hr tablet Take 1 tablet by mouth Daily.      midodrine (PROAMATINE) 5 MG tablet Take 1 tablet by mouth every night at bedtime. 30 tablet 12    nitroglycerin (NITROSTAT) 0.4 MG SL tablet Place 1 tablet under the tongue Every 5 (Five) Minutes As Needed for Chest Pain (Only if SBP Greater Than 100). Take no more than 3 doses in 15 minutes. 50 tablet 0    pantoprazole (PROTONIX) 40 MG EC tablet Take 1 tablet by mouth Daily. 30 tablet 6    ranolazine (Ranexa) 500 MG 12 hr tablet Take 1 tablet by mouth 2 (Two) Times a Day. 60 tablet 5     No current facility-administered medications for this encounter.       Vaccination History:   Pneumonia: PPSV23 9/21/20; PCV20 2/21/23  Annual Influenza: UTD 23/24 season  Shingles: Reports UTD    Objective  Vitals:    09/26/24 1341   BP: 100/62   BP Location: Left arm   Patient Position: Sitting   Cuff Size: Adult   Pulse: 58   SpO2: 95%   Weight:  "74.7 kg (164 lb 9.6 oz)   Height: 172.7 cm (68\")       Wt Readings from Last 3 Encounters:   09/26/24 74.7 kg (164 lb 9.6 oz)   08/28/24 73.8 kg (162 lb 12.8 oz)   08/27/24 73.5 kg (162 lb)         09/26/24  1341   Weight: 74.7 kg (164 lb 9.6 oz)       Lab Results   Component Value Date    GLUCOSE 129 (H) 07/26/2024    BUN 24 (H) 07/26/2024    CREATININE 1.09 07/26/2024    EGFRIFNONA 84 02/23/2022    EGFRIFAFRI 106 10/29/2020    BCR 22.0 07/26/2024    K 4.8 07/26/2024    CO2 26.3 07/26/2024    CALCIUM 9.0 07/26/2024    PROTENTOTREF 6.6 10/29/2020    ALBUMIN 3.9 07/25/2024    LABIL2 1.9 10/29/2020    AST 12 07/25/2024    ALT 16 07/25/2024     Lab Results   Component Value Date    WBC 9.48 07/26/2024    HGB 16.1 07/26/2024    HCT 48.5 07/26/2024    MCV 95.8 07/26/2024     07/26/2024     Lab Results   Component Value Date    TROPONINT 12 07/27/2024     Lab Results   Component Value Date    PROBNP 712.6 07/25/2024     Results for orders placed during the hospital encounter of 07/25/24    Adult Transthoracic Echo Complete W/ Cont if Necessary Per Protocol    Interpretation Summary    Left ventricular ejection fraction appears to be 41 - 45%.    Left ventricular diastolic function is consistent with (grade I) impaired relaxation.    Estimated right ventricular systolic pressure from tricuspid regurgitation is normal (<35 mmHg).         GDMT    Drug Class   Drug   Dose Last Dose Adjustment Additional Titration   Notes   ACEi/ARB/ARNI Entresto 24/26 mg BID 7/12/22 Needed    Beta Blocker Carvedilol 12.5 mg BID 7/12/22 Needed    SGLT2i Jardiance 10mg QD 7/27/22 N/A                Drug Therapy Problems    Duplicate PPI's filled. Both show at [facility] on 9/9/24: Protonix and Prilosec  GDMT    Recommendations:     Veronica instructed patient to stop Prilosec ( not on AVS but showing in outside reconcile and sidebar summary). Called [facility] and spoke with Kellie. She confirmed both Prilosec and Protonix filled on " 9/9/24. Asked that Prilosec be discontinued so both PPI's did not continue to get filled together. This was discussed with Veronica and she wanted Prilosec discontinued.   No further recommendations at this time.     Patient was educated on heart failure medications and the importance of medication adherence. All questions were addressed and patient would benefit from additional education at each visit.     Thank you for allowing me to participate in the care of your patient,    Gwen Marrero. Lazaro, Luis  9/26/2024  14:24 EDT

## 2024-09-30 ENCOUNTER — HOSPITAL ENCOUNTER (OUTPATIENT)
Dept: MRI IMAGING | Facility: HOSPITAL | Age: 64
Discharge: HOME | End: 2024-09-30
Payer: MEDICAID

## 2024-09-30 DIAGNOSIS — M54.12 RADICULOPATHY, CERVICAL REGION: ICD-10-CM

## 2024-09-30 DIAGNOSIS — M54.50 LOW BACK PAIN, UNSPECIFIED BACK PAIN LATERALITY, UNSPECIFIED CHRONICITY, UNSPECIFIED WHETHER SCIATICA PRESENT: ICD-10-CM

## 2024-09-30 PROCEDURE — 72148 MRI LUMBAR SPINE W/O DYE: CPT | Performed by: RADIOLOGY

## 2024-09-30 PROCEDURE — 72148 MRI LUMBAR SPINE W/O DYE: CPT

## 2024-09-30 PROCEDURE — 72141 MRI NECK SPINE W/O DYE: CPT
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
## 2024-10-08 ENCOUNTER — HOSPITAL ENCOUNTER (OUTPATIENT)
Facility: HOSPITAL | Age: 64
Discharge: HOME | End: 2024-10-08
Admitting: PHYSICIAN ASSISTANT
Payer: MEDICAID

## 2024-10-08 DIAGNOSIS — M79.672 BILATERAL FOOT PAIN: ICD-10-CM

## 2024-10-08 DIAGNOSIS — M79.671 BILATERAL FOOT PAIN: ICD-10-CM

## 2024-10-08 PROCEDURE — 93923 UPR/LXTR ART STDY 3+ LVLS: CPT

## 2024-10-31 NOTE — PROGRESS NOTES
Patient called requesting a refill on Eliquis. Discussed with provider Veronica Kim, okay to send Rx with 2 refills.    Thank you,    Gwen Marrero. Lazaro, DorothyD  10/31/24  14:25 EDT

## 2024-11-19 ENCOUNTER — NURSE TRIAGE (OUTPATIENT)
Dept: CALL CENTER | Facility: HOSPITAL | Age: 64
End: 2024-11-19
Payer: MEDICAID

## 2024-11-19 NOTE — TELEPHONE ENCOUNTER
"Reason for Disposition  • [1] Numbness (i.e., loss of sensation) of the face, arm / hand, or leg / foot on one side of the body AND [2] sudden onset AND [3] present now    Additional Information  • Negative: [1] SEVERE weakness (i.e., unable to walk or barely able to walk, requires support) AND [2] new-onset or worsening  • Negative: [1] Weakness (i.e., paralysis, loss of muscle strength) of the face, arm / hand, or leg / foot on one side of the body AND [2] sudden onset AND [3] present now  (Exception: Bell's palsy suspected [i.e., weakness only on one side of the face, developing over hours to days, no other symptoms].)    Answer Assessment - Initial Assessment Questions  1. SYMPTOM: \"What is the main symptom you are concerned about?\" (e.g., weakness, numbness)      Right side knee to foot  2. ONSET: \"When did this start?\" (minutes, hours, days; while sleeping)      Woke up with this am  3. LAST NORMAL: \"When was the last time you (the patient) were normal (no symptoms)?\"      Last night  4. PATTERN \"Does this come and go, or has it been constant since it started?\"  \"Is it present now?\"      constant  5. CARDIAC SYMPTOMS: \"Have you had any of the following symptoms: chest pain, difficulty breathing, palpitations?\"      Cp, denies sob  6. NEUROLOGIC SYMPTOMS: \"Have you had any of the following symptoms: headache, dizziness, vision loss, double vision, changes in speech, unsteady on your feet?\"      Has had dizziness, ha  7. OTHER SYMPTOMS: \"Do you have any other symptoms?\"      Large bruise on right side from armpit to wrist,   8. PREGNANCY: \"Is there any chance you are pregnant?\" \"When was your last menstrual period?\"      na    Protocols used: Neurologic Deficit-ADULT-AH    "

## 2024-11-19 NOTE — TELEPHONE ENCOUNTER
Patient calling reports has had 13 heart attacks in past with various s/s.  Today woke up with numbness on right side from knee to foot and large bruise on right side from under the armpit to wrist.  Reports no falls or trauma does take blood thinners, last known without numbness last night.  States has cp but always has chest pain, had ha and dizziness but not now.  Discussed care advice with patient and he is agreeable to be seen in ER.

## 2024-11-20 ENCOUNTER — HOSPITAL ENCOUNTER (EMERGENCY)
Facility: HOSPITAL | Age: 64
Discharge: HOME | End: 2024-11-20
Attending: STUDENT IN AN ORGANIZED HEALTH CARE EDUCATION/TRAINING PROGRAM | Admitting: STUDENT IN AN ORGANIZED HEALTH CARE EDUCATION/TRAINING PROGRAM
Payer: MEDICAID

## 2024-11-20 ENCOUNTER — APPOINTMENT (OUTPATIENT)
Dept: CT IMAGING | Facility: HOSPITAL | Age: 64
End: 2024-11-20
Payer: MEDICAID

## 2024-11-20 ENCOUNTER — APPOINTMENT (OUTPATIENT)
Dept: GENERAL RADIOLOGY | Facility: HOSPITAL | Age: 64
End: 2024-11-20
Payer: MEDICAID

## 2024-11-20 VITALS
DIASTOLIC BLOOD PRESSURE: 113 MMHG | SYSTOLIC BLOOD PRESSURE: 150 MMHG | WEIGHT: 165 LBS | HEIGHT: 68 IN | HEART RATE: 75 BPM | RESPIRATION RATE: 18 BRPM | OXYGEN SATURATION: 98 % | TEMPERATURE: 97.9 F | BODY MASS INDEX: 25.01 KG/M2

## 2024-11-20 DIAGNOSIS — R10.9 ABDOMINAL PAIN, UNSPECIFIED ABDOMINAL LOCATION: ICD-10-CM

## 2024-11-20 DIAGNOSIS — R07.9 CHEST PAIN, UNSPECIFIED TYPE: Primary | ICD-10-CM

## 2024-11-20 LAB
ALBUMIN SERPL-MCNC: 4.4 G/DL (ref 3.5–5.2)
ALBUMIN/GLOB SERPL: 1.5 G/DL
ALP SERPL-CCNC: 96 U/L (ref 39–117)
ALT SERPL W P-5'-P-CCNC: 17 U/L (ref 1–41)
ANION GAP SERPL CALCULATED.3IONS-SCNC: 9 MMOL/L (ref 5–15)
AST SERPL-CCNC: 15 U/L (ref 1–40)
BASOPHILS # BLD AUTO: 0.03 10*3/MM3 (ref 0–0.2)
BASOPHILS NFR BLD AUTO: 0.4 % (ref 0–1.5)
BILIRUB SERPL-MCNC: 0.7 MG/DL (ref 0–1.2)
BUN SERPL-MCNC: 14 MG/DL (ref 8–23)
BUN/CREAT SERPL: 14 (ref 7–25)
CALCIUM SPEC-SCNC: 9.3 MG/DL (ref 8.6–10.5)
CHLORIDE SERPL-SCNC: 101 MMOL/L (ref 98–107)
CO2 SERPL-SCNC: 30 MMOL/L (ref 22–29)
CREAT SERPL-MCNC: 1 MG/DL (ref 0.76–1.27)
DEPRECATED RDW RBC AUTO: 45.9 FL (ref 37–54)
EGFRCR SERPLBLD CKD-EPI 2021: 84 ML/MIN/1.73
EOSINOPHIL # BLD AUTO: 0.05 10*3/MM3 (ref 0–0.4)
EOSINOPHIL NFR BLD AUTO: 0.6 % (ref 0.3–6.2)
ERYTHROCYTE [DISTWIDTH] IN BLOOD BY AUTOMATED COUNT: 12.9 % (ref 12.3–15.4)
GEN 5 2HR TROPONIN T REFLEX: 9 NG/L
GLOBULIN UR ELPH-MCNC: 2.9 GM/DL
GLUCOSE SERPL-MCNC: 92 MG/DL (ref 65–99)
HCT VFR BLD AUTO: 54.1 % (ref 37.5–51)
HGB BLD-MCNC: 18.3 G/DL (ref 13–17.7)
HOLD SPECIMEN: NORMAL
HOLD SPECIMEN: NORMAL
IMM GRANULOCYTES # BLD AUTO: 0.04 10*3/MM3 (ref 0–0.05)
IMM GRANULOCYTES NFR BLD AUTO: 0.5 % (ref 0–0.5)
INR PPP: 1.01 (ref 0.9–1.1)
LIPASE SERPL-CCNC: 31 U/L (ref 13–60)
LYMPHOCYTES # BLD AUTO: 1.24 10*3/MM3 (ref 0.7–3.1)
LYMPHOCYTES NFR BLD AUTO: 15.9 % (ref 19.6–45.3)
MCH RBC QN AUTO: 32.6 PG (ref 26.6–33)
MCHC RBC AUTO-ENTMCNC: 33.8 G/DL (ref 31.5–35.7)
MCV RBC AUTO: 96.3 FL (ref 79–97)
MONOCYTES # BLD AUTO: 0.53 10*3/MM3 (ref 0.1–0.9)
MONOCYTES NFR BLD AUTO: 6.8 % (ref 5–12)
NEUTROPHILS NFR BLD AUTO: 5.93 10*3/MM3 (ref 1.7–7)
NEUTROPHILS NFR BLD AUTO: 75.8 % (ref 42.7–76)
NRBC BLD AUTO-RTO: 0 /100 WBC (ref 0–0.2)
PLATELET # BLD AUTO: 184 10*3/MM3 (ref 140–450)
PMV BLD AUTO: 10 FL (ref 6–12)
POTASSIUM SERPL-SCNC: 4.7 MMOL/L (ref 3.5–5.2)
PROT SERPL-MCNC: 7.3 G/DL (ref 6–8.5)
PROTHROMBIN TIME: 13.4 SECONDS (ref 12.1–14.7)
RBC # BLD AUTO: 5.62 10*6/MM3 (ref 4.14–5.8)
SODIUM SERPL-SCNC: 140 MMOL/L (ref 136–145)
TROPONIN T DELTA: -1 NG/L
TROPONIN T SERPL HS-MCNC: 10 NG/L
WBC NRBC COR # BLD AUTO: 7.82 10*3/MM3 (ref 3.4–10.8)
WHOLE BLOOD HOLD COAG: NORMAL
WHOLE BLOOD HOLD SPECIMEN: NORMAL

## 2024-11-20 PROCEDURE — 80053 COMPREHEN METABOLIC PANEL: CPT

## 2024-11-20 PROCEDURE — 71046 X-RAY EXAM CHEST 2 VIEWS: CPT

## 2024-11-20 PROCEDURE — 25510000001 IOPAMIDOL PER 1 ML: Performed by: STUDENT IN AN ORGANIZED HEALTH CARE EDUCATION/TRAINING PROGRAM

## 2024-11-20 PROCEDURE — 85025 COMPLETE CBC W/AUTO DIFF WBC: CPT

## 2024-11-20 PROCEDURE — 85610 PROTHROMBIN TIME: CPT | Performed by: STUDENT IN AN ORGANIZED HEALTH CARE EDUCATION/TRAINING PROGRAM

## 2024-11-20 PROCEDURE — 74177 CT ABD & PELVIS W/CONTRAST: CPT

## 2024-11-20 PROCEDURE — 36415 COLL VENOUS BLD VENIPUNCTURE: CPT

## 2024-11-20 PROCEDURE — 84484 ASSAY OF TROPONIN QUANT: CPT | Performed by: STUDENT IN AN ORGANIZED HEALTH CARE EDUCATION/TRAINING PROGRAM

## 2024-11-20 PROCEDURE — 74177 CT ABD & PELVIS W/CONTRAST: CPT | Performed by: RADIOLOGY

## 2024-11-20 PROCEDURE — 93005 ELECTROCARDIOGRAM TRACING: CPT

## 2024-11-20 PROCEDURE — 71046 X-RAY EXAM CHEST 2 VIEWS: CPT | Performed by: RADIOLOGY

## 2024-11-20 PROCEDURE — 71275 CT ANGIOGRAPHY CHEST: CPT | Performed by: RADIOLOGY

## 2024-11-20 PROCEDURE — 83690 ASSAY OF LIPASE: CPT | Performed by: STUDENT IN AN ORGANIZED HEALTH CARE EDUCATION/TRAINING PROGRAM

## 2024-11-20 PROCEDURE — 93005 ELECTROCARDIOGRAM TRACING: CPT | Performed by: STUDENT IN AN ORGANIZED HEALTH CARE EDUCATION/TRAINING PROGRAM

## 2024-11-20 PROCEDURE — 84484 ASSAY OF TROPONIN QUANT: CPT

## 2024-11-20 PROCEDURE — 99285 EMERGENCY DEPT VISIT HI MDM: CPT

## 2024-11-20 PROCEDURE — 71275 CT ANGIOGRAPHY CHEST: CPT

## 2024-11-20 RX ORDER — LIDOCAINE HYDROCHLORIDE 20 MG/ML
15 SOLUTION OROPHARYNGEAL ONCE
Status: COMPLETED | OUTPATIENT
Start: 2024-11-20 | End: 2024-11-20

## 2024-11-20 RX ORDER — SODIUM CHLORIDE 0.9 % (FLUSH) 0.9 %
10 SYRINGE (ML) INJECTION AS NEEDED
Status: DISCONTINUED | OUTPATIENT
Start: 2024-11-20 | End: 2024-11-20 | Stop reason: HOSPADM

## 2024-11-20 RX ORDER — ASPIRIN 81 MG/1
324 TABLET, CHEWABLE ORAL ONCE
Status: COMPLETED | OUTPATIENT
Start: 2024-11-20 | End: 2024-11-20

## 2024-11-20 RX ORDER — ALUMINA, MAGNESIA, AND SIMETHICONE 2400; 2400; 240 MG/30ML; MG/30ML; MG/30ML
15 SUSPENSION ORAL ONCE
Status: COMPLETED | OUTPATIENT
Start: 2024-11-20 | End: 2024-11-20

## 2024-11-20 RX ORDER — IOPAMIDOL 755 MG/ML
100 INJECTION, SOLUTION INTRAVASCULAR
Status: COMPLETED | OUTPATIENT
Start: 2024-11-20 | End: 2024-11-20

## 2024-11-20 RX ORDER — PHENOBARBITAL, HYOSCYAMINE SULFATE, ATROPINE SULFATE AND SCOPOLAMINE HYDROBROMIDE .0194; .1037; 16.2; .0065 MG/1; MG/1; MG/1; MG/1
2 TABLET ORAL ONCE
Status: COMPLETED | OUTPATIENT
Start: 2024-11-20 | End: 2024-11-20

## 2024-11-20 RX ADMIN — IOPAMIDOL 100 ML: 755 INJECTION, SOLUTION INTRAVENOUS at 16:25

## 2024-11-20 RX ADMIN — ASPIRIN 243 MG: 81 TABLET, CHEWABLE ORAL at 14:37

## 2024-11-20 RX ADMIN — PHENOBARBITAL, HYOSCYAMINE SULFATE, ATROPINE SULFATE, SCOPOLAMINE HYDROBROMIDE 32.4 MG: 16.2; .1037; .0194; .0065 TABLET ORAL at 14:43

## 2024-11-20 RX ADMIN — LIDOCAINE HYDROCHLORIDE 15 ML: 20 SOLUTION ORAL at 14:43

## 2024-11-20 RX ADMIN — ALUMINUM HYDROXIDE, MAGNESIUM HYDROXIDE, AND DIMETHICONE 15 ML: 400; 400; 40 SUSPENSION ORAL at 14:43

## 2024-11-20 NOTE — ED PROVIDER NOTES
Subjective   History of Present Illness  64-year-old male with a past medical history of atrial fibrillation, CHF, COPD, and hypertension with hyperlipidemia presents today with primary complaint of chest pain.  Patient confirms a history of 7 MIs.  Patient also notes intermittent bruising of the upper extremities.  Patient also complains of right lower extremity and bilateral hand numbness has been present for the past 3 to 5 days.  No fever.  No chills.  No diaphoresis.  No obvious alleviating factors.  Vital stable.  Afebrile      Review of Systems   Respiratory:  Positive for shortness of breath.    Cardiovascular:  Positive for chest pain.   All other systems reviewed and are negative.      Past Medical History:   Diagnosis Date    Arthritis     Atrial fibrillation     Atrial fibrillation with RVR 07/25/2024    Balance problem     CHF (congestive heart failure)     COPD (chronic obstructive pulmonary disease)     Coronary artery disease     Dependence on cane     GERD (gastroesophageal reflux disease)     Heart attack     X 13 per patient, last one 2002 (9 heart attacks 2001- 1 cardiac stent placed)    History of hepatitis C 2001    had treatment now test neg    Hypertension     Lipoma     Myocardial infarction     Tinnitus     Wears reading eyeglasses        Allergies   Allergen Reactions    Codeine GI Intolerance    Penicillins Hives       Past Surgical History:   Procedure Laterality Date    CARDIAC CATHETERIZATION N/A 09/04/2020    Procedure: Left Heart Cath;  Surgeon: Herman Sen MD;  Location: [facility] CATH INVASIVE LOCATION;  Service: Cardiology;  Laterality: N/A;    COLONOSCOPY      CORONARY STENT PLACEMENT      FACIAL RECONSTRUCTION SURGERY Right 1995    HEAD/NECK LESION/CYST EXCISION Right 12/20/2023    Procedure: LIPOMA EXCISION: right scalp and right shoulder;  Surgeon: Celeste Sheets MD;  Location: [facility] OR;  Service: General;  Laterality: Right;       Family History   Problem Relation Age  of Onset    Heart disease Mother     Heart disease Maternal Grandmother        Social History     Socioeconomic History    Marital status: Single   Tobacco Use    Smoking status: Every Day     Current packs/day: 1.00     Average packs/day: 1 pack/day for 55.7 years (55.7 ttl pk-yrs)     Types: Cigarettes     Start date: 3/1/1969    Smokeless tobacco: Never   Vaping Use    Vaping status: Never Used   Substance and Sexual Activity    Alcohol use: Never    Drug use: Not Currently     Types: Marijuana     Comment: DENIES    Sexual activity: Not Currently     Partners: Female           Objective   Physical Exam  Constitutional:       General: He is not in acute distress.     Appearance: He is well-developed. He is not ill-appearing.   HENT:      Head: Normocephalic and atraumatic.   Eyes:      Extraocular Movements: Extraocular movements intact.      Pupils: Pupils are equal, round, and reactive to light.   Neck:      Vascular: No JVD.   Cardiovascular:      Rate and Rhythm: Normal rate and regular rhythm.      Heart sounds: Normal heart sounds. No murmur heard.  Pulmonary:      Effort: No tachypnea, accessory muscle usage or respiratory distress.      Breath sounds: Normal breath sounds. No stridor. No decreased breath sounds, wheezing, rhonchi or rales.   Chest:      Chest wall: No deformity, tenderness or crepitus.   Abdominal:      General: Bowel sounds are normal.      Palpations: Abdomen is soft.      Tenderness: There is no abdominal tenderness. There is no guarding or rebound.   Musculoskeletal:         General: Normal range of motion.      Cervical back: Normal range of motion and neck supple.      Right lower leg: No tenderness. No edema.      Left lower leg: No tenderness. No edema.   Lymphadenopathy:      Cervical: No cervical adenopathy.   Skin:     General: Skin is warm and dry.      Coloration: Skin is not cyanotic.      Findings: No ecchymosis or erythema.      Comments: Intermittent bruising of the right  upper extremity at the medial AC.  Bruising on the left posterior forearm   Neurological:      General: No focal deficit present.      Mental Status: He is alert and oriented to person, place, and time.      Cranial Nerves: No cranial nerve deficit.      Motor: No weakness.   Psychiatric:         Mood and Affect: Mood normal. Mood is not anxious.         Behavior: Behavior normal. Behavior is not agitated.         Procedures           ED Course  ED Course as of 11/20/24 1859   Wed Nov 20, 2024   1326 ECG 12 Lead ED Triage Standing Order; Chest Pain  Normal sinus rhythm.  Rate 73.  Normal axis.  Normal QT interval.  Possible left atrial enlargement.  Q wave in lead V1 through V3.  No ST elevation or depression.  Abnormal EKG. [BC]      ED Course User Index  [BC] Chuck Luevano MD                  HEART Score: 4                          CT Abdomen Pelvis With Contrast    Result Date: 11/20/2024   1. Multiple low-attenuation lesions in the liver, some representing cysts and some probably representing hemangiomas. Consider CT with hepatic mass protocol.  2. Large stool burden         This report was finalized on 11/20/2024 4:53 PM by Dr. Сергей Bravo MD.      CT Angiogram Chest Pulmonary Embolism    Result Date: 11/20/2024  1. No evidence of pulmonary embolus 2. Other low-attenuation lesions in the liver..  This report was finalized on 11/20/2024 4:37 PM by Dr. Сергей Bravo MD.      XR Chest 2 View    Result Date: 11/20/2024  No radiographic evidence of acute cardiac or pulmonary disease.   This report was finalized on 11/20/2024 1:06 PM by Dr. Сергей Bravo MD.         Results for orders placed or performed during the hospital encounter of 11/20/24   ECG 12 Lead ED Triage Standing Order; Chest Pain    Collection Time: 11/20/24 12:25 PM   Result Value Ref Range    QT Interval 354 ms    QTC Interval 389 ms   Comprehensive Metabolic Panel    Collection Time: 11/20/24 12:35 PM    Specimen: Arm, Right; Blood   Result  Value Ref Range    Glucose 92 65 - 99 mg/dL    BUN 14 8 - 23 mg/dL    Creatinine 1.00 0.76 - 1.27 mg/dL    Sodium 140 136 - 145 mmol/L    Potassium 4.7 3.5 - 5.2 mmol/L    Chloride 101 98 - 107 mmol/L    CO2 30.0 (H) 22.0 - 29.0 mmol/L    Calcium 9.3 8.6 - 10.5 mg/dL    Total Protein 7.3 6.0 - 8.5 g/dL    Albumin 4.4 3.5 - 5.2 g/dL    ALT (SGPT) 17 1 - 41 U/L    AST (SGOT) 15 1 - 40 U/L    Alkaline Phosphatase 96 39 - 117 U/L    Total Bilirubin 0.7 0.0 - 1.2 mg/dL    Globulin 2.9 gm/dL    A/G Ratio 1.5 g/dL    BUN/Creatinine Ratio 14.0 7.0 - 25.0    Anion Gap 9.0 5.0 - 15.0 mmol/L    eGFR 84.0 >60.0 mL/min/1.73   High Sensitivity Troponin T    Collection Time: 11/20/24 12:35 PM    Specimen: Arm, Right; Blood   Result Value Ref Range    HS Troponin T 10 <22 ng/L   CBC Auto Differential    Collection Time: 11/20/24 12:35 PM    Specimen: Arm, Right; Blood   Result Value Ref Range    WBC 7.82 3.40 - 10.80 10*3/mm3    RBC 5.62 4.14 - 5.80 10*6/mm3    Hemoglobin 18.3 (H) 13.0 - 17.7 g/dL    Hematocrit 54.1 (H) 37.5 - 51.0 %    MCV 96.3 79.0 - 97.0 fL    MCH 32.6 26.6 - 33.0 pg    MCHC 33.8 31.5 - 35.7 g/dL    RDW 12.9 12.3 - 15.4 %    RDW-SD 45.9 37.0 - 54.0 fl    MPV 10.0 6.0 - 12.0 fL    Platelets 184 140 - 450 10*3/mm3    Neutrophil % 75.8 42.7 - 76.0 %    Lymphocyte % 15.9 (L) 19.6 - 45.3 %    Monocyte % 6.8 5.0 - 12.0 %    Eosinophil % 0.6 0.3 - 6.2 %    Basophil % 0.4 0.0 - 1.5 %    Immature Grans % 0.5 0.0 - 0.5 %    Neutrophils, Absolute 5.93 1.70 - 7.00 10*3/mm3    Lymphocytes, Absolute 1.24 0.70 - 3.10 10*3/mm3    Monocytes, Absolute 0.53 0.10 - 0.90 10*3/mm3    Eosinophils, Absolute 0.05 0.00 - 0.40 10*3/mm3    Basophils, Absolute 0.03 0.00 - 0.20 10*3/mm3    Immature Grans, Absolute 0.04 0.00 - 0.05 10*3/mm3    nRBC 0.0 0.0 - 0.2 /100 WBC   Protime-INR    Collection Time: 11/20/24 12:35 PM    Specimen: Arm, Right; Blood   Result Value Ref Range    Protime 13.4 12.1 - 14.7 Seconds    INR 1.01 0.90 - 1.10    Green Top (Gel)    Collection Time: 11/20/24 12:35 PM   Result Value Ref Range    Extra Tube Hold for add-ons.    Lavender Top    Collection Time: 11/20/24 12:35 PM   Result Value Ref Range    Extra Tube hold for add-on    Gold Top - SST    Collection Time: 11/20/24 12:35 PM   Result Value Ref Range    Extra Tube Hold for add-ons.    Light Blue Top    Collection Time: 11/20/24 12:35 PM   Result Value Ref Range    Extra Tube Hold for add-ons.    High Sensitivity Troponin T 2Hr    Collection Time: 11/20/24  3:07 PM    Specimen: Arm, Left; Blood   Result Value Ref Range    HS Troponin T 9 <22 ng/L    Troponin T Delta -1 >=-4 - <+4 ng/L   Lipase    Collection Time: 11/20/24  3:07 PM    Specimen: Arm, Left; Blood   Result Value Ref Range    Lipase 31 13 - 60 U/L                 Medical Decision Making  CBC and CMP are unremarkable.  EKG listed.  Troponins trended and within normal limits.  Chest x-ray unremarkable.  CT imaging of the abdomen pelvis revealed no acute abnormality.  However, multiple low signal is noted within the liver.  Patient is aware of these findings and has known about these inconsistencies in his liver for some time.  Patient has not had an MRI.  Likely chronic findings.  Patient's GI symptoms did improve with a GI cocktail.  Cannot rule out gastritis.    ET PE study ruled out pulmonary embolism.    Patient's most recent stress test in January 2024 revealed a low risk study.  Heart score listed.  Given heart score and stress test within the last year, the patient is at low risk for ACS at this point.    Work up and results were discussed throughly with the patient.  The patient will be discharged for further monitoring and management with their PCP.  Red flags, warning signs, worsening symptoms, and when to return to the ER discussed with and understood by the patient.  Patient will follow up with their PCP in a timely manner.  Vitals stable at discharge.      MDM:    Escalation of care including  admission/observation considered    - Discussions of management with other providers:  None    - Discussed/reviewed with Radiology regarding test interpretation    - Independent interpretation: Labs, XR, CT, and EKG    - Additional patient history obtained from: None    - Review of external non-ED record (if available):  Prior Inpt record, Office record, Outpt record, Prior Outpt labs, PCP record, Outside ED record, Other    - Chronic conditions affecting care: See HPI and medical Hx.    - Social Determinants of health significantly affecting care:  None        Medical Decision Making Discussion:    The patient has been given very strict return precautions to return to the emergency department should there be any acute change or worsening of their condition.  I have explained my findings and the patient has expressed understanding to me.  I explained that the work-up performed in the ED has been based on the specific complaint and concern, as the nature of emergency medicine is complaint driven and they understand that new symptoms may arise.  I have told them that, should there be any new symptoms, worsening or changing symptoms, a new work-up may be indicated that they are encouraged to return to the emergency department or promptly contact their primary care physician. We have employed a shared decision-making process as the discussion of their disposition.  The patient has been educated as to the nature of the visit, the tests and work-up performed and the findings from today's visit. At this time, there does not appear to be any acute emergent process that necessitates admission to the hospital, however, the patient understands that this can change unexpectedly. At this time, the patient is stable for discharge home and agrees to follow-up with her primary care physician in the next 24 to 48 hours or earlier should they be able to obtain an appointment.    The patient was counseled regarding diagnostic results  and treatment plan and patient has indicated understanding of these instructions.    Please follow up with your primary care physician in the next 1-3 days. If this is not possible, please return to the ED for re-evaluation.    It is IMPORTANT to see your DOCTOR OR PRIMARY CARE PROVIDER. Emergency Care may be incomplete without proper follow-up.  Your evaluation in the emergency department is focused on a specific clinical complaint, at a given moment in time.  Symptoms sometimes change or new symptoms might arise after you leave the Emergency Department. It is important that you call your doctor if you become worse in any way, or promptly return to the Emergency Department should any new, or worsening/changing symptom occur.  You are strongly urged to follow-up with your physician to assure complete and thorough care. PLEASE CALL YOUR DOCTORS OFFICE TODAY, INFORM THEM THAT YOU WERE SEEN IN THE EMERGENCY DEPARTMENT, AND THAT YOU NEED TO BE SEEN IMMEDIATELY FOR CLOSE FOLLOW UP.  If you do not have a primary care doctor we encourage you to proactively seek a local physician for close follow up.  Consider local clinics, free or sliding scale clinics, local [facility].  You can always return to the Emergency Department if you are having difficulty coordinating close follow up.  If medications were prescribed you should fill them at your local pharmacy immediately and take only as prescribed.  Bring your new medications to your doctors follow up visit to discuss any changes that would be necessary. RETURN TO THE EMERGENCY DEPARTMENT IMMEDIATELY FOR ANY NEW OR WORSENING SYMPTOMS.    ADDITIONAL DISCHARGE INSTRUCTIONS:     - If you received IV contrast today with a CT or MRI please stay well hydrated for the next 48-72 hours to protect your kidneys.    - If you have been prescribed an antibiotic, taking an over the counter probiotic may help with gastrointestinal side effects and antibiotic associated diarrhea.    -  Return to the emergency department or seek immediate medical care if any of the following occur:           - Symptoms do not improve in 8-12 hours. If this occurs, please return to the emergency department for re-evaluation or your symptoms or repeat abdominal examination         - Symptoms worsen at any time.         - If you are unable to follow up with a medical provider as instructed.         - You develop any worrisome symptoms such as fever, chills, uncontrolled pain, change or worsening of your pain symptoms, intractable vomiting, uncontrolled diarrhea, blood in your stool, dark/tarry stool, new neurological symptoms etc.    If you have been prescribed a narcotic pain medication, please take a stool softener to prevent constipation.     During your ED visit, you may have been given narcotics (such as morphine, dilaudid, percocet, vicodin) or sedatives (such as ativan, versed). These medications can impair your reflexes so, DO NOT DRIVE or USE ANY MACHINERY for at least 6 hours if you have been given these medications.    REMINDER FOR METFORMIN USERS: If you are using metformin (also known as Glucophage) and if you received a CT scan in which you received IV contrast, you must hold your metformin for a total of 72 hrs.  This will prevent any adverse interactions between the two agents.        Amount and/or Complexity of Data Reviewed  Labs: ordered. Decision-making details documented in ED Course.  Radiology: ordered. Decision-making details documented in ED Course.  ECG/medicine tests: ordered.    Risk  OTC drugs.  Prescription drug management.        Final diagnoses:   Chest pain, unspecified type   Abdominal pain, unspecified abdominal location       ED Disposition  ED Disposition       ED Disposition   Discharge    Condition   Stable    Comment   --               No follow-up provider specified.       Medication List      No changes were made to your prescriptions during this visit.            Adrian  Kenyon BUCIO DO  11/20/24 1859

## 2024-11-21 DIAGNOSIS — M79.89 SWELLING OF ARM: ICD-10-CM

## 2024-11-21 DIAGNOSIS — R58 ECCHYMOSIS: Primary | ICD-10-CM

## 2024-11-21 LAB
QT INTERVAL: 354 MS
QTC INTERVAL: 389 MS

## 2024-12-05 RX ORDER — CARVEDILOL 12.5 MG/1
TABLET ORAL
Qty: 60 TABLET | Refills: 0 | Status: SHIPPED | OUTPATIENT
Start: 2024-12-05

## 2024-12-06 RX ORDER — RANOLAZINE 500 MG/1
TABLET, EXTENDED RELEASE ORAL
Qty: 60 TABLET | Refills: 0 | Status: SHIPPED | OUTPATIENT
Start: 2024-12-06

## 2024-12-23 ENCOUNTER — HOSPITAL ENCOUNTER (OUTPATIENT)
Dept: CARDIOLOGY | Facility: HOSPITAL | Age: 64
Discharge: HOME | End: 2024-12-23
Admitting: PHYSICIAN ASSISTANT
Payer: MEDICAID

## 2024-12-23 VITALS
HEART RATE: 89 BPM | HEIGHT: 68 IN | DIASTOLIC BLOOD PRESSURE: 93 MMHG | SYSTOLIC BLOOD PRESSURE: 152 MMHG | BODY MASS INDEX: 24.77 KG/M2 | WEIGHT: 163.4 LBS | OXYGEN SATURATION: 98 %

## 2024-12-23 DIAGNOSIS — I25.5 ISCHEMIC CARDIOMYOPATHY: Primary | ICD-10-CM

## 2024-12-23 DIAGNOSIS — I25.10 ASCVD (ARTERIOSCLEROTIC CARDIOVASCULAR DISEASE): ICD-10-CM

## 2024-12-23 PROCEDURE — 94726 PLETHYSMOGRAPHY LUNG VOLUMES: CPT | Performed by: PHYSICIAN ASSISTANT

## 2024-12-23 RX ORDER — CARVEDILOL 12.5 MG/1
12.5 TABLET ORAL 2 TIMES DAILY WITH MEALS
Qty: 60 TABLET | Refills: 2 | Status: SHIPPED | OUTPATIENT
Start: 2024-12-23 | End: 2025-03-23

## 2024-12-23 RX ORDER — RANOLAZINE 500 MG/1
500 TABLET, EXTENDED RELEASE ORAL 2 TIMES DAILY
Qty: 60 TABLET | Refills: 0 | Status: SHIPPED | OUTPATIENT
Start: 2024-12-23

## 2024-12-23 RX ORDER — ISOSORBIDE MONONITRATE 60 MG/1
60 TABLET, EXTENDED RELEASE ORAL DAILY
Qty: 30 TABLET | Refills: 2 | Status: SHIPPED | OUTPATIENT
Start: 2024-12-23 | End: 2025-03-23

## 2024-12-23 RX ORDER — FUROSEMIDE 20 MG/1
20 TABLET ORAL DAILY PRN
Qty: 30 TABLET | Refills: 2 | Status: SHIPPED | OUTPATIENT
Start: 2024-12-23 | End: 2025-03-23

## 2024-12-23 NOTE — PROGRESS NOTES
Heart Failure Clinic  Pharmacist Note     Chong White Sr. is a 64 y.o. male seen in the Heart Failure Clinic for combined systolic and diastolic HF. Most recent EF was 41-45% on 7/25/24.     Chong White Sr. has a poor understanding of his medication regimen. He is overdue for most of his refills, but reports that he is still taking his medications, but is not sure exactly what he is taking. He reports that he just picks up the medications from his pharmacy and takes them. He reports that he checks his BP, but does not know what they mean, so he doesn't pay attention to it. He reports that he is dizzy all of the time.   Potential BP meds-  Entresto  Coreg  Jardiance  Midodrine    Patient denies any swelling but reports SOB with activity and takes Lasix 20mg daily.    He reports that his heart hurts all of the time and he goes to the ED to have it checked out, but they always send him home.       Medication Use:   Hx of med intolerances:  None related to HF  Retail Rx Management: [facility] ( changed from [facility])    Past Medical History:   Diagnosis Date    Arthritis     Atrial fibrillation     Atrial fibrillation with RVR 07/25/2024    Balance problem     CHF (congestive heart failure)     COPD (chronic obstructive pulmonary disease)     Coronary artery disease     Dependence on cane     GERD (gastroesophageal reflux disease)     Heart attack     X 13 per patient, last one 2002 (9 heart attacks 2001- 1 cardiac stent placed)    History of hepatitis C 2001    had treatment now test neg    Hypertension     Lipoma     Myocardial infarction     Tinnitus     Wears reading eyeglasses      ALLERGIES: Codeine and Penicillins  Current Outpatient Medications   Medication Sig Dispense Refill    apixaban (Eliquis) 5 MG tablet tablet Take 1 tablet by mouth 2 (Two) Times a Day. 60 tablet 2    aspirin (Aspirin Low Dose) 81 MG EC tablet Take 1 tablet by mouth Daily. 30 tablet 5    atorvastatin  "(LIPITOR) 40 MG tablet Take 1 tablet by mouth Daily. 30 tablet 5    carvedilol (COREG) 12.5 MG tablet Take 1 tablet by mouth 2 (Two) Times a Day With Meals for 90 days. 60 tablet 2    empagliflozin (JARDIANCE) 10 MG tablet tablet Take 1 tablet by mouth Daily. 30 tablet 5    furosemide (LASIX) 20 MG tablet Take 1 tablet by mouth Daily As Needed (Weight gain or leg swelling; call the heart failure clinic the day that you decide to take this) for up to 90 days. Do not take if BP is less than 100/60 30 tablet 2    HYDROcodone-acetaminophen (NORCO) 7.5-325 MG per tablet Take 1 tablet by mouth Every 12 (Twelve) Hours As Needed for Moderate Pain.      isosorbide mononitrate (IMDUR) 60 MG 24 hr tablet Take 1 tablet by mouth Daily for 90 days. 30 tablet 2    nitroglycerin (NITROSTAT) 0.4 MG SL tablet Place 1 tablet under the tongue Every 5 (Five) Minutes As Needed for Chest Pain (Only if SBP Greater Than 100). Take no more than 3 doses in 15 minutes. 50 tablet 0    pantoprazole (PROTONIX) 40 MG EC tablet Take 1 tablet by mouth Daily. 30 tablet 6    ranolazine (RANEXA) 500 MG 12 hr tablet Take 1 tablet by mouth 2 (Two) Times a Day. 60 tablet 0     No current facility-administered medications for this encounter.       Vaccination History:   Pneumonia: PPSV23 9/21/20; PCV20 2/21/23  Annual Influenza: UTD 24/25  Shingles: Reports UTD    Objective  Vitals:    12/23/24 1353   BP: 152/93   BP Location: Left arm   Patient Position: Sitting   Cuff Size: Adult   Pulse: 89   SpO2: 98%   Weight: 74.1 kg (163 lb 6.4 oz)   Height: 172.7 cm (68\")         Wt Readings from Last 3 Encounters:   12/23/24 74.1 kg (163 lb 6.4 oz)   11/20/24 74.8 kg (165 lb)   09/26/24 74.7 kg (164 lb 9.6 oz)         12/23/24  1353   Weight: 74.1 kg (163 lb 6.4 oz)         Lab Results   Component Value Date    GLUCOSE 92 11/20/2024    BUN 14 11/20/2024    CREATININE 1.00 11/20/2024    EGFRIFNONA 84 02/23/2022    EGFRIFAFRI 106 10/29/2020    BCR 14.0 11/20/2024    " K 4.7 11/20/2024    CO2 30.0 (H) 11/20/2024    CALCIUM 9.3 11/20/2024    PROTENTOTREF 6.6 10/29/2020    ALBUMIN 4.4 11/20/2024    LABIL2 1.9 10/29/2020    AST 15 11/20/2024    ALT 17 11/20/2024     Lab Results   Component Value Date    WBC 7.82 11/20/2024    HGB 18.3 (H) 11/20/2024    HCT 54.1 (H) 11/20/2024    MCV 96.3 11/20/2024     11/20/2024     Lab Results   Component Value Date    TROPONINT 9 11/20/2024     Lab Results   Component Value Date    PROBNP 582.3 09/26/2024     Results for orders placed during the hospital encounter of 07/25/24    Adult Transthoracic Echo Complete W/ Cont if Necessary Per Protocol    Interpretation Summary    Left ventricular ejection fraction appears to be 41 - 45%.    Left ventricular diastolic function is consistent with (grade I) impaired relaxation.    Estimated right ventricular systolic pressure from tricuspid regurgitation is normal (<35 mmHg).         GDMT    Drug Class   Drug   Dose Last Dose Adjustment Additional Titration   Notes   ACEi/ARB/ARNI Low BP    Beta Blocker Carvedilol 12.5 mg BID Restart 12/23 Needed    SGLT2i Jardiance 10mg QD 7/27/22 N/A            Lasix 20mg daily      Drug Therapy Problems    Med list?- what has been recently filled at pharmacy?  GDMT  ADH      Recommendations:     Called and spoke with technician at [place] and she reports that he is overdue for all of his refills. Reports that he has not had Entresto filled since October and the others were last filled in November, including Coreg, Midodrine and Jardiance.   Recommend to stop Midodine with BP of 152-93 today. Veronica to NOT restart Entresto now that Midodrine will be stopped. Veronica to send in refills of Coreg, lasix  and Jardiance.   I went over his AVS with him and suggested for him to use a pill planner and follow along with the med list when filling it up and making sure that he was taking everything that he is supposed to.       Patient was educated on heart failure  medications and the importance of medication adherence. All questions were addressed and patient would benefit from additional education at each visit.     Thank you for allowing me to participate in the care of your patient,    Destini Toscano, PharmD  12/23/2024  15:04 EST

## 2024-12-23 NOTE — PROGRESS NOTES
Heart Failure Clinic    Date: 12/23/24     Vitals:    12/23/24 1353   BP: 152/93   Pulse: 89   SpO2: 98%      Weight 163.4  Method of arrival: Ambulatory with cane    Weighing self daily: Most days    Monitoring Heart Failure Zones: Most days    Today's HF Zone: Yellow     Taking medications as prescribed: Yes    Edema No    Shortness of Air: No    Number of pillows used at night:<2    Educational Materials given:  avs                                                                         ReDS Value: 26  25-35 Optimal Value Status      Esteban Levine RN 12/23/24 13:54 EST
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
## 2025-01-06 ENCOUNTER — TELEPHONE (OUTPATIENT)
Dept: CARDIOLOGY | Facility: CLINIC | Age: 65
End: 2025-01-06
Payer: MEDICAID

## 2025-01-06 NOTE — TELEPHONE ENCOUNTER
Tried to call patient for appointment, the number is no longer a working number, made the patient appointment, and putting it in the mail today.

## 2025-02-06 ENCOUNTER — OFFICE VISIT (OUTPATIENT)
Dept: CARDIOLOGY | Facility: CLINIC | Age: 65
End: 2025-02-06
Payer: MEDICAID

## 2025-02-06 VITALS
DIASTOLIC BLOOD PRESSURE: 92 MMHG | SYSTOLIC BLOOD PRESSURE: 149 MMHG | HEIGHT: 68 IN | OXYGEN SATURATION: 96 % | WEIGHT: 167 LBS | HEART RATE: 85 BPM | BODY MASS INDEX: 25.31 KG/M2

## 2025-02-06 DIAGNOSIS — I50.42 CHRONIC COMBINED SYSTOLIC AND DIASTOLIC CONGESTIVE HEART FAILURE: ICD-10-CM

## 2025-02-06 DIAGNOSIS — I15.9 SECONDARY HYPERTENSION: ICD-10-CM

## 2025-02-06 DIAGNOSIS — Z72.0 TOBACCO ABUSE: ICD-10-CM

## 2025-02-06 DIAGNOSIS — I48.0 PAROXYSMAL ATRIAL FIBRILLATION: ICD-10-CM

## 2025-02-06 DIAGNOSIS — I73.9 PVD (PERIPHERAL VASCULAR DISEASE) WITH CLAUDICATION: ICD-10-CM

## 2025-02-06 DIAGNOSIS — I25.10 ASCVD (ARTERIOSCLEROTIC CARDIOVASCULAR DISEASE): ICD-10-CM

## 2025-02-06 DIAGNOSIS — R60.0 EDEMA LEG: Primary | ICD-10-CM

## 2025-02-06 RX ORDER — SACUBITRIL AND VALSARTAN 24; 26 MG/1; MG/1
1 TABLET, FILM COATED ORAL 2 TIMES DAILY
COMMUNITY
Start: 2025-01-14
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
## 2025-02-06 NOTE — PATIENT INSTRUCTIONS
1.Peripheral angiogram.  2.Reflux study.  3.Continue efforts at quitting smoking.  4.RTC in 6 weeks.

## 2025-02-07 PROBLEM — I73.9 PVD (PERIPHERAL VASCULAR DISEASE) WITH CLAUDICATION: Status: ACTIVE | Noted: 2025-02-07

## 2025-02-17 DIAGNOSIS — I25.10 ASCVD (ARTERIOSCLEROTIC CARDIOVASCULAR DISEASE): Primary | ICD-10-CM

## 2025-03-24 ENCOUNTER — HOSPITAL ENCOUNTER (OUTPATIENT)
Dept: CARDIOLOGY | Facility: HOSPITAL | Age: 65
Discharge: HOME | End: 2025-03-24
Admitting: PHYSICIAN ASSISTANT
Payer: MEDICARE

## 2025-03-24 VITALS
HEART RATE: 74 BPM | OXYGEN SATURATION: 96 % | HEIGHT: 68 IN | BODY MASS INDEX: 23.04 KG/M2 | SYSTOLIC BLOOD PRESSURE: 91 MMHG | WEIGHT: 152 LBS | DIASTOLIC BLOOD PRESSURE: 54 MMHG

## 2025-03-24 DIAGNOSIS — I50.42 CHRONIC COMBINED SYSTOLIC AND DIASTOLIC CONGESTIVE HEART FAILURE: Primary | ICD-10-CM

## 2025-03-24 DIAGNOSIS — I25.10 ASCVD (ARTERIOSCLEROTIC CARDIOVASCULAR DISEASE): ICD-10-CM

## 2025-03-24 DIAGNOSIS — I48.0 PAROXYSMAL ATRIAL FIBRILLATION: ICD-10-CM

## 2025-03-24 DIAGNOSIS — Z79.01 CHRONIC ANTICOAGULATION: ICD-10-CM

## 2025-03-24 LAB
ABSOLUTE LUNG FLUID CONTENT: 24 % (ref 20–35)
ANION GAP SERPL CALCULATED.3IONS-SCNC: 9.6 MMOL/L (ref 5–15)
BUN SERPL-MCNC: 15 MG/DL (ref 8–23)
BUN/CREAT SERPL: 17.9 (ref 7–25)
CALCIUM SPEC-SCNC: 8.4 MG/DL (ref 8.6–10.5)
CHLORIDE SERPL-SCNC: 104 MMOL/L (ref 98–107)
CO2 SERPL-SCNC: 27.4 MMOL/L (ref 22–29)
CREAT SERPL-MCNC: 0.84 MG/DL (ref 0.76–1.27)
EGFRCR SERPLBLD CKD-EPI 2021: 96.8 ML/MIN/1.73
GLUCOSE SERPL-MCNC: 120 MG/DL (ref 65–99)
MAGNESIUM SERPL-MCNC: 2.2 MG/DL (ref 1.6–2.4)
NT-PROBNP SERPL-MCNC: 76.7 PG/ML (ref 0–900)
POTASSIUM SERPL-SCNC: 3.9 MMOL/L (ref 3.5–5.2)
SODIUM SERPL-SCNC: 141 MMOL/L (ref 136–145)

## 2025-03-24 PROCEDURE — G2211 COMPLEX E/M VISIT ADD ON: HCPCS | Performed by: PHYSICIAN ASSISTANT

## 2025-03-24 PROCEDURE — 80048 BASIC METABOLIC PNL TOTAL CA: CPT | Performed by: PHYSICIAN ASSISTANT

## 2025-03-24 PROCEDURE — 94726 PLETHYSMOGRAPHY LUNG VOLUMES: CPT | Performed by: PHYSICIAN ASSISTANT

## 2025-03-24 PROCEDURE — 83735 ASSAY OF MAGNESIUM: CPT | Performed by: PHYSICIAN ASSISTANT

## 2025-03-24 PROCEDURE — 99214 OFFICE O/P EST MOD 30 MIN: CPT | Performed by: PHYSICIAN ASSISTANT

## 2025-03-24 PROCEDURE — 36415 COLL VENOUS BLD VENIPUNCTURE: CPT | Performed by: PHYSICIAN ASSISTANT

## 2025-03-24 PROCEDURE — 83880 ASSAY OF NATRIURETIC PEPTIDE: CPT | Performed by: PHYSICIAN ASSISTANT

## 2025-03-24 RX ORDER — MIDODRINE HYDROCHLORIDE 5 MG/1
5 TABLET ORAL DAILY
COMMUNITY
Start: 2025-03-10

## 2025-03-24 RX ORDER — CARVEDILOL 12.5 MG/1
6.25 TABLET ORAL 2 TIMES DAILY WITH MEALS
Qty: 60 TABLET | Refills: 2 | Status: SHIPPED | OUTPATIENT
Start: 2025-03-24 | End: 2025-06-22

## 2025-03-24 RX ORDER — GABAPENTIN 300 MG/1
300 CAPSULE ORAL 3 TIMES DAILY
COMMUNITY
Start: 2025-03-10
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
## 2025-03-24 NOTE — PROGRESS NOTES
Heart Failure Clinic    Date: 03/24/25     Vitals:    03/24/25 1326   BP: 91/54   Pulse: 74   SpO2: 96%    Weight 152    Method of arrival: Ambulatory with cane    Weighing self daily: No    Monitoring Heart Failure Zones: No    Today's HF Zone: Yellow     Taking medications as prescribed: Yes    Edema No    Shortness of Air: Yes    Number of pillows used at night:<2    Educational Materials given:  AVS                                                                         ReDS Value: 24  21-24 Low Normal      Jose Elias Mccoy MA 03/24/25 13:28 EDT

## 2025-03-24 NOTE — PROGRESS NOTES
Heart Failure Clinic  Pharmacist Note     Chong White Sr. is a 65 y.o. male seen in the Heart Failure Clinic for combined systolic and diastolic HF. Most recent EF was 41-45% on 7/25/24.     Chong White Sr. has a poor understanding of his medication regimen. He reports adherence to his medications, as he takes what the pharmacy gives him. Looks like she got a lot of refills in January (probably told his pharmacy to refill everything) and has gotten them filled the last 3 months, however these include Entresto and Midodrine that were not restarted. He reports lightheadedness all of the time including right now in the office. His BP was 91/54. He reports that he does not check his BP at home. He denies any swelling at this time, but reports that he will notice some every night. He reports SOB with activity. Weight is down from 163lb to 154lb now and he takes Lasix 20mg daily.       Medication Use:   Hx of med intolerances:  None related to HF  Retail Rx Management: [facility] ( changed from [facility])    Past Medical History:   Diagnosis Date    Arthritis     Atrial fibrillation     Atrial fibrillation with RVR 07/25/2024    Balance problem     CHF (congestive heart failure)     COPD (chronic obstructive pulmonary disease)     Coronary artery disease     Dependence on cane     GERD (gastroesophageal reflux disease)     Heart attack     X 13 per patient, last one 2002 (9 heart attacks 2001- 1 cardiac stent placed)    History of hepatitis C 2001    had treatment now test neg    Hypertension     Lipoma     Myocardial infarction     Tinnitus     Wears reading eyeglasses      ALLERGIES: Codeine and Penicillins  Current Outpatient Medications   Medication Sig Dispense Refill    apixaban (Eliquis) 5 MG tablet tablet Take 1 tablet by mouth 2 (Two) Times a Day. 60 tablet 2    aspirin (Aspirin Low Dose) 81 MG EC tablet Take 1 tablet by mouth Daily. 30 tablet 5    atorvastatin (LIPITOR) 40 MG tablet  "Take 1 tablet by mouth Daily. 30 tablet 5    carvedilol (COREG) 12.5 MG tablet Take 0.5 tablets by mouth 2 (Two) Times a Day With Meals for 90 days. 60 tablet 2    empagliflozin (JARDIANCE) 10 MG tablet tablet Take 1 tablet by mouth Daily. 30 tablet 5    furosemide (LASIX) 20 MG tablet Take 1 tablet by mouth Daily As Needed (Weight gain or leg swelling; call the heart failure clinic the day that you decide to take this) for up to 90 days. Do not take if BP is less than 100/60 30 tablet 2    gabapentin (NEURONTIN) 300 MG capsule Take 1 capsule by mouth 3 (Three) Times a Day.      HYDROcodone-acetaminophen (NORCO) 7.5-325 MG per tablet Take 1 tablet by mouth Every 12 (Twelve) Hours As Needed for Moderate Pain.      isosorbide mononitrate (IMDUR) 60 MG 24 hr tablet Take 1 tablet by mouth Daily for 90 days. 30 tablet 2    midodrine (PROAMATINE) 5 MG tablet Take 1 tablet by mouth Daily.      nitroglycerin (NITROSTAT) 0.4 MG SL tablet Place 1 tablet under the tongue Every 5 (Five) Minutes As Needed for Chest Pain (Only if SBP Greater Than 100). Take no more than 3 doses in 15 minutes. 50 tablet 0    pantoprazole (PROTONIX) 40 MG EC tablet Take 1 tablet by mouth Daily. 30 tablet 6    ranolazine (RANEXA) 500 MG 12 hr tablet Take 1 tablet by mouth 2 (Two) Times a Day. 60 tablet 0     No current facility-administered medications for this encounter.       Vaccination History:   Pneumonia: PPSV23 9/21/20; PCV20 2/21/23  Annual Influenza: UTD 24/25  Shingles: Reports UTD    Objective  Vitals:    03/24/25 1326   BP: 91/54   BP Location: Left arm   Patient Position: Sitting   Cuff Size: Adult   Pulse: 74   SpO2: 96%   Weight: 68.9 kg (152 lb)   Height: 172.7 cm (68\")           Wt Readings from Last 3 Encounters:   03/24/25 68.9 kg (152 lb)   02/06/25 75.8 kg (167 lb)   12/23/24 74.1 kg (163 lb 6.4 oz)         03/24/25  1326   Weight: 68.9 kg (152 lb)           Lab Results   Component Value Date    GLUCOSE 120 (H) 03/24/2025    BUN " 15 03/24/2025    CREATININE 0.84 03/24/2025    EGFRIFNONA 84 02/23/2022    EGFRIFAFRI 106 10/29/2020    BCR 17.9 03/24/2025    K 3.9 03/24/2025    CO2 27.4 03/24/2025    CALCIUM 8.4 (L) 03/24/2025    ALBUMIN 4.4 11/20/2024    AST 15 11/20/2024    ALT 17 11/20/2024     Lab Results   Component Value Date    WBC 7.82 11/20/2024    HGB 18.3 (H) 11/20/2024    HCT 54.1 (H) 11/20/2024    MCV 96.3 11/20/2024     11/20/2024     Lab Results   Component Value Date    TROPONINT 9 11/20/2024     Lab Results   Component Value Date    PROBNP 76.7 03/24/2025     Results for orders placed during the hospital encounter of 07/25/24    Adult Transthoracic Echo Complete W/ Cont if Necessary Per Protocol    Interpretation Summary    Left ventricular ejection fraction appears to be 41 - 45%.    Left ventricular diastolic function is consistent with (grade I) impaired relaxation.    Estimated right ventricular systolic pressure from tricuspid regurgitation is normal (<35 mmHg).         GDMT    Drug Class   Drug   Dose Last Dose Adjustment Additional Titration   Notes   ACEi/ARB/ARNI Low BP    Beta Blocker Carvedilol 6.25 mg BID 3/24/25 decreased     SGLT2i Jardiance 10mg QD 7/27/22             Other pertinent meds Midodrine 5mg daily      Lasix 20mg daily      Drug Therapy Problems    Med ADH- Entresto and Midodrine back on board  GDMT- hypotension  AVS  Low Calcium- 8.4      Recommendations:     Patient reports taking these medications regularly along with his others. Veronica to keep Midodrine on board- not a medication that we have him on though.   Recommend to stop Entresto at this time. I called [facility] and had them cancel all prescriptions of it. Veronica to also decreased his Coreg dose.   Veronica went over the AVS with the patient.   Recommend a daily calcium supplement.       Patient was educated on heart failure medications and the importance of medication adherence. All questions were addressed and patient would benefit from  additional education at each visit.     Thank you for allowing me to participate in the care of your patient,    Destini Toscano, PharmD  3/24/2025  15:27 EDT

## 2025-06-12 ENCOUNTER — TRANSCRIBE ORDERS (OUTPATIENT)
Dept: ADMINISTRATIVE | Facility: HOSPITAL | Age: 65
End: 2025-06-12
Payer: MEDICARE

## 2025-06-12 DIAGNOSIS — Z13.6 SCREENING FOR AAA (AORTIC ABDOMINAL ANEURYSM): Primary | ICD-10-CM

## 2025-06-17 ENCOUNTER — TRANSCRIBE ORDERS (OUTPATIENT)
Dept: ADMINISTRATIVE | Facility: HOSPITAL | Age: 65
End: 2025-06-17
Payer: MEDICARE

## 2025-06-17 DIAGNOSIS — R10.31 RLQ ABDOMINAL PAIN: Primary | ICD-10-CM

## 2025-06-26 ENCOUNTER — HOSPITAL ENCOUNTER (OUTPATIENT)
Dept: CARDIOLOGY | Facility: HOSPITAL | Age: 65
Discharge: HOME | End: 2025-06-26
Admitting: PHYSICIAN ASSISTANT
Payer: MEDICARE

## 2025-06-26 VITALS
WEIGHT: 157 LBS | HEIGHT: 68 IN | OXYGEN SATURATION: 94 % | SYSTOLIC BLOOD PRESSURE: 121 MMHG | BODY MASS INDEX: 23.79 KG/M2 | HEART RATE: 72 BPM | DIASTOLIC BLOOD PRESSURE: 66 MMHG

## 2025-06-26 DIAGNOSIS — I25.5 ISCHEMIC CARDIOMYOPATHY: ICD-10-CM

## 2025-06-26 DIAGNOSIS — R42 DIZZINESS: ICD-10-CM

## 2025-06-26 DIAGNOSIS — I50.42 CHRONIC COMBINED SYSTOLIC AND DIASTOLIC CONGESTIVE HEART FAILURE: Primary | ICD-10-CM

## 2025-06-26 LAB
ABSOLUTE LUNG FLUID CONTENT: 22 % (ref 20–35)
ANION GAP SERPL CALCULATED.3IONS-SCNC: 7.5 MMOL/L (ref 5–15)
BUN SERPL-MCNC: 18.1 MG/DL (ref 8–23)
BUN/CREAT SERPL: 18.3 (ref 7–25)
CALCIUM SPEC-SCNC: 8.5 MG/DL (ref 8.6–10.5)
CHLORIDE SERPL-SCNC: 101 MMOL/L (ref 98–107)
CO2 SERPL-SCNC: 27.5 MMOL/L (ref 22–29)
CREAT SERPL-MCNC: 0.99 MG/DL (ref 0.76–1.27)
EGFRCR SERPLBLD CKD-EPI 2021: 84.5 ML/MIN/1.73
GLUCOSE SERPL-MCNC: 89 MG/DL (ref 65–99)
MAGNESIUM SERPL-MCNC: 2.3 MG/DL (ref 1.6–2.4)
NT-PROBNP SERPL-MCNC: 106 PG/ML (ref 0–900)
POTASSIUM SERPL-SCNC: 4.8 MMOL/L (ref 3.5–5.2)
SODIUM SERPL-SCNC: 136 MMOL/L (ref 136–145)

## 2025-06-26 PROCEDURE — 83735 ASSAY OF MAGNESIUM: CPT | Performed by: PHYSICIAN ASSISTANT

## 2025-06-26 PROCEDURE — 83880 ASSAY OF NATRIURETIC PEPTIDE: CPT | Performed by: PHYSICIAN ASSISTANT

## 2025-06-26 PROCEDURE — 36415 COLL VENOUS BLD VENIPUNCTURE: CPT | Performed by: PHYSICIAN ASSISTANT

## 2025-06-26 PROCEDURE — 94726 PLETHYSMOGRAPHY LUNG VOLUMES: CPT | Performed by: PHYSICIAN ASSISTANT

## 2025-06-26 PROCEDURE — 80048 BASIC METABOLIC PNL TOTAL CA: CPT | Performed by: PHYSICIAN ASSISTANT

## 2025-06-26 RX ORDER — FUROSEMIDE 40 MG/1
40 TABLET ORAL DAILY
Qty: 30 TABLET | Refills: 2 | Status: SHIPPED | OUTPATIENT
Start: 2025-06-26

## 2025-06-26 RX ORDER — CARVEDILOL 12.5 MG/1
6.25 TABLET ORAL 2 TIMES DAILY WITH MEALS
Qty: 60 TABLET | Refills: 2 | Status: SHIPPED | OUTPATIENT
Start: 2025-06-26 | End: 2025-09-24

## 2025-06-26 RX ORDER — ISOSORBIDE MONONITRATE 60 MG/1
60 TABLET, EXTENDED RELEASE ORAL DAILY
Qty: 30 TABLET | Refills: 6 | Status: SHIPPED | OUTPATIENT
Start: 2025-06-26 | End: 2025-09-24

## 2025-06-26 NOTE — ADDENDUM NOTE
Encounter addended by: Georgia Hartley, PharmD on: 6/26/2025 3:03 PM   Actions taken: Clinical Note Signed

## 2025-06-26 NOTE — PROGRESS NOTES
" [facility] Failure Clinic  Pharmacist Note     Chong White Sr. is a 65 y.o. male seen in the Heart Failure Clinic for combined systolic and diastolic HF. Most recent EF was 41-45% on 7/26/24.     Chong White Sr. has a poor understanding of his medication regimen. He reports adherence to his medications, as he takes what the pharmacy gives him. He says he puts all of his bottles in a bowl on the table and takes them everyday.  He thinks he is probably taking his lasix everyday since it is filled regularly.  He tells me he does not weigh himself or check his BP at home.  He reports some swelling in his ankles.     He also complains today of feeling \"stoned\"  He denies any recreational drug use.  He denies any recent medication changes.        Medication Use:   Hx of med intolerances:  None related to HF  Retail Rx Management: [facility] ( changed from [facility])    Past Medical History:   Diagnosis Date    Arthritis     Atrial fibrillation     Atrial fibrillation with RVR 07/25/2024    Balance problem     CHF (congestive heart failure)     COPD (chronic obstructive pulmonary disease)     Coronary artery disease     Dependence on cane     GERD (gastroesophageal reflux disease)     Heart attack     X 13 per patient, last one 2002 (9 heart attacks 2001- 1 cardiac stent placed)    History of hepatitis C 2001    had treatment now test neg    Hypertension     Lipoma     Myocardial infarction     Tinnitus     Wears reading eyeglasses      ALLERGIES: Codeine and Penicillins  Current Outpatient Medications   Medication Sig Dispense Refill    apixaban (Eliquis) 5 MG tablet tablet Take 1 tablet by mouth 2 (Two) Times a Day. 60 tablet 5    aspirin (Aspirin Low Dose) 81 MG EC tablet Take 1 tablet by mouth Daily. 30 tablet 5    atorvastatin (LIPITOR) 40 MG tablet Take 1 tablet by mouth Daily. 30 tablet 5    carvedilol (COREG) 12.5 MG tablet Take 0.5 tablets by mouth 2 (Two) Times a Day With Meals for 90 " "days. 60 tablet 2    empagliflozin (JARDIANCE) 10 MG tablet tablet Take 1 tablet by mouth Daily. 30 tablet 5    furosemide (LASIX) 40 MG tablet Take 1 tablet by mouth Daily. Do not take if BP is less than 100/60 30 tablet 2    gabapentin (NEURONTIN) 300 MG capsule Take 1 capsule by mouth 3 (Three) Times a Day.      HYDROcodone-acetaminophen (NORCO) 7.5-325 MG per tablet Take 1 tablet by mouth Every 12 (Twelve) Hours As Needed for Moderate Pain.      isosorbide mononitrate (IMDUR) 60 MG 24 hr tablet Take 1 tablet by mouth Daily for 90 days. 30 tablet 6    nitroglycerin (NITROSTAT) 0.4 MG SL tablet Place 1 tablet under the tongue Every 5 (Five) Minutes As Needed for Chest Pain (Only if SBP Greater Than 100). Take no more than 3 doses in 15 minutes. 50 tablet 0    pantoprazole (PROTONIX) 40 MG EC tablet Take 1 tablet by mouth Daily. 30 tablet 6    ranolazine (RANEXA) 500 MG 12 hr tablet Take 1 tablet by mouth 2 (Two) Times a Day. 60 tablet 0     No current facility-administered medications for this encounter.       Vaccination History:   Pneumonia: PPSV23 9/21/20; PCV20 2/21/23  Annual Influenza: UTD 24/25  Shingles: Reports UTD    Objective  Vitals:    06/26/25 1335   BP: 121/66   BP Location: Right arm   Patient Position: Sitting   Cuff Size: Adult   Pulse: 72   SpO2: 94%   Weight: 71.2 kg (157 lb)   Height: 172.7 cm (68\")           Wt Readings from Last 3 Encounters:   06/26/25 71.2 kg (157 lb)   03/24/25 68.9 kg (152 lb)   02/06/25 75.8 kg (167 lb)         06/26/25  1335   Weight: 71.2 kg (157 lb)           Lab Results   Component Value Date    GLUCOSE 89 06/26/2025    BUN 18.1 06/26/2025    CREATININE 0.99 06/26/2025    EGFRIFNONA 84 02/23/2022    EGFRIFAFRI 106 10/29/2020    BCR 18.3 06/26/2025    K 4.8 06/26/2025    CO2 27.5 06/26/2025    CALCIUM 8.5 (L) 06/26/2025    ALBUMIN 4.4 11/20/2024    AST 15 11/20/2024    ALT 17 11/20/2024     Lab Results   Component Value Date    WBC 7.82 11/20/2024    HGB 18.3 (H) " 11/20/2024    HCT 54.1 (H) 11/20/2024    MCV 96.3 11/20/2024     11/20/2024     Lab Results   Component Value Date    TROPONINT 9 11/20/2024     Lab Results   Component Value Date    PROBNP 106.0 06/26/2025     Results for orders placed during the hospital encounter of 07/25/24    Adult Transthoracic Echo Complete W/ Cont if Necessary Per Protocol    Interpretation Summary    Left ventricular ejection fraction appears to be 41 - 45%.    Left ventricular diastolic function is consistent with (grade I) impaired relaxation.    Estimated right ventricular systolic pressure from tricuspid regurgitation is normal (<35 mmHg).         GDMT    Drug Class   Drug   Dose Last Dose Adjustment Additional Titration   Notes   ACEi/ARB/ARNI Low BP    Beta Blocker Carvedilol 12.5 mg BID   Thinks he has been taking a whole tablet   SGLT2i Jardiance 10mg QD              Other pertinent meds Midodrine 5mg daily      Lasix 20mg daily      Drug Therapy Problems    Inappropriate Drug Therapy - midodrine      Recommendations:     Stopping midodrine as he is also on anti-hypertensives. I spoke with [facility] to discontinue remaining fills.     Patient was educated on heart failure medications and the importance of medication adherence. All questions were addressed and patient would benefit from additional education at each visit.     Thank you for allowing me to participate in the care of your patient,    Georgia Hartley, PharmD  6/26/2025  14:25 EDT       Odomzo Counseling- I discussed with the patient the risks of Odomzo including but not limited to nausea, vomiting, diarrhea, constipation, weight loss, changes in the sense of taste, decreased appetite, muscle spasms, and hair loss.  The patient verbalized understanding of the proper use and possible adverse effects of Odomzo.  All of the patient's questions and concerns were addressed.
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
## 2025-06-26 NOTE — PROGRESS NOTES
[facility] Failure [facility]    Date: 06/26/25     Vitals:    06/26/25 1335   BP: 121/66   Pulse: 72   SpO2: 94%    Weight 157    Method of arrival: Ambulatory with cane    Weighing self daily: No    Monitoring Heart Failure Zones: No    Today's HF Zone: Yellow     Taking medications as prescribed: Yes    Edema Yes    Shortness of Air: Yes    Number of pillows used at night:<2    Educational Materials given:  AVS                                                                         ReDS Value: 22  21-24 Low Normal      Jose Elias Mccoy MA 06/26/25 13:36 EDT

## 2025-06-26 NOTE — PATIENT INSTRUCTIONS
Heart Failure Action Plan  A heart failure action plan helps you know what to do when you have symptoms of heart failure. Your action plan is a color-coded plan that lists the symptoms to watch for and indicates what actions to take.  If you have symptoms in the green zone, you're doing well.  If you have symptoms in the yellow zone, you're having problems.  If you have symptoms in the red zone, you need medical care right away.  Follow the plan that was created by you and your health care provider. Review your plan each time you visit your provider.  Green zone  These signs mean you're doing well and can continue what you're doing:  You don't have new or worsening shortness of breath.  You have very little swelling or no new swelling.  Your weight is stable (no gain or loss).  You have a normal activity level.  You don't have chest pain or any other new symptoms.  Yellow zone  These signs and symptoms mean your condition may be getting worse and you should make some changes:  You have trouble breathing when you're active.  You have swelling in your feet or legs or have discomfort in your belly.  You gain 2-3 lb (0.9-1.4 kg) in 24 hours, or 5 lb (2.3 kg) in a week. This amount may be more or less depending on your condition.  You get tired easily.  You have trouble sleeping.  You have a dry cough.  If you have any of these symptoms:  Contact your provider within the next day.  Your provider may adjust your medicines.  Red zone  These signs and symptoms mean you should get medical help right away:  You have trouble breathing when resting or cannot lie flat and you need to raise your head to help you breathe.  You have a dry cough that's getting worse.  You have swelling or pain in your feet or legs or discomfort in your belly that's getting worse.  You suddenly gain more than 2-3 lb (0.9-1.4 kg) in 24 hours, or more than 5 lb (2.3 kg) in a week. This amount may be more or less depending on your condition.  You have  trouble staying awake or you feel confused.  You don't have an appetite.  You have worsening sadness or depression.  These symptoms may be an emergency. Call 911 right away.  Do not wait to see if the symptoms will go away.  Do not drive yourself to the hospital.  Follow these instructions at home:  Take medicines only as told.  Eat a heart-healthy diet. Work with a dietitian to create an eating plan that's best for you.  Weigh yourself each day.   Call your provider if you gain more than 3 lb in 24 hours, or more than 5 lb in a week.  Health care provider name: [facility] care provider phone number: [phone]

## 2025-07-23 ENCOUNTER — HOSPITAL ENCOUNTER (OUTPATIENT)
Dept: ULTRASOUND IMAGING | Facility: HOSPITAL | Age: 65
Discharge: HOME | End: 2025-07-23
Admitting: PHYSICIAN ASSISTANT
Payer: MEDICARE

## 2025-07-23 DIAGNOSIS — R42 DIZZINESS: ICD-10-CM

## 2025-07-23 PROCEDURE — 93880 EXTRACRANIAL BILAT STUDY: CPT

## (undated) DEVICE — RUNWAY RADL W/TOP PAD

## (undated) DEVICE — GLV SURG PREMIERPRO MIC LTX PF SZ7.5 BRN

## (undated) DEVICE — HOLDER: Brand: DEROYAL

## (undated) DEVICE — ST INF PRI SMRTSTE 20DRP 2VLV 24ML 117

## (undated) DEVICE — ADULT DISPOSABLE SINGLE-PATIENT USE PULSE OXIMETER SENSOR: Brand: NONIN

## (undated) DEVICE — TR BAND RADIAL ARTERY COMPRESSION DEVICE: Brand: TR BAND

## (undated) DEVICE — PATIENT RETURN ELECTRODE, SINGLE-USE, CONTACT QUALITY MONITORING, ADULT, WITH 9FT CORD, FOR PATIENTS WEIGING OVER 33LBS. (15KG): Brand: MEGADYNE

## (undated) DEVICE — PREMIUM WET SKIN PREP TRAY: Brand: MEDLINE INDUSTRIES, INC.

## (undated) DEVICE — ST EXT IV SMARTSITE 2VLV SP M LL 5ML IV1

## (undated) DEVICE — PK CATH CARD 70

## (undated) DEVICE — DRSNG SURESITE WNDW 4X4.5

## (undated) DEVICE — GLV SURG PREMIERPRO MIC LTX PF SZ8 BRN

## (undated) DEVICE — DRAPE, RADIAL, STERILE: Brand: MEDLINE

## (undated) DEVICE — UNDERGLV SURG BIOGEL INDICAT PF 8 GRN

## (undated) DEVICE — CATH F5 INF JR 4 100CM: Brand: INFINITI

## (undated) DEVICE — CANNULA,OXY,ADULT,SUPER SOFT,W/14'TUB,UC: Brand: MEDLINE INDUSTRIES, INC.

## (undated) DEVICE — GLIDESHEATH SLENDER STAINLESS STEEL KIT: Brand: GLIDESHEATH SLENDER

## (undated) DEVICE — Device

## (undated) DEVICE — SKIN PREP TRAY W/CHG: Brand: MEDLINE INDUSTRIES, INC.

## (undated) DEVICE — SUT MNCRYL 4/0 PS2 18 IN

## (undated) DEVICE — CATH F5 INF JL 3.5 100CM: Brand: INFINITI

## (undated) DEVICE — TBG PENCL TELESCP MEGADYNE SMOKE EVAC 10FT

## (undated) DEVICE — RADIFOCUS GLIDEWIRE: Brand: GLIDEWIRE

## (undated) DEVICE — ANTIBACTERIAL UNDYED BRAIDED (POLYGLACTIN 910), SYNTHETIC ABSORBABLE SUTURE: Brand: COATED VICRYL

## (undated) DEVICE — PK HD AND NK 70

## (undated) DEVICE — CATH F5 INF PIG145 110CM 6SH: Brand: INFINITI